# Patient Record
Sex: MALE | Race: WHITE | NOT HISPANIC OR LATINO | Employment: PART TIME | URBAN - METROPOLITAN AREA
[De-identification: names, ages, dates, MRNs, and addresses within clinical notes are randomized per-mention and may not be internally consistent; named-entity substitution may affect disease eponyms.]

---

## 2017-02-08 RX ORDER — PRAVASTATIN SODIUM 40 MG
TABLET ORAL
Qty: 90 TABLET | Refills: 3 | Status: SHIPPED | OUTPATIENT
Start: 2017-02-08 | End: 2017-03-24 | Stop reason: SDUPTHER

## 2017-03-14 RX ORDER — BISOPROLOL FUMARATE 5 MG/1
5 TABLET, FILM COATED ORAL DAILY
Qty: 90 TABLET | Refills: 2 | Status: SHIPPED | OUTPATIENT
Start: 2017-03-14 | End: 2017-08-03 | Stop reason: SDUPTHER

## 2017-03-14 RX ORDER — CLONAZEPAM 1 MG/1
1 TABLET ORAL NIGHTLY PRN
Qty: 30 TABLET | Refills: 2 | OUTPATIENT
Start: 2017-03-14 | End: 2017-04-10

## 2017-03-14 NOTE — TELEPHONE ENCOUNTER
Nelly from [facility] requesting refill for patient Jimmie Salcedo : 1950 for RX Clonazepam 1mg no refills take one tablet by mouth for seizures. Patient was last seen 10/25/2016 and has made a f/u appointment for 2017.     Called in refill for Clonazepam to Patients pharmacy. There were not suppose to be any refills so when I called it in i said 0 refills instead of the 2 refills that are on the order.

## 2017-03-15 ENCOUNTER — OFFICE VISIT (OUTPATIENT)
Dept: INTERNAL MEDICINE | Facility: CLINIC | Age: 67
End: 2017-03-15

## 2017-03-15 VITALS
HEIGHT: 71 IN | WEIGHT: 215 LBS | BODY MASS INDEX: 30.1 KG/M2 | HEART RATE: 64 BPM | SYSTOLIC BLOOD PRESSURE: 140 MMHG | DIASTOLIC BLOOD PRESSURE: 75 MMHG

## 2017-03-15 DIAGNOSIS — F51.04 PSYCHOPHYSIOLOGICAL INSOMNIA: ICD-10-CM

## 2017-03-15 DIAGNOSIS — D69.6 THROMBOCYTOPENIA (HCC): ICD-10-CM

## 2017-03-15 DIAGNOSIS — E80.4 GILBERT'S DISEASE: ICD-10-CM

## 2017-03-15 DIAGNOSIS — M19.90 ARTHRITIS: ICD-10-CM

## 2017-03-15 DIAGNOSIS — I10 ESSENTIAL HYPERTENSION: ICD-10-CM

## 2017-03-15 DIAGNOSIS — G47.33 SEVERE OBSTRUCTIVE SLEEP APNEA: ICD-10-CM

## 2017-03-15 DIAGNOSIS — N52.1 ERECTILE DYSFUNCTION DUE TO DISEASES CLASSIFIED ELSEWHERE: ICD-10-CM

## 2017-03-15 DIAGNOSIS — E80.6 HYPERBILIRUBINEMIA: ICD-10-CM

## 2017-03-15 DIAGNOSIS — I51.89 DIASTOLIC DYSFUNCTION: Primary | ICD-10-CM

## 2017-03-15 DIAGNOSIS — R79.89 ABNORMAL LIVER FUNCTION TEST: ICD-10-CM

## 2017-03-15 DIAGNOSIS — E78.49 OTHER HYPERLIPIDEMIA: ICD-10-CM

## 2017-03-15 DIAGNOSIS — L40.9 PSORIASIS: ICD-10-CM

## 2017-03-15 DIAGNOSIS — K21.9 GASTROESOPHAGEAL REFLUX DISEASE WITHOUT ESOPHAGITIS: ICD-10-CM

## 2017-03-15 DIAGNOSIS — R60.0 BILATERAL EDEMA OF LOWER EXTREMITY: ICD-10-CM

## 2017-03-15 DIAGNOSIS — K27.9 PEPTIC ULCERATION: ICD-10-CM

## 2017-03-15 DIAGNOSIS — R73.09 ABNORMAL GLUCOSE: ICD-10-CM

## 2017-03-15 LAB
ALBUMIN SERPL-MCNC: 4.2 G/DL (ref 3.2–4.8)
ALBUMIN/GLOB SERPL: 1.5 G/DL (ref 1.5–2.5)
ALP SERPL-CCNC: 100 U/L (ref 25–100)
ALT SERPL W P-5'-P-CCNC: 26 U/L (ref 7–40)
ANION GAP SERPL CALCULATED.3IONS-SCNC: 5 MMOL/L (ref 3–11)
ARTICHOKE IGE QN: 68 MG/DL (ref 0–130)
AST SERPL-CCNC: 22 U/L (ref 0–33)
BILIRUB CONJ SERPL-MCNC: 0.4 MG/DL (ref 0–0.2)
BILIRUB SERPL-MCNC: 2.3 MG/DL (ref 0.3–1.2)
BUN BLD-MCNC: 23 MG/DL (ref 9–23)
BUN/CREAT SERPL: 38.3 (ref 7–25)
CALCIUM SPEC-SCNC: 9.5 MG/DL (ref 8.7–10.4)
CHLORIDE SERPL-SCNC: 107 MMOL/L (ref 99–109)
CHOLEST SERPL-MCNC: 163 MG/DL (ref 0–200)
CO2 SERPL-SCNC: 29 MMOL/L (ref 20–31)
CREAT BLD-MCNC: 0.6 MG/DL (ref 0.6–1.3)
DEPRECATED RDW RBC AUTO: 47.7 FL (ref 37–54)
ERYTHROCYTE [DISTWIDTH] IN BLOOD BY AUTOMATED COUNT: 15 % (ref 11.3–14.5)
GFR SERPL CREATININE-BSD FRML MDRD: 136 ML/MIN/1.73
GLOBULIN UR ELPH-MCNC: 2.8 GM/DL
GLUCOSE BLD-MCNC: 117 MG/DL (ref 70–100)
HBA1C MFR BLD: 5.3 % (ref 4.8–5.6)
HCT VFR BLD AUTO: 48.9 % (ref 38.9–50.9)
HDLC SERPL-MCNC: 42 MG/DL (ref 40–60)
HGB BLD-MCNC: 17.5 G/DL (ref 13.1–17.5)
MCH RBC QN AUTO: 31.1 PG (ref 27–31)
MCHC RBC AUTO-ENTMCNC: 35.8 G/DL (ref 32–36)
MCV RBC AUTO: 86.9 FL (ref 80–99)
PLATELET # BLD AUTO: 111 10*3/MM3 (ref 150–450)
PMV BLD AUTO: 9.8 FL (ref 6–12)
POTASSIUM BLD-SCNC: 4 MMOL/L (ref 3.5–5.5)
PROT SERPL-MCNC: 7 G/DL (ref 5.7–8.2)
RBC # BLD AUTO: 5.63 10*6/MM3 (ref 4.2–5.76)
SODIUM BLD-SCNC: 141 MMOL/L (ref 132–146)
TRIGL SERPL-MCNC: 311 MG/DL (ref 0–150)
WBC NRBC COR # BLD: 8.34 10*3/MM3 (ref 3.5–10.8)

## 2017-03-15 PROCEDURE — 80053 COMPREHEN METABOLIC PANEL: CPT | Performed by: INTERNAL MEDICINE

## 2017-03-15 PROCEDURE — 80061 LIPID PANEL: CPT | Performed by: INTERNAL MEDICINE

## 2017-03-15 PROCEDURE — 36415 COLL VENOUS BLD VENIPUNCTURE: CPT | Performed by: INTERNAL MEDICINE

## 2017-03-15 PROCEDURE — 85027 COMPLETE CBC AUTOMATED: CPT | Performed by: INTERNAL MEDICINE

## 2017-03-15 PROCEDURE — 99214 OFFICE O/P EST MOD 30 MIN: CPT | Performed by: INTERNAL MEDICINE

## 2017-03-15 PROCEDURE — 83036 HEMOGLOBIN GLYCOSYLATED A1C: CPT | Performed by: INTERNAL MEDICINE

## 2017-03-15 PROCEDURE — 82248 BILIRUBIN DIRECT: CPT | Performed by: INTERNAL MEDICINE

## 2017-03-15 RX ORDER — SILDENAFIL 100 MG/1
100 TABLET, FILM COATED ORAL DAILY PRN
Qty: 6 TABLET | Refills: 5 | Status: SHIPPED | OUTPATIENT
Start: 2017-03-15

## 2017-03-15 NOTE — PROGRESS NOTES
Patient is a 66 y.o. male who is here for a follow up of chronic conditions.  Chief Complaint   Patient presents with   • Hypertension   • Hyperlipidemia         HPI:  Here for f/u.  No dizziness or lightheadedness.  Occasional R>L edema.  No nausea or emesis.  No dyspepsia.  Rare bruising.  Sleep is fair with the use of BZD.  No sob or cp.      History:    Patient Active Problem List   Diagnosis   • Acid reflux   • Arthritis   • Hypertension   • Hyperlipidemia   • Severe obstructive sleep apnea   • Psoriasis   • Diastolic dysfunction   • Peptic ulceration   • Bilateral edema of lower extremity   • Abnormal liver function test   • Hyperbilirubinemia   • Psychophysiological insomnia   • Gilbert's disease       Past Medical History   Diagnosis Date   • Abnormal liver function test    • Bilateral edema of lower extremity    • Cellulitis of leg, right    • Chalazion    • Kidney infection    • Neck muscle spasm    • Onychomycosis of toenail    • Peptic ulceration    • Renal calculi        Past Surgical History   Procedure Laterality Date   • Knee surgery Right 1989   • Other surgical history       Lithotripsy       Current Outpatient Prescriptions on File Prior to Visit   Medication Sig   • amLODIPine (NORVASC) 5 MG tablet Take 1 tablet by mouth daily.   • ammonium lactate (LAC-HYDRIN) 12 % lotion Apply  topically As Needed for dry skin.   • bisoprolol (ZEBeta) 5 MG tablet Take 1 tablet by mouth Daily.   • calcipotriene (DOVONOX) 0.005 % ointment Apply  topically.   • clobetasol (TEMOVATE) 0.05 % cream    • clonazePAM (KlonoPIN) 1 MG tablet Take 1 tablet by mouth At Night As Needed (insomnia).   • doxazosin (CARDURA) 4 MG tablet Take 1 tablet by mouth Every Night.   • furosemide (LASIX) 40 MG tablet daily.   • Naproxen-Esomeprazole (VIMOVO) 500-20 MG tablet delayed-release Take 1 tablet by mouth 2 (Two) Times a Day As Needed (pain).   • potassium chloride (K-DUR,KLOR-CON) 20 MEQ CR tablet daily.   • pravastatin  (PRAVACHOL) 40 MG tablet TAKE ONE TABLET BY MOUTH AT BEDTIME   • tiZANidine (ZANAFLEX) 4 MG tablet TAKE ONE TABLET BY MOUTH AT BEDTIME   • [DISCONTINUED] omeprazole (PRILOSEC) 20 MG capsule Take 1 capsule by mouth 2 (Two) Times a Day.     No current facility-administered medications on file prior to visit.        Family History   Problem Relation Age of Onset   • Arthritis Other    • Hypertension Other    • Hyperlipidemia Other    • Heart attack Other    • Hypertension Mother    • Heart disease Father    • Hypertension Father        Social History     Social History   • Marital status:      Spouse name: N/A   • Number of children: N/A   • Years of education: N/A     Occupational History   • Not on file.     Social History Main Topics   • Smoking status: Never Smoker   • Smokeless tobacco: Never Used   • Alcohol use Yes   • Drug use: No   • Sexual activity: Not on file     Other Topics Concern   • Not on file     Social History Narrative         ROS:    Review of Systems   Constitutional: Negative for chills, fatigue, fever and unexpected weight change.   HENT: Negative for congestion, ear pain, hearing loss, rhinorrhea, sinus pressure, sore throat and trouble swallowing.    Eyes: Negative for discharge and itching.   Respiratory: Negative for cough, chest tightness and shortness of breath.    Cardiovascular: Positive for leg swelling. Negative for chest pain and palpitations.   Gastrointestinal: Negative for abdominal pain, blood in stool, constipation, diarrhea and vomiting.        2013 colonoscopy normal   Endocrine: Negative for polydipsia and polyuria.   Genitourinary: Negative for difficulty urinating, dysuria, enuresis, frequency, hematuria and urgency.        11/16 psa  ED   Musculoskeletal: Positive for arthralgias and neck pain. Negative for back pain, gait problem and joint swelling.   Skin: Negative for rash and wound.        Dry skin   Allergic/Immunologic: Negative for immunocompromised state.  "  Neurological: Negative for dizziness, syncope, weakness, light-headedness, numbness and headaches.   Hematological: Does not bruise/bleed easily.   Psychiatric/Behavioral: Negative for behavioral problems, dysphoric mood and sleep disturbance. The patient is not nervous/anxious.        Visit Vitals   • /75   • Pulse 64   • Ht 71\" (180.3 cm)   • Wt 215 lb (97.5 kg)   • BMI 29.99 kg/m2       Physical Exam:    Physical Exam   Constitutional: He is oriented to person, place, and time. He appears well-developed and well-nourished.   HENT:   Head: Normocephalic and atraumatic.   Right Ear: External ear normal.   Left Ear: External ear normal.   Mouth/Throat: Oropharynx is clear and moist.   Eyes: Conjunctivae and EOM are normal. Pupils are equal, round, and reactive to light.   Neck: Normal range of motion. Neck supple.   Cardiovascular: Normal rate, regular rhythm, normal heart sounds and intact distal pulses.    Pulmonary/Chest: Effort normal and breath sounds normal.   Abdominal: Soft. Bowel sounds are normal.   Genitourinary: Rectum normal, prostate normal and penis normal. Rectal exam shows guaiac negative stool. No penile tenderness.   Musculoskeletal: Normal range of motion. He exhibits edema (trace R>L).   Neurological: He is alert and oriented to person, place, and time. He has normal reflexes.   Skin: Skin is warm and dry.   Dry skin   Psychiatric: He has a normal mood and affect. His behavior is normal. Judgment and thought content normal.   Vitals reviewed.      Procedure:      Discussion/Summary:  htn-improved with increase of doxazosin to 4 mg  hyperlipidemia-cont pravachol, labs today  BPH-cont cardura  GERD-cont ppi  djd-advised to limit nsaids  neck spasm-zanaflex prn  edema-cont lasix  Hyperbilirubinemia-recheck today incl the direct  Thrombocytopenia-recheck today  ED-viagra rx  ?[place]-will fractionate the bilirubin  high risk meds-labs today      labs noted and dw patient      Current " Outpatient Prescriptions:   •  amLODIPine (NORVASC) 5 MG tablet, Take 1 tablet by mouth daily., Disp: 90 tablet, Rfl: 3  •  ammonium lactate (LAC-HYDRIN) 12 % lotion, Apply  topically As Needed for dry skin., Disp: 567 g, Rfl: 2  •  bisoprolol (ZEBeta) 5 MG tablet, Take 1 tablet by mouth Daily., Disp: 90 tablet, Rfl: 2  •  calcipotriene (DOVONOX) 0.005 % ointment, Apply  topically., Disp: , Rfl:   •  clobetasol (TEMOVATE) 0.05 % cream, , Disp: , Rfl:   •  clonazePAM (KlonoPIN) 1 MG tablet, Take 1 tablet by mouth At Night As Needed (insomnia)., Disp: 30 tablet, Rfl: 2  •  doxazosin (CARDURA) 4 MG tablet, Take 1 tablet by mouth Every Night., Disp: 90 tablet, Rfl: 3  •  esomeprazole (nexIUM) 20 MG capsule, , Disp: , Rfl:   •  furosemide (LASIX) 40 MG tablet, daily., Disp: , Rfl:   •  Naproxen-Esomeprazole (VIMOVO) 500-20 MG tablet delayed-release, Take 1 tablet by mouth 2 (Two) Times a Day As Needed (pain)., Disp: 60 tablet, Rfl: 2  •  potassium chloride (K-DUR,KLOR-CON) 20 MEQ CR tablet, daily., Disp: , Rfl:   •  pravastatin (PRAVACHOL) 40 MG tablet, TAKE ONE TABLET BY MOUTH AT BEDTIME, Disp: 90 tablet, Rfl: 3  •  tiZANidine (ZANAFLEX) 4 MG tablet, TAKE ONE TABLET BY MOUTH AT BEDTIME, Disp: 30 tablet, Rfl: 5  •  sildenafil (VIAGRA) 100 MG tablet, Take 1 tablet by mouth Daily As Needed for erectile dysfunction., Disp: 6 tablet, Rfl: 5        Jimmie was seen today for hypertension and hyperlipidemia.    Diagnoses and all orders for this visit:    Diastolic dysfunction    Other hyperlipidemia  -     Comprehensive Metabolic Panel  -     Lipid Panel    Essential hypertension    Severe obstructive sleep apnea    Gastroesophageal reflux disease without esophagitis    Peptic ulceration    Arthritis    Psoriasis    Abnormal liver function test    Bilateral edema of lower extremity    Hyperbilirubinemia  -     Bilirubin, Direct    Psychophysiological insomnia    Thrombocytopenia  -     CBC (No Diff)    Erectile dysfunction due  to diseases classified elsewhere  -     sildenafil (VIAGRA) 100 MG tablet; Take 1 tablet by mouth Daily As Needed for erectile dysfunction.    Abnormal glucose  -     Hemoglobin A1c    Gilbert's disease

## 2017-03-24 DIAGNOSIS — I10 ESSENTIAL HYPERTENSION: ICD-10-CM

## 2017-03-24 RX ORDER — DOXAZOSIN MESYLATE 4 MG/1
4 TABLET ORAL NIGHTLY
Qty: 90 TABLET | Refills: 3 | Status: SHIPPED | OUTPATIENT
Start: 2017-03-24 | End: 2017-08-03 | Stop reason: SDUPTHER

## 2017-03-24 RX ORDER — PRAVASTATIN SODIUM 40 MG
40 TABLET ORAL DAILY
Qty: 90 TABLET | Refills: 3 | Status: SHIPPED | OUTPATIENT
Start: 2017-03-24 | End: 2017-08-03 | Stop reason: SDUPTHER

## 2017-03-24 RX ORDER — AMLODIPINE BESYLATE 5 MG/1
5 TABLET ORAL DAILY
Qty: 90 TABLET | Refills: 3 | Status: SHIPPED | OUTPATIENT
Start: 2017-03-24 | End: 2017-03-24 | Stop reason: SDUPTHER

## 2017-03-24 RX ORDER — TIZANIDINE 4 MG/1
4 TABLET ORAL DAILY
Qty: 90 TABLET | Refills: 3 | Status: SHIPPED | OUTPATIENT
Start: 2017-03-24 | End: 2017-08-03 | Stop reason: SDUPTHER

## 2017-03-24 RX ORDER — AMLODIPINE BESYLATE 5 MG/1
5 TABLET ORAL DAILY
Qty: 90 TABLET | Refills: 3 | Status: SHIPPED | OUTPATIENT
Start: 2017-03-24 | End: 2017-08-03 | Stop reason: SDUPTHER

## 2017-03-30 ENCOUNTER — TELEPHONE (OUTPATIENT)
Dept: INTERNAL MEDICINE | Facility: CLINIC | Age: 67
End: 2017-03-30

## 2017-03-30 NOTE — TELEPHONE ENCOUNTER
----- Message from Javad Negron MD sent at 3/30/2017  4:35 PM EDT -----  Regarding: RE: MENTAL HEALTH ISSUES  The pulmonary/sleep MD is rxing that  ----- Message -----     From: Jackie Zapata MA     Sent: 3/30/2017   4:22 PM       To: Javad Negron MD  Subject: FW: MENTAL HEALTH ISSUES                         Spoke to wife....... She states that years ago he had a complete mental breakdown and took him like 2 years to get better. She is noticing some of the same symptoms. He is very defensive, short tempered and she is very concerned.  She wants you to know tricia does not know she is calling  ----- Message -----     From: Ghazal Pelletier     Sent: 3/30/2017   2:00 PM       To: Jackie Zapata MA  Subject: MENTAL HEALTH ISSUES                             YURIY PATIENTS WIFE IS VERY CONCERNED  ABOUT MEDICATION CLONIAZEPAM THAT PATIENT IS ON. SHE HAS MENTAL HEALTH CONCERNS AND SHE THINKS ITS BECAUSE OF THE MEDICATION. PATIENT DOES NOT KNOW HIS WIFE IS CALLING. PLEASE CALL [facility] [phone]      Notified her to call sleep doctor

## 2017-03-31 ENCOUNTER — TELEPHONE (OUTPATIENT)
Dept: SLEEP MEDICINE | Facility: HOSPITAL | Age: 67
End: 2017-03-31

## 2017-03-31 NOTE — TELEPHONE ENCOUNTER
Patient wife called and she feels like every since patient has been on clonazepam his behavior has been changing.    - extreme irritability  - manic like: unable to function normally   - jaime  - eyes look grey and lifeless     Feels like he has completely changed since on the medication.    Patient wife wants your medical advice if they should discontinue the medication.     Wife states that he doesn't know that he is calling and that she wants me to call her back with a response.     [phone] Silvana Salcedo

## 2017-04-10 ENCOUNTER — OFFICE VISIT (OUTPATIENT)
Dept: SLEEP MEDICINE | Facility: HOSPITAL | Age: 67
End: 2017-04-10

## 2017-04-10 VITALS
SYSTOLIC BLOOD PRESSURE: 156 MMHG | BODY MASS INDEX: 31.64 KG/M2 | DIASTOLIC BLOOD PRESSURE: 58 MMHG | HEART RATE: 56 BPM | WEIGHT: 226 LBS | HEIGHT: 71 IN | OXYGEN SATURATION: 91 %

## 2017-04-10 DIAGNOSIS — F51.04 PSYCHOPHYSIOLOGICAL INSOMNIA: ICD-10-CM

## 2017-04-10 DIAGNOSIS — G47.33 SEVERE OBSTRUCTIVE SLEEP APNEA: ICD-10-CM

## 2017-04-10 DIAGNOSIS — G47.33 OBSTRUCTIVE SLEEP APNEA, ADULT: Primary | ICD-10-CM

## 2017-04-10 PROCEDURE — 99214 OFFICE O/P EST MOD 30 MIN: CPT | Performed by: INTERNAL MEDICINE

## 2017-04-10 NOTE — PROGRESS NOTES
Subjective:     Chief Complaint:   Chief Complaint   Patient presents with   • Follow-up       HPI:    Jimmie Salcedo is a 66 y.o. male here for follow-up of obstructive sleep apnea and psychophysiological insomnia.      He has been prescribed clonazepam to help with sleep.  He thinks that causes him to be excessively somnolent following day.  His wife has noticed this as well.  He has not used it for the last week.    I ask him what he notices when he does not take the clonazepam and he said he simply wakes up several times per night.  It does not necessarily take him an excessive amount of time to return to sleep.    Since starting PAP sleep problem has: increased  Currently using PAP: yes Hours of usage during the night: 6    Amount of sleep per night : 6 hours  Average length of time it takes to fall asleep : 10 minutes  Number of awakenings per night : 1     He feels fatigue (tiredness, exhaustion, lethargy) in the daytime even when not sleepy? Occasionally   He feels sleepy (or struggles to stay awake) in the daytime? Occasionally     [place] Scale scored as 9/24.    Type of mask: nasal pillow    He (since starting PAP or since last visit) has problems with the following:   Pressure from the mask 0 - No Problem  Skin irritation from the mask 0 - No Problem  Mask coming off face 2 - Mild Problems  Air leaks from the mask 2 - Mild Problems  Dry mouth or throat 8 - Severe Problems  Nasal congestion 2 - Mild Problems    I reviewed his PAP download:  Average pressure: 10  Average AHI:  1  Average minutes in large leak per night: 0      Current medications are:   Current Outpatient Prescriptions:   •  amLODIPine (NORVASC) 5 MG tablet, Take 1 tablet by mouth Daily., Disp: 90 tablet, Rfl: 3  •  ammonium lactate (LAC-HYDRIN) 12 % lotion, Apply  topically As Needed for dry skin., Disp: 567 g, Rfl: 2  •  bisoprolol (ZEBeta) 5 MG tablet, Take 1 tablet by mouth Daily., Disp: 90 tablet, Rfl: 2  •  calcipotriene  (DOVONOX) 0.005 % ointment, Apply  topically., Disp: , Rfl:   •  clobetasol (TEMOVATE) 0.05 % cream, , Disp: , Rfl:   •  doxazosin (CARDURA) 4 MG tablet, Take 1 tablet by mouth Every Night., Disp: 90 tablet, Rfl: 3  •  esomeprazole (nexIUM) 20 MG capsule, , Disp: , Rfl:   •  furosemide (LASIX) 40 MG tablet, daily., Disp: , Rfl:   •  Naproxen-Esomeprazole (VIMOVO) 500-20 MG tablet delayed-release, Take 1 tablet by mouth 2 (Two) Times a Day As Needed (pain)., Disp: 60 tablet, Rfl: 2  •  potassium chloride (K-DUR,KLOR-CON) 20 MEQ CR tablet, daily., Disp: , Rfl:   •  pravastatin (PRAVACHOL) 40 MG tablet, Take 1 tablet by mouth Daily., Disp: 90 tablet, Rfl: 3  •  tiZANidine (ZANAFLEX) 4 MG tablet, Take 1 tablet by mouth Daily., Disp: 90 tablet, Rfl: 3  •  sildenafil (VIAGRA) 100 MG tablet, Take 1 tablet by mouth Daily As Needed for erectile dysfunction., Disp: 6 tablet, Rfl: 5.      The patient's relevant past medical, surgical, family and social history were reviewed and updated in Epic as appropriate.     ROS:    Review of Systems   Constitutional: Positive for fatigue.   HENT: Positive for congestion.    Respiratory: Positive for apnea.    Psychiatric/Behavioral: Positive for sleep disturbance.         Objective:    Physical Exam   Constitutional: He is oriented to person, place, and time. He appears well-developed and well-nourished.   HENT:   Head: Normocephalic and atraumatic.   Mouth/Throat: Oropharynx is clear and moist.   Class II airway   Neck: Neck supple. No thyromegaly present.   Cardiovascular: Normal rate and regular rhythm.  Exam reveals no gallop and no friction rub.    No murmur heard.  Pulmonary/Chest: Effort normal. No respiratory distress. He has no wheezes. He has no rales.   Abdominal: Soft. Bowel sounds are normal.   Musculoskeletal: He exhibits no edema.   Neurological: He is alert and oriented to person, place, and time.   Skin: Skin is warm and dry.   Psychiatric: He has a normal mood and  affect. His behavior is normal.   Vitals reviewed.      Data:    Patient's PAP download was personally reviewed including raw data and results.    Assessment:    Problem List Items Addressed This Visit        Pulmonary Problems    Severe obstructive sleep apnea    Overview     Auto CPAP            Other    Psychophysiological insomnia      Other Visit Diagnoses     Obstructive sleep apnea, adult    -  Primary    Relevant Orders    CPAP Therapy        I would recommend he not take the clonazepam and I would not necessarily replace it with another sleep aid at this point.  I think with concentration on sleep hygiene and, if that is not fully effective, cognitive behavioral therapy, would be preferable in his case.      Plan:   I went over sleep hygiene in detail with him.  I gave him some information on online cognitive behavioral therapy if he wants to go that route.  Otherwise we could refer him locally.  No change in PAP settings.  No change in his mask size or type.  Continued efforts at further weight loss.  I urged 7-8 hours of sleep per night on average.  I renewed supplies for the next year.  Follow-up scheduled.  If problems in the interim he knows how to contact us or his [employer] company.  Discussed the above in detail with the patient and any family present.        Signed by  Segun Mejia MD

## 2017-04-14 ENCOUNTER — TRANSCRIBE ORDERS (OUTPATIENT)
Dept: LAB | Facility: HOSPITAL | Age: 67
End: 2017-04-14

## 2017-04-14 ENCOUNTER — LAB (OUTPATIENT)
Dept: LAB | Facility: HOSPITAL | Age: 67
End: 2017-04-14

## 2017-04-14 DIAGNOSIS — L03.115 CELLULITIS OF RIGHT FOOT: ICD-10-CM

## 2017-04-14 DIAGNOSIS — R50.9 HYPERTHERMIA-INDUCED DEFECT: ICD-10-CM

## 2017-04-14 DIAGNOSIS — D69.6 THROMBOCYTOPENIA, UNSPECIFIED (HCC): Primary | ICD-10-CM

## 2017-04-14 DIAGNOSIS — D69.6 THROMBOCYTOPENIA, UNSPECIFIED (HCC): ICD-10-CM

## 2017-04-14 LAB
ALBUMIN SERPL-MCNC: 3.8 G/DL (ref 3.2–4.8)
ALBUMIN/GLOB SERPL: 1.4 G/DL (ref 1.5–2.5)
ALP SERPL-CCNC: 81 U/L (ref 25–100)
ALT SERPL W P-5'-P-CCNC: 32 U/L (ref 7–40)
ANION GAP SERPL CALCULATED.3IONS-SCNC: 1 MMOL/L (ref 3–11)
AST SERPL-CCNC: 29 U/L (ref 0–33)
BASOPHILS # BLD AUTO: 0.02 10*3/MM3 (ref 0–0.2)
BASOPHILS NFR BLD AUTO: 0.3 % (ref 0–1)
BILIRUB SERPL-MCNC: 3.1 MG/DL (ref 0.3–1.2)
BUN BLD-MCNC: 27 MG/DL (ref 9–23)
BUN/CREAT SERPL: 30 (ref 7–25)
CALCIUM SPEC-SCNC: 9 MG/DL (ref 8.7–10.4)
CHLORIDE SERPL-SCNC: 104 MMOL/L (ref 99–109)
CK SERPL-CCNC: 72 U/L (ref 26–174)
CO2 SERPL-SCNC: 30 MMOL/L (ref 20–31)
CREAT BLD-MCNC: 0.9 MG/DL (ref 0.6–1.3)
CRP SERPL-MCNC: 25.19 MG/DL (ref 0–1)
DEPRECATED RDW RBC AUTO: 50.3 FL (ref 37–54)
EOSINOPHIL # BLD AUTO: 0.04 10*3/MM3 (ref 0.1–0.3)
EOSINOPHIL NFR BLD AUTO: 0.6 % (ref 0–3)
ERYTHROCYTE [DISTWIDTH] IN BLOOD BY AUTOMATED COUNT: 15.5 % (ref 11.3–14.5)
ERYTHROCYTE [SEDIMENTATION RATE] IN BLOOD: 15 MM/HR (ref 0–20)
GFR SERPL CREATININE-BSD FRML MDRD: 85 ML/MIN/1.73
GLOBULIN UR ELPH-MCNC: 2.8 GM/DL
GLUCOSE BLD-MCNC: 118 MG/DL (ref 70–100)
HCT VFR BLD AUTO: 44.6 % (ref 38.9–50.9)
HGB BLD-MCNC: 15.6 G/DL (ref 13.1–17.5)
IMM GRANULOCYTES # BLD: 0.02 10*3/MM3 (ref 0–0.03)
IMM GRANULOCYTES NFR BLD: 0.3 % (ref 0–0.6)
LYMPHOCYTES # BLD AUTO: 0.49 10*3/MM3 (ref 0.6–4.8)
LYMPHOCYTES NFR BLD AUTO: 6.9 % (ref 24–44)
MCH RBC QN AUTO: 31.3 PG (ref 27–31)
MCHC RBC AUTO-ENTMCNC: 35 G/DL (ref 32–36)
MCV RBC AUTO: 89.6 FL (ref 80–99)
MONOCYTES # BLD AUTO: 0.39 10*3/MM3 (ref 0–1)
MONOCYTES NFR BLD AUTO: 5.5 % (ref 0–12)
NEUTROPHILS # BLD AUTO: 6.1 10*3/MM3 (ref 1.5–8.3)
NEUTROPHILS NFR BLD AUTO: 86.4 % (ref 41–71)
PLATELET # BLD AUTO: 80 10*3/MM3 (ref 150–450)
PMV BLD AUTO: 9.7 FL (ref 6–12)
POTASSIUM BLD-SCNC: 3.8 MMOL/L (ref 3.5–5.5)
PROT SERPL-MCNC: 6.6 G/DL (ref 5.7–8.2)
RBC # BLD AUTO: 4.98 10*6/MM3 (ref 4.2–5.76)
SODIUM BLD-SCNC: 135 MMOL/L (ref 132–146)
WBC NRBC COR # BLD: 7.06 10*3/MM3 (ref 3.5–10.8)

## 2017-04-14 PROCEDURE — 36415 COLL VENOUS BLD VENIPUNCTURE: CPT

## 2017-04-14 PROCEDURE — 85652 RBC SED RATE AUTOMATED: CPT

## 2017-04-14 PROCEDURE — 85025 COMPLETE CBC W/AUTO DIFF WBC: CPT

## 2017-04-14 PROCEDURE — 86140 C-REACTIVE PROTEIN: CPT

## 2017-04-14 PROCEDURE — 80053 COMPREHEN METABOLIC PANEL: CPT

## 2017-04-14 PROCEDURE — 82550 ASSAY OF CK (CPK): CPT

## 2017-04-17 ENCOUNTER — TRANSCRIBE ORDERS (OUTPATIENT)
Dept: LAB | Facility: HOSPITAL | Age: 67
End: 2017-04-17

## 2017-04-17 ENCOUNTER — APPOINTMENT (OUTPATIENT)
Dept: LAB | Facility: HOSPITAL | Age: 67
End: 2017-04-17

## 2017-04-17 DIAGNOSIS — R50.9 HYPERTHERMIA-INDUCED DEFECT: ICD-10-CM

## 2017-04-17 DIAGNOSIS — R51.9 FACIAL PAIN: ICD-10-CM

## 2017-04-17 DIAGNOSIS — L03.115 CELLULITIS OF RIGHT FOOT: ICD-10-CM

## 2017-04-17 DIAGNOSIS — D69.6 THROMBOCYTOPENIA, UNSPECIFIED (HCC): Primary | ICD-10-CM

## 2017-04-17 LAB
ALBUMIN SERPL-MCNC: 4.1 G/DL (ref 3.2–4.8)
ALBUMIN/GLOB SERPL: 1.4 G/DL (ref 1.5–2.5)
ALP SERPL-CCNC: 77 U/L (ref 25–100)
ALT SERPL W P-5'-P-CCNC: 34 U/L (ref 7–40)
ANION GAP SERPL CALCULATED.3IONS-SCNC: 6 MMOL/L (ref 3–11)
AST SERPL-CCNC: 32 U/L (ref 0–33)
BASOPHILS # BLD AUTO: 0.04 10*3/MM3 (ref 0–0.2)
BASOPHILS NFR BLD AUTO: 0.5 % (ref 0–1)
BILIRUB SERPL-MCNC: 1.4 MG/DL (ref 0.3–1.2)
BUN BLD-MCNC: 20 MG/DL (ref 9–23)
BUN/CREAT SERPL: 28.6 (ref 7–25)
CALCIUM SPEC-SCNC: 9.6 MG/DL (ref 8.7–10.4)
CHLORIDE SERPL-SCNC: 104 MMOL/L (ref 99–109)
CO2 SERPL-SCNC: 30 MMOL/L (ref 20–31)
CREAT BLD-MCNC: 0.7 MG/DL (ref 0.6–1.3)
CRP SERPL-MCNC: 3.61 MG/DL (ref 0–1)
DEPRECATED RDW RBC AUTO: 50.1 FL (ref 37–54)
EOSINOPHIL # BLD AUTO: 0.23 10*3/MM3 (ref 0.1–0.3)
EOSINOPHIL NFR BLD AUTO: 2.9 % (ref 0–3)
ERYTHROCYTE [DISTWIDTH] IN BLOOD BY AUTOMATED COUNT: 15.1 % (ref 11.3–14.5)
ERYTHROCYTE [SEDIMENTATION RATE] IN BLOOD: 14 MM/HR (ref 0–20)
GFR SERPL CREATININE-BSD FRML MDRD: 114 ML/MIN/1.73
GLOBULIN UR ELPH-MCNC: 2.9 GM/DL
GLUCOSE BLD-MCNC: 130 MG/DL (ref 70–100)
HCT VFR BLD AUTO: 46.4 % (ref 38.9–50.9)
HGB BLD-MCNC: 16 G/DL (ref 13.1–17.5)
IMM GRANULOCYTES # BLD: 0.04 10*3/MM3 (ref 0–0.03)
IMM GRANULOCYTES NFR BLD: 0.5 % (ref 0–0.6)
LYMPHOCYTES # BLD AUTO: 1.39 10*3/MM3 (ref 0.6–4.8)
LYMPHOCYTES NFR BLD AUTO: 17.5 % (ref 24–44)
MCH RBC QN AUTO: 31.2 PG (ref 27–31)
MCHC RBC AUTO-ENTMCNC: 34.5 G/DL (ref 32–36)
MCV RBC AUTO: 90.4 FL (ref 80–99)
MONOCYTES # BLD AUTO: 0.58 10*3/MM3 (ref 0–1)
MONOCYTES NFR BLD AUTO: 7.3 % (ref 0–12)
NEUTROPHILS # BLD AUTO: 5.67 10*3/MM3 (ref 1.5–8.3)
NEUTROPHILS NFR BLD AUTO: 71.3 % (ref 41–71)
PLATELET # BLD AUTO: 114 10*3/MM3 (ref 150–450)
PMV BLD AUTO: 9.9 FL (ref 6–12)
POTASSIUM BLD-SCNC: 4.1 MMOL/L (ref 3.5–5.5)
PROT SERPL-MCNC: 7 G/DL (ref 5.7–8.2)
RBC # BLD AUTO: 5.13 10*6/MM3 (ref 4.2–5.76)
SODIUM BLD-SCNC: 140 MMOL/L (ref 132–146)
WBC NRBC COR # BLD: 7.95 10*3/MM3 (ref 3.5–10.8)

## 2017-04-17 PROCEDURE — 36415 COLL VENOUS BLD VENIPUNCTURE: CPT | Performed by: INTERNAL MEDICINE

## 2017-04-17 PROCEDURE — 80053 COMPREHEN METABOLIC PANEL: CPT | Performed by: INTERNAL MEDICINE

## 2017-04-17 PROCEDURE — 85652 RBC SED RATE AUTOMATED: CPT | Performed by: INTERNAL MEDICINE

## 2017-04-17 PROCEDURE — 85025 COMPLETE CBC W/AUTO DIFF WBC: CPT | Performed by: INTERNAL MEDICINE

## 2017-04-17 PROCEDURE — 86140 C-REACTIVE PROTEIN: CPT | Performed by: INTERNAL MEDICINE

## 2017-04-26 ENCOUNTER — TRANSCRIBE ORDERS (OUTPATIENT)
Dept: LAB | Facility: HOSPITAL | Age: 67
End: 2017-04-26

## 2017-04-26 ENCOUNTER — LAB (OUTPATIENT)
Dept: LAB | Facility: HOSPITAL | Age: 67
End: 2017-04-26

## 2017-04-26 DIAGNOSIS — D69.6 THROMBOCYTOPENIA, UNSPECIFIED (HCC): ICD-10-CM

## 2017-04-26 DIAGNOSIS — H60.501 ACUTE NON-INFECTIVE OTITIS EXTERNA OF RIGHT EAR, UNSPECIFIED TYPE: ICD-10-CM

## 2017-04-26 DIAGNOSIS — R50.9 HYPERTHERMIA-INDUCED DEFECT: ICD-10-CM

## 2017-04-26 DIAGNOSIS — L40.9 PSORIASIS: ICD-10-CM

## 2017-04-26 DIAGNOSIS — L03.115 CELLULITIS OF RIGHT FOOT: ICD-10-CM

## 2017-04-26 DIAGNOSIS — L40.9 PSORIASIS: Primary | ICD-10-CM

## 2017-04-26 LAB
ALBUMIN SERPL-MCNC: 4.2 G/DL (ref 3.2–4.8)
ALBUMIN/GLOB SERPL: 1.4 G/DL (ref 1.5–2.5)
ALP SERPL-CCNC: 85 U/L (ref 25–100)
ALT SERPL W P-5'-P-CCNC: 25 U/L (ref 7–40)
ANION GAP SERPL CALCULATED.3IONS-SCNC: 5 MMOL/L (ref 3–11)
AST SERPL-CCNC: 19 U/L (ref 0–33)
BASOPHILS # BLD AUTO: 0.05 10*3/MM3 (ref 0–0.2)
BASOPHILS NFR BLD AUTO: 0.5 % (ref 0–1)
BILIRUB SERPL-MCNC: 2.4 MG/DL (ref 0.3–1.2)
BUN BLD-MCNC: 23 MG/DL (ref 9–23)
BUN/CREAT SERPL: 32.9 (ref 7–25)
CALCIUM SPEC-SCNC: 9.5 MG/DL (ref 8.7–10.4)
CHLORIDE SERPL-SCNC: 100 MMOL/L (ref 99–109)
CO2 SERPL-SCNC: 32 MMOL/L (ref 20–31)
CREAT BLD-MCNC: 0.7 MG/DL (ref 0.6–1.3)
CRP SERPL-MCNC: 0.21 MG/DL (ref 0–1)
DEPRECATED RDW RBC AUTO: 49.6 FL (ref 37–54)
EOSINOPHIL # BLD AUTO: 0.21 10*3/MM3 (ref 0.1–0.3)
EOSINOPHIL NFR BLD AUTO: 2.2 % (ref 0–3)
ERYTHROCYTE [DISTWIDTH] IN BLOOD BY AUTOMATED COUNT: 15.2 % (ref 11.3–14.5)
ERYTHROCYTE [SEDIMENTATION RATE] IN BLOOD: 1 MM/HR (ref 0–20)
GFR SERPL CREATININE-BSD FRML MDRD: 114 ML/MIN/1.73
GLOBULIN UR ELPH-MCNC: 3 GM/DL
GLUCOSE BLD-MCNC: 101 MG/DL (ref 70–100)
HCT VFR BLD AUTO: 47.3 % (ref 38.9–50.9)
HGB BLD-MCNC: 16.7 G/DL (ref 13.1–17.5)
IMM GRANULOCYTES # BLD: 0.02 10*3/MM3 (ref 0–0.03)
IMM GRANULOCYTES NFR BLD: 0.2 % (ref 0–0.6)
LYMPHOCYTES # BLD AUTO: 1.55 10*3/MM3 (ref 0.6–4.8)
LYMPHOCYTES NFR BLD AUTO: 16.1 % (ref 24–44)
MCH RBC QN AUTO: 31.6 PG (ref 27–31)
MCHC RBC AUTO-ENTMCNC: 35.3 G/DL (ref 32–36)
MCV RBC AUTO: 89.4 FL (ref 80–99)
MONOCYTES # BLD AUTO: 0.64 10*3/MM3 (ref 0–1)
MONOCYTES NFR BLD AUTO: 6.6 % (ref 0–12)
NEUTROPHILS # BLD AUTO: 7.16 10*3/MM3 (ref 1.5–8.3)
NEUTROPHILS NFR BLD AUTO: 74.4 % (ref 41–71)
PLATELET # BLD AUTO: 169 10*3/MM3 (ref 150–450)
PMV BLD AUTO: 9.4 FL (ref 6–12)
POTASSIUM BLD-SCNC: 4.4 MMOL/L (ref 3.5–5.5)
PROT SERPL-MCNC: 7.2 G/DL (ref 5.7–8.2)
RBC # BLD AUTO: 5.29 10*6/MM3 (ref 4.2–5.76)
SODIUM BLD-SCNC: 137 MMOL/L (ref 132–146)
WBC NRBC COR # BLD: 9.63 10*3/MM3 (ref 3.5–10.8)

## 2017-04-26 PROCEDURE — 85652 RBC SED RATE AUTOMATED: CPT | Performed by: INTERNAL MEDICINE

## 2017-04-26 PROCEDURE — 80053 COMPREHEN METABOLIC PANEL: CPT | Performed by: INTERNAL MEDICINE

## 2017-04-26 PROCEDURE — 36415 COLL VENOUS BLD VENIPUNCTURE: CPT | Performed by: INTERNAL MEDICINE

## 2017-04-26 PROCEDURE — 86140 C-REACTIVE PROTEIN: CPT | Performed by: INTERNAL MEDICINE

## 2017-04-26 PROCEDURE — 85025 COMPLETE CBC W/AUTO DIFF WBC: CPT | Performed by: INTERNAL MEDICINE

## 2017-04-28 PROBLEM — D69.6 THROMBOCYTOPENIA: Status: ACTIVE | Noted: 2017-04-28

## 2017-05-01 ENCOUNTER — LAB (OUTPATIENT)
Dept: LAB | Facility: HOSPITAL | Age: 67
End: 2017-05-01

## 2017-05-01 ENCOUNTER — CONSULT (OUTPATIENT)
Dept: ONCOLOGY | Facility: CLINIC | Age: 67
End: 2017-05-01

## 2017-05-01 VITALS
HEART RATE: 53 BPM | BODY MASS INDEX: 32.22 KG/M2 | TEMPERATURE: 97.3 F | DIASTOLIC BLOOD PRESSURE: 74 MMHG | SYSTOLIC BLOOD PRESSURE: 166 MMHG | WEIGHT: 231 LBS | RESPIRATION RATE: 19 BRPM

## 2017-05-01 DIAGNOSIS — D69.6 THROMBOCYTOPENIA (HCC): ICD-10-CM

## 2017-05-01 DIAGNOSIS — E80.4 GILBERT'S DISEASE: ICD-10-CM

## 2017-05-01 DIAGNOSIS — E80.6 HYPERBILIRUBINEMIA: Primary | ICD-10-CM

## 2017-05-01 DIAGNOSIS — E80.6 HYPERBILIRUBINEMIA: ICD-10-CM

## 2017-05-01 LAB
ALBUMIN SERPL-MCNC: 4.2 G/DL (ref 3.2–4.8)
ALBUMIN/GLOB SERPL: 1.4 G/DL (ref 1.5–2.5)
ALP SERPL-CCNC: 89 U/L (ref 25–100)
ALT SERPL W P-5'-P-CCNC: 20 U/L (ref 7–40)
ANION GAP SERPL CALCULATED.3IONS-SCNC: 5 MMOL/L (ref 3–11)
APTT PPP: 31.6 SECONDS (ref 24–31)
AST SERPL-CCNC: 17 U/L (ref 0–33)
BILIRUB CONJ SERPL-MCNC: 0.5 MG/DL (ref 0–0.2)
BILIRUB INDIRECT SERPL-MCNC: 2.1 MG/DL (ref 0.1–1.1)
BILIRUB SERPL-MCNC: 2.6 MG/DL (ref 0.3–1.2)
BILIRUB SERPL-MCNC: 2.6 MG/DL (ref 0.3–1.2)
BUN BLD-MCNC: 18 MG/DL (ref 9–23)
BUN/CREAT SERPL: 30 (ref 7–25)
CALCIUM SPEC-SCNC: 9.8 MG/DL (ref 8.7–10.4)
CHLORIDE SERPL-SCNC: 105 MMOL/L (ref 99–109)
CO2 SERPL-SCNC: 29 MMOL/L (ref 20–31)
CREAT BLD-MCNC: 0.6 MG/DL (ref 0.6–1.3)
D DIMER PPP FEU-MCNC: 0.21 MG/L (FEU) (ref 0–0.5)
DAT POLY-SP REAG RBC QL: NEGATIVE
ERYTHROCYTE [DISTWIDTH] IN BLOOD BY AUTOMATED COUNT: 14.9 % (ref 11.3–14.5)
FIBRINOGEN PPP-MCNC: 194 MG/DL (ref 200–400)
FSP PPP LA-ACNC: NORMAL
GFR SERPL CREATININE-BSD FRML MDRD: 136 ML/MIN/1.73
GLOBULIN UR ELPH-MCNC: 3 GM/DL
GLUCOSE BLD-MCNC: 103 MG/DL (ref 70–100)
HCT VFR BLD AUTO: 47 % (ref 38.9–50.9)
HGB BLD-MCNC: 15.8 G/DL (ref 13.1–17.5)
INR PPP: 1.09
LYMPHOCYTES # BLD AUTO: 1.7 10*3/MM3 (ref 0.6–4.8)
LYMPHOCYTES NFR BLD AUTO: 24.6 % (ref 24–44)
MCH RBC QN AUTO: 30.1 PG (ref 27–31)
MCHC RBC AUTO-ENTMCNC: 33.6 G/DL (ref 32–36)
MCV RBC AUTO: 89.8 FL (ref 80–99)
MONOCYTES # BLD AUTO: 0.3 10*3/MM3 (ref 0–1)
MONOCYTES NFR BLD AUTO: 4.1 % (ref 0–12)
NEUTROPHILS # BLD AUTO: 4.8 10*3/MM3 (ref 1.5–8.3)
NEUTROPHILS NFR BLD AUTO: 71.3 % (ref 41–71)
PLATELET # BLD AUTO: 165 10*3/MM3 (ref 150–450)
PMV BLD AUTO: 7.1 FL (ref 6–12)
POTASSIUM BLD-SCNC: 4 MMOL/L (ref 3.5–5.5)
PROT SERPL-MCNC: 7.2 G/DL (ref 5.7–8.2)
PROTHROMBIN TIME: 11.9 SECONDS (ref 9.6–11.5)
RBC # BLD AUTO: 5.24 10*6/MM3 (ref 4.2–5.76)
RETICS/RBC NFR AUTO: 2.02 % (ref 0.5–1.5)
SODIUM BLD-SCNC: 139 MMOL/L (ref 132–146)
WBC NRBC COR # BLD: 6.8 10*3/MM3 (ref 3.5–10.8)

## 2017-05-01 PROCEDURE — 85730 THROMBOPLASTIN TIME PARTIAL: CPT | Performed by: INTERNAL MEDICINE

## 2017-05-01 PROCEDURE — 85060 BLOOD SMEAR INTERPRETATION: CPT | Performed by: INTERNAL MEDICINE

## 2017-05-01 PROCEDURE — 86038 ANTINUCLEAR ANTIBODIES: CPT | Performed by: INTERNAL MEDICINE

## 2017-05-01 PROCEDURE — 85025 COMPLETE CBC W/AUTO DIFF WBC: CPT

## 2017-05-01 PROCEDURE — 85045 AUTOMATED RETICULOCYTE COUNT: CPT | Performed by: INTERNAL MEDICINE

## 2017-05-01 PROCEDURE — 86431 RHEUMATOID FACTOR QUANT: CPT | Performed by: INTERNAL MEDICINE

## 2017-05-01 PROCEDURE — 82247 BILIRUBIN TOTAL: CPT | Performed by: INTERNAL MEDICINE

## 2017-05-01 PROCEDURE — 85362 FIBRIN DEGRADATION PRODUCTS: CPT | Performed by: INTERNAL MEDICINE

## 2017-05-01 PROCEDURE — 36415 COLL VENOUS BLD VENIPUNCTURE: CPT

## 2017-05-01 PROCEDURE — 85379 FIBRIN DEGRADATION QUANT: CPT | Performed by: INTERNAL MEDICINE

## 2017-05-01 PROCEDURE — 86880 COOMBS TEST DIRECT: CPT | Performed by: INTERNAL MEDICINE

## 2017-05-01 PROCEDURE — 85610 PROTHROMBIN TIME: CPT | Performed by: INTERNAL MEDICINE

## 2017-05-01 PROCEDURE — 80053 COMPREHEN METABOLIC PANEL: CPT | Performed by: INTERNAL MEDICINE

## 2017-05-01 PROCEDURE — 85384 FIBRINOGEN ACTIVITY: CPT | Performed by: INTERNAL MEDICINE

## 2017-05-01 PROCEDURE — 82248 BILIRUBIN DIRECT: CPT | Performed by: INTERNAL MEDICINE

## 2017-05-01 PROCEDURE — 99204 OFFICE O/P NEW MOD 45 MIN: CPT | Performed by: INTERNAL MEDICINE

## 2017-05-01 PROCEDURE — 83010 ASSAY OF HAPTOGLOBIN QUANT: CPT | Performed by: INTERNAL MEDICINE

## 2017-05-02 LAB
ANA SER QL IA: NEGATIVE
CYTOLOGIST CVX/VAG CYTO: NORMAL
HAPTOGLOB SERPL-MCNC: 47 MG/DL (ref 34–200)
PATH INTERP BLD-IMP: NORMAL
RHEUMATOID FACT SERPL-ACNC: NEGATIVE [IU]/ML

## 2017-05-08 ENCOUNTER — OFFICE VISIT (OUTPATIENT)
Dept: ONCOLOGY | Facility: CLINIC | Age: 67
End: 2017-05-08

## 2017-05-08 VITALS
DIASTOLIC BLOOD PRESSURE: 80 MMHG | SYSTOLIC BLOOD PRESSURE: 174 MMHG | HEART RATE: 59 BPM | RESPIRATION RATE: 18 BRPM | HEIGHT: 71 IN | WEIGHT: 231 LBS | BODY MASS INDEX: 32.34 KG/M2 | TEMPERATURE: 97.4 F

## 2017-05-08 DIAGNOSIS — E80.4 GILBERT'S DISEASE: Primary | ICD-10-CM

## 2017-05-08 DIAGNOSIS — E80.6 HYPERBILIRUBINEMIA: ICD-10-CM

## 2017-05-08 PROBLEM — D69.6 THROMBOCYTOPENIA: Status: RESOLVED | Noted: 2017-04-28 | Resolved: 2017-05-08

## 2017-05-08 PROCEDURE — 99213 OFFICE O/P EST LOW 20 MIN: CPT | Performed by: INTERNAL MEDICINE

## 2017-05-30 ENCOUNTER — HOSPITAL ENCOUNTER (OUTPATIENT)
Dept: GENERAL RADIOLOGY | Facility: HOSPITAL | Age: 67
Discharge: HOME | End: 2017-05-30
Attending: INTERNAL MEDICINE | Admitting: INTERNAL MEDICINE

## 2017-05-30 ENCOUNTER — TRANSCRIBE ORDERS (OUTPATIENT)
Dept: LAB | Facility: HOSPITAL | Age: 67
End: 2017-05-30

## 2017-05-30 ENCOUNTER — LAB (OUTPATIENT)
Dept: LAB | Facility: HOSPITAL | Age: 67
End: 2017-05-30

## 2017-05-30 ENCOUNTER — TRANSCRIBE ORDERS (OUTPATIENT)
Dept: GENERAL RADIOLOGY | Facility: HOSPITAL | Age: 67
End: 2017-05-30

## 2017-05-30 DIAGNOSIS — D69.6 THROMBOCYTOPENIA, UNSPECIFIED (HCC): ICD-10-CM

## 2017-05-30 DIAGNOSIS — M25.562 LEFT KNEE PAIN, UNSPECIFIED CHRONICITY: Primary | ICD-10-CM

## 2017-05-30 DIAGNOSIS — L03.115 CELLULITIS OF RIGHT FOOT: ICD-10-CM

## 2017-05-30 DIAGNOSIS — L40.9 PSORIASIS: ICD-10-CM

## 2017-05-30 DIAGNOSIS — R50.9 HYPERTHERMIA-INDUCED DEFECT: ICD-10-CM

## 2017-05-30 DIAGNOSIS — R50.9 HYPERTHERMIA-INDUCED DEFECT: Primary | ICD-10-CM

## 2017-05-30 DIAGNOSIS — M25.562 ACUTE PAIN OF LEFT KNEE: ICD-10-CM

## 2017-05-30 DIAGNOSIS — M25.562 LEFT KNEE PAIN, UNSPECIFIED CHRONICITY: ICD-10-CM

## 2017-05-30 LAB
ALBUMIN SERPL-MCNC: 4.2 G/DL (ref 3.2–4.8)
ALBUMIN/GLOB SERPL: 1.4 G/DL (ref 1.5–2.5)
ALP SERPL-CCNC: 91 U/L (ref 25–100)
ALT SERPL W P-5'-P-CCNC: 24 U/L (ref 7–40)
ANION GAP SERPL CALCULATED.3IONS-SCNC: 8 MMOL/L (ref 3–11)
AST SERPL-CCNC: 22 U/L (ref 0–33)
BASOPHILS # BLD AUTO: 0.04 10*3/MM3 (ref 0–0.2)
BASOPHILS NFR BLD AUTO: 0.6 % (ref 0–1)
BILIRUB SERPL-MCNC: 1.5 MG/DL (ref 0.3–1.2)
BUN BLD-MCNC: 18 MG/DL (ref 9–23)
BUN/CREAT SERPL: 22.5 (ref 7–25)
CALCIUM SPEC-SCNC: 9.7 MG/DL (ref 8.7–10.4)
CHLORIDE SERPL-SCNC: 104 MMOL/L (ref 99–109)
CO2 SERPL-SCNC: 28 MMOL/L (ref 20–31)
CREAT BLD-MCNC: 0.8 MG/DL (ref 0.6–1.3)
CRP SERPL-MCNC: 0.86 MG/DL (ref 0–1)
DEPRECATED RDW RBC AUTO: 46.4 FL (ref 37–54)
EOSINOPHIL # BLD AUTO: 0.28 10*3/MM3 (ref 0.1–0.3)
EOSINOPHIL NFR BLD AUTO: 4 % (ref 0–3)
ERYTHROCYTE [DISTWIDTH] IN BLOOD BY AUTOMATED COUNT: 14.5 % (ref 11.3–14.5)
ERYTHROCYTE [SEDIMENTATION RATE] IN BLOOD: 6 MM/HR (ref 0–20)
GFR SERPL CREATININE-BSD FRML MDRD: 97 ML/MIN/1.73
GLOBULIN UR ELPH-MCNC: 2.9 GM/DL
GLUCOSE BLD-MCNC: 127 MG/DL (ref 70–100)
HCT VFR BLD AUTO: 47 % (ref 38.9–50.9)
HGB BLD-MCNC: 16.4 G/DL (ref 13.1–17.5)
IMM GRANULOCYTES # BLD: 0.02 10*3/MM3 (ref 0–0.03)
IMM GRANULOCYTES NFR BLD: 0.3 % (ref 0–0.6)
LYMPHOCYTES # BLD AUTO: 1.45 10*3/MM3 (ref 0.6–4.8)
LYMPHOCYTES NFR BLD AUTO: 20.8 % (ref 24–44)
MCH RBC QN AUTO: 30.8 PG (ref 27–31)
MCHC RBC AUTO-ENTMCNC: 34.9 G/DL (ref 32–36)
MCV RBC AUTO: 88.2 FL (ref 80–99)
MONOCYTES # BLD AUTO: 0.44 10*3/MM3 (ref 0–1)
MONOCYTES NFR BLD AUTO: 6.3 % (ref 0–12)
NEUTROPHILS # BLD AUTO: 4.74 10*3/MM3 (ref 1.5–8.3)
NEUTROPHILS NFR BLD AUTO: 68 % (ref 41–71)
PLATELET # BLD AUTO: 110 10*3/MM3 (ref 150–450)
PMV BLD AUTO: 10 FL (ref 6–12)
POTASSIUM BLD-SCNC: 4.1 MMOL/L (ref 3.5–5.5)
PROT SERPL-MCNC: 7.1 G/DL (ref 5.7–8.2)
RBC # BLD AUTO: 5.33 10*6/MM3 (ref 4.2–5.76)
SODIUM BLD-SCNC: 140 MMOL/L (ref 132–146)
URATE SERPL-MCNC: 7 MG/DL (ref 3.7–9.2)
WBC NRBC COR # BLD: 6.97 10*3/MM3 (ref 3.5–10.8)

## 2017-05-30 PROCEDURE — 86140 C-REACTIVE PROTEIN: CPT | Performed by: INTERNAL MEDICINE

## 2017-05-30 PROCEDURE — 80053 COMPREHEN METABOLIC PANEL: CPT | Performed by: INTERNAL MEDICINE

## 2017-05-30 PROCEDURE — 85025 COMPLETE CBC W/AUTO DIFF WBC: CPT | Performed by: INTERNAL MEDICINE

## 2017-05-30 PROCEDURE — 36415 COLL VENOUS BLD VENIPUNCTURE: CPT

## 2017-05-30 PROCEDURE — 73560 X-RAY EXAM OF KNEE 1 OR 2: CPT

## 2017-05-30 PROCEDURE — 84550 ASSAY OF BLOOD/URIC ACID: CPT | Performed by: INTERNAL MEDICINE

## 2017-05-30 PROCEDURE — 85652 RBC SED RATE AUTOMATED: CPT | Performed by: INTERNAL MEDICINE

## 2017-08-03 DIAGNOSIS — I10 ESSENTIAL HYPERTENSION: ICD-10-CM

## 2017-08-03 RX ORDER — AMLODIPINE BESYLATE 5 MG/1
5 TABLET ORAL DAILY
Qty: 90 TABLET | Refills: 3 | Status: SHIPPED | OUTPATIENT
Start: 2017-08-03 | End: 2017-08-07 | Stop reason: SDUPTHER

## 2017-08-03 RX ORDER — TIZANIDINE 4 MG/1
4 TABLET ORAL DAILY
Qty: 90 TABLET | Refills: 3 | Status: SHIPPED | OUTPATIENT
Start: 2017-08-03 | End: 2017-08-07 | Stop reason: SDUPTHER

## 2017-08-03 RX ORDER — PRAVASTATIN SODIUM 40 MG
40 TABLET ORAL DAILY
Qty: 90 TABLET | Refills: 3 | Status: SHIPPED | OUTPATIENT
Start: 2017-08-03 | End: 2017-08-07 | Stop reason: SDUPTHER

## 2017-08-03 RX ORDER — DOXAZOSIN MESYLATE 4 MG/1
4 TABLET ORAL NIGHTLY
Qty: 90 TABLET | Refills: 3 | Status: SHIPPED | OUTPATIENT
Start: 2017-08-03 | End: 2017-08-07 | Stop reason: SDUPTHER

## 2017-08-03 RX ORDER — FUROSEMIDE 40 MG/1
40 TABLET ORAL DAILY
Qty: 90 TABLET | Refills: 2 | Status: SHIPPED | OUTPATIENT
Start: 2017-08-03 | End: 2017-08-07 | Stop reason: SDUPTHER

## 2017-08-03 RX ORDER — BISOPROLOL FUMARATE 5 MG/1
5 TABLET, FILM COATED ORAL DAILY
Qty: 90 TABLET | Refills: 2 | Status: SHIPPED | OUTPATIENT
Start: 2017-08-03 | End: 2017-08-07 | Stop reason: SDUPTHER

## 2017-08-07 DIAGNOSIS — I10 ESSENTIAL HYPERTENSION: ICD-10-CM

## 2017-08-07 RX ORDER — PRAVASTATIN SODIUM 40 MG
40 TABLET ORAL DAILY
Qty: 90 TABLET | Refills: 3 | Status: SHIPPED | OUTPATIENT
Start: 2017-08-07 | End: 2017-08-21 | Stop reason: SDUPTHER

## 2017-08-07 RX ORDER — BISOPROLOL FUMARATE 5 MG/1
5 TABLET, FILM COATED ORAL DAILY
Qty: 90 TABLET | Refills: 2 | Status: SHIPPED | OUTPATIENT
Start: 2017-08-07 | End: 2017-08-21 | Stop reason: SDUPTHER

## 2017-08-07 RX ORDER — FUROSEMIDE 40 MG/1
40 TABLET ORAL DAILY
Qty: 90 TABLET | Refills: 2 | Status: SHIPPED | OUTPATIENT
Start: 2017-08-07 | End: 2017-08-21 | Stop reason: SDUPTHER

## 2017-08-07 RX ORDER — TIZANIDINE 4 MG/1
4 TABLET ORAL DAILY
Qty: 90 TABLET | Refills: 3 | Status: SHIPPED | OUTPATIENT
Start: 2017-08-07 | End: 2017-08-21 | Stop reason: SDUPTHER

## 2017-08-07 RX ORDER — DOXAZOSIN MESYLATE 4 MG/1
4 TABLET ORAL NIGHTLY
Qty: 90 TABLET | Refills: 3 | Status: SHIPPED | OUTPATIENT
Start: 2017-08-07 | End: 2017-08-21 | Stop reason: SDUPTHER

## 2017-08-07 RX ORDER — AMLODIPINE BESYLATE 5 MG/1
5 TABLET ORAL DAILY
Qty: 90 TABLET | Refills: 3 | Status: SHIPPED | OUTPATIENT
Start: 2017-08-07 | End: 2017-08-21 | Stop reason: SDUPTHER

## 2017-08-19 ENCOUNTER — HOSPITAL ENCOUNTER (EMERGENCY)
Facility: HOSPITAL | Age: 67
Discharge: HOME | End: 2017-08-20
Attending: EMERGENCY MEDICINE | Admitting: EMERGENCY MEDICINE

## 2017-08-19 DIAGNOSIS — L03.115 CELLULITIS OF RIGHT LOWER EXTREMITY: Primary | ICD-10-CM

## 2017-08-19 PROCEDURE — 99282 EMERGENCY DEPT VISIT SF MDM: CPT

## 2017-08-19 RX ORDER — CEPHALEXIN 500 MG/1
500 CAPSULE ORAL 3 TIMES DAILY
Qty: 21 CAPSULE | Refills: 0 | Status: SHIPPED | OUTPATIENT
Start: 2017-08-19 | End: 2017-08-30

## 2017-08-20 VITALS
OXYGEN SATURATION: 99 % | DIASTOLIC BLOOD PRESSURE: 89 MMHG | HEART RATE: 68 BPM | SYSTOLIC BLOOD PRESSURE: 152 MMHG | TEMPERATURE: 99.3 F | RESPIRATION RATE: 16 BRPM | BODY MASS INDEX: 30.1 KG/M2 | HEIGHT: 71 IN | WEIGHT: 215 LBS

## 2017-08-20 NOTE — DISCHARGE INSTRUCTIONS
Keep elevated clean and dry.  Keep wound covered loosely with dry dressing.  Recheck midweek with your primary care provider.  Return to emergency department if any change or worsening.

## 2017-08-20 NOTE — ED PROVIDER NOTES
Subjective   HPI Comments: Jimmie Salcedo is a 67 y.o.male with a history of several infections in the right lower extremity in the last several months. He presents to the ED today with c/o insect bite. He reports that earlier this evening, he noticed a localized, burning sensation in the posterior lower right leg after working outside. He believes that he may have been bitten by an insect, however, he is unsure of the species. He denies fever, chills, diaphoresis, nausea, or vomiting. Other than some scabbing in the area, there are no other acute complaints at this time.     Patient is a 67 y.o. male presenting with insect bite/sting.   History provided by:  Patient  Insect Bite   Contact animal:  Insect  Location:  Leg  Leg injury location:  R lower leg  Time since incident:  1 day  Pain details:     Quality:  Burning    Severity:  Moderate    Timing:  Constant    Progression:  Unchanged  Incident location:  Outside  Provoked: unprovoked    Notifications:  None  Animal in possession: no    Relieved by:  Nothing  Worsened by:  Nothing  Ineffective treatments:  None tried  Associated symptoms: no fever, no numbness, no rash and no swelling        Review of Systems   Constitutional: Negative for chills, diaphoresis and fever.   Gastrointestinal: Negative for nausea and vomiting.   Skin: Negative for rash.        Insect bite on the posterior right lower leg   Neurological: Negative for numbness.   All other systems reviewed and are negative.      Past Medical History:   Diagnosis Date   • Abnormal liver function test    • Arthritis    • Bilateral edema of lower extremity    • Cellulitis of leg, right    • Chalazion    • Hypertension    • Kidney infection    • Neck muscle spasm    • Onychomycosis of toenail    • Peptic ulceration    • Renal calculi        Allergies   Allergen Reactions   • Penicillins        Past Surgical History:   Procedure Laterality Date   • KNEE SURGERY Right 1989   • OTHER SURGICAL HISTORY       Lithotripsy   • TONSILLECTOMY         Family History   Problem Relation Age of Onset   • Arthritis Other    • Hypertension Other    • Hyperlipidemia Other    • Heart attack Other    • Hypertension Mother    • Heart disease Father    • Hypertension Father        Social History     Social History   • Marital status:      Spouse name: N/A   • Number of children: N/A   • Years of education: N/A     Social History Main Topics   • Smoking status: Never Smoker   • Smokeless tobacco: Never Used   • Alcohol use 2.4 oz/week     4 Cans of beer per week   • Drug use: No   • Sexual activity: Not Asked     Other Topics Concern   • None     Social History Narrative   • None         Objective   Physical Exam   Constitutional: He is oriented to person, place, and time. He appears well-developed and well-nourished. No distress.   HENT:   Head: Normocephalic and atraumatic.   Eyes: Conjunctivae are normal. No scleral icterus.   Neck: Normal range of motion. Neck supple.   Cardiovascular: Normal rate, regular rhythm and normal heart sounds.    No murmur heard.  Pulmonary/Chest: Effort normal. No respiratory distress.   Musculoskeletal: Normal range of motion.   Neurological: He is alert and oriented to person, place, and time.   Skin: Skin is warm and dry.   1 by 2 cm superficial abrasion on the right medial calf. No soft tissue swelling. No erythema. No purulent drainage. Skin is dry.    Psychiatric: He has a normal mood and affect. His behavior is normal.   Nursing note and vitals reviewed.      Procedures         ED Course  ED Course     No results found for this or any previous visit (from the past 24 hour(s)).  Note: In addition to lab results from this visit, the labs listed above may include labs taken at another facility or during a different encounter within the last 24 hours. Please correlate lab times with ED admission and discharge times for further clarification of the services performed during this visit.    No  "orders to display     Vitals:    08/19/17 2138   BP: (!) 186/84   BP Location: Right arm   Patient Position: Sitting   Pulse: 66   Resp: 18   Temp: 99.3 °F (37.4 °C)   TempSrc: Oral   SpO2: 98%   Weight: 215 lb (97.5 kg)   Height: 70.5\" (179.1 cm)     Medications - No data to display  ECG/EMG Results (last 24 hours)     ** No results found for the last 24 hours. **                       [facility]    Final diagnoses:   Cellulitis of right lower extremity       Documentation assistance provided by evan Johnson.  Information recorded by the evan was done at my direction and has been verified and validated by me.     Caitlin Johnson  08/19/17 0573       Israel Felix PA-C  08/19/17 [id number]    "

## 2017-08-21 DIAGNOSIS — I10 ESSENTIAL HYPERTENSION: ICD-10-CM

## 2017-08-21 RX ORDER — PRAVASTATIN SODIUM 40 MG
40 TABLET ORAL DAILY
Qty: 90 TABLET | Refills: 3 | Status: SHIPPED | OUTPATIENT
Start: 2017-08-21 | End: 2018-09-28 | Stop reason: SDUPTHER

## 2017-08-21 RX ORDER — DOXAZOSIN MESYLATE 4 MG/1
4 TABLET ORAL NIGHTLY
Qty: 90 TABLET | Refills: 3 | Status: SHIPPED | OUTPATIENT
Start: 2017-08-21 | End: 2018-08-31 | Stop reason: SDUPTHER

## 2017-08-21 RX ORDER — FUROSEMIDE 40 MG/1
40 TABLET ORAL DAILY
Qty: 90 TABLET | Refills: 2 | Status: SHIPPED | OUTPATIENT
Start: 2017-08-21 | End: 2018-09-12 | Stop reason: SDUPTHER

## 2017-08-21 RX ORDER — TIZANIDINE 4 MG/1
4 TABLET ORAL DAILY
Qty: 90 TABLET | Refills: 3 | Status: SHIPPED | OUTPATIENT
Start: 2017-08-21 | End: 2019-08-08

## 2017-08-21 RX ORDER — AMLODIPINE BESYLATE 5 MG/1
5 TABLET ORAL DAILY
Qty: 90 TABLET | Refills: 3 | Status: SHIPPED | OUTPATIENT
Start: 2017-08-21 | End: 2018-09-18 | Stop reason: SDUPTHER

## 2017-08-21 RX ORDER — BISOPROLOL FUMARATE 5 MG/1
5 TABLET, FILM COATED ORAL DAILY
Qty: 90 TABLET | Refills: 2 | Status: SHIPPED | OUTPATIENT
Start: 2017-08-21 | End: 2018-08-31 | Stop reason: SDUPTHER

## 2017-08-30 ENCOUNTER — OFFICE VISIT (OUTPATIENT)
Dept: INTERNAL MEDICINE | Facility: CLINIC | Age: 67
End: 2017-08-30

## 2017-08-30 VITALS
HEIGHT: 71 IN | SYSTOLIC BLOOD PRESSURE: 140 MMHG | BODY MASS INDEX: 31.36 KG/M2 | DIASTOLIC BLOOD PRESSURE: 64 MMHG | WEIGHT: 224 LBS | HEART RATE: 64 BPM

## 2017-08-30 DIAGNOSIS — I10 ESSENTIAL HYPERTENSION, MALIGNANT: ICD-10-CM

## 2017-08-30 DIAGNOSIS — L40.9 PSORIASIS: ICD-10-CM

## 2017-08-30 DIAGNOSIS — I10 ESSENTIAL HYPERTENSION: ICD-10-CM

## 2017-08-30 DIAGNOSIS — E80.6 HYPERBILIRUBINEMIA: ICD-10-CM

## 2017-08-30 DIAGNOSIS — R60.0 BILATERAL EDEMA OF LOWER EXTREMITY: ICD-10-CM

## 2017-08-30 DIAGNOSIS — R79.89 ABNORMAL LIVER FUNCTION TEST: ICD-10-CM

## 2017-08-30 DIAGNOSIS — K27.9 PEPTIC ULCERATION: ICD-10-CM

## 2017-08-30 DIAGNOSIS — E78.5 HYPERLIPIDEMIA, UNSPECIFIED HYPERLIPIDEMIA TYPE: Primary | ICD-10-CM

## 2017-08-30 DIAGNOSIS — I51.89 DIASTOLIC DYSFUNCTION: ICD-10-CM

## 2017-08-30 DIAGNOSIS — G47.33 SEVERE OBSTRUCTIVE SLEEP APNEA: ICD-10-CM

## 2017-08-30 DIAGNOSIS — M19.90 ARTHRITIS: ICD-10-CM

## 2017-08-30 DIAGNOSIS — E80.4 GILBERT'S DISEASE: ICD-10-CM

## 2017-08-30 DIAGNOSIS — K21.9 GASTROESOPHAGEAL REFLUX DISEASE WITHOUT ESOPHAGITIS: ICD-10-CM

## 2017-08-30 LAB
ALBUMIN SERPL-MCNC: 3.9 G/DL (ref 3.2–4.8)
ALBUMIN/GLOB SERPL: 1.3 G/DL (ref 1.5–2.5)
ALP SERPL-CCNC: 90 U/L (ref 25–100)
ALT SERPL W P-5'-P-CCNC: 23 U/L (ref 7–40)
ANION GAP SERPL CALCULATED.3IONS-SCNC: 7 MMOL/L (ref 3–11)
ARTICHOKE IGE QN: 37 MG/DL (ref 0–130)
AST SERPL-CCNC: 19 U/L (ref 0–33)
BILIRUB SERPL-MCNC: 2.1 MG/DL (ref 0.3–1.2)
BUN BLD-MCNC: 20 MG/DL (ref 9–23)
BUN/CREAT SERPL: 33.3 (ref 7–25)
CALCIUM SPEC-SCNC: 8.8 MG/DL (ref 8.7–10.4)
CHLORIDE SERPL-SCNC: 102 MMOL/L (ref 99–109)
CHOLEST SERPL-MCNC: 177 MG/DL (ref 0–200)
CO2 SERPL-SCNC: 28 MMOL/L (ref 20–31)
CREAT BLD-MCNC: 0.6 MG/DL (ref 0.6–1.3)
DEPRECATED RDW RBC AUTO: 47.2 FL (ref 37–54)
ERYTHROCYTE [DISTWIDTH] IN BLOOD BY AUTOMATED COUNT: 14.5 % (ref 11.3–14.5)
GFR SERPL CREATININE-BSD FRML MDRD: 135 ML/MIN/1.73
GLOBULIN UR ELPH-MCNC: 3 GM/DL
GLUCOSE BLD-MCNC: 102 MG/DL (ref 70–100)
HCT VFR BLD AUTO: 46 % (ref 38.9–50.9)
HDLC SERPL-MCNC: 31 MG/DL (ref 40–60)
HGB BLD-MCNC: 16.3 G/DL (ref 13.1–17.5)
MCH RBC QN AUTO: 31.3 PG (ref 27–31)
MCHC RBC AUTO-ENTMCNC: 35.4 G/DL (ref 32–36)
MCV RBC AUTO: 88.5 FL (ref 80–99)
PLATELET # BLD AUTO: 101 10*3/MM3 (ref 150–450)
PMV BLD AUTO: 10.2 FL (ref 6–12)
POTASSIUM BLD-SCNC: 3.7 MMOL/L (ref 3.5–5.5)
PROT SERPL-MCNC: 6.9 G/DL (ref 5.7–8.2)
RBC # BLD AUTO: 5.2 10*6/MM3 (ref 4.2–5.76)
SODIUM BLD-SCNC: 137 MMOL/L (ref 132–146)
TRIGL SERPL-MCNC: 809 MG/DL (ref 0–150)
WBC NRBC COR # BLD: 6.04 10*3/MM3 (ref 3.5–10.8)

## 2017-08-30 PROCEDURE — 99214 OFFICE O/P EST MOD 30 MIN: CPT | Performed by: INTERNAL MEDICINE

## 2017-08-30 PROCEDURE — 80061 LIPID PANEL: CPT | Performed by: INTERNAL MEDICINE

## 2017-08-30 PROCEDURE — 80053 COMPREHEN METABOLIC PANEL: CPT | Performed by: INTERNAL MEDICINE

## 2017-08-30 PROCEDURE — 85027 COMPLETE CBC AUTOMATED: CPT | Performed by: INTERNAL MEDICINE

## 2017-08-30 NOTE — PROGRESS NOTES
Patient is a 67 y.o. male who is here for a follow up of chronic conditions.  Chief Complaint   Patient presents with   • Hypertension   • Hyperlipidemia         HPI:  Here for f/u.  Had labs done this am.  BP has good.  No dizziness or lightheadedness.  Recently seen by Hem/Onc and ID.  Completing Keflex sec to right leg wound. His knees are bothersome.      History:    Patient Active Problem List   Diagnosis   • Acid reflux   • Arthritis   • Hypertension   • Hyperlipidemia   • Severe obstructive sleep apnea   • Psoriasis   • Diastolic dysfunction   • Peptic ulceration   • Bilateral edema of lower extremity   • Abnormal liver function test   • Hyperbilirubinemia   • Psychophysiological insomnia   • Gilbert's disease       Past Medical History:   Diagnosis Date   • Abnormal liver function test    • Arthritis    • Bilateral edema of lower extremity    • Cellulitis of leg, right    • Chalazion    • Hypertension    • Kidney infection    • Neck muscle spasm    • Onychomycosis of toenail    • Peptic ulceration    • Renal calculi        Past Surgical History:   Procedure Laterality Date   • KNEE SURGERY Right 1989   • OTHER SURGICAL HISTORY      Lithotripsy   • TONSILLECTOMY         Current Outpatient Prescriptions on File Prior to Visit   Medication Sig   • amLODIPine (NORVASC) 5 MG tablet Take 1 tablet by mouth Daily.   • bisoprolol (ZEBeta) 5 MG tablet Take 1 tablet by mouth Daily.   • doxazosin (CARDURA) 4 MG tablet Take 1 tablet by mouth Every Night.   • furosemide (LASIX) 40 MG tablet Take 1 tablet by mouth Daily.   • mupirocin (BACTROBAN) 2 % ointment Apply  topically 3 (Three) Times a Day.   • potassium chloride (K-DUR,KLOR-CON) 20 MEQ CR tablet daily.   • pravastatin (PRAVACHOL) 40 MG tablet Take 1 tablet by mouth Daily.   • sildenafil (VIAGRA) 100 MG tablet Take 1 tablet by mouth Daily As Needed for erectile dysfunction.   • tiZANidine (ZANAFLEX) 4 MG tablet Take 1 tablet by mouth Daily.   • [DISCONTINUED]  clobetasol (TEMOVATE) 0.05 % cream    • [DISCONTINUED] cephalexin (KEFLEX) 500 MG capsule Take 1 capsule by mouth 3 (Three) Times a Day.   • [DISCONTINUED] esomeprazole (nexIUM) 20 MG capsule    • [DISCONTINUED] Naproxen-Esomeprazole (VIMOVO) 500-20 MG tablet delayed-release Take 1 tablet by mouth 2 (Two) Times a Day As Needed (pain). (Patient taking differently: Take 1 tablet by mouth Daily.)     No current facility-administered medications on file prior to visit.        Family History   Problem Relation Age of Onset   • Arthritis Other    • Hypertension Other    • Hyperlipidemia Other    • Heart attack Other    • Hypertension Mother    • Heart disease Father    • Hypertension Father        Social History     Social History   • Marital status:      Spouse name: N/A   • Number of children: N/A   • Years of education: N/A     Occupational History   • Not on file.     Social History Main Topics   • Smoking status: Never Smoker   • Smokeless tobacco: Never Used   • Alcohol use 2.4 oz/week     4 Cans of beer per week   • Drug use: No   • Sexual activity: Not on file     Other Topics Concern   • Not on file     Social History Narrative         ROS:    Review of Systems   Constitutional: Negative for chills, fatigue, fever and unexpected weight change.   HENT: Negative for congestion, ear pain, hearing loss, rhinorrhea, sinus pressure, sore throat and trouble swallowing.    Eyes: Negative for discharge and itching.   Respiratory: Negative for cough, chest tightness and shortness of breath.    Cardiovascular: Positive for leg swelling. Negative for chest pain and palpitations.   Gastrointestinal: Negative for abdominal pain, blood in stool, constipation, diarrhea and vomiting.        2013 colonoscopy normal   Endocrine: Negative for polydipsia and polyuria.   Genitourinary: Negative for difficulty urinating, dysuria, enuresis, frequency, hematuria and urgency.        11/16 psa  ED   Musculoskeletal: Positive for  "arthralgias and neck pain. Negative for back pain, gait problem and joint swelling.   Skin: Negative for rash and wound.        Dry skin   Allergic/Immunologic: Negative for immunocompromised state.   Neurological: Negative for dizziness, syncope, weakness, light-headedness, numbness and headaches.   Hematological: Does not bruise/bleed easily.   Psychiatric/Behavioral: Negative for behavioral problems, dysphoric mood and sleep disturbance. The patient is not nervous/anxious.        /64  Pulse 64  Ht 70.5\" (179.1 cm)  Wt 224 lb (102 kg)  BMI 31.69 kg/m2    Physical Exam:    Physical Exam   Constitutional: He is oriented to person, place, and time. He appears well-developed and well-nourished.   HENT:   Head: Normocephalic and atraumatic.   Right Ear: External ear normal.   Left Ear: External ear normal.   Mouth/Throat: Oropharynx is clear and moist.   Eyes: Conjunctivae and EOM are normal. Pupils are equal, round, and reactive to light.   Neck: Normal range of motion. Neck supple.   Cardiovascular: Normal rate, regular rhythm, normal heart sounds and intact distal pulses.    Pulmonary/Chest: Effort normal and breath sounds normal.   Abdominal: Soft. Bowel sounds are normal.   Musculoskeletal: Normal range of motion. He exhibits edema (trace R>L).   Neurological: He is alert and oriented to person, place, and time. He has normal reflexes.   Skin: Skin is warm and dry.   Dry skin   Psychiatric: He has a normal mood and affect. His behavior is normal. Judgment and thought content normal.   Vitals reviewed.      Procedure:      Discussion/Summary:  htn-improved with increase of doxazosin to 4 mg  hyperlipidemia-cont pravachol, labs today  BPH-cont cardura  GERD-cont ppi  djd-advised to limit nsaids  neck spasm-zanaflex prn  edema-cont lasix  Hyperbilirubinemia-recheck today   Thrombocytopenia-recheck today  ED-viagra rx  ?Ar[facility]ne-Hem noted  high risk meds-labs today      labs noted and dw patient, will add " trilipix      Current Outpatient Prescriptions:   •  amLODIPine (NORVASC) 5 MG tablet, Take 1 tablet by mouth Daily., Disp: 90 tablet, Rfl: 3  •  bisoprolol (ZEBeta) 5 MG tablet, Take 1 tablet by mouth Daily., Disp: 90 tablet, Rfl: 2  •  doxazosin (CARDURA) 4 MG tablet, Take 1 tablet by mouth Every Night., Disp: 90 tablet, Rfl: 3  •  furosemide (LASIX) 40 MG tablet, Take 1 tablet by mouth Daily., Disp: 90 tablet, Rfl: 2  •  mupirocin (BACTROBAN) 2 % ointment, Apply  topically 3 (Three) Times a Day., Disp: 22 g, Rfl: 0  •  potassium chloride (K-DUR,KLOR-CON) 20 MEQ CR tablet, daily., Disp: , Rfl:   •  pravastatin (PRAVACHOL) 40 MG tablet, Take 1 tablet by mouth Daily., Disp: 90 tablet, Rfl: 3  •  sildenafil (VIAGRA) 100 MG tablet, Take 1 tablet by mouth Daily As Needed for erectile dysfunction., Disp: 6 tablet, Rfl: 5  •  tiZANidine (ZANAFLEX) 4 MG tablet, Take 1 tablet by mouth Daily., Disp: 90 tablet, Rfl: 3  •  triamcinolone (KENALOG) 0.1 % ointment, Apply  topically 2 (Two) Times a Day As Needed for Irritation or Rash., Disp: 80 g, Rfl: 5        Jimmie was seen today for hypertension and hyperlipidemia.    Diagnoses and all orders for this visit:    Hyperlipidemia, unspecified hyperlipidemia type  -     Lipid panel  -     Comprehensive Metabolic Panel    Essential hypertension, malignant  -     Comprehensive Metabolic Panel  -     CBC (No Diff)    Diastolic dysfunction    Essential hypertension    Severe obstructive sleep apnea    Gastroesophageal reflux disease without esophagitis    Peptic ulceration    Arthritis    Psoriasis  -     triamcinolone (KENALOG) 0.1 % ointment; Apply  topically 2 (Two) Times a Day As Needed for Irritation or Rash.    Abnormal liver function test    Bilateral edema of lower extremity    Gilbert's disease    Hyperbilirubinemia

## 2017-09-15 ENCOUNTER — OFFICE VISIT (OUTPATIENT)
Dept: ORTHOPEDIC SURGERY | Facility: CLINIC | Age: 67
End: 2017-09-15

## 2017-09-15 VITALS
HEART RATE: 56 BPM | BODY MASS INDEX: 32.58 KG/M2 | DIASTOLIC BLOOD PRESSURE: 72 MMHG | SYSTOLIC BLOOD PRESSURE: 163 MMHG | WEIGHT: 220 LBS | HEIGHT: 69 IN

## 2017-09-15 DIAGNOSIS — M79.645 FINGER PAIN, LEFT: Primary | ICD-10-CM

## 2017-09-15 DIAGNOSIS — S66.822A FLEXOR TENDON LACERATION OF HAND WITH OPEN WOUND, LEFT, INITIAL ENCOUNTER: ICD-10-CM

## 2017-09-15 DIAGNOSIS — S61.402A FLEXOR TENDON LACERATION OF HAND WITH OPEN WOUND, LEFT, INITIAL ENCOUNTER: ICD-10-CM

## 2017-09-15 PROCEDURE — 99204 OFFICE O/P NEW MOD 45 MIN: CPT | Performed by: ORTHOPAEDIC SURGERY

## 2017-09-15 NOTE — PROGRESS NOTES
[facility] [place] [facility] Note    Subjective     Chief Complaint   Patient presents with   • Left Hand - Pain     Left small finger injury 9/8/2017        HPI      Jimmie Salcedo is a 67 y.o. male.  He cut his left small finger about a week ago he is unable to flex the small finger.  He is on Keflex.        Past Medical History:   Diagnosis Date   • Abnormal liver function test    • Arthritis    • Bilateral edema of lower extremity    • Cellulitis of leg, right    • Chalazion    • Hypertension    • Kidney infection    • Neck muscle spasm    • Onychomycosis of toenail    • Peptic ulceration    • Renal calculi       Past Surgical History:   Procedure Laterality Date   • KNEE SURGERY Right 1989   • OTHER SURGICAL HISTORY      Lithotripsy   • TONSILLECTOMY        Family History   Problem Relation Age of Onset   • Arthritis Other    • Hypertension Other    • Hyperlipidemia Other    • Heart attack Other    • Hypertension Mother    • Heart disease Father    • Hypertension Father      Social History     Social History   • Marital status:      Spouse name: N/A   • Number of children: N/A   • Years of education: N/A     Occupational History   • Not on file.     Social History Main Topics   • Smoking status: Never Smoker   • Smokeless tobacco: Never Used   • Alcohol use 2.4 oz/week     4 Cans of beer per week   • Drug use: No   • Sexual activity: Defer     Other Topics Concern   • Not on file     Social History Narrative      Current Outpatient Prescriptions on File Prior to Visit   Medication Sig Dispense Refill   • amLODIPine (NORVASC) 5 MG tablet Take 1 tablet by mouth Daily. 90 tablet 3   • bisoprolol (ZEBeta) 5 MG tablet Take 1 tablet by mouth Daily. 90 tablet 2   • cephalexin (KEFLEX) 500 MG capsule Take 1 capsule by mouth 3 (Three) Times a Day for 7 days. 21 capsule 0   • choline fenofibrate (TRILIPIX) 135 MG capsule Take 1 capsule by mouth Daily. call if muscle aches/nausea/vomiting/darkening of urine  "30 capsule 2   • doxazosin (CARDURA) 4 MG tablet Take 1 tablet by mouth Every Night. 90 tablet 3   • furosemide (LASIX) 40 MG tablet Take 1 tablet by mouth Daily. 90 tablet 2   • mupirocin (BACTROBAN) 2 % ointment Apply  topically 3 (Three) Times a Day. 22 g 0   • potassium chloride (K-DUR,KLOR-CON) 20 MEQ CR tablet daily.     • pravastatin (PRAVACHOL) 40 MG tablet Take 1 tablet by mouth Daily. 90 tablet 3   • sildenafil (VIAGRA) 100 MG tablet Take 1 tablet by mouth Daily As Needed for erectile dysfunction. 6 tablet 5   • tiZANidine (ZANAFLEX) 4 MG tablet Take 1 tablet by mouth Daily. 90 tablet 3   • triamcinolone (KENALOG) 0.1 % ointment Apply  topically 2 (Two) Times a Day As Needed for Irritation or Rash. 80 g 5     No current facility-administered medications on file prior to visit.       Allergies   Allergen Reactions   • Penicillins         The following portions of the patient's history were reviewed and updated as appropriate: allergies, current medications, past family history, past medical history, past social history, past surgical history and problem list.    Review of Systems   Constitutional: Negative.    HENT: Negative.    Eyes: Negative.    Respiratory: Negative.    Cardiovascular: Positive for leg swelling.   Gastrointestinal: Negative.    Endocrine: Negative.    Genitourinary: Negative.    Musculoskeletal: Positive for neck pain and neck stiffness.   Skin: Negative.    Allergic/Immunologic: Negative.    Neurological: Negative.    Hematological: Negative.    Psychiatric/Behavioral: Negative.         Objective      Physical Exam  /72  Pulse 56  Ht 69.49\" (176.5 cm)  Wt 220 lb (99.8 kg)  BMI 32.03 kg/m2    Body mass index is 32.03 kg/(m^2).      GENERAL APPEARANCE: awake, alert & oriented x 3, in no acute distress and well developed, well nourished  PSYCH: normal mood andaffect  LUNGS:  breathing nonlabored, no wheezing  EYES: sclera anicteric, pupils equal  CARDIOVASCULAR: palpable pulses " dorsalis pedis, palpable posterior tibial bilaterally. Capillary refill less than 2 seconds  INTEGUMENTARY: skin intact, no clubbing, cyanosis  NEUROLOGIC:  Normal gait and balance     Ortho Exam  Neurologic   Left Upper Extremity    Left wrist extensors: 5/5    Left wrist flexors: 5/5    Left intrinsics: 5/5      Right Upper Extremity    Right wrist extensors: 5/5    Right wrist flexors: 5/5    Right intrinsics: 5/5    Musculoskeletal   Left Elbow    Forearm supination: AROM - 90 degrees    Forearm pronation: AROM - 90 degrees     Right Elbow    Forearm supination: AROM - 90 degrees    Forearm pronation: AROM - 90 degrees     Left Wrist    Flexion: AROM - 90 degrees    Extension: AROM - 90 degrees     Right Wrist    Flexion: AROM - 90 degrees    Extension: AROM - 90 degrees     Inspection and Palpation   Right Hand:      Tenderness - none    Swelling - none    Crepitus - none    Muscle tone - no atrophy     Left Hand    Tenderness - small finger    Swelling - none    Crepitus - none    Muscle tone - no atrophy     Strength and Tone    Right  strength: good    Left  strength: good     Deformities, Malalignments, Discrepancies    None     Phalanges    Full range of motion of bilateral thumb MCPJs and IPJs.     Full range of motion of bilateral index finger MCPJs, PIPs, and DIPJs    Full range of motion of bilateral middle finder MCPJs, PIPs, and DIPJs    Full range of motion of bilateral ring finger MCPJs, PIPs, and DIPJs    Full passive range of motion of bilateral small finger MCPJs, PIPs, and DIPJs      FDS, FDP, and extensor tendons are intact in bilateral index, middle, ring, but the FDP is torn in the small finger left hand. FPL and extensor tendons are intact in bilateral thumbs.  No palpable triggering.    Imaging/Studies  Imaging Results (last 24 hours)     Procedure Component Value Units Date/Time    XR Finger 2+ View Left [[id number]] Resulted:  09/15/17 1120     Updated:  09/15/17 1120     Narrative:       X-rays 2 views left small finger  Negative   no fracture, no soft tissue swelling, no abnormality, and no comparison   views          Assessment/Plan      Jimmie was seen today for pain.    Diagnoses and all orders for this visit:    Finger pain, left  -     XR Finger 2+ View Left    Flexor tendon laceration of hand with open wound, left, initial encounter        I will refer her to a hand surgeon for treatment of his left small finger FDP tear for surgical treatment.  His right small finger has a FDP intact but it is absent and torn in the left.  He is already a week out from the injury.  He will finish his prescription of antibiotic.    Medical Decision Making  Management Options : over-the-counter medicine and major surgery with risk factors  Data/Risk: radiology tests and independent visualization of imaging, lab tests, or EMG/NCV    Aaron Best MD  09/15/17  11:21 AM

## 2017-09-21 ENCOUNTER — LAB REQUISITION (OUTPATIENT)
Dept: LAB | Facility: HOSPITAL | Age: 67
End: 2017-09-21

## 2017-09-21 DIAGNOSIS — S61.309A OPEN WOUND OF FINGER WITH DAMAGE TO NAIL: ICD-10-CM

## 2017-09-21 DIAGNOSIS — S66.529A LACERATION OF INTRINSIC MUSCLE, FASCIA AND TENDON OF UNSPECIFIED FINGER AT WRIST AND HAND LEVEL, INITIAL ENCOUNTER: ICD-10-CM

## 2017-09-21 LAB — POTASSIUM BLDA-SCNC: 3.89 MMOL/L (ref 3.5–5.3)

## 2017-09-21 PROCEDURE — 84132 ASSAY OF SERUM POTASSIUM: CPT | Performed by: PLASTIC SURGERY

## 2017-10-02 ENCOUNTER — TRANSCRIBE ORDERS (OUTPATIENT)
Dept: PHYSICAL THERAPY | Facility: CLINIC | Age: 67
End: 2017-10-02

## 2017-10-02 ENCOUNTER — OFFICE VISIT (OUTPATIENT)
Dept: PHYSICAL THERAPY | Facility: CLINIC | Age: 67
End: 2017-10-02

## 2017-10-02 DIAGNOSIS — IMO0002 TENDON LACERATION: Primary | ICD-10-CM

## 2017-10-02 DIAGNOSIS — Z47.89 ORTHOPEDIC AFTERCARE: ICD-10-CM

## 2017-10-02 DIAGNOSIS — S64.40XA LACERATION OF DIGITAL NERVE OF FINGER, INITIAL ENCOUNTER: ICD-10-CM

## 2017-10-02 DIAGNOSIS — S66.822A FLEXOR TENDON LACERATION OF LEFT HAND WITH OPEN WOUND, INITIAL ENCOUNTER: Primary | ICD-10-CM

## 2017-10-02 DIAGNOSIS — S61.402A FLEXOR TENDON LACERATION OF LEFT HAND WITH OPEN WOUND, INITIAL ENCOUNTER: Primary | ICD-10-CM

## 2017-10-02 PROCEDURE — L3906 WHO W/O JOINTS CF: HCPCS | Performed by: PHYSICAL THERAPIST

## 2017-10-02 NOTE — PROGRESS NOTES
Jimmie DAVIS Matias 1950   Diagnosis/ Surgery: Left Small finger FDP/FDS laceration, Digital nerve laceration              Date Of Injury: 09/09/2017    Date Of Surgery:09/21/2017    Hand Dominance: Right  History of Present Condition: At home, accidental laceration with utility knife.  Sent to PT for splint fabrication  Medical/Vocational History/ Medications: See EPIC for PMH.  Works part time at [employer]    Pain: Minor discomfort in SF    Edema: moderate small finger  Sensibility: Decreased throughout the small finger   Wound Status:Primary closure clean eschar.  ROM/ Strength: Not assessed due to surgical precautions    Splinting:  · Patient was measure and fit with a custom fabricated forearm based dorsal block splint with wrist flexed 20 degrees, MCP flexed to 65 degrees, IP joint in full extension, with rubber bands attached to LF/RF/SF.  · Patient was instructed in wearing schedule, precautions and care of the splint during this visit.   · Patient was instructed in proper donning/doffing of splint.   · HEP:  PROM of fingers in the splint 10-15 x/2 hours, AROM against rubber band traction 10-20 x/hours.  Assessment:  · Patient was fitted and appropriate splint was fabricated this date.  · Patient reported that splint was comfortable and had no complications with the fit of the splint.  · Patient was instructed and patient verbalized understanding of precautions, wear and care of the splint.   · Patient demonstrated independent donning/doffing of splint during treatment today.  Goals:  · Patient was fitted properly with appropriate splint for diagnosis  · Patient was educated on precautions, wear schedule and care of splint  · Patient demonstrated independence with donning/doffing of the splint.  · Splint was provided to Protect Healing Structures, Restrict Mobility, Improve joint alignment.  Plan:  · No additional treatment is required for this patient at this time. The patient is therefore discharged from  therapy.  · Patient advised to contact therapist with any additional questions or concerns regarding the fit and function of the splint.  · Patient will be seen for splint issues as needed   · Wear Instructions: Off for hygiene       PT SIGNATURE: Aren Randle PT,T   DATE TREATMENT INITIATED: 10/2/2017    Physician Signature____________________________________ Date____________

## 2017-10-05 ENCOUNTER — TREATMENT (OUTPATIENT)
Dept: PHYSICAL THERAPY | Facility: CLINIC | Age: 67
End: 2017-10-05

## 2017-10-05 DIAGNOSIS — Z47.89 ORTHOPEDIC AFTERCARE: Primary | ICD-10-CM

## 2017-10-05 DIAGNOSIS — S61.402A FLEXOR TENDON LACERATION OF LEFT HAND WITH OPEN WOUND, INITIAL ENCOUNTER: ICD-10-CM

## 2017-10-05 DIAGNOSIS — S66.822A FLEXOR TENDON LACERATION OF LEFT HAND WITH OPEN WOUND, INITIAL ENCOUNTER: ICD-10-CM

## 2017-10-05 PROCEDURE — PTNOCHG PR CUSTOM PT NO CHARGE VISIT: Performed by: PHYSICAL THERAPIST

## 2017-10-09 NOTE — PROGRESS NOTES
The pt came in for a brace check and band check before he went out of town for the next 2 weeks.  He was instructed in place and hold 10 seconds 10 reps 4 times a day, and tenodesis glides 10 x 4 sets a day.  Aren Randle, PT, CHT

## 2017-11-08 ENCOUNTER — OFFICE VISIT (OUTPATIENT)
Dept: INTERNAL MEDICINE | Facility: CLINIC | Age: 67
End: 2017-11-08

## 2017-11-08 VITALS
WEIGHT: 223 LBS | DIASTOLIC BLOOD PRESSURE: 74 MMHG | HEART RATE: 64 BPM | HEIGHT: 71 IN | BODY MASS INDEX: 31.22 KG/M2 | SYSTOLIC BLOOD PRESSURE: 130 MMHG

## 2017-11-08 DIAGNOSIS — Z12.5 SCREENING FOR PROSTATE CANCER: ICD-10-CM

## 2017-11-08 DIAGNOSIS — K21.9 GASTROESOPHAGEAL REFLUX DISEASE WITHOUT ESOPHAGITIS: ICD-10-CM

## 2017-11-08 DIAGNOSIS — L40.9 PSORIASIS: ICD-10-CM

## 2017-11-08 DIAGNOSIS — I51.89 DIASTOLIC DYSFUNCTION: Primary | ICD-10-CM

## 2017-11-08 DIAGNOSIS — R79.89 ABNORMAL LIVER FUNCTION TEST: ICD-10-CM

## 2017-11-08 DIAGNOSIS — E80.4 GILBERT'S DISEASE: ICD-10-CM

## 2017-11-08 DIAGNOSIS — G47.33 SEVERE OBSTRUCTIVE SLEEP APNEA: ICD-10-CM

## 2017-11-08 DIAGNOSIS — R60.0 BILATERAL EDEMA OF LOWER EXTREMITY: ICD-10-CM

## 2017-11-08 DIAGNOSIS — E78.49 OTHER HYPERLIPIDEMIA: ICD-10-CM

## 2017-11-08 DIAGNOSIS — M19.90 ARTHRITIS: ICD-10-CM

## 2017-11-08 DIAGNOSIS — I10 ESSENTIAL HYPERTENSION: ICD-10-CM

## 2017-11-08 LAB
ALBUMIN SERPL-MCNC: 4.3 G/DL (ref 3.2–4.8)
ALBUMIN/GLOB SERPL: 1.7 G/DL (ref 1.5–2.5)
ALP SERPL-CCNC: 73 U/L (ref 25–100)
ALT SERPL W P-5'-P-CCNC: 24 U/L (ref 7–40)
ANION GAP SERPL CALCULATED.3IONS-SCNC: 8 MMOL/L (ref 3–11)
ARTICHOKE IGE QN: 88 MG/DL (ref 0–130)
AST SERPL-CCNC: 22 U/L (ref 0–33)
BILIRUB SERPL-MCNC: 1.7 MG/DL (ref 0.3–1.2)
BUN BLD-MCNC: 21 MG/DL (ref 9–23)
BUN/CREAT SERPL: 26.3 (ref 7–25)
CALCIUM SPEC-SCNC: 9.1 MG/DL (ref 8.7–10.4)
CHLORIDE SERPL-SCNC: 104 MMOL/L (ref 99–109)
CHOLEST SERPL-MCNC: 145 MG/DL (ref 0–200)
CK SERPL-CCNC: 95 U/L (ref 26–174)
CO2 SERPL-SCNC: 26 MMOL/L (ref 20–31)
CREAT BLD-MCNC: 0.8 MG/DL (ref 0.6–1.3)
DEPRECATED RDW RBC AUTO: 45.3 FL (ref 37–54)
ERYTHROCYTE [DISTWIDTH] IN BLOOD BY AUTOMATED COUNT: 14.2 % (ref 11.3–14.5)
GFR SERPL CREATININE-BSD FRML MDRD: 97 ML/MIN/1.73
GLOBULIN UR ELPH-MCNC: 2.6 GM/DL
GLUCOSE BLD-MCNC: 94 MG/DL (ref 70–100)
HCT VFR BLD AUTO: 44 % (ref 38.9–50.9)
HDLC SERPL-MCNC: 41 MG/DL (ref 40–60)
HGB BLD-MCNC: 15.4 G/DL (ref 13.1–17.5)
MCH RBC QN AUTO: 30.6 PG (ref 27–31)
MCHC RBC AUTO-ENTMCNC: 35 G/DL (ref 32–36)
MCV RBC AUTO: 87.5 FL (ref 80–99)
PLATELET # BLD AUTO: 113 10*3/MM3 (ref 150–450)
PMV BLD AUTO: 10.2 FL (ref 6–12)
POTASSIUM BLD-SCNC: 3.7 MMOL/L (ref 3.5–5.5)
PROT SERPL-MCNC: 6.9 G/DL (ref 5.7–8.2)
PSA SERPL-MCNC: 2.73 NG/ML (ref 0–4)
RBC # BLD AUTO: 5.03 10*6/MM3 (ref 4.2–5.76)
SODIUM BLD-SCNC: 138 MMOL/L (ref 132–146)
TRIGL SERPL-MCNC: 160 MG/DL (ref 0–150)
WBC NRBC COR # BLD: 6.87 10*3/MM3 (ref 3.5–10.8)

## 2017-11-08 PROCEDURE — 85027 COMPLETE CBC AUTOMATED: CPT | Performed by: INTERNAL MEDICINE

## 2017-11-08 PROCEDURE — 80061 LIPID PANEL: CPT | Performed by: INTERNAL MEDICINE

## 2017-11-08 PROCEDURE — G0103 PSA SCREENING: HCPCS | Performed by: INTERNAL MEDICINE

## 2017-11-08 PROCEDURE — 82550 ASSAY OF CK (CPK): CPT | Performed by: INTERNAL MEDICINE

## 2017-11-08 PROCEDURE — 80053 COMPREHEN METABOLIC PANEL: CPT | Performed by: INTERNAL MEDICINE

## 2017-11-08 PROCEDURE — 99214 OFFICE O/P EST MOD 30 MIN: CPT | Performed by: INTERNAL MEDICINE

## 2017-11-08 NOTE — PROGRESS NOTES
Patient is a 67 y.o. male who is here for a follow up of chronic conditions.  Chief Complaint   Patient presents with   • Hypertension   • Hyperlipidemia         HPI:  Here for f/u.  BP has been good.  Has no issues with the fibrates.  No nausea or emesis or myalgia.  Energy level is good.  No dizziness or lightheadedness.  GERD is controlled.  No longer on vimovo.      History:    Patient Active Problem List   Diagnosis   • Acid reflux   • Arthritis   • Hypertension   • Hyperlipidemia   • Severe obstructive sleep apnea   • Psoriasis   • Diastolic dysfunction   • Peptic ulceration   • Bilateral edema of lower extremity   • Abnormal liver function test   • Hyperbilirubinemia   • Psychophysiological insomnia   • Gilbert's disease       Past Medical History:   Diagnosis Date   • Abnormal liver function test    • Arthritis    • Bilateral edema of lower extremity    • Cellulitis of leg, right    • Chalazion    • Hypertension    • Kidney infection    • Neck muscle spasm    • Onychomycosis of toenail    • Peptic ulceration    • Renal calculi        Past Surgical History:   Procedure Laterality Date   • KNEE SURGERY Right 1989   • OTHER SURGICAL HISTORY      Lithotripsy   • TONSILLECTOMY         Current Outpatient Prescriptions on File Prior to Visit   Medication Sig   • amLODIPine (NORVASC) 5 MG tablet Take 1 tablet by mouth Daily.   • bisoprolol (ZEBeta) 5 MG tablet Take 1 tablet by mouth Daily.   • choline fenofibrate (TRILIPIX) 135 MG capsule Take 1 capsule by mouth Daily. call if muscle aches/nausea/vomiting/darkening of urine   • doxazosin (CARDURA) 4 MG tablet Take 1 tablet by mouth Every Night.   • furosemide (LASIX) 40 MG tablet Take 1 tablet by mouth Daily.   • potassium chloride (K-DUR,KLOR-CON) 20 MEQ CR tablet daily.   • pravastatin (PRAVACHOL) 40 MG tablet Take 1 tablet by mouth Daily.   • sildenafil (VIAGRA) 100 MG tablet Take 1 tablet by mouth Daily As Needed for erectile dysfunction.   • tiZANidine  (ZANAFLEX) 4 MG tablet Take 1 tablet by mouth Daily.   • triamcinolone (KENALOG) 0.1 % ointment Apply  topically 2 (Two) Times a Day As Needed for Irritation or Rash.   • mupirocin (BACTROBAN) 2 % ointment Apply  topically 3 (Three) Times a Day.   • [DISCONTINUED] oxyCODONE-acetaminophen (PERCOCET) 5-325 MG per tablet Take 1-2 tablets by mouth Every 4 (Four) to six (6)Hours As Needed.   • [DISCONTINUED] promethazine (PHENERGAN) 25 MG tablet Take 1 tablet by mouth Every 6 (Six) Hours As Needed.     No current facility-administered medications on file prior to visit.        Family History   Problem Relation Age of Onset   • Arthritis Other    • Hypertension Other    • Hyperlipidemia Other    • Heart attack Other    • Hypertension Mother    • Heart disease Father    • Hypertension Father        Social History     Social History   • Marital status:      Spouse name: N/A   • Number of children: N/A   • Years of education: N/A     Occupational History   • Not on file.     Social History Main Topics   • Smoking status: Never Smoker   • Smokeless tobacco: Never Used   • Alcohol use 2.4 oz/week     4 Cans of beer per week   • Drug use: No   • Sexual activity: Defer     Other Topics Concern   • Not on file     Social History Narrative         ROS:    Review of Systems   Constitutional: Negative for chills, fatigue, fever and unexpected weight change.   HENT: Negative for congestion, ear pain, hearing loss, rhinorrhea, sinus pressure, sore throat and trouble swallowing.    Eyes: Negative for discharge and itching.   Respiratory: Negative for cough, chest tightness and shortness of breath.    Cardiovascular: Positive for leg swelling. Negative for chest pain and palpitations.   Gastrointestinal: Negative for abdominal pain, blood in stool, constipation, diarrhea and vomiting.        2013 colonoscopy normal   Endocrine: Negative for polydipsia and polyuria.   Genitourinary: Negative for difficulty urinating, dysuria,  "enuresis, frequency, hematuria and urgency.        11/16 psa  ED   Musculoskeletal: Positive for arthralgias and neck pain. Negative for back pain, gait problem and joint swelling.   Skin: Negative for rash and wound.        Dry skin   Allergic/Immunologic: Negative for immunocompromised state.   Neurological: Negative for dizziness, syncope, weakness, light-headedness, numbness and headaches.   Hematological: Does not bruise/bleed easily.   Psychiatric/Behavioral: Negative for behavioral problems, dysphoric mood and sleep disturbance. The patient is not nervous/anxious.        /74 (BP Location: Left arm, Patient Position: Sitting)  Pulse 64  Ht 70.5\" (179.1 cm)  Wt 223 lb (101 kg)  BMI 31.54 kg/m2    Physical Exam:    Physical Exam   Constitutional: He is oriented to person, place, and time. He appears well-developed and well-nourished.   HENT:   Head: Normocephalic and atraumatic.   Right Ear: External ear normal.   Left Ear: External ear normal.   Mouth/Throat: Oropharynx is clear and moist.   Eyes: Conjunctivae and EOM are normal. Pupils are equal, round, and reactive to light.   Neck: Normal range of motion. Neck supple.   Cardiovascular: Normal rate, regular rhythm, normal heart sounds and intact distal pulses.    Pulmonary/Chest: Effort normal and breath sounds normal.   Abdominal: Soft. Bowel sounds are normal.   Musculoskeletal: Normal range of motion. He exhibits edema (trace R>L).   Neurological: He is alert and oriented to person, place, and time. He has normal reflexes.   Skin: Skin is warm and dry.   Dry skin   Psychiatric: He has a normal mood and affect. His behavior is normal. Judgment and thought content normal.   Vitals reviewed.      Procedure:      Discussion/Summary:  htn-stable  hyperlipidemia-cont pravachol/fibrate, labs today  BPH-cont cardura  GERD-cont ppi  djd-advised to limit nsaids  neck spasm-zanaflex prn and can attempt to wean off  edema-cont " lasix  Hyperbilirubinemia-recheck today   Thrombocytopenia-recheck today  ED-viagra rx  ?Hoven-Hem noted  high risk meds-labs today      labs noted and dw patient, has already had flu shot         Current Outpatient Prescriptions:   •  amLODIPine (NORVASC) 5 MG tablet, Take 1 tablet by mouth Daily., Disp: 90 tablet, Rfl: 3  •  bisoprolol (ZEBeta) 5 MG tablet, Take 1 tablet by mouth Daily., Disp: 90 tablet, Rfl: 2  •  choline fenofibrate (TRILIPIX) 135 MG capsule, Take 1 capsule by mouth Daily. call if muscle aches/nausea/vomiting/darkening of urine, Disp: 30 capsule, Rfl: 2  •  doxazosin (CARDURA) 4 MG tablet, Take 1 tablet by mouth Every Night., Disp: 90 tablet, Rfl: 3  •  furosemide (LASIX) 40 MG tablet, Take 1 tablet by mouth Daily., Disp: 90 tablet, Rfl: 2  •  potassium chloride (K-DUR,KLOR-CON) 20 MEQ CR tablet, daily., Disp: , Rfl:   •  pravastatin (PRAVACHOL) 40 MG tablet, Take 1 tablet by mouth Daily., Disp: 90 tablet, Rfl: 3  •  sildenafil (VIAGRA) 100 MG tablet, Take 1 tablet by mouth Daily As Needed for erectile dysfunction., Disp: 6 tablet, Rfl: 5  •  tiZANidine (ZANAFLEX) 4 MG tablet, Take 1 tablet by mouth Daily., Disp: 90 tablet, Rfl: 3  •  triamcinolone (KENALOG) 0.1 % ointment, Apply  topically 2 (Two) Times a Day As Needed for Irritation or Rash., Disp: 80 g, Rfl: 5  •  mupirocin (BACTROBAN) 2 % ointment, Apply  topically 3 (Three) Times a Day., Disp: 22 g, Rfl: 0        Jimmie was seen today for hypertension and hyperlipidemia.    Diagnoses and all orders for this visit:    Diastolic dysfunction    Other hyperlipidemia  -     Comprehensive Metabolic Panel  -     Lipid Panel  -     CK    Essential hypertension  -     CBC (No Diff)    Severe obstructive sleep apnea    Gastroesophageal reflux disease without esophagitis    Arthritis    Psoriasis    Abnormal liver function test    Bilateral edema of lower extremity    Gilbert's disease    Screening for prostate cancer  -     PSA Screen

## 2017-11-10 ENCOUNTER — TRANSCRIBE ORDERS (OUTPATIENT)
Dept: PHYSICAL THERAPY | Facility: CLINIC | Age: 67
End: 2017-11-10

## 2017-11-10 DIAGNOSIS — S61.219A FINGER LACERATION INVOLVING TENDON, INITIAL ENCOUNTER: Primary | ICD-10-CM

## 2017-11-10 DIAGNOSIS — Z47.89 ORTHOPEDIC AFTERCARE: ICD-10-CM

## 2017-11-13 ENCOUNTER — TREATMENT (OUTPATIENT)
Dept: PHYSICAL THERAPY | Facility: CLINIC | Age: 67
End: 2017-11-13

## 2017-11-13 DIAGNOSIS — M25.642 STIFFNESS OF JOINT, HAND, LEFT: ICD-10-CM

## 2017-11-13 DIAGNOSIS — S66.822A FLEXOR TENDON LACERATION OF LEFT HAND WITH OPEN WOUND, INITIAL ENCOUNTER: ICD-10-CM

## 2017-11-13 DIAGNOSIS — S61.402A FLEXOR TENDON LACERATION OF LEFT HAND WITH OPEN WOUND, INITIAL ENCOUNTER: ICD-10-CM

## 2017-11-13 DIAGNOSIS — Z47.89 ORTHOPEDIC AFTERCARE: Primary | ICD-10-CM

## 2017-11-13 DIAGNOSIS — S64.40XA LACERATION OF DIGITAL NERVE OF FINGER, INITIAL ENCOUNTER: ICD-10-CM

## 2017-11-13 PROCEDURE — G8985 CARRY GOAL STATUS: HCPCS | Performed by: PHYSICAL THERAPIST

## 2017-11-13 PROCEDURE — 97161 PT EVAL LOW COMPLEX 20 MIN: CPT | Performed by: PHYSICAL THERAPIST

## 2017-11-13 PROCEDURE — 97140 MANUAL THERAPY 1/> REGIONS: CPT | Performed by: PHYSICAL THERAPIST

## 2017-11-13 PROCEDURE — G8984 CARRY CURRENT STATUS: HCPCS | Performed by: PHYSICAL THERAPIST

## 2017-11-13 NOTE — PROGRESS NOTES
Physical Therapy Initial Evaluation and Plan of Care      Patient: Jimmie Salcedo   : 1950  Diagnosis/ICD-10 Code:  Orthopedic aftercare [Z47.89]  Referring practitioner: Janna Gonzales MD  Date of Initial Visit: 2017  Today's Date: 2017  Patient seen for 3 sessions             Subjective Evaluation    History of Present Illness  Date of surgery: 2017  Mechanism of injury: Patient presents to clinic s/p left SF flexor tendon laceration repair. MARYURI accidental laceration with utility knife at home. Received a custom fabricated forearm based dorsal block splint on 10/2/17 from this clinic.  Patient has been out of the splint since Thursday.     He complains of sensitivity to temperature, specifically cold. When his finger is cold it feels dull and achy, no sharp pain. Cannot bend finger completely, and gripping objects is difficult. Pain has made it difficult for him to sleep. Consistent pain throughout the day. Patient has a history of moderate arthritis in his bilateral hand and fingers.    Subjective comment: SF flexor tendon laceration  Patient Occupation: Part time  at [employer] Quality of life: good    Pain  Current pain ratin  At best pain ratin  At worst pain ratin  Location: whole SF, mostly at MCP joint; sometimes pain at RF and MF, but could be associated with arthritis  Quality: dull ache, discomfort and tight  Relieving factors: medications and rest (NSAIDS)  Aggravating factors: lifting (gripping)  Progression: improved    Social Support  Lives with: spouse    Hand dominance: right    Patient Goals  Patient goals for therapy: decreased edema, decreased pain, increased motion and increased strength             Objective       Observations     Left Wrist/Hand   Positive for Jovanna's nodes, deformity and edema.     Right Wrist/Hand   Positive for Jovanna's nodes and deformity.     Palpation     Additional Palpation Details  Scar adhesion  and tautness throughout whole SF. Most taut at and below MCP and at medial PIP joints.     Tenderness     Additional Tenderness Details  Tightness on volar SF following flexor tendon pathway    Neurological Testing   Sensation     Wrist/Hand   Left   Intact: light touch  Diminished: pin prick and sharp/dull discrimination  Hyposensation: light touch  Absent: pin prick     Right   Intact: light touch, pin prick and sharp/dull discrimination    Comments   Left pin prick: absent sensation at bifercation of scar, hyposensation around scar, normal sensation on dorsal SF     Active Range of Motion     Left Digits    Flexion   Little     MCP: 75 degrees    PIP: 32 degrees    DIP: 8 degrees  Extension   Little     MCP: 20 degrees    PIP: 25 degrees    DIP: 8 degrees    Additional Active Range of Motion Details  No AROM of SF DIP flexion, SF PIP extension, or SF DIP extension.     Passive Range of Motion     Left Digits   Flexion   Little     MCP: 85 degrees    PIP: 60 degrees    DIP: 32 degrees  Extension   Little     MCP: 60 degrees    PIP: 25 degrees    DIP: 0 degrees    Additional Passive Range of Motion Details  Hard end feel capsular block of PROM PIP extension.    Strength/Myotome Testing     Left Wrist/Hand      (2nd hand position)     Trial 1: 15 lbs    Thumb Strength  Palmar/Three-Point Pinch     Trial 1: 9 lbs    Right Wrist/Hand      (2nd hand position)     Trial 1: 50 lbs    Thumb Strength   Palmar/Three-Point Pinch     Trial 1: 11 lbs    Additional Strength Details  Normal intrinsic strength finger abduction; 4/5 intrinsic strength finger adduction         Assessment & Plan     Assessment  Impairments: abnormal or restricted ROM, impaired physical strength, lacks appropriate home exercise program and pain with function  Assessment details: Patient presents to the clinic s/p left SF flexor tendon laceration repair on 9/21/17. Patient was in a custom fabricated forearm based dorsal block splint for 6  weeks, and has been released from using it on Thursday. He presents with severe lack of AROM with the PIP and DIP joint flexion and extension greatly limited. PROM PIP extension is absent. The scar incision site spans from the distal palmar arch to above the DIP joint. The scar is taut throughout. He has overall joint and scar stiffness. Because of the limitations in AROM and PROM at the MCP and PIP joints, we hypothesize that flexor tendon is not gliding correctly at the following sites: just proximal to the MCP joint and at the PIP joint.   Prognosis: fair  Prognosis details: Patient has fair prognosis considering his age, arthritis, and scar adhesion sites negatively effecting the flexor tendon.    GOALS:  Goals to be met within 4 weeks:  1.  strength will improve by 20%.  2. SF MCP and PIP flexion AROM will improve by 15 degrees.  3. SF PIP extension PROM will improve by 10 degrees.  4. SF DIP flexion AROM will improve by 10 degrees.  5. Pain will decrease to an average of 2/10 daily.    Goals to be met within 12 weeks:  1.  strength will improve by 50% from the initial evaluation.  2. SF MCP and PIP flexion AROM will improve by 30 degrees.  3. SF PIP extension AROM will improve by 15 degrees.  4. SF DIP AROM will be within normal ranges.  5. Pain will be 0/10 throughout the day.  6. Patient will have normal sensation on volar SF.  Functional Limitations: sleeping, pulling, pushing and uncomfortable because of pain  Plan  Therapy options: will be seen for skilled physical therapy services  Planned modality interventions: thermotherapy (paraffin bath), ultrasound, high voltage pulsed current (pain management) and fluidotherapy  Planned therapy interventions: manual therapy, soft tissue mobilization, strengthening, therapeutic activities, joint mobilization, home exercise program and fine motor coordination training  Frequency: 3x week  Duration in weeks: 12  Treatment plan discussed with:  patient        Manual Therapy:    25     mins  76654;  Therapeutic Exercise:         mins  70081;     Neuromuscular Amy:        mins  84271;    Therapeutic Activity:          mins  64942;     Gait Training:           mins  94760;     Ultrasound:          mins  40077;    Electrical Stimulation:         mins  53598 ( );  Dry Needling          mins self-pay    Timed Treatment:   25   mins   Total Treatment:     25   mins    PT STUDENT SIGNATURE: Maile Tello, PT Student  PT SIGNATURE: Aren Ranlde, PT   DATE TREATMENT INITIATED: 11/13/2017    Initial Certification  Certification Period: 2/11/2018  I certify that the therapy services are furnished while this patient is under my care.  The services outlined above are required by this patient, and will be reviewed every 90 days.     PHYSICIAN: Janna Gonzales MD      DATE:     Please sign and return via fax to  .. Thank you, [facility].

## 2017-11-15 ENCOUNTER — TREATMENT (OUTPATIENT)
Dept: PHYSICAL THERAPY | Facility: CLINIC | Age: 67
End: 2017-11-15

## 2017-11-15 PROCEDURE — 97035 APP MDLTY 1+ULTRASOUND EA 15: CPT | Performed by: PHYSICAL THERAPIST

## 2017-11-15 PROCEDURE — 97140 MANUAL THERAPY 1/> REGIONS: CPT | Performed by: PHYSICAL THERAPIST

## 2017-11-15 PROCEDURE — 97110 THERAPEUTIC EXERCISES: CPT | Performed by: PHYSICAL THERAPIST

## 2017-11-16 NOTE — PROGRESS NOTES
Physical Therapy Daily Progress Note        Patient: Jimmie Salcedo   : 1950  Diagnosis/ICD-10 Code:  No primary diagnosis found.  Referring practitioner: Janna Gonzales MD  Date of Initial Visit: Type: THERAPY  Noted: 10/2/2017  Today's Date: 2017  Patient seen for 4 sessions              Subjective   Jimmie Salcedo reports: He has been performing soft tissue mobilization to his scar 2-3 times daily at home.    Objective   Patient has hypersensitivity at and around scar. Scar tautness still present. AROM has not improved. PIP capsular end feel less stiff than last visit.    See Exercise, Manual, and Modality Logs for complete treatment.    Assessment/Plan  Patient will have a slow recovery progression due to the involvement of the scar tissue. He is progressing as expected.  Progress per Plan of Care       Manual Therapy:    35     mins  71916;  Therapeutic Exercise:    10     mins  04768;     Neuromuscular Amy:        mins  43684;    Therapeutic Activity:          mins  05444;     Gait Training:           mins  14542;     Ultrasound:     13     mins  49678;   Iontophoresis          mins  15800   Electrical Stimulation:         mins  05541 ( );  Dry Needling          mins self-pay  Fluidotherapy          mins 15939    Timed Treatment:   58   mins   Total Treatment:     58   mins    Maile Tello, PT Student  Physical Therapist Student    Aren Randle, PT  Physical Therapist

## 2017-11-17 ENCOUNTER — TREATMENT (OUTPATIENT)
Dept: PHYSICAL THERAPY | Facility: CLINIC | Age: 67
End: 2017-11-17

## 2017-11-17 DIAGNOSIS — S66.822S FLEXOR TENDON LACERATION OF LEFT HAND WITH OPEN WOUND, SEQUELA: ICD-10-CM

## 2017-11-17 DIAGNOSIS — Z47.89 ORTHOPEDIC AFTERCARE: Primary | ICD-10-CM

## 2017-11-17 DIAGNOSIS — S61.402S FLEXOR TENDON LACERATION OF LEFT HAND WITH OPEN WOUND, SEQUELA: ICD-10-CM

## 2017-11-17 DIAGNOSIS — M25.642 STIFFNESS OF JOINT, HAND, LEFT: ICD-10-CM

## 2017-11-17 DIAGNOSIS — S64.40XS LACERATION OF DIGITAL NERVE OF FINGER, SEQUELA: ICD-10-CM

## 2017-11-17 PROCEDURE — 97140 MANUAL THERAPY 1/> REGIONS: CPT | Performed by: PHYSICAL THERAPIST

## 2017-11-17 PROCEDURE — 97022 WHIRLPOOL THERAPY: CPT | Performed by: PHYSICAL THERAPIST

## 2017-11-17 PROCEDURE — 97110 THERAPEUTIC EXERCISES: CPT | Performed by: PHYSICAL THERAPIST

## 2017-11-20 ENCOUNTER — TELEPHONE (OUTPATIENT)
Dept: INTERNAL MEDICINE | Facility: CLINIC | Age: 67
End: 2017-11-20

## 2017-11-20 RX ORDER — AZITHROMYCIN 250 MG/1
TABLET, FILM COATED ORAL
Qty: 6 TABLET | Refills: 0 | Status: SHIPPED | OUTPATIENT
Start: 2017-11-20 | End: 2017-12-11

## 2017-11-20 NOTE — TELEPHONE ENCOUNTER
Patient said he has an upper respiratory infection and was wanting to know if he could be called in something?

## 2017-11-20 NOTE — PROGRESS NOTES
Subjective   Jimmie Salcedo reports: Finger still very stiff and doesn't want to close very well    Objective   OBSERVATION: Significant flexion contracture still present in small finger.  PALPATION: Volar left small finger tender in scar, scar very hypomobile.  See Exercise, Manual, and Modality Logs for complete treatment.       Assessment/Plan  FDP/FDS flexion contracture present.  Appears to be held up in zone II  Progress strengthening /stabilization /functional activity           Manual Therapy:    30     mins  88094;  Therapeutic Exercise:    15     mins  73360;     Neuromuscular Amy:        mins  71653;    Therapeutic Activity:          mins  75805;     Gait Training:           mins  21300;     Ultrasound:          mins  04038;   Iontophoresis          mins  77752   Electrical Stimulation:         mins  09595 ( );  Dry Needling          mins self-pay  Fluidotherapy    15      mins 13880    Timed Treatment:   45   mins   Total Treatment:     60   mins    Aren Randle, PT, CHT  Physical Therapist

## 2017-11-29 ENCOUNTER — TREATMENT (OUTPATIENT)
Dept: PHYSICAL THERAPY | Facility: CLINIC | Age: 67
End: 2017-11-29

## 2017-11-29 DIAGNOSIS — S61.402S FLEXOR TENDON LACERATION OF LEFT HAND WITH OPEN WOUND, SEQUELA: ICD-10-CM

## 2017-11-29 DIAGNOSIS — S66.822S FLEXOR TENDON LACERATION OF LEFT HAND WITH OPEN WOUND, SEQUELA: ICD-10-CM

## 2017-11-29 DIAGNOSIS — S64.40XS LACERATION OF DIGITAL NERVE OF FINGER, SEQUELA: ICD-10-CM

## 2017-11-29 DIAGNOSIS — Z47.89 ORTHOPEDIC AFTERCARE: Primary | ICD-10-CM

## 2017-11-29 DIAGNOSIS — M25.642 STIFFNESS OF JOINT, HAND, LEFT: ICD-10-CM

## 2017-11-29 PROCEDURE — 97022 WHIRLPOOL THERAPY: CPT | Performed by: PHYSICAL THERAPIST

## 2017-11-29 PROCEDURE — 97140 MANUAL THERAPY 1/> REGIONS: CPT | Performed by: PHYSICAL THERAPIST

## 2017-11-29 PROCEDURE — 97035 APP MDLTY 1+ULTRASOUND EA 15: CPT | Performed by: PHYSICAL THERAPIST

## 2017-12-01 ENCOUNTER — TREATMENT (OUTPATIENT)
Dept: PHYSICAL THERAPY | Facility: CLINIC | Age: 67
End: 2017-12-01

## 2017-12-01 DIAGNOSIS — S61.402S FLEXOR TENDON LACERATION OF LEFT HAND WITH OPEN WOUND, SEQUELA: ICD-10-CM

## 2017-12-01 DIAGNOSIS — Z47.89 ORTHOPEDIC AFTERCARE: Primary | ICD-10-CM

## 2017-12-01 DIAGNOSIS — S64.40XS LACERATION OF DIGITAL NERVE OF FINGER, SEQUELA: ICD-10-CM

## 2017-12-01 DIAGNOSIS — M25.642 STIFFNESS OF JOINT, HAND, LEFT: ICD-10-CM

## 2017-12-01 DIAGNOSIS — S66.822S FLEXOR TENDON LACERATION OF LEFT HAND WITH OPEN WOUND, SEQUELA: ICD-10-CM

## 2017-12-01 PROCEDURE — 97022 WHIRLPOOL THERAPY: CPT | Performed by: PHYSICAL THERAPIST

## 2017-12-01 PROCEDURE — 97035 APP MDLTY 1+ULTRASOUND EA 15: CPT | Performed by: PHYSICAL THERAPIST

## 2017-12-01 PROCEDURE — 97110 THERAPEUTIC EXERCISES: CPT | Performed by: PHYSICAL THERAPIST

## 2017-12-01 PROCEDURE — 97140 MANUAL THERAPY 1/> REGIONS: CPT | Performed by: PHYSICAL THERAPIST

## 2017-12-01 NOTE — PROGRESS NOTES
Subjective   Jimmie Salcedo reports: Finger is stiff this morning.    Objective   OBJECTIVE: significant tendon flexor contractor still present  NEUROLOGICAL: moderate hypersensitivity along scar  AROM: approximately 5 degrees PIP flexion, approximately 15 degrees DIP flexion  OTHER: crepitus and popping-like feeling occurring along flexor tendon pathway with resisted finger flexion after scar tissue mobilization.  See Exercise, Manual, and Modality Logs for complete treatment.       Assessment/Plan  Patient still presents with significant flexor tendon contracture. Scar tissue mobilization followed with resisted finger flexion seems to be helping to release contracture. Patient slowly progressing.  Progress per Plan of Care and Progress strengthening /stabilization /functional activity           Manual Therapy:    30     mins  63834;  Therapeutic Exercise:    20     mins  49922;     Neuromuscular Amy:        mins  02052;    Therapeutic Activity:          mins  18859;     Gait Training:           mins  70291;     Ultrasound:     13     mins  09049;   Iontophoresis          mins  46332   Electrical Stimulation:         mins  90645 ( );  Dry Needling          mins self-pay  Fluidotherapy     15     mins 99741    Timed Treatment:  78    mins   Total Treatment:     78   mins    Aren Randle, PT  Physical Therapist

## 2017-12-08 ENCOUNTER — HOSPITAL ENCOUNTER (OUTPATIENT)
Dept: GENERAL RADIOLOGY | Facility: HOSPITAL | Age: 67
Discharge: HOME | End: 2017-12-08
Attending: INTERNAL MEDICINE | Admitting: INTERNAL MEDICINE

## 2017-12-08 DIAGNOSIS — R05.9 COUGH: Primary | ICD-10-CM

## 2017-12-08 PROCEDURE — 71020 HC CHEST PA AND LATERAL: CPT

## 2017-12-11 ENCOUNTER — OFFICE VISIT (OUTPATIENT)
Dept: INTERNAL MEDICINE | Facility: CLINIC | Age: 67
End: 2017-12-11

## 2017-12-11 VITALS
HEART RATE: 64 BPM | HEIGHT: 71 IN | SYSTOLIC BLOOD PRESSURE: 130 MMHG | TEMPERATURE: 98.1 F | DIASTOLIC BLOOD PRESSURE: 62 MMHG

## 2017-12-11 DIAGNOSIS — F51.04 PSYCHOPHYSIOLOGICAL INSOMNIA: ICD-10-CM

## 2017-12-11 DIAGNOSIS — E80.4 GILBERT'S DISEASE: ICD-10-CM

## 2017-12-11 DIAGNOSIS — M19.90 ARTHRITIS: ICD-10-CM

## 2017-12-11 DIAGNOSIS — G47.33 SEVERE OBSTRUCTIVE SLEEP APNEA: ICD-10-CM

## 2017-12-11 DIAGNOSIS — E78.49 OTHER HYPERLIPIDEMIA: Primary | ICD-10-CM

## 2017-12-11 DIAGNOSIS — K21.9 GASTROESOPHAGEAL REFLUX DISEASE WITHOUT ESOPHAGITIS: ICD-10-CM

## 2017-12-11 DIAGNOSIS — I51.89 DIASTOLIC DYSFUNCTION: ICD-10-CM

## 2017-12-11 DIAGNOSIS — R60.0 BILATERAL EDEMA OF LOWER EXTREMITY: ICD-10-CM

## 2017-12-11 DIAGNOSIS — K27.9 PEPTIC ULCERATION: ICD-10-CM

## 2017-12-11 DIAGNOSIS — L40.9 PSORIASIS: ICD-10-CM

## 2017-12-11 DIAGNOSIS — R79.89 ABNORMAL LIVER FUNCTION TEST: ICD-10-CM

## 2017-12-11 DIAGNOSIS — I10 ESSENTIAL HYPERTENSION: ICD-10-CM

## 2017-12-11 DIAGNOSIS — E80.6 HYPERBILIRUBINEMIA: ICD-10-CM

## 2017-12-11 DIAGNOSIS — J18.9 COMMUNITY ACQUIRED PNEUMONIA OF RIGHT LOWER LOBE OF LUNG: ICD-10-CM

## 2017-12-11 PROCEDURE — 99214 OFFICE O/P EST MOD 30 MIN: CPT | Performed by: INTERNAL MEDICINE

## 2017-12-11 RX ORDER — LEVOFLOXACIN 750 MG/1
750 TABLET ORAL DAILY
Qty: 10 TABLET | Refills: 0 | Status: SHIPPED | OUTPATIENT
Start: 2017-12-11 | End: 2018-08-22

## 2017-12-11 NOTE — PROGRESS NOTES
Patient is a 67 y.o. male who is here for productive cough and SOB, patient has had 1 round of antibiotics.  Chief Complaint   Patient presents with   • Cough         HPI:  Patient c/o 2 week hx of cough and congestion.  Was placed on zpack.  Some head congestion.  Has some yellow expectoration.  Some ear pain.  No fever or chills. No nausea or emesis.  Some sore throat.  No pleuritic pain.  Having some wheezing.     History:    Patient Active Problem List   Diagnosis   • Acid reflux   • Arthritis   • Hypertension   • Hyperlipidemia   • Severe obstructive sleep apnea   • Psoriasis   • Diastolic dysfunction   • Peptic ulceration   • Bilateral edema of lower extremity   • Abnormal liver function test   • Hyperbilirubinemia   • Psychophysiological insomnia   • Gilbert's disease   • Community acquired pneumonia of right lower lobe of lung       Past Medical History:   Diagnosis Date   • Abnormal liver function test    • Arthritis    • Bilateral edema of lower extremity    • Cellulitis of leg, right    • Chalazion    • Hypertension    • Kidney infection    • Neck muscle spasm    • Onychomycosis of toenail    • Peptic ulceration    • Renal calculi        Past Surgical History:   Procedure Laterality Date   • KNEE SURGERY Right 1989   • OTHER SURGICAL HISTORY      Lithotripsy   • TONSILLECTOMY         Current Outpatient Prescriptions on File Prior to Visit   Medication Sig   • amLODIPine (NORVASC) 5 MG tablet Take 1 tablet by mouth Daily.   • bisoprolol (ZEBeta) 5 MG tablet Take 1 tablet by mouth Daily.   • choline fenofibrate (TRILIPIX) 135 MG capsule Take 1 capsule by mouth Daily. call if muscle aches/nausea/vomiting/darkening of urine   • doxazosin (CARDURA) 4 MG tablet Take 1 tablet by mouth Every Night.   • furosemide (LASIX) 40 MG tablet Take 1 tablet by mouth Daily.   • mupirocin (BACTROBAN) 2 % ointment Apply  topically 3 (Three) Times a Day.   • potassium chloride (K-DUR,KLOR-CON) 20 MEQ CR tablet daily.   •  pravastatin (PRAVACHOL) 40 MG tablet Take 1 tablet by mouth Daily.   • sildenafil (VIAGRA) 100 MG tablet Take 1 tablet by mouth Daily As Needed for erectile dysfunction.   • tiZANidine (ZANAFLEX) 4 MG tablet Take 1 tablet by mouth Daily.   • triamcinolone (KENALOG) 0.1 % ointment Apply  topically 2 (Two) Times a Day As Needed for Irritation or Rash.   • [DISCONTINUED] azithromycin (ZITHROMAX Z-MARIE) 250 MG tablet Take 2 tablets the first day, then 1 tablet daily for 4 days.     No current facility-administered medications on file prior to visit.        Family History   Problem Relation Age of Onset   • Arthritis Other    • Hypertension Other    • Hyperlipidemia Other    • Heart attack Other    • Hypertension Mother    • Heart disease Father    • Hypertension Father        Social History     Social History   • Marital status:      Spouse name: N/A   • Number of children: N/A   • Years of education: N/A     Occupational History   • Not on file.     Social History Main Topics   • Smoking status: Never Smoker   • Smokeless tobacco: Never Used   • Alcohol use 2.4 oz/week     4 Cans of beer per week   • Drug use: No   • Sexual activity: Defer     Other Topics Concern   • Not on file     Social History Narrative         ROS:    Review of Systems   Constitutional: Negative for chills, fatigue, fever and unexpected weight change.   HENT: Positive for congestion. Negative for ear pain, hearing loss, rhinorrhea, sinus pressure, sore throat and trouble swallowing.    Eyes: Negative for discharge and itching.   Respiratory: Positive for shortness of breath. Negative for cough and chest tightness.    Cardiovascular: Positive for leg swelling. Negative for chest pain and palpitations.   Gastrointestinal: Negative for abdominal pain, blood in stool, constipation, diarrhea and vomiting.        2013 colonoscopy normal   Endocrine: Negative for polydipsia and polyuria.   Genitourinary: Negative for difficulty urinating, dysuria,  "enuresis, frequency, hematuria and urgency.        11/17 psa  ED   Musculoskeletal: Positive for arthralgias and neck pain. Negative for back pain, gait problem and joint swelling.   Skin: Negative for rash and wound.        Dry skin   Allergic/Immunologic: Negative for immunocompromised state.   Neurological: Negative for dizziness, syncope, weakness, light-headedness, numbness and headaches.   Hematological: Does not bruise/bleed easily.   Psychiatric/Behavioral: Negative for behavioral problems, dysphoric mood and sleep disturbance. The patient is not nervous/anxious.        /62  Pulse 64  Temp 98.1 °F (36.7 °C) (Temporal Artery )   Ht 179.1 cm (70.51\")    Physical Exam:    Physical Exam   Constitutional: He is oriented to person, place, and time. He appears well-developed and well-nourished.   HENT:   Head: Normocephalic and atraumatic.   Right Ear: External ear normal.   Left Ear: External ear normal.   Mouth/Throat: Oropharynx is clear and moist.   Eyes: Conjunctivae and EOM are normal. Pupils are equal, round, and reactive to light.   Neck: Normal range of motion. Neck supple.   Cardiovascular: Normal rate, regular rhythm, normal heart sounds and intact distal pulses.    Pulmonary/Chest: Effort normal. He has wheezes. He has rales ( RLL).   Abdominal: Soft. Bowel sounds are normal.   Musculoskeletal: Normal range of motion. He exhibits edema (trace R>L).   Neurological: He is alert and oriented to person, place, and time. He has normal reflexes.   Skin: Skin is warm and dry.   Dry skin   Psychiatric: He has a normal mood and affect. His behavior is normal. Judgment and thought content normal.   Vitals reviewed.      Procedure:      Discussion/Summary:    htn-stable  hyperlipidemia-cont pravachol/fibrate, labs noted  BPH-cont cardura  GERD-cont ppi  djd-advised to limit nsaids  neck spasm-zanaflex prn and can attempt to wean off  edema-cont lasix  Hyperbilirubinemia-recheck " noted  Thrombocytopenia-recheck noted  ED-viagra rx  ?Ceiba-Hem noted  CAP-levaquin and Breo  high risk meds-labs noted      labs noted and dw patient, has already had flu shot    Current Outpatient Prescriptions:   •  amLODIPine (NORVASC) 5 MG tablet, Take 1 tablet by mouth Daily., Disp: 90 tablet, Rfl: 3  •  bisoprolol (ZEBeta) 5 MG tablet, Take 1 tablet by mouth Daily., Disp: 90 tablet, Rfl: 2  •  choline fenofibrate (TRILIPIX) 135 MG capsule, Take 1 capsule by mouth Daily. call if muscle aches/nausea/vomiting/darkening of urine, Disp: 30 capsule, Rfl: 2  •  doxazosin (CARDURA) 4 MG tablet, Take 1 tablet by mouth Every Night., Disp: 90 tablet, Rfl: 3  •  furosemide (LASIX) 40 MG tablet, Take 1 tablet by mouth Daily., Disp: 90 tablet, Rfl: 2  •  mupirocin (BACTROBAN) 2 % ointment, Apply  topically 3 (Three) Times a Day., Disp: 22 g, Rfl: 0  •  potassium chloride (K-DUR,KLOR-CON) 20 MEQ CR tablet, daily., Disp: , Rfl:   •  pravastatin (PRAVACHOL) 40 MG tablet, Take 1 tablet by mouth Daily., Disp: 90 tablet, Rfl: 3  •  sildenafil (VIAGRA) 100 MG tablet, Take 1 tablet by mouth Daily As Needed for erectile dysfunction., Disp: 6 tablet, Rfl: 5  •  tiZANidine (ZANAFLEX) 4 MG tablet, Take 1 tablet by mouth Daily., Disp: 90 tablet, Rfl: 3  •  triamcinolone (KENALOG) 0.1 % ointment, Apply  topically 2 (Two) Times a Day As Needed for Irritation or Rash., Disp: 80 g, Rfl: 5  •  levoFLOXacin (LEVAQUIN) 750 MG tablet, Take 1 tablet by mouth Daily., Disp: 10 tablet, Rfl: 0        Jimmie was seen today for cough.    Diagnoses and all orders for this visit:    Other hyperlipidemia    Essential hypertension    Diastolic dysfunction    Community acquired pneumonia of right lower lobe of lung  -     levoFLOXacin (LEVAQUIN) 750 MG tablet; Take 1 tablet by mouth Daily.    Severe obstructive sleep apnea    Peptic ulceration    Gastroesophageal reflux disease without esophagitis    Psoriasis    Arthritis    Gilbert's  disease    Psychophysiological insomnia    Hyperbilirubinemia    Abnormal liver function test    Bilateral edema of lower extremity

## 2017-12-28 RX ORDER — FENOFIBRATE 145 MG/1
145 TABLET, COATED ORAL DAILY
Qty: 90 TABLET | Refills: 2 | Status: SHIPPED | OUTPATIENT
Start: 2017-12-28 | End: 2018-11-21 | Stop reason: SDUPTHER

## 2018-05-14 ENCOUNTER — APPOINTMENT (OUTPATIENT)
Dept: CT IMAGING | Facility: HOSPITAL | Age: 68
End: 2018-05-14

## 2018-05-14 ENCOUNTER — HOSPITAL ENCOUNTER (EMERGENCY)
Facility: HOSPITAL | Age: 68
Discharge: HOME | End: 2018-05-14
Attending: EMERGENCY MEDICINE | Admitting: EMERGENCY MEDICINE

## 2018-05-14 VITALS
BODY MASS INDEX: 31.5 KG/M2 | SYSTOLIC BLOOD PRESSURE: 142 MMHG | TEMPERATURE: 98.8 F | WEIGHT: 220 LBS | DIASTOLIC BLOOD PRESSURE: 75 MMHG | HEIGHT: 70 IN | RESPIRATION RATE: 16 BRPM | OXYGEN SATURATION: 96 % | HEART RATE: 55 BPM

## 2018-05-14 DIAGNOSIS — T14.8XXA MUSCULOSKELETAL STRAIN: Primary | ICD-10-CM

## 2018-05-14 LAB
BILIRUB UR QL STRIP: NEGATIVE
BUN BLDA-MCNC: 21 MG/DL (ref 8–26)
CA-I BLDA-SCNC: 1.28 MMOL/L (ref 1.2–1.32)
CHLORIDE BLDA-SCNC: 102 MMOL/L (ref 98–109)
CLARITY UR: CLEAR
CO2 BLDA-SCNC: 27 MMOL/L (ref 24–29)
COLOR UR: YELLOW
CREAT BLDA-MCNC: 0.8 MG/DL (ref 0.6–1.3)
GLUCOSE BLDC GLUCOMTR-MCNC: 135 MG/DL (ref 70–130)
GLUCOSE UR STRIP-MCNC: NEGATIVE MG/DL
HCT VFR BLDA CALC: 42 % (ref 38–51)
HGB BLDA-MCNC: 14.3 G/DL (ref 12–17)
HGB UR QL STRIP.AUTO: NEGATIVE
KETONES UR QL STRIP: NEGATIVE
LEUKOCYTE ESTERASE UR QL STRIP.AUTO: NEGATIVE
NITRITE UR QL STRIP: NEGATIVE
PH UR STRIP.AUTO: <=5 [PH] (ref 5–8)
POTASSIUM BLDA-SCNC: 3.5 MMOL/L (ref 3.5–4.9)
PROT UR QL STRIP: NEGATIVE
SODIUM BLDA-SCNC: 141 MMOL/L (ref 138–146)
SP GR UR STRIP: 1.01 (ref 1–1.03)
UROBILINOGEN UR QL STRIP: NORMAL

## 2018-05-14 PROCEDURE — 85014 HEMATOCRIT: CPT

## 2018-05-14 PROCEDURE — 81003 URINALYSIS AUTO W/O SCOPE: CPT | Performed by: EMERGENCY MEDICINE

## 2018-05-14 PROCEDURE — 80047 BASIC METABLC PNL IONIZED CA: CPT

## 2018-05-14 PROCEDURE — 99283 EMERGENCY DEPT VISIT LOW MDM: CPT

## 2018-05-14 PROCEDURE — 74176 CT ABD & PELVIS W/O CONTRAST: CPT

## 2018-05-14 RX ORDER — CYCLOBENZAPRINE HCL 10 MG
10 TABLET ORAL 3 TIMES DAILY
Qty: 12 TABLET | Refills: 0 | Status: SHIPPED | OUTPATIENT
Start: 2018-05-14 | End: 2019-08-08

## 2018-05-14 RX ORDER — HYDROCODONE BITARTRATE AND ACETAMINOPHEN 7.5; 325 MG/1; MG/1
1 TABLET ORAL ONCE
Status: COMPLETED | OUTPATIENT
Start: 2018-05-14 | End: 2018-05-14

## 2018-05-14 RX ORDER — NAPROXEN 500 MG/1
500 TABLET ORAL 2 TIMES DAILY WITH MEALS
Qty: 10 TABLET | Refills: 0 | Status: SHIPPED | OUTPATIENT
Start: 2018-05-14 | End: 2019-01-30

## 2018-05-14 RX ADMIN — HYDROCODONE BITARTRATE AND ACETAMINOPHEN 1 TABLET: 7.5; 325 TABLET ORAL at 15:05

## 2018-05-14 NOTE — ED PROVIDER NOTES
Subjective   Jimmie Salcedo is a 67 y.o.male who presents to the ED with complaints of back pain. He reports developing lower back pain ten days ago while getting out of bed. He believes he may have twisted his back at that time. Since, he has been experiencing constant lower back pain that is most prominent on the left. He has taken Ibuprofen with some relief. He denies any recent strenuous activity or heavy lifting. He also complains of nausea but denies vomiting, dysuria, hematuria, or any other complaints at this time. He has past medical history of kidney stones 30 years ago. He presented to an [facility] prior to arrival and was referred to the ED for further evaluation and potential imaging.         History provided by:  Patient  Back Pain   Location:  Lumbar spine  Radiates to:  Does not radiate  Pain severity:  Moderate  Pain is:  Same all the time  Onset quality:  Sudden  Duration:  10 days  Timing:  Constant  Progression:  Unchanged  Chronicity:  New  Context: twisting (while getting out of bed)    Context: not lifting heavy objects    Relieved by:  Ibuprofen (improved with)  Worsened by:  Nothing  Ineffective treatments:  None tried  Associated symptoms: no dysuria        Review of Systems   Gastrointestinal: Negative for vomiting.   Genitourinary: Negative for dysuria and hematuria.   Musculoskeletal: Positive for back pain.   All other systems reviewed and are negative.      Past Medical History:   Diagnosis Date   • Abnormal liver function test    • Arthritis    • Bilateral edema of lower extremity    • Cellulitis of leg, right    • Chalazion    • Hypertension    • Kidney infection    • Neck muscle spasm    • Onychomycosis of toenail    • Peptic ulceration    • Renal calculi        Allergies   Allergen Reactions   • Penicillins        Past Surgical History:   Procedure Laterality Date   • KNEE SURGERY Right 1989   • OTHER SURGICAL HISTORY      Lithotripsy   • TONSILLECTOMY         Family History   Problem  Relation Age of Onset   • Arthritis Other    • Hypertension Other    • Hyperlipidemia Other    • Heart attack Other    • Hypertension Mother    • Heart disease Father    • Hypertension Father        Social History     Social History   • Marital status:      Social History Main Topics   • Smoking status: Never Smoker   • Smokeless tobacco: Never Used   • Alcohol use 2.4 oz/week     4 Cans of beer per week   • Drug use: No   • Sexual activity: Defer     Other Topics Concern   • Not on file         Objective   Physical Exam   Constitutional: He is oriented to person, place, and time. He appears well-developed and well-nourished. No distress.   HENT:   Head: Normocephalic and atraumatic.   Nose: Nose normal.   Eyes: Conjunctivae are normal. No scleral icterus.   Neck: Normal range of motion. Neck supple.   Cardiovascular: Normal rate, regular rhythm and normal heart sounds.    No murmur heard.  Pulmonary/Chest: Effort normal and breath sounds normal. No respiratory distress.   Abdominal: Soft. Bowel sounds are normal. There is no tenderness (No abdominal tenderness).   Tenderness at the left CVA   Musculoskeletal: Normal range of motion. He exhibits tenderness. He exhibits no edema.   Tenderness to palpation in the area of L2-L4 to the left of the vertebral column.    Neurological: He is alert and oriented to person, place, and time.   Skin: Skin is warm and dry.   Psychiatric: He has a normal mood and affect. His behavior is normal.   Nursing note and vitals reviewed.      Procedures         ED Course  ED Course     Recent Results (from the past 24 hour(s))   Urinalysis With / Culture If Indicated - Urine, Clean Catch    Collection Time: 05/14/18  2:34 PM   Result Value Ref Range    Color, UA Yellow Yellow, Straw    Appearance, UA Clear Clear    pH, UA <=5.0 5.0 - 8.0    Specific Gravity, UA 1.013 1.001 - 1.030    Glucose, UA Negative Negative    Ketones, UA Negative Negative    Bilirubin, UA Negative Negative  "   Blood, UA Negative Negative    Protein, UA Negative Negative    Leuk Esterase, UA Negative Negative    Nitrite, UA Negative Negative    Urobilinogen, UA 0.2 E.U./dL 0.2 - 1.0 E.U./dL   POC CHEM 8    Collection Time: 05/14/18  2:49 PM   Result Value Ref Range    Glucose 135 (H) 70 - 130 mg/dL    BUN, Arterial 21 8 - 26 mg/dL    Creatinine 0.80 0.60 - 1.30 mg/dL    Sodium 141 138 - 146 mmol/L    Potassium 3.5 3.5 - 4.9 mmol/L    Chloride 102 98 - 109 mmol/L    Total CO2 27 24 - 29 mmol/L    Hemoglobin 14.3 12.0 - 17.0 g/dL    Hematocrit 42 38 - 51 %    Ionized Calcium 1.28 1.20 - 1.32 mmol/L     Note: In addition to lab results from this visit, the labs listed above may include labs taken at another facility or during a different encounter within the last 24 hours. Please correlate lab times with ED admission and discharge times for further clarification of the services performed during this visit.    CT Abdomen Pelvis Without Contrast    (Results Pending)     Vitals:    05/14/18 1331   BP: 157/74   Pulse: 52   Resp: 20   Temp: 98.4 °F (36.9 °C)   TempSrc: Oral   SpO2: 96%   Weight: 99.8 kg (220 lb)   Height: 177.8 cm (70\")     Medications   HYDROcodone-acetaminophen (NORCO) 7.5-325 MG per tablet 1 tablet (1 tablet Oral Given 5/14/18 1505)     ECG/EMG Results (last 24 hours)     ** No results found for the last 24 hours. **                        [facility]    Final diagnoses:   Musculoskeletal strain       Documentation assistance provided by evan Jiménez.  Information recorded by the evan was done at my direction and has been verified and validated by me.     Cat Jiménez  05/14/18 1519       Cat Jiménez  05/14/18 1541       ANNA MARIE Hackett  05/14/18 1552    "

## 2018-05-14 NOTE — DISCHARGE INSTRUCTIONS
Immediately return to the ED for any concerns or worsening symptoms.     Please review the medications you are supposed to be taking according to prior physician recommendations. I have not changed your home medications during this visit. If your discharge instructions indicate that I have changed your home medications, this is not the case, and you should disregard. If you have any questions about the medication you should be taking at home, please call your physician.

## 2018-05-15 ENCOUNTER — EPISODE CHANGES (OUTPATIENT)
Dept: CASE MANAGEMENT | Facility: OTHER | Age: 68
End: 2018-05-15

## 2018-05-17 ENCOUNTER — EPISODE CHANGES (OUTPATIENT)
Dept: CASE MANAGEMENT | Facility: OTHER | Age: 68
End: 2018-05-17

## 2018-05-29 ENCOUNTER — EPISODE CHANGES (OUTPATIENT)
Dept: CASE MANAGEMENT | Facility: OTHER | Age: 68
End: 2018-05-29

## 2018-08-01 DIAGNOSIS — F43.23 SITUATIONAL MIXED ANXIETY AND DEPRESSIVE DISORDER: Primary | ICD-10-CM

## 2018-08-22 ENCOUNTER — OFFICE VISIT (OUTPATIENT)
Dept: INTERNAL MEDICINE | Facility: CLINIC | Age: 68
End: 2018-08-22

## 2018-08-22 VITALS
DIASTOLIC BLOOD PRESSURE: 70 MMHG | SYSTOLIC BLOOD PRESSURE: 126 MMHG | BODY MASS INDEX: 33.36 KG/M2 | HEIGHT: 70 IN | WEIGHT: 233 LBS | HEART RATE: 64 BPM

## 2018-08-22 DIAGNOSIS — Z12.11 SCREEN FOR COLON CANCER: ICD-10-CM

## 2018-08-22 DIAGNOSIS — F43.21 SITUATIONAL DEPRESSION: ICD-10-CM

## 2018-08-22 DIAGNOSIS — E80.4 GILBERT'S DISEASE: ICD-10-CM

## 2018-08-22 DIAGNOSIS — E80.6 HYPERBILIRUBINEMIA: ICD-10-CM

## 2018-08-22 DIAGNOSIS — K27.9 PEPTIC ULCERATION: ICD-10-CM

## 2018-08-22 DIAGNOSIS — M19.90 ARTHRITIS: ICD-10-CM

## 2018-08-22 DIAGNOSIS — N52.8 OTHER MALE ERECTILE DYSFUNCTION: ICD-10-CM

## 2018-08-22 DIAGNOSIS — I51.89 DIASTOLIC DYSFUNCTION: Primary | ICD-10-CM

## 2018-08-22 DIAGNOSIS — E78.49 OTHER HYPERLIPIDEMIA: ICD-10-CM

## 2018-08-22 DIAGNOSIS — G47.33 SEVERE OBSTRUCTIVE SLEEP APNEA: ICD-10-CM

## 2018-08-22 DIAGNOSIS — R60.0 BILATERAL EDEMA OF LOWER EXTREMITY: ICD-10-CM

## 2018-08-22 DIAGNOSIS — R79.89 ABNORMAL LIVER FUNCTION TEST: ICD-10-CM

## 2018-08-22 DIAGNOSIS — I10 ESSENTIAL HYPERTENSION: ICD-10-CM

## 2018-08-22 DIAGNOSIS — K21.9 GASTROESOPHAGEAL REFLUX DISEASE WITHOUT ESOPHAGITIS: ICD-10-CM

## 2018-08-22 DIAGNOSIS — F51.04 PSYCHOPHYSIOLOGICAL INSOMNIA: ICD-10-CM

## 2018-08-22 PROBLEM — J18.9 COMMUNITY ACQUIRED PNEUMONIA OF RIGHT LOWER LOBE OF LUNG: Status: RESOLVED | Noted: 2017-12-11 | Resolved: 2018-08-22

## 2018-08-22 LAB
ALBUMIN SERPL-MCNC: 4.28 G/DL (ref 3.2–4.8)
ALBUMIN/GLOB SERPL: 1.6 G/DL (ref 1.5–2.5)
ALP SERPL-CCNC: 58 U/L (ref 25–100)
ALT SERPL W P-5'-P-CCNC: 30 U/L (ref 7–40)
ANION GAP SERPL CALCULATED.3IONS-SCNC: 11 MMOL/L (ref 3–11)
ARTICHOKE IGE QN: 94 MG/DL (ref 0–130)
AST SERPL-CCNC: 24 U/L (ref 0–33)
BILIRUB SERPL-MCNC: 1.2 MG/DL (ref 0.3–1.2)
BUN BLD-MCNC: 18 MG/DL (ref 9–23)
BUN/CREAT SERPL: 22 (ref 7–25)
CALCIUM SPEC-SCNC: 9.2 MG/DL (ref 8.7–10.4)
CHLORIDE SERPL-SCNC: 104 MMOL/L (ref 99–109)
CHOLEST SERPL-MCNC: 156 MG/DL (ref 0–200)
CO2 SERPL-SCNC: 26 MMOL/L (ref 20–31)
CREAT BLD-MCNC: 0.82 MG/DL (ref 0.6–1.3)
DEPRECATED RDW RBC AUTO: 48.4 FL (ref 37–54)
ERYTHROCYTE [DISTWIDTH] IN BLOOD BY AUTOMATED COUNT: 14.7 % (ref 11.3–14.5)
GFR SERPL CREATININE-BSD FRML MDRD: 94 ML/MIN/1.73
GLOBULIN UR ELPH-MCNC: 2.7 GM/DL
GLUCOSE BLD-MCNC: 102 MG/DL (ref 70–100)
HCT VFR BLD AUTO: 48.3 % (ref 38.9–50.9)
HDLC SERPL-MCNC: 41 MG/DL (ref 40–60)
HGB BLD-MCNC: 16.7 G/DL (ref 13.1–17.5)
MCH RBC QN AUTO: 31.2 PG (ref 27–31)
MCHC RBC AUTO-ENTMCNC: 34.6 G/DL (ref 32–36)
MCV RBC AUTO: 90.1 FL (ref 80–99)
PLATELET # BLD AUTO: 109 10*3/MM3 (ref 150–450)
PMV BLD AUTO: 10.4 FL (ref 6–12)
POTASSIUM BLD-SCNC: 3.8 MMOL/L (ref 3.5–5.5)
PROT SERPL-MCNC: 7 G/DL (ref 5.7–8.2)
RBC # BLD AUTO: 5.36 10*6/MM3 (ref 4.2–5.76)
SODIUM BLD-SCNC: 141 MMOL/L (ref 132–146)
TRIGL SERPL-MCNC: 204 MG/DL (ref 0–150)
TSH SERPL DL<=0.05 MIU/L-ACNC: 1.14 MIU/ML (ref 0.35–5.35)
WBC NRBC COR # BLD: 7.78 10*3/MM3 (ref 3.5–10.8)

## 2018-08-22 PROCEDURE — 80061 LIPID PANEL: CPT | Performed by: INTERNAL MEDICINE

## 2018-08-22 PROCEDURE — 99212 OFFICE O/P EST SF 10 MIN: CPT | Performed by: INTERNAL MEDICINE

## 2018-08-22 PROCEDURE — 84443 ASSAY THYROID STIM HORMONE: CPT | Performed by: INTERNAL MEDICINE

## 2018-08-22 PROCEDURE — G0438 PPPS, INITIAL VISIT: HCPCS | Performed by: INTERNAL MEDICINE

## 2018-08-22 PROCEDURE — 80053 COMPREHEN METABOLIC PANEL: CPT | Performed by: INTERNAL MEDICINE

## 2018-08-22 PROCEDURE — 85027 COMPLETE CBC AUTOMATED: CPT | Performed by: INTERNAL MEDICINE

## 2018-08-22 RX ORDER — SILDENAFIL CITRATE 20 MG/1
TABLET ORAL
Qty: 90 TABLET | Refills: 5 | Status: SHIPPED | OUTPATIENT
Start: 2018-08-22 | End: 2020-07-08

## 2018-08-22 RX ORDER — DULOXETIN HYDROCHLORIDE 30 MG/1
30 CAPSULE, DELAYED RELEASE ORAL DAILY
Qty: 30 CAPSULE | Refills: 5 | Status: SHIPPED | OUTPATIENT
Start: 2018-08-22 | End: 2019-02-20 | Stop reason: SDUPTHER

## 2018-08-22 NOTE — PROGRESS NOTES
QUICK REFERENCE INFORMATION:  The ABCs of the Annual Wellness Visit    Initial Medicare Wellness Visit    HEALTH RISK ASSESSMENT    1950    Recent Hospitalizations:  No hospitalization(s) within the last year..        Current Medical Providers:  Patient Care Team:  Javad Negron MD as PCP - General (Internal Medicine)  Javad Negron MD as PCP - Claims Attributed  Sundeep Berger MD as Consulting Physician (Infectious Diseases)  Erin Hobbs, RN as Care Coordinator (Population Health)        Smoking Status:  History   Smoking Status   • Never Smoker   Smokeless Tobacco   • Never Used       Alcohol Consumption:  History   Alcohol Use   • 2.4 oz/week   • 4 Cans of beer per week       Depression Screen:   PHQ-2/PHQ-9 Depression Screening 8/22/2018   Little interest or pleasure in doing things 1   Feeling down, depressed, or hopeless 1   Trouble falling or staying asleep, or sleeping too much 0   Feeling tired or having little energy 1   Poor appetite or overeating 0   Feeling bad about yourself - or that you are a failure or have let yourself or your family down 0   Trouble concentrating on things, such as reading the newspaper or watching television 0   Moving or speaking so slowly that other people could have noticed. Or the opposite - being so fidgety or restless that you have been moving around a lot more than usual 0   Thoughts that you would be better off dead, or of hurting yourself in some way 0   Total Score 3   If you checked off any problems, how difficult have these problems made it for you to do your work, take care of things at home, or get along with other people? Somewhat difficult       Health Habits and Functional and Cognitive Screening:  Functional & Cognitive Status 8/22/2018   Do you have difficulty preparing food and eating? No   Do you have difficulty bathing yourself, getting dressed or grooming yourself? No   Do you have difficulty using the toilet? No   Do you have difficulty  moving around from place to place? No   Do you have trouble with steps or getting out of a bed or a chair? No   In the past year have you fallen or experienced a near fall? Yes   Current Diet Well Balanced Diet   Dental Exam Up to date   Eye Exam Up to date   Exercise (times per week) 2 times per week   Current Exercise Activities Include Walking   Do you need help using the phone?  No   Are you deaf or do you have serious difficulty hearing?  No   Do you need help with transportation? No   Do you need help shopping? No   Do you need help preparing meals?  No   Do you need help with housework?  No   Do you need help with laundry? No   Do you need help taking your medications? No   Do you need help managing money? No   Do you ever drive or ride in a car without wearing a seat belt? No   Have you felt unusual stress, anger or loneliness in the last month? No   Who do you live with? Spouse   If you need help, do you have trouble finding someone available to you? No   Have you been bothered in the last four weeks by sexual problems? No   Do you have difficulty concentrating, remembering or making decisions? No           Does the patient have evidence of cognitive impairment? No    Asiprin use counseling: Does not need ASA (and currently is not on it)      Recent Lab Results:    Visual Acuity:  No exam data present    Age-appropriate Screening Schedule:  Refer to the list below for future screening recommendations based on patient's age, sex and/or medical conditions. Orders for these recommended tests are listed in the plan section. The patient has been provided with a written plan.    Health Maintenance   Topic Date Due   • TDAP/TD VACCINES (1 - Tdap) 08/18/1971   • ZOSTER VACCINE (1 of 2) 08/18/2002   • PNEUMOCOCCAL VACCINES (65+ LOW/MEDIUM RISK) (1 of 2 - PCV13) 08/18/2017   • INFLUENZA VACCINE  08/01/2018   • LIPID PANEL  11/08/2018   • COLONOSCOPY  01/01/2023        Subjective   History of Present Illness    Jimmie  RYAN Salcedo is a 68 y.o. male who presents for an Annual Wellness Visit.    The following portions of the patient's history were reviewed and updated as appropriate: current medications, past family history, past medical history, past social history, past surgical history and problem list.    Outpatient Medications Prior to Visit   Medication Sig Dispense Refill   • amLODIPine (NORVASC) 5 MG tablet Take 1 tablet by mouth Daily. 90 tablet 3   • bisoprolol (ZEBeta) 5 MG tablet Take 1 tablet by mouth Daily. 90 tablet 2   • cyclobenzaprine (FLEXERIL) 10 MG tablet Take 1 tablet by mouth 3 (Three) Times a Day. 12 tablet 0   • doxazosin (CARDURA) 4 MG tablet Take 1 tablet by mouth Every Night. 90 tablet 3   • fenofibrate (TRICOR) 145 MG tablet Take 1 tablet by mouth Daily. 90 tablet 2   • furosemide (LASIX) 40 MG tablet Take 1 tablet by mouth Daily. 90 tablet 2   • mupirocin (BACTROBAN) 2 % ointment Apply  topically 3 (Three) Times a Day. 22 g 0   • naproxen (EC NAPROSYN) 500 MG EC tablet Take 1 tablet by mouth 2 (Two) Times a Day With Meals. 10 tablet 0   • potassium chloride (K-DUR,KLOR-CON) 20 MEQ CR tablet daily.     • pravastatin (PRAVACHOL) 40 MG tablet Take 1 tablet by mouth Daily. 90 tablet 3   • sildenafil (VIAGRA) 100 MG tablet Take 1 tablet by mouth Daily As Needed for erectile dysfunction. 6 tablet 5   • tiZANidine (ZANAFLEX) 4 MG tablet Take 1 tablet by mouth Daily. 90 tablet 3   • triamcinolone (KENALOG) 0.1 % ointment Apply  topically 2 (Two) Times a Day As Needed for Irritation or Rash. 80 g 5   • levoFLOXacin (LEVAQUIN) 750 MG tablet Take 1 tablet by mouth Daily. 10 tablet 0     No facility-administered medications prior to visit.        Patient Active Problem List   Diagnosis   • Acid reflux   • Arthritis   • Hypertension   • Hyperlipidemia   • Severe obstructive sleep apnea   • Psoriasis   • Diastolic dysfunction   • Peptic ulceration   • Bilateral edema of lower extremity   • Abnormal liver function test    • Hyperbilirubinemia   • Psychophysiological insomnia   • Gilbert's disease   • Situational depression       Advance Care Planning:  has an advance directive - a copy has been provided and is in file    Identification of Risk Factors:  Risk factors include: weight , unhealthy diet, cardiovascular risk, inactivity, increased fall risk, chronic pain, depression, financial stress and polypharmacy.    Review of Systems   Constitutional: Negative for chills, fatigue, fever and unexpected weight change.   HENT: Negative for ear pain, hearing loss, rhinorrhea, sinus pressure, sore throat and trouble swallowing.    Eyes: Positive for redness. Negative for discharge and itching.   Respiratory: Positive for shortness of breath. Negative for cough and chest tightness.    Cardiovascular: Positive for leg swelling. Negative for chest pain and palpitations.   Gastrointestinal: Negative for abdominal pain, blood in stool, constipation, diarrhea and vomiting.        2013 colonoscopy normal   Endocrine: Negative for polydipsia and polyuria.   Genitourinary: Negative for difficulty urinating, dysuria, enuresis, frequency, hematuria and urgency.        11/17 psa  ED   Musculoskeletal: Positive for arthralgias and neck pain. Negative for back pain, gait problem and joint swelling.   Skin: Negative for rash and wound.        Dry skin   Allergic/Immunologic: Negative for immunocompromised state.   Neurological: Negative for dizziness, syncope, weakness, light-headedness, numbness and headaches.   Hematological: Does not bruise/bleed easily.   Psychiatric/Behavioral: Negative for behavioral problems, dysphoric mood and sleep disturbance. The patient is not nervous/anxious.        Compared to one year ago, the patient feels his physical health is worse.  Compared to one year ago, the patient feels his mental health is worse.    Objective     Physical Exam   Constitutional: He is oriented to person, place, and time. He appears  "well-developed and well-nourished.   HENT:   Head: Normocephalic and atraumatic.   Right Ear: External ear normal.   Left Ear: External ear normal.   Mouth/Throat: Oropharynx is clear and moist.   Eyes: Pupils are equal, round, and reactive to light. Conjunctivae and EOM are normal.   Right subconjunctival bleed   Neck: Normal range of motion. Neck supple.   Cardiovascular: Normal rate, regular rhythm, normal heart sounds and intact distal pulses.    Pulmonary/Chest: Effort normal and breath sounds normal.   Abdominal: Soft. Bowel sounds are normal.   Musculoskeletal: Normal range of motion. He exhibits edema (trace R>L).   Neurological: He is alert and oriented to person, place, and time. He has normal reflexes.   Skin: Skin is warm and dry.   Dry skin   Psychiatric: He has a normal mood and affect. His behavior is normal. Judgment and thought content normal.   Vitals reviewed.      Vitals:    08/22/18 0944 08/22/18 0959   BP: 130/70 126/70   BP Location: Left arm    Patient Position: Sitting    Pulse: 64    Weight: 106 kg (233 lb)    Height: 177.8 cm (70\")    PainSc: 0-No pain        Patient's Body mass index is 33.43 kg/m². BMI is above normal parameters. Recommendations include: educational material, exercise counseling and nutrition counseling.      Assessment/Plan   Patient Self-Management and Personalized Health Advice  The patient has been provided with information about: diet, exercise, fall prevention and mental health concerns and preventive services including:   · Colorectal cancer screening, colonoscopy referral placed, Exercise counseling provided, Nutrition counseling provided, Prostate cancer screening discussed, Zostavax vaccine (Herpes Zoster).    Visit Diagnoses:    ICD-10-CM ICD-9-CM   1. Diastolic dysfunction I51.9 429.9   2. Other hyperlipidemia E78.4 272.4   3. Essential hypertension I10 401.9   4. Severe obstructive sleep apnea G47.33 327.23   5. Gastroesophageal reflux disease without " esophagitis K21.9 530.81   6. Peptic ulceration K27.9 533.90   7. Arthritis M19.90 716.90   8. Abnormal liver function test R79.89 790.6   9. Bilateral edema of lower extremity R60.0 782.3   10. Gilbert's disease E80.4 277.4   11. Hyperbilirubinemia E80.6 782.4   12. Psychophysiological insomnia F51.04 307.42   13. Situational depression F43.21 309.0   14. Screen for colon cancer Z12.11 V76.51   15. Other male erectile dysfunction N52.8 607.84       Orders Placed This Encounter   Procedures   • Comprehensive Metabolic Panel   • CBC (No Diff)   • Lipid Panel   • TSH   • Ambulatory Referral For Screening Colonoscopy     Referral Priority:   Routine     Referral Type:   Diagnostic Medical     Referral Reason:   Specialty Services Required     Referred to Provider:   Ck Sullivan MD     Number of Visits Requested:   1       Outpatient Encounter Prescriptions as of 8/22/2018   Medication Sig Dispense Refill   • amLODIPine (NORVASC) 5 MG tablet Take 1 tablet by mouth Daily. 90 tablet 3   • bisoprolol (ZEBeta) 5 MG tablet Take 1 tablet by mouth Daily. 90 tablet 2   • cyclobenzaprine (FLEXERIL) 10 MG tablet Take 1 tablet by mouth 3 (Three) Times a Day. 12 tablet 0   • doxazosin (CARDURA) 4 MG tablet Take 1 tablet by mouth Every Night. 90 tablet 3   • fenofibrate (TRICOR) 145 MG tablet Take 1 tablet by mouth Daily. 90 tablet 2   • furosemide (LASIX) 40 MG tablet Take 1 tablet by mouth Daily. 90 tablet 2   • mupirocin (BACTROBAN) 2 % ointment Apply  topically 3 (Three) Times a Day. 22 g 0   • naproxen (EC NAPROSYN) 500 MG EC tablet Take 1 tablet by mouth 2 (Two) Times a Day With Meals. 10 tablet 0   • potassium chloride (K-DUR,KLOR-CON) 20 MEQ CR tablet daily.     • pravastatin (PRAVACHOL) 40 MG tablet Take 1 tablet by mouth Daily. 90 tablet 3   • sildenafil (VIAGRA) 100 MG tablet Take 1 tablet by mouth Daily As Needed for erectile dysfunction. 6 tablet 5   • tiZANidine (ZANAFLEX) 4 MG tablet Take 1 tablet by mouth  Daily. 90 tablet 3   • triamcinolone (KENALOG) 0.1 % ointment Apply  topically 2 (Two) Times a Day As Needed for Irritation or Rash. 80 g 5   • DULoxetine (CYMBALTA) 30 MG capsule Take 1 capsule by mouth Daily. 30 capsule 5   • sildenafil (REVATIO) 20 MG tablet 2-4 tabs as needed 90 tablet 5   • [DISCONTINUED] levoFLOXacin (LEVAQUIN) 750 MG tablet Take 1 tablet by mouth Daily. 10 tablet 0     No facility-administered encounter medications on file as of 8/22/2018.        Reviewed use of high risk medication in the elderly: yes  Reviewed for potential of harmful drug interactions in the elderly: yes    Follow Up:  No Follow-up on file.     An After Visit Summary and PPPS with all of these plans were given to the patient.          htn-stable  hyperlipidemia-cont pravachol/fibrate, labs today  BPH-cont cardura  GERD-cont ppi  djd-advised to limit nsaids  neck spasm-zanaflex prn and can attempt to wean off  edema-cont lasix  Hyperbilirubinemia-recheck   Thrombocytopenia-recheck  ED-viagra rx  ?[facility] [place]-[facility] noted  Situational depression-rx cymbalta and will f/u with Behavioral health  high risk meds-labs today      labs noted and dw patient

## 2018-08-31 DIAGNOSIS — I10 ESSENTIAL HYPERTENSION: ICD-10-CM

## 2018-08-31 RX ORDER — BISOPROLOL FUMARATE 5 MG/1
TABLET, FILM COATED ORAL
Qty: 90 TABLET | Refills: 2 | Status: SHIPPED | OUTPATIENT
Start: 2018-08-31 | End: 2018-10-25

## 2018-08-31 RX ORDER — DOXAZOSIN MESYLATE 4 MG/1
TABLET ORAL
Qty: 90 TABLET | Refills: 3 | Status: SHIPPED | OUTPATIENT
Start: 2018-08-31 | End: 2019-09-28 | Stop reason: SDUPTHER

## 2018-09-12 RX ORDER — FUROSEMIDE 40 MG/1
TABLET ORAL
Qty: 90 TABLET | Refills: 2 | Status: SHIPPED | OUTPATIENT
Start: 2018-09-12 | End: 2019-07-08 | Stop reason: SDUPTHER

## 2018-09-17 DIAGNOSIS — Z12.11 SCREENING FOR COLON CANCER: Primary | ICD-10-CM

## 2018-09-17 RX ORDER — SODIUM, POTASSIUM,MAG SULFATES 17.5-3.13G
1 SOLUTION, RECONSTITUTED, ORAL ORAL TAKE AS DIRECTED
Qty: 2 BOTTLE | Refills: 0 | Status: SHIPPED | OUTPATIENT
Start: 2018-09-17 | End: 2018-10-25

## 2018-09-18 DIAGNOSIS — I10 ESSENTIAL HYPERTENSION: ICD-10-CM

## 2018-09-18 RX ORDER — AMLODIPINE BESYLATE 5 MG/1
TABLET ORAL
Qty: 90 TABLET | Refills: 3 | Status: SHIPPED | OUTPATIENT
Start: 2018-09-18 | End: 2019-10-05 | Stop reason: SDUPTHER

## 2018-09-25 ENCOUNTER — LAB REQUISITION (OUTPATIENT)
Dept: LAB | Facility: HOSPITAL | Age: 68
End: 2018-09-25

## 2018-09-25 ENCOUNTER — OUTSIDE FACILITY SERVICE (OUTPATIENT)
Dept: GASTROENTEROLOGY | Facility: CLINIC | Age: 68
End: 2018-09-25

## 2018-09-25 DIAGNOSIS — K64.8 OTHER HEMORRHOIDS: ICD-10-CM

## 2018-09-25 DIAGNOSIS — D12.3 BENIGN NEOPLASM OF TRANSVERSE COLON: ICD-10-CM

## 2018-09-25 DIAGNOSIS — D12.0 BENIGN NEOPLASM OF CECUM: ICD-10-CM

## 2018-09-25 DIAGNOSIS — Z80.0 FAMILY HISTORY OF MALIGNANT NEOPLASM OF DIGESTIVE ORGAN: ICD-10-CM

## 2018-09-25 DIAGNOSIS — Z86.010 HISTORY OF COLONIC POLYPS: ICD-10-CM

## 2018-09-25 PROCEDURE — 45385 COLONOSCOPY W/LESION REMOVAL: CPT | Performed by: INTERNAL MEDICINE

## 2018-09-25 PROCEDURE — 88305 TISSUE EXAM BY PATHOLOGIST: CPT | Performed by: INTERNAL MEDICINE

## 2018-09-26 PROBLEM — D36.9 TUBULAR ADENOMA: Status: ACTIVE | Noted: 2018-09-26

## 2018-09-26 LAB
CYTO UR: NORMAL
LAB AP CASE REPORT: NORMAL
LAB AP CLINICAL INFORMATION: NORMAL
PATH REPORT.FINAL DX SPEC: NORMAL
PATH REPORT.GROSS SPEC: NORMAL

## 2018-09-28 RX ORDER — PRAVASTATIN SODIUM 40 MG
TABLET ORAL
Qty: 90 TABLET | Refills: 3 | Status: SHIPPED | OUTPATIENT
Start: 2018-09-28 | End: 2019-10-29 | Stop reason: SDUPTHER

## 2018-10-12 ENCOUNTER — EPISODE CHANGES (OUTPATIENT)
Dept: CASE MANAGEMENT | Facility: OTHER | Age: 68
End: 2018-10-12

## 2018-10-25 ENCOUNTER — OFFICE VISIT (OUTPATIENT)
Dept: INTERNAL MEDICINE | Facility: CLINIC | Age: 68
End: 2018-10-25

## 2018-10-25 VITALS
HEIGHT: 70 IN | WEIGHT: 218 LBS | BODY MASS INDEX: 31.21 KG/M2 | SYSTOLIC BLOOD PRESSURE: 172 MMHG | DIASTOLIC BLOOD PRESSURE: 80 MMHG | HEART RATE: 72 BPM

## 2018-10-25 DIAGNOSIS — F43.21 SITUATIONAL DEPRESSION: ICD-10-CM

## 2018-10-25 DIAGNOSIS — G47.33 SEVERE OBSTRUCTIVE SLEEP APNEA: ICD-10-CM

## 2018-10-25 DIAGNOSIS — E80.4 GILBERT'S DISEASE: ICD-10-CM

## 2018-10-25 DIAGNOSIS — K27.9 PEPTIC ULCERATION: ICD-10-CM

## 2018-10-25 DIAGNOSIS — F51.04 PSYCHOPHYSIOLOGICAL INSOMNIA: ICD-10-CM

## 2018-10-25 DIAGNOSIS — R60.0 BILATERAL EDEMA OF LOWER EXTREMITY: ICD-10-CM

## 2018-10-25 DIAGNOSIS — Z23 NEED FOR INFLUENZA VACCINATION: ICD-10-CM

## 2018-10-25 DIAGNOSIS — D36.9 TUBULAR ADENOMA: ICD-10-CM

## 2018-10-25 DIAGNOSIS — I10 ESSENTIAL HYPERTENSION: ICD-10-CM

## 2018-10-25 DIAGNOSIS — E80.6 HYPERBILIRUBINEMIA: ICD-10-CM

## 2018-10-25 DIAGNOSIS — I51.89 DIASTOLIC DYSFUNCTION: Primary | ICD-10-CM

## 2018-10-25 DIAGNOSIS — R79.89 ABNORMAL LIVER FUNCTION TEST: ICD-10-CM

## 2018-10-25 DIAGNOSIS — L40.9 PSORIASIS: ICD-10-CM

## 2018-10-25 DIAGNOSIS — E78.49 OTHER HYPERLIPIDEMIA: ICD-10-CM

## 2018-10-25 DIAGNOSIS — K21.9 GASTROESOPHAGEAL REFLUX DISEASE WITHOUT ESOPHAGITIS: ICD-10-CM

## 2018-10-25 DIAGNOSIS — M19.90 ARTHRITIS: ICD-10-CM

## 2018-10-25 PROCEDURE — 90662 IIV NO PRSV INCREASED AG IM: CPT | Performed by: INTERNAL MEDICINE

## 2018-10-25 PROCEDURE — G0008 ADMIN INFLUENZA VIRUS VAC: HCPCS | Performed by: INTERNAL MEDICINE

## 2018-10-25 PROCEDURE — 99214 OFFICE O/P EST MOD 30 MIN: CPT | Performed by: INTERNAL MEDICINE

## 2018-10-25 RX ORDER — IRBESARTAN 300 MG/1
300 TABLET ORAL NIGHTLY
Refills: 5 | COMMUNITY
Start: 2018-10-22 | End: 2023-03-24

## 2018-10-25 RX ORDER — ARIPIPRAZOLE 10 MG/1
TABLET ORAL
Refills: 2 | COMMUNITY
Start: 2018-10-10 | End: 2019-08-08

## 2018-10-25 NOTE — PROGRESS NOTES
Patient is a 68 y.o. male who is here for a follow up of chronic conditions.  Chief Complaint   Patient presents with   • Hypertension   • Hyperlipidemia         HPI:    Here for f/u.  Feels better on cymbalta.  Still seeing counselor.  Poor sleep.  No dizziness or lightheadedness.  Occasional palpitations.  GERD is good.  Has lost 15 pounds since last seen.  No HA's.      History:    Patient Active Problem List   Diagnosis   • Acid reflux   • Arthritis   • Hypertension   • Hyperlipidemia   • Severe obstructive sleep apnea   • Psoriasis   • Diastolic dysfunction   • Peptic ulceration   • Bilateral edema of lower extremity   • Abnormal liver function test   • Hyperbilirubinemia   • Psychophysiological insomnia   • Gilbert's disease   • Situational depression   • Tubular adenoma       Past Medical History:   Diagnosis Date   • Abnormal liver function test    • Arthritis    • Bilateral edema of lower extremity    • Cellulitis of leg, right    • Chalazion    • Hypertension    • Kidney infection    • Neck muscle spasm    • Onychomycosis of toenail    • Peptic ulceration    • Renal calculi    • Situational depression 8/22/2018       Past Surgical History:   Procedure Laterality Date   • KNEE SURGERY Right 1989   • OTHER SURGICAL HISTORY      Lithotripsy   • TONSILLECTOMY         Current Outpatient Prescriptions on File Prior to Visit   Medication Sig   • amLODIPine (NORVASC) 5 MG tablet TAKE ONE TABLET BY MOUTH EVERY DAY   • cyclobenzaprine (FLEXERIL) 10 MG tablet Take 1 tablet by mouth 3 (Three) Times a Day.   • doxazosin (CARDURA) 4 MG tablet TAKE ONE TABLET BY MOUTH EVERY NIGHT   • DULoxetine (CYMBALTA) 30 MG capsule Take 1 capsule by mouth Daily.   • fenofibrate (TRICOR) 145 MG tablet Take 1 tablet by mouth Daily.   • furosemide (LASIX) 40 MG tablet TAKE ONE TABLET BY MOUTH EVERY DAY   • mupirocin (BACTROBAN) 2 % ointment Apply  topically 3 (Three) Times a Day.   • naproxen (EC NAPROSYN) 500 MG EC tablet Take 1 tablet  by mouth 2 (Two) Times a Day With Meals.   • potassium chloride (K-DUR,KLOR-CON) 20 MEQ CR tablet daily.   • pravastatin (PRAVACHOL) 40 MG tablet TAKE ONE TABLET BY MOUTH EVERY DAY   • sildenafil (REVATIO) 20 MG tablet 2-4 tabs as needed   • sildenafil (VIAGRA) 100 MG tablet Take 1 tablet by mouth Daily As Needed for erectile dysfunction.   • tiZANidine (ZANAFLEX) 4 MG tablet Take 1 tablet by mouth Daily.   • triamcinolone (KENALOG) 0.1 % ointment Apply  topically 2 (Two) Times a Day As Needed for Irritation or Rash.   • [DISCONTINUED] bisoprolol (ZEBeta) 5 MG tablet TAKE ONE TABLET BY MOUTH EVERY DAY   • [DISCONTINUED] sodium-potassium-magnesium sulfates (SUPREP BOWEL PREP KIT) 17.5-3.13-1.6 GM/180ML solution oral solution Take 1 bottle by mouth Take As Directed. Follow instructions that were mailed to your home. If you didn't receive these call [phone].     No current facility-administered medications on file prior to visit.        Family History   Problem Relation Age of Onset   • Arthritis Other    • Hypertension Other    • Hyperlipidemia Other    • Heart attack Other    • Hypertension Mother    • Heart disease Father    • Hypertension Father        Social History     Social History   • Marital status:      Spouse name: N/A   • Number of children: N/A   • Years of education: N/A     Occupational History   • Not on file.     Social History Main Topics   • Smoking status: Never Smoker   • Smokeless tobacco: Never Used   • Alcohol use 2.4 oz/week     4 Cans of beer per week   • Drug use: No   • Sexual activity: Defer     Other Topics Concern   • Not on file     Social History Narrative   • No narrative on file         Review of Systems   Constitutional: Negative for chills, fatigue, fever and unexpected weight change.   HENT: Negative for ear pain, hearing loss, rhinorrhea, sinus pressure, sore throat and trouble swallowing.    Eyes: Negative for discharge and itching.   Respiratory: Negative for  "cough and chest tightness.    Cardiovascular: Positive for leg swelling (on right leg). Negative for chest pain and palpitations.   Gastrointestinal: Negative for abdominal pain, blood in stool, constipation, diarrhea and vomiting.        2013 colonoscopy normal  9/18 colonoscopy with TA times 1, repeat in 9/21   Endocrine: Negative for polydipsia and polyuria.   Genitourinary: Negative for difficulty urinating, dysuria, enuresis, frequency, hematuria and urgency.        11/17 psa  ED   Musculoskeletal: Positive for arthralgias and neck pain. Negative for back pain, gait problem and joint swelling.   Skin: Negative for rash and wound.        Dry skin   Allergic/Immunologic: Negative for immunocompromised state.   Neurological: Negative for dizziness, syncope, weakness, light-headedness, numbness and headaches.   Hematological: Does not bruise/bleed easily.   Psychiatric/Behavioral: Negative for behavioral problems, dysphoric mood and sleep disturbance. The patient is not nervous/anxious.        /80   Pulse 72   Ht 177.8 cm (70\")   Wt 98.9 kg (218 lb)   BMI 31.28 kg/m²       Physical Exam   Constitutional: He is oriented to person, place, and time. He appears well-developed and well-nourished.   HENT:   Head: Normocephalic and atraumatic.   Right Ear: External ear normal.   Left Ear: External ear normal.   Mouth/Throat: Oropharynx is clear and moist.   Eyes: Pupils are equal, round, and reactive to light. Conjunctivae and EOM are normal.   Neck: Normal range of motion. Neck supple.   Cardiovascular: Normal rate, regular rhythm, normal heart sounds and intact distal pulses.    Pulmonary/Chest: Effort normal and breath sounds normal.   Abdominal: Soft. Bowel sounds are normal.   Musculoskeletal: Normal range of motion. He exhibits edema (trace R>L).   Neurological: He is alert and oriented to person, place, and time. He has normal reflexes.   Skin: Skin is warm and dry.   Dry skin   Psychiatric: He has a " normal mood and affect. His behavior is normal. Judgment and thought content normal.   Vitals reviewed.      Procedure:      Discussion/Summary:    htn-has recently been rxd irbesartan  hyperlipidemia-cont pravachol/fibrate, labs on rtc  BPH-cont cardura  GERD-cont ppi  djd-advised to limit nsaids  neck spasm-zanaflex prn and can attempt to wean off  edema-cont lasix  Hyperbilirubinemia-recheck noted  Thrombocytopenia-recheck noted  ED-viagra rx  ?[place]-Hem noted  Situational depression-cont cymbalta and will f/u with Behavioral health  high risk meds-labs noted      labs noted and dw patient    Current Outpatient Prescriptions:   •  amLODIPine (NORVASC) 5 MG tablet, TAKE ONE TABLET BY MOUTH EVERY DAY, Disp: 90 tablet, Rfl: 3  •  cyclobenzaprine (FLEXERIL) 10 MG tablet, Take 1 tablet by mouth 3 (Three) Times a Day., Disp: 12 tablet, Rfl: 0  •  doxazosin (CARDURA) 4 MG tablet, TAKE ONE TABLET BY MOUTH EVERY NIGHT, Disp: 90 tablet, Rfl: 3  •  DULoxetine (CYMBALTA) 30 MG capsule, Take 1 capsule by mouth Daily., Disp: 30 capsule, Rfl: 5  •  fenofibrate (TRICOR) 145 MG tablet, Take 1 tablet by mouth Daily., Disp: 90 tablet, Rfl: 2  •  furosemide (LASIX) 40 MG tablet, TAKE ONE TABLET BY MOUTH EVERY DAY, Disp: 90 tablet, Rfl: 2  •  mupirocin (BACTROBAN) 2 % ointment, Apply  topically 3 (Three) Times a Day., Disp: 22 g, Rfl: 0  •  naproxen (EC NAPROSYN) 500 MG EC tablet, Take 1 tablet by mouth 2 (Two) Times a Day With Meals., Disp: 10 tablet, Rfl: 0  •  potassium chloride (K-DUR,KLOR-CON) 20 MEQ CR tablet, daily., Disp: , Rfl:   •  pravastatin (PRAVACHOL) 40 MG tablet, TAKE ONE TABLET BY MOUTH EVERY DAY, Disp: 90 tablet, Rfl: 3  •  sildenafil (REVATIO) 20 MG tablet, 2-4 tabs as needed, Disp: 90 tablet, Rfl: 5  •  sildenafil (VIAGRA) 100 MG tablet, Take 1 tablet by mouth Daily As Needed for erectile dysfunction., Disp: 6 tablet, Rfl: 5  •  tiZANidine (ZANAFLEX) 4 MG tablet, Take 1 tablet by mouth Daily., Disp: 90 tablet,  Rfl: 3  •  triamcinolone (KENALOG) 0.1 % ointment, Apply  topically 2 (Two) Times a Day As Needed for Irritation or Rash., Disp: 80 g, Rfl: 5  •  ARIPiprazole (ABILIFY) 5 MG tablet, TK 1 T PO HS, Disp: , Rfl: 2  •  irbesartan (AVAPRO) 300 MG tablet, Take 300 mg by mouth Daily., Disp: , Rfl: 5        Jimmie was seen today for hypertension and hyperlipidemia.    Diagnoses and all orders for this visit:    Diastolic dysfunction    Other hyperlipidemia    Essential hypertension    Severe obstructive sleep apnea    Gastroesophageal reflux disease without esophagitis    Peptic ulceration    Arthritis    Psoriasis    Abnormal liver function test    Bilateral edema of lower extremity    Gilbert's disease    Hyperbilirubinemia    Psychophysiological insomnia    Situational depression    Tubular adenoma    Need for influenza vaccination  -     Fluzone High Dose =>67Years

## 2018-10-26 ENCOUNTER — TELEPHONE (OUTPATIENT)
Dept: INTERNAL MEDICINE | Facility: CLINIC | Age: 68
End: 2018-10-26

## 2018-10-26 RX ORDER — CLONIDINE HYDROCHLORIDE 0.1 MG/1
TABLET ORAL
Qty: 30 TABLET | Refills: 1 | Status: SHIPPED | OUTPATIENT
Start: 2018-10-26 | End: 2019-01-30 | Stop reason: SDUPTHER

## 2018-11-05 ENCOUNTER — OFFICE VISIT (OUTPATIENT)
Dept: SLEEP MEDICINE | Facility: HOSPITAL | Age: 68
End: 2018-11-05

## 2018-11-05 VITALS
WEIGHT: 220 LBS | HEART RATE: 67 BPM | SYSTOLIC BLOOD PRESSURE: 158 MMHG | DIASTOLIC BLOOD PRESSURE: 69 MMHG | OXYGEN SATURATION: 98 % | HEIGHT: 70 IN | BODY MASS INDEX: 31.5 KG/M2

## 2018-11-05 DIAGNOSIS — F51.04 PSYCHOPHYSIOLOGICAL INSOMNIA: ICD-10-CM

## 2018-11-05 DIAGNOSIS — G47.33 OSA (OBSTRUCTIVE SLEEP APNEA): Primary | ICD-10-CM

## 2018-11-05 PROCEDURE — 99213 OFFICE O/P EST LOW 20 MIN: CPT | Performed by: NURSE PRACTITIONER

## 2018-11-05 RX ORDER — UREA 200 MG/G
GEL TOPICAL AS NEEDED
COMMUNITY

## 2018-11-05 RX ORDER — CHLORAL HYDRATE 500 MG
CAPSULE ORAL
COMMUNITY
End: 2020-03-11 | Stop reason: SDUPTHER

## 2018-11-05 NOTE — PROGRESS NOTES
Subjective: Follow-up        Chief Complaint:   Chief Complaint   Patient presents with   • Follow-up       HPI:    Jimmie Salcedo is a 68 y.o. male here for follow-up of chad.  Patient was last seen 4/10/17..  He's doing well with his sleep therapy and a sleeping approximately 5 hours nightly.  His [place] is increased at 13/24.  Patient has had some blood pressure medication changes in the past month and he does think this is affecting his sleep.  He is having increased trouble with insomnia.  He will wake up in the middle the night and will not go back to sleep.  Years ago he was on clonazepam and discontinued he has not tried anything prescription   He has tried melatonin in the past and it did seem to be working however he did stop but he does not remember why.        Current medications are:   Current Outpatient Prescriptions:   •  amLODIPine (NORVASC) 5 MG tablet, TAKE ONE TABLET BY MOUTH EVERY DAY, Disp: 90 tablet, Rfl: 3  •  ARIPiprazole (ABILIFY) 5 MG tablet, TK 1 T PO HS, Disp: , Rfl: 2  •  calcium citrate-vitamin d (CITRACAL) 200-250 MG-UNIT tablet tablet, Take  by mouth Daily., Disp: , Rfl:   •  CloNIDine (CATAPRES) 0.1 MG tablet, Tab 1 tab 1 6 hours prn if systolic is over 180, Disp: 30 tablet, Rfl: 1  •  cyclobenzaprine (FLEXERIL) 10 MG tablet, Take 1 tablet by mouth 3 (Three) Times a Day., Disp: 12 tablet, Rfl: 0  •  doxazosin (CARDURA) 4 MG tablet, TAKE ONE TABLET BY MOUTH EVERY NIGHT, Disp: 90 tablet, Rfl: 3  •  DULoxetine (CYMBALTA) 30 MG capsule, Take 1 capsule by mouth Daily., Disp: 30 capsule, Rfl: 5  •  fenofibrate (TRICOR) 145 MG tablet, Take 1 tablet by mouth Daily., Disp: 90 tablet, Rfl: 2  •  furosemide (LASIX) 40 MG tablet, TAKE ONE TABLET BY MOUTH EVERY DAY, Disp: 90 tablet, Rfl: 2  •  irbesartan (AVAPRO) 300 MG tablet, Take 300 mg by mouth Daily., Disp: , Rfl: 5  •  mupirocin (BACTROBAN) 2 % ointment, Apply  topically 3 (Three) Times a Day., Disp: 22 g, Rfl: 0  •  Omega-3 Fatty Acids  (FISH OIL) 1000 MG capsule capsule, Take  by mouth Daily With Breakfast., Disp: , Rfl:   •  Ped Multivitamins-Fl-Iron (MULTIVITAMIN WITH FLUORIDE/IRON) 0.25-10 MG/ML solution solution, Take  by mouth Daily., Disp: , Rfl:   •  potassium chloride (K-DUR,KLOR-CON) 20 MEQ CR tablet, daily., Disp: , Rfl:   •  pravastatin (PRAVACHOL) 40 MG tablet, TAKE ONE TABLET BY MOUTH EVERY DAY, Disp: 90 tablet, Rfl: 3  •  sildenafil (REVATIO) 20 MG tablet, 2-4 tabs as needed, Disp: 90 tablet, Rfl: 5  •  triamcinolone (KENALOG) 0.1 % ointment, Apply  topically 2 (Two) Times a Day As Needed for Irritation or Rash., Disp: 80 g, Rfl: 5  •  urea (CARMOL) 20 % cream, Apply  topically to the appropriate area as directed As Needed for Dry Skin., Disp: , Rfl:   •  naproxen (EC NAPROSYN) 500 MG EC tablet, Take 1 tablet by mouth 2 (Two) Times a Day With Meals., Disp: 10 tablet, Rfl: 0  •  sildenafil (VIAGRA) 100 MG tablet, Take 1 tablet by mouth Daily As Needed for erectile dysfunction., Disp: 6 tablet, Rfl: 5  •  tiZANidine (ZANAFLEX) 4 MG tablet, Take 1 tablet by mouth Daily., Disp: 90 tablet, Rfl: 3.      The patient's relevant past medical, surgical, family and social history were reviewed and updated in Epic as appropriate.       Review of Systems   Eyes: Positive for visual disturbance.   Respiratory: Positive for apnea.    Gastrointestinal:        Reflux   Musculoskeletal: Positive for arthralgias and joint swelling.   Skin: Positive for rash.   Psychiatric/Behavioral: Positive for sleep disturbance.         Objective:    Physical Exam   Constitutional: He is oriented to person, place, and time. He appears well-developed and well-nourished.   HENT:   Head: Normocephalic and atraumatic.   Mouth/Throat: Oropharynx is clear and moist.   Mallampati 2 anatomy   Eyes: Conjunctivae are normal.   Neck: Neck supple. No thyromegaly present.   Cardiovascular: Normal rate and regular rhythm.    Pulmonary/Chest: Effort normal and breath sounds  normal.   Lymphadenopathy:     He has no cervical adenopathy.   Neurological: He is alert and oriented to person, place, and time.   Skin: Skin is warm and dry.   Psychiatric: He has a normal mood and affect. His behavior is normal. Judgment and thought content normal.   Nursing note and vitals reviewed.   download and compliance reviewed with patient.  Usage greater than 4 hours of 90.6.  AHI of 0.9.  90% pressure 11.0 cm H2O.      ASSESSMENT/PLAN    Jimmie was seen today for follow-up.    Diagnoses and all orders for this visit:    THA (obstructive sleep apnea)  -     CPAP Therapy    Psychophysiological insomnia            1. Counseled patient regarding multimodal approach with healthy nutrition, healthy sleep, regular physical activity, social activities, counseling, and medications. Encouraged to practice lateral sleep position. Avoid alcohol and sedatives close to bedtime.  2. I have given patient a handout on the [facility] sleep therapy online program.  I have also suggested melatonin and possibly if this does not work a very small dose of Benadryl.  I have put patient back in 1 year.  However, I have strongly stressed to patient if the insomnia does not get better to please return to clinic sooner as we will try a stronger medication.  Patient verbalizes understanding.    I have reviewed the results of my evaluation and impression and discussed my recommendations in detail with the patient.      Signed by  SOURAV Field    November 5, 2018      CC: Javad Negron MD          No ref. provider found

## 2018-11-05 NOTE — PATIENT INSTRUCTIONS
Insomnia  Insomnia is a sleep disorder that makes it difficult to fall asleep or to stay asleep. Insomnia can cause tiredness (fatigue), low energy, difficulty concentrating, mood swings, and poor performance at work or school.  There are three different ways to classify insomnia:  · Difficulty falling asleep.  · Difficulty staying asleep.  · Waking up too early in the morning.    Any type of insomnia can be long-term (chronic) or short-term (acute). Both are common. Short-term insomnia usually lasts for three months or less. Chronic insomnia occurs at least three times a week for longer than three months.  What are the causes?  Insomnia may be caused by another condition, situation, or substance, such as:  · Anxiety.  · Certain medicines.  · Gastroesophageal reflux disease (GERD) or other gastrointestinal conditions.  · Asthma or other breathing conditions.  · Restless legs syndrome, sleep apnea, or other sleep disorders.  · Chronic pain.  · Menopause. This may include hot flashes.  · Stroke.  · Abuse of alcohol, tobacco, or illegal drugs.  · Depression.  · Caffeine.  · Neurological disorders, such as Alzheimer disease.  · An overactive thyroid (hyperthyroidism).    The cause of insomnia may not be known.  What increases the risk?  Risk factors for insomnia include:  · Gender. Women are more commonly affected than men.  · Age. Insomnia is more common as you get older.  · Stress. This may involve your professional or personal life.  · Income. Insomnia is more common in people with lower income.  · Lack of exercise.  · Irregular work schedule or night shifts.  · Traveling between different time zones.    What are the signs or symptoms?  If you have insomnia, trouble falling asleep or trouble staying asleep is the main symptom. This may lead to other symptoms, such as:  · Feeling fatigued.  · Feeling nervous about going to sleep.  · Not feeling rested in the morning.  · Having trouble concentrating.  · Feeling  irritable, anxious, or depressed.    How is this treated?  Treatment for insomnia depends on the cause. If your insomnia is caused by an underlying condition, treatment will focus on addressing the condition. Treatment may also include:  · Medicines to help you sleep.  · Counseling or therapy.  · Lifestyle adjustments.    Follow these instructions at home:  · Take medicines only as directed by your health care provider.  · Keep regular sleeping and waking hours. Avoid naps.  · Keep a sleep diary to help you and your health care provider figure out what could be causing your insomnia. Include:  ? When you sleep.  ? When you wake up during the night.  ? How well you sleep.  ? How rested you feel the next day.  ? Any side effects of medicines you are taking.  ? What you eat and drink.  · Make your bedroom a comfortable place where it is easy to fall asleep:  ? Put up shades or special blackout curtains to block light from outside.  ? Use a white noise machine to block noise.  ? Keep the temperature cool.  · Exercise regularly as directed by your health care provider. Avoid exercising right before bedtime.  · Use relaxation techniques to manage stress. Ask your health care provider to suggest some techniques that may work well for you. These may include:  ? Breathing exercises.  ? Routines to release muscle tension.  ? Visualizing peaceful scenes.  · Cut back on alcohol, caffeinated beverages, and cigarettes, especially close to bedtime. These can disrupt your sleep.  · Do not overeat or eat spicy foods right before bedtime. This can lead to digestive discomfort that can make it hard for you to sleep.  · Limit screen use before bedtime. This includes:  ? Watching TV.  ? Using your smartphone, tablet, and computer.  · Stick to a routine. This can help you fall asleep faster. Try to do a quiet activity, brush your teeth, and go to bed at the same time each night.  · Get out of bed if you are still awake after 15 minutes  of trying to sleep. Keep the lights down, but try reading or doing a quiet activity. When you feel sleepy, go back to bed.  · Make sure that you drive carefully. Avoid driving if you feel very sleepy.  · Keep all follow-up appointments as directed by your health care provider. This is important.  Contact a health care provider if:  · You are tired throughout the day or have trouble in your daily routine due to sleepiness.  · You continue to have sleep problems or your sleep problems get worse.  Get help right away if:  · You have serious thoughts about hurting yourself or someone else.  This information is not intended to replace advice given to you by your health care provider. Make sure you discuss any questions you have with your health care provider.  Document Released: 12/15/2001 Document Revised: 05/19/2017 Document Reviewed: 09/18/2015  [employer] Interactive Patient Education © 2018 [employer]  Sleep Apnea  Sleep apnea is a condition that affects breathing. People with sleep apnea have moments during sleep when their breathing pauses briefly or gets shallow. Sleep apnea can cause these symptoms:  · Trouble staying asleep.  · Sleepiness or tiredness during the day.  · Irritability.  · Loud snoring.  · Morning headaches.  · Trouble concentrating.  · Forgetting things.  · Less interest in sex.  · Being sleepy for no reason.  · Mood swings.  · Personality changes.  · Depression.  · Waking up a lot during the night to pee (urinate).  · Dry mouth.  · Sore throat.    Follow these instructions at home:  · Make any changes in your routine that your doctor recommends.  · Eat a healthy, well-balanced diet.  · Take over-the-counter and prescription medicines only as told by your doctor.  · Avoid using alcohol, calming medicines (sedatives), and narcotic medicines.  · Take steps to lose weight if you are overweight.  · If you were given a machine (device) to use while you sleep, use it only as told by your doctor.  · Do  not use any tobacco products, such as cigarettes, chewing tobacco, and e-cigarettes. If you need help quitting, ask your doctor.  · Keep all follow-up visits as told by your doctor. This is important.  Contact a doctor if:  · The machine that you were given to use during sleep is uncomfortable or does not seem to be working.  · Your symptoms do not get better.  · Your symptoms get worse.  Get help right away if:  · Your chest hurts.  · You have trouble breathing in enough air (shortness of breath).  · You have an uncomfortable feeling in your back, arms, or stomach.  · You have trouble talking.  · One side of your body feels weak.  · A part of your face is hanging down (drooping).  These symptoms may be an emergency. Do not wait to see if the symptoms will go away. Get medical help right away. Call your local emergency services (911 in the [place] Do not drive yourself to the hospital.  This information is not intended to replace advice given to you by your health care provider. Make sure you discuss any questions you have with your health care provider.  Document Released: 09/26/2009 Document Revised: 08/13/2017 Document Reviewed: 09/26/2016  [employer] Interactive Patient Education © 2018 [employer]

## 2018-11-06 ENCOUNTER — TRANSCRIBE ORDERS (OUTPATIENT)
Dept: LAB | Facility: HOSPITAL | Age: 68
End: 2018-11-06

## 2018-11-06 ENCOUNTER — LAB (OUTPATIENT)
Dept: LAB | Facility: HOSPITAL | Age: 68
End: 2018-11-06

## 2018-11-06 DIAGNOSIS — R50.9 HYPERTHERMIA-INDUCED DEFECT: Primary | ICD-10-CM

## 2018-11-06 DIAGNOSIS — M25.562 ACUTE PAIN OF LEFT KNEE: ICD-10-CM

## 2018-11-06 DIAGNOSIS — R50.9 HYPERTHERMIA-INDUCED DEFECT: ICD-10-CM

## 2018-11-06 LAB
ALBUMIN SERPL-MCNC: 4.31 G/DL (ref 3.2–4.8)
ALBUMIN/GLOB SERPL: 1.9 G/DL (ref 1.5–2.5)
ALP SERPL-CCNC: 68 U/L (ref 25–100)
ALT SERPL W P-5'-P-CCNC: 22 U/L (ref 7–40)
ANION GAP SERPL CALCULATED.3IONS-SCNC: 9 MMOL/L (ref 3–11)
AST SERPL-CCNC: 28 U/L (ref 0–33)
BASOPHILS # BLD AUTO: 0.02 10*3/MM3 (ref 0–0.2)
BASOPHILS NFR BLD AUTO: 0.1 % (ref 0–1)
BILIRUB SERPL-MCNC: 1.7 MG/DL (ref 0.3–1.2)
BUN BLD-MCNC: 21 MG/DL (ref 9–23)
BUN/CREAT SERPL: 20.6 (ref 7–25)
CALCIUM SPEC-SCNC: 9.2 MG/DL (ref 8.7–10.4)
CHLORIDE SERPL-SCNC: 102 MMOL/L (ref 99–109)
CO2 SERPL-SCNC: 27 MMOL/L (ref 20–31)
CREAT BLD-MCNC: 1.02 MG/DL (ref 0.6–1.3)
CRP SERPL-MCNC: 6.94 MG/DL (ref 0–1)
DEPRECATED RDW RBC AUTO: 47.3 FL (ref 37–54)
EOSINOPHIL # BLD AUTO: 0.02 10*3/MM3 (ref 0–0.3)
EOSINOPHIL NFR BLD AUTO: 0.1 % (ref 0–3)
ERYTHROCYTE [DISTWIDTH] IN BLOOD BY AUTOMATED COUNT: 15 % (ref 11.3–14.5)
ERYTHROCYTE [SEDIMENTATION RATE] IN BLOOD: 7 MM/HR (ref 0–20)
GFR SERPL CREATININE-BSD FRML MDRD: 73 ML/MIN/1.73
GLOBULIN UR ELPH-MCNC: 2.3 GM/DL
GLUCOSE BLD-MCNC: 114 MG/DL (ref 70–100)
HCT VFR BLD AUTO: 44.4 % (ref 38.9–50.9)
HGB BLD-MCNC: 15.5 G/DL (ref 13.1–17.5)
IMM GRANULOCYTES # BLD: 0.06 10*3/MM3 (ref 0–0.03)
IMM GRANULOCYTES NFR BLD: 0.3 % (ref 0–0.6)
LYMPHOCYTES # BLD AUTO: 0.49 10*3/MM3 (ref 0.6–4.8)
LYMPHOCYTES NFR BLD AUTO: 2.3 % (ref 24–44)
MCH RBC QN AUTO: 30.6 PG (ref 27–31)
MCHC RBC AUTO-ENTMCNC: 34.9 G/DL (ref 32–36)
MCV RBC AUTO: 87.6 FL (ref 80–99)
MONOCYTES # BLD AUTO: 0.63 10*3/MM3 (ref 0–1)
MONOCYTES NFR BLD AUTO: 3 % (ref 0–12)
NEUTROPHILS # BLD AUTO: 20 10*3/MM3 (ref 1.5–8.3)
NEUTROPHILS NFR BLD AUTO: 94.5 % (ref 41–71)
PLATELET # BLD AUTO: 114 10*3/MM3 (ref 150–450)
PMV BLD AUTO: 9.9 FL (ref 6–12)
POTASSIUM BLD-SCNC: 3.8 MMOL/L (ref 3.5–5.5)
PROT SERPL-MCNC: 6.6 G/DL (ref 5.7–8.2)
RBC # BLD AUTO: 5.07 10*6/MM3 (ref 4.2–5.76)
SODIUM BLD-SCNC: 138 MMOL/L (ref 132–146)
WBC NRBC COR # BLD: 21.16 10*3/MM3 (ref 3.5–10.8)

## 2018-11-06 PROCEDURE — 85025 COMPLETE CBC W/AUTO DIFF WBC: CPT

## 2018-11-06 PROCEDURE — 86140 C-REACTIVE PROTEIN: CPT

## 2018-11-06 PROCEDURE — 80053 COMPREHEN METABOLIC PANEL: CPT

## 2018-11-06 PROCEDURE — 36415 COLL VENOUS BLD VENIPUNCTURE: CPT

## 2018-11-06 PROCEDURE — 85652 RBC SED RATE AUTOMATED: CPT

## 2018-11-08 ENCOUNTER — TRANSCRIBE ORDERS (OUTPATIENT)
Dept: LAB | Facility: HOSPITAL | Age: 68
End: 2018-11-08

## 2018-11-08 ENCOUNTER — LAB (OUTPATIENT)
Dept: LAB | Facility: HOSPITAL | Age: 68
End: 2018-11-08

## 2018-11-08 DIAGNOSIS — R79.82 ELEVATED C-REACTIVE PROTEIN (CRP): ICD-10-CM

## 2018-11-08 DIAGNOSIS — R50.9 HYPERTHERMIA-INDUCED DEFECT: ICD-10-CM

## 2018-11-08 DIAGNOSIS — R53.81 DEBILITY: ICD-10-CM

## 2018-11-08 DIAGNOSIS — R53.81 DEBILITY: Primary | ICD-10-CM

## 2018-11-08 DIAGNOSIS — L03.115 CELLULITIS OF RIGHT FOOT: ICD-10-CM

## 2018-11-08 DIAGNOSIS — R23.4 CHANGES IN SKIN TEXTURE: ICD-10-CM

## 2018-11-08 LAB
ANION GAP SERPL CALCULATED.3IONS-SCNC: 8 MMOL/L (ref 3–11)
BASOPHILS # BLD AUTO: 0.03 10*3/MM3 (ref 0–0.2)
BASOPHILS NFR BLD AUTO: 0.4 % (ref 0–1)
BUN BLD-MCNC: 21 MG/DL (ref 9–23)
BUN/CREAT SERPL: 20.8 (ref 7–25)
CALCIUM SPEC-SCNC: 8.7 MG/DL (ref 8.7–10.4)
CHLORIDE SERPL-SCNC: 102 MMOL/L (ref 99–109)
CO2 SERPL-SCNC: 28 MMOL/L (ref 20–31)
CREAT BLD-MCNC: 1.01 MG/DL (ref 0.6–1.3)
CRP SERPL-MCNC: 6.56 MG/DL (ref 0–1)
DEPRECATED RDW RBC AUTO: 46.7 FL (ref 37–54)
EOSINOPHIL # BLD AUTO: 0.2 10*3/MM3 (ref 0–0.3)
EOSINOPHIL NFR BLD AUTO: 2.6 % (ref 0–3)
ERYTHROCYTE [DISTWIDTH] IN BLOOD BY AUTOMATED COUNT: 14.8 % (ref 11.3–14.5)
GFR SERPL CREATININE-BSD FRML MDRD: 74 ML/MIN/1.73
GLUCOSE BLD-MCNC: 114 MG/DL (ref 70–100)
HCT VFR BLD AUTO: 43.3 % (ref 38.9–50.9)
HGB BLD-MCNC: 15.2 G/DL (ref 13.1–17.5)
IMM GRANULOCYTES # BLD: 0.01 10*3/MM3 (ref 0–0.03)
IMM GRANULOCYTES NFR BLD: 0.1 % (ref 0–0.6)
LYMPHOCYTES # BLD AUTO: 0.95 10*3/MM3 (ref 0.6–4.8)
LYMPHOCYTES NFR BLD AUTO: 12.4 % (ref 24–44)
MCH RBC QN AUTO: 30.3 PG (ref 27–31)
MCHC RBC AUTO-ENTMCNC: 35.1 G/DL (ref 32–36)
MCV RBC AUTO: 86.3 FL (ref 80–99)
MONOCYTES # BLD AUTO: 0.48 10*3/MM3 (ref 0–1)
MONOCYTES NFR BLD AUTO: 6.3 % (ref 0–12)
NEUTROPHILS # BLD AUTO: 6.02 10*3/MM3 (ref 1.5–8.3)
NEUTROPHILS NFR BLD AUTO: 78.3 % (ref 41–71)
PLATELET # BLD AUTO: 137 10*3/MM3 (ref 150–450)
PMV BLD AUTO: 10.1 FL (ref 6–12)
POTASSIUM BLD-SCNC: 4 MMOL/L (ref 3.5–5.5)
RBC # BLD AUTO: 5.02 10*6/MM3 (ref 4.2–5.76)
SODIUM BLD-SCNC: 138 MMOL/L (ref 132–146)
WBC NRBC COR # BLD: 7.68 10*3/MM3 (ref 3.5–10.8)

## 2018-11-08 PROCEDURE — 86140 C-REACTIVE PROTEIN: CPT

## 2018-11-08 PROCEDURE — 85025 COMPLETE CBC W/AUTO DIFF WBC: CPT

## 2018-11-08 PROCEDURE — 36415 COLL VENOUS BLD VENIPUNCTURE: CPT

## 2018-11-08 PROCEDURE — 80048 BASIC METABOLIC PNL TOTAL CA: CPT

## 2018-11-21 RX ORDER — FENOFIBRATE 145 MG/1
TABLET, COATED ORAL
Qty: 90 TABLET | Refills: 2 | Status: SHIPPED | OUTPATIENT
Start: 2018-11-21 | End: 2019-09-06 | Stop reason: SDUPTHER

## 2018-11-29 ENCOUNTER — CLINICAL SUPPORT (OUTPATIENT)
Dept: INTERNAL MEDICINE | Facility: CLINIC | Age: 68
End: 2018-11-29

## 2018-11-29 DIAGNOSIS — Z23 NEED FOR PNEUMOCOCCAL VACCINATION: Primary | ICD-10-CM

## 2018-11-29 PROCEDURE — 90732 PPSV23 VACC 2 YRS+ SUBQ/IM: CPT | Performed by: INTERNAL MEDICINE

## 2018-11-29 PROCEDURE — G0009 ADMIN PNEUMOCOCCAL VACCINE: HCPCS | Performed by: INTERNAL MEDICINE

## 2018-12-06 ENCOUNTER — HOSPITAL ENCOUNTER (EMERGENCY)
Facility: HOSPITAL | Age: 68
Discharge: HOME | End: 2018-12-06
Attending: EMERGENCY MEDICINE | Admitting: EMERGENCY MEDICINE

## 2018-12-06 ENCOUNTER — APPOINTMENT (OUTPATIENT)
Dept: CARDIOLOGY | Facility: HOSPITAL | Age: 68
End: 2018-12-06
Attending: EMERGENCY MEDICINE

## 2018-12-06 ENCOUNTER — APPOINTMENT (OUTPATIENT)
Dept: GENERAL RADIOLOGY | Facility: HOSPITAL | Age: 68
End: 2018-12-06

## 2018-12-06 VITALS
TEMPERATURE: 97.9 F | RESPIRATION RATE: 16 BRPM | HEART RATE: 57 BPM | OXYGEN SATURATION: 95 % | HEIGHT: 71 IN | WEIGHT: 214 LBS | BODY MASS INDEX: 29.96 KG/M2 | DIASTOLIC BLOOD PRESSURE: 49 MMHG | SYSTOLIC BLOOD PRESSURE: 148 MMHG

## 2018-12-06 DIAGNOSIS — M25.462 EFFUSION OF LEFT KNEE: Primary | ICD-10-CM

## 2018-12-06 DIAGNOSIS — M17.12 ARTHRITIS OF LEFT KNEE: ICD-10-CM

## 2018-12-06 DIAGNOSIS — M25.562 ARTHRALGIA OF LEFT KNEE: ICD-10-CM

## 2018-12-06 LAB

## 2018-12-06 PROCEDURE — 99284 EMERGENCY DEPT VISIT MOD MDM: CPT

## 2018-12-06 PROCEDURE — 93970 EXTREMITY STUDY: CPT

## 2018-12-06 PROCEDURE — 73560 X-RAY EXAM OF KNEE 1 OR 2: CPT

## 2018-12-06 PROCEDURE — 93970 EXTREMITY STUDY: CPT | Performed by: INTERNAL MEDICINE

## 2018-12-06 RX ORDER — HYDROMORPHONE HYDROCHLORIDE 1 MG/ML
0.5 INJECTION, SOLUTION INTRAMUSCULAR; INTRAVENOUS; SUBCUTANEOUS ONCE
Status: DISCONTINUED | OUTPATIENT
Start: 2018-12-06 | End: 2018-12-06

## 2018-12-06 RX ORDER — GUAIFENESIN 600 MG/1
600 TABLET, EXTENDED RELEASE ORAL EVERY 12 HOURS SCHEDULED
Status: DISCONTINUED | OUTPATIENT
Start: 2018-12-06 | End: 2018-12-06

## 2018-12-06 RX ORDER — HYDROCODONE BITARTRATE AND ACETAMINOPHEN 5; 325 MG/1; MG/1
1 TABLET ORAL EVERY 6 HOURS PRN
Qty: 7 TABLET | Refills: 0 | Status: SHIPPED | OUTPATIENT
Start: 2018-12-06 | End: 2019-01-30

## 2018-12-06 RX ORDER — HYDROCODONE BITARTRATE AND ACETAMINOPHEN 5; 325 MG/1; MG/1
1 TABLET ORAL ONCE
Status: COMPLETED | OUTPATIENT
Start: 2018-12-06 | End: 2018-12-06

## 2018-12-06 RX ADMIN — HYDROCODONE BITARTRATE AND ACETAMINOPHEN 1 TABLET: 5; 325 TABLET ORAL at 07:16

## 2018-12-06 NOTE — DISCHARGE INSTRUCTIONS
Follow up with Dr. Restrepo in two days. Follow up with your PCP, Dr. Negron, within one week for reevaluation.     Keep leg elevated and use ACE wrap as we discussed.    Immediately return to the ED for worsening or new concerning symptoms including fever or worsening pain.     Please review the medications you are supposed to be taking according to prior physician recommendations. I have not changed your home medications during this visit. If your discharge instructions indicate that I have changed your home medications, this is not the case, and you should disregard. If you have any questions about the medication you should be taking at home, please call your physician.

## 2018-12-06 NOTE — ED PROVIDER NOTES
Subjective   This patient is a very nice 68-year-old gentleman who comes in today with left knee discomfort.  He tells me that over the last couple of days, he has had some swelling in the left knee on the anterior surface.  He has no history of ligamentous or meniscal injury.  He has not fallen.  He denies any fevers, chills, redness, rash, penetrating injury.  He reports mild history of arthritis.  He tells me he has a history of infection in the right lower leg that has been difficult to treat.  No history of PAD or PVD.  No history of diabetes.  He has no pain in his calves bilaterally.  No swelling of the calves or legs.  No shortness of breath, cough, chest pain, hemoptysis or hematemesis.  He comes in today with his wife who confirms the aforementioned story.  In summary, we have a patient with left anterior knee swelling over the last couple of days who comes in for evaluation.  He does not have an orthopedist, but would like to see Dr. Mattson if a referral as deemed necessary, given the fact that his mom sees Dr. Mattson here at [facility].    Past Medical History: Arthroscopic surgery. Arthritis. Cellulitis. HLD. No hx of gout. No diabetes.     Family History: CAD in Father. Parkinson's in father. Colon cancer.         History provided by:  Patient  Lower Extremity Issue   Location:  Knee  Time since incident:  3 days  Injury: no    Knee location:  L knee  Pain details:     Radiates to:  L leg    Severity:  Moderate    Onset quality:  Gradual    Duration:  3 days  Associated symptoms: no fatigue, no fever and no neck pain        Review of Systems   Constitutional: Negative.  Negative for chills, fatigue, fever and unexpected weight change.   HENT: Negative for dental problem, ear pain, hearing loss and sinus pressure.    Eyes: Negative for pain and visual disturbance.   Respiratory: Negative for chest tightness and shortness of breath.    Cardiovascular: Negative for chest  pain, palpitations and leg swelling.   Gastrointestinal: Negative for diarrhea, nausea, rectal pain and vomiting.   Genitourinary: Negative for difficulty urinating, dysuria, frequency, hematuria and urgency.   Musculoskeletal: Positive for arthralgias and joint swelling. Negative for myalgias, neck pain and neck stiffness.   Neurological: Negative for seizures, syncope, speech difficulty, light-headedness and headaches.   Psychiatric/Behavioral: Negative for confusion.   All other systems reviewed and are negative.      Past Medical History:   Diagnosis Date   • Abnormal liver function test    • Arthritis    • Bilateral edema of lower extremity    • Cellulitis of leg, right    • Chalazion    • Hypertension    • Kidney infection    • Neck muscle spasm    • Onychomycosis of toenail    • Peptic ulceration    • Renal calculi    • Situational depression 8/22/2018       Allergies   Allergen Reactions   • Penicillins        Past Surgical History:   Procedure Laterality Date   • KNEE SURGERY Right 1989   • OTHER SURGICAL HISTORY      Lithotripsy   • TONSILLECTOMY         Family History   Problem Relation Age of Onset   • Arthritis Other    • Hypertension Other    • Hyperlipidemia Other    • Heart attack Other    • Hypertension Mother    • Heart disease Father    • Hypertension Father        Social History     Socioeconomic History   • Marital status:      Spouse name: Not on file   • Number of children: Not on file   • Years of education: Not on file   • Highest education level: Not on file   Tobacco Use   • Smoking status: Never Smoker   • Smokeless tobacco: Never Used   Substance and Sexual Activity   • Alcohol use: Yes     Alcohol/week: 2.4 oz     Types: 4 Cans of beer per week   • Drug use: No   • Sexual activity: Defer         Objective   Physical Exam   Constitutional: He is oriented to person, place, and time. He appears well-developed.  Non-toxic appearance. No distress.   HENT:   Head: Normocephalic and  atraumatic.   Right Ear: Tympanic membrane, external ear and ear canal normal.   Left Ear: Tympanic membrane, external ear and ear canal normal.   Nose: Nose normal. No nasal septal hematoma.   Mouth/Throat: Oropharynx is clear and moist. Mucous membranes are not dry. No oral lesions. No trismus in the jaw. No dental abscesses or uvula swelling. No posterior oropharyngeal erythema or tonsillar abscesses. No tonsillar exudate.   Eyes: EOM are normal. Pupils are equal, round, and reactive to light. Right conjunctiva is not injected. Left conjunctiva is not injected.   Neck: Normal range of motion. Neck supple. No JVD present. No tracheal tenderness present. No neck rigidity. Normal range of motion present.   Cardiovascular: Normal rate, regular rhythm and normal heart sounds. Exam reveals no gallop and no friction rub.   Pulmonary/Chest: Effort normal and breath sounds normal. He has no wheezes. He has no rales. He exhibits no tenderness.   Abdominal: Soft. Bowel sounds are normal. He exhibits no distension, no pulsatile midline mass and no mass. There is no tenderness. There is no rigidity, no rebound, no guarding and no tenderness at McBurney's point.   No signs of acute abdomen.  No pain at McBurney's point.  No pulsatile abdominal mass   Musculoskeletal: Normal range of motion. He exhibits no edema, tenderness or deformity.   Moderate joint effusion at the left knee. He has no warmth, redness, crepitus, or bony deformity. He has a good range of motion.    Lymphadenopathy:     He has no cervical adenopathy.   Neurological: He is alert and oriented to person, place, and time. He has normal strength. He displays no tremor. No cranial nerve deficit or sensory deficit. He exhibits normal muscle tone.   5/5 strength bilaterally with flexion and extension of fingers, wrist, elbows, knees and hips as well as plantar and dorsiflexion of the foot.   Skin: Skin is warm and dry. No rash noted. No erythema.   Psychiatric: He  has a normal mood and affect. His speech is normal and behavior is normal. Judgment and thought content normal. He is attentive.   Nursing note and vitals reviewed.      Procedures         ED Course  ED Course as of Dec 06 1621   Thu Dec 06, 2018   0710 I had a good conversation with the patient and his wife.  A left knee x-ray and bilateral lower extremity duplex studies have been ordered.  The patient has a mild to moderate left knee joint effusion.  I do not believe this is a traumatic hemarthrosis as no trauma has been experienced.  There is no warmth or cellulitis and no signs of infection.  The patient has good range of motion of the left knee.  We've talked about ligamentous versus meniscal injury.  I talked about the need for orthopedic follow-up and possible/potential MRI of the left knee.  Patient is agreeable and in agreement.  I'm getting the ultrasound primarily due to the fact that the patient has had bilateral lower external complaints for several years and no history of vascular study according to the patient and wife.  Additionally, the wife is concerned about potential DVT.  Patient and wife are very appreciative for care and without question or complaint.  Ultimate disposition, plan, and diagnosis following results of studies ordered.  [VIKRAM]   0712 /75 at 0712.   [TJ]   0854 Reevaluated the patient at bedside and discussed findings from imaging and updated plan for care.   [TJ]   0902 I reexamined the patient and had a good conversation with him.  The x-ray is negative for any acute abnormality.  Positive arthritis noted.  Bilateral duplex negative for DVT.  Patient is resting comfortable he.  I reexamined him and he feels well.  We talked about the differential diagnosis and medical decision making.  An Ace wrap will be placed on his left knee.  Pressure will include left knee effusion, left knee arthralgia, left knee arthritis.  Patient has been told to come back immediately if he no longer  has good range of motion of the left knee, has fevers, or any increase in pain.  We talked about osteomyelitis, gout, and other orthopedic potential complaints.  Patient is very agreeable to the plan, very appreciative, and will be discharged home accordingly.  Patient will follow-up with Dr. Mattson or partner in the next 2 days.  [VIKRAM]      ED Course User Index  [VIKRAM] Dequan Whitman MD  [TJ] Brandin Bonds     Recent Results (from the past 24 hour(s))   Duplex Venous Lower Extremity - Bilateral    Collection Time: 12/06/18 11:01 AM   Result Value Ref Range    Right Common Femoral Spont Y     Right Common Femoral Phasic Y     Right Common Femoral Augment Y     Right Common Femoral Competent Y     Right Common Femoral Compress C     Right Saphenofemoral Junction Spont Y     Right Saphenofemoral Junction Phasic Y     Right Saphenofemoral Junction Augment Y     Right Saphenofemoral Junction Competent Y     Right Saphenofemoral Junction Compress C     Right Proximal Femoral Compress C     Right Mid Femoral Spont Y     Right Mid Femoral Phasic Y     Right Mid Femoral Augment Y     Right Mid Femoral Competent Y     Right Mid Femoral Compress C     Right Distal Femoral Compress C     Right Popliteal Spont Y     Right Popliteal Phasic Y     Right Popliteal Augment Y     Right Popliteal Competent Y     Right Popliteal Compress C     Right Posterior Tibial Compress C     Right Peroneal Compress C     Right GastronemiusSoleal Compress C     Right Greater Saph AK Compress C     Right Greater Saph BK Compress C     Right Lesser Saph Compress C     Left Common Femoral Spont Y     Left Common Femoral Phasic Y     Left Common Femoral Augment Y     Left Common Femoral Competent Y     Left Common Femoral Compress C     Left Saphenofemoral Junction Spont Y     Left Saphenofemoral Junction Phasic Y     Left Saphenofemoral Junction Augment Y     Left Saphenofemoral Junction Competent Y     Left Saphenofemoral Junction Compress C      Left Proximal Femoral Compress C     Left Mid Femoral Spont Y     Left Mid Femoral Phasic Y     Left Mid Femoral Augment Y     Left Mid Femoral Competent Y     Left Mid Femoral Compress C     Left Distal Femoral Compress C     Left Popliteal Spont Y     Left Popliteal Phasic Y     Left Popliteal Augment Y     Left Popliteal Competent Y     Left Popliteal Compress C     Left Posterior Tibial Compress C     Left Peroneal Compress C     Left GastronemiusSoleal Compress C     Left Greater Saph AK Compress C     Left Greater Saph BK Compress C     Left Lesser Saph Compress C      Note: In addition to lab results from this visit, the labs listed above may include labs taken at another facility or during a different encounter within the last 24 hours. Please correlate lab times with ED admission and discharge times for further clarification of the services performed during this visit.    XR Knee 1 or 2 View Left   Final Result   Advanced osteoarthritis. There are no acute findings.       D:  12/06/2018   E:  12/06/2018       This report was finalized on 12/6/2018 2:07 PM by Dr. Tyler Vadlerrama MD.            Vitals:    12/06/18 0700 12/06/18 0730 12/06/18 0848 12/06/18 0900   BP: 165/86 164/79 140/78 148/49   Pulse: 60 57 61 57   Resp:       Temp:       SpO2: 97% 94% 96% 95%   Weight:       Height:         Medications   HYDROcodone-acetaminophen (NORCO) 5-325 MG per tablet 1 tablet (1 tablet Oral Given 12/6/18 0716)     ECG/EMG Results (last 24 hours)     ** No results found for the last 24 hours. **                        [facility]    Final diagnoses:   Effusion of left knee   Arthralgia of left knee   Arthritis of left knee       Documentation assistance provided by evan Bonds.  Information recorded by the evan was done at my direction and has been verified and validated by me.     Brandin Bonds  12/06/18 0710       Brandin Bonds  12/06/18 0920       Brandin Bonds  12/06/18 1013       Dequan Whitman,  MD  12/06/18 1629

## 2018-12-08 ENCOUNTER — HOSPITAL ENCOUNTER (EMERGENCY)
Facility: HOSPITAL | Age: 68
Discharge: HOME | End: 2018-12-08
Attending: EMERGENCY MEDICINE | Admitting: EMERGENCY MEDICINE

## 2018-12-08 ENCOUNTER — APPOINTMENT (OUTPATIENT)
Dept: CT IMAGING | Facility: HOSPITAL | Age: 68
End: 2018-12-08

## 2018-12-08 VITALS
HEIGHT: 71 IN | BODY MASS INDEX: 29.68 KG/M2 | HEART RATE: 74 BPM | DIASTOLIC BLOOD PRESSURE: 68 MMHG | OXYGEN SATURATION: 96 % | SYSTOLIC BLOOD PRESSURE: 160 MMHG | WEIGHT: 212 LBS | RESPIRATION RATE: 22 BRPM | TEMPERATURE: 98.1 F

## 2018-12-08 DIAGNOSIS — R33.9 URINARY RETENTION: ICD-10-CM

## 2018-12-08 DIAGNOSIS — K59.00 CONSTIPATION, UNSPECIFIED CONSTIPATION TYPE: Primary | ICD-10-CM

## 2018-12-08 LAB
ALBUMIN SERPL-MCNC: 4.41 G/DL (ref 3.2–4.8)
ALBUMIN/GLOB SERPL: 1.6 G/DL (ref 1.5–2.5)
ALP SERPL-CCNC: 62 U/L (ref 25–100)
ALT SERPL W P-5'-P-CCNC: 23 U/L (ref 7–40)
ANION GAP SERPL CALCULATED.3IONS-SCNC: 10 MMOL/L (ref 3–11)
AST SERPL-CCNC: 22 U/L (ref 0–33)
BACTERIA UR QL AUTO: ABNORMAL /HPF
BASOPHILS # BLD AUTO: 0.02 10*3/MM3 (ref 0–0.2)
BASOPHILS NFR BLD AUTO: 0.1 % (ref 0–1)
BILIRUB SERPL-MCNC: 1.7 MG/DL (ref 0.3–1.2)
BILIRUB UR QL STRIP: NEGATIVE
BUN BLD-MCNC: 18 MG/DL (ref 9–23)
BUN/CREAT SERPL: 22.2 (ref 7–25)
CALCIUM SPEC-SCNC: 9.3 MG/DL (ref 8.7–10.4)
CHLORIDE SERPL-SCNC: 100 MMOL/L (ref 99–109)
CLARITY UR: ABNORMAL
CO2 SERPL-SCNC: 24 MMOL/L (ref 20–31)
COLOR UR: YELLOW
CREAT BLD-MCNC: 0.81 MG/DL (ref 0.6–1.3)
D-LACTATE SERPL-SCNC: 1.3 MMOL/L (ref 0.5–2)
DEPRECATED RDW RBC AUTO: 47.2 FL (ref 37–54)
EOSINOPHIL # BLD AUTO: 0.06 10*3/MM3 (ref 0–0.3)
EOSINOPHIL NFR BLD AUTO: 0.3 % (ref 0–3)
ERYTHROCYTE [DISTWIDTH] IN BLOOD BY AUTOMATED COUNT: 14.8 % (ref 11.3–14.5)
GFR SERPL CREATININE-BSD FRML MDRD: 95 ML/MIN/1.73
GLOBULIN UR ELPH-MCNC: 2.7 GM/DL
GLUCOSE BLD-MCNC: 145 MG/DL (ref 70–100)
GLUCOSE UR STRIP-MCNC: NEGATIVE MG/DL
HCT VFR BLD AUTO: 46.5 % (ref 38.9–50.9)
HGB BLD-MCNC: 16.9 G/DL (ref 13.1–17.5)
HGB UR QL STRIP.AUTO: NEGATIVE
HOLD SPECIMEN: NORMAL
HOLD SPECIMEN: NORMAL
HYALINE CASTS UR QL AUTO: ABNORMAL /LPF
IMM GRANULOCYTES # BLD: 0.08 10*3/MM3 (ref 0–0.03)
IMM GRANULOCYTES NFR BLD: 0.4 % (ref 0–0.6)
KETONES UR QL STRIP: NEGATIVE
LEUKOCYTE ESTERASE UR QL STRIP.AUTO: NEGATIVE
LIPASE SERPL-CCNC: 32 U/L (ref 6–51)
LYMPHOCYTES # BLD AUTO: 0.87 10*3/MM3 (ref 0.6–4.8)
LYMPHOCYTES NFR BLD AUTO: 4.5 % (ref 24–44)
MCH RBC QN AUTO: 31.4 PG (ref 27–31)
MCHC RBC AUTO-ENTMCNC: 36.3 G/DL (ref 32–36)
MCV RBC AUTO: 86.4 FL (ref 80–99)
MONOCYTES # BLD AUTO: 0.7 10*3/MM3 (ref 0–1)
MONOCYTES NFR BLD AUTO: 3.6 % (ref 0–12)
NEUTROPHILS # BLD AUTO: 17.62 10*3/MM3 (ref 1.5–8.3)
NEUTROPHILS NFR BLD AUTO: 91.1 % (ref 41–71)
NITRITE UR QL STRIP: NEGATIVE
PH UR STRIP.AUTO: 7 [PH] (ref 5–8)
PLATELET # BLD AUTO: 100 10*3/MM3 (ref 150–450)
PMV BLD AUTO: 9.2 FL (ref 6–12)
POTASSIUM BLD-SCNC: 3.7 MMOL/L (ref 3.5–5.5)
PROT SERPL-MCNC: 7.1 G/DL (ref 5.7–8.2)
PROT UR QL STRIP: NEGATIVE
RBC # BLD AUTO: 5.38 10*6/MM3 (ref 4.2–5.76)
RBC # UR: ABNORMAL /HPF
REF LAB TEST METHOD: ABNORMAL
SODIUM BLD-SCNC: 134 MMOL/L (ref 132–146)
SP GR UR STRIP: 1.01 (ref 1–1.03)
SQUAMOUS #/AREA URNS HPF: ABNORMAL /HPF
UROBILINOGEN UR QL STRIP: ABNORMAL
WBC NRBC COR # BLD: 19.35 10*3/MM3 (ref 3.5–10.8)
WBC UR QL AUTO: ABNORMAL /HPF
WHOLE BLOOD HOLD SPECIMEN: NORMAL
WHOLE BLOOD HOLD SPECIMEN: NORMAL

## 2018-12-08 PROCEDURE — 99284 EMERGENCY DEPT VISIT MOD MDM: CPT

## 2018-12-08 PROCEDURE — 83605 ASSAY OF LACTIC ACID: CPT | Performed by: EMERGENCY MEDICINE

## 2018-12-08 PROCEDURE — 85025 COMPLETE CBC W/AUTO DIFF WBC: CPT | Performed by: EMERGENCY MEDICINE

## 2018-12-08 PROCEDURE — 83690 ASSAY OF LIPASE: CPT | Performed by: EMERGENCY MEDICINE

## 2018-12-08 PROCEDURE — 74176 CT ABD & PELVIS W/O CONTRAST: CPT

## 2018-12-08 PROCEDURE — 80053 COMPREHEN METABOLIC PANEL: CPT | Performed by: EMERGENCY MEDICINE

## 2018-12-08 PROCEDURE — 81001 URINALYSIS AUTO W/SCOPE: CPT | Performed by: EMERGENCY MEDICINE

## 2018-12-08 PROCEDURE — 87040 BLOOD CULTURE FOR BACTERIA: CPT | Performed by: EMERGENCY MEDICINE

## 2018-12-08 RX ORDER — SODIUM CHLORIDE 0.9 % (FLUSH) 0.9 %
10 SYRINGE (ML) INJECTION AS NEEDED
Status: DISCONTINUED | OUTPATIENT
Start: 2018-12-08 | End: 2018-12-09 | Stop reason: HOSPADM

## 2018-12-08 RX ADMIN — Medication 300 ML: at 22:30

## 2018-12-09 NOTE — DISCHARGE INSTRUCTIONS
Keep scheduled appointment with Dr. Lynch this week.     Follow up with your primary care physician at next available. Call to schedule an appointment.     Follow up with Dr. Bhatti, urology. Call the office in 2 days to schedule appointment for santana cath removal.     Immediately return to the emergency department for any new or worsening symptoms.

## 2018-12-09 NOTE — ED PROVIDER NOTES
Subjective   Mr. Jimmie Salcedo is a 68 y.o. male who presents to the ED with c/o constipation onset 2 days ago. Pt reports he was evaluated in this ED 2 days ago for knee pain and upon discharge he was prescribed percocet. Since starting this medication he has gradually developed urinary retention and constipation with accompanied abdominal pain, abdominal distention, and decreased appetite.  He has been attempting to treat the constipation with stool softeners, high fiber diet, and enemas x2, however none have provided relief. Throughout the day today the pain gradually worsened and he also notes a sensation of needing to produce a bowel movement, however he states he is unable to do so even with straining. He denies fever, vomiting and any other acute sx at this time.     Pt also has hx of cellulitis of RLE (treated with Lasix), kidney infection, peptic ulceration, renal calculi, HTN, knee surgery.             History provided by:  Patient  Constipation   Severity:  Moderate  Time since last bowel movement:  2 days  Timing:  Constant  Progression:  Worsening  Chronicity:  New  Context: narcotics    Stool description:  None produced  Relieved by:  Nothing  Worsened by:  Nothing  Ineffective treatments:  Stool softeners, fiber and enemas  Associated symptoms: abdominal pain and urinary retention    Associated symptoms: no fever and no vomiting    Risk factors: change in medication (percocet)        Review of Systems   Constitutional: Positive for appetite change (decreased). Negative for fever.   Gastrointestinal: Positive for abdominal distention, abdominal pain and constipation. Negative for vomiting.   Genitourinary: Positive for difficulty urinating.   All other systems reviewed and are negative.      Past Medical History:   Diagnosis Date   • Abnormal liver function test    • Arthritis    • Bilateral edema of lower extremity    • Cellulitis of leg, right    • Chalazion    • Hypertension    • Kidney infection   "  • Neck muscle spasm    • Onychomycosis of toenail    • Peptic ulceration    • Renal calculi    • Situational depression 8/22/2018       Allergies   Allergen Reactions   • Penicillins        Past Surgical History:   Procedure Laterality Date   • KNEE SURGERY Right 1989   • OTHER SURGICAL HISTORY      Lithotripsy   • TONSILLECTOMY         Family History   Problem Relation Age of Onset   • Arthritis Other    • Hypertension Other    • Hyperlipidemia Other    • Heart attack Other    • Hypertension Mother    • Heart disease Father    • Hypertension Father        Social History     Socioeconomic History   • Marital status:      Spouse name: Not on file   • Number of children: Not on file   • Years of education: Not on file   • Highest education level: Not on file   Tobacco Use   • Smoking status: Never Smoker   • Smokeless tobacco: Never Used   Substance and Sexual Activity   • Alcohol use: Yes     Alcohol/week: 2.4 oz     Types: 4 Cans of beer per week   • Drug use: No   • Sexual activity: Defer         Objective   Physical Exam   Constitutional: He is oriented to person, place, and time. He appears well-developed and well-nourished. No distress.   HENT:   Head: Normocephalic and atraumatic.   Right Ear: External ear normal.   Left Ear: External ear normal.   Nose: Nose normal.   Eyes: Conjunctivae are normal. No scleral icterus.   Neck: Normal range of motion.   Cardiovascular: Normal rate, regular rhythm and normal heart sounds. Exam reveals no gallop and no friction rub.   No murmur heard.  Pulmonary/Chest: Effort normal and breath sounds normal. No respiratory distress. He has no wheezes. He has no rales. He exhibits no tenderness.   Abdominal: Soft. Bowel sounds are normal. There is tenderness (mild (pt reports tenderness as \"a little bit\")) in the right lower quadrant. There is no rebound and no guarding.   Genitourinary:   Genitourinary Comments: Large caliber stool in his rectum   Musculoskeletal: Normal " range of motion.   Neurological: He is alert and oriented to person, place, and time.   Skin: Skin is warm and dry. He is not diaphoretic.   Psychiatric: He has a normal mood and affect. His behavior is normal.   Nursing note and vitals reviewed.      Procedures         ED Course  ED Course as of Dec 08 2254   Sat Dec 08, 2018   2024 WBC: (!) 19.35 [ML]   2024 Appearance, UA: (!) Cloudy [ML]   2024 WBC, UA: (!) 6-12 [ML]      ED Course User Index  [ML] Shabnam Chun, SOURAV      Recent Results (from the past 24 hour(s))   Comprehensive Metabolic Panel    Collection Time: 12/08/18  7:46 PM   Result Value Ref Range    Glucose 145 (H) 70 - 100 mg/dL    BUN 18 9 - 23 mg/dL    Creatinine 0.81 0.60 - 1.30 mg/dL    Sodium 134 132 - 146 mmol/L    Potassium 3.7 3.5 - 5.5 mmol/L    Chloride 100 99 - 109 mmol/L    CO2 24.0 20.0 - 31.0 mmol/L    Calcium 9.3 8.7 - 10.4 mg/dL    Total Protein 7.1 5.7 - 8.2 g/dL    Albumin 4.41 3.20 - 4.80 g/dL    ALT (SGPT) 23 7 - 40 U/L    AST (SGOT) 22 0 - 33 U/L    Alkaline Phosphatase 62 25 - 100 U/L    Total Bilirubin 1.7 (H) 0.3 - 1.2 mg/dL    eGFR Non African Amer 95 >60 mL/min/1.73    Globulin 2.7 gm/dL    A/G Ratio 1.6 1.5 - 2.5 g/dL    BUN/Creatinine Ratio 22.2 7.0 - 25.0    Anion Gap 10.0 3.0 - 11.0 mmol/L   Lipase    Collection Time: 12/08/18  7:46 PM   Result Value Ref Range    Lipase 32 6 - 51 U/L   Light Blue Top    Collection Time: 12/08/18  7:46 PM   Result Value Ref Range    Extra Tube hold for add-on    Green Top (Gel)    Collection Time: 12/08/18  7:46 PM   Result Value Ref Range    Extra Tube Hold for add-ons.    Lavender Top    Collection Time: 12/08/18  7:46 PM   Result Value Ref Range    Extra Tube hold for add-on    Gold Top - SST    Collection Time: 12/08/18  7:46 PM   Result Value Ref Range    Extra Tube Hold for add-ons.    CBC Auto Differential    Collection Time: 12/08/18  7:46 PM   Result Value Ref Range    WBC 19.35 (H) 3.50 - 10.80 10*3/mm3    RBC 5.38 4.20 -  5.76 10*6/mm3    Hemoglobin 16.9 13.1 - 17.5 g/dL    Hematocrit 46.5 38.9 - 50.9 %    MCV 86.4 80.0 - 99.0 fL    MCH 31.4 (H) 27.0 - 31.0 pg    MCHC 36.3 (H) 32.0 - 36.0 g/dL    RDW 14.8 (H) 11.3 - 14.5 %    RDW-SD 47.2 37.0 - 54.0 fl    MPV 9.2 6.0 - 12.0 fL    Platelets 100 (L) 150 - 450 10*3/mm3    Neutrophil % 91.1 (H) 41.0 - 71.0 %    Lymphocyte % 4.5 (L) 24.0 - 44.0 %    Monocyte % 3.6 0.0 - 12.0 %    Eosinophil % 0.3 0.0 - 3.0 %    Basophil % 0.1 0.0 - 1.0 %    Immature Grans % 0.4 0.0 - 0.6 %    Neutrophils, Absolute 17.62 (H) 1.50 - 8.30 10*3/mm3    Lymphocytes, Absolute 0.87 0.60 - 4.80 10*3/mm3    Monocytes, Absolute 0.70 0.00 - 1.00 10*3/mm3    Eosinophils, Absolute 0.06 0.00 - 0.30 10*3/mm3    Basophils, Absolute 0.02 0.00 - 0.20 10*3/mm3    Immature Grans, Absolute 0.08 (H) 0.00 - 0.03 10*3/mm3   Urinalysis With Microscopic If Indicated (No Culture) - Urine, Catheter    Collection Time: 12/08/18  7:53 PM   Result Value Ref Range    Color, UA Yellow Yellow, Straw    Appearance, UA Cloudy (A) Clear    pH, UA 7.0 5.0 - 8.0    Specific Gravity, UA 1.015 1.001 - 1.030    Glucose, UA Negative Negative    Ketones, UA Negative Negative    Bilirubin, UA Negative Negative    Blood, UA Negative Negative    Protein, UA Negative Negative    Leuk Esterase, UA Negative Negative    Nitrite, UA Negative Negative    Urobilinogen, UA 1.0 E.U./dL 0.2 - 1.0 E.U./dL   Urinalysis, Microscopic Only - Urine, Catheter    Collection Time: 12/08/18  7:53 PM   Result Value Ref Range    RBC, UA 3-6 (A) None Seen, 0-2 /HPF    WBC, UA 6-12 (A) None Seen, 0-2 /HPF    Bacteria, UA None Seen None Seen, Trace /HPF    Squamous Epithelial Cells, UA 0-2 None Seen, 0-2 /HPF    Hyaline Casts, UA None Seen 0 - 6 /LPF    Methodology Automated Microscopy    Lactic Acid, Plasma    Collection Time: 12/08/18  8:43 PM   Result Value Ref Range    Lactate 1.3 0.5 - 2.0 mmol/L     Note: In addition to lab results from this visit, the labs listed above  may include labs taken at another facility or during a different encounter within the last 24 hours. Please correlate lab times with ED admission and discharge times for further clarification of the services performed during this visit.    CT Abdomen Pelvis Without Contrast   Final Result   1. Findings are suggestive of rectal fecal impaction and constipation. Mild    soft tissue edema in the retrorectal space may reflect proctitis.    2. Negative for bowel obstruction or appendicitis.    3. Urinary bladder is decompressed with a Paiz catheter. Nonspecific prostate    enlargement.   4. Chronic findings as described.       Total images at time of interpretation: 207.      THIS DOCUMENT HAS BEEN ELECTRONICALLY SIGNED BY TRISTON UMANA MD        Vitals:    12/08/18 2045 12/08/18 2115 12/08/18 2130 12/08/18 2200   BP: 153/76 157/72 148/75 164/88   BP Location:       Patient Position:       Pulse: 67 69 70 78   Resp:       Temp:       TempSrc:       SpO2: 98% 99% 94% 99%   Weight:       Height:         Medications   sodium chloride 0.9 % flush 10 mL (not administered)   0.9% sodium chloride for irrigation-mineral oil-glycerin (SMOG) enema 300 mL (not administered)     ECG/EMG Results (last 24 hours)     ** No results found for the last 24 hours. **                          MD[facility]    Final diagnoses:   Constipation, unspecified constipation type   Urinary retention       Documentation assistance provided by evan Amezquita.  Information recorded by the scribe was done at my direction and has been verified and validated by me.     Kai Amezquita  12/08/18 2009       Kai Amezquita  12/08/18 3134       Shabnam Chun APRN  12/08/18 6490

## 2018-12-10 ENCOUNTER — EPISODE CHANGES (OUTPATIENT)
Dept: CASE MANAGEMENT | Facility: OTHER | Age: 68
End: 2018-12-10

## 2018-12-10 ENCOUNTER — HOSPITAL ENCOUNTER (EMERGENCY)
Facility: HOSPITAL | Age: 68
Discharge: HOME | End: 2018-12-10
Attending: EMERGENCY MEDICINE | Admitting: EMERGENCY MEDICINE

## 2018-12-10 VITALS
BODY MASS INDEX: 30.1 KG/M2 | WEIGHT: 215 LBS | HEIGHT: 71 IN | TEMPERATURE: 98.3 F | OXYGEN SATURATION: 96 % | SYSTOLIC BLOOD PRESSURE: 154 MMHG | RESPIRATION RATE: 18 BRPM | DIASTOLIC BLOOD PRESSURE: 69 MMHG | HEART RATE: 76 BPM

## 2018-12-10 DIAGNOSIS — Z46.6 ENCOUNTER FOR FOLEY CATHETER REMOVAL: Primary | ICD-10-CM

## 2018-12-10 PROCEDURE — 99283 EMERGENCY DEPT VISIT LOW MDM: CPT

## 2018-12-10 NOTE — ED PROVIDER NOTES
Subjective   68-year-old male here for Paiz catheter removal.  Seen here 2 days ago, Paiz catheter placed after urinary retention secondary to constipation.  Patient states symptoms are improving, no hematuria pyuria fevers chills sweats abdominal pain or flank pain.  Called his urologist today to have catheter removed, states is unable to be seen, referred to emergency department.  Denies other symptoms at this time.        Illness   Location:  Per HPI  Quality:  Per HPI  Severity:  Moderate  Onset quality:  Gradual  Duration:  2 days  Timing:  Constant  Progression:  Unchanged  Chronicity:  New  Context:  Per HPI  Relieved by:  Per HPI  Worsened by:  Per HPI  Associated symptoms: no abdominal pain, no fever, no nausea and no vomiting        Review of Systems   Constitutional: Negative for fever.   Gastrointestinal: Negative for abdominal pain, nausea and vomiting.   All other systems reviewed and are negative.      Past Medical History:   Diagnosis Date   • Abnormal liver function test    • Arthritis    • Bilateral edema of lower extremity    • Cellulitis of leg, right    • Chalazion    • Hypertension    • Kidney infection    • Neck muscle spasm    • Onychomycosis of toenail    • Peptic ulceration    • Renal calculi    • Situational depression 8/22/2018       Allergies   Allergen Reactions   • Penicillins        Past Surgical History:   Procedure Laterality Date   • KNEE SURGERY Right 1989   • OTHER SURGICAL HISTORY      Lithotripsy   • TONSILLECTOMY         Family History   Problem Relation Age of Onset   • Arthritis Other    • Hypertension Other    • Hyperlipidemia Other    • Heart attack Other    • Hypertension Mother    • Heart disease Father    • Hypertension Father        Social History     Socioeconomic History   • Marital status:      Spouse name: Not on file   • Number of children: Not on file   • Years of education: Not on file   • Highest education level: Not on file   Tobacco Use   • Smoking  status: Never Smoker   • Smokeless tobacco: Never Used   Substance and Sexual Activity   • Alcohol use: Yes     Alcohol/week: 2.4 oz     Types: 4 Cans of beer per week   • Drug use: No   • Sexual activity: Defer           Objective   Physical Exam   Constitutional: He is oriented to person, place, and time. He appears well-developed and well-nourished. No distress.   HENT:   Head: Normocephalic and atraumatic.   Right Ear: External ear normal.   Left Ear: External ear normal.   Nose: Nose normal.   Mouth/Throat: Oropharynx is clear and moist. No oropharyngeal exudate.   Eyes: Conjunctivae and EOM are normal. Pupils are equal, round, and reactive to light. Right eye exhibits no discharge. Left eye exhibits no discharge. No scleral icterus.   Neck: Normal range of motion. Neck supple. No JVD present. No tracheal deviation present. No thyromegaly present.   Cardiovascular: Normal rate.   Pulmonary/Chest: Effort normal. No stridor. No respiratory distress.   Abdominal: Soft. He exhibits no distension. There is no tenderness. There is no rebound and no guarding.   Musculoskeletal: Normal range of motion. He exhibits no edema, tenderness or deformity.   Neurological: He is alert and oriented to person, place, and time. No cranial nerve deficit. He exhibits normal muscle tone. Coordination normal.   Skin: Skin is warm and dry. No rash noted. He is not diaphoretic. No erythema. No pallor.   Psychiatric: He has a normal mood and affect. His behavior is normal. Judgment and thought content normal.   Nursing note and vitals reviewed.      Procedures           ED Course  ED Course as of Dec 10 1500   Mon Dec 10, 2018   1500 Paiz catheter removed.  Patient is to follow-up with his urologist and primary care as directed.  Return immediately if any change or worsening.  [TG]      ED Course User Index  [TG] Israel Felix PA-C [facility]      Final diagnoses:   Encounter for Paiz catheter removal             Israel Felix PA-C  12/10/18 1500

## 2018-12-11 ENCOUNTER — EPISODE CHANGES (OUTPATIENT)
Dept: CASE MANAGEMENT | Facility: OTHER | Age: 68
End: 2018-12-11

## 2018-12-11 ENCOUNTER — TELEPHONE (OUTPATIENT)
Dept: ORTHOPEDIC SURGERY | Facility: CLINIC | Age: 68
End: 2018-12-11

## 2018-12-11 ENCOUNTER — OFFICE VISIT (OUTPATIENT)
Dept: ORTHOPEDIC SURGERY | Facility: CLINIC | Age: 68
End: 2018-12-11

## 2018-12-11 VITALS — HEIGHT: 69 IN | WEIGHT: 216.05 LBS | HEART RATE: 72 BPM | BODY MASS INDEX: 32 KG/M2 | OXYGEN SATURATION: 99 %

## 2018-12-11 DIAGNOSIS — M17.12 PRIMARY OSTEOARTHRITIS OF LEFT KNEE: Primary | ICD-10-CM

## 2018-12-11 PROCEDURE — 20610 DRAIN/INJ JOINT/BURSA W/O US: CPT | Performed by: ORTHOPAEDIC SURGERY

## 2018-12-11 PROCEDURE — 99214 OFFICE O/P EST MOD 30 MIN: CPT | Performed by: ORTHOPAEDIC SURGERY

## 2018-12-11 RX ORDER — TRIAMCINOLONE ACETONIDE 40 MG/ML
80 INJECTION, SUSPENSION INTRA-ARTICULAR; INTRAMUSCULAR
Status: COMPLETED | OUTPATIENT
Start: 2018-12-11 | End: 2018-12-11

## 2018-12-11 RX ORDER — LIDOCAINE HYDROCHLORIDE 10 MG/ML
3 INJECTION, SOLUTION INFILTRATION; PERINEURAL
Status: COMPLETED | OUTPATIENT
Start: 2018-12-11 | End: 2018-12-11

## 2018-12-11 RX ORDER — MELOXICAM 15 MG/1
TABLET ORAL
Qty: 60 TABLET | Refills: 0 | Status: SHIPPED | OUTPATIENT
Start: 2018-12-11 | End: 2019-02-07 | Stop reason: SDUPTHER

## 2018-12-11 RX ORDER — BUPIVACAINE HYDROCHLORIDE 2.5 MG/ML
3 INJECTION, SOLUTION INFILTRATION; PERINEURAL
Status: COMPLETED | OUTPATIENT
Start: 2018-12-11 | End: 2018-12-11

## 2018-12-11 RX ADMIN — LIDOCAINE HYDROCHLORIDE 3 ML: 10 INJECTION, SOLUTION INFILTRATION; PERINEURAL at 09:35

## 2018-12-11 RX ADMIN — TRIAMCINOLONE ACETONIDE 80 MG: 40 INJECTION, SUSPENSION INTRA-ARTICULAR; INTRAMUSCULAR at 09:35

## 2018-12-11 RX ADMIN — BUPIVACAINE HYDROCHLORIDE 3 ML: 2.5 INJECTION, SOLUTION INFILTRATION; PERINEURAL at 09:35

## 2018-12-11 NOTE — PROGRESS NOTES
[facility] Clinic Note: Knee New Patient    Chief Complaint   Patient presents with   • Left Knee - Pain        HPI    Jimmie Salcedo is a 68 y.o. male who presents with left knee pain for 1 year(s). Onset has been atraumatic and gradual in nature.  His pain is localized globally throughout the knee.  He rates it a 4/10 on the pain scale.  Walking standing and weightbearing activities increase his pain.  He complains primarily of stiffness and swelling of the knee with episodes of giving way.  Sitting and resting improves his pain.  Previous treatments have included occasional anti-inflammatories.  Despite these interventions, his pain is persisting and limiting daily activities.  Past Medical History:   Diagnosis Date   • Abnormal liver function test    • Arthritis    • Bilateral edema of lower extremity    • Cellulitis of leg, right    • Chalazion    • Hypertension    • Kidney infection    • Neck muscle spasm    • Onychomycosis of toenail    • Peptic ulceration    • Renal calculi    • Situational depression 8/22/2018      Past Surgical History:   Procedure Laterality Date   • CYSTOSCOPY W/ LASER LITHOTRIPSY     • FINGER SURGERY      left 5th finger   • KNEE SURGERY Right 1986   • OTHER SURGICAL HISTORY      Lithotripsy   • TONSILLECTOMY        Family History   Problem Relation Age of Onset   • Arthritis Other    • Hypertension Other    • Hyperlipidemia Other    • Heart attack Other    • Hypertension Mother    • Heart disease Father    • Hypertension Father      Social History     Socioeconomic History   • Marital status:      Spouse name: Not on file   • Number of children: Not on file   • Years of education: Not on file   • Highest education level: Not on file   Social Needs   • Financial resource strain: Not on file   • Food insecurity - worry: Not on file   • Food insecurity - inability: Not on file   • Transportation needs - medical: Not on file   • Transportation needs - non-medical: Not on file    Occupational History   • Not on file   Tobacco Use   • Smoking status: Never Smoker   • Smokeless tobacco: Never Used   Substance and Sexual Activity   • Alcohol use: Yes     Alcohol/week: 2.4 oz     Types: 4 Cans of beer per week   • Drug use: No   • Sexual activity: Defer   Other Topics Concern   • Not on file   Social History Narrative   • Not on file      Current Outpatient Medications on File Prior to Visit   Medication Sig Dispense Refill   • amLODIPine (NORVASC) 5 MG tablet TAKE ONE TABLET BY MOUTH EVERY DAY 90 tablet 3   • ARIPiprazole (ABILIFY) 5 MG tablet TK 1 T PO HS  2   • calcium citrate-vitamin d (CITRACAL) 200-250 MG-UNIT tablet tablet Take  by mouth Daily.     • CloNIDine (CATAPRES) 0.1 MG tablet Tab 1 tab 1 6 hours prn if systolic is over 180 30 tablet 1   • cyclobenzaprine (FLEXERIL) 10 MG tablet Take 1 tablet by mouth 3 (Three) Times a Day. 12 tablet 0   • doxazosin (CARDURA) 4 MG tablet TAKE ONE TABLET BY MOUTH EVERY NIGHT 90 tablet 3   • DULoxetine (CYMBALTA) 30 MG capsule Take 1 capsule by mouth Daily. 30 capsule 5   • fenofibrate (TRICOR) 145 MG tablet TAKE ONE TABLET BY MOUTH EVERY DAY 90 tablet 2   • furosemide (LASIX) 40 MG tablet TAKE ONE TABLET BY MOUTH EVERY DAY 90 tablet 2   • HYDROcodone-acetaminophen (NORCO) 5-325 MG per tablet Take 1 tablet by mouth Every 6 (Six) Hours As Needed for Moderate Pain . 7 tablet 0   • irbesartan (AVAPRO) 300 MG tablet Take 300 mg by mouth Daily.  5   • mupirocin (BACTROBAN) 2 % ointment Apply  topically 3 (Three) Times a Day. 22 g 0   • naproxen (EC NAPROSYN) 500 MG EC tablet Take 1 tablet by mouth 2 (Two) Times a Day With Meals. 10 tablet 0   • Omega-3 Fatty Acids (FISH OIL) 1000 MG capsule capsule Take  by mouth Daily With Breakfast.     • Ped Multivitamins-Fl-Iron (MULTIVITAMIN WITH FLUORIDE/IRON) 0.25-10 MG/ML solution solution Take  by mouth Daily.     • potassium chloride (K-DUR,KLOR-CON) 20 MEQ CR tablet daily.     • pravastatin (PRAVACHOL) 40 MG  "tablet TAKE ONE TABLET BY MOUTH EVERY DAY 90 tablet 3   • sildenafil (REVATIO) 20 MG tablet 2-4 tabs as needed 90 tablet 5   • sildenafil (VIAGRA) 100 MG tablet Take 1 tablet by mouth Daily As Needed for erectile dysfunction. 6 tablet 5   • tiZANidine (ZANAFLEX) 4 MG tablet Take 1 tablet by mouth Daily. 90 tablet 3   • triamcinolone (KENALOG) 0.1 % ointment Apply  topically 2 (Two) Times a Day As Needed for Irritation or Rash. 80 g 5   • urea (CARMOL) 20 % cream Apply  topically to the appropriate area as directed As Needed for Dry Skin.       No current facility-administered medications on file prior to visit.       Allergies   Allergen Reactions   • Penicillins         Review of Systems   Constitutional: Negative.    HENT: Positive for sore throat.    Eyes: Positive for redness and itching.   Respiratory: Negative.    Cardiovascular: Positive for leg swelling.   Gastrointestinal: Positive for constipation.   Endocrine: Negative.    Genitourinary: Positive for difficulty urinating.   Musculoskeletal: Positive for arthralgias, back pain, neck pain and neck stiffness.   Skin: Negative.    Allergic/Immunologic: Negative.    Neurological: Positive for numbness (feet).   Hematological: Negative.    Psychiatric/Behavioral: The patient is hyperactive.         The following portions of the patient's history were reviewed and updated as appropriate: allergies, current medications, past family history, past medical history, past social history, past surgical history and problem list.    Physical Exam  Pulse 72, height 175.3 cm (69\"), weight 98 kg (216 lb 0.8 oz), SpO2 99 %.    Body mass index is 31.91 kg/m².    GENERAL APPEARANCE: awake, alert & oriented x 3, in no acute distress and well developed, well nourished  PSYCH: normal affect  LUNGS:  breathing nonlabored  EYES: sclera anicteric  CARDIOVASCULAR: palpable dorsalis pedis, palpable posterior tibial bilaterally. Capillary refill less than 2 seconds  EXTREMITIES: no " clubbing, cyanosis  GAIT:  Antalgic            Right Lower Extremity Exam:   ----------  Hip Exam  ----------  FLEXION CONTRACTURE: None  FLEXION: 110 degrees  INTERNAL ROTATION: 20 degrees at 90 degrees of flexion   EXTERNAL ROTATION: 40 degrees at 90 degrees of flexion    PAIN WITH HIP MOTION: no  ----------  Knee Exam  ----------  ALIGNMENT: neutral, no varus or valgus deformity     RANGE OF MOTION:  Normal (0-120 degrees) with no extensor lag or flexion contracture  LIGAMENTOUS STABILITY:   stable to varus and valgus stress at 0 and 30 degrees without any evidence of laxity     STRENGTH:  5/5 knee flexion, extension. 5/5 ankle dorsiflexion and plantarflexion.     PAIN WITH PALPATION: denies tenderness to palpation about the knee, denies medial or lateral joint line pain  KNEE EFFUSION: no  PAIN WITH KNEE ROM: no  PATELLAR CREPITUS: yes, asymptomatic  SPECIAL EXAM FINDINGS:  Negative patellar compression    REFLEXES:  PATELLAR 2+/4  ACHILLES 2+/4    CLONUS: negative  STRAIGHT LEG TEST:   negative    SENSATION TO LIGHT TOUCH:  DEEP PERONEAL/SUPERFICIAL PERONEAL/SURAL/SAPHENOUS/TIBIAL:   intact    EDEMA:  no  ERYTHEMA:  no  WOUNDS/INCISIONS: none, no overlying skin problems.      Left Lower Extremity Exam:   ----------  Hip Exam  ----------  FLEXION CONTRACTURE: None  FLEXION: 110 degrees  INTERNAL ROTATION: 20 degrees at 90 degrees of flexion   EXTERNAL ROTATION: 40 degrees at 90 degrees of flexion    PAIN WITH HIP MOTION: no  ----------  Knee Exam  ----------  ALIGNMENT: Slight varus, correctable to neutral     RANGE OF MOTION:  Decreased (5 - 110 degrees) with no extensor lag  LIGAMENTOUS STABILITY:   stable to varus and valgus stress at terminal extension and 30 degrees; slight retensioning of the MCL is appreciated with valgus stress at 30 degrees consistent with medial compartment degeneration     STRENGTH:  5/5 knee flexion, extension. 5/5 ankle dorsiflexion and plantarflexion.     PAIN WITH PALPATION:  global  KNEE EFFUSION: yes, moderate effusion  PAIN WITH KNEE ROM: yes, global   PATELLAR CREPITUS: yes, painful and symptomatic  SPECIAL EXAM FINDINGS:  none    REFLEXES:  PATELLAR 2+/4  ACHILLES 2+/4    CLONUS: no  STRAIGHT LEG TEST:   negative    SENSATION TO LIGHT TOUCH:  DEEP PERONEAL/SUPERFICIAL PERONEAL/SURAL/SAPHENOUS/TIBIAL:   intact    EDEMA:  no  ERYTHEMA:  no  WOUNDS/INCISIONS: no      ______________________________________________________________________  ______________________________________________________________________    RADIOGRAPHIC FINDINGS:   Radiographs from 12/6/18 were personally reviewed.  Radiographs demonstrate moderate to severe tricompartmental osteoarthritis with no acute bony injury or fracture.      Assessment/Plan:   Diagnosis Plan   1. Primary osteoarthritis of left knee  Large Joint Arthrocentesis: L knee     Patient's symptoms are related osteoarthritis of the left knee.  I discussed treatment options with him including oral anti-inflammatories, and cortisone injections.  He is agreeable to proceeding with both of these interventions as recommended.  We'll aspirate the knee due to significant effusion today before proceeding with cortisone injection.  He'll follow-up in 3 months.    Procedure Note:  I discussed with the patient the potential benefits of performing a therapeutic aspiration and injection of the left knee as well as potential risks including but not limited to infection, swelling, pain, bleeding, bruising, nerve/vessel damage, skin color changes, transient elevation in blood glucose levels, and fat atrophy. After informed consent and after the area was prepped with alcohol, ethyl chloride was used to numb the skin. Via the superolateral approach, an 18-gauge needle was inserted into the knee joint and 36 cc of clear yellow joint fluid are aspirated from the knee.  Then, 3cc of 1% lidocaine, 3cc of 0.25% marcaine and 2 cc of 40mg/ml of Kenalog were injected into the  left knee. The patient tolerated the procedure well. There were no complications. A sterile dressing was placed over the injection site.      Dennis Lynch MD  12/11/18  9:52 AM

## 2018-12-11 NOTE — PROGRESS NOTES
Procedure   Large Joint Arthrocentesis: L knee  Date/Time: 12/11/2018 9:35 AM  Consent given by: patient  Site marked: site marked  Timeout: Immediately prior to procedure a time out was called to verify the correct patient, procedure, equipment, support staff and site/side marked as required   Supporting Documentation  Indications: pain   Procedure Details  Location: knee - L knee  Preparation: Patient was prepped and draped in the usual sterile fashion  Needle size: 22 G  Approach: anterolateral  Medications administered: 3 mL bupivacaine 0.25 %; 3 mL lidocaine 1 %; 80 mg triamcinolone acetonide 40 MG/ML  Aspirate amount: 36 mL  Aspirate: yellow and clear  Patient tolerance: patient tolerated the procedure well with no immediate complications

## 2018-12-11 NOTE — TELEPHONE ENCOUNTER
MANDIE CALLING FROM PHARMACY TO MAKE SURE THAT INSTRUCTIONS FOR MELOXICAM IS CORRECT. HER NUMBE [phone]

## 2018-12-11 NOTE — TELEPHONE ENCOUNTER
PT CALLING TO SEE IF DR DOUGLAS WILL CALL IN AN ANTI INFLAMMATORY. PLEASE CALL INTO THE [facility] ON [facility]. HIS NUMBER [phone].

## 2018-12-13 LAB
BACTERIA SPEC AEROBE CULT: NORMAL
BACTERIA SPEC AEROBE CULT: NORMAL

## 2019-01-07 ENCOUNTER — PATIENT OUTREACH (OUTPATIENT)
Dept: CASE MANAGEMENT | Facility: OTHER | Age: 69
End: 2019-01-07

## 2019-01-07 ENCOUNTER — TELEPHONE (OUTPATIENT)
Dept: INTERNAL MEDICINE | Facility: CLINIC | Age: 69
End: 2019-01-07

## 2019-01-07 NOTE — OUTREACH NOTE
PCP notified of being unable to reach patient since ER visit, patient has an appointment to see PCP on 1/30/19. Spoke with Lise. Staff will contact [facility] if further needs are found.

## 2019-01-07 NOTE — TELEPHONE ENCOUNTER
HARRY FROM [facility] CALLED AND WANTED TO LET YOU KNOW THAT THE PATIENT WAS SEEN IN THE ER AT [facility] FOR CONSTIPATION AND SHES REACHED OUT TO HIM 4 TIMES TO CHECK ON HIM AND HAVENT GOTTEN A RESPONSE BACK.

## 2019-01-16 ENCOUNTER — EPISODE CHANGES (OUTPATIENT)
Dept: CASE MANAGEMENT | Facility: OTHER | Age: 69
End: 2019-01-16

## 2019-01-22 ENCOUNTER — OFFICE VISIT (OUTPATIENT)
Dept: ORTHOPEDIC SURGERY | Facility: CLINIC | Age: 69
End: 2019-01-22

## 2019-01-22 VITALS — OXYGEN SATURATION: 98 % | HEART RATE: 76 BPM | HEIGHT: 69 IN | WEIGHT: 225 LBS | BODY MASS INDEX: 33.33 KG/M2

## 2019-01-22 DIAGNOSIS — M11.262 CHONDROCALCINOSIS DUE TO DICALCIUM PHOSPHATE CRYSTALS, OF THE KNEE, LEFT: ICD-10-CM

## 2019-01-22 DIAGNOSIS — M17.12 PRIMARY OSTEOARTHRITIS OF LEFT KNEE: Primary | ICD-10-CM

## 2019-01-22 PROCEDURE — 99214 OFFICE O/P EST MOD 30 MIN: CPT | Performed by: ORTHOPAEDIC SURGERY

## 2019-01-22 NOTE — PROGRESS NOTES
[facility] Note    Subjective     Pain of the Left Knee (Started apx 1 year ago- persistent in nature- pain scale 3/10 today- had injection 12/11/2018- still helping some- mod factors- NSAIDs, activity mod)      HPI    Jimmie Salcedo is a 68 y.o. male.  Patient is here today seeing me for the first time for left knee pain.  In December 2018, he saw my partner Dr. Lynch 2 injected his left knee with corticosteroid.  This helped him.  His main complaints are of his left knee giving way.  No history of locking.  This been going on and off for last year.  He is tried anti-inflammatories without a lot of relief.  He rates his symptoms as 3 out of 10 today.  It should be noted that the patient has a history of a chronic skin infection on the right lower extremity that he believes is due to streptococcus.  His most recent infection was a few months ago.  He has a history of peripheral neuropathy not related to diabetes.     Past Medical History:   Diagnosis Date   • Abnormal liver function test    • Arthritis    • Bilateral edema of lower extremity    • Cellulitis of leg, right    • Chalazion    • Hypertension    • Kidney infection    • Neck muscle spasm    • Onychomycosis of toenail    • Peptic ulceration    • Renal calculi    • Situational depression 8/22/2018      Past Surgical History:   Procedure Laterality Date   • CYSTOSCOPY W/ LASER LITHOTRIPSY     • FINGER SURGERY      left 5th finger   • KNEE SURGERY Right 1986   • OTHER SURGICAL HISTORY      Lithotripsy   • TONSILLECTOMY        Family History   Problem Relation Age of Onset   • Arthritis Other    • Hypertension Other    • Hyperlipidemia Other    • Heart attack Other    • Hypertension Mother    • Heart disease Father    • Hypertension Father      Social History     Socioeconomic History   • Marital status:      Spouse name: Not on file   • Number of children: Not on file   • Years of education: Not on file   • Highest education  level: Not on file   Social Needs   • Financial resource strain: Not on file   • Food insecurity - worry: Not on file   • Food insecurity - inability: Not on file   • Transportation needs - medical: Not on file   • Transportation needs - non-medical: Not on file   Occupational History   • Not on file   Tobacco Use   • Smoking status: Never Smoker   • Smokeless tobacco: Never Used   Substance and Sexual Activity   • Alcohol use: Yes     Alcohol/week: 2.4 oz     Types: 4 Cans of beer per week   • Drug use: No   • Sexual activity: Defer   Other Topics Concern   • Not on file   Social History Narrative   • Not on file      Current Outpatient Medications on File Prior to Visit   Medication Sig Dispense Refill   • amLODIPine (NORVASC) 5 MG tablet TAKE ONE TABLET BY MOUTH EVERY DAY 90 tablet 3   • ARIPiprazole (ABILIFY) 5 MG tablet TK 1 T PO HS  2   • calcium citrate-vitamin d (CITRACAL) 200-250 MG-UNIT tablet tablet Take  by mouth Daily.     • CloNIDine (CATAPRES) 0.1 MG tablet Tab 1 tab 1 6 hours prn if systolic is over 180 30 tablet 1   • cyclobenzaprine (FLEXERIL) 10 MG tablet Take 1 tablet by mouth 3 (Three) Times a Day. 12 tablet 0   • doxazosin (CARDURA) 4 MG tablet TAKE ONE TABLET BY MOUTH EVERY NIGHT 90 tablet 3   • DULoxetine (CYMBALTA) 30 MG capsule Take 1 capsule by mouth Daily. 30 capsule 5   • fenofibrate (TRICOR) 145 MG tablet TAKE ONE TABLET BY MOUTH EVERY DAY 90 tablet 2   • furosemide (LASIX) 40 MG tablet TAKE ONE TABLET BY MOUTH EVERY DAY 90 tablet 2   • HYDROcodone-acetaminophen (NORCO) 5-325 MG per tablet Take 1 tablet by mouth Every 6 (Six) Hours As Needed for Moderate Pain . 7 tablet 0   • irbesartan (AVAPRO) 300 MG tablet Take 300 mg by mouth Daily.  5   • meloxicam (MOBIC) 15 MG tablet 1 PO Daily with food. 60 tablet 0   • mupirocin (BACTROBAN) 2 % ointment Apply  topically 3 (Three) Times a Day. 22 g 0   • naproxen (EC NAPROSYN) 500 MG EC tablet Take 1 tablet by mouth 2 (Two) Times a Day With  "Meals. 10 tablet 0   • Omega-3 Fatty Acids (FISH OIL) 1000 MG capsule capsule Take  by mouth Daily With Breakfast.     • Ped Multivitamins-Fl-Iron (MULTIVITAMIN WITH FLUORIDE/IRON) 0.25-10 MG/ML solution solution Take  by mouth Daily.     • potassium chloride (K-DUR,KLOR-CON) 20 MEQ CR tablet daily.     • pravastatin (PRAVACHOL) 40 MG tablet TAKE ONE TABLET BY MOUTH EVERY DAY 90 tablet 3   • sildenafil (REVATIO) 20 MG tablet 2-4 tabs as needed 90 tablet 5   • sildenafil (VIAGRA) 100 MG tablet Take 1 tablet by mouth Daily As Needed for erectile dysfunction. 6 tablet 5   • tiZANidine (ZANAFLEX) 4 MG tablet Take 1 tablet by mouth Daily. 90 tablet 3   • triamcinolone (KENALOG) 0.1 % ointment Apply  topically 2 (Two) Times a Day As Needed for Irritation or Rash. 80 g 5   • urea (CARMOL) 20 % cream Apply  topically to the appropriate area as directed As Needed for Dry Skin.       No current facility-administered medications on file prior to visit.       Allergies   Allergen Reactions   • Penicillins         The following portions of the patient's history were reviewed and updated as appropriate: allergies, current medications, past family history, past medical history, past social history, past surgical history and problem list.    Review of Systems   Constitutional: Negative.    HENT: Negative.    Eyes: Negative.    Respiratory: Negative.    Cardiovascular: Negative.    Gastrointestinal: Negative.    Endocrine: Negative.    Genitourinary: Negative.    Musculoskeletal: Positive for arthralgias.   Skin: Negative.    Allergic/Immunologic: Negative.    Neurological: Negative.    Hematological: Negative.    Psychiatric/Behavioral: Negative.         Objective      Physical Exam  Pulse 76   Ht 175.3 cm (69.02\")   Wt 102 kg (225 lb)   SpO2 98%   BMI 33.21 kg/m²     Body mass index is 33.21 kg/m².    General  Mental Status - alert  General Appearance - cooperative, well groomed, not in acute distress  Orientation - Oriented " X3  Build & Nutrition - well developed and well nourished  Posture - normal posture  Gait - normal gait     Integumentary  Global Assessment  Examination of related systems reveals - no lymphadenopathy  Ears:  No abnormality  Nose:  No mucous drainage  General Characteristics  Overall examination of the patient's skin reveals - no rashes, no evidence of scars, no suspicious lesions and no bruises.  Color - normal coloration of skin.  Vascular: Brisk capillary refill in all extremities    Ortho Exam  Peripheral Vascular:    Upper Extremity:   Inspection:  Left--no cyanotic nail beds Right--no cyanotic nail beds   Bilateral:  Pink nail beds with brisk capillary refill   Palpation:  Bilateral radial pulse normal    Musculoskeletal:  Global Assessment:  Overall assessment of Lower Extremity Muscle Strength and Tone:  Left quadriceps--5/5   Left hamstrings--5/5       Left tibialis anterior--5/5  Left gastroc-soleus--5/5  Left EHL --5/5    Lower Extremity:  Knee/Patella:  No digital clubbing or cyanosis.    Examination of left knee reveals:  Normal deep tendon reflexes, coordination, strength, tone, sensation.  No known fractures or deformities.    Inspection and Palpation:  Left knee:  Tenderness:  Over the medial joint line and moderate severity  Effusion:  1+  Crepitus:  Positive  Pulses:  2+  Ecchymosis:  None  Warmth:  None     ROM:  Right:  Extension: 5    Flexion: 120   Left:  Extension: 5     Flexion:120    Instability:    Left:  Lachman Test:  Negative, Varus stress test negative, Valgus stress test negative    Deformities/Malalignments/Discrepancies:    Left:  Genu Varum   Right:  No deformity    Functional Testing:  Vincent's test:  Negative  Patella grind test:  Positive  Q-angle:  normal    Imaging/Studies  I reviewed x-rays of the patient's left knee from December 2018 through Epic.  He has end-stage patellofemoral arthritis and chondrocalcinosis and moderate medial compartment joint space narrowing and  arthritis    Assessment:  1. Primary osteoarthritis of left knee    2. 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 Continue over-the-counter medication as needed for discomfort  2. Patient's chronic infection on the right leg needs to be kept in mind.  He doesn't have changes on the left leg that are as worrisome  3. Because he has failed corticosteroid injection, I recommended we get a course of viscosupplementation approved and started on the left knee.  4. His patellofemoral arthritis is severe enough that we can call it end-stage and may merit arthroplasty but some aquatic physical therapy and viscosupplementation I believe could delay the surgery for some time if he finds them beneficial      Medical Decision Making  Management Options : over-the-counter medicine and prescription/IM medicine  Data/Risk: independent visualization of imaging, lab tests, or EMG/NCV    Tereso Restrepo MD  01/22/19  10:14 AM

## 2019-01-30 ENCOUNTER — OFFICE VISIT (OUTPATIENT)
Dept: INTERNAL MEDICINE | Facility: CLINIC | Age: 69
End: 2019-01-30

## 2019-01-30 VITALS
SYSTOLIC BLOOD PRESSURE: 160 MMHG | HEART RATE: 68 BPM | BODY MASS INDEX: 33.62 KG/M2 | WEIGHT: 227 LBS | DIASTOLIC BLOOD PRESSURE: 80 MMHG | HEIGHT: 69 IN

## 2019-01-30 DIAGNOSIS — G47.33 SEVERE OBSTRUCTIVE SLEEP APNEA: ICD-10-CM

## 2019-01-30 DIAGNOSIS — K27.9 PEPTIC ULCERATION: ICD-10-CM

## 2019-01-30 DIAGNOSIS — K21.9 GASTROESOPHAGEAL REFLUX DISEASE WITHOUT ESOPHAGITIS: ICD-10-CM

## 2019-01-30 DIAGNOSIS — I51.89 DIASTOLIC DYSFUNCTION: ICD-10-CM

## 2019-01-30 DIAGNOSIS — I10 ESSENTIAL HYPERTENSION: Primary | ICD-10-CM

## 2019-01-30 DIAGNOSIS — D36.9 TUBULAR ADENOMA: ICD-10-CM

## 2019-01-30 DIAGNOSIS — E80.6 HYPERBILIRUBINEMIA: ICD-10-CM

## 2019-01-30 DIAGNOSIS — F43.21 SITUATIONAL DEPRESSION: ICD-10-CM

## 2019-01-30 DIAGNOSIS — E80.4 GILBERT'S DISEASE: ICD-10-CM

## 2019-01-30 DIAGNOSIS — R79.89 ABNORMAL LIVER FUNCTION TEST: ICD-10-CM

## 2019-01-30 DIAGNOSIS — Z12.5 SCREENING FOR PROSTATE CANCER: ICD-10-CM

## 2019-01-30 DIAGNOSIS — L40.9 PSORIASIS: ICD-10-CM

## 2019-01-30 DIAGNOSIS — E78.49 OTHER HYPERLIPIDEMIA: ICD-10-CM

## 2019-01-30 DIAGNOSIS — R60.0 BILATERAL EDEMA OF LOWER EXTREMITY: ICD-10-CM

## 2019-01-30 DIAGNOSIS — M19.90 ARTHRITIS: ICD-10-CM

## 2019-01-30 LAB
ALBUMIN SERPL-MCNC: 4.56 G/DL (ref 3.2–4.8)
ALBUMIN/GLOB SERPL: 2.2 G/DL (ref 1.5–2.5)
ALP SERPL-CCNC: 58 U/L (ref 25–100)
ALT SERPL W P-5'-P-CCNC: 26 U/L (ref 7–40)
ANION GAP SERPL CALCULATED.3IONS-SCNC: 7 MMOL/L (ref 3–11)
ARTICHOKE IGE QN: 115 MG/DL (ref 0–130)
AST SERPL-CCNC: 20 U/L (ref 0–33)
BILIRUB SERPL-MCNC: 1.8 MG/DL (ref 0.3–1.2)
BUN BLD-MCNC: 19 MG/DL (ref 9–23)
BUN/CREAT SERPL: 21.8 (ref 7–25)
CALCIUM SPEC-SCNC: 9.3 MG/DL (ref 8.7–10.4)
CHLORIDE SERPL-SCNC: 106 MMOL/L (ref 99–109)
CHOLEST SERPL-MCNC: 178 MG/DL (ref 0–200)
CO2 SERPL-SCNC: 29 MMOL/L (ref 20–31)
CREAT BLD-MCNC: 0.87 MG/DL (ref 0.6–1.3)
DEPRECATED RDW RBC AUTO: 47.9 FL (ref 37–54)
ERYTHROCYTE [DISTWIDTH] IN BLOOD BY AUTOMATED COUNT: 15 % (ref 11.3–14.5)
GFR SERPL CREATININE-BSD FRML MDRD: 88 ML/MIN/1.73
GLOBULIN UR ELPH-MCNC: 2 GM/DL
GLUCOSE BLD-MCNC: 107 MG/DL (ref 70–100)
HCT VFR BLD AUTO: 47.7 % (ref 38.9–50.9)
HDLC SERPL-MCNC: 47 MG/DL (ref 40–60)
HGB BLD-MCNC: 16.5 G/DL (ref 13.1–17.5)
MCH RBC QN AUTO: 30.8 PG (ref 27–31)
MCHC RBC AUTO-ENTMCNC: 34.6 G/DL (ref 32–36)
MCV RBC AUTO: 89 FL (ref 80–99)
PLATELET # BLD AUTO: 121 10*3/MM3 (ref 150–450)
PMV BLD AUTO: 10 FL (ref 6–12)
POTASSIUM BLD-SCNC: 3.9 MMOL/L (ref 3.5–5.5)
PROT SERPL-MCNC: 6.6 G/DL (ref 5.7–8.2)
PSA SERPL-MCNC: 2.97 NG/ML (ref 0–4)
RBC # BLD AUTO: 5.36 10*6/MM3 (ref 4.2–5.76)
SODIUM BLD-SCNC: 142 MMOL/L (ref 132–146)
TRIGL SERPL-MCNC: 172 MG/DL (ref 0–150)
TSH SERPL DL<=0.05 MIU/L-ACNC: 1.27 MIU/ML (ref 0.35–5.35)
WBC NRBC COR # BLD: 7.44 10*3/MM3 (ref 3.5–10.8)

## 2019-01-30 PROCEDURE — 80053 COMPREHEN METABOLIC PANEL: CPT | Performed by: INTERNAL MEDICINE

## 2019-01-30 PROCEDURE — G0103 PSA SCREENING: HCPCS | Performed by: INTERNAL MEDICINE

## 2019-01-30 PROCEDURE — 84443 ASSAY THYROID STIM HORMONE: CPT | Performed by: INTERNAL MEDICINE

## 2019-01-30 PROCEDURE — 80061 LIPID PANEL: CPT | Performed by: INTERNAL MEDICINE

## 2019-01-30 PROCEDURE — 85027 COMPLETE CBC AUTOMATED: CPT | Performed by: INTERNAL MEDICINE

## 2019-01-30 PROCEDURE — 99214 OFFICE O/P EST MOD 30 MIN: CPT | Performed by: INTERNAL MEDICINE

## 2019-01-30 RX ORDER — GABAPENTIN 300 MG/1
300 CAPSULE ORAL NIGHTLY
COMMUNITY
End: 2019-08-08

## 2019-01-30 RX ORDER — CLONIDINE HYDROCHLORIDE 0.1 MG/1
0.1 TABLET ORAL 2 TIMES DAILY
Qty: 60 TABLET | Refills: 5 | Status: SHIPPED | OUTPATIENT
Start: 2019-01-30 | End: 2019-08-22

## 2019-01-30 RX ORDER — GABAPENTIN 300 MG/1
300 CAPSULE ORAL 3 TIMES DAILY
Status: CANCELLED | OUTPATIENT
Start: 2019-01-30

## 2019-01-30 NOTE — PROGRESS NOTES
Patient is a 68 y.o. male who is here for a follow up of chronic conditions.  Chief Complaint   Patient presents with   • Hyperlipidemia   • Hypertension         HPI:    Here for f/u.  Has a rash on his back.  Worst in the winter.  BP has been up at times.  Problems with left knee and plans for collagen injection.  No dizziness or lightheadedness.  No abdominal pains.  GERD is controlled.      History:    Patient Active Problem List   Diagnosis   • Acid reflux   • Arthritis   • Hypertension   • Hyperlipidemia   • Severe obstructive sleep apnea   • Psoriasis   • Diastolic dysfunction   • Peptic ulceration   • Bilateral edema of lower extremity   • Abnormal liver function test   • Hyperbilirubinemia   • Psychophysiological insomnia   • Gilbert's disease   • Situational depression   • Tubular adenoma       Past Medical History:   Diagnosis Date   • Abnormal liver function test    • Arthritis    • Bilateral edema of lower extremity    • Cellulitis of leg, right    • Chalazion    • Hypertension    • Kidney infection    • Neck muscle spasm    • Onychomycosis of toenail    • Peptic ulceration    • Renal calculi    • Situational depression 8/22/2018       Past Surgical History:   Procedure Laterality Date   • CYSTOSCOPY W/ LASER LITHOTRIPSY     • FINGER SURGERY      left 5th finger   • KNEE SURGERY Right 1986   • OTHER SURGICAL HISTORY      Lithotripsy   • TONSILLECTOMY         Current Outpatient Medications on File Prior to Visit   Medication Sig   • amLODIPine (NORVASC) 5 MG tablet TAKE ONE TABLET BY MOUTH EVERY DAY   • ARIPiprazole (ABILIFY) 5 MG tablet TK 1 T PO HS   • calcium citrate-vitamin d (CITRACAL) 200-250 MG-UNIT tablet tablet Take  by mouth Daily.   • cyclobenzaprine (FLEXERIL) 10 MG tablet Take 1 tablet by mouth 3 (Three) Times a Day.   • doxazosin (CARDURA) 4 MG tablet TAKE ONE TABLET BY MOUTH EVERY NIGHT   • DULoxetine (CYMBALTA) 30 MG capsule Take 1 capsule by mouth Daily.   • fenofibrate (TRICOR) 145 MG  tablet TAKE ONE TABLET BY MOUTH EVERY DAY   • furosemide (LASIX) 40 MG tablet TAKE ONE TABLET BY MOUTH EVERY DAY   • gabapentin (NEURONTIN) 300 MG capsule Take 300 mg by mouth Every Night.   • irbesartan (AVAPRO) 300 MG tablet Take 300 mg by mouth Daily.   • meloxicam (MOBIC) 15 MG tablet 1 PO Daily with food.   • mupirocin (BACTROBAN) 2 % ointment Apply  topically 3 (Three) Times a Day.   • Omega-3 Fatty Acids (FISH OIL) 1000 MG capsule capsule Take  by mouth Daily With Breakfast.   • Ped Multivitamins-Fl-Iron (MULTIVITAMIN WITH FLUORIDE/IRON) 0.25-10 MG/ML solution solution Take  by mouth Daily.   • potassium chloride (K-DUR,KLOR-CON) 20 MEQ CR tablet daily.   • pravastatin (PRAVACHOL) 40 MG tablet TAKE ONE TABLET BY MOUTH EVERY DAY   • sildenafil (REVATIO) 20 MG tablet 2-4 tabs as needed   • sildenafil (VIAGRA) 100 MG tablet Take 1 tablet by mouth Daily As Needed for erectile dysfunction.   • tiZANidine (ZANAFLEX) 4 MG tablet Take 1 tablet by mouth Daily.   • triamcinolone (KENALOG) 0.1 % ointment Apply  topically 2 (Two) Times a Day As Needed for Irritation or Rash.   • urea (CARMOL) 20 % cream Apply  topically to the appropriate area as directed As Needed for Dry Skin.   • [DISCONTINUED] CloNIDine (CATAPRES) 0.1 MG tablet Tab 1 tab 1 6 hours prn if systolic is over 180   • [DISCONTINUED] HYDROcodone-acetaminophen (NORCO) 5-325 MG per tablet Take 1 tablet by mouth Every 6 (Six) Hours As Needed for Moderate Pain .   • [DISCONTINUED] naproxen (EC NAPROSYN) 500 MG EC tablet Take 1 tablet by mouth 2 (Two) Times a Day With Meals.     No current facility-administered medications on file prior to visit.        Family History   Problem Relation Age of Onset   • Arthritis Other    • Hypertension Other    • Hyperlipidemia Other    • Heart attack Other    • Hypertension Mother    • Heart disease Father    • Hypertension Father        Social History     Socioeconomic History   • Marital status:      Spouse name: Not  on file   • Number of children: Not on file   • Years of education: Not on file   • Highest education level: Not on file   Social Needs   • Financial resource strain: Not on file   • Food insecurity - worry: Not on file   • Food insecurity - inability: Not on file   • Transportation needs - medical: Not on file   • Transportation needs - non-medical: Not on file   Occupational History   • Not on file   Tobacco Use   • Smoking status: Never Smoker   • Smokeless tobacco: Never Used   Substance and Sexual Activity   • Alcohol use: Yes     Alcohol/week: 2.4 oz     Types: 4 Cans of beer per week   • Drug use: No   • Sexual activity: Defer   Other Topics Concern   • Not on file   Social History Narrative   • Not on file         Review of Systems   Constitutional: Negative for chills, fatigue, fever and unexpected weight change.   HENT: Negative for ear pain, hearing loss, rhinorrhea, sinus pressure, sore throat and trouble swallowing.    Eyes: Negative for discharge and itching.   Respiratory: Negative for cough and chest tightness.    Cardiovascular: Positive for leg swelling (on right leg). Negative for chest pain and palpitations.   Gastrointestinal: Negative for abdominal pain, blood in stool, constipation, diarrhea and vomiting.        2013 colonoscopy normal  9/18 colonoscopy with TA times 1, repeat in 9/21   Endocrine: Negative for polydipsia and polyuria.   Genitourinary: Negative for difficulty urinating, dysuria, enuresis, frequency, hematuria and urgency.        1/19 psa  ED   Musculoskeletal: Positive for arthralgias and neck pain. Negative for back pain, gait problem and joint swelling.   Skin: Negative for rash and wound.        Dry skin   Allergic/Immunologic: Negative for immunocompromised state.   Neurological: Negative for dizziness, syncope, weakness, light-headedness, numbness and headaches.   Hematological: Does not bruise/bleed easily.   Psychiatric/Behavioral: Negative for behavioral problems,  "dysphoric mood and sleep disturbance. The patient is not nervous/anxious.        /80   Pulse 68   Ht 175.3 cm (69.02\")   Wt 103 kg (227 lb)   BMI 33.51 kg/m²       Physical Exam   Constitutional: He is oriented to person, place, and time. He appears well-developed and well-nourished.   HENT:   Head: Normocephalic and atraumatic.   Right Ear: External ear normal.   Left Ear: External ear normal.   Mouth/Throat: Oropharynx is clear and moist.   Eyes: Conjunctivae and EOM are normal. Pupils are equal, round, and reactive to light.   Neck: Normal range of motion. Neck supple.   Cardiovascular: Normal rate, regular rhythm, normal heart sounds and intact distal pulses.   Pulmonary/Chest: Effort normal and breath sounds normal.   Abdominal: Soft. Bowel sounds are normal.   Musculoskeletal: Normal range of motion. He exhibits edema (trace R>L).   Neurological: He is alert and oriented to person, place, and time. He has normal reflexes.   Skin: Skin is warm and dry.   Dry skin   Psychiatric: He has a normal mood and affect. His behavior is normal. Judgment and thought content normal.   Vitals reviewed.      Procedure:      Discussion/Summary:    htn-add clonidine at 1/2 bid  hyperlipidemia-cont pravachol/fibrate, labs today  BPH-cont cardura  GERD-cont ppi  djd-advised to limit nsaids  neck spasm-zanaflex prn and can attempt to wean off  edema-cont lasix  Hyperbilirubinemia-recheck   Thrombocytopenia-recheck   ED-viagra rx  ?[place]-Hem noted  Situational depression-cont cymbalta and will f/u with Behavioral health  high risk meds-labs today      labs noted and dw patient, counseled on low carb and low chol        Current Outpatient Medications:   •  amLODIPine (NORVASC) 5 MG tablet, TAKE ONE TABLET BY MOUTH EVERY DAY, Disp: 90 tablet, Rfl: 3  •  ARIPiprazole (ABILIFY) 5 MG tablet, TK 1 T PO HS, Disp: , Rfl: 2  •  calcium citrate-vitamin d (CITRACAL) 200-250 MG-UNIT tablet tablet, Take  by mouth Daily., Disp: , " Rfl:   •  CloNIDine (CATAPRES) 0.1 MG tablet, Take 1 tablet by mouth 2 (Two) Times a Day., Disp: 60 tablet, Rfl: 5  •  cyclobenzaprine (FLEXERIL) 10 MG tablet, Take 1 tablet by mouth 3 (Three) Times a Day., Disp: 12 tablet, Rfl: 0  •  doxazosin (CARDURA) 4 MG tablet, TAKE ONE TABLET BY MOUTH EVERY NIGHT, Disp: 90 tablet, Rfl: 3  •  DULoxetine (CYMBALTA) 30 MG capsule, Take 1 capsule by mouth Daily., Disp: 30 capsule, Rfl: 5  •  fenofibrate (TRICOR) 145 MG tablet, TAKE ONE TABLET BY MOUTH EVERY DAY, Disp: 90 tablet, Rfl: 2  •  furosemide (LASIX) 40 MG tablet, TAKE ONE TABLET BY MOUTH EVERY DAY, Disp: 90 tablet, Rfl: 2  •  gabapentin (NEURONTIN) 300 MG capsule, Take 300 mg by mouth Every Night., Disp: , Rfl:   •  irbesartan (AVAPRO) 300 MG tablet, Take 300 mg by mouth Daily., Disp: , Rfl: 5  •  meloxicam (MOBIC) 15 MG tablet, 1 PO Daily with food., Disp: 60 tablet, Rfl: 0  •  mupirocin (BACTROBAN) 2 % ointment, Apply  topically 3 (Three) Times a Day., Disp: 22 g, Rfl: 0  •  Omega-3 Fatty Acids (FISH OIL) 1000 MG capsule capsule, Take  by mouth Daily With Breakfast., Disp: , Rfl:   •  Ped Multivitamins-Fl-Iron (MULTIVITAMIN WITH FLUORIDE/IRON) 0.25-10 MG/ML solution solution, Take  by mouth Daily., Disp: , Rfl:   •  potassium chloride (K-DUR,KLOR-CON) 20 MEQ CR tablet, daily., Disp: , Rfl:   •  pravastatin (PRAVACHOL) 40 MG tablet, TAKE ONE TABLET BY MOUTH EVERY DAY, Disp: 90 tablet, Rfl: 3  •  sildenafil (REVATIO) 20 MG tablet, 2-4 tabs as needed, Disp: 90 tablet, Rfl: 5  •  sildenafil (VIAGRA) 100 MG tablet, Take 1 tablet by mouth Daily As Needed for erectile dysfunction., Disp: 6 tablet, Rfl: 5  •  tiZANidine (ZANAFLEX) 4 MG tablet, Take 1 tablet by mouth Daily., Disp: 90 tablet, Rfl: 3  •  triamcinolone (KENALOG) 0.1 % ointment, Apply  topically 2 (Two) Times a Day As Needed for Irritation or Rash., Disp: 80 g, Rfl: 5  •  urea (CARMOL) 20 % cream, Apply  topically to the appropriate area as directed As Needed for  Dry Skin., Disp: , Rfl:         Jimmie was seen today for hyperlipidemia and hypertension.    Diagnoses and all orders for this visit:    Essential hypertension  -     CloNIDine (CATAPRES) 0.1 MG tablet; Take 1 tablet by mouth 2 (Two) Times a Day.  -     CBC (No Diff)    Other hyperlipidemia  -     Comprehensive Metabolic Panel  -     Lipid Panel  -     TSH    Diastolic dysfunction    Severe obstructive sleep apnea    Peptic ulceration    Gastroesophageal reflux disease without esophagitis    Psoriasis    Arthritis    Tubular adenoma    Situational depression    Hyperbilirubinemia    Gilbert's disease    Bilateral edema of lower extremity    Abnormal liver function test    Screening for prostate cancer  -     PSA Screen    Other orders  -     Cancel: gabapentin (NEURONTIN) 300 MG capsule; Take 1 capsule by mouth 3 (Three) Times a Day.

## 2019-02-07 ENCOUNTER — CLINICAL SUPPORT (OUTPATIENT)
Dept: ORTHOPEDIC SURGERY | Facility: CLINIC | Age: 69
End: 2019-02-07

## 2019-02-07 DIAGNOSIS — M11.262 CHONDROCALCINOSIS DUE TO DICALCIUM PHOSPHATE CRYSTALS, OF THE KNEE, LEFT: ICD-10-CM

## 2019-02-07 DIAGNOSIS — M17.12 PRIMARY OSTEOARTHRITIS OF LEFT KNEE: Primary | ICD-10-CM

## 2019-02-07 PROCEDURE — 20610 DRAIN/INJ JOINT/BURSA W/O US: CPT | Performed by: ORTHOPAEDIC SURGERY

## 2019-02-07 RX ORDER — MELOXICAM 15 MG/1
TABLET ORAL
Qty: 60 TABLET | Refills: 2 | Status: SHIPPED | OUTPATIENT
Start: 2019-02-07 | End: 2019-08-08

## 2019-02-07 RX ORDER — LIDOCAINE HYDROCHLORIDE 10 MG/ML
3 INJECTION, SOLUTION INFILTRATION; PERINEURAL
Status: COMPLETED | OUTPATIENT
Start: 2019-02-07 | End: 2019-02-07

## 2019-02-07 RX ADMIN — LIDOCAINE HYDROCHLORIDE 3 ML: 10 INJECTION, SOLUTION INFILTRATION; PERINEURAL at 11:31

## 2019-02-07 NOTE — PROGRESS NOTES
Patient's here today for Orthovisc No. 1 to the left knee.  This is injected through a superolateral approach and tolerated well.  Patient will be seen in 1 week for Orthovisc No. 2.    He is asked for a refill on his meloxicam.  He is currently having no GI symptoms.  We've asked him to be on the look out for that.    Patient is also asked for a prescription for physical therapy.  He will be sent to the [address] [place] [address] for outpatient PT for the left knee.

## 2019-02-07 NOTE — PROGRESS NOTES
Procedure   Large Joint Arthrocentesis: L knee  Date/Time: 2/7/2019 11:31 AM  Consent given by: patient  Site marked: site marked  Timeout: Immediately prior to procedure a time out was called to verify the correct patient, procedure, equipment, support staff and site/side marked as required   Supporting Documentation  Indications: pain   Procedure Details  Location: knee - L knee  Preparation: Patient was prepped and draped in the usual sterile fashion  Needle size: 22 G  Approach: superior  Medications administered: 3 mL lidocaine 1 %; 30 mg Hyaluronan 30 MG/2ML  Aspirate: clear (to verify intraarticular placement of the needle)  Patient tolerance: patient tolerated the procedure well with no immediate complications

## 2019-02-14 ENCOUNTER — CLINICAL SUPPORT (OUTPATIENT)
Dept: ORTHOPEDIC SURGERY | Facility: CLINIC | Age: 69
End: 2019-02-14

## 2019-02-14 ENCOUNTER — TELEPHONE (OUTPATIENT)
Dept: INTERNAL MEDICINE | Facility: CLINIC | Age: 69
End: 2019-02-14

## 2019-02-14 DIAGNOSIS — R00.1 BRADYCARDIA: Primary | ICD-10-CM

## 2019-02-14 DIAGNOSIS — M17.12 PRIMARY OSTEOARTHRITIS OF LEFT KNEE: Primary | ICD-10-CM

## 2019-02-14 PROCEDURE — 20610 DRAIN/INJ JOINT/BURSA W/O US: CPT | Performed by: ORTHOPAEDIC SURGERY

## 2019-02-14 RX ORDER — LIDOCAINE HYDROCHLORIDE 10 MG/ML
3 INJECTION, SOLUTION INFILTRATION; PERINEURAL
Status: COMPLETED | OUTPATIENT
Start: 2019-02-14 | End: 2019-02-14

## 2019-02-14 RX ADMIN — LIDOCAINE HYDROCHLORIDE 3 ML: 10 INJECTION, SOLUTION INFILTRATION; PERINEURAL at 09:31

## 2019-02-14 NOTE — PROGRESS NOTES
Patient is here for Orthovisc No. 2 to the left knee.  He is getting relief from injection #1.  Follow-up in one week for Orthovisc No. 3.

## 2019-02-14 NOTE — PROGRESS NOTES
Procedure   Large Joint Arthrocentesis: L knee  Date/Time: 2/14/2019 9:31 AM  Consent given by: patient  Site marked: site marked  Timeout: Immediately prior to procedure a time out was called to verify the correct patient, procedure, equipment, support staff and site/side marked as required   Supporting Documentation  Indications: pain   Procedure Details  Location: knee - L knee  Preparation: Patient was prepped and draped in the usual sterile fashion  Needle size: 22 G  Approach: anterolateral  Medications administered: 30 mg Hyaluronan 30 MG/2ML; 3 mL lidocaine 1 %  Patient tolerance: patient tolerated the procedure well with no immediate complications

## 2019-02-14 NOTE — TELEPHONE ENCOUNTER
Pt said that his heart rate is going below 50. Its had been running 46,47 and 48. Please call the pt back

## 2019-02-20 DIAGNOSIS — F43.21 SITUATIONAL DEPRESSION: ICD-10-CM

## 2019-02-21 ENCOUNTER — CLINICAL SUPPORT (OUTPATIENT)
Dept: ORTHOPEDIC SURGERY | Facility: CLINIC | Age: 69
End: 2019-02-21

## 2019-02-21 DIAGNOSIS — M11.262 CHONDROCALCINOSIS DUE TO DICALCIUM PHOSPHATE CRYSTALS, OF THE KNEE, LEFT: ICD-10-CM

## 2019-02-21 DIAGNOSIS — M17.12 PRIMARY OSTEOARTHRITIS OF LEFT KNEE: Primary | ICD-10-CM

## 2019-02-21 PROCEDURE — 20610 DRAIN/INJ JOINT/BURSA W/O US: CPT | Performed by: ORTHOPAEDIC SURGERY

## 2019-02-21 RX ORDER — DULOXETIN HYDROCHLORIDE 30 MG/1
30 CAPSULE, DELAYED RELEASE ORAL DAILY
Qty: 30 CAPSULE | Refills: 5 | Status: SHIPPED | OUTPATIENT
Start: 2019-02-21 | End: 2019-04-24 | Stop reason: SDUPTHER

## 2019-02-21 RX ORDER — LIDOCAINE HYDROCHLORIDE 10 MG/ML
3 INJECTION, SOLUTION INFILTRATION; PERINEURAL
Status: COMPLETED | OUTPATIENT
Start: 2019-02-21 | End: 2019-02-21

## 2019-02-21 RX ADMIN — LIDOCAINE HYDROCHLORIDE 3 ML: 10 INJECTION, SOLUTION INFILTRATION; PERINEURAL at 09:15

## 2019-02-21 NOTE — PROGRESS NOTES
Patient is here for Orthovisc No. 3 to the left knee.  Patient is getting relief from the injections.  This was injected through a superolateral approach and tolerated well.  Patient would like to follow-up as needed.  Cannot have repeat Orthovisc until after August 22, 2019.

## 2019-02-21 NOTE — PROGRESS NOTES
Procedure   Large Joint Arthrocentesis: L knee  Date/Time: 2/21/2019 9:15 AM  Consent given by: patient  Site marked: site marked  Timeout: Immediately prior to procedure a time out was called to verify the correct patient, procedure, equipment, support staff and site/side marked as required   Supporting Documentation  Indications: pain   Procedure Details  Location: knee - L knee  Preparation: Patient was prepped and draped in the usual sterile fashion  Needle size: 22 G  Approach: anterolateral  Medications administered: 30 mg Hyaluronan 30 MG/2ML; 3 mL lidocaine 1 %  Patient tolerance: patient tolerated the procedure well with no immediate complications

## 2019-02-25 ENCOUNTER — HOSPITAL ENCOUNTER (OUTPATIENT)
Dept: PHYSICAL THERAPY | Facility: HOSPITAL | Age: 69
Setting detail: THERAPIES SERIES
Discharge: HOME | End: 2019-02-25

## 2019-02-25 DIAGNOSIS — M25.562 CHRONIC PAIN OF LEFT KNEE: Primary | ICD-10-CM

## 2019-02-25 DIAGNOSIS — G89.29 CHRONIC PAIN OF LEFT KNEE: Primary | ICD-10-CM

## 2019-02-25 PROCEDURE — 97161 PT EVAL LOW COMPLEX 20 MIN: CPT

## 2019-02-25 NOTE — THERAPY TREATMENT NOTE
Outpatient Physical Therapy Ortho Initial Evaluation  [facility]     Patient Name: Jimmie Salcedo  : 1950  MRN: [id number]  Today's Date: 2019      Visit Date: 2019    Patient Active Problem List   Diagnosis   • Acid reflux   • Arthritis   • Hypertension   • Hyperlipidemia   • Severe obstructive sleep apnea   • Psoriasis   • Diastolic dysfunction   • Peptic ulceration   • Bilateral edema of lower extremity   • Abnormal liver function test   • Hyperbilirubinemia   • Psychophysiological insomnia   • Gilbert's disease   • Situational depression   • Tubular adenoma        Past Medical History:   Diagnosis Date   • Abnormal liver function test    • Arthritis    • Bilateral edema of lower extremity    • Cellulitis of leg, right    • Chalazion    • Hypertension    • Kidney infection    • Neck muscle spasm    • Onychomycosis of toenail    • Peptic ulceration    • Renal calculi    • Situational depression 2018        Past Surgical History:   Procedure Laterality Date   • CYSTOSCOPY W/ LASER LITHOTRIPSY     • FINGER SURGERY      left 5th finger   • KNEE SURGERY Right    • OTHER SURGICAL HISTORY      Lithotripsy   • TONSILLECTOMY         Visit Dx:     ICD-10-CM ICD-9-CM   1. Chronic pain of left knee M25.562 719.46    G89.29 338.29       Patient History     Row Name 19 1345 19 1300          History    Chief Complaint  Difficulty with daily activities;Difficulty Walking;Joint stiffness;Muscle tenderness;Muscle weakness;Pain;Joint swelling  -MM  --     Type of Pain  Knee pain  -MM  --     Brief Description of Current Complaint  Client presents with left knee pain that has been present for over a year. He had a cortizone injection about a year ago and experienced relief for about a month. He recently finished the series of 3 synvisc injections for his knee with the last injection being last week.  He reports that his knee feels remarkably better after the injections. Prior to the  injections he was noticing problems with his knee giving way and needing to use a crutch. Now he currently has very little pain. Client also has a comorbidity of problems with his right knee due to battling intermittent infection the past 3 years.   -MM  --     Patient/Caregiver Goals  Relieve pain;Return to prior level of function;Improve mobility;Improve strength  -MM  --  -MM     Occupation/sports/leisure activities  employed part time for [employer] as a   -MM  --     What clinical tests have you had for this problem?  X-ray  -MM  --     Results of Clinical Tests  signs of OA left knee  -MM  --        Pain     Pain Location  Knee  -MM  --  -MM     Pain at Present  0  -MM  --  -MM     Pain at Best  0  -MM  --  -MM     Pain at Worst  4  -MM  --  -MM     Pain Frequency  Intermittent  -MM  --     Pain Description  Aching  -MM  --     Pain Comments  Pain is worse with prolonged weightbearing activity.   -MM  --     Difficulties with ADL's?  Prolonged weightbearing activity. negotiating stairs, squats  -MM  --        Fall Risk Assessment    Any falls in the past year:  No  -MM  --        Daily Activities    Primary Language  English  -MM  --     How does patient learn best?  Listening;Reading;Demonstration  -MM  --     Barriers to learning  None  -MM  --     Pt Participated in POC and Goals  Yes  -MM  --        Safety    Are you being hurt, hit, or frightened by anyone at home or in your life?  Unspecified  -MM  --       User Key  (r) = Recorded By, (t) = Taken By, (c) = Cosigned By    Initials Name Provider Type    MM Dennis Kenny, PT Physical Therapist          PT Ortho     Row Name 02/25/19 6459       Posture/Observations    Posture/Observations Comments  No noticeable swelling. Palpation: mild tenderness medial and lateral left knee joint line.   -MM       General ROM    GENERAL ROM COMMENTS  Crepitus with passive knee flexion and extension  -MM       Left Lower Ext    Lt Knee Extension/Flexion AROM   0-134  -MM       MMT Left Lower Ext    Lt Hip Flexion MMT, Gross Movement  (4+/5) good plus  -MM    Lt Hip Extension MMT, Gross Movement  (5/5) normal  -MM    Lt Hip ABduction MMT, Gross Movement  (4+/5) good plus  -MM    Lt Hip ADduction MMT, Gross Movement  (4+/5) good plus  -MM    Lt Knee Extension MMT, Gross Movement  (4+/5) good plus crepitus noted  -MM    Lt Knee Flexion MMT, Gross Movement  (5/5) normal crepitus  -MM    Lt Ankle Plantarflexion MMT, Gross Movement  (5/5) normal  -MM    Lt Ankle Dorsiflexion MMT, Gross Movement  (5/5) normal  -MM      User Key  (r) = Recorded By, (t) = Taken By, (c) = Cosigned By    Initials Name Provider Type    Dennis Nagy, PT Physical Therapist        Therapy Education  Education Details: Provided and reviewed home program. Exercises included: partial squat with support, SLR flexion/abduction/extension, ball squeezes, heel slides, long arc quads, bridging, and hamstring stretch.     PT OP Goals     Row Name 02/25/19 1345          PT Short Term Goals    STG Date to Achieve  03/18/19  -MM     STG 1  Client will report 50% improvement in knee pain with daily activity.   -MM     STG 1 Progress  New  -MM     STG 2  Client will be independent with home program to minimize pain and improve strength.   -MM     STG 2 Progress  New  -MM     STG 3  Left knee extension strength will improve to 5/5.   -MM     STG 3 Progress  New  -MM        Long Term Goals    LTG Date to Achieve  04/08/19  -MM     LTG 1  LEFS score will improve to 75% ability.   -MM     LTG 1 Progress  New  -MM     LTG 2  Client will improve to negotiating stairs without difficulty.   -MM     LTG 2 Progress  New  -MM        Time Calculation    PT Goal Re-Cert Due Date  05/26/19  -MM       User Key  (r) = Recorded By, (t) = Taken By, (c) = Cosigned By    Initials Name Provider Type    Dennis Nagy, PT Physical Therapist          PT Assessment/Plan     Row Name 02/25/19 1345          PT Assessment     Functional Limitations  Impaired locomotion;Limitation in home management;Limitations in community activities;Performance in leisure activities;Performance in self-care ADL  -MM     Impairments  Pain;Muscle strength  -MM     Assessment Comments  Client presents with evolving symptoms of low complexity. Signs and symptoms are consistent with left Knee pain related to OA. He received 3 rounds of ortho visc injections and has experienced a lot of relief. Further skilled care is required to maximize strength, mobility and minimize pain.   -MM     Please refer to paper survey for additional self-reported information  Yes  -MM     Rehab Potential  Good  -MM     Patient/caregiver participated in establishment of treatment plan and goals  Yes  -MM     Patient would benefit from skilled therapy intervention  Yes  -MM        PT Plan    PT Frequency  1x/week  -MM     Predicted Duration of Therapy Intervention (Therapy Eval)  2-5 visits  -MM     Planned CPT's?  PT EVAL LOW COMPLEXITY: 51091;PT THER PROC EA 15 MIN: 70404;PT MANUAL THERAPY EA 15 MIN: 62139;PT NEUROMUSC RE-EDUCATION EA 15 MIN: 19269;PT THER ACT EA 15 MIN: 80242  -MM     PT Plan Comments  Continue per plan of treatment with exercises to maximize mobility and strength while minimizing stress on the medial joint line.   -MM       User Key  (r) = Recorded By, (t) = Taken By, (c) = Cosigned By    Initials Name Provider Type    MM Dennis Kenny, PT Physical Therapist        Outcome Measure Options: Lower Extremity Functional Scale (LEFS)(57.5%)  Lower Extremity Functional Index  Any of your usual work, housework or school activities: Moderate difficulty  Your usual hobbies, recreational or sporting activities: A little bit of difficulty  Getting into or out of the bath: No difficulty  Walking between rooms: No difficulty  Putting on your shoes or socks: No difficulty  Squatting: Quite a bit of difficulty  Lifting an object, like a bag of groceries from the floor: A  little bit of difficulty  Performing light activities around your home: No difficulty  Performing heavy activities around your home: Moderate difficulty  Getting into or out of a car: A little bit of difficulty  Walking 2 blocks: Moderate difficulty  Walking a mile: Quite a bit of difficulty  Going up or down 10 stairs (about 1 flight of stairs): Moderate difficulty  Standing for 1 hour: Moderate difficulty  Sitting for 1 hour: A little bit of difficulty  Running on even ground: Quite a bit of difficulty  Running on uneven ground: Quite a bit of difficulty  Making sharp turns while running fast: Extreme difficulty or unable to perform activity  Hopping: Quite a bit of difficulty  Rolling over in bed: A little bit of difficulty  Total: 46    Start Time: 1345     Therapy Charges for Today     Code Description Service Date Service Provider Modifiers Qty    75862812867 HC PT EVAL LOW COMPLEXITY 4 2/25/2019 Dennis Kenny, PT GP 1          PT G-Codes  Outcome Measure Options: Lower Extremity Functional Scale (LEFS)(57.5%)  Total: 46         Dennis Kenny, PT  2/25/2019

## 2019-03-07 ENCOUNTER — HOSPITAL ENCOUNTER (OUTPATIENT)
Dept: PHYSICAL THERAPY | Facility: HOSPITAL | Age: 69
Setting detail: THERAPIES SERIES
Discharge: HOME | End: 2019-03-07

## 2019-03-07 DIAGNOSIS — M25.562 CHRONIC PAIN OF LEFT KNEE: Primary | ICD-10-CM

## 2019-03-07 DIAGNOSIS — G89.29 CHRONIC PAIN OF LEFT KNEE: Primary | ICD-10-CM

## 2019-03-07 PROCEDURE — 97110 THERAPEUTIC EXERCISES: CPT

## 2019-03-07 NOTE — THERAPY TREATMENT NOTE
Outpatient Physical Therapy Ortho Treatment Note  [facility]     Patient Name: Jimmie Salcedo  : 1950  MRN: [id number]  Today's Date: 3/7/2019      Visit Date: 2019    Visit Dx:    ICD-10-CM ICD-9-CM   1. Chronic pain of left knee M25.562 719.46    G89.29 338.29       Patient Active Problem List   Diagnosis   • Acid reflux   • Arthritis   • Hypertension   • Hyperlipidemia   • Severe obstructive sleep apnea   • Psoriasis   • Diastolic dysfunction   • Peptic ulceration   • Bilateral edema of lower extremity   • Abnormal liver function test   • Hyperbilirubinemia   • Psychophysiological insomnia   • Gilbert's disease   • Situational depression   • Tubular adenoma        Past Medical History:   Diagnosis Date   • Abnormal liver function test    • Arthritis    • Bilateral edema of lower extremity    • Cellulitis of leg, right    • Chalazion    • Hypertension    • Kidney infection    • Neck muscle spasm    • Onychomycosis of toenail    • Peptic ulceration    • Renal calculi    • Situational depression 2018        Past Surgical History:   Procedure Laterality Date   • CYSTOSCOPY W/ LASER LITHOTRIPSY     • FINGER SURGERY      left 5th finger   • KNEE SURGERY Right    • OTHER SURGICAL HISTORY      Lithotripsy   • TONSILLECTOMY         PT Assessment/Plan     Row Name 19 1115          PT Assessment    Assessment Comments  Client continues with some joint line soreness.  He tolerates exercises in the clinic well.  Limited range is required for squatting activity due to crepitus.  -MM        PT Plan    PT Plan Comments  Continue per plan of treatment with exercises and use modalities as needed.  Consider adding sidestep with band and diagonal step with band.  -MM       User Key  (r) = Recorded By, (t) = Taken By, (c) = Cosigned By    Initials Name Provider Type    Dennis Nagy, PT Physical Therapist              Exercises     Row Name 19 1130 19 1115          Subjective  Comments    Subjective Comments  --  -MM  Client reports some lateral joint line pain today. He has been working on the home program.   -MM        Subjective Pain    Able to rate subjective pain?  --  yes  -MM     Pre-Treatment Pain Level  --  3  -MM     Post-Treatment Pain Level  --  2  -MM        Total Minutes    33280 - PT Therapeutic Exercise Minutes  --  30  -MM        Exercise 1    Exercise Name 1  --  Included exercises in clinical setting per flow sheet.  Updated program to include terminal knee extension with blue band, and three-way leg kicks with red band.  -MM     Cueing 1  --  Verbal  -MM     Time 1  --  30  -MM     Additional Comments  --  ther ex  -MM        Exercise 2    Exercise Name 2  --  --  -MM     Time 2  --  --  -MM       User Key  (r) = Recorded By, (t) = Taken By, (c) = Cosigned By    Initials Name Provider Type    Dennis Nagy, PT Physical Therapist          Time Calculation:   Start Time: 1115  Therapy Suggested Charges     Code   Minutes Charges    99808 (CPT®) Hc Pt Neuromusc Re Education Ea 15 Min      99486 (CPT®) Hc Pt Ther Proc Ea 15 Min 30 2    47217 (CPT®) Hc Gait Training Ea 15 Min      65640 (CPT®) Hc Pt Therapeutic Act Ea 15 Min      89678 (CPT®) Hc Pt Manual Therapy Ea 15 Min      79904 (CPT®) Hc Pt Ther Massage- Per 15 Min      09172 (CPT®) Hc Pt Iontophoresis Ea 15 Min      40312 (CPT®) Hc Pt Elec Stim Ea-Per 15 Min      59066 (CPT®) Hc Pt Ultrasound Ea 15 Min      22381 (CPT®) Hc Pt Self Care/Mgmt/Train Ea 15 Min      49579 (CPT®) Hc Pt Prosthetic (S) Train Initial Encounter, Each 15 Min      22777 (CPT®) Hc Orthotic(S) Mgmt/Train Initial Encounter, Each 15min      72099 (CPT®) Hc Pt Aquatic Therapy Ea 15 Min      28598 (CPT®) Hc Pt Orthotic(S)/Prosthetic(S) Encounter, Each 15 Min       (CPT®) Hc Pt Electrical Stim Unattended      Total  30 2        Therapy Charges for Today     Code Description Service Date Service Provider Modifiers Qty    01386411845 HC PT  THER PROC EA 15 MIN 3/7/2019 Dennis Kenny, PT GP 2                    Dennis Kenny, PT  3/7/2019

## 2019-03-13 ENCOUNTER — OFFICE VISIT (OUTPATIENT)
Dept: INTERNAL MEDICINE | Facility: CLINIC | Age: 69
End: 2019-03-13

## 2019-03-13 VITALS
WEIGHT: 223 LBS | HEIGHT: 69 IN | HEART RATE: 60 BPM | DIASTOLIC BLOOD PRESSURE: 70 MMHG | SYSTOLIC BLOOD PRESSURE: 142 MMHG | BODY MASS INDEX: 33.03 KG/M2

## 2019-03-13 DIAGNOSIS — G47.33 SEVERE OBSTRUCTIVE SLEEP APNEA: ICD-10-CM

## 2019-03-13 DIAGNOSIS — R60.0 BILATERAL EDEMA OF LOWER EXTREMITY: ICD-10-CM

## 2019-03-13 DIAGNOSIS — M19.90 ARTHRITIS: ICD-10-CM

## 2019-03-13 DIAGNOSIS — I10 ESSENTIAL HYPERTENSION: Primary | ICD-10-CM

## 2019-03-13 DIAGNOSIS — L40.9 PSORIASIS: ICD-10-CM

## 2019-03-13 DIAGNOSIS — I51.89 DIASTOLIC DYSFUNCTION: ICD-10-CM

## 2019-03-13 DIAGNOSIS — F51.04 PSYCHOPHYSIOLOGICAL INSOMNIA: ICD-10-CM

## 2019-03-13 DIAGNOSIS — F43.21 SITUATIONAL DEPRESSION: ICD-10-CM

## 2019-03-13 DIAGNOSIS — D36.9 TUBULAR ADENOMA: ICD-10-CM

## 2019-03-13 DIAGNOSIS — K21.9 GASTROESOPHAGEAL REFLUX DISEASE WITHOUT ESOPHAGITIS: ICD-10-CM

## 2019-03-13 DIAGNOSIS — E78.49 OTHER HYPERLIPIDEMIA: ICD-10-CM

## 2019-03-13 DIAGNOSIS — E80.4 GILBERT'S DISEASE: ICD-10-CM

## 2019-03-13 DIAGNOSIS — R79.89 ABNORMAL LIVER FUNCTION TEST: ICD-10-CM

## 2019-03-13 DIAGNOSIS — K27.9 PEPTIC ULCERATION: ICD-10-CM

## 2019-03-13 PROCEDURE — 99214 OFFICE O/P EST MOD 30 MIN: CPT | Performed by: INTERNAL MEDICINE

## 2019-03-13 NOTE — PROGRESS NOTES
Patient is a 68 y.o. male who is here for a follow up of chronic conditions.  Chief Complaint   Patient presents with   • Hyperlipidemia   • Hypertension         HPI:    Here for f/u.  No dizziness or lightheadedness.  Occasional HAs.  Some fatigue.  GERD is controlled.  Getting PT for knee pain.  Sleeping well.  No nocturia.  Using gabapentin qhs which helps sleep.    History:    Patient Active Problem List   Diagnosis   • Acid reflux   • Arthritis   • Hypertension   • Hyperlipidemia   • Severe obstructive sleep apnea   • Psoriasis   • Diastolic dysfunction   • Peptic ulceration   • Bilateral edema of lower extremity   • Abnormal liver function test   • Hyperbilirubinemia   • Psychophysiological insomnia   • Gilbert's disease   • Situational depression   • Tubular adenoma       Past Medical History:   Diagnosis Date   • Abnormal liver function test    • Arthritis    • Bilateral edema of lower extremity    • Cellulitis of leg, right    • Chalazion    • Hypertension    • Kidney infection    • Neck muscle spasm    • Onychomycosis of toenail    • Peptic ulceration    • Renal calculi    • Situational depression 8/22/2018       Past Surgical History:   Procedure Laterality Date   • CYSTOSCOPY W/ LASER LITHOTRIPSY     • FINGER SURGERY      left 5th finger   • KNEE SURGERY Right 1986   • OTHER SURGICAL HISTORY      Lithotripsy   • TONSILLECTOMY         Current Outpatient Medications on File Prior to Visit   Medication Sig   • ARIPiprazole (ABILIFY) 5 MG tablet TK 1 T PO HS   • calcium citrate-vitamin d (CITRACAL) 200-250 MG-UNIT tablet tablet Take  by mouth Daily.   • CloNIDine (CATAPRES) 0.1 MG tablet Take 1 tablet by mouth 2 (Two) Times a Day.   • cyclobenzaprine (FLEXERIL) 10 MG tablet Take 1 tablet by mouth 3 (Three) Times a Day.   • doxazosin (CARDURA) 4 MG tablet TAKE ONE TABLET BY MOUTH EVERY NIGHT   • DULoxetine (CYMBALTA) 30 MG capsule TAKE 1 CAPSULE BY MOUTH DAILY   • fenofibrate (TRICOR) 145 MG tablet TAKE ONE  TABLET BY MOUTH EVERY DAY   • furosemide (LASIX) 40 MG tablet TAKE ONE TABLET BY MOUTH EVERY DAY   • gabapentin (NEURONTIN) 300 MG capsule Take 300 mg by mouth Every Night.   • irbesartan (AVAPRO) 300 MG tablet Take 300 mg by mouth Daily.   • meloxicam (MOBIC) 15 MG tablet 1 PO Daily with food.   • mupirocin (BACTROBAN) 2 % ointment Apply  topically 3 (Three) Times a Day.   • Omega-3 Fatty Acids (FISH OIL) 1000 MG capsule capsule Take  by mouth Daily With Breakfast.   • Ped Multivitamins-Fl-Iron (MULTIVITAMIN WITH FLUORIDE/IRON) 0.25-10 MG/ML solution solution Take  by mouth Daily.   • potassium chloride (K-DUR,KLOR-CON) 20 MEQ CR tablet daily.   • pravastatin (PRAVACHOL) 40 MG tablet TAKE ONE TABLET BY MOUTH EVERY DAY   • sildenafil (REVATIO) 20 MG tablet 2-4 tabs as needed   • sildenafil (VIAGRA) 100 MG tablet Take 1 tablet by mouth Daily As Needed for erectile dysfunction.   • tiZANidine (ZANAFLEX) 4 MG tablet Take 1 tablet by mouth Daily.   • triamcinolone (KENALOG) 0.1 % ointment Apply  topically 2 (Two) Times a Day As Needed for Irritation or Rash.   • urea (CARMOL) 20 % cream Apply  topically to the appropriate area as directed As Needed for Dry Skin.   • amLODIPine (NORVASC) 5 MG tablet TAKE ONE TABLET BY MOUTH EVERY DAY     No current facility-administered medications on file prior to visit.        Family History   Problem Relation Age of Onset   • Arthritis Other    • Hypertension Other    • Hyperlipidemia Other    • Heart attack Other    • Hypertension Mother    • Heart disease Father    • Hypertension Father        Social History     Socioeconomic History   • Marital status:      Spouse name: Not on file   • Number of children: Not on file   • Years of education: Not on file   • Highest education level: Not on file   Social Needs   • Financial resource strain: Not on file   • Food insecurity - worry: Not on file   • Food insecurity - inability: Not on file   • Transportation needs - medical: Not  "on file   • Transportation needs - non-medical: Not on file   Occupational History   • Not on file   Tobacco Use   • Smoking status: Never Smoker   • Smokeless tobacco: Never Used   Substance and Sexual Activity   • Alcohol use: Yes     Alcohol/week: 2.4 oz     Types: 4 Cans of beer per week   • Drug use: No   • Sexual activity: Defer   Other Topics Concern   • Not on file   Social History Narrative   • Not on file         Review of Systems   Constitutional: Negative for chills, fatigue, fever and unexpected weight change.   HENT: Negative for ear pain, hearing loss, rhinorrhea, sinus pressure, sore throat and trouble swallowing.    Eyes: Negative for discharge and itching.   Respiratory: Negative for cough and chest tightness.    Cardiovascular: Positive for leg swelling (on right leg). Negative for chest pain and palpitations.   Gastrointestinal: Negative for abdominal pain, blood in stool, constipation, diarrhea and vomiting.        2013 colonoscopy normal  9/18 colonoscopy with TA times 1, repeat in 9/21   Endocrine: Negative for polydipsia and polyuria.   Genitourinary: Negative for difficulty urinating, dysuria, enuresis, frequency, hematuria and urgency.        1/19 psa  ED   Musculoskeletal: Positive for arthralgias and neck pain. Negative for back pain, gait problem and joint swelling.   Skin: Negative for rash and wound.        Dry skin   Allergic/Immunologic: Negative for immunocompromised state.   Neurological: Negative for dizziness, syncope, weakness, light-headedness, numbness and headaches.   Hematological: Does not bruise/bleed easily.   Psychiatric/Behavioral: Negative for behavioral problems, dysphoric mood and sleep disturbance. The patient is not nervous/anxious.        /70   Pulse 60   Ht 175.3 cm (69.02\")   Wt 101 kg (223 lb)   BMI 32.92 kg/m²       Physical Exam   Constitutional: He is oriented to person, place, and time. He appears well-developed and well-nourished.   HENT:   Head: " Normocephalic and atraumatic.   Right Ear: External ear normal.   Left Ear: External ear normal.   Mouth/Throat: Oropharynx is clear and moist.   Eyes: Conjunctivae and EOM are normal. Pupils are equal, round, and reactive to light.   Neck: Normal range of motion. Neck supple.   Cardiovascular: Normal rate, regular rhythm, normal heart sounds and intact distal pulses.   Pulmonary/Chest: Effort normal and breath sounds normal.   Abdominal: Soft. Bowel sounds are normal.   Musculoskeletal: Normal range of motion. He exhibits edema (trace R>L).   Neurological: He is alert and oriented to person, place, and time. He has normal reflexes.   Skin: Skin is warm and dry.   Dry skin   Psychiatric: He has a normal mood and affect. His behavior is normal. Judgment and thought content normal.   Vitals reviewed.      Procedure:      Discussion/Summary:    htn-increase clonidine to 1 pill bid  hyperlipidemia-cont pravachol/fibrate, labs noted  BPH-cont cardura  GERD-cont ppi  djd-advised to limit nsaids  neck spasm-zanaflex prn and can attempt to wean off  edema-cont lasix  Hyperbilirubinemia-recheck noted  Thrombocytopenia-recheck noted  ED-viagra rx  ?[place]-Hem noted  Situational depression-cont cymbalta and will f/u with Behavioral health  high risk meds-labs today      labs noted and dw patient, counseled on low carb and low chol        Current Outpatient Medications:   •  ARIPiprazole (ABILIFY) 5 MG tablet, TK 1 T PO HS, Disp: , Rfl: 2  •  calcium citrate-vitamin d (CITRACAL) 200-250 MG-UNIT tablet tablet, Take  by mouth Daily., Disp: , Rfl:   •  CloNIDine (CATAPRES) 0.1 MG tablet, Take 1 tablet by mouth 2 (Two) Times a Day., Disp: 60 tablet, Rfl: 5  •  cyclobenzaprine (FLEXERIL) 10 MG tablet, Take 1 tablet by mouth 3 (Three) Times a Day., Disp: 12 tablet, Rfl: 0  •  doxazosin (CARDURA) 4 MG tablet, TAKE ONE TABLET BY MOUTH EVERY NIGHT, Disp: 90 tablet, Rfl: 3  •  DULoxetine (CYMBALTA) 30 MG capsule, TAKE 1 CAPSULE BY  MOUTH DAILY, Disp: 30 capsule, Rfl: 5  •  fenofibrate (TRICOR) 145 MG tablet, TAKE ONE TABLET BY MOUTH EVERY DAY, Disp: 90 tablet, Rfl: 2  •  furosemide (LASIX) 40 MG tablet, TAKE ONE TABLET BY MOUTH EVERY DAY, Disp: 90 tablet, Rfl: 2  •  gabapentin (NEURONTIN) 300 MG capsule, Take 300 mg by mouth Every Night., Disp: , Rfl:   •  irbesartan (AVAPRO) 300 MG tablet, Take 300 mg by mouth Daily., Disp: , Rfl: 5  •  meloxicam (MOBIC) 15 MG tablet, 1 PO Daily with food., Disp: 60 tablet, Rfl: 2  •  mupirocin (BACTROBAN) 2 % ointment, Apply  topically 3 (Three) Times a Day., Disp: 22 g, Rfl: 0  •  Omega-3 Fatty Acids (FISH OIL) 1000 MG capsule capsule, Take  by mouth Daily With Breakfast., Disp: , Rfl:   •  Ped Multivitamins-Fl-Iron (MULTIVITAMIN WITH FLUORIDE/IRON) 0.25-10 MG/ML solution solution, Take  by mouth Daily., Disp: , Rfl:   •  potassium chloride (K-DUR,KLOR-CON) 20 MEQ CR tablet, daily., Disp: , Rfl:   •  pravastatin (PRAVACHOL) 40 MG tablet, TAKE ONE TABLET BY MOUTH EVERY DAY, Disp: 90 tablet, Rfl: 3  •  sildenafil (REVATIO) 20 MG tablet, 2-4 tabs as needed, Disp: 90 tablet, Rfl: 5  •  sildenafil (VIAGRA) 100 MG tablet, Take 1 tablet by mouth Daily As Needed for erectile dysfunction., Disp: 6 tablet, Rfl: 5  •  tiZANidine (ZANAFLEX) 4 MG tablet, Take 1 tablet by mouth Daily., Disp: 90 tablet, Rfl: 3  •  triamcinolone (KENALOG) 0.1 % ointment, Apply  topically 2 (Two) Times a Day As Needed for Irritation or Rash., Disp: 80 g, Rfl: 5  •  urea (CARMOL) 20 % cream, Apply  topically to the appropriate area as directed As Needed for Dry Skin., Disp: , Rfl:   •  amLODIPine (NORVASC) 5 MG tablet, TAKE ONE TABLET BY MOUTH EVERY DAY, Disp: 90 tablet, Rfl: 3        Jimmie was seen today for hyperlipidemia and hypertension.    Diagnoses and all orders for this visit:    Essential hypertension    Other hyperlipidemia    Diastolic dysfunction    Severe obstructive sleep apnea    Peptic ulceration    Gastroesophageal reflux  disease without esophagitis    Psoriasis    Arthritis    Tubular adenoma    Situational depression    Psychophysiological insomnia    Gilbert's disease    Bilateral edema of lower extremity    Abnormal liver function test

## 2019-03-14 ENCOUNTER — HOSPITAL ENCOUNTER (OUTPATIENT)
Dept: PHYSICAL THERAPY | Facility: HOSPITAL | Age: 69
Setting detail: THERAPIES SERIES
Discharge: HOME | End: 2019-03-14

## 2019-03-14 DIAGNOSIS — G89.29 CHRONIC PAIN OF LEFT KNEE: Primary | ICD-10-CM

## 2019-03-14 DIAGNOSIS — M25.562 CHRONIC PAIN OF LEFT KNEE: Primary | ICD-10-CM

## 2019-03-14 PROCEDURE — 97110 THERAPEUTIC EXERCISES: CPT

## 2019-03-14 NOTE — THERAPY TREATMENT NOTE
Outpatient Physical Therapy Ortho Treatment Note  [facility]     Patient Name: Jimmie Salcedo  : 1950  MRN: [id number]  Today's Date: 3/14/2019      Visit Date: 2019    Visit Dx:    ICD-10-CM ICD-9-CM   1. Chronic pain of left knee M25.562 719.46    G89.29 338.29       Patient Active Problem List   Diagnosis   • Acid reflux   • Arthritis   • Hypertension   • Hyperlipidemia   • Severe obstructive sleep apnea   • Psoriasis   • Diastolic dysfunction   • Peptic ulceration   • Bilateral edema of lower extremity   • Abnormal liver function test   • Hyperbilirubinemia   • Psychophysiological insomnia   • Gilbert's disease   • Situational depression   • Tubular adenoma        Past Medical History:   Diagnosis Date   • Abnormal liver function test    • Arthritis    • Bilateral edema of lower extremity    • Cellulitis of leg, right    • Chalazion    • Hypertension    • Kidney infection    • Neck muscle spasm    • Onychomycosis of toenail    • Peptic ulceration    • Renal calculi    • Situational depression 2018        Past Surgical History:   Procedure Laterality Date   • CYSTOSCOPY W/ LASER LITHOTRIPSY     • FINGER SURGERY      left 5th finger   • KNEE SURGERY Right    • OTHER SURGICAL HISTORY      Lithotripsy   • TONSILLECTOMY                         PT Assessment/Plan     Row Name 19 1130          PT Assessment    Assessment Comments  Client is noticed a good improvement in pain after the Orthovisc injections.  He tolerates exercises in the clinic very well.  Knee flexion and weightbearing is limited due to crepitus.  -MM        PT Plan    PT Plan Comments  Client plans to continue exercises on his own and to also look at exercising at a gym.  Client plans to call in 2 weeks to follow-up if needed.  -MM       User Key  (r) = Recorded By, (t) = Taken By, (c) = Cosigned By    Initials Name Provider Type    Dennis Nagy, PT Physical Therapist              Exercises     Row Name  03/14/19 1130             Subjective Comments    Subjective Comments  Client reports his knee has been feeling better.  He reports no pain at the time of treatment today.  He continues to notice crepitus with squatting type activity.  -MM         Subjective Pain    Able to rate subjective pain?  yes  -MM      Pre-Treatment Pain Level  0  -MM      Post-Treatment Pain Level  0  -MM         Total Minutes    18585 - PT Therapeutic Exercise Minutes  25  -MM         Exercise 1    Exercise Name 1  Included exercises in clinical setting per flow sheet.  Updated exercises to include sidestep with band, diagonal step with band.  Also reviewed hamstring and quadricep stretches.  -MM      Cueing 1  Verbal  -MM      Time 1  25  -MM      Additional Comments  Therapeutic exercise  -MM        User Key  (r) = Recorded By, (t) = Taken By, (c) = Cosigned By    Initials Name Provider Type    Dennis Nagy, PT Physical Therapist                                            Time Calculation:   Start Time: 1130  Therapy Suggested Charges     Code   Minutes Charges    32399 (CPT®) Hc Pt Neuromusc Re Education Ea 15 Min      02988 (CPT®) Hc Pt Ther Proc Ea 15 Min 25 2    13141 (CPT®) Hc Gait Training Ea 15 Min      96435 (CPT®) Hc Pt Therapeutic Act Ea 15 Min      43069 (CPT®) Hc Pt Manual Therapy Ea 15 Min      07364 (CPT®) Hc Pt Ther Massage- Per 15 Min      21379 (CPT®) Hc Pt Iontophoresis Ea 15 Min      64400 (CPT®) Hc Pt Elec Stim Ea-Per 15 Min      23504 (CPT®) Hc Pt Ultrasound Ea 15 Min      90637 (CPT®) Hc Pt Self Care/Mgmt/Train Ea 15 Min      38468 (CPT®) Hc Pt Prosthetic (S) Train Initial Encounter, Each 15 Min      62884 (CPT®) Hc Orthotic(S) Mgmt/Train Initial Encounter, Each 15min      88559 (CPT®) Hc Pt Aquatic Therapy Ea 15 Min      43216 (CPT®) Hc Pt Orthotic(S)/Prosthetic(S) Encounter, Each 15 Min       (CPT®) Hc Pt Electrical Stim Unattended      Total  25 2        Therapy Charges for Today     Code Description  Service Date Service Provider Modifiers Qty    [id number]  PT THER PROC EA 15 MIN 3/14/2019 Dennis Kenny, PT GP 2                    Dennis Kenny, PT  3/14/2019

## 2019-04-24 ENCOUNTER — OFFICE VISIT (OUTPATIENT)
Dept: INTERNAL MEDICINE | Facility: CLINIC | Age: 69
End: 2019-04-24

## 2019-04-24 VITALS
DIASTOLIC BLOOD PRESSURE: 80 MMHG | WEIGHT: 204 LBS | HEIGHT: 69 IN | HEART RATE: 64 BPM | BODY MASS INDEX: 30.21 KG/M2 | SYSTOLIC BLOOD PRESSURE: 130 MMHG

## 2019-04-24 DIAGNOSIS — K21.9 GASTROESOPHAGEAL REFLUX DISEASE WITHOUT ESOPHAGITIS: ICD-10-CM

## 2019-04-24 DIAGNOSIS — F43.21 SITUATIONAL DEPRESSION: ICD-10-CM

## 2019-04-24 DIAGNOSIS — R60.0 BILATERAL EDEMA OF LOWER EXTREMITY: ICD-10-CM

## 2019-04-24 DIAGNOSIS — F51.04 PSYCHOPHYSIOLOGICAL INSOMNIA: ICD-10-CM

## 2019-04-24 DIAGNOSIS — K27.9 PEPTIC ULCERATION: ICD-10-CM

## 2019-04-24 DIAGNOSIS — I10 ESSENTIAL HYPERTENSION: Primary | ICD-10-CM

## 2019-04-24 DIAGNOSIS — M19.90 ARTHRITIS: ICD-10-CM

## 2019-04-24 DIAGNOSIS — D36.9 TUBULAR ADENOMA: ICD-10-CM

## 2019-04-24 DIAGNOSIS — I51.89 DIASTOLIC DYSFUNCTION: ICD-10-CM

## 2019-04-24 DIAGNOSIS — E78.49 OTHER HYPERLIPIDEMIA: ICD-10-CM

## 2019-04-24 DIAGNOSIS — E80.4 GILBERT'S DISEASE: ICD-10-CM

## 2019-04-24 DIAGNOSIS — R73.09 ABNORMAL GLUCOSE: ICD-10-CM

## 2019-04-24 LAB
ALBUMIN SERPL-MCNC: 4.5 G/DL (ref 3.5–5.2)
ALBUMIN/GLOB SERPL: 1.6 G/DL
ALP SERPL-CCNC: 57 U/L (ref 39–117)
ALT SERPL W P-5'-P-CCNC: 22 U/L (ref 1–41)
ANION GAP SERPL CALCULATED.3IONS-SCNC: 13.2 MMOL/L
AST SERPL-CCNC: 21 U/L (ref 1–40)
BILIRUB SERPL-MCNC: 1.8 MG/DL (ref 0.2–1.2)
BUN BLD-MCNC: 17 MG/DL (ref 8–23)
BUN/CREAT SERPL: 17.9 (ref 7–25)
CALCIUM SPEC-SCNC: 10.3 MG/DL (ref 8.6–10.5)
CHLORIDE SERPL-SCNC: 103 MMOL/L (ref 98–107)
CO2 SERPL-SCNC: 21.8 MMOL/L (ref 22–29)
CREAT BLD-MCNC: 0.95 MG/DL (ref 0.76–1.27)
DEPRECATED RDW RBC AUTO: 43.4 FL (ref 37–54)
ERYTHROCYTE [DISTWIDTH] IN BLOOD BY AUTOMATED COUNT: 13.6 % (ref 12.3–15.4)
GFR SERPL CREATININE-BSD FRML MDRD: 79 ML/MIN/1.73
GLOBULIN UR ELPH-MCNC: 2.9 GM/DL
GLUCOSE BLD-MCNC: 105 MG/DL (ref 65–99)
HBA1C MFR BLD: 4.88 % (ref 4.8–5.6)
HCT VFR BLD AUTO: 49.1 % (ref 37.5–51)
HGB BLD-MCNC: 16.8 G/DL (ref 13–17.7)
MCH RBC QN AUTO: 30.1 PG (ref 26.6–33)
MCHC RBC AUTO-ENTMCNC: 34.2 G/DL (ref 31.5–35.7)
MCV RBC AUTO: 88 FL (ref 79–97)
PLATELET # BLD AUTO: 131 10*3/MM3 (ref 140–450)
PMV BLD AUTO: 10.7 FL (ref 6–12)
POTASSIUM BLD-SCNC: 3.8 MMOL/L (ref 3.5–5.2)
PROT SERPL-MCNC: 7.4 G/DL (ref 6–8.5)
RBC # BLD AUTO: 5.58 10*6/MM3 (ref 4.14–5.8)
SODIUM BLD-SCNC: 138 MMOL/L (ref 136–145)
TSH SERPL DL<=0.05 MIU/L-ACNC: 0.79 MIU/ML (ref 0.27–4.2)
VIT B12 BLD-MCNC: 793 PG/ML (ref 211–946)
WBC NRBC COR # BLD: 6.66 10*3/MM3 (ref 3.4–10.8)

## 2019-04-24 PROCEDURE — 82607 VITAMIN B-12: CPT | Performed by: INTERNAL MEDICINE

## 2019-04-24 PROCEDURE — 99214 OFFICE O/P EST MOD 30 MIN: CPT | Performed by: INTERNAL MEDICINE

## 2019-04-24 PROCEDURE — 84443 ASSAY THYROID STIM HORMONE: CPT | Performed by: INTERNAL MEDICINE

## 2019-04-24 PROCEDURE — 80053 COMPREHEN METABOLIC PANEL: CPT | Performed by: INTERNAL MEDICINE

## 2019-04-24 PROCEDURE — 83036 HEMOGLOBIN GLYCOSYLATED A1C: CPT | Performed by: INTERNAL MEDICINE

## 2019-04-24 PROCEDURE — 85027 COMPLETE CBC AUTOMATED: CPT | Performed by: INTERNAL MEDICINE

## 2019-04-24 RX ORDER — DULOXETIN HYDROCHLORIDE 30 MG/1
30 CAPSULE, DELAYED RELEASE ORAL DAILY
Qty: 30 CAPSULE | Refills: 5 | Status: SHIPPED | OUTPATIENT
Start: 2019-04-24 | End: 2019-05-08

## 2019-04-24 NOTE — PROGRESS NOTES
Patient is a 68 y.o. male who is here for a follow up of chronic conditions.  Chief Complaint   Patient presents with   • Hyperlipidemia   • Hypertension         HPI:    Here for f/u.  Has not felt well in the past month.  Feels anxious.  Has been on abilify 5 mg and recently increased cymbalta to 60 mg from 30 mg.  Poor concentration.  Not sleeping well.  Has lost 20 pounds since last seen.  Not performing well at work. Motivation is good.      History:    Patient Active Problem List   Diagnosis   • Acid reflux   • Arthritis   • Hypertension   • Hyperlipidemia   • Severe obstructive sleep apnea   • Psoriasis   • Diastolic dysfunction   • Peptic ulceration   • Bilateral edema of lower extremity   • Abnormal liver function test   • Hyperbilirubinemia   • Psychophysiological insomnia   • Gilbert's disease   • Situational depression   • Tubular adenoma       Past Medical History:   Diagnosis Date   • Abnormal liver function test    • Arthritis    • Bilateral edema of lower extremity    • Cellulitis of leg, right    • Chalazion    • Hypertension    • Kidney infection    • Neck muscle spasm    • Onychomycosis of toenail    • Peptic ulceration    • Renal calculi    • Situational depression 8/22/2018       Past Surgical History:   Procedure Laterality Date   • CYSTOSCOPY W/ LASER LITHOTRIPSY     • FINGER SURGERY      left 5th finger   • KNEE SURGERY Right 1986   • OTHER SURGICAL HISTORY      Lithotripsy   • TONSILLECTOMY         Current Outpatient Medications on File Prior to Visit   Medication Sig   • amLODIPine (NORVASC) 5 MG tablet TAKE ONE TABLET BY MOUTH EVERY DAY   • ARIPiprazole (ABILIFY) 5 MG tablet TK 1 T PO HS   • calcium citrate-vitamin d (CITRACAL) 200-250 MG-UNIT tablet tablet Take  by mouth Daily.   • CloNIDine (CATAPRES) 0.1 MG tablet Take 1 tablet by mouth 2 (Two) Times a Day.   • cyclobenzaprine (FLEXERIL) 10 MG tablet Take 1 tablet by mouth 3 (Three) Times a Day.   • doxazosin (CARDURA) 4 MG tablet TAKE  ONE TABLET BY MOUTH EVERY NIGHT   • fenofibrate (TRICOR) 145 MG tablet TAKE ONE TABLET BY MOUTH EVERY DAY   • furosemide (LASIX) 40 MG tablet TAKE ONE TABLET BY MOUTH EVERY DAY   • gabapentin (NEURONTIN) 300 MG capsule Take 300 mg by mouth Every Night.   • irbesartan (AVAPRO) 300 MG tablet Take 300 mg by mouth Daily.   • meloxicam (MOBIC) 15 MG tablet 1 PO Daily with food.   • mupirocin (BACTROBAN) 2 % ointment Apply  topically 3 (Three) Times a Day.   • Omega-3 Fatty Acids (FISH OIL) 1000 MG capsule capsule Take  by mouth Daily With Breakfast.   • Ped Multivitamins-Fl-Iron (MULTIVITAMIN WITH FLUORIDE/IRON) 0.25-10 MG/ML solution solution Take  by mouth Daily.   • potassium chloride (K-DUR,KLOR-CON) 20 MEQ CR tablet daily.   • pravastatin (PRAVACHOL) 40 MG tablet TAKE ONE TABLET BY MOUTH EVERY DAY   • sildenafil (REVATIO) 20 MG tablet 2-4 tabs as needed   • sildenafil (VIAGRA) 100 MG tablet Take 1 tablet by mouth Daily As Needed for erectile dysfunction.   • tiZANidine (ZANAFLEX) 4 MG tablet Take 1 tablet by mouth Daily.   • triamcinolone (KENALOG) 0.1 % ointment Apply  topically 2 (Two) Times a Day As Needed for Irritation or Rash.   • urea (CARMOL) 20 % cream Apply  topically to the appropriate area as directed As Needed for Dry Skin.   • [DISCONTINUED] DULoxetine (CYMBALTA) 30 MG capsule TAKE 1 CAPSULE BY MOUTH DAILY (Patient taking differently: Take 60 mg by mouth Daily.)     No current facility-administered medications on file prior to visit.        Family History   Problem Relation Age of Onset   • Arthritis Other    • Hypertension Other    • Hyperlipidemia Other    • Heart attack Other    • Hypertension Mother    • Heart disease Father    • Hypertension Father        Social History     Socioeconomic History   • Marital status:      Spouse name: Not on file   • Number of children: Not on file   • Years of education: Not on file   • Highest education level: Not on file   Tobacco Use   • Smoking status:  "Never Smoker   • Smokeless tobacco: Never Used   Substance and Sexual Activity   • Alcohol use: Yes     Alcohol/week: 2.4 oz     Types: 4 Cans of beer per week   • Drug use: No   • Sexual activity: Defer         Review of Systems   Constitutional: Negative for chills, fatigue, fever and unexpected weight change.   HENT: Negative for ear pain, hearing loss, rhinorrhea, sinus pressure, sore throat and trouble swallowing.    Eyes: Negative for discharge and itching.   Respiratory: Negative for cough and chest tightness.    Cardiovascular: Positive for leg swelling (on right leg). Negative for chest pain and palpitations.   Gastrointestinal: Negative for abdominal pain, blood in stool, constipation, diarrhea and vomiting.        2013 colonoscopy normal  9/18 colonoscopy with TA times 1, repeat in 9/21   Endocrine: Negative for polydipsia and polyuria.   Genitourinary: Negative for difficulty urinating, dysuria, enuresis, frequency, hematuria and urgency.        1/19 psa  ED   Musculoskeletal: Positive for arthralgias and neck pain. Negative for back pain, gait problem and joint swelling.   Skin: Negative for rash and wound.        Dry skin   Allergic/Immunologic: Negative for immunocompromised state.   Neurological: Negative for dizziness, syncope, weakness, light-headedness, numbness and headaches.   Hematological: Does not bruise/bleed easily.   Psychiatric/Behavioral: Positive for behavioral problems and decreased concentration. Negative for dysphoric mood and sleep disturbance. The patient is nervous/anxious.        /80   Pulse 64   Ht 175.3 cm (69.02\")   Wt 92.5 kg (204 lb)   BMI 30.11 kg/m²       Physical Exam   Constitutional: He is oriented to person, place, and time. He appears well-developed and well-nourished.   HENT:   Head: Normocephalic and atraumatic.   Right Ear: External ear normal.   Left Ear: External ear normal.   Mouth/Throat: Oropharynx is clear and moist.   Eyes: Conjunctivae and EOM are " normal. Pupils are equal, round, and reactive to light.   Neck: Normal range of motion. Neck supple.   Cardiovascular: Normal rate, regular rhythm, normal heart sounds and intact distal pulses.   Pulmonary/Chest: Effort normal and breath sounds normal.   Abdominal: Soft. Bowel sounds are normal.   Musculoskeletal: Normal range of motion. He exhibits edema (trace R>L).   Neurological: He is alert and oriented to person, place, and time. He has normal reflexes.   Skin: Skin is warm and dry.   Dry skin   Psychiatric: He has a normal mood and affect. His behavior is normal. Judgment and thought content normal.   Vitals reviewed.      Procedure:      Discussion/Summary:    htn-change clonidine to 1/2 bid  hyperlipidemia-cont pravachol/fibrate, labs noted  BPH-cont cardura  GERD-cont ppi  djd-advised to limit nsaids  neck spasm-zanaflex prn and can attempt to wean off  edema-cont lasix  Hyperbilirubinemia-recheck  Thrombocytopenia-recheck  ED-viagra rx  ?[place]-Hem noted  Situational depression-reduce cymbalta and will f/u with Behavioral health  high risk meds-labs today      labs noted and dw patient, counseled on low carb and low chol    Off work for 2 weeks        Current Outpatient Medications:   •  amLODIPine (NORVASC) 5 MG tablet, TAKE ONE TABLET BY MOUTH EVERY DAY, Disp: 90 tablet, Rfl: 3  •  ARIPiprazole (ABILIFY) 5 MG tablet, TK 1 T PO HS, Disp: , Rfl: 2  •  calcium citrate-vitamin d (CITRACAL) 200-250 MG-UNIT tablet tablet, Take  by mouth Daily., Disp: , Rfl:   •  CloNIDine (CATAPRES) 0.1 MG tablet, Take 1 tablet by mouth 2 (Two) Times a Day., Disp: 60 tablet, Rfl: 5  •  cyclobenzaprine (FLEXERIL) 10 MG tablet, Take 1 tablet by mouth 3 (Three) Times a Day., Disp: 12 tablet, Rfl: 0  •  doxazosin (CARDURA) 4 MG tablet, TAKE ONE TABLET BY MOUTH EVERY NIGHT, Disp: 90 tablet, Rfl: 3  •  DULoxetine (CYMBALTA) 30 MG capsule, Take 1 capsule by mouth Daily., Disp: 30 capsule, Rfl: 5  •  fenofibrate (TRICOR) 145 MG  tablet, TAKE ONE TABLET BY MOUTH EVERY DAY, Disp: 90 tablet, Rfl: 2  •  furosemide (LASIX) 40 MG tablet, TAKE ONE TABLET BY MOUTH EVERY DAY, Disp: 90 tablet, Rfl: 2  •  gabapentin (NEURONTIN) 300 MG capsule, Take 300 mg by mouth Every Night., Disp: , Rfl:   •  irbesartan (AVAPRO) 300 MG tablet, Take 300 mg by mouth Daily., Disp: , Rfl: 5  •  meloxicam (MOBIC) 15 MG tablet, 1 PO Daily with food., Disp: 60 tablet, Rfl: 2  •  mupirocin (BACTROBAN) 2 % ointment, Apply  topically 3 (Three) Times a Day., Disp: 22 g, Rfl: 0  •  Omega-3 Fatty Acids (FISH OIL) 1000 MG capsule capsule, Take  by mouth Daily With Breakfast., Disp: , Rfl:   •  Ped Multivitamins-Fl-Iron (MULTIVITAMIN WITH FLUORIDE/IRON) 0.25-10 MG/ML solution solution, Take  by mouth Daily., Disp: , Rfl:   •  potassium chloride (K-DUR,KLOR-CON) 20 MEQ CR tablet, daily., Disp: , Rfl:   •  pravastatin (PRAVACHOL) 40 MG tablet, TAKE ONE TABLET BY MOUTH EVERY DAY, Disp: 90 tablet, Rfl: 3  •  sildenafil (REVATIO) 20 MG tablet, 2-4 tabs as needed, Disp: 90 tablet, Rfl: 5  •  sildenafil (VIAGRA) 100 MG tablet, Take 1 tablet by mouth Daily As Needed for erectile dysfunction., Disp: 6 tablet, Rfl: 5  •  tiZANidine (ZANAFLEX) 4 MG tablet, Take 1 tablet by mouth Daily., Disp: 90 tablet, Rfl: 3  •  triamcinolone (KENALOG) 0.1 % ointment, Apply  topically 2 (Two) Times a Day As Needed for Irritation or Rash., Disp: 80 g, Rfl: 5  •  urea (CARMOL) 20 % cream, Apply  topically to the appropriate area as directed As Needed for Dry Skin., Disp: , Rfl:         Jimmie was seen today for hyperlipidemia and hypertension.    Diagnoses and all orders for this visit:    Essential hypertension  -     CBC (No Diff)    Other hyperlipidemia  -     Comprehensive Metabolic Panel  -     TSH    Diastolic dysfunction    Peptic ulceration    Gastroesophageal reflux disease without esophagitis    Arthritis    Tubular adenoma    Situational depression  -     DULoxetine (CYMBALTA) 30 MG capsule; Take  1 capsule by mouth Daily.  -     Vitamin B12    Psychophysiological insomnia    Gilbert's disease    Bilateral edema of lower extremity    Abnormal glucose  -     Hemoglobin A1c

## 2019-05-08 ENCOUNTER — OFFICE VISIT (OUTPATIENT)
Dept: INTERNAL MEDICINE | Facility: CLINIC | Age: 69
End: 2019-05-08

## 2019-05-08 VITALS
DIASTOLIC BLOOD PRESSURE: 80 MMHG | BODY MASS INDEX: 29.03 KG/M2 | HEART RATE: 68 BPM | WEIGHT: 196 LBS | HEIGHT: 69 IN | SYSTOLIC BLOOD PRESSURE: 154 MMHG

## 2019-05-08 DIAGNOSIS — G47.33 SEVERE OBSTRUCTIVE SLEEP APNEA: ICD-10-CM

## 2019-05-08 DIAGNOSIS — I10 ESSENTIAL HYPERTENSION: Primary | ICD-10-CM

## 2019-05-08 DIAGNOSIS — K21.9 GASTROESOPHAGEAL REFLUX DISEASE WITHOUT ESOPHAGITIS: ICD-10-CM

## 2019-05-08 DIAGNOSIS — L40.9 PSORIASIS: ICD-10-CM

## 2019-05-08 DIAGNOSIS — J30.1 SEASONAL ALLERGIC RHINITIS DUE TO POLLEN: ICD-10-CM

## 2019-05-08 DIAGNOSIS — D36.9 TUBULAR ADENOMA: ICD-10-CM

## 2019-05-08 DIAGNOSIS — E78.49 OTHER HYPERLIPIDEMIA: ICD-10-CM

## 2019-05-08 DIAGNOSIS — F51.04 PSYCHOPHYSIOLOGICAL INSOMNIA: ICD-10-CM

## 2019-05-08 DIAGNOSIS — I51.89 DIASTOLIC DYSFUNCTION: ICD-10-CM

## 2019-05-08 DIAGNOSIS — F43.21 SITUATIONAL DEPRESSION: ICD-10-CM

## 2019-05-08 DIAGNOSIS — K27.9 PEPTIC ULCERATION: ICD-10-CM

## 2019-05-08 DIAGNOSIS — M19.90 ARTHRITIS: ICD-10-CM

## 2019-05-08 PROCEDURE — 99214 OFFICE O/P EST MOD 30 MIN: CPT | Performed by: INTERNAL MEDICINE

## 2019-05-08 NOTE — PROGRESS NOTES
Patient is a 68 y.o. male who is here for a follow up of chronic conditions.  Chief Complaint   Patient presents with   • Hypertension         HPI:    Here for f/u.  Patient recently seen in psych jacobson at [facility].  His abilify was increased to 10 mg.  Having problems constipation.  Last BM was Sunday.  No nausea or emesis.  Still very anxious.  Still depressed.  Will be seeing Dr Lezama this Friday.    Has a sore throat.  Some cough with clear phlegm.  No fever or chills.      History:    Patient Active Problem List   Diagnosis   • Acid reflux   • Arthritis   • Hypertension   • Hyperlipidemia   • Severe obstructive sleep apnea   • Psoriasis   • Diastolic dysfunction   • Peptic ulceration   • Bilateral edema of lower extremity   • Abnormal liver function test   • Hyperbilirubinemia   • Psychophysiological insomnia   • Gilbert's disease   • Situational depression   • Tubular adenoma   • Seasonal allergic rhinitis due to pollen       Past Medical History:   Diagnosis Date   • Abnormal liver function test    • Arthritis    • Bilateral edema of lower extremity    • Cellulitis of leg, right    • Chalazion    • Hypertension    • Kidney infection    • Neck muscle spasm    • Onychomycosis of toenail    • Peptic ulceration    • Renal calculi    • Situational depression 8/22/2018       Past Surgical History:   Procedure Laterality Date   • CYSTOSCOPY W/ LASER LITHOTRIPSY     • FINGER SURGERY      left 5th finger   • KNEE SURGERY Right 1986   • OTHER SURGICAL HISTORY      Lithotripsy   • TONSILLECTOMY         Current Outpatient Medications on File Prior to Visit   Medication Sig   • amLODIPine (NORVASC) 5 MG tablet TAKE ONE TABLET BY MOUTH EVERY DAY   • ARIPiprazole (ABILIFY) 10 MG tablet TK 1 T PO HS   • calcium citrate-vitamin d (CITRACAL) 200-250 MG-UNIT tablet tablet Take  by mouth Daily.   • CloNIDine (CATAPRES) 0.1 MG tablet Take 1 tablet by mouth 2 (Two) Times a Day.   • cyclobenzaprine (FLEXERIL) 10 MG tablet Take 1 tablet  by mouth 3 (Three) Times a Day.   • doxazosin (CARDURA) 4 MG tablet TAKE ONE TABLET BY MOUTH EVERY NIGHT   • fenofibrate (TRICOR) 145 MG tablet TAKE ONE TABLET BY MOUTH EVERY DAY   • furosemide (LASIX) 40 MG tablet TAKE ONE TABLET BY MOUTH EVERY DAY   • gabapentin (NEURONTIN) 300 MG capsule Take 300 mg by mouth Every Night.   • irbesartan (AVAPRO) 300 MG tablet Take 300 mg by mouth Daily.   • meloxicam (MOBIC) 15 MG tablet 1 PO Daily with food.   • mupirocin (BACTROBAN) 2 % ointment Apply  topically 3 (Three) Times a Day.   • Omega-3 Fatty Acids (FISH OIL) 1000 MG capsule capsule Take  by mouth Daily With Breakfast.   • Ped Multivitamins-Fl-Iron (MULTIVITAMIN WITH FLUORIDE/IRON) 0.25-10 MG/ML solution solution Take  by mouth Daily.   • potassium chloride (K-DUR,KLOR-CON) 20 MEQ CR tablet daily.   • pravastatin (PRAVACHOL) 40 MG tablet TAKE ONE TABLET BY MOUTH EVERY DAY   • sildenafil (REVATIO) 20 MG tablet 2-4 tabs as needed   • sildenafil (VIAGRA) 100 MG tablet Take 1 tablet by mouth Daily As Needed for erectile dysfunction.   • tiZANidine (ZANAFLEX) 4 MG tablet Take 1 tablet by mouth Daily.   • triamcinolone (KENALOG) 0.1 % ointment Apply  topically 2 (Two) Times a Day As Needed for Irritation or Rash.   • urea (CARMOL) 20 % cream Apply  topically to the appropriate area as directed As Needed for Dry Skin.   • [DISCONTINUED] DULoxetine (CYMBALTA) 30 MG capsule Take 1 capsule by mouth Daily.     No current facility-administered medications on file prior to visit.        Family History   Problem Relation Age of Onset   • Arthritis Other    • Hypertension Other    • Hyperlipidemia Other    • Heart attack Other    • Hypertension Mother    • Heart disease Father    • Hypertension Father        Social History     Socioeconomic History   • Marital status:      Spouse name: Not on file   • Number of children: Not on file   • Years of education: Not on file   • Highest education level: Not on file   Tobacco Use   •  "Smoking status: Never Smoker   • Smokeless tobacco: Never Used   Substance and Sexual Activity   • Alcohol use: Yes     Alcohol/week: 2.4 oz     Types: 4 Cans of beer per week   • Drug use: No   • Sexual activity: Defer         Review of Systems   Constitutional: Negative for chills, fatigue, fever and unexpected weight change.   HENT: Positive for voice change. Negative for ear pain, hearing loss, rhinorrhea, sinus pressure, sore throat and trouble swallowing.    Eyes: Negative for discharge and itching.   Respiratory: Negative for cough and chest tightness.    Cardiovascular: Positive for leg swelling (on right leg). Negative for chest pain and palpitations.   Gastrointestinal: Negative for abdominal pain, blood in stool, constipation, diarrhea and vomiting.        2013 colonoscopy normal  9/18 colonoscopy with TA times 1, repeat in 9/21   Endocrine: Negative for polydipsia and polyuria.   Genitourinary: Negative for difficulty urinating, dysuria, enuresis, frequency, hematuria and urgency.        1/19 psa  ED   Musculoskeletal: Positive for arthralgias and neck pain. Negative for back pain, gait problem and joint swelling.   Skin: Negative for rash and wound.        Dry skin   Allergic/Immunologic: Negative for immunocompromised state.   Neurological: Negative for dizziness, syncope, weakness, light-headedness, numbness and headaches.   Hematological: Does not bruise/bleed easily.   Psychiatric/Behavioral: Positive for behavioral problems and decreased concentration. Negative for dysphoric mood and sleep disturbance. The patient is nervous/anxious.        /80   Pulse 68   Ht 175.3 cm (69.02\")   Wt 88.9 kg (196 lb)   BMI 28.93 kg/m²       Physical Exam   Constitutional: He is oriented to person, place, and time. He appears well-developed and well-nourished.   HENT:   Head: Normocephalic and atraumatic.   Right Ear: External ear normal.   Left Ear: External ear normal.   Mouth/Throat: Oropharynx is clear " and moist.   Eyes: Conjunctivae and EOM are normal. Pupils are equal, round, and reactive to light.   Neck: Normal range of motion. Neck supple.   Cardiovascular: Normal rate, regular rhythm, normal heart sounds and intact distal pulses.   Pulmonary/Chest: Effort normal and breath sounds normal.   Abdominal: Soft. Bowel sounds are normal.   Musculoskeletal: Normal range of motion. He exhibits edema (trace R>L).   Neurological: He is alert and oriented to person, place, and time. He has normal reflexes.   Skin: Skin is warm and dry.   Dry skin   Psychiatric: He has a normal mood and affect. His behavior is normal. Judgment and thought content normal.   Vitals reviewed.      Procedure:      Discussion/Summary:    htn-change clonidine to 1 bid  hyperlipidemia-cont pravachol/fibrate, labs noted  BPH-cont cardura  GERD-cont ppi  djd-advised to limit nsaids  neck spasm-zanaflex prn and can attempt to wean off  edema-cont lasix  Hyperbilirubinemia-recheck noted  Thrombocytopenia-recheck noted  ED-viagra rx  ?[place]-Hem noted  Situational depression-f/u with Behavioral health  Rhinitis-flonase otc  Constipation-samples of linzess  high risk meds-labs noted      labs noted and dw patient, counseled on low carb and low chol             Current Outpatient Medications:   •  amLODIPine (NORVASC) 5 MG tablet, TAKE ONE TABLET BY MOUTH EVERY DAY, Disp: 90 tablet, Rfl: 3  •  ARIPiprazole (ABILIFY) 10 MG tablet, TK 1 T PO HS, Disp: , Rfl: 2  •  calcium citrate-vitamin d (CITRACAL) 200-250 MG-UNIT tablet tablet, Take  by mouth Daily., Disp: , Rfl:   •  CloNIDine (CATAPRES) 0.1 MG tablet, Take 1 tablet by mouth 2 (Two) Times a Day., Disp: 60 tablet, Rfl: 5  •  cyclobenzaprine (FLEXERIL) 10 MG tablet, Take 1 tablet by mouth 3 (Three) Times a Day., Disp: 12 tablet, Rfl: 0  •  doxazosin (CARDURA) 4 MG tablet, TAKE ONE TABLET BY MOUTH EVERY NIGHT, Disp: 90 tablet, Rfl: 3  •  fenofibrate (TRICOR) 145 MG tablet, TAKE ONE TABLET BY MOUTH  EVERY DAY, Disp: 90 tablet, Rfl: 2  •  furosemide (LASIX) 40 MG tablet, TAKE ONE TABLET BY MOUTH EVERY DAY, Disp: 90 tablet, Rfl: 2  •  gabapentin (NEURONTIN) 300 MG capsule, Take 300 mg by mouth Every Night., Disp: , Rfl:   •  irbesartan (AVAPRO) 300 MG tablet, Take 300 mg by mouth Daily., Disp: , Rfl: 5  •  meloxicam (MOBIC) 15 MG tablet, 1 PO Daily with food., Disp: 60 tablet, Rfl: 2  •  mupirocin (BACTROBAN) 2 % ointment, Apply  topically 3 (Three) Times a Day., Disp: 22 g, Rfl: 0  •  Omega-3 Fatty Acids (FISH OIL) 1000 MG capsule capsule, Take  by mouth Daily With Breakfast., Disp: , Rfl:   •  Ped Multivitamins-Fl-Iron (MULTIVITAMIN WITH FLUORIDE/IRON) 0.25-10 MG/ML solution solution, Take  by mouth Daily., Disp: , Rfl:   •  potassium chloride (K-DUR,KLOR-CON) 20 MEQ CR tablet, daily., Disp: , Rfl:   •  pravastatin (PRAVACHOL) 40 MG tablet, TAKE ONE TABLET BY MOUTH EVERY DAY, Disp: 90 tablet, Rfl: 3  •  sildenafil (REVATIO) 20 MG tablet, 2-4 tabs as needed, Disp: 90 tablet, Rfl: 5  •  sildenafil (VIAGRA) 100 MG tablet, Take 1 tablet by mouth Daily As Needed for erectile dysfunction., Disp: 6 tablet, Rfl: 5  •  tiZANidine (ZANAFLEX) 4 MG tablet, Take 1 tablet by mouth Daily., Disp: 90 tablet, Rfl: 3  •  triamcinolone (KENALOG) 0.1 % ointment, Apply  topically 2 (Two) Times a Day As Needed for Irritation or Rash., Disp: 80 g, Rfl: 5  •  urea (CARMOL) 20 % cream, Apply  topically to the appropriate area as directed As Needed for Dry Skin., Disp: , Rfl:   •  sertraline (ZOLOFT) 50 MG tablet, Take 1 tablet by mouth Every Morning., Disp: 30 tablet, Rfl: 2        Jimmie was seen today for hypertension.    Diagnoses and all orders for this visit:    Essential hypertension    Other hyperlipidemia    Diastolic dysfunction    Severe obstructive sleep apnea    Peptic ulceration    Gastroesophageal reflux disease without esophagitis    Psoriasis    Arthritis    Tubular adenoma    Situational depression  -     sertraline  (ZOLOFT) 50 MG tablet; Take 1 tablet by mouth Every Morning.    Psychophysiological insomnia    Seasonal allergic rhinitis due to pollen

## 2019-06-19 ENCOUNTER — TELEPHONE (OUTPATIENT)
Dept: INTERNAL MEDICINE | Facility: CLINIC | Age: 69
End: 2019-06-19

## 2019-06-19 DIAGNOSIS — G24.01 TARDIVE DYSKINESIA: Primary | ICD-10-CM

## 2019-06-19 NOTE — TELEPHONE ENCOUNTER
Pt wife called and left me a VM stating she would like a referral for her  to go to Brittany James MD at . I'm pretty sure she meant Brittany Leos MD - Neurology.    Please advise,    Thank you     I think she is no longer seeing new patients, can you ask what the issue is

## 2019-07-08 RX ORDER — FUROSEMIDE 40 MG/1
TABLET ORAL
Qty: 90 TABLET | Refills: 2 | Status: SHIPPED | OUTPATIENT
Start: 2019-07-08 | End: 2019-11-05

## 2019-08-08 ENCOUNTER — OFFICE VISIT (OUTPATIENT)
Dept: INTERNAL MEDICINE | Facility: CLINIC | Age: 69
End: 2019-08-08

## 2019-08-08 VITALS
SYSTOLIC BLOOD PRESSURE: 120 MMHG | WEIGHT: 177 LBS | HEIGHT: 69 IN | HEART RATE: 64 BPM | BODY MASS INDEX: 26.22 KG/M2 | DIASTOLIC BLOOD PRESSURE: 60 MMHG

## 2019-08-08 DIAGNOSIS — F43.21 SITUATIONAL DEPRESSION: ICD-10-CM

## 2019-08-08 DIAGNOSIS — G47.33 SEVERE OBSTRUCTIVE SLEEP APNEA: ICD-10-CM

## 2019-08-08 DIAGNOSIS — J30.1 SEASONAL ALLERGIC RHINITIS DUE TO POLLEN: ICD-10-CM

## 2019-08-08 DIAGNOSIS — R60.0 BILATERAL EDEMA OF LOWER EXTREMITY: ICD-10-CM

## 2019-08-08 DIAGNOSIS — D36.9 TUBULAR ADENOMA: ICD-10-CM

## 2019-08-08 DIAGNOSIS — I10 ESSENTIAL HYPERTENSION: Primary | ICD-10-CM

## 2019-08-08 DIAGNOSIS — M19.90 ARTHRITIS: ICD-10-CM

## 2019-08-08 DIAGNOSIS — K21.9 GASTROESOPHAGEAL REFLUX DISEASE WITHOUT ESOPHAGITIS: ICD-10-CM

## 2019-08-08 DIAGNOSIS — E78.49 OTHER HYPERLIPIDEMIA: ICD-10-CM

## 2019-08-08 DIAGNOSIS — I51.89 DIASTOLIC DYSFUNCTION: ICD-10-CM

## 2019-08-08 DIAGNOSIS — K27.9 PEPTIC ULCERATION: ICD-10-CM

## 2019-08-08 LAB
ALBUMIN SERPL-MCNC: 4.7 G/DL (ref 3.5–5.2)
ALBUMIN/GLOB SERPL: 1.8 G/DL
ALP SERPL-CCNC: 51 U/L (ref 39–117)
ALT SERPL W P-5'-P-CCNC: 20 U/L (ref 1–41)
ANION GAP SERPL CALCULATED.3IONS-SCNC: 10.5 MMOL/L (ref 5–15)
AST SERPL-CCNC: 22 U/L (ref 1–40)
BILIRUB SERPL-MCNC: 1.3 MG/DL (ref 0.2–1.2)
BUN BLD-MCNC: 22 MG/DL (ref 8–23)
BUN/CREAT SERPL: 22.4 (ref 7–25)
CALCIUM SPEC-SCNC: 10.1 MG/DL (ref 8.6–10.5)
CHLORIDE SERPL-SCNC: 104 MMOL/L (ref 98–107)
CHOLEST SERPL-MCNC: 146 MG/DL (ref 0–200)
CO2 SERPL-SCNC: 25.5 MMOL/L (ref 22–29)
CREAT BLD-MCNC: 0.98 MG/DL (ref 0.76–1.27)
DEPRECATED RDW RBC AUTO: 49.5 FL (ref 37–54)
ERYTHROCYTE [DISTWIDTH] IN BLOOD BY AUTOMATED COUNT: 14.4 % (ref 12.3–15.4)
GFR SERPL CREATININE-BSD FRML MDRD: 77 ML/MIN/1.73
GLOBULIN UR ELPH-MCNC: 2.6 GM/DL
GLUCOSE BLD-MCNC: 104 MG/DL (ref 65–99)
HCT VFR BLD AUTO: 49.4 % (ref 37.5–51)
HDLC SERPL-MCNC: 43 MG/DL (ref 40–60)
HGB BLD-MCNC: 16.3 G/DL (ref 13–17.7)
LDLC SERPL CALC-MCNC: 77 MG/DL (ref 0–100)
LDLC/HDLC SERPL: 1.78 {RATIO}
MCH RBC QN AUTO: 30.7 PG (ref 26.6–33)
MCHC RBC AUTO-ENTMCNC: 33 G/DL (ref 31.5–35.7)
MCV RBC AUTO: 93 FL (ref 79–97)
PLATELET # BLD AUTO: 134 10*3/MM3 (ref 140–450)
PMV BLD AUTO: 10.8 FL (ref 6–12)
POTASSIUM BLD-SCNC: 3.8 MMOL/L (ref 3.5–5.2)
PROT SERPL-MCNC: 7.3 G/DL (ref 6–8.5)
RBC # BLD AUTO: 5.31 10*6/MM3 (ref 4.14–5.8)
SODIUM BLD-SCNC: 140 MMOL/L (ref 136–145)
TRIGL SERPL-MCNC: 132 MG/DL (ref 0–150)
TSH SERPL DL<=0.05 MIU/L-ACNC: 0.9 MIU/ML (ref 0.27–4.2)
VIT B12 BLD-MCNC: 676 PG/ML (ref 211–946)
VLDLC SERPL-MCNC: 26.4 MG/DL (ref 5–40)
WBC NRBC COR # BLD: 5.42 10*3/MM3 (ref 3.4–10.8)

## 2019-08-08 PROCEDURE — 82607 VITAMIN B-12: CPT | Performed by: INTERNAL MEDICINE

## 2019-08-08 PROCEDURE — 84443 ASSAY THYROID STIM HORMONE: CPT | Performed by: INTERNAL MEDICINE

## 2019-08-08 PROCEDURE — 80061 LIPID PANEL: CPT | Performed by: INTERNAL MEDICINE

## 2019-08-08 PROCEDURE — 80053 COMPREHEN METABOLIC PANEL: CPT | Performed by: INTERNAL MEDICINE

## 2019-08-08 PROCEDURE — 85027 COMPLETE CBC AUTOMATED: CPT | Performed by: INTERNAL MEDICINE

## 2019-08-08 PROCEDURE — 99214 OFFICE O/P EST MOD 30 MIN: CPT | Performed by: INTERNAL MEDICINE

## 2019-08-08 RX ORDER — MIRTAZAPINE 15 MG/1
15 TABLET, FILM COATED ORAL NIGHTLY
Qty: 30 TABLET | Refills: 5 | Status: SHIPPED | OUTPATIENT
Start: 2019-08-08 | End: 2020-03-02

## 2019-08-08 RX ORDER — DIAZEPAM 5 MG/1
2.5 TABLET ORAL 2 TIMES DAILY PRN
COMMUNITY
End: 2019-12-13

## 2019-08-08 NOTE — PROGRESS NOTES
Patient is a 68 y.o. male who is here to followup on chronic conditions.  Chief Complaint   Patient presents with   • Hyperlipidemia   • Hypertension         HPI:    Here for f/u.  Has been on a multitude of meds.  Feels depressed and anxious.  Poor appetite.  Feels like his mind racing.  No SI.  Trouble concentrating.  Sleeping ok at night.  Appetite is poor.  Poor motivation.  No dizziness or lightheadedness.      History:     Patient Active Problem List   Diagnosis   • Acid reflux   • Arthritis   • Hypertension   • Hyperlipidemia   • Severe obstructive sleep apnea   • Psoriasis   • Diastolic dysfunction   • Peptic ulceration   • Bilateral edema of lower extremity   • Abnormal liver function test   • Hyperbilirubinemia   • Psychophysiological insomnia   • Gilbert's disease   • Situational depression   • Tubular adenoma   • Seasonal allergic rhinitis due to pollen       Past Medical History:   Diagnosis Date   • Abnormal liver function test    • Arthritis    • Bilateral edema of lower extremity    • Cellulitis of leg, right    • Chalazion    • Hypertension    • Kidney infection    • Neck muscle spasm    • Onychomycosis of toenail    • Peptic ulceration    • Renal calculi    • Situational depression 8/22/2018       Past Surgical History:   Procedure Laterality Date   • CYSTOSCOPY W/ LASER LITHOTRIPSY     • FINGER SURGERY      left 5th finger   • KNEE SURGERY Right 1986   • OTHER SURGICAL HISTORY      Lithotripsy   • TONSILLECTOMY         Current Outpatient Medications on File Prior to Visit   Medication Sig   • amLODIPine (NORVASC) 5 MG tablet TAKE ONE TABLET BY MOUTH EVERY DAY   • calcium citrate-vitamin d (CITRACAL) 200-250 MG-UNIT tablet tablet Take  by mouth Daily.   • diazePAM (VALIUM) 5 MG tablet Take 2.5 mg by mouth 2 (Two) Times a Day As Needed for Anxiety.   • doxazosin (CARDURA) 4 MG tablet TAKE ONE TABLET BY MOUTH EVERY NIGHT   • fenofibrate (TRICOR) 145 MG tablet TAKE ONE TABLET BY MOUTH EVERY DAY   •  furosemide (LASIX) 40 MG tablet TAKE ONE TABLET BY MOUTH EVERY DAY   • irbesartan (AVAPRO) 300 MG tablet Take 300 mg by mouth Daily.   • Omega-3 Fatty Acids (FISH OIL) 1000 MG capsule capsule Take  by mouth Daily With Breakfast.   • Ped Multivitamins-Fl-Iron (MULTIVITAMIN WITH FLUORIDE/IRON) 0.25-10 MG/ML solution solution Take  by mouth Daily.   • potassium chloride (K-DUR,KLOR-CON) 20 MEQ CR tablet daily.   • pravastatin (PRAVACHOL) 40 MG tablet TAKE ONE TABLET BY MOUTH EVERY DAY   • triamcinolone (KENALOG) 0.1 % ointment Apply  topically 2 (Two) Times a Day As Needed for Irritation or Rash.   • urea (CARMOL) 20 % cream Apply  topically to the appropriate area as directed As Needed for Dry Skin.   • CloNIDine (CATAPRES) 0.1 MG tablet Take 1 tablet by mouth 2 (Two) Times a Day.   • sildenafil (REVATIO) 20 MG tablet 2-4 tabs as needed   • sildenafil (VIAGRA) 100 MG tablet Take 1 tablet by mouth Daily As Needed for erectile dysfunction.   • [DISCONTINUED] ARIPiprazole (ABILIFY) 10 MG tablet TK 1 T PO HS   • [DISCONTINUED] cyclobenzaprine (FLEXERIL) 10 MG tablet Take 1 tablet by mouth 3 (Three) Times a Day.   • [DISCONTINUED] gabapentin (NEURONTIN) 300 MG capsule Take 300 mg by mouth Every Night.   • [DISCONTINUED] meloxicam (MOBIC) 15 MG tablet 1 PO Daily with food.   • [DISCONTINUED] mupirocin (BACTROBAN) 2 % ointment Apply  topically 3 (Three) Times a Day.   • [DISCONTINUED] sertraline (ZOLOFT) 50 MG tablet Take 1 tablet by mouth Every Morning.   • [DISCONTINUED] tiZANidine (ZANAFLEX) 4 MG tablet Take 1 tablet by mouth Daily.     No current facility-administered medications on file prior to visit.        Family History   Problem Relation Age of Onset   • Arthritis Other    • Hypertension Other    • Hyperlipidemia Other    • Heart attack Other    • Hypertension Mother    • Heart disease Father    • Hypertension Father        Social History     Socioeconomic History   • Marital status:      Spouse name: Not on  "file   • Number of children: Not on file   • Years of education: Not on file   • Highest education level: Not on file   Tobacco Use   • Smoking status: Never Smoker   • Smokeless tobacco: Never Used   Substance and Sexual Activity   • Alcohol use: Yes     Alcohol/week: 2.4 oz     Types: 4 Cans of beer per week   • Drug use: No   • Sexual activity: Defer         Review of Systems   Constitutional: Positive for appetite change and fatigue. Negative for chills, fever and unexpected weight change.   HENT: Negative for ear pain, hearing loss, rhinorrhea, sinus pressure, sore throat and trouble swallowing.    Eyes: Negative for discharge and itching.   Respiratory: Negative for cough and chest tightness.    Cardiovascular: Negative for chest pain and palpitations.   Gastrointestinal: Positive for constipation. Negative for abdominal pain, blood in stool, diarrhea and vomiting.        2013 colonoscopy normal  9/18 colonoscopy with TA times 1, repeat in 9/21   Endocrine: Negative for polydipsia and polyuria.   Genitourinary: Negative for difficulty urinating, dysuria, enuresis, frequency, hematuria and urgency.        1/19 psa  ED   Musculoskeletal: Positive for arthralgias and neck pain. Negative for back pain, gait problem and joint swelling.   Skin: Negative for rash and wound.        Dry skin   Allergic/Immunologic: Negative for immunocompromised state.   Neurological: Negative for dizziness, syncope, weakness, light-headedness, numbness and headaches.   Hematological: Does not bruise/bleed easily.   Psychiatric/Behavioral: Positive for behavioral problems and decreased concentration. Negative for dysphoric mood and sleep disturbance. The patient is nervous/anxious.        /60   Pulse 64   Ht 175.3 cm (69.02\")   Wt 80.3 kg (177 lb)   BMI 26.13 kg/m²       Physical Exam   Constitutional: He is oriented to person, place, and time. He appears well-developed and well-nourished.   HENT:   Head: Normocephalic and " atraumatic.   Right Ear: External ear normal.   Left Ear: External ear normal.   Mouth/Throat: Oropharynx is clear and moist.   Eyes: Conjunctivae and EOM are normal. Pupils are equal, round, and reactive to light.   Neck: Normal range of motion. Neck supple.   Cardiovascular: Normal rate, regular rhythm, normal heart sounds and intact distal pulses.   Pulmonary/Chest: Effort normal and breath sounds normal.   Abdominal: Soft. Bowel sounds are normal.   Musculoskeletal: Normal range of motion. He exhibits edema (trace R>L).   Neurological: He is alert and oriented to person, place, and time. He has normal reflexes.   Skin: Skin is warm and dry.   Dry skin   Psychiatric: He has a normal mood and affect. His behavior is normal. Judgment and thought content normal.   Vitals reviewed.      Procedure:      Discussion/Summary:    HTN-wean cardura  hyperlipidemia-cont pravachol/fibrate, labs today  BPH-will add flomax if needed  GERD-stable off tx  djd-advised to limit nsaids  edema-change lasix to mwf  Hyperbilirubinemia-recheck today  Thrombocytopenia-recheck today  ED-viagra rx  ?[place]-[facility]em noted  Situational depression-f/u with Behavioral health, will add rexulti starter pack  Constipation-citrucel/miralax combo  high risk meds-labs today      labs noted and dw patient, counseled on low carb and low chol        Current Outpatient Medications:   •  amLODIPine (NORVASC) 5 MG tablet, TAKE ONE TABLET BY MOUTH EVERY DAY, Disp: 90 tablet, Rfl: 3  •  calcium citrate-vitamin d (CITRACAL) 200-250 MG-UNIT tablet tablet, Take  by mouth Daily., Disp: , Rfl:   •  diazePAM (VALIUM) 5 MG tablet, Take 2.5 mg by mouth 2 (Two) Times a Day As Needed for Anxiety., Disp: , Rfl:   •  doxazosin (CARDURA) 4 MG tablet, TAKE ONE TABLET BY MOUTH EVERY NIGHT, Disp: 90 tablet, Rfl: 3  •  fenofibrate (TRICOR) 145 MG tablet, TAKE ONE TABLET BY MOUTH EVERY DAY, Disp: 90 tablet, Rfl: 2  •  furosemide (LASIX) 40 MG tablet, TAKE ONE TABLET BY MOUTH  EVERY DAY, Disp: 90 tablet, Rfl: 2  •  irbesartan (AVAPRO) 300 MG tablet, Take 300 mg by mouth Daily., Disp: , Rfl: 5  •  Omega-3 Fatty Acids (FISH OIL) 1000 MG capsule capsule, Take  by mouth Daily With Breakfast., Disp: , Rfl:   •  Ped Multivitamins-Fl-Iron (MULTIVITAMIN WITH FLUORIDE/IRON) 0.25-10 MG/ML solution solution, Take  by mouth Daily., Disp: , Rfl:   •  potassium chloride (K-DUR,KLOR-CON) 20 MEQ CR tablet, daily., Disp: , Rfl:   •  pravastatin (PRAVACHOL) 40 MG tablet, TAKE ONE TABLET BY MOUTH EVERY DAY, Disp: 90 tablet, Rfl: 3  •  triamcinolone (KENALOG) 0.1 % ointment, Apply  topically 2 (Two) Times a Day As Needed for Irritation or Rash., Disp: 80 g, Rfl: 5  •  urea (CARMOL) 20 % cream, Apply  topically to the appropriate area as directed As Needed for Dry Skin., Disp: , Rfl:   •  Brexpiprazole (REXULTI) 0.5 MG tablet, Take 0.5 mg by mouth Every Morning., Disp: 30 tablet, Rfl: 0  •  CloNIDine (CATAPRES) 0.1 MG tablet, Take 1 tablet by mouth 2 (Two) Times a Day., Disp: 60 tablet, Rfl: 5  •  mirtazapine (REMERON) 15 MG tablet, Take 1 tablet by mouth Every Night., Disp: 30 tablet, Rfl: 5  •  sildenafil (REVATIO) 20 MG tablet, 2-4 tabs as needed, Disp: 90 tablet, Rfl: 5  •  sildenafil (VIAGRA) 100 MG tablet, Take 1 tablet by mouth Daily As Needed for erectile dysfunction., Disp: 6 tablet, Rfl: 5        Jimmie was seen today for hyperlipidemia and hypertension.    Diagnoses and all orders for this visit:    Essential hypertension  -     CBC (No Diff)    Other hyperlipidemia  -     Comprehensive Metabolic Panel  -     Lipid Panel  -     TSH    Diastolic dysfunction    Severe obstructive sleep apnea    Seasonal allergic rhinitis due to pollen    Peptic ulceration    Gastroesophageal reflux disease without esophagitis    Arthritis    Tubular adenoma    Situational depression  -     Brexpiprazole (REXULTI) 0.5 MG tablet; Take 0.5 mg by mouth Every Morning.  -     mirtazapine (REMERON) 15 MG tablet; Take 1  tablet by mouth Every Night.  -     Vitamin B12    Bilateral edema of lower extremity

## 2019-08-12 ENCOUNTER — DOCUMENTATION (OUTPATIENT)
Dept: PHYSICAL THERAPY | Facility: HOSPITAL | Age: 69
End: 2019-08-12

## 2019-08-12 DIAGNOSIS — S64.40XA LACERATION OF DIGITAL NERVE OF FINGER, INITIAL ENCOUNTER: ICD-10-CM

## 2019-08-12 DIAGNOSIS — S66.822S FLEXOR TENDON LACERATION OF LEFT HAND WITH OPEN WOUND, SEQUELA: ICD-10-CM

## 2019-08-12 DIAGNOSIS — M25.562 CHRONIC PAIN OF LEFT KNEE: Primary | ICD-10-CM

## 2019-08-12 DIAGNOSIS — S61.402A FLEXOR TENDON LACERATION OF LEFT HAND WITH OPEN WOUND, INITIAL ENCOUNTER: ICD-10-CM

## 2019-08-12 DIAGNOSIS — S64.40XS LACERATION OF DIGITAL NERVE OF FINGER, SEQUELA: ICD-10-CM

## 2019-08-12 DIAGNOSIS — Z47.89 ORTHOPEDIC AFTERCARE: ICD-10-CM

## 2019-08-12 DIAGNOSIS — G89.29 CHRONIC PAIN OF LEFT KNEE: Primary | ICD-10-CM

## 2019-08-12 DIAGNOSIS — S66.822A FLEXOR TENDON LACERATION OF LEFT HAND WITH OPEN WOUND, INITIAL ENCOUNTER: ICD-10-CM

## 2019-08-12 DIAGNOSIS — M25.642 STIFFNESS OF JOINT, HAND, LEFT: ICD-10-CM

## 2019-08-12 DIAGNOSIS — S61.402S FLEXOR TENDON LACERATION OF LEFT HAND WITH OPEN WOUND, SEQUELA: ICD-10-CM

## 2019-08-12 NOTE — THERAPY DISCHARGE NOTE
Outpatient Physical Therapy Discharge Summary         Patient Name: Jimmie Salcedo  : 1950  MRN: [id number]    Today's Date: 2019    Visit Dx:    ICD-10-CM ICD-9-CM   1. Chronic pain of left knee M25.562 719.46    G89.29 338.29   2. Orthopedic aftercare Z47.89 V54.9   3. Flexor tendon laceration of left hand with open wound, sequela S66.822S 905.8    S61.409S    4. Laceration of digital nerve of finger, sequela S64.40XS 907.4   5. Stiffness of joint, hand, left M25.642 719.54   6. Flexor tendon laceration of left hand with open wound, initial encounter S66.822A 882.2    S61.409A    7. Laceration of digital nerve of finger, initial encounter S64.40XA 955.6       PT OP Goals     Row Name 19 1429          PT Short Term Goals    STG Date to Achieve  19  -AC     STG 1  Client will report 50% improvement in knee pain with daily activity.   -AC     STG 1 Progress  Not Met  -AC     STG 2  Client will be independent with home program to minimize pain and improve strength.   -AC     STG 2 Progress  Met  -AC     STG 3  Left knee extension strength will improve to 5/5.   -AC     STG 3 Progress  Not Met  -AC        Long Term Goals    LTG Date to Achieve  19  -AC     LTG 1  LEFS score will improve to 75% ability.   -AC     LTG 1 Progress  Not Met  -AC     LTG 2  Client will improve to negotiating stairs without difficulty.   -AC     LTG 2 Progress  Not Met  -AC        Time Calculation    PT Goal Re-Cert Due Date  19  -AC       User Key  (r) = Recorded By, (t) = Taken By, (c) = Cosigned By    Initials Name Provider Type    Shwetha Conway, PT Physical Therapist        OP PT Discharge Summary  Date of Discharge: 19  Reason for Discharge: Non-compliant  Outcomes Achieved: Patient able to partially acheive established goals  Discharge Destination: Home with home program  Discharge Instructions/Additional Comments: Discharge due to lapse in treatment.     Time Calculation:    Start Time: 1429    Shwetha Oneil, PT  8/12/2019

## 2019-08-14 ENCOUNTER — OFFICE VISIT (OUTPATIENT)
Dept: NEUROLOGY | Facility: CLINIC | Age: 69
End: 2019-08-14

## 2019-08-14 VITALS
OXYGEN SATURATION: 99 % | BODY MASS INDEX: 26.93 KG/M2 | HEART RATE: 65 BPM | SYSTOLIC BLOOD PRESSURE: 118 MMHG | WEIGHT: 181.8 LBS | DIASTOLIC BLOOD PRESSURE: 70 MMHG | HEIGHT: 69 IN

## 2019-08-14 DIAGNOSIS — G24.01 TARDIVE DYSKINESIA: Primary | ICD-10-CM

## 2019-08-14 PROCEDURE — 99204 OFFICE O/P NEW MOD 45 MIN: CPT | Performed by: PSYCHIATRY & NEUROLOGY

## 2019-08-14 NOTE — PROGRESS NOTES
Subjective:    CC: Jimmie Salcedo is seen today in consultation at the request of Javad Negron MD for Tardive dyskinesia       HPI:  68-year-old male accompanied by his wife with a history of hypertension, hyperlipidemia, anxiety, depression presents with abnormal movements of the face.  As per patient he was diagnosed with anxiety and depression about 2 years ago.  He was started on Abilify in 2018 that he took until May 2019.  He was also tried on other antidepressant such as Zoloft, benztropine and Cymbalta.  Recently he was started on Rexulti ( last week) and Remeron.  A few months ago patient started to have abnormal movements of his face and tongue where he has frequent blinking of the eyes, mouth twitches, tongue movements and a constant urge to move his legs and arms.  These are worse in the morning.  Since he stopped Abilify these movements have improved but not completely resolved.  Patient states he also has vocal tics which are a means for him to relieve his anxiety.  Patient had blood work recently including a TSH and B12 level both of which were normal.    The following portions of the patient's history were reviewed today and updated as of 08/14/2019  : allergies, current medications, past family history, past medical history, past social history, past surgical history and problem list  These document will be scanned to patient's chart.      Current Outpatient Medications:   •  amLODIPine (NORVASC) 5 MG tablet, TAKE ONE TABLET BY MOUTH EVERY DAY, Disp: 90 tablet, Rfl: 3  •  Brexpiprazole (REXULTI) 0.5 MG tablet, Take 0.5 mg by mouth Every Morning., Disp: 30 tablet, Rfl: 0  •  calcium citrate-vitamin d (CITRACAL) 200-250 MG-UNIT tablet tablet, Take  by mouth Daily., Disp: , Rfl:   •  doxazosin (CARDURA) 4 MG tablet, TAKE ONE TABLET BY MOUTH EVERY NIGHT, Disp: 90 tablet, Rfl: 3  •  fenofibrate (TRICOR) 145 MG tablet, TAKE ONE TABLET BY MOUTH EVERY DAY, Disp: 90 tablet, Rfl: 2  •  furosemide  (LASIX) 40 MG tablet, TAKE ONE TABLET BY MOUTH EVERY DAY, Disp: 90 tablet, Rfl: 2  •  irbesartan (AVAPRO) 300 MG tablet, Take 300 mg by mouth Daily., Disp: , Rfl: 5  •  mirtazapine (REMERON) 15 MG tablet, Take 1 tablet by mouth Every Night., Disp: 30 tablet, Rfl: 5  •  Ped Multivitamins-Fl-Iron (MULTIVITAMIN WITH FLUORIDE/IRON) 0.25-10 MG/ML solution solution, Take  by mouth Daily., Disp: , Rfl:   •  potassium chloride (K-DUR,KLOR-CON) 20 MEQ CR tablet, daily., Disp: , Rfl:   •  pravastatin (PRAVACHOL) 40 MG tablet, TAKE ONE TABLET BY MOUTH EVERY DAY, Disp: 90 tablet, Rfl: 3  •  sildenafil (REVATIO) 20 MG tablet, 2-4 tabs as needed, Disp: 90 tablet, Rfl: 5  •  sildenafil (VIAGRA) 100 MG tablet, Take 1 tablet by mouth Daily As Needed for erectile dysfunction., Disp: 6 tablet, Rfl: 5  •  triamcinolone (KENALOG) 0.1 % ointment, Apply  topically 2 (Two) Times a Day As Needed for Irritation or Rash., Disp: 80 g, Rfl: 5  •  urea (CARMOL) 20 % cream, Apply  topically to the appropriate area as directed As Needed for Dry Skin., Disp: , Rfl:   •  CloNIDine (CATAPRES) 0.1 MG tablet, Take 1 tablet by mouth 2 (Two) Times a Day., Disp: 60 tablet, Rfl: 5  •  diazePAM (VALIUM) 5 MG tablet, Take 2.5 mg by mouth 2 (Two) Times a Day As Needed for Anxiety., Disp: , Rfl:   •  Omega-3 Fatty Acids (FISH OIL) 1000 MG capsule capsule, Take  by mouth Daily With Breakfast., Disp: , Rfl:    Past Medical History:   Diagnosis Date   • Abnormal liver function test    • Arthritis    • Bilateral edema of lower extremity    • Cellulitis of leg, right    • Chalazion    • Hypertension    • Kidney infection    • Neck muscle spasm    • Onychomycosis of toenail    • Peptic ulceration    • Renal calculi    • Situational depression 8/22/2018      Past Surgical History:   Procedure Laterality Date   • CYSTOSCOPY W/ LASER LITHOTRIPSY     • FINGER SURGERY      left 5th finger   • KNEE SURGERY Right 1986   • OTHER SURGICAL HISTORY      Lithotripsy   •  "TONSILLECTOMY        Family History   Problem Relation Age of Onset   • Arthritis Other    • Hypertension Other    • Hyperlipidemia Other    • Heart attack Other    • Hypertension Mother    • Heart disease Father    • Hypertension Father       Social History     Socioeconomic History   • Marital status:      Spouse name: Not on file   • Number of children: Not on file   • Years of education: Not on file   • Highest education level: Not on file   Tobacco Use   • Smoking status: Never Smoker   • Smokeless tobacco: Never Used   Substance and Sexual Activity   • Alcohol use: Yes     Alcohol/week: 2.4 oz     Types: 4 Cans of beer per week   • Drug use: No   • Sexual activity: Defer     Review of Systems   Constitutional: Positive for appetite change, chills and fatigue.   HENT: Positive for voice change.    Respiratory: Positive for cough.    Gastrointestinal: Positive for constipation.   Musculoskeletal: Positive for gait problem and joint swelling.   Neurological: Positive for tremors, speech difficulty and numbness.   Psychiatric/Behavioral: Positive for agitation, behavioral problems and decreased concentration. The patient is nervous/anxious.    All other systems reviewed and are negative.      Objective:    /70   Pulse 65   Ht 175.3 cm (69\")   Wt 82.5 kg (181 lb 12.8 oz)   SpO2 99%   BMI 26.85 kg/m²     Neurology Exam:    General apperance: NAD.     Mental status: Alert, awake and oriented to time place and person.    Recent and Remote memory: Intact.    Attention span and Concentration: Normal.     Language and Speech: Intact- No dysarthria.    Fluency, Naming , Repitition and Comprehension:  Intact    Cranial Nerves:   CN II: Visual fields are full. Intact. Fundi - Normal, No papillederma, Pupils - GERARD  CN III, IV and VI: Extraocular movements are intact. Normal saccades.   CN V: Facial sensation is intact.   CN VII: Muscles of facial expression reveal no asymmetry. Intact.   CN VIII: Hearing " is intact. Whispered voice intact.   CN IX and X: Palate elevates symmetrically. Intact  CN XI: Shoulder shrug is intact.   CN XII: Tongue is midline without evidence of atrophy or fasciculation.     Ophthalmoscopic exam of optic disc-normal    Motor:--No darting movements of the tongue or abnormal facial movements noted.  Rare eye blinking seen.  Patient was also constantly moving his legs.  No tremors in his hands    Right UE muscle strength 5/5. Normal tone.     Left UE muscle strength 5/5. Normal tone.      Right LE muscle strength5/5. Normal tone.     Left LE muscle strength 5/5. Normal tone.      Sensory: Normal light touch, vibration and pinprick sensation bilaterally.    DTRs: 2+ bilaterally in upper and lower extremities.    Babinski: Negative bilaterally.    Co-ordination: Normal finger-to-nose, heel to shin B/L.    Rhomberg: Negative.    Gait: Normal.    Cardiovascular: Regular rate and rhythm without murmur, gallop or rub.    Assessment and Plan:  1. Tardive dyskinesia  Based on the description of his symptoms patient could have tardive dyskinesia along with akathisia.  I do not see any features of drug-induced Parkinson's  Since his movements are not significant I will defer starting him on any medications.  However I made the patient and his wife are aware that if these movements do worsen then I will start him on tetrabenazine.       Return in about 3 months (around 11/14/2019).         Luz Zafar MD

## 2019-08-22 ENCOUNTER — OFFICE VISIT (OUTPATIENT)
Dept: INTERNAL MEDICINE | Facility: CLINIC | Age: 69
End: 2019-08-22

## 2019-08-22 VITALS
SYSTOLIC BLOOD PRESSURE: 130 MMHG | HEIGHT: 69 IN | DIASTOLIC BLOOD PRESSURE: 70 MMHG | BODY MASS INDEX: 26.81 KG/M2 | HEART RATE: 64 BPM | WEIGHT: 181 LBS

## 2019-08-22 DIAGNOSIS — R60.0 BILATERAL EDEMA OF LOWER EXTREMITY: ICD-10-CM

## 2019-08-22 DIAGNOSIS — J30.1 SEASONAL ALLERGIC RHINITIS DUE TO POLLEN: ICD-10-CM

## 2019-08-22 DIAGNOSIS — D36.9 TUBULAR ADENOMA: ICD-10-CM

## 2019-08-22 DIAGNOSIS — G47.33 SEVERE OBSTRUCTIVE SLEEP APNEA: ICD-10-CM

## 2019-08-22 DIAGNOSIS — F43.21 SITUATIONAL DEPRESSION: ICD-10-CM

## 2019-08-22 DIAGNOSIS — E78.49 OTHER HYPERLIPIDEMIA: ICD-10-CM

## 2019-08-22 DIAGNOSIS — I51.89 DIASTOLIC DYSFUNCTION: ICD-10-CM

## 2019-08-22 DIAGNOSIS — K27.9 PEPTIC ULCERATION: ICD-10-CM

## 2019-08-22 DIAGNOSIS — L40.9 PSORIASIS: ICD-10-CM

## 2019-08-22 DIAGNOSIS — E80.4 GILBERT'S DISEASE: ICD-10-CM

## 2019-08-22 DIAGNOSIS — M19.90 ARTHRITIS: ICD-10-CM

## 2019-08-22 DIAGNOSIS — E80.6 HYPERBILIRUBINEMIA: ICD-10-CM

## 2019-08-22 DIAGNOSIS — F51.04 PSYCHOPHYSIOLOGICAL INSOMNIA: ICD-10-CM

## 2019-08-22 DIAGNOSIS — R79.89 ABNORMAL LIVER FUNCTION TEST: ICD-10-CM

## 2019-08-22 DIAGNOSIS — K21.9 GASTROESOPHAGEAL REFLUX DISEASE WITHOUT ESOPHAGITIS: ICD-10-CM

## 2019-08-22 DIAGNOSIS — I10 ESSENTIAL HYPERTENSION: Primary | ICD-10-CM

## 2019-08-22 PROCEDURE — 99214 OFFICE O/P EST MOD 30 MIN: CPT | Performed by: INTERNAL MEDICINE

## 2019-08-22 NOTE — PROGRESS NOTES
Patient is a 69 y.o. male who is here for a follow up of anxiety.  Chief Complaint   Patient presents with   • Anxiety     2 week f/u         HPI:    Here for f/u.  Feels some better.  Continues to see a counselor.  Less anxious.  Some OCD.  Sleeping ok.  More motivated.  Up to 2 mg of Rexulti today.  No nausea or emesis.    BP has been good.  Good uop.  No dizziness or lightheadedness.  No HAs.      History:     Patient Active Problem List   Diagnosis   • Acid reflux   • Arthritis   • Hypertension   • Hyperlipidemia   • Severe obstructive sleep apnea   • Psoriasis   • Diastolic dysfunction   • Peptic ulceration   • Bilateral edema of lower extremity   • Abnormal liver function test   • Hyperbilirubinemia   • Psychophysiological insomnia   • Gilbert's disease   • Situational depression   • Tubular adenoma   • Seasonal allergic rhinitis due to pollen   • Tardive dyskinesia       Past Medical History:   Diagnosis Date   • Abnormal liver function test    • Arthritis    • Bilateral edema of lower extremity    • Cellulitis of leg, right    • Chalazion    • Hypertension    • Kidney infection    • Neck muscle spasm    • Onychomycosis of toenail    • Peptic ulceration    • Renal calculi    • Situational depression 8/22/2018       Past Surgical History:   Procedure Laterality Date   • CYSTOSCOPY W/ LASER LITHOTRIPSY     • FINGER SURGERY      left 5th finger   • KNEE SURGERY Right 1986   • OTHER SURGICAL HISTORY      Lithotripsy   • TONSILLECTOMY         Current Outpatient Medications on File Prior to Visit   Medication Sig   • amLODIPine (NORVASC) 5 MG tablet TAKE ONE TABLET BY MOUTH EVERY DAY   • diazePAM (VALIUM) 5 MG tablet Take 2.5 mg by mouth 2 (Two) Times a Day As Needed for Anxiety.   • doxazosin (CARDURA) 4 MG tablet TAKE ONE TABLET BY MOUTH EVERY NIGHT (Patient taking differently: TAKE ONE HALF TABLET BY MOUTH EVERY NIGHT)   • fenofibrate (TRICOR) 145 MG tablet TAKE ONE TABLET BY MOUTH EVERY DAY   • furosemide  (LASIX) 40 MG tablet TAKE ONE TABLET BY MOUTH EVERY DAY (Patient taking differently: TAKE ONE TABLET BY MOUTH EVERY MWF)   • irbesartan (AVAPRO) 300 MG tablet Take 300 mg by mouth Daily.   • mirtazapine (REMERON) 15 MG tablet Take 1 tablet by mouth Every Night.   • Omega-3 Fatty Acids (FISH OIL) 1000 MG capsule capsule Take  by mouth Daily With Breakfast.   • Ped Multivitamins-Fl-Iron (MULTIVITAMIN WITH FLUORIDE/IRON) 0.25-10 MG/ML solution solution Take  by mouth Daily.   • potassium chloride (K-DUR,KLOR-CON) 20 MEQ CR tablet mwf   • pravastatin (PRAVACHOL) 40 MG tablet TAKE ONE TABLET BY MOUTH EVERY DAY   • sildenafil (REVATIO) 20 MG tablet 2-4 tabs as needed   • sildenafil (VIAGRA) 100 MG tablet Take 1 tablet by mouth Daily As Needed for erectile dysfunction.   • triamcinolone (KENALOG) 0.1 % ointment Apply  topically 2 (Two) Times a Day As Needed for Irritation or Rash.   • urea (CARMOL) 20 % cream Apply  topically to the appropriate area as directed As Needed for Dry Skin.   • [DISCONTINUED] Brexpiprazole (REXULTI) 0.5 MG tablet Take 0.5 mg by mouth Every Morning.   • [DISCONTINUED] calcium citrate-vitamin d (CITRACAL) 200-250 MG-UNIT tablet tablet Take  by mouth Daily.   • [DISCONTINUED] CloNIDine (CATAPRES) 0.1 MG tablet Take 1 tablet by mouth 2 (Two) Times a Day.     No current facility-administered medications on file prior to visit.        Family History   Problem Relation Age of Onset   • Arthritis Other    • Hypertension Other    • Hyperlipidemia Other    • Heart attack Other    • Hypertension Mother    • Heart disease Father    • Hypertension Father        Social History     Socioeconomic History   • Marital status:      Spouse name: Not on file   • Number of children: Not on file   • Years of education: Not on file   • Highest education level: Not on file   Tobacco Use   • Smoking status: Never Smoker   • Smokeless tobacco: Never Used   Substance and Sexual Activity   • Alcohol use: Yes      "Alcohol/week: 2.4 oz     Types: 4 Cans of beer per week   • Drug use: No   • Sexual activity: Defer         Review of Systems   Constitutional: Positive for appetite change and fatigue. Negative for chills, fever and unexpected weight change.   HENT: Negative for ear pain, hearing loss, rhinorrhea, sinus pressure, sore throat and trouble swallowing.    Eyes: Negative for discharge and itching.   Respiratory: Negative for cough and chest tightness.    Cardiovascular: Negative for chest pain and palpitations.   Gastrointestinal: Positive for constipation (BETTER). Negative for abdominal pain, blood in stool, diarrhea and vomiting.        2013 colonoscopy normal  9/18 colonoscopy with TA times 1, repeat in 9/21   Endocrine: Negative for polydipsia and polyuria.   Genitourinary: Negative for difficulty urinating, dysuria, enuresis, frequency, hematuria and urgency.        1/19 psa  ED   Musculoskeletal: Positive for arthralgias and neck pain. Negative for back pain, gait problem and joint swelling.   Skin: Negative for rash and wound.        Dry skin   Allergic/Immunologic: Negative for immunocompromised state.   Neurological: Negative for dizziness, syncope, weakness, light-headedness, numbness and headaches.   Hematological: Does not bruise/bleed easily.   Psychiatric/Behavioral: Positive for behavioral problems and decreased concentration. Negative for dysphoric mood and sleep disturbance. The patient is nervous/anxious.        /70   Pulse 64   Ht 175.3 cm (69.02\")   Wt 82.1 kg (181 lb)   BMI 26.72 kg/m²       Physical Exam   Constitutional: He is oriented to person, place, and time. He appears well-developed and well-nourished.   HENT:   Head: Normocephalic and atraumatic.   Right Ear: External ear normal.   Left Ear: External ear normal.   Mouth/Throat: Oropharynx is clear and moist.   Eyes: Conjunctivae and EOM are normal. Pupils are equal, round, and reactive to light.   Neck: Normal range of motion. " Neck supple.   Cardiovascular: Normal rate, regular rhythm, normal heart sounds and intact distal pulses.   Pulmonary/Chest: Effort normal and breath sounds normal.   Abdominal: Soft. Bowel sounds are normal.   Musculoskeletal: Normal range of motion. He exhibits edema (trace R>L).   Neurological: He is alert and oriented to person, place, and time. He has normal reflexes.   Skin: Skin is warm and dry.   Dry skin   Psychiatric: He has a normal mood and affect. His behavior is normal. Judgment and thought content normal.   Vitals reviewed.      Procedure:      Discussion/Summary:    HTN-stable on triple tx  hyperlipidemia-cont pravachol/fibrate, labs noted  BPH-will add flomax if needed  GERD-stable off tx  djd-advised to limit nsaids  edema-change lasix to prn  Hyperbilirubinemia-recheck noted  Thrombocytopenia-recheck noted  ED-viagra rx  ?[place]-Hem noted  Situational depression-f/u with Behavioral health, will cont rexulti at 2 mg  Constipation-citrucel/miralax combo, stable  high risk meds-labs noted      labs noted and dw patient, counseled on low carb and low chol    Current Outpatient Medications:   •  amLODIPine (NORVASC) 5 MG tablet, TAKE ONE TABLET BY MOUTH EVERY DAY, Disp: 90 tablet, Rfl: 3  •  diazePAM (VALIUM) 5 MG tablet, Take 2.5 mg by mouth 2 (Two) Times a Day As Needed for Anxiety., Disp: , Rfl:   •  doxazosin (CARDURA) 4 MG tablet, TAKE ONE TABLET BY MOUTH EVERY NIGHT (Patient taking differently: TAKE ONE HALF TABLET BY MOUTH EVERY NIGHT), Disp: 90 tablet, Rfl: 3  •  fenofibrate (TRICOR) 145 MG tablet, TAKE ONE TABLET BY MOUTH EVERY DAY, Disp: 90 tablet, Rfl: 2  •  furosemide (LASIX) 40 MG tablet, TAKE ONE TABLET BY MOUTH EVERY DAY (Patient taking differently: TAKE ONE TABLET BY MOUTH EVERY MWF), Disp: 90 tablet, Rfl: 2  •  irbesartan (AVAPRO) 300 MG tablet, Take 300 mg by mouth Daily., Disp: , Rfl: 5  •  mirtazapine (REMERON) 15 MG tablet, Take 1 tablet by mouth Every Night., Disp: 30 tablet,  Rfl: 5  •  Omega-3 Fatty Acids (FISH OIL) 1000 MG capsule capsule, Take  by mouth Daily With Breakfast., Disp: , Rfl:   •  Ped Multivitamins-Fl-Iron (MULTIVITAMIN WITH FLUORIDE/IRON) 0.25-10 MG/ML solution solution, Take  by mouth Daily., Disp: , Rfl:   •  potassium chloride (K-DUR,KLOR-CON) 20 MEQ CR tablet, mwf, Disp: , Rfl:   •  pravastatin (PRAVACHOL) 40 MG tablet, TAKE ONE TABLET BY MOUTH EVERY DAY, Disp: 90 tablet, Rfl: 3  •  sildenafil (REVATIO) 20 MG tablet, 2-4 tabs as needed, Disp: 90 tablet, Rfl: 5  •  sildenafil (VIAGRA) 100 MG tablet, Take 1 tablet by mouth Daily As Needed for erectile dysfunction., Disp: 6 tablet, Rfl: 5  •  triamcinolone (KENALOG) 0.1 % ointment, Apply  topically 2 (Two) Times a Day As Needed for Irritation or Rash., Disp: 80 g, Rfl: 5  •  urea (CARMOL) 20 % cream, Apply  topically to the appropriate area as directed As Needed for Dry Skin., Disp: , Rfl:   •  Brexpiprazole (REXULTI) 2 MG tablet, Take 1 tablet by mouth Every Morning., Disp: 30 tablet, Rfl: 5        Jimmie was seen today for anxiety.    Diagnoses and all orders for this visit:    Essential hypertension    Other hyperlipidemia    Diastolic dysfunction    Severe obstructive sleep apnea    Seasonal allergic rhinitis due to pollen    Gastroesophageal reflux disease without esophagitis    Peptic ulceration    Psoriasis    Arthritis    Tubular adenoma    Situational depression  -     Brexpiprazole (REXULTI) 2 MG tablet; Take 1 tablet by mouth Every Morning.    Psychophysiological insomnia    Hyperbilirubinemia    Gilbert's disease    Bilateral edema of lower extremity    Abnormal liver function test

## 2019-08-29 ENCOUNTER — TELEPHONE (OUTPATIENT)
Dept: INTERNAL MEDICINE | Facility: CLINIC | Age: 69
End: 2019-08-29

## 2019-08-29 NOTE — TELEPHONE ENCOUNTER
After insurance pays $800 they still have to pay over $300 a month and they can not afford that. What do you recommend. He still has a few samples of the 2mg

## 2019-08-29 NOTE — TELEPHONE ENCOUNTER
PT WIFE WOULD LIKEF OR YOU TO GIVE HER A CALL, THE PT CAN NOT AFFORD WHAT INSURANCE WILL NOT PAY ON HIS REXULTI.

## 2019-09-09 RX ORDER — FENOFIBRATE 145 MG/1
TABLET, COATED ORAL
Qty: 90 TABLET | Refills: 2 | Status: SHIPPED | OUTPATIENT
Start: 2019-09-09 | End: 2020-06-15

## 2019-09-28 DIAGNOSIS — I10 ESSENTIAL HYPERTENSION: ICD-10-CM

## 2019-09-30 RX ORDER — DOXAZOSIN MESYLATE 4 MG/1
TABLET ORAL
Qty: 90 TABLET | Refills: 3 | Status: SHIPPED | OUTPATIENT
Start: 2019-09-30 | End: 2020-03-11 | Stop reason: SDUPTHER

## 2019-10-01 ENCOUNTER — OFFICE VISIT (OUTPATIENT)
Dept: NEUROLOGY | Facility: CLINIC | Age: 69
End: 2019-10-01

## 2019-10-01 ENCOUNTER — LAB (OUTPATIENT)
Dept: LAB | Facility: HOSPITAL | Age: 69
End: 2019-10-01

## 2019-10-01 VITALS
OXYGEN SATURATION: 98 % | BODY MASS INDEX: 26.33 KG/M2 | SYSTOLIC BLOOD PRESSURE: 126 MMHG | HEIGHT: 69 IN | DIASTOLIC BLOOD PRESSURE: 80 MMHG | WEIGHT: 177.8 LBS | HEART RATE: 59 BPM

## 2019-10-01 DIAGNOSIS — G24.01 TARDIVE DYSKINESIA: ICD-10-CM

## 2019-10-01 DIAGNOSIS — G31.84 MILD COGNITIVE IMPAIRMENT: Primary | ICD-10-CM

## 2019-10-01 DIAGNOSIS — G31.84 MILD COGNITIVE IMPAIRMENT: ICD-10-CM

## 2019-10-01 LAB
FERRITIN SERPL-MCNC: 393 NG/ML (ref 30–400)
T4 FREE SERPL-MCNC: 1.53 NG/DL (ref 0.93–1.7)
TSH SERPL DL<=0.05 MIU/L-ACNC: 1.35 UIU/ML (ref 0.27–4.2)
VIT B12 BLD-MCNC: 889 PG/ML (ref 211–946)

## 2019-10-01 PROCEDURE — 82728 ASSAY OF FERRITIN: CPT

## 2019-10-01 PROCEDURE — 36415 COLL VENOUS BLD VENIPUNCTURE: CPT

## 2019-10-01 PROCEDURE — 84443 ASSAY THYROID STIM HORMONE: CPT

## 2019-10-01 PROCEDURE — 84439 ASSAY OF FREE THYROXINE: CPT

## 2019-10-01 PROCEDURE — 82607 VITAMIN B-12: CPT

## 2019-10-01 PROCEDURE — 99215 OFFICE O/P EST HI 40 MIN: CPT | Performed by: PSYCHIATRY & NEUROLOGY

## 2019-10-01 NOTE — PROGRESS NOTES
Subjective:    CC: Jimmie Salcedo is seen today for TARDIVE DYSKINESIA       HPI:  Current visit- since his last visit patient has stopped taking most of his antidepressant and antipsychotic medications and is now only on Remeron 15 mg at night.  As per the wife patient has been doing very poorly in the past few months as he reports to having poor attention and concentration, loss of interest in activities, problems with his memory, anxiety, apathy as well as severe anxiety.  He also has shaking of his leg periodically but denies having any abnormal movements of the face anymore.    68-year-old male accompanied by his wife with a history of hypertension, hyperlipidemia, anxiety, depression presents with abnormal movements of the face.  As per patient he was diagnosed with anxiety and depression about 2 years ago.  He was started on Abilify in 2018 that he took until May 2019.  He was also tried on other antidepressant such as Zoloft, benztropine and Cymbalta.  Recently he was started on Rexulti ( last week) and Remeron.  A few months ago patient started to have abnormal movements of his face and tongue where he has frequent blinking of the eyes, mouth twitches, tongue movements and a constant urge to move his legs and arms.  These are worse in the morning.  Since he stopped Abilify these movements have improved but not completely resolved.  Patient states he also has vocal tics which are a means for him to relieve his anxiety.  Patient had blood work recently including a TSH and B12 level both of which were normal.    The following portions of the patient's history were reviewed today and updated as of 10/01/2019  : allergies, current medications, past family history, past medical history, past social history, past surgical history and problem list  These document will be scanned to patient's chart.      Current Outpatient Medications:   •  amLODIPine (NORVASC) 5 MG tablet, TAKE ONE TABLET BY MOUTH EVERY DAY, Disp:  90 tablet, Rfl: 3  •  diazePAM (VALIUM) 5 MG tablet, Take 2.5 mg by mouth 2 (Two) Times a Day As Needed for Anxiety., Disp: , Rfl:   •  doxazosin (CARDURA) 4 MG tablet, TAKE ONE TABLET BY MOUTH EVERY NIGHT, Disp: 90 tablet, Rfl: 3  •  fenofibrate (TRICOR) 145 MG tablet, TAKE ONE TABLET BY MOUTH EVERY DAY, Disp: 90 tablet, Rfl: 2  •  furosemide (LASIX) 40 MG tablet, TAKE ONE TABLET BY MOUTH EVERY DAY (Patient taking differently: TAKE ONE TABLET BY MOUTH EVERY MWF), Disp: 90 tablet, Rfl: 2  •  irbesartan (AVAPRO) 300 MG tablet, Take 300 mg by mouth Daily., Disp: , Rfl: 5  •  mirtazapine (REMERON) 15 MG tablet, Take 1 tablet by mouth Every Night., Disp: 30 tablet, Rfl: 5  •  Omega-3 Fatty Acids (FISH OIL) 1000 MG capsule capsule, Take  by mouth Daily With Breakfast., Disp: , Rfl:   •  potassium chloride (K-DUR,KLOR-CON) 20 MEQ CR tablet, mwf, Disp: , Rfl:   •  pravastatin (PRAVACHOL) 40 MG tablet, TAKE ONE TABLET BY MOUTH EVERY DAY, Disp: 90 tablet, Rfl: 3  •  sildenafil (REVATIO) 20 MG tablet, 2-4 tabs as needed, Disp: 90 tablet, Rfl: 5  •  sildenafil (VIAGRA) 100 MG tablet, Take 1 tablet by mouth Daily As Needed for erectile dysfunction., Disp: 6 tablet, Rfl: 5  •  triamcinolone (KENALOG) 0.1 % ointment, Apply  topically 2 (Two) Times a Day As Needed for Irritation or Rash., Disp: 80 g, Rfl: 5  •  urea (CARMOL) 20 % cream, Apply  topically to the appropriate area as directed As Needed for Dry Skin., Disp: , Rfl:   •  Brexpiprazole (REXULTI) 2 MG tablet, Take 1 tablet by mouth Every Morning., Disp: 30 tablet, Rfl: 5  •  Ped Multivitamins-Fl-Iron (MULTIVITAMIN WITH FLUORIDE/IRON) 0.25-10 MG/ML solution solution, Take  by mouth Daily., Disp: , Rfl:    Past Medical History:   Diagnosis Date   • Abnormal liver function test    • Arthritis    • Bilateral edema of lower extremity    • Cellulitis of leg, right    • Chalazion    • Hypertension    • Kidney infection    • Neck muscle spasm    • Onychomycosis of toenail    •  "Peptic ulceration    • Renal calculi    • Situational depression 8/22/2018      Past Surgical History:   Procedure Laterality Date   • CYSTOSCOPY W/ LASER LITHOTRIPSY     • FINGER SURGERY      left 5th finger   • KNEE SURGERY Right 1986   • OTHER SURGICAL HISTORY      Lithotripsy   • TONSILLECTOMY        Family History   Problem Relation Age of Onset   • Arthritis Other    • Hypertension Other    • Hyperlipidemia Other    • Heart attack Other    • Hypertension Mother    • Heart disease Father    • Hypertension Father       Social History     Socioeconomic History   • Marital status:      Spouse name: Not on file   • Number of children: Not on file   • Years of education: Not on file   • Highest education level: Not on file   Tobacco Use   • Smoking status: Never Smoker   • Smokeless tobacco: Never Used   Substance and Sexual Activity   • Alcohol use: Yes     Alcohol/week: 2.4 oz     Types: 4 Cans of beer per week   • Drug use: No   • Sexual activity: Defer     Review of Systems   Constitutional: Positive for appetite change.   Neurological: Positive for tremors.   Psychiatric/Behavioral: Positive for behavioral problems, decreased concentration, sleep disturbance, depressed mood and stress. The patient is nervous/anxious.    All other systems reviewed and are negative.      Objective:    /80   Pulse 59   Ht 175.3 cm (69\")   Wt 80.6 kg (177 lb 12.8 oz)   SpO2 98%   BMI 26.26 kg/m²     Neurology Exam:    General apperance: NAD.     Mental status: Alert, awake and oriented to time place and person.    Recent and Remote memory: Intact.  MMSE score-29/30 with a delayed recall of 2/3    Attention span and Concentration: Normal.     Language and Speech: Intact- No dysarthria.    Fluency, Naming , Repitition and Comprehension:  Intact    Cranial Nerves:   CN II: Visual fields are full. Intact. Fundi - Normal, No papillederma, Pupils - GERARD  CN III, IV and VI: Extraocular movements are intact. Normal " saccades.   CN V: Facial sensation is intact.   CN VII: Muscles of facial expression reveal no asymmetry. Intact.   CN VIII: Hearing is intact. Whispered voice intact.   CN IX and X: Palate elevates symmetrically. Intact  CN XI: Shoulder shrug is intact.   CN XII: Tongue is midline without evidence of atrophy or fasciculation.     Ophthalmoscopic exam of optic disc-normal    Motor:  Right UE muscle strength 5/5. Normal tone.     Left UE muscle strength 5/5. Normal tone.      Right LE muscle strength5/5. Normal tone.     Left LE muscle strength 5/5. Normal tone.      Sensory: Normal light touch, vibration and pinprick sensation bilaterally.    DTRs: 2+ bilaterally in upper and lower extremities.    Babinski: Negative bilaterally.    Co-ordination: Normal finger-to-nose, heel to shin B/L.    Rhomberg: Negative.    Gait: Normal.    Cardiovascular: Regular rate and rhythm without murmur, gallop or rub.    Assessment and Plan:  1. Mild cognitive impairment  In my opinion patient's cognitive problems are due to underlying depression.  I have again asked them to make an appointment with a new psychiatrist (as they do not want to go back to his previous psychiatrist) because I feel he would need to be started on medications for depression.  He is also undergoing therapy currently  -We will get labs and a MRI brain without contrast as patient has had several concussions before    - Vitamin B12; Future  - TSH+Free T4; Future  - MRI Brain Without Contrast; Future    2. Tardive dyskinesia  Again the movements that the patient described previously could be tardive dyskinesia along with akathisia.  I do not see any leg tremors today but that could be anxiety related  - MRI Brain Without Contrast; Future  - Ferritin; Future       Return in about 4 months (around 2/1/2020).     I spent over 40 minutes with the patient face to face out of which over 50% (30 minutes) was spent in management including obtaining a MMSE, instructions  and education regarding his symptoms.     Luz Zafar MD

## 2019-10-02 ENCOUNTER — TELEPHONE (OUTPATIENT)
Dept: NEUROLOGY | Facility: CLINIC | Age: 69
End: 2019-10-02

## 2019-10-02 NOTE — TELEPHONE ENCOUNTER
----- Message from Luz Zafar MD sent at 10/2/2019 10:21 AM EDT -----  Let him know that all his labs were perfect

## 2019-10-03 ENCOUNTER — TELEPHONE (OUTPATIENT)
Dept: NEUROLOGY | Facility: CLINIC | Age: 69
End: 2019-10-03

## 2019-10-03 NOTE — TELEPHONE ENCOUNTER
----- Message from Fanta Brown sent at 10/3/2019  1:55 PM EDT -----  Contact: Miquel Zafar Pt called in regards to MRI. Pt requested that it be w/contrast the order has w/o. Waiting for a corrected allie that was faxed to the office.  Pt is requesting pllease sign and return. Please call and advise.

## 2019-10-04 ENCOUNTER — HOSPITAL ENCOUNTER (OUTPATIENT)
Dept: MRI IMAGING | Facility: HOSPITAL | Age: 69
Discharge: HOME | End: 2019-10-04
Admitting: PSYCHIATRY & NEUROLOGY

## 2019-10-04 DIAGNOSIS — G31.84 MILD COGNITIVE IMPAIRMENT: ICD-10-CM

## 2019-10-04 DIAGNOSIS — G24.01 TARDIVE DYSKINESIA: ICD-10-CM

## 2019-10-04 PROCEDURE — 70551 MRI BRAIN STEM W/O DYE: CPT

## 2019-10-05 DIAGNOSIS — I10 ESSENTIAL HYPERTENSION: ICD-10-CM

## 2019-10-07 ENCOUNTER — TELEPHONE (OUTPATIENT)
Dept: NEUROLOGY | Facility: CLINIC | Age: 69
End: 2019-10-07

## 2019-10-07 RX ORDER — AMLODIPINE BESYLATE 5 MG/1
TABLET ORAL
Qty: 90 TABLET | Refills: 3 | Status: SHIPPED | OUTPATIENT
Start: 2019-10-07 | End: 2020-03-11 | Stop reason: DRUGHIGH

## 2019-10-07 NOTE — TELEPHONE ENCOUNTER
----- Message from Luz Zafar MD sent at 10/7/2019 12:55 PM EDT -----  Can you let him know that he just has mild age-related changes but no other significant problems

## 2019-10-29 RX ORDER — PRAVASTATIN SODIUM 40 MG
TABLET ORAL
Qty: 90 TABLET | Refills: 3 | Status: SHIPPED | OUTPATIENT
Start: 2019-10-29 | End: 2020-10-19

## 2019-11-05 ENCOUNTER — OFFICE VISIT (OUTPATIENT)
Dept: INTERNAL MEDICINE | Facility: CLINIC | Age: 69
End: 2019-11-05

## 2019-11-05 VITALS
BODY MASS INDEX: 26.81 KG/M2 | HEIGHT: 69 IN | WEIGHT: 181 LBS | DIASTOLIC BLOOD PRESSURE: 60 MMHG | SYSTOLIC BLOOD PRESSURE: 126 MMHG | HEART RATE: 64 BPM

## 2019-11-05 DIAGNOSIS — J30.1 SEASONAL ALLERGIC RHINITIS DUE TO POLLEN: ICD-10-CM

## 2019-11-05 DIAGNOSIS — D36.9 TUBULAR ADENOMA: ICD-10-CM

## 2019-11-05 DIAGNOSIS — K27.9 PEPTIC ULCERATION: ICD-10-CM

## 2019-11-05 DIAGNOSIS — G47.33 SEVERE OBSTRUCTIVE SLEEP APNEA: ICD-10-CM

## 2019-11-05 DIAGNOSIS — I10 ESSENTIAL HYPERTENSION: Primary | ICD-10-CM

## 2019-11-05 DIAGNOSIS — F43.21 SITUATIONAL DEPRESSION: ICD-10-CM

## 2019-11-05 DIAGNOSIS — K21.9 GASTROESOPHAGEAL REFLUX DISEASE WITHOUT ESOPHAGITIS: ICD-10-CM

## 2019-11-05 DIAGNOSIS — E78.49 OTHER HYPERLIPIDEMIA: ICD-10-CM

## 2019-11-05 PROCEDURE — 99214 OFFICE O/P EST MOD 30 MIN: CPT | Performed by: INTERNAL MEDICINE

## 2019-11-05 NOTE — PROGRESS NOTES
Patient is a 69 y.o. male who is here for a follow up of hyperlipidemia and hypertension.  Chief Complaint   Patient presents with   • Hyperlipidemia   • Hypertension         HPI:    Here for mgmt of Depression and Anxiety.  Has weaned off his psychiatric meds except for Remeron.  Has a multitude of  symptoms.  Feels depressed.  Getting out of the house rarely.  Still with little appetite.  Sleeping well.  Continues to see a therapist.  Would like to be seen at  [facility].  Feels anxious also.     History:     Patient Active Problem List   Diagnosis   • Acid reflux   • Arthritis   • Hypertension   • Hyperlipidemia   • Severe obstructive sleep apnea   • Psoriasis   • Diastolic dysfunction   • Peptic ulceration   • Bilateral edema of lower extremity   • Abnormal liver function test   • Hyperbilirubinemia   • Psychophysiological insomnia   • Gilbert's disease   • Situational depression   • Tubular adenoma   • Seasonal allergic rhinitis due to pollen   • Tardive dyskinesia   • Mild cognitive impairment       Past Medical History:   Diagnosis Date   • Abnormal liver function test    • Arthritis    • Bilateral edema of lower extremity    • Cellulitis of leg, right    • Chalazion    • Hypertension    • Kidney infection    • Neck muscle spasm    • Onychomycosis of toenail    • Peptic ulceration    • Renal calculi    • Situational depression 8/22/2018       Past Surgical History:   Procedure Laterality Date   • CYSTOSCOPY W/ LASER LITHOTRIPSY     • FINGER SURGERY      left 5th finger   • KNEE SURGERY Right 1986   • OTHER SURGICAL HISTORY      Lithotripsy   • TONSILLECTOMY         Current Outpatient Medications on File Prior to Visit   Medication Sig   • amLODIPine (NORVASC) 5 MG tablet TAKE ONE TABLET BY MOUTH EVERY DAY   • diazePAM (VALIUM) 5 MG tablet Take 2.5 mg by mouth 2 (Two) Times a Day As Needed for Anxiety.   • doxazosin (CARDURA) 4 MG tablet TAKE ONE TABLET BY MOUTH EVERY NIGHT   • fenofibrate (TRICOR)  145 MG tablet TAKE ONE TABLET BY MOUTH EVERY DAY   • irbesartan (AVAPRO) 300 MG tablet Take 300 mg by mouth Daily.   • mirtazapine (REMERON) 15 MG tablet Take 1 tablet by mouth Every Night.   • Ped Multivitamins-Fl-Iron (MULTIVITAMIN WITH FLUORIDE/IRON) 0.25-10 MG/ML solution solution Take  by mouth Daily.   • pravastatin (PRAVACHOL) 40 MG tablet TAKE ONE TABLET BY MOUTH EVERY DAY   • sildenafil (REVATIO) 20 MG tablet 2-4 tabs as needed   • sildenafil (VIAGRA) 100 MG tablet Take 1 tablet by mouth Daily As Needed for erectile dysfunction.   • triamcinolone (KENALOG) 0.1 % ointment Apply  topically 2 (Two) Times a Day As Needed for Irritation or Rash.   • urea (CARMOL) 20 % cream Apply  topically to the appropriate area as directed As Needed for Dry Skin.   • Omega-3 Fatty Acids (FISH OIL) 1000 MG capsule capsule Take  by mouth Daily With Breakfast.   • [DISCONTINUED] Brexpiprazole (REXULTI) 2 MG tablet Take 1 tablet by mouth Every Morning.   • [DISCONTINUED] furosemide (LASIX) 40 MG tablet TAKE ONE TABLET BY MOUTH EVERY DAY (Patient taking differently: TAKE ONE TABLET BY MOUTH EVERY MWF)   • [DISCONTINUED] potassium chloride (K-DUR,KLOR-CON) 20 MEQ CR tablet mwf     No current facility-administered medications on file prior to visit.        Family History   Problem Relation Age of Onset   • Arthritis Other    • Hypertension Other    • Hyperlipidemia Other    • Heart attack Other    • Hypertension Mother    • Heart disease Father    • Hypertension Father        Social History     Socioeconomic History   • Marital status:      Spouse name: Not on file   • Number of children: Not on file   • Years of education: Not on file   • Highest education level: Not on file   Tobacco Use   • Smoking status: Never Smoker   • Smokeless tobacco: Never Used   Substance and Sexual Activity   • Alcohol use: Yes     Alcohol/week: 2.4 oz     Types: 4 Cans of beer per week   • Drug use: No   • Sexual activity: Defer         Review  "of Systems   Constitutional: Positive for appetite change and fatigue. Negative for chills, fever and unexpected weight change.   HENT: Negative for ear pain, hearing loss, rhinorrhea, sinus pressure, sore throat and trouble swallowing.    Eyes: Negative for discharge and itching.   Respiratory: Negative for cough and chest tightness.    Cardiovascular: Negative for chest pain and palpitations.   Gastrointestinal: Positive for constipation (BETTER). Negative for abdominal pain, blood in stool, diarrhea and vomiting.        2013 colonoscopy normal  9/18 colonoscopy with TA times 1, repeat in 9/21   Endocrine: Negative for polydipsia and polyuria.   Genitourinary: Negative for difficulty urinating, dysuria, enuresis, frequency, hematuria and urgency.        1/19 psa  ED   Musculoskeletal: Positive for arthralgias and neck pain. Negative for back pain, gait problem and joint swelling.   Skin: Negative for rash and wound.        Dry skin   Allergic/Immunologic: Negative for immunocompromised state.   Neurological: Positive for tremors. Negative for dizziness, syncope, weakness, light-headedness, numbness and headaches.   Hematological: Does not bruise/bleed easily.   Psychiatric/Behavioral: Positive for behavioral problems and decreased concentration. Negative for dysphoric mood and sleep disturbance. The patient is nervous/anxious.        /60   Pulse 64   Ht 175.3 cm (69.02\")   Wt 82.1 kg (181 lb)   BMI 26.72 kg/m²       Physical Exam   Constitutional: He is oriented to person, place, and time. He appears well-developed and well-nourished.   HENT:   Head: Normocephalic and atraumatic.   Right Ear: External ear normal.   Left Ear: External ear normal.   Mouth/Throat: Oropharynx is clear and moist.   Eyes: Conjunctivae and EOM are normal. Pupils are equal, round, and reactive to light.   Neck: Normal range of motion. Neck supple.   Cardiovascular: Normal rate, regular rhythm, normal heart sounds and intact " distal pulses.   Pulmonary/Chest: Effort normal and breath sounds normal.   Abdominal: Soft. Bowel sounds are normal.   Musculoskeletal: Normal range of motion. He exhibits edema (trace R>L).   Neurological: He is alert and oriented to person, place, and time. He has normal reflexes.   Skin: Skin is warm and dry.   Dry skin   Psychiatric: He has a normal mood and affect. His behavior is normal. Judgment and thought content normal.   Vitals reviewed.      Procedure:      Discussion/Summary:    HTN-stable on triple tx  hyperlipidemia-cont pravachol/fibrate, labs noted  BPH-stable on cardura  GERD-stable off tx  djd-advised to limit nsaids  edema-not an issue  Hyperbilirubinemia-recheck noted  Thrombocytopenia-recheck noted  ED-viagra rx  ?[place]-[facility]em noted  Situational depression-f/u with Behavioral health at   Constipation-citrucel/miralax combo, stable      labs noted and dw patient, counseled on low carb and low chol    Current Outpatient Medications:   •  amLODIPine (NORVASC) 5 MG tablet, TAKE ONE TABLET BY MOUTH EVERY DAY, Disp: 90 tablet, Rfl: 3  •  diazePAM (VALIUM) 5 MG tablet, Take 2.5 mg by mouth 2 (Two) Times a Day As Needed for Anxiety., Disp: , Rfl:   •  doxazosin (CARDURA) 4 MG tablet, TAKE ONE TABLET BY MOUTH EVERY NIGHT, Disp: 90 tablet, Rfl: 3  •  fenofibrate (TRICOR) 145 MG tablet, TAKE ONE TABLET BY MOUTH EVERY DAY, Disp: 90 tablet, Rfl: 2  •  irbesartan (AVAPRO) 300 MG tablet, Take 300 mg by mouth Daily., Disp: , Rfl: 5  •  mirtazapine (REMERON) 15 MG tablet, Take 1 tablet by mouth Every Night., Disp: 30 tablet, Rfl: 5  •  Ped Multivitamins-Fl-Iron (MULTIVITAMIN WITH FLUORIDE/IRON) 0.25-10 MG/ML solution solution, Take  by mouth Daily., Disp: , Rfl:   •  pravastatin (PRAVACHOL) 40 MG tablet, TAKE ONE TABLET BY MOUTH EVERY DAY, Disp: 90 tablet, Rfl: 3  •  sildenafil (REVATIO) 20 MG tablet, 2-4 tabs as needed, Disp: 90 tablet, Rfl: 5  •  sildenafil (VIAGRA) 100 MG tablet, Take 1 tablet by mouth  Daily As Needed for erectile dysfunction., Disp: 6 tablet, Rfl: 5  •  triamcinolone (KENALOG) 0.1 % ointment, Apply  topically 2 (Two) Times a Day As Needed for Irritation or Rash., Disp: 80 g, Rfl: 5  •  urea (CARMOL) 20 % cream, Apply  topically to the appropriate area as directed As Needed for Dry Skin., Disp: , Rfl:   •  Omega-3 Fatty Acids (FISH OIL) 1000 MG capsule capsule, Take  by mouth Daily With Breakfast., Disp: , Rfl:         Jimmie was seen today for hyperlipidemia and hypertension.    Diagnoses and all orders for this visit:    Essential hypertension    Other hyperlipidemia    Severe obstructive sleep apnea    Seasonal allergic rhinitis due to pollen    Peptic ulceration    Gastroesophageal reflux disease without esophagitis    Tubular adenoma    Situational depression  -     Ambulatory Referral to Psychiatry

## 2019-11-06 ENCOUNTER — TELEPHONE (OUTPATIENT)
Dept: INTERNAL MEDICINE | Facility: CLINIC | Age: 69
End: 2019-11-06

## 2019-11-06 NOTE — TELEPHONE ENCOUNTER
CIARRAI - Called and left Pt a VM. Tried to refer him to Dr. Marcial and I'm not for sure if it's just someone they have looked up and wanted to go there or? But he does not see for this Pt's dx. It says it under his specialty but he doesn't do the treatment side of it. Called and left Pt VM explaining this as well, that they just refer Pt's to  outpatient psych center which I told him about this morning.    Thank you.

## 2019-11-06 NOTE — TELEPHONE ENCOUNTER
Jackie, please have Dr. DORMAN To put in a ref. For Jimmie to Dr. Ron [facility] [phone]. he is a neurological psychologist.    Funmi I placed order for behavioral health for him at  yesterday

## 2019-11-07 DIAGNOSIS — R41.3 MEMORY IMPAIRMENT: Primary | ICD-10-CM

## 2019-11-15 ENCOUNTER — TELEPHONE (OUTPATIENT)
Dept: INTERNAL MEDICINE | Facility: CLINIC | Age: 69
End: 2019-11-15

## 2019-11-15 DIAGNOSIS — R26.81 GAIT INSTABILITY: ICD-10-CM

## 2019-11-15 DIAGNOSIS — G24.01 TARDIVE DYSKINESIA: ICD-10-CM

## 2019-11-15 DIAGNOSIS — R41.3 MEMORY IMPAIRMENT: Primary | ICD-10-CM

## 2019-11-27 ENCOUNTER — TELEPHONE (OUTPATIENT)
Dept: NEUROLOGY | Facility: CLINIC | Age: 69
End: 2019-11-27

## 2019-11-27 NOTE — TELEPHONE ENCOUNTER
"Phone call with wife, Silvana, who contacted me to discuss worsening symptoms and any guidance on getting help with diagnosis. 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 The changes in cogntition and functionality impact his involvement in such activities. She said he has \"tried 14 different meds\" and feels he's getting worse cognitively (foggy, trouble with processing conversations, how to use computer, poor reasoning abilities), looks sad and depressed, apathetic, lost 60lbs as he doesn't have an appetite (he will eat if food put in front of him and she sees that he gets healthy/balanced meals). He struggles with constipation. Physically he is dragging feet, unsteady, left foot shakes starting moment he wakes up. Says he is very fatigued, apathetic not wanting to leave the house- \"says no to going or doing anything but will go\". Voice is lower to where she has him repeat most things, raspy and mumbles. They have an appt with [facility] Jan 2020 and [facility] testing April 2020. I suggested I could conatct Dr. Negron to make an appt for PT,SLP (cognitive rehab and speech) and OT. He is out of the office until Monday but message was sent asking if he would consider a referral to outpatient rehab.   "

## 2019-12-03 DIAGNOSIS — R41.89 COGNITIVE IMPAIRMENT: Primary | ICD-10-CM

## 2019-12-03 DIAGNOSIS — R26.81 GAIT INSTABILITY: ICD-10-CM

## 2019-12-03 DIAGNOSIS — R49.8 IMPAIRED VOCAL QUALITY: ICD-10-CM

## 2019-12-11 ENCOUNTER — OFFICE VISIT (OUTPATIENT)
Dept: PHYSICAL THERAPY | Facility: CLINIC | Age: 69
End: 2019-12-11

## 2019-12-11 DIAGNOSIS — R47.1 DYSARTHRIA: ICD-10-CM

## 2019-12-11 DIAGNOSIS — R49.8 IMPAIRED VOCAL QUALITY: Primary | ICD-10-CM

## 2019-12-11 DIAGNOSIS — R41.3 MEMORY LOSS: ICD-10-CM

## 2019-12-11 PROCEDURE — 92523 SPEECH SOUND LANG COMPREHEN: CPT | Performed by: SPEECH-LANGUAGE PATHOLOGIST

## 2019-12-11 PROCEDURE — 92524 BEHAVRAL QUALIT ANALYS VOICE: CPT | Performed by: SPEECH-LANGUAGE PATHOLOGIST

## 2019-12-11 NOTE — PROGRESS NOTES
Outpatient Speech Language Pathology   Adult Speech Language Cognitive Initial Evaluation       Patient Name: Jimmie Salcedo  : 1950  MRN: [id number]  Today's Date: 2019        Visit Date: 2019   Patient Active Problem List   Diagnosis   • Acid reflux   • Arthritis   • Hypertension   • Hyperlipidemia   • Severe obstructive sleep apnea   • Psoriasis   • Diastolic dysfunction   • Peptic ulceration   • Bilateral edema of lower extremity   • Abnormal liver function test   • Hyperbilirubinemia   • Psychophysiological insomnia   • Gilbert's disease   • Situational depression   • Tubular adenoma   • Seasonal allergic rhinitis due to pollen   • Tardive dyskinesia   • Mild cognitive impairment        Past Medical History:   Diagnosis Date   • Abnormal liver function test    • Arthritis    • Bilateral edema of lower extremity    • Cellulitis of leg, right    • Chalazion    • Hypertension    • Kidney infection    • Neck muscle spasm    • Onychomycosis of toenail    • Peptic ulceration    • Renal calculi    • Situational depression 2018        Past Surgical History:   Procedure Laterality Date   • CYSTOSCOPY W/ LASER LITHOTRIPSY     • FINGER SURGERY      left 5th finger   • KNEE SURGERY Right    • OTHER SURGICAL HISTORY      Lithotripsy   • TONSILLECTOMY           Visit Dx:    ICD-10-CM ICD-9-CM   1. Impaired vocal quality R49.8 784.49   2. Memory loss R41.3 780.93   3. Dysarthria R47.1 784.51           SLP [facility] Evaluation - 19 1245        Communication Assessment/Intervention    Document Type  evaluation   -REAGAN    Subjective Information  no complaints   -REAGAN    Patient Observations  alert;cooperative;agree to therapy   -REAGAN    Patient/Family Observations  pt wife present for evaluation    -REAGAN    Patient Effort  good   -REAGAN    Symptoms Noted During/After Treatment  none   -REAGAN       General Information    Patient Profile Reviewed  yes   -REAGAN    Pertinent History Of Current Problem  This 68 yo was  referred by primary care for evaluation of voice and memory. Pt reports decreased vocal quality and some difficulty with memory that is affecting his daily living. He does report depression and says it has increased recently causing him to retire from his job in March of this year. Pt has flat affect and decreased prosody with limited facial expression observed. Wife and pt report a 50 pound weight loss since March of this year due to decreased appetite. Pt denies difficulty swallowing, however when probed, he did recall having an esophageal dilation in 2000 and has difficulty currently wtih swallowing big bites and some solids which he states 'get stuck'. Recommended pt return to GI for follow up as well as ENT for thorough evaluation of vocal cords due to voice changes also. Pt reports that he is currently not taking any medication for his depression or seeing a therapist stating he is 'on a break' until he 'finds out what is wrong with him'.   Pmhx also includes: Reflux (currently unmanaged per patient) HTN, hyperlipidemia, sleep apnea gilberts disease.    -REAGAN    Precautions/Limitations, Vision  WFL with corrective lenses;for purposes of eval   -REAGAN    Precautions/Limitations, Hearing  WFL;for purposes of eval   -REAGAN    Patient Level of Education  2 years college.    -    Prior Level of Function-Communication  WFL   -    Plans/Goals Discussed with  patient;spouse/S.O.;agreed upon   -    Barriers to Rehab  none identified   -    Patient's Goals for Discharge  functional voice;functional cognition   -       Pain Assessment    Additional Documentation  Pain Scale: Numbers Pre/Post-Treatment (Group)   -REAGAN       Pain Scale: Numbers Pre/Post-Treatment    Pain Scale: Numbers, Pretreatment  0/10 - no pain   -REAGAN    Pain Scale: Numbers, Post-Treatment  0/10 - no pain   -       Comprehension Assessment/Intervention    Comprehension Assessment/Intervention  Auditory Comprehension   -       Auditory Comprehension  Assessment/Intervention    Auditory Comprehension (Communication)  [facility]    Able to Identify Objects/Pictures (Communication)  body part;familiar objects;pictures of common objects;[facility]    Answers Questions (Communication)  yes/no;wh questions;personal;simple;concrete;mulit-unit;[facility]   -    Able to Follow Commands (Communication)  2-step;[facility]   -    Narrative Discourse  simple paragraph level;[facility]   -       Expression Assessment/Intervention    Expression Assessment/Intervention  verbal expression   -       Verbal Expression Assessment/Intervention    Verbal Expression  [facility]   -       Oral Motor Structure and Function    Oral Motor Structure and Function  [facility]   -    Dentition Assessment  natural, present and adequate   -    Mucosal Quality  moist, healthy   -       Oral Musculature and Cranial Nerve Assessment    Oral Motor General Assessment  generalized oral motor weakness   -       Motor Speech Assessment/Intervention    Motor Speech Function  mild impairment   -    Characteristics Consistent with Dysarthria  decreased breath support   -       Cursory Voice Assessment/Intervention    Quality and Resonance (Voice)  hoarse;rough;further voice-assessment warranted;breathy   -    Voice, Comment  voice assessment completed. pt with s/z ratio of 2.8 indicative of further testing with ENT to thoroughly assess vocal cord function and structure.    -REAGAN       Cognitive Assessment Intervention- SLP    Cognitive Function (Cognition)  WFL;mild impairment   -REAGAN    Orientation Status (Cognition)  [facility]    Memory (Cognitive)  simple;mod-complex;functional;immediate;short-term;long-term;delayed;mental manipulation;WFL;mild impairment   -    Attention (Cognitive)  sustained;mild impairment;distracting environment   -    Thought Organization (Cognitive)  concrete divergent;concrete convergent;WFL;mild impairment   -    Reasoning (Cognitive)  mental flexibility;logic/creative thinking;mild  impairment   -ERAGAN    Problem Solving (Cognitive)  multifactorial;temporal;WFL;mild impairment   -REAGAN    Executive Function (Cognition)  planning;time management;home management activities;complex organization;mild impairment   -REAGAN       Standardized Tests    Cognitive/Memory Tests  RBANS: Repeatable Battery for the Assessment of Neuropsychological Status   -       RBANS- Repeatable Battery for the Assessment of Neuropsychological Status    Immediate Memory Index Score  97   -REAGAN    Immediate Memory Qualitative Description  average   -REAGAN    Visuospatial Index Score  89   -REAGAN    Visuospatial Qualitative Description  low average   -REAGAN    Language Index Score  96   -REAGAN    Language Qualitative Description  average   -REAGAN    Attention Index Score  97   -REAGAN    Attention Qualitative Description  average   -REAGAN    Delayed Memory Index Score  88   -REAGAN    Delayed Memory Qualitative Description  borderline   -REAGAN    Total Index Score  467   -REAGAN    Total Percentile  25 %   -REAGAN    Total Qualitative Description  low average   -REAGAN    RBANS Comments  pt performed wfl on most subtests, however, observed to lack motivation for achieving his max potential.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 and overall intelligibility for communicating as well as minimal cognitive communication deficits that interfere with functional activities required for running a household and performing work activities    -REAGAN    Rehab Potential/Prognosis  good   -REAGAN    [facility] Criteria for Skilled Therapy Interventions Met  yes   -REAGAN    Functional Impact  functional impact in social situations;restrictions in personal and social life;difficulty completing vocational tasks;difficulty completing home management task   -REAGAN      User Key  (r) = Recorded By, (t) = Taken By, (c) = Cosigned By    Initials Name Provider Type    REAGAN Zamora  MS Marisabel CCC-SLP Speech and Language Pathologist             Voice - 12/11/19 1300        General Voice History    Reflux History  Reflux is reported to be managed   -REAGAN    Alcohol Servings  occasional   -REAGAN    Daily Water Intake  24 ounces   -REAGAN    Daily Caffeine Intake  0   -REAGAN    Tobacco History  quit in 1980 smoked 1/2 pack per day.    -REAGAN    Pulmonary Status  Obstructive sleep apnea   -REAGAN    Environmental Factors  None reported   -REAGAN    Typical Voice Use  Uses voice for ADL's   -REAGAN    Symptoms Improved/Worsened By  better in afternoon.    -REAGAN    Vocal Abuse/Misuse  Coughing;Unmanaged reflux;Other (comment)    phlegm  -REAGAN    Allergies  none that are reported.    -REAGAN       Voice Assessment    S/Z Ratio and Interpretation  2.31   -REAGAN    Maximum Phonation Time and Interpretation  5.3 sec   -REAGAN    Pitch Glide Characteristics  limited range.    -REAGAN    Pitch Range During Speech  monotone flat affect    -REAGAN    Resonance Characteristics  WFL   -REAGAN    Postural Alignment- Shoulders  mild left lower   -REAGAN    Breath Support- During Sustained Phonation  Clavicular   -REAGAN    Breath Support- During Conversation  Clavicular;Mixed;Abdominal   -REAGAN    Other Voice Observations  harsh raspy   -REAGAN      User Key  (r) = Recorded By, (t) = Taken By, (c) = Cosigned By    Initials Name Provider Type    REAGAN Marisabel Zamora, MS CCC-SLP Speech and Language Pathologist                  OP SLP Education     Row Name 12/11/19 1245       Education    Barriers to Learning  No barriers identified  -REAGAN    Education Provided  Described results of evaluation;Patient requires further education on strategies, risks;Family/caregivers require further education on strategies, risks  -REAGAN    Assessed  Learning needs;Learning motivation;Learning preferences;Learning readiness  -REAGAN    Learning Motivation  Moderate  -REAGAN    Learning Method  Explanation;Demonstration;Teach back  -REAGAN    Teaching Response  Verbalized understanding;Reinforcement needed  -REAGAN     Education Comments  initiated during evaluation  -      User Key  (r) = Recorded By, (t) = Taken By, (c) = Cosigned By    Initials Name Effective Dates    Marisabel Marshall, MS CCC-SLP 08/03/18 -           SLP OP Goals     Row Name 12/11/19 1245          Goal Type Needed    Goal Type Needed  Memory;Voice;Other Adult Goals  -        Memory Goals    Memory LTG's  Patient will be able to remember information needed to participate in avocational activities  -     Memory STG's  Patient’s memory skills will be enhanced as reported by patient by using external memory aides  -REAGAN     Patient’s memory skills will be enhanced as reported by patient by using external memory aides  90%:  -     Status: Patient’s memory skills will be enhanced as reported by patient by using external memory aides  New  -        Voice Goals    Voice LTG's  Patient will maintain a program of good vocal hygiene in all settings by developing an awareness of behaviors and lifestyle  -REAGAN     Patient will maintain a program of good vocal hygiene in all settings by developing an awareness of behaviors and lifestyle.  100%:;with intermittent cues  -REAGAN     Status: Patient will maintain a program of good vocal hygiene in all settings by developing an awareness of behaviors and lifestyle.  New  [facility]     Voice STG's  Patient will institute vocal hygiene technique of increasing water intake per patient report;Patient will institute vocal hygiene technique of replacing cough/throat clear with silent cough or hard swallow;Patient will reduce hyperfunctional use of the vocal mechanism via improved use of diaphragmatic breathing;Patient will identify and discriminate vocal abuse behaviors in situations that promote vocal abuse and misuse as well as identify good vocal hygiene practices, alternatives, and situations;Patient will be able to use a balanced vocal resonance;Pt will demonstrate an understanding of the structures and functions of the  phonatory/respiratory tract (respiration, phonation, resonance and articulation);Patient will reduce hyperfunctional use of voice by performing Vocal Function Exercises  -REAGAN     Status: Patient will institute vocal hygiene technique of increasing water intake per patient report  New  -REAGAN     Patient will institute vocal hygiene technique of replacing cough/throat clear with silent cough or hard swallow  with intermittent cues;___ days:  -REAGAN     Status: Patient will institute vocal hygiene technique of replacing cough/throat clear with silent cough or hard swallow  New  -REAGAN     Patient will reduce hyperfunctional use of the vocal mechanism via improved use of diaphragmatic breathing  90%:  -REAGAN     Status: Patient will reduce hyperfunctional use of the vocal mechanism via improved use of diaphragmatic breathing  New  -REAGAN     Patient will identify and discriminate vocal abuse behaviors in situations that promote vocal abuse and misuse as well as identify good vocal hygiene practices, alternatives, and situations  90%:  -REAGAN     Status: Patient will identify and discriminate vocal abuse behaviors in situations that promote vocal abuse and misuse as well as identify good vocal hygiene practices, alternatives, and situations  New  -REAGAN     Status: Patient will be able to use a balanced vocal resonance  New  -REAGAN     Pt will demonstrate an understanding of the structures and functions of the phonatory/respiratory tract (respiration, phonation, resonance and articulation)  90%:  -REAGAN     Status: Pt will demonstrate an understanding of the structures and functions of the phonatory/respiratory tract (respiration, phonation, resonance and articulation)  New  -REAGAN     Patient will reduce hyperfunctional use of voice by performing Vocal Function Exercises  90%:;with intermittent cues  -REAGAN     Status: Patient will reduce hyperfunctional use of voice by performing Vocal Function Exercises  New  -REAGAN        Other Goals    Other Adult  Goal- 1  Pt will improve visual spatial constructional skills for problem solving for activities of daily living 90% with in cues   -REAGAN     Status: Other Adult Goal- 1  New  -REAGAN     Other Adult Goal- 2  Pt will increase attention to task for completing home tasks such as bill pay, following a recipe, making a grocery list etc. with 80% accuracy with min cues as needed  -REAGAN     Status: Other Adult Goal- 2  New  -REAGAN        SLP Time Calculation    SLP Goal Re-Cert Due Date  03/09/20  -REAGAN       User Key  (r) = Recorded By, (t) = Taken By, (c) = Cosigned By    Initials Name Provider Type    Marisabel Marshall, MS CCC-SLP Speech and Language Pathologist          OP SLP Assessment/Plan - 12/11/19 1361        SLP Assessment    Functional Problems  Speech Language- Adult/Cognition;Voice   -REAGAN    Impact on Function: Adult Speech Language/Cognition  Difficulty participating in avocational activities;Unable to complete specified job requirements;Restrictions in personal and social life   -    Clinical Impression: Speech Language-Adult/Congnition  Minimal:;Mild:;Cognitive Communication Impairment   -REAGAN    Impact on Function- Voice  Restrictions in personal and social life;Difficulty participating in avocational activities   -    Clinical Impression- Voice  Mild voice disorder   -REAGAN    Clinical Impression- PVFM  Does not appear to present with [facility]   -    Functional Problems Comment  pt presents with hoarse vocal quality with decreased breath support affecting his ability to effectively comunicate in all settings. he also exhibits mild cognitive commmunication deficit that affects his ability to complete home management and work related tasks efficiently and safely   -    Clinical Impression Comments  mild cognitive communication deficit; mild mod voice disorder.    -REAGAN    Please refer to paper survey for additional self-reported information  Yes   -REAGAN    Please refer to items scanned into chart for additional diagnostic  informaiton and handouts as provided by clinician  Yes   -REAGAN    Prognosis  Good (comment)   -REAGAN    Patient/caregiver participated in establishment of treatment plan and goals  Yes   -REAGAN    Patient would benefit from skilled therapy intervention  Yes   -REAGAN       SLP Plan    Frequency  1-2x weekly   -REAGAN    Duration  8 weeks   -REAGAN    Planned CPT's?  SLP SPEECH & LANGUAGE EVAL: 06378;SLP VOICE & RESONANCE EVAL: 60081;SLP INDIVIDUAL SPEECH THERAPY: 08631   -REAGAN    Expected Duration Therapy Session - minutes  45-60 minutes   -REAGAN    Plan Comments  initiate plan of care; recommend pt see an ENT for thorough evaluation of vocal cords as well as GI consult for r/o stricture.    -REAGAN      User Key  (r) = Recorded By, (t) = Taken By, (c) = Cosigned By    Initials Name Provider Type    Marisabel Marshall, MS CCC-SLP Speech and Language Pathologist                 Time Calculation:                     Marisabel Zamora MS CCC-SLP  2019 and Outpatient Speech Language Pathology   Adult/Peds Voice Initial Evaluation       Patient Name: Jimmie Salcedo  : 1950  MRN: [id number]  Today's Date: 2019         Visit Date: 2019   Patient Active Problem List   Diagnosis   • Acid reflux   • Arthritis   • Hypertension   • Hyperlipidemia   • Severe obstructive sleep apnea   • Psoriasis   • Diastolic dysfunction   • Peptic ulceration   • Bilateral edema of lower extremity   • Abnormal liver function test   • Hyperbilirubinemia   • Psychophysiological insomnia   • Gilbert's disease   • Situational depression   • Tubular adenoma   • Seasonal allergic rhinitis due to pollen   • Tardive dyskinesia   • Mild cognitive impairment        Past Medical History:   Diagnosis Date   • Abnormal liver function test    • Arthritis    • Bilateral edema of lower extremity    • Cellulitis of leg, right    • Chalazion    • Hypertension    • Kidney infection    • Neck muscle spasm    • Onychomycosis of toenail    • Peptic ulceration    • Renal  calculi    • Situational depression 8/22/2018        Past Surgical History:   Procedure Laterality Date   • CYSTOSCOPY W/ LASER LITHOTRIPSY     • FINGER SURGERY      left 5th finger   • KNEE SURGERY Right 1986   • OTHER SURGICAL HISTORY      Lithotripsy   • TONSILLECTOMY           Visit Dx:    ICD-10-CM ICD-9-CM   1. Impaired vocal quality R49.8 784.49   2. Memory loss R41.3 780.93   3. Dysarthria R47.1 784.51           Voice - 12/11/19 1300        General Voice History    Reflux History  Reflux is reported to be managed   -    Alcohol Servings  occasional   -REAGAN    Daily Water Intake  24 ounces   -REAGAN    Daily Caffeine Intake  0   -REAGAN    Tobacco History  quit in 1980 smoked 1/2 pack per day.    -REAGAN    Pulmonary Status  Obstructive sleep apnea   -REAGAN    Environmental Factors  None reported   -REAGAN    Typical Voice Use  Uses voice for ADL's   -REAGAN    Symptoms Improved/Worsened By  better in afternoon.    -REAGAN    Vocal Abuse/Misuse  Coughing;Unmanaged reflux;Other (comment)    phlegm  -REAGAN    Allergies  none that are reported.    -REAGAN       Voice Assessment    S/Z Ratio and Interpretation  2.31   -REAGAN    Maximum Phonation Time and Interpretation  5.3 sec   -REAGAN    Pitch Glide Characteristics  limited range.    -REAGAN    Pitch Range During Speech  monotone flat affect    -REAGAN    Resonance Characteristics  WFL   -REAGAN    Postural Alignment- Shoulders  mild left lower   -REAGAN    Breath Support- During Sustained Phonation  Clavicular   -REAGAN    Breath Support- During Conversation  Clavicular;Mixed;Abdominal   -REAGAN    Other Voice Observations  harsh raspy   -REAGAN      User Key  (r) = Recorded By, (t) = Taken By, (c) = Cosigned By    Initials Name Provider Type    REAGAN Marisabel Zamora, MS CCC-SLP Speech and Language Pathologist          SLP [facility] Evaluation - 12/11/19 1245        Communication Assessment/Intervention    Document Type  evaluation   -REAGAN    Subjective Information  no complaints   -REAGAN    Patient Observations  alert;cooperative;agree to  therapy   -REAGAN    Patient/Family Observations  pt wife present for evaluation    -REAGAN    Patient Effort  good   -REAGAN    Symptoms Noted During/After Treatment  none   -REAGAN       General Information    Patient Profile Reviewed  yes   -REAGAN    Pertinent History Of Current Problem  This 68 yo was referred by primary care for evaluation of voice and memory. Pt reports decreased vocal quality and some difficulty with memory that is affecting his daily living. He does report depression and says it has increased recently causing him to retire from his job in March of this year. Pt has flat affect and decreased prosody with limited facial expression observed. Wife and pt report a 50 pound weight loss since March of this year due to decreased appetite. Pt denies difficulty swallowing, however when probed, he did recall having an esophageal dilation in 2000 and has difficulty currently wtih swallowing big bites and some solids which he states 'get stuck'. Recommended pt return to GI for follow up as well as ENT for thorough evaluation of vocal cords due to voice changes also. Pt reports that he is currently not taking any medication for his depression or seeing a therapist stating he is 'on a break' until he 'finds out what is wrong with him'.   Pmhx also includes: Reflux (currently unmanaged per patient) HTN, hyperlipidemia, sleep apnea gilberts disease.    -REAGAN    Precautions/Limitations, Vision  WFL with corrective lenses;for purposes of eval   -REAGAN    Precautions/Limitations, Hearing  WFL;for purposes of eval   -REAGAN    Patient Level of Education  2 years college.    -REAGAN    Prior Level of Function-Communication  WFL   -REAGAN    Plans/Goals Discussed with  patient;spouse/S.O.;agreed upon   -REAGAN    Barriers to Rehab  none identified   -REAGAN    Patient's Goals for Discharge  functional voice;functional cognition   -REAGAN       Pain Assessment    Additional Documentation  Pain Scale: Numbers Pre/Post-Treatment (Group)   -REAGAN       Pain Scale:  Numbers Pre/Post-Treatment    Pain Scale: Numbers, Pretreatment  0/10 - no pain   -REAGAN    Pain Scale: Numbers, Post-Treatment  0/10 - no pain   -REAGAN       Comprehension Assessment/Intervention    Comprehension Assessment/Intervention  Auditory Comprehension   -REAGAN       Auditory Comprehension Assessment/Intervention    Auditory Comprehension (Communication)  [facility]   -REAGAN    Able to Identify Objects/Pictures (Communication)  body part;familiar objects;pictures of common objects;[facility]   -    Answers Questions (Communication)  yes/no;wh questions;personal;simple;concrete;mulit-unit;[facility]   -REAGAN    Able to Follow Commands (Communication)  2-step;[facility]   -    Narrative Discourse  simple paragraph level;[facility]   -REAGAN       Expression Assessment/Intervention    Expression Assessment/Intervention  verbal expression   -REAGAN       Verbal Expression Assessment/Intervention    Verbal Expression  [facility]   -REAGAN       Oral Motor Structure and Function    Oral Motor Structure and Function  [facility]   -    Dentition Assessment  natural, present and adequate   -REAGAN    Mucosal Quality  moist, healthy   -REAGAN       Oral Musculature and Cranial Nerve Assessment    Oral Motor General Assessment  generalized oral motor weakness   -REAGAN       Motor Speech Assessment/Intervention    Motor Speech Function  mild impairment   -REAGAN    Characteristics Consistent with Dysarthria  decreased breath support   -REAGAN       Cursory Voice Assessment/Intervention    Quality and Resonance (Voice)  hoarse;rough;further voice-assessment warranted;breathy   -REAGAN    Voice, Comment  voice assessment completed. pt with s/z ratio of 2.8 indicative of further testing with ENT to thoroughly assess vocal cord function and structure.    -REAGAN       Cognitive Assessment Intervention- SLP    Cognitive Function (Cognition)  WFL;mild impairment   -REAGAN    Orientation Status (Cognition)  [facility]   -    Memory (Cognitive)  simple;mod-complex;functional;immediate;short-term;long-term;delayed;mental  manipulation;WFL;mild impairment   -REAGAN    Attention (Cognitive)  sustained;mild impairment;distracting environment   -REAGAN    Thought Organization (Cognitive)  concrete divergent;concrete convergent;WFL;mild impairment   -REAGAN    Reasoning (Cognitive)  mental flexibility;logic/creative thinking;mild impairment   -REAGAN    Problem Solving (Cognitive)  multifactorial;temporal;WFL;mild impairment   -REAGAN    Executive Function (Cognition)  planning;time management;home management activities;complex organization;mild impairment   -REAGAN       Standardized Tests    Cognitive/Memory Tests  RBANS: Repeatable Battery for the Assessment of Neuropsychological Status   -       RBANS- Repeatable Battery for the Assessment of Neuropsychological Status    Immediate Memory Index Score  97   -REAGAN    Immediate Memory Qualitative Description  average   -REAGAN    Visuospatial Index Score  89   -REAGAN    Visuospatial Qualitative Description  low average   -REAGAN    Language Index Score  96   -REAGAN    Language Qualitative Description  average   -REAGAN    Attention Index Score  97   -REAGAN    Attention Qualitative Description  average   -REAGAN    Delayed Memory Index Score  88   -REAGAN    Delayed Memory Qualitative Description  borderline   -    Total Index Score  467   -    Total Percentile  25 %   -    Total Qualitative Description  low average   -REAGAN    RBANS Comments  pt performed wfl on most subtests, however, observed to lack motivation for achieving his max potential.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 and overall intelligibility for communicating as well as minimal cognitive communication deficits that interfere with functional activities required for running a household and performing work activities    -    Rehab Potential/Prognosis  good   -[facility] Criteria for Skilled Therapy Interventions Met  yes   -REAGAN     Functional Impact  functional impact in social situations;restrictions in personal and social life;difficulty completing vocational tasks;difficulty completing home management task   -REAGAN      User Key  (r) = Recorded By, (t) = Taken By, (c) = Cosigned By    Initials Name Provider Type    Marisabel Marshall MS CCC-SLP Speech and Language Pathologist                    OP SLP Education     Row Name 12/11/19 1245       Education    Barriers to Learning  No barriers identified  -REAGAN    Education Provided  Described results of evaluation;Patient requires further education on strategies, risks;Family/caregivers require further education on strategies, risks  -REAGAN    Assessed  Learning needs;Learning motivation;Learning preferences;Learning readiness  -REAGAN    Learning Motivation  Moderate  -REAGAN    Learning Method  Explanation;Demonstration;Teach back  -REAGAN    Teaching Response  Verbalized understanding;Reinforcement needed  -REAGAN    Education Comments  initiated during evaluation  -REAGAN      User Key  (r) = Recorded By, (t) = Taken By, (c) = Cosigned By    Initials Name Effective Dates    Marisabel Marshall MS CCC-SLP 08/03/18 -           SLP OP Goals     Row Name 12/11/19 1245          Goal Type Needed    Goal Type Needed  Memory;Voice;Other Adult Goals  -REAGAN        Memory Goals    Memory LTG's  Patient will be able to remember information needed to participate in avocational activities  -REAGAN     Memory STG's  Patient’s memory skills will be enhanced as reported by patient by using external memory aides  -REAGAN     Patient’s memory skills will be enhanced as reported by patient by using external memory aides  90%:  -REAGAN     Status: Patient’s memory skills will be enhanced as reported by patient by using external memory aides  New  -REAGAN        Voice Goals    Voice LTG's  Patient will maintain a program of good vocal hygiene in all settings by developing an awareness of behaviors and lifestyle  -REAGAN     Patient will maintain a program of  good vocal hygiene in all settings by developing an awareness of behaviors and lifestyle.  100%:;with intermittent cues  -REAGAN     Status: Patient will maintain a program of good vocal hygiene in all settings by developing an awareness of behaviors and lifestyle.  New  -     Voice STG's  Patient will institute vocal hygiene technique of increasing water intake per patient report;Patient will institute vocal hygiene technique of replacing cough/throat clear with silent cough or hard swallow;Patient will reduce hyperfunctional use of the vocal mechanism via improved use of diaphragmatic breathing;Patient will identify and discriminate vocal abuse behaviors in situations that promote vocal abuse and misuse as well as identify good vocal hygiene practices, alternatives, and situations;Patient will be able to use a balanced vocal resonance;Pt will demonstrate an understanding of the structures and functions of the phonatory/respiratory tract (respiration, phonation, resonance and articulation);Patient will reduce hyperfunctional use of voice by performing Vocal Function Exercises  -REAGAN     Status: Patient will institute vocal hygiene technique of increasing water intake per patient report  New  -     Patient will institute vocal hygiene technique of replacing cough/throat clear with silent cough or hard swallow  with intermittent cues;___ days:  -REAGAN     Status: Patient will institute vocal hygiene technique of replacing cough/throat clear with silent cough or hard swallow  New  -     Patient will reduce hyperfunctional use of the vocal mechanism via improved use of diaphragmatic breathing  90%:  -REAGAN     Status: Patient will reduce hyperfunctional use of the vocal mechanism via improved use of diaphragmatic breathing  New  -     Patient will identify and discriminate vocal abuse behaviors in situations that promote vocal abuse and misuse as well as identify good vocal hygiene practices, alternatives, and situations   90%:  -REAGAN     Status: Patient will identify and discriminate vocal abuse behaviors in situations that promote vocal abuse and misuse as well as identify good vocal hygiene practices, alternatives, and situations  New  -REAGAN     Status: Patient will be able to use a balanced vocal resonance  New  -REAGAN     Pt will demonstrate an understanding of the structures and functions of the phonatory/respiratory tract (respiration, phonation, resonance and articulation)  90%:  -REAGAN     Status: Pt will demonstrate an understanding of the structures and functions of the phonatory/respiratory tract (respiration, phonation, resonance and articulation)  New  -REAGAN     Patient will reduce hyperfunctional use of voice by performing Vocal Function Exercises  90%:;with intermittent cues  -REAGAN     Status: Patient will reduce hyperfunctional use of voice by performing Vocal Function Exercises  New  -REAGNA        Other Goals    Other Adult Goal- 1  Pt will improve visual spatial constructional skills for problem solving for activities of daily living 90% with in cues   -REAGAN     Status: Other Adult Goal- 1  New  -REAGAN     Other Adult Goal- 2  Pt will increase attention to task for completing home tasks such as bill pay, following a recipe, making a grocery list etc. with 80% accuracy with min cues as needed  -REAGAN     Status: Other Adult Goal- 2  New  -REAGAN        SLP Time Calculation    SLP Goal Re-Cert Due Date  03/09/20  -REAGAN       User Key  (r) = Recorded By, (t) = Taken By, (c) = Cosigned By    Initials Name Provider Type    Marisabel Marshall MS CCC-SLP Speech and Language Pathologist          OP SLP Assessment/Plan - 12/11/19 2403        SLP Assessment    Functional Problems  Speech Language- Adult/Cognition;Voice   -REAGAN    Impact on Function: Adult Speech Language/Cognition  Difficulty participating in avocational activities;Unable to complete specified job requirements;Restrictions in personal and social life   -REAGAN    Clinical Impression: Speech  Language-Adult/Congnition  Minimal:;Mild:;Cognitive Communication Impairment   -REAGAN    Impact on Function- Voice  Restrictions in personal and social life;Difficulty participating in avocational activities   -    Clinical Impression- Voice  Mild voice disorder   -REAGAN    Clinical Impression- PVFM  Does not appear to present with PVFM   -REAGAN    Functional Problems Comment  pt presents with hoarse vocal quality with decreased breath support affecting his ability to effectively comunicate in all settings. he also exhibits mild cognitive commmunication deficit that affects his ability to complete home management and work related tasks efficiently and safely   -REAGAN    Clinical Impression Comments  mild cognitive communication deficit; mild mod voice disorder.    -REAGAN    Please refer to paper survey for additional self-reported information  Yes   -REAGAN    Please refer to items scanned into chart for additional diagnostic informaiton and handouts as provided by clinician  Yes   -REAGAN    Prognosis  Good (comment)   -REAGAN    Patient/caregiver participated in establishment of treatment plan and goals  Yes   -REAGAN    Patient would benefit from skilled therapy intervention  Yes   -REAGAN       SLP Plan    Frequency  1-2x weekly   -REAGAN    Duration  8 weeks   -REAGAN    Planned CPT's?  SLP SPEECH & LANGUAGE EVAL: 45078;SLP VOICE & RESONANCE EVAL: 84974;SLP INDIVIDUAL SPEECH THERAPY: 45471   -REAGAN    Expected Duration Therapy Session - minutes  45-60 minutes   -REAGAN    Plan Comments  initiate plan of care; recommend pt see an ENT for thorough evaluation of vocal cords as well as GI consult for r/o stricture.    -REAGAN      User Key  (r) = Recorded By, (t) = Taken By, (c) = Cosigned By    Initials Name Provider Type    Marisabel Marshall, MS CCC-SLP Speech and Language Pathologist          Marisabel Zamora, MS CCC-SLP  12/11/2019

## 2019-12-13 ENCOUNTER — APPOINTMENT (OUTPATIENT)
Dept: LAB | Facility: HOSPITAL | Age: 69
End: 2019-12-13

## 2019-12-13 ENCOUNTER — OFFICE VISIT (OUTPATIENT)
Dept: NEUROLOGY | Facility: CLINIC | Age: 69
End: 2019-12-13

## 2019-12-13 VITALS — HEART RATE: 75 BPM | HEIGHT: 69 IN | OXYGEN SATURATION: 99 % | WEIGHT: 181 LBS | BODY MASS INDEX: 26.81 KG/M2

## 2019-12-13 DIAGNOSIS — R41.3 MEMORY LOSS: Primary | ICD-10-CM

## 2019-12-13 LAB
AMMONIA BLD-SCNC: 37 UMOL/L (ref 16–60)
HIV1+2 AB SER QL: NORMAL

## 2019-12-13 PROCEDURE — 82175 ASSAY OF ARSENIC: CPT | Performed by: PSYCHIATRY & NEUROLOGY

## 2019-12-13 PROCEDURE — 83825 ASSAY OF MERCURY: CPT | Performed by: PSYCHIATRY & NEUROLOGY

## 2019-12-13 PROCEDURE — 86592 SYPHILIS TEST NON-TREP QUAL: CPT | Performed by: PSYCHIATRY & NEUROLOGY

## 2019-12-13 PROCEDURE — 82140 ASSAY OF AMMONIA: CPT | Performed by: PSYCHIATRY & NEUROLOGY

## 2019-12-13 PROCEDURE — 86618 LYME DISEASE ANTIBODY: CPT | Performed by: PSYCHIATRY & NEUROLOGY

## 2019-12-13 PROCEDURE — 87798 DETECT AGENT NOS DNA AMP: CPT | Performed by: PSYCHIATRY & NEUROLOGY

## 2019-12-13 PROCEDURE — G0432 EIA HIV-1/HIV-2 SCREEN: HCPCS | Performed by: PSYCHIATRY & NEUROLOGY

## 2019-12-13 PROCEDURE — 83655 ASSAY OF LEAD: CPT | Performed by: PSYCHIATRY & NEUROLOGY

## 2019-12-13 PROCEDURE — 36415 COLL VENOUS BLD VENIPUNCTURE: CPT | Performed by: PSYCHIATRY & NEUROLOGY

## 2019-12-13 PROCEDURE — 99214 OFFICE O/P EST MOD 30 MIN: CPT | Performed by: PSYCHIATRY & NEUROLOGY

## 2019-12-13 RX ORDER — NAPROXEN 500 MG/1
TABLET ORAL
COMMUNITY
Start: 2015-11-30 | End: 2020-03-11

## 2019-12-13 RX ORDER — AMLODIPINE BESYLATE 10 MG/1
TABLET ORAL
COMMUNITY
Start: 2015-11-24 | End: 2020-07-08

## 2019-12-13 RX ORDER — FUROSEMIDE 40 MG/1
TABLET ORAL
COMMUNITY
End: 2020-03-11

## 2019-12-13 RX ORDER — FLUOCINONIDE TOPICAL SOLUTION USP, 0.05% 0.5 MG/ML
SOLUTION TOPICAL
COMMUNITY
Start: 2013-11-05 | End: 2020-07-08

## 2019-12-13 RX ORDER — LISINOPRIL AND HYDROCHLOROTHIAZIDE 20; 12.5 MG/1; MG/1
TABLET ORAL
COMMUNITY
Start: 2013-12-02 | End: 2020-03-11

## 2019-12-13 RX ORDER — POTASSIUM CHLORIDE 20 MEQ/1
TABLET, EXTENDED RELEASE ORAL
COMMUNITY
End: 2020-03-11

## 2019-12-13 RX ORDER — DOXAZOSIN 2 MG/1
TABLET ORAL
COMMUNITY
Start: 2015-11-12 | End: 2020-07-08

## 2019-12-13 RX ORDER — LOVASTATIN 20 MG/1
TABLET ORAL
COMMUNITY
Start: 2015-11-30 | End: 2020-03-11

## 2019-12-13 RX ORDER — BISOPROLOL FUMARATE 5 MG/1
TABLET, FILM COATED ORAL
COMMUNITY
End: 2020-07-08

## 2019-12-13 RX ORDER — PYRITHIONE ZINC 0.25 %
SPRAY, NON-AEROSOL (ML) TOPICAL
COMMUNITY
Start: 2015-05-12 | End: 2020-07-08

## 2019-12-13 RX ORDER — DULOXETIN HYDROCHLORIDE 30 MG/1
CAPSULE, DELAYED RELEASE ORAL
COMMUNITY
End: 2019-12-13

## 2019-12-13 RX ORDER — GABAPENTIN 100 MG/1
CAPSULE ORAL
COMMUNITY
End: 2019-12-13

## 2019-12-13 RX ORDER — CALCIPOTRIENE 50 UG/G
OINTMENT TOPICAL AS NEEDED
COMMUNITY
Start: 2015-10-07

## 2019-12-13 RX ORDER — EPINEPHRINE 0.3 MG/.3ML
INJECTION SUBCUTANEOUS
COMMUNITY
Start: 2015-11-12 | End: 2020-07-08

## 2019-12-13 RX ORDER — CHLORAL HYDRATE 500 MG
1000 CAPSULE ORAL DAILY
COMMUNITY

## 2019-12-13 RX ORDER — ARIPIPRAZOLE 5 MG/1
TABLET ORAL
COMMUNITY
End: 2019-12-13

## 2019-12-13 RX ORDER — DOXYCYCLINE 50 MG/1
TABLET ORAL
COMMUNITY
Start: 2015-11-10 | End: 2020-03-11

## 2019-12-13 RX ORDER — CLONIDINE HYDROCHLORIDE 0.1 MG/1
TABLET ORAL
COMMUNITY
End: 2020-03-11

## 2019-12-13 RX ORDER — FENOFIBRATE 160 MG/1
TABLET ORAL
COMMUNITY
Start: 2015-11-12 | End: 2020-03-11 | Stop reason: SDUPTHER

## 2019-12-13 NOTE — PROGRESS NOTES
"Jimmie Salcedo    Subjective     CC: cognitive impairment    History of Present Illness   Jimmie Salcedo returns to clinic today with a history of cognitive and personality changes. His wife noted a decline starting around March, 2019. Initially he seemed less interested in community activities, and he was less organized. The history is difficult, but his wife has noted a marked decline over time with significant apathy, personality change and forgetfulness. He is more stoic, showing no emotion. He is anhedonic. He has difficulty with decisions. He states that he cannot or won't do things. He is more easily irritated. At times he seems to have significant anxiety.    He has been treated for depression with numerous medications, thought without apparent benefit. He has been hospitalized in May, June and July, including at  for depression. Evaluation by  neurology has apparently been unrevealing.    Evaluation has included a normal MRI of the brain and screening labs.    His FH is notable for a father with PD and 2 great-uncles on his paternal side with ALS.    I have reviewed and confirmed the past family, social and medical history as accurate on 12/13/19.     Review of Systems   Constitutional: Negative.    Respiratory: Negative.    Cardiovascular: Negative.    Gastrointestinal: Negative.    Genitourinary: Negative.    Musculoskeletal: Negative.    Psychiatric/Behavioral: Positive for dysphoric mood.   All other systems reviewed and are negative.      Objective     Pulse 75   Ht 175.3 cm (69\")   Wt 82.1 kg (181 lb)   SpO2 99%   BMI 26.73 kg/m²      Neurologic Exam     Mental Status   Oriented to person, place, and time.   Registration: recalls 3 of 3 objects. Recall at 5 minutes: recalls 3 of 3 objects. Follows 3 step commands.   Attention: normal.   Speech: speech is normal   Level of consciousness: alert  Knowledge: good.   Able to name object. Able to read. Able to repeat. Able to write. Normal " comprehension.     Cranial Nerves   Cranial nerves II through XII intact.     Motor Exam   Muscle bulk: normal  Overall muscle tone: normal    Strength   Strength 5/5 throughout.     Sensory Exam   Light touch normal.     Gait, Coordination, and Reflexes     Gait  Gait: normal    Coordination   Finger to nose coordination: normal    Reflexes   Right brachioradialis: 1+  Left brachioradialis: 1+  Right biceps: 1+  Left biceps: 1+  Right triceps: 1+  Left triceps: 1+  Right patellar: 1+  Left patellar: 1+  Right achilles: 1+  Left achilles: 1+No primitive reflexes elicited       MMSE=30      Assessment/Plan   Jimmie was seen today for memory loss and follow-up.    Diagnoses and all orders for this visit:    Memory loss  -     B. Burgdorferi Antibodies, WB Reflex  -     Ammonia  -     RPR  -     HIV-1 / O / 2 Ag / Antibody 4th Generation  -     Heavy Metals, Blood  -     Tropheryma Whipplei PCR  -     EEG Awake or Asleep Routine  -     CT Lumbar Puncture Diagnostic; Future          Jimmie Salcedo returns to clinic today with a history of cognitive impairment and personality changes. While I'm unconvinced I'll find a neurologic etiology, I would like to pursue a fairly aggressive evaluation. For now I'll request additional labs and a CSF examination. If these are normal, however, then I would recommend re-focusing back on depression. However, I also raised the possibility of referral for a higher level opinion at the [facility] as well.    As part of this visit I reviewed prior lab results, reviewed radiology results, reviewed radiology images and obtained additional history from the family which is incorporated in the HPI.    Ck Casiano MD

## 2019-12-15 LAB — RPR SER QL: NORMAL

## 2019-12-16 LAB
B BURGDOR IGG+IGM SER-ACNC: <0.91 ISR (ref 0–0.9)
B BURGDOR IGM SER-ACNC: <0.8 INDEX (ref 0–0.79)

## 2019-12-17 ENCOUNTER — TREATMENT (OUTPATIENT)
Dept: PHYSICAL THERAPY | Facility: CLINIC | Age: 69
End: 2019-12-17

## 2019-12-17 ENCOUNTER — OFFICE VISIT (OUTPATIENT)
Dept: PHYSICAL THERAPY | Facility: CLINIC | Age: 69
End: 2019-12-17

## 2019-12-17 DIAGNOSIS — R26.81 GAIT INSTABILITY: Primary | ICD-10-CM

## 2019-12-17 DIAGNOSIS — R49.8 IMPAIRED VOCAL QUALITY: Primary | ICD-10-CM

## 2019-12-17 DIAGNOSIS — R47.1 DYSARTHRIA: ICD-10-CM

## 2019-12-17 DIAGNOSIS — R41.3 MEMORY LOSS: ICD-10-CM

## 2019-12-17 LAB
ARSENIC BLD-MCNC: 4 UG/L (ref 2–23)
LEAD BLD-MCNC: 2 UG/DL (ref 0–4)
MERCURY BLD-MCNC: NORMAL UG/L (ref 0–14.9)

## 2019-12-17 PROCEDURE — 97162 PT EVAL MOD COMPLEX 30 MIN: CPT | Performed by: PHYSICAL THERAPIST

## 2019-12-17 PROCEDURE — 97110 THERAPEUTIC EXERCISES: CPT | Performed by: PHYSICAL THERAPIST

## 2019-12-17 PROCEDURE — 92507 TX SP LANG VOICE COMM INDIV: CPT | Performed by: SPEECH-LANGUAGE PATHOLOGIST

## 2019-12-17 NOTE — PROGRESS NOTES
Physical Therapy Initial Evaluation and Plan of Care      Patient: Jimmie Salcedo   : 1950  Diagnosis/ICD-10 Code:  Gait instability [R26.81]  Referring practitioner: Javad Negron MD  Date of Initial Visit: 2019  Today's Date: 2019  Patient seen for 1 sessions           Subjective Questionnaire: FGA   TUG 8.34 sec  10 Meter 6.44 sec      Subjective Evaluation    History of Present Illness  Date of onset: 2019  Mechanism of injury: Patient relates that he is seeing Dr. Casiano for depression and neurological issues. Patient relates he walks the dog every day and that is the only physical activity he gets. He is retired from retail. He states he doesn't feel as balanced as he should be and his gait isn't as normal. Patient relates he has also been hospitalized for depression medication stabilization. He is able to manage ADL's independently. Pt lives with wife in a 1 story home with a few steps in the front of the house. He denies issues with these. Denies any falls. He would like to get back to [employer]. He does sit the majority of the day about 1-2 hours at a time.     Quality of life: fair    Pain  Current pain ratin  At best pain ratin  At worst pain ratin  Location: low back   Quality: dull ache    Social Support  Lives in: one-story house    Patient Goals  Patient goals for therapy: increased strength             Objective       Strength/Myotome Testing     Left Shoulder     Planes of Motion   Flexion: 4   Abduction: 4     Right Shoulder     Planes of Motion   Flexion: 4   Abduction: 4     Left Elbow   Flexion: 4  Extension: 4    Right Elbow   Flexion: 4  Extension: 4    Left Hip   Planes of Motion   Flexion: 4  Extension: 4-  Abduction: 4-  Adduction: 3+    Right Hip   Planes of Motion   Flexion: 4  Extension: 4-  Abduction: 4-  Adduction: 4-    Left Knee   Flexion: 4+  Extension: 5    Right Knee   Flexion: 4+  Extension: 5    Left Ankle/Foot   Dorsiflexion:  5    Right Ankle/Foot   Dorsiflexion: 5    Ambulation     Comments   Pt demonstrates a normalized gait pattern.        PT Neuro         Assessment & Plan     Assessment  Impairments: activity intolerance, impaired balance and impaired physical strength  Assessment details: Patient is a 69 YOM that presents with weakness and deconditioning to worsening of depression symptoms. He is able to walk normally and balance within normal limits, however he is globally weak thoughout LE's due to his sedentary nature. Patient will require skilled PT intervention to address deficits in order to improve function.   Prognosis: fair  Functional Limitations: walking and standing  Goals  Plan Goals: STG (4 visits)  1. Patient will report compliance with initial HEP.   2. Patient will demonstrate improved global LE strength by 1/2 muscle grade throughout.     LTG (8 visits)  1. Patient will be I with final HEP.   3. Patient to perform TUG within 7 sec without LOB for improved functional mobility.  4. Patient to ambulate 10 meters without AD within 6 sec without LOB for improved gait radha and functional mobility.  5. Patient to improve FGA score to >/= 30/30 to decrease client's risk of falls.        Plan  Therapy options: will be seen for skilled physical therapy services  Planned modality interventions: TENS  Planned therapy interventions: ADL retraining, balance/weight-bearing training, flexibility, gait training, manual therapy, neuromuscular re-education, motor coordination training, postural training, strengthening, stretching, therapeutic activities, transfer training and home exercise program  Frequency: 1x week  Duration in visits: 8  Treatment plan discussed with: patient and caregiver  Plan details: Patient will be seen 1x/wk x 8wks with treatment to include strengthening, stretching, manual therapy, neuromuscular re-education, balance, gait and endurance training.           Timed:  Manual Therapy:    0     mins   97377;  Therapeutic Exercise:    10     mins  75401;     Neuromuscular Amy:    0    mins  62862;    Therapeutic Activity:     0     mins  53281;     Gait Trainin     mins  57269;     Ultrasound:     0     mins  18163;    Electrical Stimulation:    0     mins  33594 ( );    Untimed:  Electrical Stimulation:    0     mins  02610 ( );  Mechanical Traction:    0     mins  24424;     Timed Treatment:   10   mins   Total Treatment:     45   mins    PT SIGNATURE: Bernie Daniel, PT, DPT, MSCS, CDP  DATE TREATMENT INITIATED: 2019    Initial Certification Certification Period: 3/16/2020  I certify that the therapy services are furnished while this patient is under my care.  The services outlined above are required by this patient, and will be reviewed every 90 days.     PHYSICIAN: Javad Negron MD      DATE:     Please sign and return via fax to [phone]..   Thank you,   [facility].

## 2019-12-18 NOTE — PROGRESS NOTES
Outpatient Speech Language Pathology   Adult Speech Language Cognitive Treatment Note       Patient Name: Jimmie Salcedo  : 1950  MRN: [id number]  Today's Date: 2019         Visit Date: 2019   Patient Active Problem List   Diagnosis   • Acid reflux   • Arthritis   • Hypertension   • Hyperlipidemia   • Severe obstructive sleep apnea   • Psoriasis   • Diastolic dysfunction   • Peptic ulceration   • Bilateral edema of lower extremity   • Abnormal liver function test   • Hyperbilirubinemia   • Psychophysiological insomnia   • Gilbert's disease   • Situational depression   • Tubular adenoma   • Seasonal allergic rhinitis due to pollen   • Tardive dyskinesia   • Mild cognitive impairment   • Memory loss          Visit Dx:    ICD-10-CM ICD-9-CM   1. Impaired vocal quality R49.8 784.49   2. Memory loss R41.3 780.93   3. Dysarthria R47.1 784.51                         SLP OP Goals     Row Name 19 1515          Goal Type Needed    Goal Type Needed  Memory;Dysarthria;Voice;Other Adult Goals  -REAGAN        Subjective Comments    Subjective Comments  pt very cooperative for therapy activities  -REAGAN        Memory Goals    Memory LTG's  Patient will be able to remember information needed to participate in avocational activities  -REAGAN     Status: Patient will be able to remember information needed to participate in avocational activities  Progressing as expected  -REAGAN     Comments: Patient will be able to remember information needed to participate in avocational activities  : initiated.   -REAGAN     Memory STG's  Patient’s memory skills will be enhanced as reported by patient by using external memory aides  -REAGAN     Patient’s memory skills will be enhanced as reported by patient by using external memory aides  90%:  -REAGAN     Status: Patient’s memory skills will be enhanced as reported by patient by using external memory aides  Progressing as expected  -REAGAN     Comments: Patient’s memory skills will be enhanced  as reported by patient by using external memory aides  12/17: reviewed testing results with patient.   -REAGAN        Voice Goals    Patient will maintain a program of good vocal hygiene in all settings by developing an awareness of behaviors and lifestyle.  100%:;with intermittent cues  -REAGAN     Status: Patient will maintain a program of good vocal hygiene in all settings by developing an awareness of behaviors and lifestyle.  Progressing as expected  -REAGAN     Comments: Patient will maintain a program of good vocal hygiene in all settings by developing an awareness of behaviors and lifestyle.  12/17: introduced vocal hygiene strategies and gave handout. Discussed alternative of throat clearing for increasing vocal hygiene.   -REAGAN     Status: Patient will institute vocal hygiene technique of increasing water intake per patient report  Progressing as expected  -REAGAN     Comments: Patient will institute vocal hygiene technique of increasing water intake per patient report  12/17: discussed importance of increasing water intake to 8 glasses per day. pt agrees.   -REAGAN     Patient will institute vocal hygiene technique of replacing cough/throat clear with silent cough or hard swallow  with intermittent cues;___ days:  -REAGAN     Status: Patient will institute vocal hygiene technique of replacing cough/throat clear with silent cough or hard swallow  Progressing as expected  -REAGAN     Comments: Patient will institute vocal hygiene technique of replacing cough/throat clear with silent cough or hard swallow  12/17: introduced today with cues to replace during session.   -REAGAN     Patient will reduce hyperfunctional use of the vocal mechanism via improved use of diaphragmatic breathing  90%:  -REAGAN     Status: Patient will reduce hyperfunctional use of the vocal mechanism via improved use of diaphragmatic breathing  Progressing as expected  -REAGAN     Comments: Patient will reduce hyperfunctional use of the vocal mechanism via improved use of  diaphragmatic breathing  12/17: introduced diaphragmatic breathing technique  -REAGAN     Patient will identify and discriminate vocal abuse behaviors in situations that promote vocal abuse and misuse as well as identify good vocal hygiene practices, alternatives, and situations  90%:  -REAGAN     Status: Patient will identify and discriminate vocal abuse behaviors in situations that promote vocal abuse and misuse as well as identify good vocal hygiene practices, alternatives, and situations  Progressing as expected  -REAGAN     Comments: Patient will identify and discriminate vocal abuse behaviors in situations that promote vocal abuse and misuse as well as identify good vocal hygiene practices, alternatives, and situations  12/17: introduced; discussed reflux precautions.   -REAGAN     Status: Patient will be able to use a balanced vocal resonance  New  -REAGAN     Pt will demonstrate an understanding of the structures and functions of the phonatory/respiratory tract (respiration, phonation, resonance and articulation)  90%:  -REAGAN     Status: Pt will demonstrate an understanding of the structures and functions of the phonatory/respiratory tract (respiration, phonation, resonance and articulation)  New  -REAGAN     Patient will reduce hyperfunctional use of voice by performing Vocal Function Exercises  90%:;with intermittent cues  -REAGAN     Status: Patient will reduce hyperfunctional use of voice by performing Vocal Function Exercises  New  -REAGAN        Other Goals    Other Adult Goal- 1  Pt will improve visual spatial constructional skills for problem solving for activities of daily living 90% with in cues   -REAGAN     Status: Other Adult Goal- 1  New  -REAGAN     Other Adult Goal- 2  Pt will increase attention to task for completing home tasks such as bill pay, following a recipe, making a grocery list etc. with 80% accuracy with min cues as needed  -REAGAN     Status: Other Adult Goal- 2  New  -REAGAN        SLP Time Calculation    SLP Goal Re-Cert Due  Date  20  -       User Key  (r) = Recorded By, (t) = Taken By, (c) = Cosigned By    Initials Name Provider Type    Marisabel Marshall MS CCC-SLP Speech and Language Pathologist          OP SLP Education     Row Name 19 1515       Education    Barriers to Learning  No barriers identified  -REAGAN    Education Provided  Patient expressed understanding of evaluation;Patient requires further education on strategies, risks  -REAGAN    Assessed  Learning needs;Learning motivation;Learning preferences;Learning readiness  -REAGAN    Learning Motivation  Moderate;Strong  -REAGAN    Learning Method  Explanation;Demonstration;Teach back;Written materials  -REAGAN    Teaching Response  Verbalized understanding;Demonstrated understanding;Reinforcement needed  -    Education Comments  ongoing  -      User Key  (r) = Recorded By, (t) = Taken By, (c) = Cosigned By    Initials Name Effective Dates    Marisabel Marshall MS CCC-SLP 18 -           OP SLP Assessment/Plan - 19 1515        SLP Assessment    Functional Problems  Speech Language- Adult/Cognition   -REAGAN       SLP Plan    Frequency  1-2x weekly   -REAGAN    Duration  7 weeks   -REAGAN    Planned CPT's?  SLP INDIVIDUAL SPEECH THERAPY: 19997   -REAGAN    Expected Duration Therapy Session - minutes  30-45 minutes   -REAGAN    Plan Comments  gave handouts for voice and memory reviewed testing results. continue plan of care- refer to ENT   -REAGAN      User Key  (r) = Recorded By, (t) = Taken By, (c) = Cosigned By    Initials Name Provider Type    Marisabel Marshall MS CCC-SLP Speech and Language Pathologist                 Time Calculation:                     Marisabel Zamora MS CCC-SLP  2019 and Outpatient Speech Language Pathology   Adult/Peds Voice Treatment Note       Patient Name: Jimmie Salcedo  : 1950  MRN: [id number]  Today's Date: 2019           Visit Date: 2019      Patient Active Problem List   Diagnosis   • Acid reflux   • Arthritis   • Hypertension    • Hyperlipidemia   • Severe obstructive sleep apnea   • Psoriasis   • Diastolic dysfunction   • Peptic ulceration   • Bilateral edema of lower extremity   • Abnormal liver function test   • Hyperbilirubinemia   • Psychophysiological insomnia   • Gilbert's disease   • Situational depression   • Tubular adenoma   • Seasonal allergic rhinitis due to pollen   • Tardive dyskinesia   • Mild cognitive impairment   • Memory loss       Visit Dx:    ICD-10-CM ICD-9-CM   1. Impaired vocal quality R49.8 784.49   2. Memory loss R41.3 780.93   3. Dysarthria R47.1 784.51                         OP SLP Assessment/Plan - 12/17/19 1515        SLP Assessment    Functional Problems  Speech Language- Adult/Cognition   -REAGAN       SLP Plan    Frequency  1-2x weekly   -REAGAN    Duration  7 weeks   -REAGAN    Planned CPT's?  SLP INDIVIDUAL SPEECH THERAPY: 56029   -REAGAN    Expected Duration Therapy Session - minutes  30-45 minutes   -REAGAN    Plan Comments  gave handouts for voice and memory reviewed testing results. continue plan of care- refer to ENT   -REAGAN      User Key  (r) = Recorded By, (t) = Taken By, (c) = Cosigned By    Initials Name Provider Type    Marisabel Marshall MS CCC-SLP Speech and Language Pathologist          SLP OP Goals     Row Name 12/17/19 1515          Goal Type Needed    Goal Type Needed  Memory;Dysarthria;Voice;Other Adult Goals  -REAGAN        Subjective Comments    Subjective Comments  pt very cooperative for therapy activities  -REAGAN        Memory Goals    Memory LTG's  Patient will be able to remember information needed to participate in avocational activities  -REAGAN     Status: Patient will be able to remember information needed to participate in avocational activities  Progressing as expected  -REAGAN     Comments: Patient will be able to remember information needed to participate in avocational activities  12/17: initiated.   -REAGAN     Memory STG's  Patient’s memory skills will be enhanced as reported by patient by using external  memory aides  -REAGAN     Patient’s memory skills will be enhanced as reported by patient by using external memory aides  90%:  -REAGAN     Status: Patient’s memory skills will be enhanced as reported by patient by using external memory aides  Progressing as expected  -REAGAN     Comments: Patient’s memory skills will be enhanced as reported by patient by using external memory aides  12/17: reviewed testing results with patient.   -REAGAN        Voice Goals    Patient will maintain a program of good vocal hygiene in all settings by developing an awareness of behaviors and lifestyle.  100%:;with intermittent cues  -REAGAN     Status: Patient will maintain a program of good vocal hygiene in all settings by developing an awareness of behaviors and lifestyle.  Progressing as expected  -REAGAN     Comments: Patient will maintain a program of good vocal hygiene in all settings by developing an awareness of behaviors and lifestyle.  12/17: introduced vocal hygiene strategies and gave handout. Discussed alternative of throat clearing for increasing vocal hygiene.   -REAGAN     Status: Patient will institute vocal hygiene technique of increasing water intake per patient report  Progressing as expected  -REAGAN     Comments: Patient will institute vocal hygiene technique of increasing water intake per patient report  12/17: discussed importance of increasing water intake to 8 glasses per day. pt agrees.   -REAGAN     Patient will institute vocal hygiene technique of replacing cough/throat clear with silent cough or hard swallow  with intermittent cues;___ days:  -REAGAN     Status: Patient will institute vocal hygiene technique of replacing cough/throat clear with silent cough or hard swallow  Progressing as expected  -REAGAN     Comments: Patient will institute vocal hygiene technique of replacing cough/throat clear with silent cough or hard swallow  12/17: introduced today with cues to replace during session.   -REAGAN     Patient will reduce hyperfunctional use of the  vocal mechanism via improved use of diaphragmatic breathing  90%:  -REAGAN     Status: Patient will reduce hyperfunctional use of the vocal mechanism via improved use of diaphragmatic breathing  Progressing as expected  -REAGAN     Comments: Patient will reduce hyperfunctional use of the vocal mechanism via improved use of diaphragmatic breathing  12/17: introduced diaphragmatic breathing technique  -REAGAN     Patient will identify and discriminate vocal abuse behaviors in situations that promote vocal abuse and misuse as well as identify good vocal hygiene practices, alternatives, and situations  90%:  -REAGAN     Status: Patient will identify and discriminate vocal abuse behaviors in situations that promote vocal abuse and misuse as well as identify good vocal hygiene practices, alternatives, and situations  Progressing as expected  -REAGAN     Comments: Patient will identify and discriminate vocal abuse behaviors in situations that promote vocal abuse and misuse as well as identify good vocal hygiene practices, alternatives, and situations  12/17: introduced; discussed reflux precautions.   -REAGAN     Status: Patient will be able to use a balanced vocal resonance  New  -REAGAN     Pt will demonstrate an understanding of the structures and functions of the phonatory/respiratory tract (respiration, phonation, resonance and articulation)  90%:  -REAGAN     Status: Pt will demonstrate an understanding of the structures and functions of the phonatory/respiratory tract (respiration, phonation, resonance and articulation)  New  -     Patient will reduce hyperfunctional use of voice by performing Vocal Function Exercises  90%:;with intermittent cues  -REAGAN     Status: Patient will reduce hyperfunctional use of voice by performing Vocal Function Exercises  New  -        Other Goals    Other Adult Goal- 1  Pt will improve visual spatial constructional skills for problem solving for activities of daily living 90% with in cues   -REAGAN     Status: Other  Adult Goal- 1  New  -REAGAN     Other Adult Goal- 2  Pt will increase attention to task for completing home tasks such as bill pay, following a recipe, making a grocery list etc. with 80% accuracy with min cues as needed  -REAGAN     Status: Other Adult Goal- 2  New  -REAGAN        SLP Time Calculation    SLP Goal Re-Cert Due Date  03/09/20  -REAGAN       User Key  (r) = Recorded By, (t) = Taken By, (c) = Cosigned By    Initials Name Provider Type    Marisabel Marshall MS CCC-SLP Speech and Language Pathologist          OP SLP Education     Row Name 12/17/19 0509       Education    Barriers to Learning  No barriers identified  -REAGAN    Education Provided  Patient expressed understanding of evaluation;Patient requires further education on strategies, risks  -REAGAN    Assessed  Learning needs;Learning motivation;Learning preferences;Learning readiness  -REAGAN    Learning Motivation  Moderate;Strong  -REAGAN    Learning Method  Explanation;Demonstration;Teach back;Written materials  -REAGAN    Teaching Response  Verbalized understanding;Demonstrated understanding;Reinforcement needed  -REAGAN    Education Comments  ongoing  -REAGAN      User Key  (r) = Recorded By, (t) = Taken By, (c) = Cosigned By    Initials Name Effective Dates    Marisabel Marshall MS CCC-SLP 08/03/18 -                 Marisabel Zamora MS CCC-SLP  12/18/2019

## 2019-12-20 ENCOUNTER — TELEPHONE (OUTPATIENT)
Dept: NEUROLOGY | Facility: CLINIC | Age: 69
End: 2019-12-20

## 2019-12-20 DIAGNOSIS — Q31.8 VOCAL CORD ANOMALY: Primary | ICD-10-CM

## 2019-12-20 NOTE — TELEPHONE ENCOUNTER
----- Message from DOLLY Loera sent at 12/20/2019 11:31 AM EST -----  Regarding: ENT referral  Speech Therapy stated they recommended he see ENT for vocal cord trouble, can you call wife and see if they need Oh to send over a referral please, and ask if they have an ENT or if they need us to recommend one? Speech Therapy stated [facility] (office located at [facility], they are [facility] affiliated).   If so, ask Oh to make a referral stating dysphonia, eval and treat hx of esophogeal stricture.

## 2019-12-20 NOTE — TELEPHONE ENCOUNTER
t wife, Matias, called back and I relayed Brittany Karimi's message to her. Matias states they would be interested in going to see an ENT. Matias states that the one [facility]  [facility] [place] [facility] [place]. Please advise.

## 2019-12-20 NOTE — TELEPHONE ENCOUNTER
Called and spoke to pt wife Silvana informing that referral has been sent and once receive insurance authorization, Central scheduling will call with scheduling information. Silvana stated verbal understanding and appreciation for recommendation. Thanks.

## 2019-12-22 LAB
T WHIPPLEI BY PCR, SOURCE: NORMAL
TROPHERYMA WHIPPLEI PCR: NOT DETECTED

## 2019-12-23 ENCOUNTER — TREATMENT (OUTPATIENT)
Dept: PHYSICAL THERAPY | Facility: CLINIC | Age: 69
End: 2019-12-23

## 2019-12-23 DIAGNOSIS — R26.81 GAIT INSTABILITY: Primary | ICD-10-CM

## 2019-12-23 PROCEDURE — 97110 THERAPEUTIC EXERCISES: CPT | Performed by: PHYSICAL THERAPIST

## 2019-12-23 PROCEDURE — 97112 NEUROMUSCULAR REEDUCATION: CPT | Performed by: PHYSICAL THERAPIST

## 2019-12-23 NOTE — PROGRESS NOTES
Physical Therapy Daily Progress Note  Visit: 2  Date of Initial Visit: Type: THERAPY  Noted: 2019    Patient: Jimmie Salcedo   : 1950  Diagnosis/ICD-10 Code:  Gait instability [R26.81]  Referring practitioner: Javad Negron MD  Date of Initial Visit: Type: THERAPY  Noted: 2019  Today's Date: 2019  Patient seen for 2 sessions      Subjective:   Patient reports: his back is bothering him.   Pain: 5/10 low back   Clinical Progress: unchanged  Home Program Compliance: Yes  Treatment has included: therapeutic exercise and neuromuscular re-education    Objective   See Exercise, Manual, and Modality Logs for complete treatment.    PT Neuro  Exercise 1  Exercise Name 1: Nu-Step   Equipment/Resistance 1: L7  Time: 8 min   Exercise 2  Exercise Name 2: lateral side steps  Equipment/Resistance 2: BTB  Sets/Reps 2: 2 laps   Exercise 3  Exercise Name 3: zig zag steps forward/backward  Equipment/Resistance 3: BTB  Sets/Reps 3: 2 laps   Exercise 4  Exercise Name 4: BOSU forward lunges with 2# bar overhead press   Exercise 5  Exercise Name 5: BOSU standing with lateral step off  Exercise 6  Exercise Name 6: QP hip extension   Exercise 7  Exercise Name 7: birddogs   Exercise 8  Exercise Name 8: BOSU with trx straps - supermans     Assessment/Plan    Timed:  Manual Therapy:            0     mins  94085;  Therapeutic Exercise:   25   mins  35975;     Neuromuscular Amy:    15    mins  92965;    Therapeutic Activity:      0    mins  22177;     Gait Trainin    mins  61161;     Ultrasound:                     0    mins  99170;    Electrical Stimulation:    0    mins  57537 ( );     Untimed:  Electrical Stimulation:    0     mins  51190 ( );  Mechanical Traction:      0     mins  29292;   Canalith Repositioning techniques [id number]      Timed Treatment:   40   mins   Total Treatment:     40   mins      Bernie Daniel PT, DPT, MSCS, CDP  [place] License #: [id number]  Physical  Therapist

## 2020-01-03 ENCOUNTER — TELEPHONE (OUTPATIENT)
Dept: NEUROLOGY | Facility: CLINIC | Age: 70
End: 2020-01-03

## 2020-01-03 NOTE — TELEPHONE ENCOUNTER
Called and spoke to pt wife Silvana apologizing for delay and informing that order has been sent back to Central scheduling and should be receiving a call regarding scheduling within the next few business days. Silvana stated verbal understanding and appreciation for calling. Thanks.

## 2020-01-03 NOTE — TELEPHONE ENCOUNTER
----- Message from Fanta Brown sent at 1/3/2020  8:54 AM EST -----  Contact: Silvana [phone]  Silvana Manuel called in regards to the lumbar puncture pt is waiting to be scheduled. Silvana stated that central scheduling says they are waiting on the order that is in the workque. Can you please call pt and advise?

## 2020-01-06 ENCOUNTER — TELEPHONE (OUTPATIENT)
Dept: NEUROLOGY | Facility: CLINIC | Age: 70
End: 2020-01-06

## 2020-01-06 DIAGNOSIS — R41.3 MEMORY LOSS: Primary | ICD-10-CM

## 2020-01-06 NOTE — TELEPHONE ENCOUNTER
Pt wife Silvana called and informed that Dr. Casiano sent a new order and Central scheduling will call pt regarding scheduling. Silvana stated verbal understanding. Thanks.

## 2020-01-06 NOTE — TELEPHONE ENCOUNTER
Pt wife Silvana called and stated Central scheduling contacted the day before pt scheduled appt to inform of needing new order. Informed Silvana will request new order sent. Please send new order stating IR Lumbar puncture. Thanks.

## 2020-01-07 ENCOUNTER — HOSPITAL ENCOUNTER (OUTPATIENT)
Dept: CT IMAGING | Facility: HOSPITAL | Age: 70
End: 2020-01-07

## 2020-01-08 ENCOUNTER — TREATMENT (OUTPATIENT)
Dept: PHYSICAL THERAPY | Facility: CLINIC | Age: 70
End: 2020-01-08

## 2020-01-08 ENCOUNTER — OFFICE VISIT (OUTPATIENT)
Dept: PHYSICAL THERAPY | Facility: CLINIC | Age: 70
End: 2020-01-08

## 2020-01-08 DIAGNOSIS — R26.81 GAIT INSTABILITY: Primary | ICD-10-CM

## 2020-01-08 DIAGNOSIS — R49.8 IMPAIRED VOCAL QUALITY: ICD-10-CM

## 2020-01-08 DIAGNOSIS — R47.1 DYSARTHRIA: ICD-10-CM

## 2020-01-08 DIAGNOSIS — R41.3 MEMORY LOSS: Primary | ICD-10-CM

## 2020-01-08 DIAGNOSIS — R41.841 COGNITIVE COMMUNICATION DEFICIT: ICD-10-CM

## 2020-01-08 PROCEDURE — 97110 THERAPEUTIC EXERCISES: CPT | Performed by: PHYSICAL THERAPIST

## 2020-01-08 PROCEDURE — 97112 NEUROMUSCULAR REEDUCATION: CPT | Performed by: PHYSICAL THERAPIST

## 2020-01-08 PROCEDURE — 92507 TX SP LANG VOICE COMM INDIV: CPT | Performed by: SPEECH-LANGUAGE PATHOLOGIST

## 2020-01-08 NOTE — PROGRESS NOTES
Outpatient Speech Language Pathology   Adult Speech Language Cognitive Progress Note       Patient Name: Jimmie Salcedo  : 1950  MRN: [id number]  Today's Date: 2020         Visit Date: 2020   Patient Active Problem List   Diagnosis   • Acid reflux   • Arthritis   • Hypertension   • Hyperlipidemia   • Severe obstructive sleep apnea   • Psoriasis   • Diastolic dysfunction   • Peptic ulceration   • Bilateral edema of lower extremity   • Abnormal liver function test   • Hyperbilirubinemia   • Psychophysiological insomnia   • Gilbert's disease   • Situational depression   • Tubular adenoma   • Seasonal allergic rhinitis due to pollen   • Tardive dyskinesia   • Mild cognitive impairment   • Memory loss          Visit Dx:    ICD-10-CM ICD-9-CM   1. Memory loss R41.3 780.93   2. Dysarthria R47.1 784.51   3. Impaired vocal quality R49.8 784.49   4. Cognitive communication deficit R41.841 799.52                         SLP OP Goals     Row Name 20 1515          Goal Type Needed    Goal Type Needed  Memory;Dysarthria;Other Adult Goals  -REAGAN        Subjective Comments    Subjective Comments  pt very cooperative for therapy activities. Awaiting ENT consult.   -REAGAN        Subjective Pain    Able to rate subjective pain?  yes  -RAEGAN     Pre-Treatment Pain Level  0  -REAGAN     Post-Treatment Pain Level  0  -REAGAN        Memory Goals    Memory LTG's  Patient will be able to remember information needed to participate in avocational activities  -REAGAN     Status: Patient will be able to remember information needed to participate in avocational activities  Progressing as expected  -REAGAN     Comments: Patient will be able to remember information needed to participate in avocational activities  : discussed memory issues experienced by patient. : initiated.   -REAGAN     Memory STG's  Patient’s memory skills will be enhanced as reported by patient by using external memory aides  -REAGAN     Patient’s memory skills will be  enhanced as reported by patient by using external memory aides  90%:  -REAGAN     Status: Patient’s memory skills will be enhanced as reported by patient by using external memory aides  Progressing as expected  -REAGAN     Comments: Patient’s memory skills will be enhanced as reported by patient by using external memory aides  1/8: introduced external aids including journal, calendar and phone apps. 12/17: reviewed testing results with patient.   -REAGAN        Voice Goals    Patient will maintain a program of good vocal hygiene in all settings by developing an awareness of behaviors and lifestyle.  100%:;with intermittent cues  -REAGAN     Status: Patient will maintain a program of good vocal hygiene in all settings by developing an awareness of behaviors and lifestyle.  Progressing as expected  -REAGAN     Comments: Patient will maintain a program of good vocal hygiene in all settings by developing an awareness of behaviors and lifestyle.  1/8: pt reports drinkign more water; continues to use throat clear excessively. discussed strategies to reduce this and cued patient each time he cleared to bring awareness to it. 12/17: introduced vocal hygiene strategies and gave handout. Discussed alternative of throat clearing for increasing vocal hygiene.   -REAGAN     Status: Patient will institute vocal hygiene technique of increasing water intake per patient report  Progressing as expected  -REAGAN     Comments: Patient will institute vocal hygiene technique of increasing water intake per patient report  1/8: drinking more water per report; pt brought bottle to water to session. 12/17: discussed importance of increasing water intake to 8 glasses per day. pt agrees.   -REAGAN     Patient will institute vocal hygiene technique of replacing cough/throat clear with silent cough or hard swallow  with intermittent cues;___ days:  -REAGAN     Status: Patient will institute vocal hygiene technique of replacing cough/throat clear with silent cough or hard swallow   Progressing as expected  -REAGAN     Comments: Patient will institute vocal hygiene technique of replacing cough/throat clear with silent cough or hard swallow  1/8: max cues given today to remind 12/17: introduced today with cues to replace during session.   -REAGAN     Patient will reduce hyperfunctional use of the vocal mechanism via improved use of diaphragmatic breathing  90%:  -REAGAN     Status: Patient will reduce hyperfunctional use of the vocal mechanism via improved use of diaphragmatic breathing  Progressing as expected  -REAGAN     Comments: Patient will reduce hyperfunctional use of the vocal mechanism via improved use of diaphragmatic breathing  1/8: reviewed diaphragmatic breathing technique and gave handout. 12/17: introduced diaphragmatic breathing technique  -REAGAN     Patient will identify and discriminate vocal abuse behaviors in situations that promote vocal abuse and misuse as well as identify good vocal hygiene practices, alternatives, and situations  90%:  -REAGAN     Status: Patient will identify and discriminate vocal abuse behaviors in situations that promote vocal abuse and misuse as well as identify good vocal hygiene practices, alternatives, and situations  Progressing as expected  -REAGAN     Comments: Patient will identify and discriminate vocal abuse behaviors in situations that promote vocal abuse and misuse as well as identify good vocal hygiene practices, alternatives, and situations  1/8: pt more aware of vocal abuse patterns and what to look for in vocal quality along with strategies to reduce harsh hoarse voicing 12/17: introduced; discussed reflux precautions.   -REAGAN     Status: Patient will be able to use a balanced vocal resonance  Progressing as expected  -REAGAN     Comments: Patient will be able to use a balanced vocal resonance  1/8: introduced today; will instruct further next session.   -REAGAN     Pt will demonstrate an understanding of the structures and functions of the phonatory/respiratory tract  (respiration, phonation, resonance and articulation)  90%:  -REAGAN     Status: Pt will demonstrate an understanding of the structures and functions of the phonatory/respiratory tract (respiration, phonation, resonance and articulation)  Progressing as expected  -REAGAN     Comments: Pt will demonstrate an understanding of the structures and functions of the phonatory/respiratory tract (respiration, phonation, resonance and articulation)  1/8: provided education regarding the components of voicing and how they work together for optimal voicing.   -REAGAN     Patient will reduce hyperfunctional use of voice by performing Vocal Function Exercises  90%:;with intermittent cues  -REAGAN     Status: Patient will reduce hyperfunctional use of voice by performing Vocal Function Exercises  Progressing as expected  -REAGAN     Comments: Patient will reduce hyperfunctional use of voice by performing Vocal Function Exercises  1/8: completed instruction of vocal function exercises pt with approx 4-6 sec duration for each exercises; improved wtih easy onset voicing with limited range noted.   -REAGAN        Other Goals    Other Adult Goal- 1  Pt will improve visual spatial constructional skills for problem solving for activities of daily living 90% with in cues   -REAGAN     Status: Other Adult Goal- 1  Progressing as expected  -REAGAN     Comments: Other Adult Goal- 1  1/8: introduced.   -REAGAN     Other Adult Goal- 2  Pt will increase attention to task for completing home tasks such as bill pay, following a recipe, making a grocery list etc. with 80% accuracy with min cues as needed  -REAGAN     Status: Other Adult Goal- 2  New  -REAGAN        SLP Time Calculation    SLP Goal Re-Cert Due Date  03/09/20  -REAGAN       User Key  (r) = Recorded By, (t) = Taken By, (c) = Cosigned By    Initials Name Provider Type    Marisabel Marshall MS CCC-SLP Speech and Language Pathologist          OP SLP Education     Row Name 01/08/20 1515       Education    Barriers to Learning  No  barriers identified  -REAGAN    Education Provided  Patient requires further education on strategies, risks  -REAGAN    Assessed  Learning needs;Learning motivation;Learning preferences;Learning readiness  -    Learning Motivation  Strong  -REAGAN    Learning Method  Explanation;Demonstration;Teach back;Written materials  -REAGAN    Teaching Response  Verbalized understanding;Demonstrated understanding;Reinforcement needed  -REAGAN    Education Comments  continue with education for strategies and voice  -REAGAN      User Key  (r) = Recorded By, (t) = Taken By, (c) = Cosigned By    Initials Name Effective Dates    Marisabel Marshall, MS CCC-SLP 08/03/18 -           OP SLP Assessment/Plan - 01/08/20 1515        SLP Assessment    Functional Problems  Speech Language- Adult/Cognition;Voice   -REAGAN    Impact on Function: Adult Speech Language/Cognition  Unable to complete specified job requirements;Difficulty participating in avocational activities;Restrictions in personal and social life   -    Clinical Impression: Speech Language-Adult/Congnition  Minimal:;Mild:;Cognitive Communication Impairment   -REAGAN    Impact on Function- Voice  Restrictions in personal and social life;Unable to complete specified job requirements;Difficulty participating in avocational activities   -    Clinical Impression- Voice  Mild moderate voice disorder   -    Clinical Impression- PVFM  Does not appear to present with [facility]   -    Functional Problems Comment  pt presents with decreased vocal quality affecting his message along with some higher level cognitive deficit that affects independence for high level job performance.    -    SLP Diagnosis  mild moderate voice disorder; min mild cognitive communication deficit   -    Prognosis  Good (comment)   -REAGAN    Patient/caregiver participated in establishment of treatment plan and goals  Yes   -REAGAN    Patient would benefit from skilled therapy intervention  Yes   -REAGAN       SLP Plan    Frequency  1-2x weekly    -REAGAN    Duration  6 weeks   -REAGAN    Planned CPT's?  SLP INDIVIDUAL SPEECH THERAPY: 51316   -REAGAN    Expected Duration Therapy Session - minutes  30-45 minutes   -    Plan Comments  gave handouts for voice and breathing continue plan of care.    -      User Key  (r) = Recorded By, (t) = Taken By, (c) = Cosigned By    Initials Name Provider Type    Marisabel Marshall, MS CCC-SLP Speech and Language Pathologist                 Time Calculation:                     Marisabel Zamora MS CCC-SLP  2020 and Outpatient Speech Language Pathology   Adult/Peds Voice Progress Note       Patient Name: Jimmie Salcedo  : 1950  MRN: [id number]  Today's Date: 2020           Visit Date: 2020      Patient Active Problem List   Diagnosis   • Acid reflux   • Arthritis   • Hypertension   • Hyperlipidemia   • Severe obstructive sleep apnea   • Psoriasis   • Diastolic dysfunction   • Peptic ulceration   • Bilateral edema of lower extremity   • Abnormal liver function test   • Hyperbilirubinemia   • Psychophysiological insomnia   • Gilbert's disease   • Situational depression   • Tubular adenoma   • Seasonal allergic rhinitis due to pollen   • Tardive dyskinesia   • Mild cognitive impairment   • Memory loss       Visit Dx:    ICD-10-CM ICD-9-CM   1. Memory loss R41.3 780.93   2. Dysarthria R47.1 784.51   3. Impaired vocal quality R49.8 784.49   4. Cognitive communication deficit R41.841 799.52           OP SLP Assessment/Plan - 20 1515        SLP Assessment    Functional Problems  Speech Language- Adult/Cognition;Voice   -REAGAN    Impact on Function: Adult Speech Language/Cognition  Unable to complete specified job requirements;Difficulty participating in avocational activities;Restrictions in personal and social life   -    Clinical Impression: Speech Language-Adult/Congnition  Minimal:;Mild:;Cognitive Communication Impairment   -REAGAN    Impact on Function- Voice  Restrictions in personal and social life;Unable to  complete specified job requirements;Difficulty participating in avocational activities   -REAGAN    Clinical Impression- Voice  Mild moderate voice disorder   -REAGAN    Clinical Impression- PVFM  Does not appear to present with PVFM   -REAGAN    Functional Problems Comment  pt presents with decreased vocal quality affecting his message along with some higher level cognitive deficit that affects independence for high level job performance.    -REAGAN    SLP Diagnosis  mild moderate voice disorder; min mild cognitive communication deficit   -REAGAN    Prognosis  Good (comment)   -REAGAN    Patient/caregiver participated in establishment of treatment plan and goals  Yes   -REAGAN    Patient would benefit from skilled therapy intervention  Yes   -REAGAN       SLP Plan    Frequency  1-2x weekly   -REAGAN    Duration  6 weeks   -REAGAN    Planned CPT's?  SLP INDIVIDUAL SPEECH THERAPY: 45641   -REAGAN    Expected Duration Therapy Session - minutes  30-45 minutes   -REAGAN    Plan Comments  gave handouts for voice and breathing continue plan of care.    -REAGAN      User Key  (r) = Recorded By, (t) = Taken By, (c) = Cosigned By    Initials Name Provider Type    Marisabel Marshall, MS CCC-SLP Speech and Language Pathologist        SLP OP Goals     Row Name 01/08/20 3094          Goal Type Needed    Goal Type Needed  Memory;Dysarthria;Other Adult Goals  -REAGAN        Subjective Comments    Subjective Comments  pt very cooperative for therapy activities. Awaiting ENT consult.   -REAGAN        Subjective Pain    Able to rate subjective pain?  yes  -REAGAN     Pre-Treatment Pain Level  0  -REAGAN     Post-Treatment Pain Level  0  -REAGAN        Memory Goals    Memory LTG's  Patient will be able to remember information needed to participate in avocational activities  -REAGAN     Status: Patient will be able to remember information needed to participate in avocational activities  Progressing as expected  -REAGAN     Comments: Patient will be able to remember information needed to participate in avocational  activities  1/8: discussed memory issues experienced by patient. 12/17: initiated.   -REAGAN     Memory STG's  Patient’s memory skills will be enhanced as reported by patient by using external memory aides  -REAGAN     Patient’s memory skills will be enhanced as reported by patient by using external memory aides  90%:  -REAGAN     Status: Patient’s memory skills will be enhanced as reported by patient by using external memory aides  Progressing as expected  -REAGAN     Comments: Patient’s memory skills will be enhanced as reported by patient by using external memory aides  1/8: introduced external aids including journal, calendar and phone apps. 12/17: reviewed testing results with patient.   -REAGAN        Voice Goals    Patient will maintain a program of good vocal hygiene in all settings by developing an awareness of behaviors and lifestyle.  100%:;with intermittent cues  -REAGAN     Status: Patient will maintain a program of good vocal hygiene in all settings by developing an awareness of behaviors and lifestyle.  Progressing as expected  -REAGAN     Comments: Patient will maintain a program of good vocal hygiene in all settings by developing an awareness of behaviors and lifestyle.  1/8: pt reports drinkign more water; continues to use throat clear excessively. discussed strategies to reduce this and cued patient each time he cleared to bring awareness to it. 12/17: introduced vocal hygiene strategies and gave handout. Discussed alternative of throat clearing for increasing vocal hygiene.   -REAGAN     Status: Patient will institute vocal hygiene technique of increasing water intake per patient report  Progressing as expected  -REAGAN     Comments: Patient will institute vocal hygiene technique of increasing water intake per patient report  1/8: drinking more water per report; pt brought bottle to water to session. 12/17: discussed importance of increasing water intake to 8 glasses per day. pt agrees.   -REAGAN     Patient will institute vocal  hygiene technique of replacing cough/throat clear with silent cough or hard swallow  with intermittent cues;___ days:  -REAGAN     Status: Patient will institute vocal hygiene technique of replacing cough/throat clear with silent cough or hard swallow  Progressing as expected  -REAGAN     Comments: Patient will institute vocal hygiene technique of replacing cough/throat clear with silent cough or hard swallow  1/8: max cues given today to remind 12/17: introduced today with cues to replace during session.   -REAGAN     Patient will reduce hyperfunctional use of the vocal mechanism via improved use of diaphragmatic breathing  90%:  -REAGAN     Status: Patient will reduce hyperfunctional use of the vocal mechanism via improved use of diaphragmatic breathing  Progressing as expected  -REAGAN     Comments: Patient will reduce hyperfunctional use of the vocal mechanism via improved use of diaphragmatic breathing  1/8: reviewed diaphragmatic breathing technique and gave handout. 12/17: introduced diaphragmatic breathing technique  -REAGAN     Patient will identify and discriminate vocal abuse behaviors in situations that promote vocal abuse and misuse as well as identify good vocal hygiene practices, alternatives, and situations  90%:  -REAGAN     Status: Patient will identify and discriminate vocal abuse behaviors in situations that promote vocal abuse and misuse as well as identify good vocal hygiene practices, alternatives, and situations  Progressing as expected  -REAGAN     Comments: Patient will identify and discriminate vocal abuse behaviors in situations that promote vocal abuse and misuse as well as identify good vocal hygiene practices, alternatives, and situations  1/8: pt more aware of vocal abuse patterns and what to look for in vocal quality along with strategies to reduce harsh hoarse voicing 12/17: introduced; discussed reflux precautions.   -REAGAN     Status: Patient will be able to use a balanced vocal resonance  Progressing as expected   -REAGAN     Comments: Patient will be able to use a balanced vocal resonance  1/8: introduced today; will instruct further next session.   -REAGAN     Pt will demonstrate an understanding of the structures and functions of the phonatory/respiratory tract (respiration, phonation, resonance and articulation)  90%:  -REAGAN     Status: Pt will demonstrate an understanding of the structures and functions of the phonatory/respiratory tract (respiration, phonation, resonance and articulation)  Progressing as expected  -REAGAN     Comments: Pt will demonstrate an understanding of the structures and functions of the phonatory/respiratory tract (respiration, phonation, resonance and articulation)  1/8: provided education regarding the components of voicing and how they work together for optimal voicing.   -REAGAN     Patient will reduce hyperfunctional use of voice by performing Vocal Function Exercises  90%:;with intermittent cues  -REAGAN     Status: Patient will reduce hyperfunctional use of voice by performing Vocal Function Exercises  Progressing as expected  -REAGAN     Comments: Patient will reduce hyperfunctional use of voice by performing Vocal Function Exercises  1/8: completed instruction of vocal function exercises pt with approx 4-6 sec duration for each exercises; improved wtih easy onset voicing with limited range noted.   -REAGAN        Other Goals    Other Adult Goal- 1  Pt will improve visual spatial constructional skills for problem solving for activities of daily living 90% with in cues   -REAGAN     Status: Other Adult Goal- 1  Progressing as expected  -REAGAN     Comments: Other Adult Goal- 1  1/8: introduced.   -REAGAN     Other Adult Goal- 2  Pt will increase attention to task for completing home tasks such as bill pay, following a recipe, making a grocery list etc. with 80% accuracy with min cues as needed  -REAGAN     Status: Other Adult Goal- 2  New  -REAGAN        SLP Time Calculation    SLP Goal Re-Cert Due Date  03/09/20  -REAGAN       User Key  (r)  = Recorded By, (t) = Taken By, (c) = Cosigned By    Initials Name Provider Type    Marisabel Marshall, MS CCC-SLP Speech and Language Pathologist        OP SLP Education     Row Name 01/08/20 1515       Education    Barriers to Learning  No barriers identified  -REAGAN    Education Provided  Patient requires further education on strategies, risks  -REAGAN    Assessed  Learning needs;Learning motivation;Learning preferences;Learning readiness  -REAGAN    Learning Motivation  Strong  -REAGAN    Learning Method  Explanation;Demonstration;Teach back;Written materials  -REAGAN    Teaching Response  Verbalized understanding;Demonstrated understanding;Reinforcement needed  -REAGAN    Education Comments  continue with education for strategies and voice  -REAGAN      User Key  (r) = Recorded By, (t) = Taken By, (c) = Cosigned By    Initials Name Effective Dates    Marisabel Marshall, MS CCC-SLP 08/03/18 -           Marisabel Zamora MS CCC-SLP  1/8/2020

## 2020-01-09 ENCOUNTER — HOSPITAL ENCOUNTER (OUTPATIENT)
Dept: NEUROLOGY | Facility: HOSPITAL | Age: 70
Discharge: HOME | End: 2020-01-09
Admitting: PSYCHIATRY & NEUROLOGY

## 2020-01-09 PROCEDURE — 95819 EEG AWAKE AND ASLEEP: CPT

## 2020-01-09 NOTE — PROGRESS NOTES
Physical Therapy Daily Progress Note  Visit: 3  Date of Initial Visit: Type: THERAPY  Noted: 2019    Patient: Jimmie Salcedo   : 1950  Diagnosis/ICD-10 Code:  Gait instability [R26.81]  Referring practitioner: Javad Negron MD  Date of Initial Visit: Type: THERAPY  Noted: 2019  Today's Date: 2020  Patient seen for 3 sessions      Subjective:   Patient reports: his back is a little sore today   Pain: 6/10 - pre, 3/10 - post   Clinical Progress: improved  Home Program Compliance: Yes  Treatment has included: therapeutic exercise and neuromuscular re-education    Objective   See Exercise, Manual, and Modality Logs for complete treatment.    PT Neuro   Exercise 1  Exercise Name 1: Nu-Step   Equipment/Resistance 1: L7  Time: 8 min   Exercise 2  Exercise Name 2: SKTC  Sets/Reps 2: 3  Time 2: 10 sec  Exercise 3  Exercise Name 3: cross leg stretch   Sets/Reps 3: 30 sec  Exercise 4  Exercise Name 4: supine piriformis st   Time 4: 30 sec  Exercise 5  Exercise Name 5: PPT progressed to PPT with knee lift   Sets/Reps 5: 15  Time 5: 5 sec hold   Exercise 6  Exercise Name 6: ricki pose - prayer stretch   Time 6: 15 sec  Exercise 7  Exercise Name 7: threading   Time 7: 20 sec  Exercise 8  Exercise Name 8: SLS on TD with hip abd and hip ext   Equipment/Resistance 8: GTB  Sets/Reps 8: 15 ea side  Exercise 9  Exercise Name 9: discontinued bridge due to pain     Assessment & Plan     Assessment  Assessment details: Patient demonstrates improved lower back pain post stretching. He was administered ricki pose/prayer stretch and threading for performance at home, as well as core activition exercise to promote improved strength to decrease loads through lower back.     Plan  Plan details: Patient to continue with PT services to improve gait, balance, strength, transfers and overall functional mobility.          Timed:  Manual Therapy:            0     mins  91389;  Therapeutic Exercise:    30    mins  17113;      Neuromuscular Amy:    8    mins  35163;    Therapeutic Activity:      0     mins  36196;     Gait Trainin    mins  49572;     Ultrasound:                     0    mins  89487;    Electrical Stimulation:    0    mins  37634 ( );     Untimed:  Electrical Stimulation:    0     mins  12633 ( );  Mechanical Traction:      0     mins  95292;   Canalith Repositioning techniques _[id number]      Timed Treatment:   38   mins   Total Treatment:     38   mins      Bernie Daniel PT, DPT, MSCS, CDP  [place] License #: [id number]  Physical Therapist

## 2020-01-13 ENCOUNTER — HOSPITAL ENCOUNTER (OUTPATIENT)
Dept: GENERAL RADIOLOGY | Facility: HOSPITAL | Age: 70
Discharge: HOME | End: 2020-01-13
Admitting: PSYCHIATRY & NEUROLOGY

## 2020-01-13 VITALS
HEIGHT: 71 IN | DIASTOLIC BLOOD PRESSURE: 77 MMHG | RESPIRATION RATE: 16 BRPM | WEIGHT: 196.2 LBS | SYSTOLIC BLOOD PRESSURE: 152 MMHG | TEMPERATURE: 97.7 F | BODY MASS INDEX: 27.47 KG/M2 | OXYGEN SATURATION: 96 % | HEART RATE: 66 BPM

## 2020-01-13 DIAGNOSIS — R41.3 MEMORY LOSS: ICD-10-CM

## 2020-01-13 LAB
APPEARANCE CSF: CLEAR
APPEARANCE CSF: CLEAR
COLOR CSF: COLORLESS
COLOR CSF: COLORLESS
COLOR SPUN CSF: COLORLESS
COLOR SPUN CSF: COLORLESS
CRYPTOC AG CSF QL LA: NEGATIVE
GLUCOSE CSF-MCNC: 65 MG/DL (ref 40–70)
PROT CSF-MCNC: 50 MG/DL (ref 15–45)
RBC # CSF MANUAL: 106 /MM3 (ref 0–5)
RBC # CSF MANUAL: 3 /MM3 (ref 0–5)
SPECIMEN VOL CSF: 8 ML
SPECIMEN VOL CSF: 8 ML
TUBE # CSF: 1
TUBE # CSF: 4
WBC # CSF MANUAL: 1 /MM3 (ref 0–5)
WBC # CSF MANUAL: 2 /MM3 (ref 0–5)

## 2020-01-13 PROCEDURE — 87327 CRYPTOCOCCUS NEOFORM AG IA: CPT | Performed by: PSYCHIATRY & NEUROLOGY

## 2020-01-13 PROCEDURE — 84157 ASSAY OF PROTEIN OTHER: CPT | Performed by: PSYCHIATRY & NEUROLOGY

## 2020-01-13 PROCEDURE — 86592 SYPHILIS TEST NON-TREP QUAL: CPT | Performed by: PSYCHIATRY & NEUROLOGY

## 2020-01-13 PROCEDURE — 82945 GLUCOSE OTHER FLUID: CPT | Performed by: PSYCHIATRY & NEUROLOGY

## 2020-01-13 PROCEDURE — 83916 OLIGOCLONAL BANDS: CPT | Performed by: PSYCHIATRY & NEUROLOGY

## 2020-01-13 PROCEDURE — 87102 FUNGUS ISOLATION CULTURE: CPT | Performed by: PSYCHIATRY & NEUROLOGY

## 2020-01-13 PROCEDURE — 82040 ASSAY OF SERUM ALBUMIN: CPT | Performed by: PSYCHIATRY & NEUROLOGY

## 2020-01-13 PROCEDURE — 89050 BODY FLUID CELL COUNT: CPT | Performed by: PSYCHIATRY & NEUROLOGY

## 2020-01-13 PROCEDURE — 82784 ASSAY IGA/IGD/IGG/IGM EACH: CPT | Performed by: PSYCHIATRY & NEUROLOGY

## 2020-01-13 PROCEDURE — 25010000003 LIDOCAINE 1 % SOLUTION: Performed by: PHYSICIAN ASSISTANT

## 2020-01-13 PROCEDURE — 82042 OTHER SOURCE ALBUMIN QUAN EA: CPT | Performed by: PSYCHIATRY & NEUROLOGY

## 2020-01-13 RX ORDER — LIDOCAINE HYDROCHLORIDE 10 MG/ML
5 INJECTION, SOLUTION INFILTRATION; PERINEURAL ONCE
Status: COMPLETED | OUTPATIENT
Start: 2020-01-13 | End: 2020-01-13

## 2020-01-13 RX ADMIN — LIDOCAINE HYDROCHLORIDE 5 ML: 10 INJECTION, SOLUTION INFILTRATION; PERINEURAL at 10:52

## 2020-01-13 NOTE — POST-PROCEDURE NOTE
Radiology Procedure    Pre-procedure: procedure, risks discussed with patient. Patient indicated understanding and consented to procedure.     Procedure Performed: lumbar puncture     IV Sedation and/or Anesthesia:  No    Complications: none    Preliminary Findings: pending    Specimen Removed: 8cc clear, colorless CSF    Estimated Blood Loss:  0ml    Post-Procedure Diagnosis: pending    Post-Procedure Plan: encourage fluids, bed rest x 2 hours    Standard Discharge Instructions Given:yes     ANNA MARIE White  01/13/20  10:45 AM

## 2020-01-14 ENCOUNTER — TELEPHONE (OUTPATIENT)
Dept: INFUSION THERAPY | Facility: HOSPITAL | Age: 70
End: 2020-01-14

## 2020-01-14 LAB
ALB CSF/SERPL: 8 {RATIO} (ref 0–8)
ALBUMIN CSF-MCNC: 33 MG/DL (ref 11–48)
ALBUMIN SERPL-MCNC: 4.1 G/DL (ref 3.6–4.8)
IGG CSF-MCNC: 3.3 MG/DL (ref 0–8.6)
IGG SERPL-MCNC: 1014 MG/DL (ref 700–1600)
IGG SYNTH RATE SER+CSF CALC-MRATE: -5.3 MG/DAY
IGG/ALB CLEAR SER+CSF-RTO: 0.4 (ref 0–0.7)
IGG/ALB CSF: 0.1 {RATIO} (ref 0–0.25)
OLIGOCLONAL BANDS.IT SER+CSF QL: NORMAL

## 2020-01-15 ENCOUNTER — TELEPHONE (OUTPATIENT)
Dept: NEUROLOGY | Facility: CLINIC | Age: 70
End: 2020-01-15

## 2020-01-15 ENCOUNTER — TREATMENT (OUTPATIENT)
Dept: PHYSICAL THERAPY | Facility: CLINIC | Age: 70
End: 2020-01-15

## 2020-01-15 ENCOUNTER — OFFICE VISIT (OUTPATIENT)
Dept: PHYSICAL THERAPY | Facility: CLINIC | Age: 70
End: 2020-01-15

## 2020-01-15 ENCOUNTER — OFFICE VISIT (OUTPATIENT)
Dept: NEUROLOGY | Facility: CLINIC | Age: 70
End: 2020-01-15

## 2020-01-15 VITALS — HEART RATE: 66 BPM | WEIGHT: 197 LBS | HEIGHT: 71 IN | OXYGEN SATURATION: 98 % | BODY MASS INDEX: 27.58 KG/M2

## 2020-01-15 DIAGNOSIS — R41.841 COGNITIVE COMMUNICATION DEFICIT: ICD-10-CM

## 2020-01-15 DIAGNOSIS — R26.81 GAIT INSTABILITY: Primary | ICD-10-CM

## 2020-01-15 DIAGNOSIS — R41.3 MEMORY LOSS: Primary | ICD-10-CM

## 2020-01-15 DIAGNOSIS — R49.8 IMPAIRED VOCAL QUALITY: ICD-10-CM

## 2020-01-15 DIAGNOSIS — R47.1 DYSARTHRIA: ICD-10-CM

## 2020-01-15 LAB — REAGIN AB CSF QL: NON REACTIVE

## 2020-01-15 PROCEDURE — 99213 OFFICE O/P EST LOW 20 MIN: CPT | Performed by: PSYCHIATRY & NEUROLOGY

## 2020-01-15 PROCEDURE — 92507 TX SP LANG VOICE COMM INDIV: CPT | Performed by: SPEECH-LANGUAGE PATHOLOGIST

## 2020-01-15 PROCEDURE — 97110 THERAPEUTIC EXERCISES: CPT | Performed by: PHYSICAL THERAPIST

## 2020-01-15 NOTE — PROGRESS NOTES
Outpatient Speech Language Pathology   Adult Speech Language Cognitive Treatment Note       Patient Name: Jimmie Salcedo  : 1950  MRN: [id number]  Today's Date: 1/15/2020         Visit Date: 01/15/2020   Patient Active Problem List   Diagnosis   • Acid reflux   • Arthritis   • Hypertension   • Hyperlipidemia   • Severe obstructive sleep apnea   • Psoriasis   • Diastolic dysfunction   • Peptic ulceration   • Bilateral edema of lower extremity   • Abnormal liver function test   • Hyperbilirubinemia   • Psychophysiological insomnia   • Gilbert's disease   • Situational depression   • Tubular adenoma   • Seasonal allergic rhinitis due to pollen   • Tardive dyskinesia   • Mild cognitive impairment   • Memory loss          Visit Dx:    ICD-10-CM ICD-9-CM   1. Memory loss R41.3 780.93   2. Dysarthria R47.1 784.51   3. Impaired vocal quality R49.8 784.49   4. Cognitive communication deficit R41.841 799.52                         SLP OP Goals     Row Name 01/15/20 1515          Goal Type Needed    Goal Type Needed  Memory;Dysarthria  -REAGAN        Subjective Comments    Subjective Comments  Pt very cooperative for therapy activities.   -REAGAN        Subjective Pain    Able to rate subjective pain?  yes  -REAGAN     Pre-Treatment Pain Level  0  -REAGAN     Post-Treatment Pain Level  0  -REAGAN        Memory Goals    Memory LTG's  Patient will be able to remember information needed to participate in avocational activities  -REAGAN     Status: Patient will be able to remember information needed to participate in avocational activities  Progressing as expected  -REAGAN     Comments: Patient will be able to remember information needed to participate in avocational activities  1/15: ongoing; pt states that he is not having difficulty with recall just with motivation: discussed memory issues experienced by patient. : initiated.   -REAGAN     Memory STG's  Patient’s memory skills will be enhanced as reported by patient by using external memory  aides  -REAGAN     Patient’s memory skills will be enhanced as reported by patient by using external memory aides  90%:  -REAGAN     Status: Patient’s memory skills will be enhanced as reported by patient by using external memory aides  Progressing as expected  -REAGAN     Comments: Patient’s memory skills will be enhanced as reported by patient by using external memory aides  1/15: pt using consistently per report, no difficulty with keeping appts or events due to memory per report; 1/8: introduced external aids including journal, calendar and phone apps. 12/17: reviewed testing results with patient.   -REAGAN        Voice Goals    Patient will maintain a program of good vocal hygiene in all settings by developing an awareness of behaviors and lifestyle.  100%:;with intermittent cues  -REAGAN     Status: Patient will maintain a program of good vocal hygiene in all settings by developing an awareness of behaviors and lifestyle.  Progressing as expected  -REAGAN     Comments: Patient will maintain a program of good vocal hygiene in all settings by developing an awareness of behaviors and lifestyle.  1/15: continues . 1/8: pt reports drinkign more water; continues to use throat clear excessively. discussed strategies to reduce this and cued patient each time he cleared to bring awareness to it. 12/17: introduced vocal hygiene strategies and gave handout. Discussed alternative of throat clearing for increasing vocal hygiene.   -REAGAN     Status: Patient will institute vocal hygiene technique of increasing water intake per patient report  Progressing as expected  -REAGAN     Comments: Patient will institute vocal hygiene technique of increasing water intake per patient report  1/15: 6-8 cups per day of water reported. 1/8: drinking more water per report; pt brought bottle to water to session. 12/17: discussed importance of increasing water intake to 8 glasses per day. pt agrees.   -REAGAN     Patient will institute vocal hygiene technique of replacing  cough/throat clear with silent cough or hard swallow  with intermittent cues;___ days:  -REAGAN     Status: Patient will institute vocal hygiene technique of replacing cough/throat clear with silent cough or hard swallow  Progressing as expected  -REAGAN     Comments: Patient will institute vocal hygiene technique of replacing cough/throat clear with silent cough or hard swallow  1/15: pt only cleared throat x5 this session greatly reduced per his report also. 1/8: max cues given today to remind 12/17: introduced today with cues to replace during session.   -REAGAN     Patient will reduce hyperfunctional use of the vocal mechanism via improved use of diaphragmatic breathing  90%:  -REAGAN     Status: Patient will reduce hyperfunctional use of the vocal mechanism via improved use of diaphragmatic breathing  Achieved  -REAGAN     Comments: Patient will reduce hyperfunctional use of the vocal mechanism via improved use of diaphragmatic breathing  1/15: pt using more breath support during speaking activities; diaphragmatic breathing exercises mastered with patient. 1/8: reviewed diaphragmatic breathing technique and gave handout. 12/17: introduced diaphragmatic breathing technique  -REAGAN     Patient will identify and discriminate vocal abuse behaviors in situations that promote vocal abuse and misuse as well as identify good vocal hygiene practices, alternatives, and situations  90%:  -REAGAN     Status: Patient will identify and discriminate vocal abuse behaviors in situations that promote vocal abuse and misuse as well as identify good vocal hygiene practices, alternatives, and situations  Progressing as expected  -REAGAN     Comments: Patient will identify and discriminate vocal abuse behaviors in situations that promote vocal abuse and misuse as well as identify good vocal hygiene practices, alternatives, and situations  1/15: pt able to identify vocal abuse habits including resonance; throat clearing and reflux causing foods and habits. 1/8: pt  more aware of vocal abuse patterns and what to look for in vocal quality along with strategies to reduce harsh hoarse voicing 12/17: introduced; discussed reflux precautions.   -REAGAN     Status: Patient will be able to use a balanced vocal resonance  Progressing as expected  -REAGAN     Comments: Patient will be able to use a balanced vocal resonance  1/15: pt with resonance drills dificult initially. eventually able to achieve forward focus with mmmm and syllable drills continue at home for forward focus. 1/8: introduced today; will instruct further next session.   -REAGAN     Pt will demonstrate an understanding of the structures and functions of the phonatory/respiratory tract (respiration, phonation, resonance and articulation)  90%:  -REAGAN     Status: Pt will demonstrate an understanding of the structures and functions of the phonatory/respiratory tract (respiration, phonation, resonance and articulation)  Progressing as expected  -REAGAN     Comments: Pt will demonstrate an understanding of the structures and functions of the phonatory/respiratory tract (respiration, phonation, resonance and articulation)  1/15: pt greatly improved with knowledge and understanding of vocal function 1/8: provided education regarding the components of voicing and how they work together for optimal voicing.   -REAGAN     Patient will reduce hyperfunctional use of voice by performing Vocal Function Exercises  90%:;with intermittent cues  -REAGAN     Status: Patient will reduce hyperfunctional use of voice by performing Vocal Function Exercises  Progressing as expected  -REAGAN     Comments: Patient will reduce hyperfunctional use of voice by performing Vocal Function Exercises  1/15: pt continues to practice 7 second duration achieved with each; encouraging 10 seconds. 1/8: completed instruction of vocal function exercises pt with approx 4-6 sec duration for each exercises; improved wtih easy onset voicing with limited range noted.   -REAGAN        Other Goals     Other Adult Goal- 1  Pt will improve visual spatial constructional skills for problem solving for activities of daily living 90% with in cues   -REAGAN     Status: Other Adult Goal- 1  Progressing as expected  -REAGAN     Comments: Other Adult Goal- 1  1/15: ongoing 1/8: introduced.   -REAGAN     Other Adult Goal- 2  Pt will increase attention to task for completing home tasks such as bill pay, following a recipe, making a grocery list etc. with 80% accuracy with min cues as needed  -REAGAN     Status: Other Adult Goal- 2  Progressing as expected  -REAGAN     Comments: Other Adult Goal- 2  1/15: excellent attention to task for learning vocal function exercises and drills today. pt able to rehears newly learned drills and activtiies without cues.   -REAGAN        SLP Time Calculation    SLP Goal Re-Cert Due Date  03/09/20  -REAGAN       User Key  (r) = Recorded By, (t) = Taken By, (c) = Cosigned By    Initials Name Provider Type    Marisabel Marshall MS CCC-SLP Speech and Language Pathologist          OP SLP Education     Row Name 01/15/20 1515       Education    Barriers to Learning  No barriers identified  -REAGAN    Education Provided  Patient requires further education on strategies, risks  -REAGAN    Assessed  Learning needs;Learning motivation;Learning preferences;Learning readiness  -REAGAN    Learning Motivation  Strong  -REAGAN    Learning Method  Explanation;Demonstration;Teach back;Written materials  -REAGAN    Teaching Response  Demonstrated understanding;Verbalized understanding;Reinforcement needed  -REAGAN    Education Comments  continue with voice education  -REAGAN      User Key  (r) = Recorded By, (t) = Taken By, (c) = Cosigned By    Initials Name Effective Dates    Mraisabel Marshall MS CCC-SLP 08/03/18 -           OP SLP Assessment/Plan - 01/15/20 1515        SLP Assessment    Functional Problems  Speech Language- Adult/Cognition   -REAGAN       SLP Plan    Frequency  1-2x weekly   -REAGAN    Duration  5 weeks   -REAGAN    Planned CPT's?  SLP INDIVIDUAL  SPEECH THERAPY: 20956   -    Expected Duration Therapy Session - minutes  45-60 minutes   -REAGAN    Plan Comments  continue plan of care; gave more handouts for oral resonance.    -      User Key  (r) = Recorded By, (t) = Taken By, (c) = Cosigned By    Initials Name Provider Type    Marisabel Marshall MS CCC-SLP Speech and Language Pathologist                 Time Calculation:                     Marisabel Zamora MS CCC-SLP  1/15/2020 and Outpatient Speech Language Pathology   Adult/Peds Voice Treatment Note       Patient Name: Jimmie Salcedo  : 1950  MRN: [id number]  Today's Date: 1/15/2020           Visit Date: 01/15/2020      Patient Active Problem List   Diagnosis   • Acid reflux   • Arthritis   • Hypertension   • Hyperlipidemia   • Severe obstructive sleep apnea   • Psoriasis   • Diastolic dysfunction   • Peptic ulceration   • Bilateral edema of lower extremity   • Abnormal liver function test   • Hyperbilirubinemia   • Psychophysiological insomnia   • Gilbert's disease   • Situational depression   • Tubular adenoma   • Seasonal allergic rhinitis due to pollen   • Tardive dyskinesia   • Mild cognitive impairment   • Memory loss       Visit Dx:    ICD-10-CM ICD-9-CM   1. Memory loss R41.3 780.93   2. Dysarthria R47.1 784.51   3. Impaired vocal quality R49.8 784.49   4. Cognitive communication deficit R41.841 799.52                           OP SLP Assessment/Plan - 01/15/20 1515        SLP Assessment    Functional Problems  Speech Language- Adult/Cognition   -REAGAN       SLP Plan    Frequency  1-2x weekly   -REAGAN    Duration  5 weeks   -REAGAN    Planned CPT's?  SLP INDIVIDUAL SPEECH THERAPY: 84971   -REAGAN    Expected Duration Therapy Session - minutes  45-60 minutes   -REAGAN    Plan Comments  continue plan of care; gave more handouts for oral resonance.    -      User Key  (r) = Recorded By, (t) = Taken By, (c) = Cosigned By    Initials Name Provider Type    Marisabel Marshall, MS CCC-SLP Speech and Language  Pathologist        SLP OP Goals     Row Name 01/15/20 1515          Goal Type Needed    Goal Type Needed  Memory;Dysarthria  -REAGAN        Subjective Comments    Subjective Comments  Pt very cooperative for therapy activities.   -REAGAN        Subjective Pain    Able to rate subjective pain?  yes  -REAGAN     Pre-Treatment Pain Level  0  -REAGAN     Post-Treatment Pain Level  0  -REAGAN        Memory Goals    Memory LTG's  Patient will be able to remember information needed to participate in avocational activities  -REAGAN     Status: Patient will be able to remember information needed to participate in avocational activities  Progressing as expected  -REAGAN     Comments: Patient will be able to remember information needed to participate in avocational activities  1/15: ongoing; pt states that he is not having difficulty with recall just with motivation1/8: discussed memory issues experienced by patient. 12/17: initiated.   -REAGAN     Memory STG's  Patient’s memory skills will be enhanced as reported by patient by using external memory aides  -REAGAN     Patient’s memory skills will be enhanced as reported by patient by using external memory aides  90%:  -REAGAN     Status: Patient’s memory skills will be enhanced as reported by patient by using external memory aides  Progressing as expected  -REAGAN     Comments: Patient’s memory skills will be enhanced as reported by patient by using external memory aides  1/15: pt using consistently per report, no difficulty with keeping appts or events due to memory per report; 1/8: introduced external aids including journal, calendar and phone apps. 12/17: reviewed testing results with patient.   -REAGAN        Voice Goals    Patient will maintain a program of good vocal hygiene in all settings by developing an awareness of behaviors and lifestyle.  100%:;with intermittent cues  -REAGAN     Status: Patient will maintain a program of good vocal hygiene in all settings by developing an awareness of behaviors and lifestyle.   Progressing as expected  -REAGAN     Comments: Patient will maintain a program of good vocal hygiene in all settings by developing an awareness of behaviors and lifestyle.  1/15: continues . 1/8: pt reports drinkign more water; continues to use throat clear excessively. discussed strategies to reduce this and cued patient each time he cleared to bring awareness to it. 12/17: introduced vocal hygiene strategies and gave handout. Discussed alternative of throat clearing for increasing vocal hygiene.   -REAGAN     Status: Patient will institute vocal hygiene technique of increasing water intake per patient report  Progressing as expected  -REAGAN     Comments: Patient will institute vocal hygiene technique of increasing water intake per patient report  1/15: 6-8 cups per day of water reported. 1/8: drinking more water per report; pt brought bottle to water to session. 12/17: discussed importance of increasing water intake to 8 glasses per day. pt agrees.   -REAGAN     Patient will institute vocal hygiene technique of replacing cough/throat clear with silent cough or hard swallow  with intermittent cues;___ days:  -REAGAN     Status: Patient will institute vocal hygiene technique of replacing cough/throat clear with silent cough or hard swallow  Progressing as expected  -REAGAN     Comments: Patient will institute vocal hygiene technique of replacing cough/throat clear with silent cough or hard swallow  1/15: pt only cleared throat x5 this session greatly reduced per his report also. 1/8: max cues given today to remind 12/17: introduced today with cues to replace during session.   -REAGAN     Patient will reduce hyperfunctional use of the vocal mechanism via improved use of diaphragmatic breathing  90%:  -REAGAN     Status: Patient will reduce hyperfunctional use of the vocal mechanism via improved use of diaphragmatic breathing  Achieved  -REAGAN     Comments: Patient will reduce hyperfunctional use of the vocal mechanism via improved use of  diaphragmatic breathing  1/15: pt using more breath support during speaking activities; diaphragmatic breathing exercises mastered with patient. 1/8: reviewed diaphragmatic breathing technique and gave handout. 12/17: introduced diaphragmatic breathing technique  -REAGAN     Patient will identify and discriminate vocal abuse behaviors in situations that promote vocal abuse and misuse as well as identify good vocal hygiene practices, alternatives, and situations  90%:  -REAGAN     Status: Patient will identify and discriminate vocal abuse behaviors in situations that promote vocal abuse and misuse as well as identify good vocal hygiene practices, alternatives, and situations  Progressing as expected  -REAGAN     Comments: Patient will identify and discriminate vocal abuse behaviors in situations that promote vocal abuse and misuse as well as identify good vocal hygiene practices, alternatives, and situations  1/15: pt able to identify vocal abuse habits including resonance; throat clearing and reflux causing foods and habits. 1/8: pt more aware of vocal abuse patterns and what to look for in vocal quality along with strategies to reduce harsh hoarse voicing 12/17: introduced; discussed reflux precautions.   -REAGAN     Status: Patient will be able to use a balanced vocal resonance  Progressing as expected  -REAGAN     Comments: Patient will be able to use a balanced vocal resonance  1/15: pt with resonance drills dificult initially. eventually able to achieve forward focus with mmmm and syllable drills continue at home for forward focus. 1/8: introduced today; will instruct further next session.   -REAGAN     Pt will demonstrate an understanding of the structures and functions of the phonatory/respiratory tract (respiration, phonation, resonance and articulation)  90%:  -REAGAN     Status: Pt will demonstrate an understanding of the structures and functions of the phonatory/respiratory tract (respiration, phonation, resonance and articulation)   Progressing as expected  -REAGAN     Comments: Pt will demonstrate an understanding of the structures and functions of the phonatory/respiratory tract (respiration, phonation, resonance and articulation)  1/15: pt greatly improved with knowledge and understanding of vocal function 1/8: provided education regarding the components of voicing and how they work together for optimal voicing.   -REAGAN     Patient will reduce hyperfunctional use of voice by performing Vocal Function Exercises  90%:;with intermittent cues  -REAGAN     Status: Patient will reduce hyperfunctional use of voice by performing Vocal Function Exercises  Progressing as expected  -REAGAN     Comments: Patient will reduce hyperfunctional use of voice by performing Vocal Function Exercises  1/15: pt continues to practice 7 second duration achieved with each; encouraging 10 seconds. 1/8: completed instruction of vocal function exercises pt with approx 4-6 sec duration for each exercises; improved wtih easy onset voicing with limited range noted.   -REAGAN        Other Goals    Other Adult Goal- 1  Pt will improve visual spatial constructional skills for problem solving for activities of daily living 90% with in cues   -REAGAN     Status: Other Adult Goal- 1  Progressing as expected  -REAGAN     Comments: Other Adult Goal- 1  1/15: ongoing 1/8: introduced.   -REAGAN     Other Adult Goal- 2  Pt will increase attention to task for completing home tasks such as bill pay, following a recipe, making a grocery list etc. with 80% accuracy with min cues as needed  -REAGAN     Status: Other Adult Goal- 2  Progressing as expected  -REAGAN     Comments: Other Adult Goal- 2  1/15: excellent attention to task for learning vocal function exercises and drills today. pt able to rehears newly learned drills and activtiies without cues.   -REAGAN        SLP Time Calculation    SLP Goal Re-Cert Due Date  03/09/20  -REAGAN       User Key  (r) = Recorded By, (t) = Taken By, (c) = Cosigned By    Initials Name Provider  Type    Marisabel Marshall MS CCC-SLP Speech and Language Pathologist        OP SLP Education     Row Name 01/15/20 1515       Education    Barriers to Learning  No barriers identified  -REAGAN    Education Provided  Patient requires further education on strategies, risks  -REAGAN    Assessed  Learning needs;Learning motivation;Learning preferences;Learning readiness  -REAGAN    Learning Motivation  Strong  -REAGAN    Learning Method  Explanation;Demonstration;Teach back;Written materials  -REAGAN    Teaching Response  Demonstrated understanding;Verbalized understanding;Reinforcement needed  -REAGAN    Education Comments  continue with voice education  -REAGAN      User Key  (r) = Recorded By, (t) = Taken By, (c) = Cosigned By    Initials Name Effective Dates    Marisabel Marshall MS CCC-SLP 08/03/18 -               Time Calculation:                       Marisabel Zamora MS CCC-SLP  1/15/2020

## 2020-01-15 NOTE — TELEPHONE ENCOUNTER
----- Message from Ck Casiano MD sent at 1/14/2020  5:15 PM EST -----  Please call the patient/family and let them know that his spinal fluid results are normal so far. Then, if they'd like a referral to [facility] I'm happy to help. Thanks.

## 2020-01-15 NOTE — PROGRESS NOTES
PT Re-Assessment / Re-Certification        Patient: Jimmie Salcedo   : 1950  Diagnosis/ICD-10 Code:  No primary diagnosis found.  Referring practitioner: Javad Negron MD  Date of Initial Visit: No linked episodes  Today's Date: 1/15/2020  Patient seen for Visit count could not be calculated. Make sure you are using a visit which is associated with an episode. sessions      Subjective:   Patient reports: his back is feeling better with the new stretches   Pain: 3/10, 3/10  Subjective Questionnaire: FGA   Clinical Progress: improved  Home Program Compliance: Yes  Treatment has included: therapeutic exercise    Subjective   Objective     PT Neuro  Exercise 1  Exercise Name 1: Nu-Step   Equipment/Resistance 1: L7  Time: 8 min   Exercise 2  Exercise Name 2: HS stretch  Time 2: 30 sec ea  Exercise 3  Exercise Name 3: QP hydrants   Sets/Reps 3: 10 ea   Exercise 4  Exercise Name 4: birddogs   Sets/Reps 4: 10  Exercise 5  Exercise Name 5: PPT progressed to PPT with knee lift   Sets/Reps 5: 15  Time 5: 5 sec hold   Exercise 6  Exercise Name 6: prone donkey kicks   Sets/Reps 6: 10 ea   Exercise 7  Exercise Name 7: Reassessment performed        Assessment & Plan     Assessment  Impairments: activity intolerance, impaired balance and impaired physical strength  Assessment details: Patient is making good strides towards improve back pain and hip strength. He is compliant with HEP and would like to continue to work towards pain free movement. PT will continue to follow for another 4 visits.   Prognosis: fair  Functional Limitations: walking and standing  Goals  Plan Goals: STG (2 visits)  1. Patient will report compliance with initial HEP. MET  2. Patient will demonstrate improved global LE strength by 1/2 muscle grade throughout. ONGOING    LTG (4visits)  1. Patient will be I with final HEP. ONGOING  3. Patient to perform TUG within 7 sec without LOB for improved functional mobility. MET  4. Patient to ambulate  10 meters without AD within 6 sec without LOB for improved gait radha and functional mobility. MET  5. Patient to improve FGA score to >/= 30/30 to decrease client's risk of falls. ONGOING        Plan  Therapy options: will be seen for skilled physical therapy services  Planned modality interventions: TENS  Planned therapy interventions: ADL retraining, balance/weight-bearing training, flexibility, gait training, manual therapy, neuromuscular re-education, motor coordination training, postural training, strengthening, stretching, therapeutic activities, transfer training and home exercise program  Frequency: 1x week  Duration in visits: 4  Treatment plan discussed with: patient and caregiver  Plan details: Patient will continue to be seen 1x/wk x 4wks with treatment to include strengthening, stretching, manual therapy, neuromuscular re-education, balance, gait and endurance training.           Visit Diagnoses:  No diagnosis found.    Progress toward previous goals: Partially Met      Recommendations: Continue as planned  Timeframe: 1 month  Prognosis to achieve goals: fair    PT Signature: Bernie Daniel, PT, DPT, MSCS, CDP  [place] License #: [id number]    Based upon review of the patient's progress and continued therapy plan, it is my medical opinion that Jimmie Salcedo should continue physical therapy treatment at [facility]  [address] [place] [id number] [phone].    Signature: __________________________________  Javad Negron MD    Timed:  Manual Therapy:    0     mins  20018;  Therapeutic Exercise:    38     mins  30056;     Neuromuscular Amy:    0    mins  68699;    Therapeutic Activity:     0     mins  48678;     Gait Trainin     mins  50129;     Ultrasound:     0     mins  94335;    Electrical Stimulation:    0     mins  12387 ( );    Untimed:  Electrical Stimulation:    0     mins  02633 ( );  Mechanical Traction:    0      mins  89328;     Timed Treatment:   38   mins   Total Treatment:     38   mins

## 2020-01-15 NOTE — PROGRESS NOTES
"Jimmie Salcedo    Subjective     CC: cognitive impairment    History of Present Illness   Jimmie Salcedo returns to clinic today with a history of cognitive and personality changes. His wife noted a decline starting around March, 2019. Initially he seemed less interested in community activities, and he was less organized. The history is difficult, but his wife has noted a marked decline over time with significant apathy, personality change and forgetfulness. He is more stoic, showing no emotion. He is anhedonic. He has difficulty with decisions. He states that he cannot or won't do things. He is more easily irritated. At times he seems to have significant anxiety.    He has been treated for depression with numerous medications, though without apparent benefit. He has been hospitalized in May, June and July, including at  for depression. Evaluation by  neurology has apparently been unrevealing.    Evaluation has included a normal MRI of the brain and screening labs. An EEG and CSF examination have also been unrevealing.    His FH is notable for a father with PD and 2 great-uncles on his paternal side with ALS.    Since his last appointment on 12/13/19 he denies any changes in his interval history. His wife notes significant tearfulness.     I have reviewed and confirmed the past family, social and medical history as accurate on 1/15/20.     Review of Systems   Constitutional: Negative.        Objective     Pulse 66   Ht 180.3 cm (71\")   Wt 89.4 kg (197 lb)   SpO2 98%   BMI 27.48 kg/m²      Neurologic Exam     Mental Status   Oriented to person, place, and time.   Registration: recalls 3 of 3 objects. Recall at 5 minutes: recalls 3 of 3 objects. Follows 3 step commands.   Attention: normal.   Speech: speech is normal   Level of consciousness: alert  Knowledge: good.   Able to name object. Able to read. Able to repeat. Able to write. Normal comprehension.     Cranial Nerves   Cranial nerves II through XII " intact.     Motor Exam   Muscle bulk: normal  Overall muscle tone: normal    Strength   Strength 5/5 throughout.     Gait, Coordination, and Reflexes     Reflexes   Right brachioradialis: 2+  Left brachioradialis: 2+  Right biceps: 2+  Left biceps: 2+  Right triceps: 2+  Left triceps: 2+  Right patellar: 2+  Left patellar: 2+  Right achilles: 2+  Left achilles: 2+      MMSE=30      Assessment/Plan   Jimmie was seen today for follow-up and facial droop.    Diagnoses and all orders for this visit:    Memory loss  -     Ambulatory Referral to Neurology        Jimmie Salcedo returns to clinic today with a history of cognitive impairment and personality changes. I remain unable to find clear evidence for a neurological etiology. However, after discussion it was elected to seek a 2nd opinion at the [facility]. If no neurologic etiology is found at that time, then I would reconsider psychiatric re-evaluation.    As part of this visit I reviewed prior lab results and obtained additional history from the family which is incorporated in the HPI.    Ck Casiano MD

## 2020-01-15 NOTE — TELEPHONE ENCOUNTER
Called and spoke to pt wife Silvana informing of normal test results and informed per Dr. Casiano if they would like a referral to [facility] he would be happy to send. Silvana stated that pt has an appt today and would like to discuss further at appt. Confirmed with Silvana, will see pt at appt today. Thanks.

## 2020-01-16 ENCOUNTER — TELEPHONE (OUTPATIENT)
Dept: NEUROLOGY | Facility: CLINIC | Age: 70
End: 2020-01-16

## 2020-01-16 NOTE — TELEPHONE ENCOUNTER
Called and spoke to pt wife Silvana informing of pt scheduled appt with [facility]. Silvana stated verbal understanding. Faxed Order, OV notes, Demographics, and Ambulatory Consent ([phone]). Pt asked to please call ([phone]) if needing to reschedule. Thanks.

## 2020-01-22 ENCOUNTER — TREATMENT (OUTPATIENT)
Dept: PHYSICAL THERAPY | Facility: CLINIC | Age: 70
End: 2020-01-22

## 2020-01-22 ENCOUNTER — OFFICE VISIT (OUTPATIENT)
Dept: PHYSICAL THERAPY | Facility: CLINIC | Age: 70
End: 2020-01-22

## 2020-01-22 DIAGNOSIS — R49.8 IMPAIRED VOCAL QUALITY: ICD-10-CM

## 2020-01-22 DIAGNOSIS — R47.1 DYSARTHRIA: ICD-10-CM

## 2020-01-22 DIAGNOSIS — R41.3 MEMORY LOSS: Primary | ICD-10-CM

## 2020-01-22 DIAGNOSIS — R26.81 GAIT INSTABILITY: Primary | ICD-10-CM

## 2020-01-22 DIAGNOSIS — R41.841 COGNITIVE COMMUNICATION DEFICIT: ICD-10-CM

## 2020-01-22 PROCEDURE — 97110 THERAPEUTIC EXERCISES: CPT | Performed by: PHYSICAL THERAPIST

## 2020-01-22 PROCEDURE — 97112 NEUROMUSCULAR REEDUCATION: CPT | Performed by: PHYSICAL THERAPIST

## 2020-01-22 PROCEDURE — 92507 TX SP LANG VOICE COMM INDIV: CPT | Performed by: SPEECH-LANGUAGE PATHOLOGIST

## 2020-01-22 NOTE — PROGRESS NOTES
Physical Therapy Daily Progress Note  Visit: 5  Date of Initial Visit: Type: THERAPY  Noted: 2019    Patient: Jimmie Salcedo   : 1950  Diagnosis/ICD-10 Code:  Gait instability [R26.81]  Referring practitioner: Javad Negron MD  Date of Initial Visit: Type: THERAPY  Noted: 2019  Today's Date: 2020  Patient seen for 5 sessions      Subjective:   Patient reports: he is having minimal back pain  Pain: 2/10  Clinical Progress: improved  Home Program Compliance: Yes  Treatment has included: therapeutic exercise and neuromuscular re-education    Objective   See Exercise, Manual, and Modality Logs for complete treatment.    PT Neuro     Exercise 1  Exercise Name 1: Nu-Step   Equipment/Resistance 1: L8  Time: 8 min   Exercise 2  Exercise Name 2: BOSU standing ball toss to rebounder  Exercise 3  Exercise Name 3: rebounder jumps - switch kicks and lateral jumps   Exercise 4  Exercise Name 4: seated air disc 2# ball rotations and 3# bar ball toss to each side   Exercise 5  Exercise Name 5: latera, zig zag and posterior walking with RTB  Sets/Reps 5: 4 laps ea   Exercise 6  Exercise Name 6: lateral ball toss to rebounder               Assessment & Plan     Assessment  Assessment details: Patient demonstrates good form with exercises, however with hip fatigue he did begin to report back pain. He was re-instructed in PPT and resetting posture. Continue with strengthening and balance re-ed.     Plan  Plan details: Patient to continue with PT services to improve gait, balance, strength, transfers and overall functional mobility.          Timed:  Manual Therapy:            0     mins  27714;  Therapeutic Exercise:    15    mins  92513;     Neuromuscular Amy:    15    mins  34896;    Therapeutic Activity:      0     mins  85759;     Gait Trainin    mins  36809;     Ultrasound:                     0    mins  72718;    Electrical Stimulation:    0    mins  17506 ( );      Untimed:  Electrical Stimulation:    0     mins  89871 ( );  Canalith Repositioning techniques _[id number]      Timed Treatment:   30   mins   Total Treatment:     30   mins      Bernie Daniel PT, ALEXT, MSCS, CDP  [place] License #: [id number]  Physical Therapist

## 2020-01-22 NOTE — PROGRESS NOTES
Outpatient Speech Language Pathology   Adult Speech Language Cognitive Treatment Note       Patient Name: Jimmie Salcedo  : 1950  MRN: [id number]  Today's Date: 2020         Visit Date: 2020   Patient Active Problem List   Diagnosis   • Acid reflux   • Arthritis   • Hypertension   • Hyperlipidemia   • Severe obstructive sleep apnea   • Psoriasis   • Diastolic dysfunction   • Peptic ulceration   • Bilateral edema of lower extremity   • Abnormal liver function test   • Hyperbilirubinemia   • Psychophysiological insomnia   • Gilbert's disease   • Situational depression   • Tubular adenoma   • Seasonal allergic rhinitis due to pollen   • Tardive dyskinesia   • Mild cognitive impairment   • Memory loss          Visit Dx:    ICD-10-CM ICD-9-CM   1. Memory loss R41.3 780.93   2. Dysarthria R47.1 784.51   3. Impaired vocal quality R49.8 784.49   4. Cognitive communication deficit R41.841 799.52           SLP OP Goals     Row Name 20 1345          Goal Type Needed    Goal Type Needed  Memory;Dysarthria;Other Adult Goals  -REAGAN        Subjective Comments    Subjective Comments  Pt reports no change in his vocal quality that is signficant. However, pt does admit that after performing vocal function exercises his vocal quality improves for a short time.   -REAGAN        Subjective Pain    Able to rate subjective pain?  yes  -REAGAN     Pre-Treatment Pain Level  0  -REAGAN     Post-Treatment Pain Level  0  -REAGAN        Memory Goals    Memory LTG's  Patient will be able to remember information needed to participate in avocational activities  -REAGAN     Status: Patient will be able to remember information needed to participate in avocational activities  Achieved  -REAGAN     Comments: Patient will be able to remember information needed to participate in avocational activities  : pt at prior level for memory reports distractability is more from lack of motivation than attention issues. 1/15: ongoing; pt states that he is not  having difficulty with recall just with motivation1/8: discussed memory issues experienced by patient. 12/17: initiated.   -REAGAN     Memory STG's  Patient’s memory skills will be enhanced as reported by patient by using external memory aides  -REAGAN     Patient’s memory skills will be enhanced as reported by patient by using external memory aides  90%:  -REAGAN     Status: Patient’s memory skills will be enhanced as reported by patient by using external memory aides  Achieved  -REAGAN     Comments: Patient’s memory skills will be enhanced as reported by patient by using external memory aides  1/22: pt uses consistently. 1/15: pt using consistently per report, no difficulty with keeping appts or events due to memory per report; 1/8: introduced external aids including journal, calendar and phone apps. 12/17: reviewed testing results with patient.   -REAGAN        Voice Goals    Patient will maintain a program of good vocal hygiene in all settings by developing an awareness of behaviors and lifestyle.  100%:;with intermittent cues  -REAGAN     Status: Patient will maintain a program of good vocal hygiene in all settings by developing an awareness of behaviors and lifestyle.  Progressing as expected  -REAGAN     Comments: Patient will maintain a program of good vocal hygiene in all settings by developing an awareness of behaviors and lifestyle.  1/22: pt drinking 8-10 glasses of water; very aware of throat clearing and strategy to clear. reflux precautons are limited. 1/15: continues . 1/8: pt reports drinkign more water; continues to use throat clear excessively. discussed strategies to reduce this and cued patient each time he cleared to bring awareness to it. 12/17: introduced vocal hygiene strategies and gave handout. Discussed alternative of throat clearing for increasing vocal hygiene.   -REAGAN     Status: Patient will institute vocal hygiene technique of increasing water intake per patient report  Achieved  -REAGAN     Comments: Patient will  institute vocal hygiene technique of increasing water intake per patient report  1/22: pt consistently drinking 6-8 cups of water per day no caffeine per pt report. 1/15: 6-8 cups per day of water reported. 1/8: drinking more water per report; pt brought bottle to water to session. 12/17: discussed importance of increasing water intake to 8 glasses per day. pt agrees.   -REAGAN     Patient will institute vocal hygiene technique of replacing cough/throat clear with silent cough or hard swallow  with intermittent cues;___ days:  -REAGAN     Status: Patient will institute vocal hygiene technique of replacing cough/throat clear with silent cough or hard swallow  Progressing as expected  -REAGAN     Comments: Patient will institute vocal hygiene technique of replacing cough/throat clear with silent cough or hard swallow  1/22 pt throat cleared x 1 during exercises1/15: pt only cleared throat x5 this session greatly reduced per his report also. 1/8: max cues given today to remind 12/17: introduced today with cues to replace during session.   -REAGAN     Patient will reduce hyperfunctional use of the vocal mechanism via improved use of diaphragmatic breathing  90%:  -REAGAN     Status: Patient will reduce hyperfunctional use of the vocal mechanism via improved use of diaphragmatic breathing  Achieved  -REAGAN     Comments: Patient will reduce hyperfunctional use of the vocal mechanism via improved use of diaphragmatic breathing  1/15: pt using more breath support during speaking activities; diaphragmatic breathing exercises mastered with patient. 1/8: reviewed diaphragmatic breathing technique and gave handout. 12/17: introduced diaphragmatic breathing technique  -REAGAN     Patient will identify and discriminate vocal abuse behaviors in situations that promote vocal abuse and misuse as well as identify good vocal hygiene practices, alternatives, and situations  90%:  -REAGAN     Status: Patient will identify and discriminate vocal abuse behaviors in  situations that promote vocal abuse and misuse as well as identify good vocal hygiene practices, alternatives, and situations  Progressing as expected  -REAGAN     Comments: Patient will identify and discriminate vocal abuse behaviors in situations that promote vocal abuse and misuse as well as identify good vocal hygiene practices, alternatives, and situations  1/22: pt noted when he started to clear throat and immediately corrected. 1/15: pt able to identify vocal abuse habits including resonance; throat clearing and reflux causing foods and habits. 1/8: pt more aware of vocal abuse patterns and what to look for in vocal quality along with strategies to reduce harsh hoarse voicing 12/17: introduced; discussed reflux precautions.   -REAGAN     Status: Patient will be able to use a balanced vocal resonance  Progressing as expected  -REAGAN     Comments: Patient will be able to use a balanced vocal resonance  1/22: oral resonance achieved more frequently today during drills. pt states that he often feels it in his throat instead of his mouth and becomes fatigued vocally during exercises. discussed importance of forward focus and eaay onset during resonance drills. 1/15: pt with resonance drills dificult initially. eventually able to achieve forward focus with mmmm and syllable drills continue at home for forward focus. 1/8: introduced today; will instruct further next session.   -REAGAN     Pt will demonstrate an understanding of the structures and functions of the phonatory/respiratory tract (respiration, phonation, resonance and articulation)  90%:  -REAGAN     Status: Pt will demonstrate an understanding of the structures and functions of the phonatory/respiratory tract (respiration, phonation, resonance and articulation)  Progressing as expected  -REAGAN     Comments: Pt will demonstrate an understanding of the structures and functions of the phonatory/respiratory tract (respiration, phonation, resonance and articulation)  1/22: ongoing  1/15: pt greatly improved with knowledge and understanding of vocal function 1/8: provided education regarding the components of voicing and how they work together for optimal voicing.   -REAGAN     Patient will reduce hyperfunctional use of voice by performing Vocal Function Exercises  90%:;with intermittent cues  -REAGAN     Status: Patient will reduce hyperfunctional use of voice by performing Vocal Function Exercises  Progressing as expected  -REAGAN     Comments: Patient will reduce hyperfunctional use of voice by performing Vocal Function Exercises  1/22: pt vocal quality improves after performing vocal function exercises. pt with reduced pitch breaks noted during exercises. 1/15: pt continues to practice 7 second duration achieved with each; encouraging 10 seconds. 1/8: completed instruction of vocal function exercises pt with approx 4-6 sec duration for each exercises; improved wtih easy onset voicing with limited range noted.   -REAGAN        Other Goals    Other Adult Goal- 1  Pt will improve visual spatial constructional skills for problem solving for activities of daily living 90% with in cues   -REAGAN     Status: Other Adult Goal- 1  Progressing as expected  -REAGAN     Comments: Other Adult Goal- 1  1/22: pt given rush hour game level 3 and 4 solved indpendently. pt admits he just lacks motivation not problem solving skills. 1/15: ongoing 1/8: introduced.   -REAGAN     Other Adult Goal- 2  Pt will increase attention to task for completing home tasks such as bill pay, following a recipe, making a grocery list etc. with 80% accuracy with min cues as needed  -REAGAN     Status: Other Adult Goal- 2  Progressing as expected  -REAGAN     Comments: Other Adult Goal- 2  1/22: 1/15: excellent attention to task for learning vocal function exercises and drills today. pt able to rehears newly learned drills and activtiies without cues.   -REAGAN        SLP Time Calculation    SLP Goal Re-Cert Due Date  03/09/20  -REAGAN       User Key  (r) = Recorded By,  (t) = Taken By, (c) = Cosigned By    Initials Name Provider Type    Marisabel Marshall MS CCC-SLP Speech and Language Pathologist          OP SLP Education     Row Name 01/22/20 1345       Education    Barriers to Learning  No barriers identified  -REAGAN    Education Provided  Patient demonstrated recommended strategies  -REAGAN    Assessed  Learning needs;Learning motivation;Learning preferences;Learning readiness  -REAGAN    Learning Motivation  Strong  -REAGAN    Learning Method  Explanation;Demonstration;Teach back;Written materials  -REAGAN    Teaching Response  Demonstrated understanding;Verbalized understanding  -REAGAN    Education Comments  ongoing  -REAGAN      User Key  (r) = Recorded By, (t) = Taken By, (c) = Cosigned By    Initials Name Effective Dates    Marisabel Marshall MS CCC-SLP 08/03/18 -           OP SLP Assessment/Plan - 01/22/20 1345        SLP Assessment    Functional Problems  Speech Language- Adult/Cognition   -REAGAN       SLP Plan    Frequency  1-2x weekly   -REAGAN    Duration  4 weeks   -REAGAN    Planned CPT's?  SLP INDIVIDUAL SPEECH THERAPY: 35341   -REAGAN    Expected Duration Therapy Session - minutes  45-60 minutes   -REAGAN    Plan Comments  continue plan of care ENT appt next week scheduled.    -REAGAN      User Key  (r) = Recorded By, (t) = Taken By, (c) = Cosigned By    Initials Name Provider Type    Marisabel Marshall, MS CCC-SLP Speech and Language Pathologist              Marisabel Zamora MS CCC-SLP  1/22/2020

## 2020-01-27 ENCOUNTER — APPOINTMENT (OUTPATIENT)
Dept: NEUROLOGY | Facility: HOSPITAL | Age: 70
End: 2020-01-27

## 2020-01-28 ENCOUNTER — OFFICE VISIT (OUTPATIENT)
Dept: SLEEP MEDICINE | Facility: HOSPITAL | Age: 70
End: 2020-01-28

## 2020-01-28 VITALS
DIASTOLIC BLOOD PRESSURE: 70 MMHG | OXYGEN SATURATION: 97 % | HEART RATE: 67 BPM | BODY MASS INDEX: 27.58 KG/M2 | WEIGHT: 197 LBS | HEIGHT: 71 IN | SYSTOLIC BLOOD PRESSURE: 144 MMHG

## 2020-01-28 DIAGNOSIS — G47.33 SEVERE OBSTRUCTIVE SLEEP APNEA: ICD-10-CM

## 2020-01-28 DIAGNOSIS — G47.33 OSA (OBSTRUCTIVE SLEEP APNEA): Primary | ICD-10-CM

## 2020-01-28 DIAGNOSIS — F51.04 PSYCHOPHYSIOLOGICAL INSOMNIA: ICD-10-CM

## 2020-01-28 PROCEDURE — 99214 OFFICE O/P EST MOD 30 MIN: CPT | Performed by: INTERNAL MEDICINE

## 2020-01-28 RX ORDER — LANOLIN ALCOHOL/MO/W.PET/CERES
50 CREAM (GRAM) TOPICAL DAILY
COMMUNITY

## 2020-01-28 NOTE — PROGRESS NOTES
Subjective:     Chief Complaint:   Chief Complaint   Patient presents with   • Follow-up       HPI:    Jimmie Salcedo is a 69 y.o. male here for follow-up of severe obstructive sleep apnea and insomnia.    I last saw him in April 2017.  He has a history of severe obstructive sleep apnea and has historically been treated with auto CPAP at a range of 10-20 cm H2O.  At the time of his visit in April 2017 his AHI was normal and he was compliant with CPAP therapy.    Since that time there is been some changes.  In May of last year he was admitted for an inpatient psychiatric visit due to uncontrolled depression.  He could not use his CPAP therapy while an inpatient and got out of the habit of using it.  Around that time he also lost around 60 pounds.    He has since gained about 15 pounds back.  He no longer wears CPAP therapy.  He does not think he is somnolent during the day.  He averages 8 hours of sleep per night and awakens once so insomnia is no longer an issue for him.  He thinks his mood problems are relatively well controlled.    His wife notes snoring and possibly apneas at night.  It was recommended he come for reevaluation.    Current medications are:   Current Outpatient Medications:   •  amLODIPine (NORVASC) 5 MG tablet, TAKE ONE TABLET BY MOUTH EVERY DAY, Disp: 90 tablet, Rfl: 3  •  calcipotriene (DOVONOX) 0.005 % ointment, calcipotriene 0.005 % topical ointment  Two times a day, Disp: , Rfl:   •  Cholecalciferol (VITAMIN D3) 125 MCG (5000 UT) capsule capsule, Take 5,000 Units by mouth Daily., Disp: , Rfl:   •  Clobetasol Propionate (CLOBETASOL 17 PROPIONATE) 0.5 % powder, clobetasol  Two times a day, Disp: , Rfl:   •  doxazosin (CARDURA) 2 MG tablet, doxazosin 2 mg tablet  Every night at bedtime, Disp: , Rfl:   •  EPINEPHrine (EPIPEN 2-MARIE) 0.3 MG/0.3ML solution auto-injector injection, EpiPen 2-Marie 0.3 mg/0.3 mL injection, auto-injector  Bedtime, Disp: , Rfl:   •  fenofibrate (TRICOR) 145 MG tablet,  TAKE ONE TABLET BY MOUTH EVERY DAY, Disp: 90 tablet, Rfl: 2  •  fluocinonide (LIDEX) 0.05 % external solution, fluocinonide 0.05 % topical solution  Daily, Disp: , Rfl:   •  hepatitis A (HAVRIX) 1440 EL U/ML vaccine, Havrix (PF) 1,440 JERONIMO unit/mL intramuscular syringe, Disp: , Rfl:   •  irbesartan (AVAPRO) 300 MG tablet, Take 300 mg by mouth Daily., Disp: , Rfl: 5  •  mirtazapine (REMERON) 15 MG tablet, Take 1 tablet by mouth Every Night., Disp: 30 tablet, Rfl: 5  •  MULTIPLE VITAMINS PO, Multiple Vitamins, Disp: , Rfl:   •  Neomycin-Polymyxin-Dexameth (MAXITROL) 0.1 % ointment, Maxitrol 3.5 mg/g-10,000 unit/g-0.1 % eye ointment  Every night at bedtime, Disp: , Rfl:   •  Omega-3 Fatty Acids (FISH OIL) 1000 MG capsule capsule, Take  by mouth Daily With Breakfast., Disp: , Rfl:   •  pravastatin (PRAVACHOL) 40 MG tablet, TAKE ONE TABLET BY MOUTH EVERY DAY, Disp: 90 tablet, Rfl: 3  •  sildenafil (REVATIO) 20 MG tablet, 2-4 tabs as needed, Disp: 90 tablet, Rfl: 5  •  triamcinolone (KENALOG) 0.1 % ointment, Apply  topically 2 (Two) Times a Day As Needed for Irritation or Rash., Disp: 80 g, Rfl: 5  •  urea (CARMOL) 20 % cream, Apply  topically to the appropriate area as directed As Needed for Dry Skin., Disp: , Rfl:   •  vitamin B-6 (PYRIDOXINE) 50 MG tablet, Take 50 mg by mouth Daily., Disp: , Rfl:   •  amLODIPine (NORVASC) 10 MG tablet, amlodipine 10 mg tablet  Daily, Disp: , Rfl:   •  bisoprolol (ZEBeta) 5 MG tablet, bisoprolol fumarate 5 mg tablet  take one tablet by mouth daily, Disp: , Rfl:   •  cloNIDine (CATAPRES) 0.1 MG tablet, clonidine HCl 0.1 mg tablet, Disp: , Rfl:   •  doxazosin (CARDURA) 4 MG tablet, TAKE ONE TABLET BY MOUTH EVERY NIGHT, Disp: 90 tablet, Rfl: 3  •  doxycycline (ADOXA) 50 MG tablet, doxycycline monohydrate 50 mg tablet  Daily, Disp: , Rfl:   •  fenofibrate 160 MG tablet, fenofibrate 160 mg tablet  Daily, Disp: , Rfl:   •  furosemide (LASIX) 40 MG tablet, furosemide 40 mg tablet, Disp: ,  Rfl:   •  lisinopril-hydrochlorothiazide (PRINZIDE,ZESTORETIC) 20-12.5 MG per tablet, lisinopril 20 mg-hydrochlorothiazide 12.5 mg tablet  Daily, Disp: , Rfl:   •  lovastatin (MEVACOR) 20 MG tablet, lovastatin 20 mg tablet  Every night at bedtime, Disp: , Rfl:   •  naproxen (NAPROSYN) 500 MG tablet, naproxen 500 mg tablet  Two times a day, Disp: , Rfl:   •  Omega-3 Fatty Acids (FISH OIL) 1000 MG capsule capsule, Fish Oil, Disp: , Rfl:   •  Ped Multivitamins-Fl-Iron (MULTIVITAMIN WITH FLUORIDE/IRON) 0.25-10 MG/ML solution solution, Take  by mouth Daily., Disp: , Rfl:   •  potassium chloride (K-DUR,KLOR-CON) 20 MEQ CR tablet, potassium chloride ER 20 mEq tablet,extended release(part/cryst), Disp: , Rfl:   •  sildenafil (VIAGRA) 100 MG tablet, Take 1 tablet by mouth Daily As Needed for erectile dysfunction., Disp: 6 tablet, Rfl: 5.      The patient's relevant past medical, surgical, family and social history were reviewed and updated in Epic as appropriate.     ROS:    Review of Systems   Constitutional: Positive for unexpected weight change.   Respiratory: Positive for shortness of breath.    Psychiatric/Behavioral: Positive for dysphoric mood. Negative for sleep disturbance.         Objective:    Physical Exam   Constitutional: He is oriented to person, place, and time. He appears well-developed and well-nourished.   HENT:   Head: Normocephalic and atraumatic.   Mouth/Throat: Oropharynx is clear and moist.   Class III airway   Neck: Neck supple. No thyromegaly present.   Cardiovascular: Normal rate and regular rhythm. Exam reveals no gallop and no friction rub.   No murmur heard.  Pulmonary/Chest: Effort normal. No respiratory distress. He has no wheezes. He has no rales.   Musculoskeletal: He exhibits no edema.   Neurological: He is alert and oriented to person, place, and time.   Skin: Skin is warm and dry.   Psychiatric: He has a normal mood and affect. His behavior is normal.   Vitals  reviewed.        Assessment:    Problem List Items Addressed This Visit        Pulmonary Problems    Severe obstructive sleep apnea    Overview     Auto CPAP         Relevant Orders    Home Sleep Study       Other    Psychophysiological insomnia      Other Visit Diagnoses     THA (obstructive sleep apnea)    -  Primary    Relevant Orders    CPAP Therapy        He has a history of severe sleep apnea which is currently untreated.  He has lost significant amounts of weight which would be a significant change in his clinical status so I think he warrants a reevaluation with a home sleep apnea test.  If that is abnormal then we will notify him and he can resume his CPAP therapy.    In regards to his insomnia, that does not seem to be a big problem currently.  He has a history of depression he thinks that is under control currently as well.  He does take Remeron at night.    Plan:     1. Home sleep apnea test  2. I did reorder supplies for his CPAP machine in case he needs to resume that.  I am concerned that he will still need CPAP therapy as his sleep apnea was of a severe nature and his wife still notes issues with his breathing at night.  3. We can reassess his settings once he is back on therapy, if that occurs, and make adjustments as needed.  His current settings are auto at a range of 10-20 cm H2O.  4. Sleep center follow-up in about 4 months    Discussed in detail with the patient.  He will call prior to his follow up visit for any new problems.    Level of Risk Moderate due to: mild exacerbation of one chronic illness    Signed by  Segun Mejia MD

## 2020-02-05 ENCOUNTER — OFFICE VISIT (OUTPATIENT)
Dept: PHYSICAL THERAPY | Facility: CLINIC | Age: 70
End: 2020-02-05

## 2020-02-05 ENCOUNTER — TREATMENT (OUTPATIENT)
Dept: PHYSICAL THERAPY | Facility: CLINIC | Age: 70
End: 2020-02-05

## 2020-02-05 DIAGNOSIS — R49.8 IMPAIRED VOCAL QUALITY: ICD-10-CM

## 2020-02-05 DIAGNOSIS — R41.3 MEMORY LOSS: Primary | ICD-10-CM

## 2020-02-05 DIAGNOSIS — R26.81 GAIT INSTABILITY: Primary | ICD-10-CM

## 2020-02-05 DIAGNOSIS — R41.841 COGNITIVE COMMUNICATION DEFICIT: ICD-10-CM

## 2020-02-05 DIAGNOSIS — R47.1 DYSARTHRIA: ICD-10-CM

## 2020-02-05 PROCEDURE — 92507 TX SP LANG VOICE COMM INDIV: CPT | Performed by: SPEECH-LANGUAGE PATHOLOGIST

## 2020-02-05 PROCEDURE — 97110 THERAPEUTIC EXERCISES: CPT | Performed by: PHYSICAL THERAPIST

## 2020-02-05 NOTE — PROGRESS NOTES
Physical Therapy Daily Progress Note  Visit: 6  Date of Initial Visit: Type: THERAPY  Noted: 2019    Patient: Jimmie Salcedo   : 1950  Diagnosis/ICD-10 Code:  Gait instability [R26.81]  Referring practitioner: Javad Negron MD  Date of Initial Visit: Type: THERAPY  Noted: 2019  Today's Date: 2020  Patient seen for 6 sessions      Subjective:   Patient reports: he is feeling ok.   Pain: 0/10  Clinical Progress: improved  Home Program Compliance: Yes  Treatment has included: therapeutic exercise and neuromuscular re-education    Objective   See Exercise, Manual, and Modality Logs for complete treatment.    PT Neuro  Exercise 1  Exercise Name 1: Nu-Step   Equipment/Resistance 1: L8  Time: 8 min   Exercise 2  Exercise Name 2: birddogs  Sets/Reps 2: 10  Exercise 3  Exercise Name 3: QP firehydrants   Sets/Reps 3: 10  Exercise 4  Exercise Name 4: TK wood chops w/ 2# ball   Sets/Reps 4: 15 ea   Exercise 5  Exercise Name 5: TK to HK   Sets/Reps 5: 5 ea side   Exercise 6  Exercise Name 6: TG squats   Time 6: 1 min   Exercise 7  Exercise Name 7: TG SLS   Sets/Reps 7: 10 ea   Exercise 8  Exercise Name 8: sidelying TG squats   Time 8: 1 min ea   Exercise 9  Exercise Name 9: step ups with hip hike   Sets/Reps 9: 20 ea  Exercise 10  Exercise Name 10: HS stretch, seated piriformis and cross leg stretch   Time 10: 30 sec ea     Assessment & Plan     Assessment  Assessment details: Pt with a slight increase in back pain due to more challenging exercises. He was instructed on hip stretches, as well as hamstring for home performance.     Plan  Plan details: Patient to continue with PT services to improve gait, balance, strength, transfers and overall functional mobility.          Timed:  Manual Therapy:            0     mins  39998;  Therapeutic Exercise:    40    mins  08581;     Neuromuscular Amy:    0    mins  98074;    Therapeutic Activity:      0     mins  39511;     Gait Trainin     mins  58943;     Electrical Stimulation:    0    mins  17678 ( );     Untimed:  Canalith Repositioning techniques _0_ 32057      Timed Treatment:   15   mins (only billing one unit-overlapped with another patient)   Total Treatment:     40   mins      Bernie Daniel PT, DPT, MSCS, CDP  [place] License #: [id number]  Physical Therapist

## 2020-02-05 NOTE — PROGRESS NOTES
Outpatient Speech Language Pathology   Adult Speech Language Cognitive Progress Note       Patient Name: Jimmie Salcedo  : 1950  MRN: [id number]  Today's Date: 2020         Visit Date: 2020   Patient Active Problem List   Diagnosis   • Acid reflux   • Arthritis   • Hypertension   • Hyperlipidemia   • Severe obstructive sleep apnea   • Psoriasis   • Diastolic dysfunction   • Peptic ulceration   • Bilateral edema of lower extremity   • Abnormal liver function test   • Hyperbilirubinemia   • Psychophysiological insomnia   • Gilbert's disease   • Situational depression   • Tubular adenoma   • Seasonal allergic rhinitis due to pollen   • Tardive dyskinesia   • Mild cognitive impairment   • Memory loss          Visit Dx:    ICD-10-CM ICD-9-CM   1. Memory loss R41.3 780.93   2. Dysarthria R47.1 784.51   3. Impaired vocal quality R49.8 784.49   4. Cognitive communication deficit R41.841 799.52         SLP OP Goals     Row Name 20 1300          Goal Type Needed    Goal Type Needed  Dysarthria;Voice  -REAGAN        Subjective Comments    Subjective Comments  pt reports going to [facility] and having neuropsychological testing last week. results pending.   -REAGAN        Subjective Pain    Able to rate subjective pain?  yes  -REAGAN     Pre-Treatment Pain Level  0  -REAGAN     Post-Treatment Pain Level  0  -REAGAN        Memory Goals    Memory LTG's  Patient will be able to remember information needed to participate in avocational activities  -REAGAN     Status: Patient will be able to remember information needed to participate in avocational activities  Achieved  -REAGAN     Comments: Patient will be able to remember information needed to participate in avocational activities  : pt at prior level for memory reports distractability is more from lack of motivation than attention issues. 1/15: ongoing; pt states that he is not having difficulty with recall just with motivation: discussed memory issues experienced by  patient. 12/17: initiated.   -REAGAN     Memory STG's  Patient’s memory skills will be enhanced as reported by patient by using external memory aides  -REAGAN     Patient’s memory skills will be enhanced as reported by patient by using external memory aides  90%:  -REAGAN     Status: Patient’s memory skills will be enhanced as reported by patient by using external memory aides  Achieved  -REAGAN     Comments: Patient’s memory skills will be enhanced as reported by patient by using external memory aides  1/22: pt uses consistently. 1/15: pt using consistently per report, no difficulty with keeping appts or events due to memory per report; 1/8: introduced external aids including journal, calendar and phone apps. 12/17: reviewed testing results with patient.   -REAGAN        Voice Goals    Patient will maintain a program of good vocal hygiene in all settings by developing an awareness of behaviors and lifestyle.  100%:;with intermittent cues  -REAGAN     Status: Patient will maintain a program of good vocal hygiene in all settings by developing an awareness of behaviors and lifestyle.  Progressing as expected  -REAGAN     Comments: Patient will maintain a program of good vocal hygiene in all settings by developing an awareness of behaviors and lifestyle.  2/5: pt becoing much more aware of habits and things that affect voicing. 1/22: pt drinking 8-10 glasses of water; very aware of throat clearing and strategy to clear. reflux precautons are limited. 1/15: continues . 1/8: pt reports drinkign more water; continues to use throat clear excessively. discussed strategies to reduce this and cued patient each time he cleared to bring awareness to it. 12/17: introduced vocal hygiene strategies and gave handout. Discussed alternative of throat clearing for increasing vocal hygiene.   -REAGAN     Status: Patient will institute vocal hygiene technique of increasing water intake per patient report  Achieved  -REAGAN     Comments: Patient will institute vocal  hygiene technique of increasing water intake per patient report  1/22: pt consistently drinking 6-8 cups of water per day no caffeine per pt report. 1/15: 6-8 cups per day of water reported. 1/8: drinking more water per report; pt brought bottle to water to session. 12/17: discussed importance of increasing water intake to 8 glasses per day. pt agrees.   -REAGAN     Patient will institute vocal hygiene technique of replacing cough/throat clear with silent cough or hard swallow  with intermittent cues;___ days:  -REAGAN     Status: Patient will institute vocal hygiene technique of replacing cough/throat clear with silent cough or hard swallow  Progressing as expected  -REAGAN     Comments: Patient will institute vocal hygiene technique of replacing cough/throat clear with silent cough or hard swallow  2/5: pt very aware of throat clears today and minimally noted. 1/22 pt throat cleared x 1 during exercises1/15: pt only cleared throat x5 this session greatly reduced per his report also. 1/8: max cues given today to remind 12/17: introduced today with cues to replace during session.   -REAGAN     Patient will reduce hyperfunctional use of the vocal mechanism via improved use of diaphragmatic breathing  90%:  -REAGAN     Status: Patient will reduce hyperfunctional use of the vocal mechanism via improved use of diaphragmatic breathing  Achieved  -     Comments: Patient will reduce hyperfunctional use of the vocal mechanism via improved use of diaphragmatic breathing  1/15: pt using more breath support during speaking activities; diaphragmatic breathing exercises mastered with patient. 1/8: reviewed diaphragmatic breathing technique and gave handout. 12/17: introduced diaphragmatic breathing technique  -REAGAN     Patient will identify and discriminate vocal abuse behaviors in situations that promote vocal abuse and misuse as well as identify good vocal hygiene practices, alternatives, and situations  90%:  -REAGAN     Status: Patient will  identify and discriminate vocal abuse behaviors in situations that promote vocal abuse and misuse as well as identify good vocal hygiene practices, alternatives, and situations  Progressing as expected  -REAGAN     Comments: Patient will identify and discriminate vocal abuse behaviors in situations that promote vocal abuse and misuse as well as identify good vocal hygiene practices, alternatives, and situations  2/5: pt [place] brings water and uses alternatives to throat clearing. still not addressing reflux 1/22: pt noted when he started to clear throat and immediately corrected. 1/15: pt able to identify vocal abuse habits including resonance; throat clearing and reflux causing foods and habits. 1/8: pt more aware of vocal abuse patterns and what to look for in vocal quality along with strategies to reduce harsh hoarse voicing 12/17: introduced; discussed reflux precautions.   -REAGAN     Status: Patient will be able to use a balanced vocal resonance  Progressing as expected  -REAGAN     Comments: Patient will be able to use a balanced vocal resonance  2/5: oral resonance achieved in drills pt reports practice at home 1/22: oral resonance achieved more frequently today during drills. pt states that he often feels it in his throat instead of his mouth and becomes fatigued vocally during exercises. discussed importance of forward focus and eaay onset during resonance drills. 1/15: pt with resonance drills dificult initially. eventually able to achieve forward focus with mmmm and syllable drills continue at home for forward focus. 1/8: introduced today; will instruct further next session.   -REAGAN     Pt will demonstrate an understanding of the structures and functions of the phonatory/respiratory tract (respiration, phonation, resonance and articulation)  90%:  -REAGAN     Status: Pt will demonstrate an understanding of the structures and functions of the phonatory/respiratory tract (respiration, phonation, resonance and articulation)   Progressing as expected  -REAGAN     Comments: Pt will demonstrate an understanding of the structures and functions of the phonatory/respiratory tract (respiration, phonation, resonance and articulation)  1/22: ongoing 1/15: pt greatly improved with knowledge and understanding of vocal function 1/8: provided education regarding the components of voicing and how they work together for optimal voicing.   -REAGAN     Patient will reduce hyperfunctional use of voice by performing Vocal Function Exercises  90%:;with intermittent cues  -REAGAN     Status: Patient will reduce hyperfunctional use of voice by performing Vocal Function Exercises  Progressing as expected  -REAGAN     Comments: Patient will reduce hyperfunctional use of voice by performing Vocal Function Exercises  1/22: pt vocal quality improves after performing vocal function exercises. pt with reduced pitch breaks noted during exercises. 1/15: pt continues to practice 7 second duration achieved with each; encouraging 10 seconds. 1/8: completed instruction of vocal function exercises pt with approx 4-6 sec duration for each exercises; improved wtih easy onset voicing with limited range noted.   -REAGAN        Other Goals    Other Adult Goal- 1  Pt will improve visual spatial constructional skills for problem solving for activities of daily living 90% with in cues   -REAGAN     Status: Other Adult Goal- 1  Progressing as expected  -REAGAN     Comments: Other Adult Goal- 1  2/5: visual spatial game on computer pt had difficulty with focus and maintaining attention for 70% 1/22: pt given rush hour game level 3 and 4 solved indpendently. pt admits he just lacks motivation not problem solving skills. 1/15: ongoing 1/8: introduced.   -REAGAN     Other Adult Goal- 2  Pt will increase attention to task for completing home tasks such as bill pay, following a recipe, making a grocery list etc. with 80% accuracy with min cues as needed  -REAGAN     Status: Other Adult Goal- 2  Progressing as expected   -REAGAN     Comments: Other Adult Goal- 2  2/5:introduced lumosity and cognifit websites to patient to try at home.grocery store task given to patient functional math worksheet. pt initially reported he did not understand directions but with encouragement, he was able to  1/22: 1/15: excellent attention to task for learning vocal function exercises and drills today. pt able to rehears newly learned drills and activtiies without cues.   -REAGAN        SLP Time Calculation    SLP Goal Re-Cert Due Date  03/09/20  -REAGAN       User Key  (r) = Recorded By, (t) = Taken By, (c) = Cosigned By    Initials Name Provider Type    Marisabel Marshall MS CCC-SLP Speech and Language Pathologist          OP SLP Education     Row Name 02/05/20 1300       Education    Barriers to Learning  No barriers identified  -REAGAN    Education Provided  Patient requires further education on strategies, risks;Patient demonstrated recommended strategies  -REAGAN    Assessed  Learning needs;Learning motivation;Learning preferences;Learning readiness  -REAGAN    Learning Motivation  Strong  -REAGAN    Learning Method  Explanation;Demonstration;Teach back  -REAGAN    Teaching Response  Verbalized understanding;Reinforcement needed;Demonstrated understanding  -REAGAN    Education Comments  continue  -REAGAN      User Key  (r) = Recorded By, (t) = Taken By, (c) = Cosigned By    Initials Name Effective Dates    Marisabel Marshall MS CCC-SLP 08/03/18 -           OP SLP Assessment/Plan - 02/05/20 1300        SLP Assessment    Functional Problems  Speech Language- Adult/Cognition   -REAGAN    Impact on Function: Adult Speech Language/Cognition  Unable to complete specified job requirements;Difficulty participating in avocational activities;Poor attention to task;Restrictions in personal and social life   -REAGAN    Clinical Impression: Speech Language-Adult/Congnition  Minimal:;Cognitive Communication Impairment;Dysarthria- Mixed   -REAGAN    Impact on Function- Voice  Restrictions in personal and  social life;Difficulty participating in avocational activities   -    Clinical Impression- Voice  Mild moderate voice disorder   -REAGAN    Clinical Impression- PVFM  Does not appear to present with PVFM   -REAGAN    Functional Problems Comment  Pt presents with hoarse vocal quality that affects his abilty to maintain voicing for communicating    -REAGAN    SLP Diagnosis  mild moderate    -REAGAN       SLP Plan    Frequency  1-2x weekly   -REAGAN    Duration  3 weeks   -REAGAN    Planned CPT's?  SLP INDIVIDUAL SPEECH THERAPY: 67217   -RAEGAN    Expected Duration Therapy Session - minutes  30-45 minutes   -REAGAN    Plan Comments  continue plan of care ENT appts was sarah to February 16.    -REAGAN      User Key  (r) = Recorded By, (t) = Taken By, (c) = Cosigned By    Initials Name Provider Type    Marisabel Marshall, MS CCC-SLP Speech and Language Pathologist              Marisabel Zamora, MS CCC-SLP  2/5/2020

## 2020-02-12 ENCOUNTER — OFFICE VISIT (OUTPATIENT)
Dept: PHYSICAL THERAPY | Facility: CLINIC | Age: 70
End: 2020-02-12

## 2020-02-12 ENCOUNTER — TREATMENT (OUTPATIENT)
Dept: PHYSICAL THERAPY | Facility: CLINIC | Age: 70
End: 2020-02-12

## 2020-02-12 DIAGNOSIS — R26.81 GAIT INSTABILITY: Primary | ICD-10-CM

## 2020-02-12 DIAGNOSIS — R49.8 IMPAIRED VOCAL QUALITY: ICD-10-CM

## 2020-02-12 DIAGNOSIS — R41.841 COGNITIVE COMMUNICATION DEFICIT: ICD-10-CM

## 2020-02-12 DIAGNOSIS — R41.3 MEMORY LOSS: ICD-10-CM

## 2020-02-12 DIAGNOSIS — R47.1 DYSARTHRIA: Primary | ICD-10-CM

## 2020-02-12 PROCEDURE — 97110 THERAPEUTIC EXERCISES: CPT | Performed by: PHYSICAL THERAPIST

## 2020-02-12 PROCEDURE — 92507 TX SP LANG VOICE COMM INDIV: CPT | Performed by: SPEECH-LANGUAGE PATHOLOGIST

## 2020-02-12 NOTE — PROGRESS NOTES
Physical Therapy Daily Progress Note  Visit: 7  Date of Initial Visit: Type: THERAPY  Noted: 2019    Patient: Jimmie Salcedo   : 1950  Diagnosis/ICD-10 Code:  Gait instability [R26.81]  Referring practitioner: Javad Negron MD  Date of Initial Visit: Type: THERAPY  Noted: 2019  Today's Date: 2020  Patient seen for 7 sessions      Subjective:   Patient reports: he is back is feeling decent today.  Pain: 2-3/10  Clinical Progress: improved  Home Program Compliance: Yes  Treatment has included: therapeutic exercise and neuromuscular re-education    Objective   See Exercise, Manual, and Modality Logs for complete treatment.    PT Neuro   Exercise 1  Exercise Name 1: Nu-Step  Equipment/Resistance 1: L8  Time: 8 min   Exercise 2  Exercise Name 2: SB knee lifts   Sets/Reps 2: 20  Exercise 3  Exercise Name 3: SB hip abd taps   Sets/Reps 3: 20  Exercise 4  Exercise Name 4: SB wood chops  Equipment/Resistance 4: YTB  Sets/Reps 4: 10 ea side   Exercise 5  Exercise Name 5: prone SB traction  Sets/Reps 5: 5  Time 5: 10 sec  Exercise 6  Exercise Name 6: prone SB leg lifts   Sets/Reps 6: 10 ea   Exercise 7  Exercise Name 7: supine SB bridges   Sets/Reps 7: 10  Exercise 8  Exercise Name 8: Supine SB leg lifts   Sets/Reps 8: 10  Exercise 9  Exercise Name 9: supien SB rolls   Sets/Reps 9: 10  Exercise 10  Exercise Name 10: Elliptical   Time 10: 2 min                    Assessment & Plan     Assessment  Assessment details: Patient demonstrates good form and posture with challenging exercises. No pain reported during treatment. Next visit will be discharge. Exercises for HEP will be progressed.     Plan  Plan details: Patient to continue with PT services to improve gait, balance, strength, transfers and overall functional mobility.          Timed:  Manual Therapy:            0     mins  61869;  Therapeutic Exercise:    40    mins  60625;     Neuromuscular Amy:    0    mins  35535;    Therapeutic Activity:       0     mins  57191;     Gait Trainin    mins  00489;     Electrical Stimulation:    0    mins  72431 ( );     Untimed:  Canalith Repositioning techniques _0_ 12062      Timed Treatment:   40   mins   Total Treatment:     40   mins      Bernie Daniel PT, DPT, MSCS, CDP  [place] License #: [id number]  Physical Therapist

## 2020-02-12 NOTE — PROGRESS NOTES
Outpatient Speech Language Pathology   Adult Speech Language Cognitive Treatment Note        Patient Name: Jimmie Salcedo  : 1950  MRN: [id number]  Today's Date: 2020         Visit Date: 2020   Patient Active Problem List   Diagnosis   • Acid reflux   • Arthritis   • Hypertension   • Hyperlipidemia   • Severe obstructive sleep apnea   • Psoriasis   • Diastolic dysfunction   • Peptic ulceration   • Bilateral edema of lower extremity   • Abnormal liver function test   • Hyperbilirubinemia   • Psychophysiological insomnia   • Gilbert's disease   • Situational depression   • Tubular adenoma   • Seasonal allergic rhinitis due to pollen   • Tardive dyskinesia   • Mild cognitive impairment   • Memory loss          Visit Dx:    ICD-10-CM ICD-9-CM   1. Dysarthria R47.1 784.51   2. Memory loss R41.3 780.93   3. Impaired vocal quality R49.8 784.49   4. Cognitive communication deficit R41.841 799.52         SLP OP Goals     Row Name 20 1340          Goal Type Needed    Goal Type Needed  Dysphagia;Voice  -REAGAN        Subjective Comments    Subjective Comments  pt voice is improved.   -REAGAN        Subjective Pain    Able to rate subjective pain?  yes  -REAGAN     Pre-Treatment Pain Level  0  -REAGAN     Post-Treatment Pain Level  0  -REAGAN        Memory Goals    Memory LTG's  Patient will be able to remember information needed to participate in avocational activities  -REAGAN     Status: Patient will be able to remember information needed to participate in avocational activities  Achieved  -REAGAN     Comments: Patient will be able to remember information needed to participate in avocational activities  : pt at prior level for memory reports distractability is more from lack of motivation than attention issues. 1/15: ongoing; pt states that he is not having difficulty with recall just with motivation: discussed memory issues experienced by patient. : initiated.   -REAGAN     Memory STG's  Patient’s memory skills will  be enhanced as reported by patient by using external memory aides  -REAGAN     Patient’s memory skills will be enhanced as reported by patient by using external memory aides  90%:  -REAGAN     Status: Patient’s memory skills will be enhanced as reported by patient by using external memory aides  Achieved  -REAGAN     Comments: Patient’s memory skills will be enhanced as reported by patient by using external memory aides  1/22: pt uses consistently. 1/15: pt using consistently per report, no difficulty with keeping appts or events due to memory per report; 1/8: introduced external aids including journal, calendar and phone apps. 12/17: reviewed testing results with patient.   -REAGAN        Voice Goals    Patient will maintain a program of good vocal hygiene in all settings by developing an awareness of behaviors and lifestyle.  100%:;with intermittent cues  -REAGAN     Status: Patient will maintain a program of good vocal hygiene in all settings by developing an awareness of behaviors and lifestyle.  Achieved  -REAGAN     Comments: Patient will maintain a program of good vocal hygiene in all settings by developing an awareness of behaviors and lifestyle.  2/12: pt recited all vocal hygiene 2/5: pt becoing much more aware of habits and things that affect voicing. 1/22: pt drinking 8-10 glasses of water; very aware of throat clearing and strategy to clear. reflux precautons are limited. 1/15: continues . 1/8: pt reports drinkign more water; continues to use throat clear excessively. discussed strategies to reduce this and cued patient each time he cleared to bring awareness to it. 12/17: introduced vocal hygiene strategies and gave handout. Discussed alternative of throat clearing for increasing vocal hygiene.   -REAGAN     Status: Patient will institute vocal hygiene technique of increasing water intake per patient report  Achieved  -REAGAN     Comments: Patient will institute vocal hygiene technique of increasing water intake per patient report   1/22: pt consistently drinking 6-8 cups of water per day no caffeine per pt report. 1/15: 6-8 cups per day of water reported. 1/8: drinking more water per report; pt brought bottle to water to session. 12/17: discussed importance of increasing water intake to 8 glasses per day. pt agrees.   -REAGAN     Patient will institute vocal hygiene technique of replacing cough/throat clear with silent cough or hard swallow  with intermittent cues;___ days:  -REAGAN     Status: Patient will institute vocal hygiene technique of replacing cough/throat clear with silent cough or hard swallow  Progressing as expected  -REAGAN     Comments: Patient will institute vocal hygiene technique of replacing cough/throat clear with silent cough or hard swallow  2/12: min throat clears today; pt caught himself 2/5: pt very aware of throat clears today and minimally noted. 1/22 pt throat cleared x 1 during exercises1/15: pt only cleared throat x5 this session greatly reduced per his report also. 1/8: max cues given today to remind 12/17: introduced today with cues to replace during session.   -REAGAN     Patient will reduce hyperfunctional use of the vocal mechanism via improved use of diaphragmatic breathing  90%:  -REAGAN     Status: Patient will reduce hyperfunctional use of the vocal mechanism via improved use of diaphragmatic breathing  Achieved  -     Comments: Patient will reduce hyperfunctional use of the vocal mechanism via improved use of diaphragmatic breathing  1/15: pt using more breath support during speaking activities; diaphragmatic breathing exercises mastered with patient. 1/8: reviewed diaphragmatic breathing technique and gave handout. 12/17: introduced diaphragmatic breathing technique  -     Patient will identify and discriminate vocal abuse behaviors in situations that promote vocal abuse and misuse as well as identify good vocal hygiene practices, alternatives, and situations  90%:  -REAGAN     Status: Patient will identify and  discriminate vocal abuse behaviors in situations that promote vocal abuse and misuse as well as identify good vocal hygiene practices, alternatives, and situations  Progressing as expected  -REAGAN     Comments: Patient will identify and discriminate vocal abuse behaviors in situations that promote vocal abuse and misuse as well as identify good vocal hygiene practices, alternatives, and situations  2/12: very aware of good vs bad vocal hygiene habits. 2/5: pt [place] brings water and uses alternatives to throat clearing. still not addressing reflux 1/22: pt noted when he started to clear throat and immediately corrected. 1/15: pt able to identify vocal abuse habits including resonance; throat clearing and reflux causing foods and habits. 1/8: pt more aware of vocal abuse patterns and what to look for in vocal quality along with strategies to reduce harsh hoarse voicing 12/17: introduced; discussed reflux precautions.   -REAGAN     Status: Patient will be able to use a balanced vocal resonance  Progressing as expected  -REAGAN     Comments: Patient will be able to use a balanced vocal resonance  2/12: resonance drills performed with patient reviewed oral resonance commuicator and drills pt with some tension noted today. 2/5: oral resonance achieved in drills pt reports practice at home 1/22: oral resonance achieved more frequently today during drills. pt states that he often feels it in his throat instead of his mouth and becomes fatigued vocally during exercises. discussed importance of forward focus and eaay onset during resonance drills. 1/15: pt with resonance drills dificult initially. eventually able to achieve forward focus with mmmm and syllable drills continue at home for forward focus. 1/8: introduced today; will instruct further next session.   -REAGAN     Pt will demonstrate an understanding of the structures and functions of the phonatory/respiratory tract (respiration, phonation, resonance and articulation)  90%:  -REAGAN      Status: Pt will demonstrate an understanding of the structures and functions of the phonatory/respiratory tract (respiration, phonation, resonance and articulation)  Achieved  -REAGAN     Comments: Pt will demonstrate an understanding of the structures and functions of the phonatory/respiratory tract (respiration, phonation, resonance and articulation)  1/22: ongoing 1/15: pt greatly improved with knowledge and understanding of vocal function 1/8: provided education regarding the components of voicing and how they work together for optimal voicing.   -REAGAN     Patient will reduce hyperfunctional use of voice by performing Vocal Function Exercises  90%:;with intermittent cues  -REAGAN     Status: Patient will reduce hyperfunctional use of voice by performing Vocal Function Exercises  Progressing as expected  -REAGAN     Comments: Patient will reduce hyperfunctional use of voice by performing Vocal Function Exercises  2/12: pt performed vocal function with good effort improved breath support and vocal quality noted. 1/22: pt vocal quality improves after performing vocal function exercises. pt with reduced pitch breaks noted during exercises. 1/15: pt continues to practice 7 second duration achieved with each; encouraging 10 seconds. 1/8: completed instruction of vocal function exercises pt with approx 4-6 sec duration for each exercises; improved wtih easy onset voicing with limited range noted.   -REAGAN        Other Goals    Other Adult Goal- 1  Pt will improve visual spatial constructional skills for problem solving for activities of daily living 90% with in cues   -REAGAN     Status: Other Adult Goal- 1  Progressing as expected  -REAGAN     Comments: Other Adult Goal- 1  2/12:working memory task for attention pt performed 80% accuracy with min cues required.   2/5: visual spatial  game on computer pt had difficulty with focus and maintaining attention for 70% 1/22: pt given rush hour game level 3 and 4 solved indpendently. pt admits he  just lacks motivation not problem solving skills. 1/15: ongoing 1/8: introduced.   -REAGAN     Other Adult Goal- 2  Pt will increase attention to task for completing home tasks such as bill pay, following a recipe, making a grocery list etc. with 80% accuracy with min cues as needed  -REAGAN     Status: Other Adult Goal- 2  Progressing as expected  -REAGAN     Comments: Other Adult Goal- 2  2/12: attention to task improved for sustained attention task 90% accuracy. 2/5:introduced lumosity and cognifit websites to patient to try at home.grocery store task given to patient functional math worksheet. pt initially reported he did not understand directions but with encouragement, he was able to  1/22: 1/15: excellent attention to task for learning vocal function exercises and drills today. pt able to rehears newly learned drills and activtiies without cues.   -REAGAN        SLP Time Calculation    SLP Goal Re-Cert Due Date  03/09/20  -REAGAN       User Key  (r) = Recorded By, (t) = Taken By, (c) = Cosigned By    Initials Name Provider Type    Marisabel Marshall MS CCC-SLP Speech and Language Pathologist          OP SLP Education     Row Name 02/12/20 1340       Education    Barriers to Learning  No barriers identified  -REAGAN    Education Provided  Patient demonstrated recommended strategies  -REAGAN    Assessed  Learning needs;Learning motivation;Learning preferences;Learning readiness  -REAGAN    Learning Motivation  Strong  -REAGAN    Learning Method  Explanation;Demonstration;Teach back;Written materials  -REAGAN    Teaching Response  Verbalized understanding;Demonstrated understanding  -REAGAN    Education Comments  ongoing  -REAGAN      User Key  (r) = Recorded By, (t) = Taken By, (c) = Cosigned By    Initials Name Effective Dates    Marisabel Marshall MS CCC-SLP 02/11/20 -           OP SLP Assessment/Plan - 02/12/20 1340        SLP Assessment    Functional Problems  Speech Language- Adult/Cognition   -REAGAN       SLP Plan    Frequency  1-2x weekly   -REAGAN     Duration  2 weeks    -REAGAN    Planned CPT's?  SLP INDIVIDUAL SPEECH THERAPY: 09962   -REAGAN    Expected Duration Therapy Session - minutes  30-45 minutes   -REAGAN    Plan Comments  continue plan of care pt ahs ENT next week   -REAGAN      User Key  (r) = Recorded By, (t) = Taken By, (c) = Cosigned By    Initials Name Provider Type    Marisabel Marshall, MS CCC-SLP Speech and Language Pathologist              Marisabel Zamora, MS CRYSTAL-SLP  2/12/2020

## 2020-02-13 ENCOUNTER — HOSPITAL ENCOUNTER (OUTPATIENT)
Dept: SLEEP MEDICINE | Facility: HOSPITAL | Age: 70
Discharge: HOME | End: 2020-02-13
Admitting: INTERNAL MEDICINE

## 2020-02-13 VITALS
OXYGEN SATURATION: 98 % | DIASTOLIC BLOOD PRESSURE: 63 MMHG | SYSTOLIC BLOOD PRESSURE: 129 MMHG | HEIGHT: 71 IN | WEIGHT: 196.8 LBS | HEART RATE: 64 BPM | BODY MASS INDEX: 27.55 KG/M2

## 2020-02-13 DIAGNOSIS — G47.33 SEVERE OBSTRUCTIVE SLEEP APNEA: ICD-10-CM

## 2020-02-13 PROCEDURE — 95806 SLEEP STUDY UNATT&RESP EFFT: CPT

## 2020-02-13 PROCEDURE — 95806 SLEEP STUDY UNATT&RESP EFFT: CPT | Performed by: INTERNAL MEDICINE

## 2020-02-17 DIAGNOSIS — G47.33 OSA (OBSTRUCTIVE SLEEP APNEA): Primary | ICD-10-CM

## 2020-02-17 DIAGNOSIS — I10 ESSENTIAL HYPERTENSION: ICD-10-CM

## 2020-02-17 DIAGNOSIS — J30.1 SEASONAL ALLERGIC RHINITIS DUE TO POLLEN: ICD-10-CM

## 2020-02-18 NOTE — PROGRESS NOTES
CALLED PATIENT TO ADVISE OF STUDY RESULTS. PATIENT VERBALIZED UNDERSTANDING AND WAS AGREEABLE TO PAP THERAPY. FAXED ORDER TO [facility] 02/18/20 TRC

## 2020-02-19 ENCOUNTER — TREATMENT (OUTPATIENT)
Dept: PHYSICAL THERAPY | Facility: CLINIC | Age: 70
End: 2020-02-19

## 2020-02-19 DIAGNOSIS — R26.81 GAIT INSTABILITY: Primary | ICD-10-CM

## 2020-02-19 PROCEDURE — 97110 THERAPEUTIC EXERCISES: CPT | Performed by: PHYSICAL THERAPIST

## 2020-02-19 NOTE — PROGRESS NOTES
PT Discharge Summary   Visit: 8  Date of Initial Visit: Type: THERAPY  Noted: 2019    Patient: Jimmie Salcedo   : 1950  Diagnosis/ICD-10 Code:  Gait instability [R26.81]  Referring practitioner: Javad Negron MD  Date of Initial Visit: Type: THERAPY  Noted: 2019  Today's Date: 2020  Patient seen for 8 sessions      Subjective:   Patient reports: he went to the ENT and is now on silent reflux meds   Pain: 2/10 low back   Clinical Progress: improved  Home Program Compliance: Yes  Treatment has included: therapeutic exercise    Objective   See Exercise, Manual, and Modality Logs for complete treatment.    PT Neuro  Exercise 1  Exercise Name 1: Nu-Step  Equipment/Resistance 1: L8  Time: 8 min   Exercise 2  Exercise Name 2: wall slides   Sets/Reps 2: 10  Exercise 3  Exercise Name 3: fire hydrants   Sets/Reps 3: 10  Exercise 4  Exercise Name 4: birddogs  Sets/Reps 4: 10  Exercise 5  Exercise Name 5: thoracic rotation in QP   Sets/Reps 5: 10 ea   Exercise 6  Exercise Name 6: TK overhead lifts   Sets/Reps 6: 10  Exercise 7  Exercise Name 7: half kneeling trunk rotation   Sets/Reps 7: 10 ea way   Exercise 8  Exercise Name 8: SL bridge   Sets/Reps 8: 10 ea   Exercise 9  Exercise Name 9: bridge march   Sets/Reps 9: 10  Exercise 10  Exercise Name 10: standing hip abd   Sets/Reps 10: 10-GTB    Assessment & Plan     Assessment  Assessment details: Patient is making good strides towards improve back pain and hip strength. He is compliant with HEP and would like to continue to work towards pain free movement. PT will continue to follow for another 4 visits.     Goals  Plan Goals: STG (2 visits)  1. Patient will report compliance with initial HEP. MET  2. Patient will demonstrate improved global LE strength by 1/2 muscle grade throughout. MET    LTG (4visits)  1. Patient will be I with final HEP. MET  3. Patient to perform TUG within 7 sec without LOB for improved functional mobility. MET  4. Patient to  ambulate 10 meters without AD within 6 sec without LOB for improved gait radha and functional mobility. MET  5. Patient to improve FGA score to >/= 30/30 to decrease client's risk of falls. MET        Plan  Treatment plan discussed with: patient and caregiver  Plan details: Patient will be discharged to independent management of program.         Visit Diagnoses:    ICD-10-CM ICD-9-CM   1. Gait instability R26.81 781.2       Progress toward previous goals: All Met      Recommendations: Discharge    PT Signature: Bernie Daniel, PT, DPT, MSCS, CDP  [place] License #: [id number]    Based upon review of the patient's progress, it is my medical opinion that Jimmie Salcedo should be discharged from physical therapy treatment at [facility] THERAPY  [address] [place] [id number] [phone].    Signature: __________________________________  Javad Negron MD    Timed:  Manual Therapy:    0     mins  80389;  Therapeutic Exercise:    38     mins  13283;     Neuromuscular Amy:    0    mins  89498;    Therapeutic Activity:     0     mins  96020;     Gait Trainin     mins  47946;     Electrical Stimulation:    0     mins  10607 ( );    Untimed:  Canalith Repositioning   0 mins  27907    Timed Treatment:   38   mins   Total Treatment:     38   mins

## 2020-02-24 LAB — FUNGUS WND CULT: NORMAL

## 2020-02-26 ENCOUNTER — OFFICE VISIT (OUTPATIENT)
Dept: PHYSICAL THERAPY | Facility: CLINIC | Age: 70
End: 2020-02-26

## 2020-02-26 DIAGNOSIS — R41.841 COGNITIVE COMMUNICATION DEFICIT: ICD-10-CM

## 2020-02-26 DIAGNOSIS — R41.3 MEMORY LOSS: ICD-10-CM

## 2020-02-26 DIAGNOSIS — R47.1 DYSARTHRIA: Primary | ICD-10-CM

## 2020-02-26 DIAGNOSIS — R49.8 IMPAIRED VOCAL QUALITY: ICD-10-CM

## 2020-02-26 PROCEDURE — 92507 TX SP LANG VOICE COMM INDIV: CPT | Performed by: SPEECH-LANGUAGE PATHOLOGIST

## 2020-02-26 NOTE — PROGRESS NOTES
Outpatient Speech Language Pathology   Adult Speech Language Cognitive Treatment Note       Patient Name: Jimmie Salcedo  : 1950  MRN: [id number]  Today's Date: 2020         Visit Date: 2020   Patient Active Problem List   Diagnosis   • Acid reflux   • Arthritis   • Hypertension   • Hyperlipidemia   • Severe obstructive sleep apnea   • Psoriasis   • Diastolic dysfunction   • Peptic ulceration   • Bilateral edema of lower extremity   • Abnormal liver function test   • Hyperbilirubinemia   • Psychophysiological insomnia   • Gilbert's disease   • Situational depression   • Tubular adenoma   • Seasonal allergic rhinitis due to pollen   • Tardive dyskinesia   • Mild cognitive impairment   • Memory loss          Visit Dx:    ICD-10-CM ICD-9-CM   1. Dysarthria R47.1 784.51   2. Memory loss R41.3 780.93   3. Impaired vocal quality R49.8 784.49   4. Cognitive communication deficit R41.841 799.52                         SLP OP Goals     Row Name 20 1300          Goal Type Needed    Goal Type Needed  Dysarthria;Voice;Other Adult Goals  -REAGAN        Subjective Comments    Subjective Comments  Pt saw ent 2 weeks ago dx with partially paralyzed   -REAGAN        Subjective Pain    Able to rate subjective pain?  yes  -REAGAN     Pre-Treatment Pain Level  0  -REAGAN     Post-Treatment Pain Level  0  -REAGAN        Memory Goals    Memory LTG's  Patient will be able to remember information needed to participate in avocational activities  -REAGAN     Status: Patient will be able to remember information needed to participate in avocational activities  Achieved  -REAGAN     Comments: Patient will be able to remember information needed to participate in avocational activities  : pt at prior level for memory reports distractability is more from lack of motivation than attention issues. 1/15: ongoing; pt states that he is not having difficulty with recall just with motivation: discussed memory issues experienced by patient. :  initiated.   -REAGAN     Memory STG's  Patient’s memory skills will be enhanced as reported by patient by using external memory aides  -ERAGAN     Patient’s memory skills will be enhanced as reported by patient by using external memory aides  90%:  -REAGAN     Status: Patient’s memory skills will be enhanced as reported by patient by using external memory aides  Achieved  -REAGAN     Comments: Patient’s memory skills will be enhanced as reported by patient by using external memory aides  1/22: pt uses consistently. 1/15: pt using consistently per report, no difficulty with keeping appts or events due to memory per report; 1/8: introduced external aids including journal, calendar and phone apps. 12/17: reviewed testing results with patient.   -REAGAN        Voice Goals    Patient will maintain a program of good vocal hygiene in all settings by developing an awareness of behaviors and lifestyle.  100%:;with intermittent cues  -REAGAN     Status: Patient will maintain a program of good vocal hygiene in all settings by developing an awareness of behaviors and lifestyle.  Achieved  -REAGAN     Comments: Patient will maintain a program of good vocal hygiene in all settings by developing an awareness of behaviors and lifestyle.  2/12: pt recited all vocal hygiene 2/5: pt becoing much more aware of habits and things that affect voicing. 1/22: pt drinking 8-10 glasses of water; very aware of throat clearing and strategy to clear. reflux precautons are limited. 1/15: continues . 1/8: pt reports drinkign more water; continues to use throat clear excessively. discussed strategies to reduce this and cued patient each time he cleared to bring awareness to it. 12/17: introduced vocal hygiene strategies and gave handout. Discussed alternative of throat clearing for increasing vocal hygiene.   -REAGAN     Status: Patient will institute vocal hygiene technique of increasing water intake per patient report  Achieved  -REAGAN     Comments: Patient will institute vocal  hygiene technique of increasing water intake per patient report  1/22: pt consistently drinking 6-8 cups of water per day no caffeine per pt report. 1/15: 6-8 cups per day of water reported. 1/8: drinking more water per report; pt brought bottle to water to session. 12/17: discussed importance of increasing water intake to 8 glasses per day. pt agrees.   -     Patient will institute vocal hygiene technique of replacing cough/throat clear with silent cough or hard swallow  with intermittent cues;___ days:  -REAGAN     Status: Patient will institute vocal hygiene technique of replacing cough/throat clear with silent cough or hard swallow  Progressing as expected  -     Comments: Patient will institute vocal hygiene technique of replacing cough/throat clear with silent cough or hard swallow  2/26: pt started omeprazole and throat clearing has decreased significantly 2/12: min throat clears today; pt caught himself 2/5: pt very aware of throat clears today and minimally noted. 1/22 pt throat cleared x 1 during exercises1/15: pt only cleared throat x5 this session greatly reduced per his report also. 1/8: max cues given today to remind 12/17: introduced today with cues to replace during session.   -     Patient will reduce hyperfunctional use of the vocal mechanism via improved use of diaphragmatic breathing  90%:  -     Status: Patient will reduce hyperfunctional use of the vocal mechanism via improved use of diaphragmatic breathing  Achieved  -     Comments: Patient will reduce hyperfunctional use of the vocal mechanism via improved use of diaphragmatic breathing  1/15: pt using more breath support during speaking activities; diaphragmatic breathing exercises mastered with patient. 1/8: reviewed diaphragmatic breathing technique and gave handout. 12/17: introduced diaphragmatic breathing technique  -     Patient will identify and discriminate vocal abuse behaviors in situations that promote vocal abuse and  misuse as well as identify good vocal hygiene practices, alternatives, and situations  90%:  -REAGAN     Status: Patient will identify and discriminate vocal abuse behaviors in situations that promote vocal abuse and misuse as well as identify good vocal hygiene practices, alternatives, and situations  Progressing as expected  -REAGAN     Comments: Patient will identify and discriminate vocal abuse behaviors in situations that promote vocal abuse and misuse as well as identify good vocal hygiene practices, alternatives, and situations  2/26: discussed resonance issues with patient and ways to decrease resonance falling back. 2/12: very aware of good vs bad vocal hygiene habits. 2/5: pt [place] brings water and uses alternatives to throat clearing. still not addressing reflux 1/22: pt noted when he started to clear throat and immediately corrected. 1/15: pt able to identify vocal abuse habits including resonance; throat clearing and reflux causing foods and habits. 1/8: pt more aware of vocal abuse patterns and what to look for in vocal quality along with strategies to reduce harsh hoarse voicing 12/17: introduced; discussed reflux precautions.   -REAGAN     Status: Patient will be able to use a balanced vocal resonance  Progressing as expected  -REAGAN     Comments: Patient will be able to use a balanced vocal resonance  2/26: resonance drills performed. pt given strategy for compensating left vocal cord weakness; voicing more breathy and vibratory with head turn to the right. pt with improved voicing 2/12: resonance drills performed with patient reviewed oral resonance commuicator and drills pt with some tension noted today. 2/5: oral resonance achieved in drills pt reports practice at home 1/22: oral resonance achieved more frequently today during drills. pt states that he often feels it in his throat instead of his mouth and becomes fatigued vocally during exercises. discussed importance of forward focus and eaay onset during  resonance drills. 1/15: pt with resonance drills dificult initially. eventually able to achieve forward focus with mmmm and syllable drills continue at home for forward focus. 1/8: introduced today; will instruct further next session.   -REAGAN     Pt will demonstrate an understanding of the structures and functions of the phonatory/respiratory tract (respiration, phonation, resonance and articulation)  90%:  -REAGAN     Status: Pt will demonstrate an understanding of the structures and functions of the phonatory/respiratory tract (respiration, phonation, resonance and articulation)  Achieved  -REAGAN     Comments: Pt will demonstrate an understanding of the structures and functions of the phonatory/respiratory tract (respiration, phonation, resonance and articulation)  1/22: ongoing 1/15: pt greatly improved with knowledge and understanding of vocal function 1/8: provided education regarding the components of voicing and how they work together for optimal voicing.   -REAGAN     Patient will reduce hyperfunctional use of voice by performing Vocal Function Exercises  90%:;with intermittent cues  -REAGAN     Status: Patient will reduce hyperfunctional use of voice by performing Vocal Function Exercises  Progressing as expected  -REAGAN     Comments: Patient will reduce hyperfunctional use of voice by performing Vocal Function Exercises  2/26: pt performing with much improved stamina and vocal quality improved. 2/12: pt performed vocal function with good effort improved breath support and vocal quality noted. 1/22: pt vocal quality improves after performing vocal function exercises. pt with reduced pitch breaks noted during exercises. 1/15: pt continues to practice 7 second duration achieved with each; encouraging 10 seconds. 1/8: completed instruction of vocal function exercises pt with approx 4-6 sec duration for each exercises; improved wtih easy onset voicing with limited range noted.   -REAGAN        Other Goals    Other Adult Goal- 1  Pt  will improve visual spatial constructional skills for problem solving for activities of daily living 90% with in cues   -REAGAN     Status: Other Adult Goal- 1  Progressing as expected  -REAGAN     Comments: Other Adult Goal- 1  2/26: visual spatial skills improved 80% 2/12:working memory task for attention pt performed 80% accuracy with min cues required.   2/5: visual spatial  game on computer pt had difficulty with focus and maintaining attention for 70% 1/22: pt given rush hour game level 3 and 4 solved indpendently. pt admits he just lacks motivation not problem solving skills. 1/15: ongoing 1/8: introduced.   -REAGAN     Other Adult Goal- 2  Pt will increase attention to task for completing home tasks such as bill pay, following a recipe, making a grocery list etc. with 80% accuracy with min cues as needed  -REAGAN     Status: Other Adult Goal- 2  Progressing as expected  -REAGAN     Comments: Other Adult Goal- 2  2/26: attention to task improved; pt very focussed for tasks and completionattention is overall improved.  2/12: attention to task improved for sustained attention task 90% accuracy. 2/5:introduced lumosity and cognifit websites to patient to try at home.grocery store task given to patient functional math worksheet. pt initially reported he did not understand directions but with encouragement, he was able to  1/22: 1/15: excellent attention to task for learning vocal function exercises and drills today. pt able to rehears newly learned drills and activtiies without cues.   -REAGAN        SLP Time Calculation    SLP Goal Re-Cert Due Date  03/09/20  -REAGAN       User Key  (r) = Recorded By, (t) = Taken By, (c) = Cosigned By    Initials Name Provider Type    Marisabel Marshall, MS CCC-SLP Speech and Language Pathologist          OP SLP Education     Row Name 02/26/20 1300       Education    Barriers to Learning  No barriers identified  -REAGAN    Education Provided  Patient demonstrated recommended strategies  -REAGAN    Assessed   "Learning needs;Learning motivation;Learning preferences;Learning readiness  -    Learning Motivation  Strong  -    Learning Method  Explanation;Demonstration;Written materials;Teach back  -    Teaching Response  Verbalized understanding;Demonstrated understanding  -    Education Comments  continue gave pt Dr. Ace book title for reading \"the acid watcher diet\".   -      User Key  (r) = Recorded By, (t) = Taken By, (c) = Cosigned By    Initials Name Effective Dates    Marisabel Marshall, MS CCC-SLP 02/11/20 -           OP SLP Assessment/Plan - 02/26/20 1300        SLP Assessment    Functional Problems  Speech Language- Adult/Cognition   -    Clinical Impression Comments  Pt improving with voicing using vocal function exercises and resonance drills. reflux precautions and vocal hygiene   -    Prognosis  Good (comment)   -       SLP Plan    Frequency  1x weekly   -REAGAN    Duration  2 weeks   -    Planned CPT's?  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 pt voicing improved with exercises and throat clearing reduced significantly even moreso sice starting omeprazole as prescribed by ent. continue plan of care per ENT rec to continue with exercises and strategies for imoproving vocal quality.   -      User Key  (r) = Recorded By, (t) = Taken By, (c) = Cosigned By    Initials Name Provider Type    Marisabel Marshall, MS CCC-SLP Speech and Language Pathologist                 Time Calculation:                     Marisabel Zamora MS CCC-SLP  2/26/2020  "

## 2020-02-29 DIAGNOSIS — F43.21 SITUATIONAL DEPRESSION: ICD-10-CM

## 2020-03-02 RX ORDER — MIRTAZAPINE 15 MG/1
TABLET, FILM COATED ORAL
Qty: 30 TABLET | Refills: 5 | Status: SHIPPED | OUTPATIENT
Start: 2020-03-02 | End: 2022-07-28

## 2020-03-04 ENCOUNTER — OFFICE VISIT (OUTPATIENT)
Dept: PHYSICAL THERAPY | Facility: CLINIC | Age: 70
End: 2020-03-04

## 2020-03-04 DIAGNOSIS — R41.841 COGNITIVE COMMUNICATION DEFICIT: ICD-10-CM

## 2020-03-04 DIAGNOSIS — R47.1 DYSARTHRIA: Primary | ICD-10-CM

## 2020-03-04 DIAGNOSIS — R49.8 IMPAIRED VOCAL QUALITY: ICD-10-CM

## 2020-03-04 PROCEDURE — 92507 TX SP LANG VOICE COMM INDIV: CPT | Performed by: SPEECH-LANGUAGE PATHOLOGIST

## 2020-03-04 NOTE — PROGRESS NOTES
Outpatient Speech Language Pathology   Adult Speech Language Cognitive Progress Note       Patient Name: Jimmie Salcedo  : 1950  MRN: [id number]  Today's Date: 3/4/2020         Visit Date: 2020   Patient Active Problem List   Diagnosis   • Acid reflux   • Arthritis   • Hypertension   • Hyperlipidemia   • Severe obstructive sleep apnea   • Psoriasis   • Diastolic dysfunction   • Peptic ulceration   • Bilateral edema of lower extremity   • Abnormal liver function test   • Hyperbilirubinemia   • Psychophysiological insomnia   • Gilbert's disease   • Situational depression   • Tubular adenoma   • Seasonal allergic rhinitis due to pollen   • Tardive dyskinesia   • Mild cognitive impairment   • Memory loss          Visit Dx:    ICD-10-CM ICD-9-CM   1. Dysarthria R47.1 784.51   2. Impaired vocal quality R49.8 784.49   3. Cognitive communication deficit R41.841 799.52                         SLP OP Goals     Row Name 20 1345          Goal Type Needed    Goal Type Needed  Dysarthria;Voice;Other Adult Goals  -REAGAN        Subjective Comments    Subjective Comments  Pt voice improving weekly. Introduced vocal adduction exercises to patient this week.  -REAGAN        Subjective Pain    Able to rate subjective pain?  yes  -REAGAN     Pre-Treatment Pain Level  0  -REAGAN     Post-Treatment Pain Level  0  -REAGAN        Memory Goals    Memory LTG's  Patient will be able to remember information needed to participate in avocational activities  -REAGAN     Status: Patient will be able to remember information needed to participate in avocational activities  Achieved  -REAGAN     Comments: Patient will be able to remember information needed to participate in avocational activities  : pt at prior level for memory reports distractability is more from lack of motivation than attention issues. 1/15: ongoing; pt states that he is not having difficulty with recall just with motivation: discussed memory issues experienced by patient. :  initiated.   -REAGAN     Memory STG's  Patient’s memory skills will be enhanced as reported by patient by using external memory aides  -REAGAN     Patient’s memory skills will be enhanced as reported by patient by using external memory aides  90%:  -REAGAN     Status: Patient’s memory skills will be enhanced as reported by patient by using external memory aides  Achieved  -REAGAN     Comments: Patient’s memory skills will be enhanced as reported by patient by using external memory aides  1/22: pt uses consistently. 1/15: pt using consistently per report, no difficulty with keeping appts or events due to memory per report; 1/8: introduced external aids including journal, calendar and phone apps. 12/17: reviewed testing results with patient.   -REAGAN        Voice Goals    Patient will maintain a program of good vocal hygiene in all settings by developing an awareness of behaviors and lifestyle.  100%:;with intermittent cues  -REAGAN     Status: Patient will maintain a program of good vocal hygiene in all settings by developing an awareness of behaviors and lifestyle.  Achieved  -REAGAN     Comments: Patient will maintain a program of good vocal hygiene in all settings by developing an awareness of behaviors and lifestyle.  2/12: pt recited all vocal hygiene 2/5: pt becoing much more aware of habits and things that affect voicing. 1/22: pt drinking 8-10 glasses of water; very aware of throat clearing and strategy to clear. reflux precautons are limited. 1/15: continues . 1/8: pt reports drinkign more water; continues to use throat clear excessively. discussed strategies to reduce this and cued patient each time he cleared to bring awareness to it. 12/17: introduced vocal hygiene strategies and gave handout. Discussed alternative of throat clearing for increasing vocal hygiene.   -REAGAN     Status: Patient will institute vocal hygiene technique of increasing water intake per patient report  Achieved  -REAGAN     Comments: Patient will institute vocal  hygiene technique of increasing water intake per patient report  1/22: pt consistently drinking 6-8 cups of water per day no caffeine per pt report. 1/15: 6-8 cups per day of water reported. 1/8: drinking more water per report; pt brought bottle to water to session. 12/17: discussed importance of increasing water intake to 8 glasses per day. pt agrees.   -REAGAN     Patient will institute vocal hygiene technique of replacing cough/throat clear with silent cough or hard swallow  with intermittent cues;___ days:  -REAGAN     Status: Patient will institute vocal hygiene technique of replacing cough/throat clear with silent cough or hard swallow  Progressing as expected  -     Comments: Patient will institute vocal hygiene technique of replacing cough/throat clear with silent cough or hard swallow  3/4: pt with significantly less episodes of throat clearing in session noted. 2/26: pt started omeprazole and throat clearing has decreased significantly 2/12: min throat clears today; pt caught himself 2/5: pt very aware of throat clears today and minimally noted. 1/22 pt throat cleared x 1 during exercises1/15: pt only cleared throat x5 this session greatly reduced per his report also. 1/8: max cues given today to remind 12/17: introduced today with cues to replace during session.   -     Patient will reduce hyperfunctional use of the vocal mechanism via improved use of diaphragmatic breathing  90%:  -     Status: Patient will reduce hyperfunctional use of the vocal mechanism via improved use of diaphragmatic breathing  Achieved  -     Comments: Patient will reduce hyperfunctional use of the vocal mechanism via improved use of diaphragmatic breathing  1/15: pt using more breath support during speaking activities; diaphragmatic breathing exercises mastered with patient. 1/8: reviewed diaphragmatic breathing technique and gave handout. 12/17: introduced diaphragmatic breathing technique  -     Patient will identify and  discriminate vocal abuse behaviors in situations that promote vocal abuse and misuse as well as identify good vocal hygiene practices, alternatives, and situations  90%:  -REAGAN     Status: Patient will identify and discriminate vocal abuse behaviors in situations that promote vocal abuse and misuse as well as identify good vocal hygiene practices, alternatives, and situations  Progressing as expected  -REAGAN     Comments: Patient will identify and discriminate vocal abuse behaviors in situations that promote vocal abuse and misuse as well as identify good vocal hygiene practices, alternatives, and situations  3/4: pt using good vocal hygiene habits consistently. 2/26: discussed resonance issues with patient and ways to decrease resonance falling back. 2/12: very aware of good vs bad vocal hygiene habits. 2/5: pt alwasy brings water and uses alternatives to throat clearing. still not addressing reflux 1/22: pt noted when he started to clear throat and immediately corrected. 1/15: pt able to identify vocal abuse habits including resonance; throat clearing and reflux causing foods and habits. 1/8: pt more aware of vocal abuse patterns and what to look for in vocal quality along with strategies to reduce harsh hoarse voicing 12/17: introduced; discussed reflux precautions.   -REAGAN     Status: Patient will be able to use a balanced vocal resonance  Progressing as expected  -REAGAN     Comments: Patient will be able to use a balanced vocal resonance  3/4: resonance becoming more forward consistently 2/26: resonance drills performed. pt given strategy for compensating left vocal cord weakness; voicing more breathy and vibratory with head turn to the right. pt with improved voicing 2/12: resonance drills performed with patient reviewed oral resonance commuicator and drills pt with some tension noted today. 2/5: oral resonance achieved in drills pt reports practice at home 1/22: oral resonance achieved more frequently today during  drills. pt states that he often feels it in his throat instead of his mouth and becomes fatigued vocally during exercises. discussed importance of forward focus and eaay onset during resonance drills. 1/15: pt with resonance drills dificult initially. eventually able to achieve forward focus with mmmm and syllable drills continue at home for forward focus. 1/8: introduced today; will instruct further next session.   -REAGAN     Pt will demonstrate an understanding of the structures and functions of the phonatory/respiratory tract (respiration, phonation, resonance and articulation)  90%:  -REAGAN     Status: Pt will demonstrate an understanding of the structures and functions of the phonatory/respiratory tract (respiration, phonation, resonance and articulation)  Achieved  -REAGAN     Comments: Pt will demonstrate an understanding of the structures and functions of the phonatory/respiratory tract (respiration, phonation, resonance and articulation)  1/22: ongoing 1/15: pt greatly improved with knowledge and understanding of vocal function 1/8: provided education regarding the components of voicing and how they work together for optimal voicing.   -REAGAN     Patient will reduce hyperfunctional use of voice by performing Vocal Function Exercises  90%:;with intermittent cues  -REAGAN     Status: Patient will reduce hyperfunctional use of voice by performing Vocal Function Exercises  Progressing as expected  -REAGAN     Comments: Patient will reduce hyperfunctional use of voice by performing Vocal Function Exercises  3/4: pt perfromed vocal function exercises with cues to slow rate and take more consistent breaths before each. also attempted with head turned to the left for better adduction with improved voicing achieved. 2/26: pt performing with much improved stamina and vocal quality improved. 2/12: pt performed vocal function with good effort improved breath support and vocal quality noted. 1/22: pt vocal quality improves after performing  vocal function exercises. pt with reduced pitch breaks noted during exercises. 1/15: pt continues to practice 7 second duration achieved with each; encouraging 10 seconds. 1/8: completed instruction of vocal function exercises pt with approx 4-6 sec duration for each exercises; improved wtih easy onset voicing with limited range noted.   -REAGAN        Other Goals    Other Adult Goal- 1  Pt will improve visual spatial constructional skills for problem solving for activities of daily living 90% with in cues   -REAGAN     Status: Other Adult Goal- 1  Achieved  -REAGAN     Comments: Other Adult Goal- 1  2/26: visual spatial skills improved 80%:  2/12:working memory task for attention pt performed 80% accuracy with min cues required.   2/5: visual spatial  game on computer pt had difficulty with focus and maintaining attention for 70% 1/22: pt given rush hour game level 3 and 4 solved indpendently. pt admits he just lacks motivation not problem solving skills. 1/15: ongoing 1/8: introduced.   -REAGAN     Other Adult Goal- 2  Pt will increase attention to task for completing home tasks such as bill pay, following a recipe, making a grocery list etc. with 80% accuracy with min cues as needed  -REAGAN     Status: Other Adult Goal- 2  Achieved  -REAGAN     Comments: Other Adult Goal- 2  3/4: pt completing home management tasks per report without difficulty  2/26: attention to task improved; pt very focussed for tasks and completionattention is overall improved.  2/12: attention to task improved for sustained attention task 90% accuracy. 2/5:introduced lumosity and cognifit websites to patient to try at home.grocery store task given to patient functional math worksheet. pt initially reported he did not understand directions but with encouragement, he was able to  1/22: 1/15: excellent attention to task for learning vocal function exercises and drills today. pt able to rehears newly learned drills and activtiies without cues.   -REAGAN        SLP Time  Calculation    SLP Goal Re-Cert Due Date  06/01/20  -REAGAN       User Key  (r) = Recorded By, (t) = Taken By, (c) = Cosigned By    Initials Name Provider Type    REAGAN ZamoraMarisabel MS CCC-SLP Speech and Language Pathologist          OP SLP Education     Row Name 03/04/20 1345       Education    Barriers to Learning  No barriers identified  -REAGAN    Education Provided  Patient requires further education on strategies, risks;Patient demonstrated recommended strategies  -REAGAN    Assessed  Learning needs;Learning motivation;Learning preferences;Learning readiness  -REAGAN    Learning Motivation  Strong  -REAGAN    Learning Method  Explanation;Demonstration;Teach back;Written materials  -REAGAN    Teaching Response  Verbalized understanding;Demonstrated understanding;Reinforcement needed  -REAGAN    Education Comments  ongoing for strategies and exercises for improving vocal quality   -REAGAN      User Key  (r) = Recorded By, (t) = Taken By, (c) = Cosigned By    Initials Name Effective Dates    Marisabel Marshall MS CCC-SLP 02/28/20 -           OP SLP Assessment/Plan - 03/04/20 1345        SLP Assessment    Functional Problems  Speech Language- Adult/Cognition   -REAGAN    Impact on Function- Voice  Restrictions in personal and social life   -REAGAN    Clinical Impression- Voice  Mild voice disorder   -REAGAN    Functional Problems Comment  pt has improved, however continues to exhibit stimulability and improvement with exercises and strategy training for improving vocal cord adduction resulting in improved overall vocal quality   -REAGAN    Clinical Impression Comments  pt very receptive to tx activities and introduced vocal cord adduction exercises to add to vocal function and resonance exercises. pt also improved with reduction of throat clearing replacing with swallow and sip water behaviour.    -REAGAN    Prognosis  Excellent (comment)   -REAGAN    Patient/caregiver participated in establishment of treatment plan and goals  Yes   -REAGAN    Patient would benefit from  skilled therapy intervention  Yes   -REAGAN       SLP Plan    Frequency  1x weekly    -REAGAN    Duration  2-4 weeks   -REAGAN    Planned CPT's?  SLP INDIVIDUAL SPEECH THERAPY: 71879   -REAGAN    Expected Duration Therapy Session - minutes  30-45 minutes   -REAGAN    Plan Comments  continue plan of care; extended x 2-4 weeks to mastery of exercises for carryover. introduced new exercise today   -REAGAN      User Key  (r) = Recorded By, (t) = Taken By, (c) = Cosigned By    Initials Name Provider Type    Marisabel Marshall, MS CCC-SLP Speech and Language Pathologist                 Time Calculation:                     Marisabel Zamora MS CCC-SLP  3/4/2020

## 2020-03-11 ENCOUNTER — OFFICE VISIT (OUTPATIENT)
Dept: INTERNAL MEDICINE | Facility: CLINIC | Age: 70
End: 2020-03-11

## 2020-03-11 ENCOUNTER — OFFICE VISIT (OUTPATIENT)
Dept: PHYSICAL THERAPY | Facility: CLINIC | Age: 70
End: 2020-03-11

## 2020-03-11 VITALS
WEIGHT: 201 LBS | DIASTOLIC BLOOD PRESSURE: 70 MMHG | HEART RATE: 68 BPM | HEIGHT: 71 IN | BODY MASS INDEX: 28.14 KG/M2 | SYSTOLIC BLOOD PRESSURE: 130 MMHG

## 2020-03-11 DIAGNOSIS — D69.6 THROMBOCYTOPENIA, UNSPECIFIED (HCC): ICD-10-CM

## 2020-03-11 DIAGNOSIS — D36.9 TUBULAR ADENOMA: ICD-10-CM

## 2020-03-11 DIAGNOSIS — R73.09 ABNORMAL GLUCOSE: ICD-10-CM

## 2020-03-11 DIAGNOSIS — K21.9 GASTROESOPHAGEAL REFLUX DISEASE WITHOUT ESOPHAGITIS: ICD-10-CM

## 2020-03-11 DIAGNOSIS — I10 ESSENTIAL HYPERTENSION: Primary | ICD-10-CM

## 2020-03-11 DIAGNOSIS — R41.841 COGNITIVE COMMUNICATION DEFICIT: ICD-10-CM

## 2020-03-11 DIAGNOSIS — Z12.5 SCREENING FOR PROSTATE CANCER: ICD-10-CM

## 2020-03-11 DIAGNOSIS — F43.21 SITUATIONAL DEPRESSION: ICD-10-CM

## 2020-03-11 DIAGNOSIS — J30.1 SEASONAL ALLERGIC RHINITIS DUE TO POLLEN: ICD-10-CM

## 2020-03-11 DIAGNOSIS — R47.1 DYSARTHRIA: Primary | ICD-10-CM

## 2020-03-11 DIAGNOSIS — R49.8 IMPAIRED VOCAL QUALITY: ICD-10-CM

## 2020-03-11 DIAGNOSIS — E80.4 GILBERT'S DISEASE: ICD-10-CM

## 2020-03-11 DIAGNOSIS — I51.89 DIASTOLIC DYSFUNCTION: ICD-10-CM

## 2020-03-11 DIAGNOSIS — M19.90 ARTHRITIS: ICD-10-CM

## 2020-03-11 DIAGNOSIS — E78.49 OTHER HYPERLIPIDEMIA: ICD-10-CM

## 2020-03-11 DIAGNOSIS — K27.9 PEPTIC ULCERATION: ICD-10-CM

## 2020-03-11 PROBLEM — G24.01 TARDIVE DYSKINESIA: Status: RESOLVED | Noted: 2019-08-14 | Resolved: 2020-03-11

## 2020-03-11 LAB
ALBUMIN SERPL-MCNC: 4.4 G/DL (ref 3.5–5.2)
ALBUMIN/GLOB SERPL: 1.8 G/DL
ALP SERPL-CCNC: 53 U/L (ref 39–117)
ALT SERPL W P-5'-P-CCNC: 27 U/L (ref 1–41)
ANION GAP SERPL CALCULATED.3IONS-SCNC: 13.9 MMOL/L (ref 5–15)
AST SERPL-CCNC: 24 U/L (ref 1–40)
BILIRUB SERPL-MCNC: 1.1 MG/DL (ref 0.2–1.2)
BUN BLD-MCNC: 20 MG/DL (ref 8–23)
BUN/CREAT SERPL: 21.1 (ref 7–25)
CALCIUM SPEC-SCNC: 9.4 MG/DL (ref 8.6–10.5)
CHLORIDE SERPL-SCNC: 101 MMOL/L (ref 98–107)
CHOLEST SERPL-MCNC: 148 MG/DL (ref 0–200)
CO2 SERPL-SCNC: 24.1 MMOL/L (ref 22–29)
CREAT BLD-MCNC: 0.95 MG/DL (ref 0.76–1.27)
DEPRECATED RDW RBC AUTO: 42.3 FL (ref 37–54)
ERYTHROCYTE [DISTWIDTH] IN BLOOD BY AUTOMATED COUNT: 13.9 % (ref 12.3–15.4)
GFR SERPL CREATININE-BSD FRML MDRD: 79 ML/MIN/1.73
GLOBULIN UR ELPH-MCNC: 2.5 GM/DL
GLUCOSE BLD-MCNC: 109 MG/DL (ref 65–99)
HCT VFR BLD AUTO: 45.9 % (ref 37.5–51)
HDLC SERPL-MCNC: 50 MG/DL (ref 40–60)
HGB BLD-MCNC: 16 G/DL (ref 13–17.7)
LDLC SERPL CALC-MCNC: 81 MG/DL (ref 0–100)
LDLC/HDLC SERPL: 1.62 {RATIO}
MCH RBC QN AUTO: 29.7 PG (ref 26.6–33)
MCHC RBC AUTO-ENTMCNC: 34.9 G/DL (ref 31.5–35.7)
MCV RBC AUTO: 85.2 FL (ref 79–97)
PLATELET # BLD AUTO: 125 10*3/MM3 (ref 140–450)
PMV BLD AUTO: 9.6 FL (ref 6–12)
POTASSIUM BLD-SCNC: 4.1 MMOL/L (ref 3.5–5.2)
PROT SERPL-MCNC: 6.9 G/DL (ref 6–8.5)
PSA SERPL-MCNC: 3.52 NG/ML (ref 0–4)
RBC # BLD AUTO: 5.39 10*6/MM3 (ref 4.14–5.8)
SODIUM BLD-SCNC: 139 MMOL/L (ref 136–145)
TRIGL SERPL-MCNC: 84 MG/DL (ref 0–150)
TSH SERPL DL<=0.05 MIU/L-ACNC: 1.06 UIU/ML (ref 0.27–4.2)
VIT B12 BLD-MCNC: 773 PG/ML (ref 211–946)
VLDLC SERPL-MCNC: 16.8 MG/DL (ref 5–40)
WBC NRBC COR # BLD: 5.97 10*3/MM3 (ref 3.4–10.8)

## 2020-03-11 PROCEDURE — 80061 LIPID PANEL: CPT | Performed by: INTERNAL MEDICINE

## 2020-03-11 PROCEDURE — 84443 ASSAY THYROID STIM HORMONE: CPT | Performed by: INTERNAL MEDICINE

## 2020-03-11 PROCEDURE — 85027 COMPLETE CBC AUTOMATED: CPT | Performed by: INTERNAL MEDICINE

## 2020-03-11 PROCEDURE — 83036 HEMOGLOBIN GLYCOSYLATED A1C: CPT | Performed by: INTERNAL MEDICINE

## 2020-03-11 PROCEDURE — 92507 TX SP LANG VOICE COMM INDIV: CPT | Performed by: SPEECH-LANGUAGE PATHOLOGIST

## 2020-03-11 PROCEDURE — G0103 PSA SCREENING: HCPCS | Performed by: INTERNAL MEDICINE

## 2020-03-11 PROCEDURE — 80053 COMPREHEN METABOLIC PANEL: CPT | Performed by: INTERNAL MEDICINE

## 2020-03-11 PROCEDURE — 99214 OFFICE O/P EST MOD 30 MIN: CPT | Performed by: INTERNAL MEDICINE

## 2020-03-11 PROCEDURE — 82607 VITAMIN B-12: CPT | Performed by: INTERNAL MEDICINE

## 2020-03-11 RX ORDER — OMEPRAZOLE 40 MG/1
40 CAPSULE, DELAYED RELEASE ORAL DAILY
Status: ON HOLD | COMMUNITY
End: 2020-07-23 | Stop reason: SDUPTHER

## 2020-03-11 NOTE — PROGRESS NOTES
Outpatient Speech Language Pathology   Adult Speech Language Cognitive Treatment Note       Patient Name: Jimmie Salcedo  : 1950  MRN: [id number]  Today's Date: 3/11/2020         Visit Date: 2020   Patient Active Problem List   Diagnosis   • Acid reflux   • Arthritis   • Hypertension   • Hyperlipidemia   • Severe obstructive sleep apnea   • Psoriasis   • Diastolic dysfunction   • Peptic ulceration   • Bilateral edema of lower extremity   • Abnormal liver function test   • Hyperbilirubinemia   • Psychophysiological insomnia   • Gilbert's disease   • Thrombocytopenia, unspecified (CMS/HCC)   • Situational depression   • Tubular adenoma   • Seasonal allergic rhinitis due to pollen   • Mild cognitive impairment   • Memory loss          Visit Dx:    ICD-10-CM ICD-9-CM   1. Dysarthria R47.1 784.51   2. Impaired vocal quality R49.8 784.49   3. Cognitive communication deficit R41.841 799.52                         SLP OP Goals     Row Name 20 1300          Goal Type Needed    Goal Type Needed  Dysarthria;Voice;Other Adult Goals  -REAGAN        Subjective Comments    Subjective Comments  Pt very motivated and cooperative for therapy activities for carryover   -REAGAN        Memory Goals    Memory LTG's  Patient will be able to remember information needed to participate in avocational activities  -REAGAN     Status: Patient will be able to remember information needed to participate in avocational activities  Achieved  -     Comments: Patient will be able to remember information needed to participate in avocational activities  : pt at prior level for memory reports distractability is more from lack of motivation than attention issues. 1/15: ongoing; pt states that he is not having difficulty with recall just with motivation: discussed memory issues experienced by patient. : initiated.   -REAGAN     Memory STG's  Patient’s memory skills will be enhanced as reported by patient by using external memory aides   -REAGAN     Patient’s memory skills will be enhanced as reported by patient by using external memory aides  90%:  -REAGAN     Status: Patient’s memory skills will be enhanced as reported by patient by using external memory aides  Achieved  -REAGAN     Comments: Patient’s memory skills will be enhanced as reported by patient by using external memory aides  1/22: pt uses consistently. 1/15: pt using consistently per report, no difficulty with keeping appts or events due to memory per report; 1/8: introduced external aids including journal, calendar and phone apps. 12/17: reviewed testing results with patient.   -REAGAN        Voice Goals    Patient will maintain a program of good vocal hygiene in all settings by developing an awareness of behaviors and lifestyle.  100%:;with intermittent cues  -REAGAN     Status: Patient will maintain a program of good vocal hygiene in all settings by developing an awareness of behaviors and lifestyle.  Achieved  -REAGAN     Comments: Patient will maintain a program of good vocal hygiene in all settings by developing an awareness of behaviors and lifestyle.  2/12: pt recited all vocal hygiene 2/5: pt becoing much more aware of habits and things that affect voicing. 1/22: pt drinking 8-10 glasses of water; very aware of throat clearing and strategy to clear. reflux precautons are limited. 1/15: continues . 1/8: pt reports drinkign more water; continues to use throat clear excessively. discussed strategies to reduce this and cued patient each time he cleared to bring awareness to it. 12/17: introduced vocal hygiene strategies and gave handout. Discussed alternative of throat clearing for increasing vocal hygiene.   -REAGAN     Status: Patient will institute vocal hygiene technique of increasing water intake per patient report  Achieved  -REAGAN     Comments: Patient will institute vocal hygiene technique of increasing water intake per patient report  1/22: pt consistently drinking 6-8 cups of water per day no caffeine  per pt report. 1/15: 6-8 cups per day of water reported. 1/8: drinking more water per report; pt brought bottle to water to session. 12/17: discussed importance of increasing water intake to 8 glasses per day. pt agrees.   -REAGAN     Patient will institute vocal hygiene technique of replacing cough/throat clear with silent cough or hard swallow  with intermittent cues;___ days:  -REAGAN     Status: Patient will institute vocal hygiene technique of replacing cough/throat clear with silent cough or hard swallow  Achieved  -REAGAN     Comments: Patient will institute vocal hygiene technique of replacing cough/throat clear with silent cough or hard swallow  3/11: pt significantly improved at reduction of throat clearing. pt states he is very aware of replacement strategy instead of throat clearing. pt also started omeprazole per  order. 3/4: pt with significantly less episodes of throat clearing in session noted. 2/26: pt started omeprazole and throat clearing has decreased significantly 2/12: min throat clears today; pt caught himself 2/5: pt very aware of throat clears today and minimally noted. 1/22 pt throat cleared x 1 during exercises1/15: pt only cleared throat x5 this session greatly reduced per his report also. 1/8: max cues given today to remind 12/17: introduced today with cues to replace during session.   -REAGAN     Patient will reduce hyperfunctional use of the vocal mechanism via improved use of diaphragmatic breathing  90%:  -REAGAN     Status: Patient will reduce hyperfunctional use of the vocal mechanism via improved use of diaphragmatic breathing  Achieved  -REAGNA     Comments: Patient will reduce hyperfunctional use of the vocal mechanism via improved use of diaphragmatic breathing  1/15: pt using more breath support during speaking activities; diaphragmatic breathing exercises mastered with patient. 1/8: reviewed diaphragmatic breathing technique and gave handout. 12/17: introduced diaphragmatic breathing technique   -REAGAN     Patient will identify and discriminate vocal abuse behaviors in situations that promote vocal abuse and misuse as well as identify good vocal hygiene practices, alternatives, and situations  90%:  -REAGAN     Status: Patient will identify and discriminate vocal abuse behaviors in situations that promote vocal abuse and misuse as well as identify good vocal hygiene practices, alternatives, and situations  Achieved  -REAGAN     Comments: Patient will identify and discriminate vocal abuse behaviors in situations that promote vocal abuse and misuse as well as identify good vocal hygiene practices, alternatives, and situations  3/11: pt is very aware of resonance and good and bad vocal use consistently able to identify these during session. 3/4: pt using good vocal hygiene habits consistently. 2/26: discussed resonance issues with patient and ways to decrease resonance falling back. 2/12: very aware of good vs bad vocal hygiene habits. 2/5: pt alwasy brings water and uses alternatives to throat clearing. still not addressing reflux 1/22: pt noted when he started to clear throat and immediately corrected. 1/15: pt able to identify vocal abuse habits including resonance; throat clearing and reflux causing foods and habits. 1/8: pt more aware of vocal abuse patterns and what to look for in vocal quality along with strategies to reduce harsh hoarse voicing 12/17: introduced; discussed reflux precautions.   -REAGAN     Status: Patient will be able to use a balanced vocal resonance  Progressing as expected  -REAGAN     Comments: Patient will be able to use a balanced vocal resonance  3/11: pt with occasional vibratory feeling in larynx; much improved resonance achieved with exercises; pt has home carryover activity for continued practice.  3/4: resonance becoming more forward consistently 2/26: resonance drills performed. pt given strategy for compensating left vocal cord weakness; voicing more breathy and vibratory with head turn to  the right. pt with improved voicing 2/12: resonance drills performed with patient reviewed oral resonance commuicator and drills pt with some tension noted today. 2/5: oral resonance achieved in drills pt reports practice at home 1/22: oral resonance achieved more frequently today during drills. pt states that he often feels it in his throat instead of his mouth and becomes fatigued vocally during exercises. discussed importance of forward focus and eaay onset during resonance drills. 1/15: pt with resonance drills dificult initially. eventually able to achieve forward focus with mmmm and syllable drills continue at home for forward focus. 1/8: introduced today; will instruct further next session.   -REAGAN     Pt will demonstrate an understanding of the structures and functions of the phonatory/respiratory tract (respiration, phonation, resonance and articulation)  90%:  -REAGAN     Status: Pt will demonstrate an understanding of the structures and functions of the phonatory/respiratory tract (respiration, phonation, resonance and articulation)  Achieved  -REAGAN     Comments: Pt will demonstrate an understanding of the structures and functions of the phonatory/respiratory tract (respiration, phonation, resonance and articulation)  1/22: ongoing 1/15: pt greatly improved with knowledge and understanding of vocal function 1/8: provided education regarding the components of voicing and how they work together for optimal voicing.   -REAGAN     Patient will reduce hyperfunctional use of voice by performing Vocal Function Exercises  90%:;with intermittent cues  -REAGAN     Status: Patient will reduce hyperfunctional use of voice by performing Vocal Function Exercises  Achieved  -REAGAN     Comments: Patient will reduce hyperfunctional use of voice by performing Vocal Function Exercises  3/11: pt has carryover program in place for continueing vocal function exercises. 3/4: pt perfromed vocal function exercises with cues to slow rate and take  more consistent breaths before each. also attempted with head turned to the left for better adduction with improved voicing achieved. 2/26: pt performing with much improved stamina and vocal quality improved. 2/12: pt performed vocal function with good effort improved breath support and vocal quality noted. 1/22: pt vocal quality improves after performing vocal function exercises. pt with reduced pitch breaks noted during exercises. 1/15: pt continues to practice 7 second duration achieved with each; encouraging 10 seconds. 1/8: completed instruction of vocal function exercises pt with approx 4-6 sec duration for each exercises; improved wtih easy onset voicing with limited range noted.   -REAGAN        Other Goals    Other Adult Goal- 1  Pt will improve visual spatial constructional skills for problem solving for activities of daily living 90% with in cues   -REAGAN     Status: Other Adult Goal- 1  Achieved  -REAGAN     Comments: Other Adult Goal- 1  2/26: visual spatial skills improved 80%:  2/12:working memory task for attention pt performed 80% accuracy with min cues required.   2/5: visual spatial  game on computer pt had difficulty with focus and maintaining attention for 70% 1/22: pt given rush hour game level 3 and 4 solved indpendently. pt admits he just lacks motivation not problem solving skills. 1/15: ongoing 1/8: introduced.   -REAGAN     Other Adult Goal- 2  Pt will increase attention to task for completing home tasks such as bill pay, following a recipe, making a grocery list etc. with 80% accuracy with min cues as needed  -REAGAN     Status: Other Adult Goal- 2  Achieved  -REAGAN     Comments: Other Adult Goal- 2  3/4: pt completing home management tasks per report without difficulty  2/26: attention to task improved; pt very focussed for tasks and completionattention is overall improved.  2/12: attention to task improved for sustained attention task 90% accuracy. 2/5:introduced lumosity and cognifit websites to patient to  try at home.grocery store task given to patient functional math worksheet. pt initially reported he did not understand directions but with encouragement, he was able to  1/22: 1/15: excellent attention to task for learning vocal function exercises and drills today. pt able to rehears newly learned drills and activtiies without cues.   -REAGAN       User Key  (r) = Recorded By, (t) = Taken By, (c) = Cosigned By    Initials Name Provider Type    Marisabel Marshall MS CCC-SLP Speech and Language Pathologist          OP SLP Education     Row Name 03/11/20 1400       Education    Barriers to Learning  No barriers identified  -REAGAN    Education Provided  Patient demonstrated recommended strategies  -REAGAN    Assessed  Learning needs;Learning motivation;Learning preferences;Learning readiness  -REAGAN    Learning Motivation  Strong  -REAGAN    Learning Method  Explanation;Demonstration;Teach back;Written materials  -REAGAN    Teaching Response  Verbalized understanding;Demonstrated understanding;Reinforcement needed  -REAGAN    Education Comments  pt has all hand outs for education of voice and exercises.   -REAGAN      User Key  (r) = Recorded By, (t) = Taken By, (c) = Cosigned By    Initials Name Effective Dates    Marisabel Marshall MS CCC-SLP 02/28/20 -           OP SLP Assessment/Plan - 03/11/20 1400        SLP Assessment    Functional Problems  Speech Language- Adult/Cognition   -REAGAN    Clinical Impression Comments  pt has met most treatment objectives; he has been given a home program for initiation; very receptive to exercises and strategies     -REAGAN    Prognosis  Excellent (comment)   -REAGAN    Patient/caregiver participated in establishment of treatment plan and goals  Yes   -REAGAN    Patient would benefit from skilled therapy intervention  Yes   -REAGAN       SLP Plan    Frequency  1x weekly   -REAGAN    Duration  follow up x 1 to determine   -REAGAN    Planned CPT's?  SLP INDIVIDUAL SPEECH THERAPY: 00446   -REAGAN    Expected Duration Therapy Session - minutes   30-45 minutes   -REAGAN    Plan Comments  pt has initiated home program practice. follow up in 2 weeks to determine if further sessions are warranted. excellent progress toward tx goals    -REAGAN      User Key  (r) = Recorded By, (t) = Taken By, (c) = Cosigned By    Initials Name Provider Type    Marisabel Marshall, MS CCC-SLP Speech and Language Pathologist                 Time Calculation:                     Marisabel Zamora MS CCC-SLP  3/11/2020

## 2020-03-11 NOTE — PROGRESS NOTES
Patient is a 69 y.o. male who is here for a follow up of hyperlipidemia and hypertension.  Chief Complaint   Patient presents with   • Hyperlipidemia   • Hypertension         HPI:    Here for mgmt of HTN and hyperlipidemia and BPH.  Onset years.  Feeling better overall.  Now on wellbutrin in addition to Remeron.  No dizziness or lightheadedness.  Balance is good.  No HAs.  Appetite is good.  Sleeping well.  GERD is controlled with PPI.      History:     Patient Active Problem List   Diagnosis   • Acid reflux   • Arthritis   • Hypertension   • Hyperlipidemia   • Severe obstructive sleep apnea   • Psoriasis   • Diastolic dysfunction   • Peptic ulceration   • Bilateral edema of lower extremity   • Abnormal liver function test   • Hyperbilirubinemia   • Psychophysiological insomnia   • Gilbert's disease   • Thrombocytopenia, unspecified (CMS/HCC)   • Situational depression   • Tubular adenoma   • Seasonal allergic rhinitis due to pollen   • Mild cognitive impairment   • Memory loss       Past Medical History:   Diagnosis Date   • Abnormal liver function test    • Arthritis    • Bilateral edema of lower extremity    • Cellulitis of leg, right    • Chalazion    • Hypertension    • Kidney infection    • Neck muscle spasm    • Onychomycosis of toenail    • Peptic ulceration    • Renal calculi    • Situational depression 8/22/2018       Past Surgical History:   Procedure Laterality Date   • CYSTOSCOPY W/ LASER LITHOTRIPSY     • FINGER SURGERY      left 5th finger   • KNEE SURGERY Right 1986   • OTHER SURGICAL HISTORY      Lithotripsy   • TONSILLECTOMY         Current Outpatient Medications on File Prior to Visit   Medication Sig   • amLODIPine (NORVASC) 10 MG tablet amlodipine 10 mg tablet   Daily   • bisoprolol (ZEBeta) 5 MG tablet bisoprolol fumarate 5 mg tablet   take one tablet by mouth daily   • buPROPion HCl (WELLBUTRIN PO) Take 100 mg by mouth 2 (Two) Times a Day. Started bid today 2/13/20   • calcipotriene (DOVONOX)  0.005 % ointment calcipotriene 0.005 % topical ointment   Two times a day   • Cholecalciferol (VITAMIN D3) 125 MCG (5000 UT) capsule capsule Take 5,000 Units by mouth Daily.   • Clobetasol Propionate (CLOBETASOL 17 PROPIONATE) 0.5 % powder clobetasol   Two times a day   • doxazosin (CARDURA) 2 MG tablet doxazosin 2 mg tablet   Every night at bedtime   • EPINEPHrine (EPIPEN 2-MARIE) 0.3 MG/0.3ML solution auto-injector injection EpiPen 2-Marie 0.3 mg/0.3 mL injection, auto-injector   Bedtime   • fenofibrate (TRICOR) 145 MG tablet TAKE ONE TABLET BY MOUTH EVERY DAY   • fluocinonide (LIDEX) 0.05 % external solution fluocinonide 0.05 % topical solution   Daily   • irbesartan (AVAPRO) 300 MG tablet Take 300 mg by mouth Daily.   • mirtazapine (REMERON) 15 MG tablet TAKE ONE TABLET BY MOUTH EVERY NIGHT   • MULTIPLE VITAMINS PO Multiple Vitamins   • Omega-3 Fatty Acids (FISH OIL) 1000 MG capsule capsule Fish Oil   • omeprazole (priLOSEC) 40 MG capsule Take 40 mg by mouth Daily.   • Ped Multivitamins-Fl-Iron (MULTIVITAMIN WITH FLUORIDE/IRON) 0.25-10 MG/ML solution solution Take  by mouth Daily.   • pravastatin (PRAVACHOL) 40 MG tablet TAKE ONE TABLET BY MOUTH EVERY DAY   • sildenafil (REVATIO) 20 MG tablet 2-4 tabs as needed   • sildenafil (VIAGRA) 100 MG tablet Take 1 tablet by mouth Daily As Needed for erectile dysfunction.   • triamcinolone (KENALOG) 0.1 % ointment Apply  topically 2 (Two) Times a Day As Needed for Irritation or Rash.   • urea (CARMOL) 20 % cream Apply  topically to the appropriate area as directed As Needed for Dry Skin.   • vitamin B-6 (PYRIDOXINE) 50 MG tablet Take 50 mg by mouth Daily.     No current facility-administered medications on file prior to visit.        Family History   Problem Relation Age of Onset   • Arthritis Other    • Hypertension Other    • Hyperlipidemia Other    • Heart attack Other    • Hypertension Mother    • Heart disease Father    • Hypertension Father        Social History  "    Socioeconomic History   • Marital status:      Spouse name: Not on file   • Number of children: Not on file   • Years of education: Not on file   • Highest education level: Not on file   Tobacco Use   • Smoking status: Never Smoker   • Smokeless tobacco: Never Used   Substance and Sexual Activity   • Alcohol use: Yes     Alcohol/week: 4.0 standard drinks     Types: 4 Cans of beer per week   • Drug use: No   • Sexual activity: Defer         Review of Systems   Constitutional: Positive for fatigue (better). Negative for chills, fever and unexpected weight change.   HENT: Negative for ear pain, hearing loss, rhinorrhea, sinus pressure, sore throat and trouble swallowing.    Eyes: Negative for discharge and itching.   Respiratory: Negative for cough and chest tightness.    Cardiovascular: Negative for chest pain and palpitations.   Gastrointestinal: Negative for abdominal pain, blood in stool, diarrhea and vomiting.        2013 colonoscopy normal  9/18 colonoscopy with TA times 1, repeat in 9/21   Endocrine: Negative for polydipsia and polyuria.   Genitourinary: Negative for difficulty urinating, dysuria, enuresis, frequency, hematuria and urgency.        1/19 psa  ED   Musculoskeletal: Positive for arthralgias and neck pain. Negative for back pain, gait problem and joint swelling.   Skin: Negative for rash and wound.        Dry skin   Allergic/Immunologic: Negative for immunocompromised state.   Neurological: Positive for tremors. Negative for dizziness, syncope, weakness, light-headedness, numbness and headaches.   Hematological: Does not bruise/bleed easily.   Psychiatric/Behavioral: Positive for behavioral problems (improved) and decreased concentration (improved). Negative for dysphoric mood and sleep disturbance. The patient is nervous/anxious (improved).        /70   Pulse 68   Ht 180.3 cm (70.98\")   Wt 91.2 kg (201 lb)   BMI 28.05 kg/m²       Physical Exam   Constitutional: He is oriented to " person, place, and time. He appears well-developed and well-nourished.   HENT:   Head: Normocephalic and atraumatic.   Right Ear: External ear normal.   Left Ear: External ear normal.   Mouth/Throat: Oropharynx is clear and moist.   Eyes: Pupils are equal, round, and reactive to light. Conjunctivae and EOM are normal.   Neck: Normal range of motion. Neck supple.   Cardiovascular: Normal rate, regular rhythm, normal heart sounds and intact distal pulses.   Pulmonary/Chest: Effort normal and breath sounds normal.   Abdominal: Soft. Bowel sounds are normal.   Musculoskeletal: Normal range of motion. He exhibits edema (trace R>L).   Neurological: He is alert and oriented to person, place, and time. He has normal reflexes.   Skin: Skin is warm and dry.   Dry skin   Psychiatric: He has a normal mood and affect. His behavior is normal. Judgment and thought content normal.   Vitals reviewed.      Procedure:      Discussion/Summary:    HTN-stable on multi drug tx  hyperlipidemia-cont pravachol/fibrate, labs today at goal  BPH-stable on cardura, psa today at goal  GERD-stable on omeprazole  djd-advised to limit nsaids  edema-not an issue today  Hyperbilirubinemia-recheck at goal  Thrombocytopenia-recheck stable  ?[place]-Hem noted, bilirubin level today stable  Situational depression-improved with combo remeron/wellbutrin  Constipation-citrucel/miralax combo, stable      3/11 labs noted and dw patient    Advance Care Planning   ACP discussion was held with the patient during this visit. Patient has an advance directive (not in EMR), copy requested.      Current Outpatient Medications:   •  amLODIPine (NORVASC) 10 MG tablet, amlodipine 10 mg tablet  Daily, Disp: , Rfl:   •  bisoprolol (ZEBeta) 5 MG tablet, bisoprolol fumarate 5 mg tablet  take one tablet by mouth daily, Disp: , Rfl:   •  buPROPion HCl (WELLBUTRIN PO), Take 100 mg by mouth 2 (Two) Times a Day. Started bid today 2/13/20, Disp: , Rfl:   •  calcipotriene  (DOVONOX) 0.005 % ointment, calcipotriene 0.005 % topical ointment  Two times a day, Disp: , Rfl:   •  Cholecalciferol (VITAMIN D3) 125 MCG (5000 UT) capsule capsule, Take 5,000 Units by mouth Daily., Disp: , Rfl:   •  Clobetasol Propionate (CLOBETASOL 17 PROPIONATE) 0.5 % powder, clobetasol  Two times a day, Disp: , Rfl:   •  doxazosin (CARDURA) 2 MG tablet, doxazosin 2 mg tablet  Every night at bedtime, Disp: , Rfl:   •  EPINEPHrine (EPIPEN 2-MARIE) 0.3 MG/0.3ML solution auto-injector injection, EpiPen 2-Marie 0.3 mg/0.3 mL injection, auto-injector  Bedtime, Disp: , Rfl:   •  fenofibrate (TRICOR) 145 MG tablet, TAKE ONE TABLET BY MOUTH EVERY DAY, Disp: 90 tablet, Rfl: 2  •  fluocinonide (LIDEX) 0.05 % external solution, fluocinonide 0.05 % topical solution  Daily, Disp: , Rfl:   •  irbesartan (AVAPRO) 300 MG tablet, Take 300 mg by mouth Daily., Disp: , Rfl: 5  •  mirtazapine (REMERON) 15 MG tablet, TAKE ONE TABLET BY MOUTH EVERY NIGHT, Disp: 30 tablet, Rfl: 5  •  MULTIPLE VITAMINS PO, Multiple Vitamins, Disp: , Rfl:   •  Omega-3 Fatty Acids (FISH OIL) 1000 MG capsule capsule, Fish Oil, Disp: , Rfl:   •  omeprazole (priLOSEC) 40 MG capsule, Take 40 mg by mouth Daily., Disp: , Rfl:   •  Ped Multivitamins-Fl-Iron (MULTIVITAMIN WITH FLUORIDE/IRON) 0.25-10 MG/ML solution solution, Take  by mouth Daily., Disp: , Rfl:   •  pravastatin (PRAVACHOL) 40 MG tablet, TAKE ONE TABLET BY MOUTH EVERY DAY, Disp: 90 tablet, Rfl: 3  •  sildenafil (REVATIO) 20 MG tablet, 2-4 tabs as needed, Disp: 90 tablet, Rfl: 5  •  sildenafil (VIAGRA) 100 MG tablet, Take 1 tablet by mouth Daily As Needed for erectile dysfunction., Disp: 6 tablet, Rfl: 5  •  triamcinolone (KENALOG) 0.1 % ointment, Apply  topically 2 (Two) Times a Day As Needed for Irritation or Rash., Disp: 80 g, Rfl: 5  •  urea (CARMOL) 20 % cream, Apply  topically to the appropriate area as directed As Needed for Dry Skin., Disp: , Rfl:   •  vitamin B-6 (PYRIDOXINE) 50 MG tablet, Take  50 mg by mouth Daily., Disp: , Rfl:         Jimmie was seen today for hyperlipidemia and hypertension.    Diagnoses and all orders for this visit:    Essential hypertension  -     CBC (No Diff)    Other hyperlipidemia  -     Comprehensive Metabolic Panel  -     Lipid Panel  -     TSH    Diastolic dysfunction    Seasonal allergic rhinitis due to pollen    Peptic ulceration    Gastroesophageal reflux disease without esophagitis    Arthritis    Tubular adenoma    Situational depression    Gilbert's disease    Thrombocytopenia, unspecified (CMS/HCC)  -     Vitamin B12    Screening for prostate cancer  -     PSA Screen    Abnormal glucose  -     Hemoglobin A1c

## 2020-03-12 LAB — HBA1C MFR BLD: 4.7 % (ref 4.8–5.6)

## 2020-05-28 ENCOUNTER — TELEMEDICINE (OUTPATIENT)
Dept: SLEEP MEDICINE | Facility: HOSPITAL | Age: 70
End: 2020-05-28

## 2020-05-28 VITALS — HEIGHT: 71 IN | WEIGHT: 205 LBS | BODY MASS INDEX: 28.7 KG/M2

## 2020-05-28 DIAGNOSIS — G47.33 OSA (OBSTRUCTIVE SLEEP APNEA): Primary | ICD-10-CM

## 2020-05-28 PROCEDURE — 99213 OFFICE O/P EST LOW 20 MIN: CPT | Performed by: NURSE PRACTITIONER

## 2020-05-28 NOTE — PROGRESS NOTES
Chief Complaint:   Chief Complaint   Patient presents with   • Follow-up       HPI:    Jimmie Salcedo is a 69 y.o. male here for follow-up of sleep apnea.  Patient was last seen 1/28/2020 he has stopped using CPAP due to a weight loss of 60 pounds.  Patient did not gained about approximately 15 pounds back and was having snoring and witnessed apneas.  Patient did restudy 2/13/2020 did show sleep apnea with an AHI of 20.2 and he did reinitiate CPAP therapy.  Patient is sleeping 6 to 8 hours nightly and will go to sleep within 30 minutes or less.  Patient does not get up in the night to use the restroom.  Patient has an [place] score of 0/24.  Patient is having no difficulty with skin irritation from the mask, air leak, difficulty putting on or off the mask, dry mucosa, or difficulty breathing while wearing the mask.  Patient does wish to continue with CPAP therapy.        Current medications are:   Current Outpatient Medications:   •  amLODIPine (NORVASC) 10 MG tablet, amlodipine 10 mg tablet  Daily, Disp: , Rfl:   •  bisoprolol (ZEBeta) 5 MG tablet, bisoprolol fumarate 5 mg tablet  take one tablet by mouth daily, Disp: , Rfl:   •  buPROPion HCl (WELLBUTRIN PO), Take 100 mg by mouth 2 (Two) Times a Day. Started bid today 2/13/20, Disp: , Rfl:   •  calcipotriene (DOVONOX) 0.005 % ointment, calcipotriene 0.005 % topical ointment  Two times a day, Disp: , Rfl:   •  Cholecalciferol (VITAMIN D3) 125 MCG (5000 UT) capsule capsule, Take 5,000 Units by mouth Daily., Disp: , Rfl:   •  Clobetasol Propionate (CLOBETASOL 17 PROPIONATE) 0.5 % powder, clobetasol  Two times a day, Disp: , Rfl:   •  doxazosin (CARDURA) 2 MG tablet, doxazosin 2 mg tablet  Every night at bedtime, Disp: , Rfl:   •  EPINEPHrine (EPIPEN 2-MARIE) 0.3 MG/0.3ML solution auto-injector injection, EpiPen 2-Marie 0.3 mg/0.3 mL injection, auto-injector  Bedtime, Disp: , Rfl:   •  fenofibrate (TRICOR) 145 MG tablet, TAKE ONE TABLET BY MOUTH EVERY DAY, Disp: 90  tablet, Rfl: 2  •  fluocinonide (LIDEX) 0.05 % external solution, fluocinonide 0.05 % topical solution  Daily, Disp: , Rfl:   •  irbesartan (AVAPRO) 300 MG tablet, Take 300 mg by mouth Daily., Disp: , Rfl: 5  •  mirtazapine (REMERON) 15 MG tablet, TAKE ONE TABLET BY MOUTH EVERY NIGHT, Disp: 30 tablet, Rfl: 5  •  MULTIPLE VITAMINS PO, Multiple Vitamins, Disp: , Rfl:   •  Omega-3 Fatty Acids (FISH OIL) 1000 MG capsule capsule, Fish Oil, Disp: , Rfl:   •  omeprazole (priLOSEC) 40 MG capsule, Take 40 mg by mouth Daily., Disp: , Rfl:   •  Ped Multivitamins-Fl-Iron (MULTIVITAMIN WITH FLUORIDE/IRON) 0.25-10 MG/ML solution solution, Take  by mouth Daily., Disp: , Rfl:   •  pravastatin (PRAVACHOL) 40 MG tablet, TAKE ONE TABLET BY MOUTH EVERY DAY, Disp: 90 tablet, Rfl: 3  •  sildenafil (REVATIO) 20 MG tablet, 2-4 tabs as needed, Disp: 90 tablet, Rfl: 5  •  sildenafil (VIAGRA) 100 MG tablet, Take 1 tablet by mouth Daily As Needed for erectile dysfunction., Disp: 6 tablet, Rfl: 5  •  triamcinolone (KENALOG) 0.1 % ointment, Apply  topically 2 (Two) Times a Day As Needed for Irritation or Rash., Disp: 80 g, Rfl: 5  •  urea (CARMOL) 20 % cream, Apply  topically to the appropriate area as directed As Needed for Dry Skin., Disp: , Rfl:   •  vitamin B-6 (PYRIDOXINE) 50 MG tablet, Take 50 mg by mouth Daily., Disp: , Rfl: .      The patient's relevant past medical, surgical, family and social history were reviewed and updated in Epic as appropriate.       Review of Systems   Constitutional: Positive for unexpected weight change.   Eyes: Positive for visual disturbance.   Respiratory: Positive for apnea and shortness of breath.    Psychiatric/Behavioral: Positive for dysphoric mood and sleep disturbance.   All other systems reviewed and are negative.        Objective:    Physical Exam   Constitutional: He appears well-developed and well-nourished.   HENT:   Head: Normocephalic and atraumatic.   Mallampati 3 anatomy   Neck: No tracheal  deviation present.   Pulmonary/Chest: Effort normal.   Musculoskeletal: Normal range of motion.   Neurological: He is alert.   Skin: No erythema. No pallor.   Psychiatric: His behavior is normal. Judgment and thought content normal.     86/86 days of use.  Greater than 4-hour use 97.7%.  90% pressure 8.9.  AHI 1.2.  Settings remain 8 to 18 cm H2O.    ASSESSMENT/PLAN    Jimmie was seen today for follow-up.    Diagnoses and all orders for this visit:    THA (obstructive sleep apnea)  -     PAP Therapy            1. Counseled patient regarding multimodal approach with healthy nutrition, healthy sleep, regular physical activity, social activities, counseling, and medications. Encouraged to practice lateral sleep position. Avoid alcohol and sedatives close to bedtime.  2. Refill supplies x1 year.  Return to clinic 1 year or sooner symptoms warrant.  3. After verbal consent was given we did move forward with 15-minute video visit.    I have reviewed the results of my evaluation and impression and discussed my recommendations in detail with the patient.      Signed by  Pallavi Prince, SOURAV    May 28, 2020      CC: Javad Negron MD          No ref. provider found

## 2020-06-15 RX ORDER — FENOFIBRATE 145 MG/1
TABLET, COATED ORAL
Qty: 90 TABLET | Refills: 2 | Status: SHIPPED | OUTPATIENT
Start: 2020-06-15 | End: 2021-03-16

## 2020-06-18 ENCOUNTER — OFFICE VISIT (OUTPATIENT)
Dept: ORTHOPEDIC SURGERY | Facility: CLINIC | Age: 70
End: 2020-06-18

## 2020-06-18 VITALS — OXYGEN SATURATION: 99 % | HEART RATE: 66 BPM | BODY MASS INDEX: 29.82 KG/M2 | WEIGHT: 213 LBS | HEIGHT: 71 IN

## 2020-06-18 DIAGNOSIS — M17.12 PRIMARY OSTEOARTHRITIS OF LEFT KNEE: Primary | ICD-10-CM

## 2020-06-18 DIAGNOSIS — M25.561 PAIN IN BOTH KNEES, UNSPECIFIED CHRONICITY: ICD-10-CM

## 2020-06-18 DIAGNOSIS — M17.0 PRIMARY OSTEOARTHRITIS OF BOTH KNEES: ICD-10-CM

## 2020-06-18 DIAGNOSIS — M25.562 PAIN IN BOTH KNEES, UNSPECIFIED CHRONICITY: ICD-10-CM

## 2020-06-18 PROCEDURE — 99214 OFFICE O/P EST MOD 30 MIN: CPT | Performed by: ORTHOPAEDIC SURGERY

## 2020-06-18 NOTE — PROGRESS NOTES
"    [facility] [place] [facility] Note        Subjective     CC: Pain of the Left Knee and Pain of the Right Knee      HPI    Jimmie Salcedo is a 69 y.o. male.  Patient is here for new problem today regarding his right knee.  This is bothered him quite a bit over the last 60 to 90 days he tells me.  No history of trauma.  He has anteromedial pain.  No treatment to date.  He has been taking over-the-counter medications.  The pain is severe and limits him more than the left knee.  He completed a course of Orthovisc on the left knee on 2/21/2019.      ROS:    Constiutional:Pt denies fever, chills, nausea, or vomiting.  MSK:as above        Objective      Past Medical History  Past Medical History:   Diagnosis Date   • Abnormal liver function test    • Arthritis    • Bilateral edema of lower extremity    • Cellulitis of leg, right    • Chalazion    • Hypertension    • Kidney infection    • Neck muscle spasm    • Onychomycosis of toenail    • Peptic ulceration    • Renal calculi    • Situational depression 8/22/2018         Physical Exam  Pulse 66   Ht 180.3 cm (70.98\")   Wt 96.6 kg (213 lb)   SpO2 99%   BMI 29.72 kg/m²     Body mass index is 29.72 kg/m².    Patient is well nourished and well developed.        Ortho Exam  Peripheral Vascular:    Upper Extremity:   Inspection:  Left--no cyanotic nail beds Right--no cyanotic nail beds   Bilateral:  Pink nail beds with brisk capillary refill   Palpation:  Bilateral radial pulse normal    Musculoskeletal:  Global Assessment:  Overall assessment of Lower Extremity Muscle Strength and Tone:  Right quadriceps--5/5   Right hamstrings--5/5       Right tibialis anterior--5/5  Right gastroc-soleus--5/5  Right EHL --5/5    Lower Extremity:  Knee/Patella:  No digital clubbing or cyanosis.    Examination of right knee reveals:  Normal deep tendon reflexes, coordination, strength, tone, sensation.  No known fractures or deformities.    Inspection and Palpation:  Right " knee:  Tenderness:  Over the medial joint line and moderate severity  Effusion:  1+  Crepitus:  Positive  Pulses:  2+  Ecchymosis:  None  Warmth:  None     ROM:  Right:  Extension: 5    Flexion:120    Instability:    Right:  Lachman Test:  Negative, Varus stress test negative, Valgus stress test negative    Deformities/Malalignments/Discrepancies:    Left:  No deformities   Right:  Genu Varum    Functional Testing:  Vincent's test:  Negative  Patella grind test:  Positive  Q-angle:  Normal    Peripheral Vascular:    Upper Extremity:   Inspection:  Left--no cyanotic nail beds Right--no cyanotic nail beds   Bilateral:  Pink nail beds with brisk capillary refill   Palpation:  Bilateral radial pulse normal    Musculoskeletal:  Global Assessment:  Overall assessment of Lower Extremity Muscle Strength and Tone:  Left quadriceps--5/5   Left hamstrings--5/5       Left tibialis anterior--5/5  Left gastroc-soleus--5/5  Left EHL --5/5    Lower Extremity:  Knee/Patella:  No digital clubbing or cyanosis.    Examination of left knee reveals:  Normal deep tendon reflexes, coordination, strength, tone, sensation.  No known fractures or deformities.    Inspection and Palpation:  Left knee:  Tenderness:  Over the medial joint line and moderate severity  Effusion:  1+  Crepitus:  Positive  Pulses:  2+  Ecchymosis:  None  Warmth:  None     ROM:  Left:  Extension: 5     Flexion:120    Instability:    Left:  Lachman Test:  Negative, Varus stress test negative, Valgus stress test negative    Deformities/Malalignments/Discrepancies:    Left:  Genu Varum   Right:  No deformity    Functional Testing:  Vincent's test:  Negative  Patella grind test:  Positive  Q-angle:  normal            Imaging/Labs/EMG Reviewed:  Imaging Results (Last 24 Hours)     Procedure Component Value Units Date/Time    XR Knee 4+ View Bilateral [[id number]] Resulted:  06/18/20 1301     Updated:  06/18/20 1303    Narrative:       Knee X-Ray    Indication:  Pain    Study:  Upright AP, Skiers, Lateral, and Sunrise views of Right knee(s)    Comparison: None    Findings:    Patient appears to have end-stage hypertrophic degenerative changes in the   medial compartment.  There is bone-on-bone articulation.  There are moderate degenerative changes in the lateral compartment with   chondrocalcinosis noted.    There are moderate to early severe changes in the patellofemoral   compartment.    Patient has overall varus alignment.        Impression:   End-stage medial compartment and moderate lateral and moderate to early   severe patellofemoral compartment degnerative changes of the knee           Knee X-Ray    Indication: Pain    Study:  Upright AP, Skiers, Lateral, and Sunrise views of Left knee(s)    Comparison: Left knee 12/6/2018    Findings:    Patient appears to have moderate to severe degenerative changes in the   medial compartment.  There is near bone-on-bone articulation with 1 to 2   mm of joint space remaining only.  There are mild to early moderate degenerative changes in the lateral   compartment.    There are severe changes in the patellofemoral compartment.    Patient has overall varus alignment.        Impression:   Early severe medial compartment and end-stage patellofemoral compartment   degnerative changes of the knee               Assessment    Assessment:  1. Primary osteoarthritis of left knee    2. Pain in both knees, unspecified chronicity    3. Primary osteoarthritis of both knees        Plan:  1. Recommend over the counter anti-inflammatories for pain and/or swelling  2. End-stage right knee arthritis--severe medial compartment and moderate to severe patellofemoral compartment.  This seems to be bothering her more than anything.  We have talked to the patient about treatment options alternatives going down the road.  He is a candidate for arthroplasty.  I suspect that he will do quite well.  He is considering that versus Visco supplementation.  We  will go ahead and order the Visco supplement.  He will call us back if he would like to consider surgery.  The risk, benefits, and potential hazards of knee replacement surgery were discussed with him this afternoon just in case.  3. Left knee arthritis--severe patellofemoral and moderate to early severe medial compartment--Visco supplementation will be ordered.  He has done well with Orthovisc in the past.  See him back for injection #1.      Tereso Restrepo MD  06/18/20  13:13

## 2020-06-23 ENCOUNTER — PREP FOR SURGERY (OUTPATIENT)
Dept: OTHER | Facility: HOSPITAL | Age: 70
End: 2020-06-23

## 2020-06-23 DIAGNOSIS — M17.11 PRIMARY OSTEOARTHRITIS OF RIGHT KNEE: Primary | ICD-10-CM

## 2020-06-23 RX ORDER — ACETAMINOPHEN 500 MG
1000 TABLET ORAL ONCE
Status: CANCELLED | OUTPATIENT
Start: 2020-06-23 | End: 2020-06-23

## 2020-06-23 RX ORDER — OXYCODONE HCL 10 MG/1
10 TABLET, FILM COATED, EXTENDED RELEASE ORAL ONCE
Status: CANCELLED | OUTPATIENT
Start: 2020-06-23 | End: 2020-06-23

## 2020-06-23 RX ORDER — CEFAZOLIN SODIUM 2 G/100ML
2 INJECTION, SOLUTION INTRAVENOUS ONCE
Status: CANCELLED | OUTPATIENT
Start: 2020-06-23 | End: 2020-06-23

## 2020-06-24 ENCOUNTER — DOCUMENTATION (OUTPATIENT)
Dept: PHYSICAL THERAPY | Facility: CLINIC | Age: 70
End: 2020-06-24

## 2020-06-24 NOTE — PROGRESS NOTES
Speech Language Pathology Discharge Summary         Patient Name: Jimmie Salcedo  : 1950  MRN: [id number]    Today's Date: 2020    Discharge due to COVID-19       OP SLP Discharge Summary  Date of Discharge: 20  Reason for Discharge: (COVID-19 )  Progress Toward Achieving Short/long Term Goals: goals partially met within established timelines  Discharge Destination: home  Discharge Instructions: follow up as needed       Time Calculation:                    ROSANNE Hernandez  2020

## 2020-07-02 ENCOUNTER — CLINICAL SUPPORT (OUTPATIENT)
Dept: ORTHOPEDIC SURGERY | Facility: CLINIC | Age: 70
End: 2020-07-02

## 2020-07-02 DIAGNOSIS — M17.12 PRIMARY OSTEOARTHRITIS OF LEFT KNEE: Primary | ICD-10-CM

## 2020-07-02 DIAGNOSIS — M17.0 PRIMARY OSTEOARTHRITIS OF BOTH KNEES: ICD-10-CM

## 2020-07-02 PROCEDURE — 20610 DRAIN/INJ JOINT/BURSA W/O US: CPT | Performed by: ORTHOPAEDIC SURGERY

## 2020-07-02 RX ORDER — LIDOCAINE HYDROCHLORIDE 10 MG/ML
3 INJECTION, SOLUTION EPIDURAL; INFILTRATION; INTRACAUDAL; PERINEURAL
Status: COMPLETED | OUTPATIENT
Start: 2020-07-02 | End: 2020-07-02

## 2020-07-02 RX ADMIN — LIDOCAINE HYDROCHLORIDE 3 ML: 10 INJECTION, SOLUTION EPIDURAL; INFILTRATION; INTRACAUDAL; PERINEURAL at 11:22

## 2020-07-02 NOTE — PROGRESS NOTES
Patient is here for Orthovisc No. 1 to the left knee.  This was injected through a superior lateral approach and tolerated well.  Follow-up in 1 week for Orthovisc No. 2.

## 2020-07-02 NOTE — PROGRESS NOTES
Procedure   Large Joint Arthrocentesis: L knee  Date/Time: 7/2/2020 11:22 AM  Consent given by: patient  Site marked: site marked  Timeout: Immediately prior to procedure a time out was called to verify the correct patient, procedure, equipment, support staff and site/side marked as required   Supporting Documentation  Indications: pain   Procedure Details  Location: knee - L knee  Preparation: Patient was prepped and draped in the usual sterile fashion  Needle size: 22 G  Approach: anterolateral  Medications administered: 30 mg Hyaluronan 30 MG/2ML; 3 mL lidocaine PF 1% 1 %  Patient tolerance: patient tolerated the procedure well with no immediate complications

## 2020-07-08 ENCOUNTER — APPOINTMENT (OUTPATIENT)
Dept: PREADMISSION TESTING | Facility: HOSPITAL | Age: 70
End: 2020-07-08

## 2020-07-08 VITALS — WEIGHT: 218.26 LBS | BODY MASS INDEX: 30.56 KG/M2 | HEIGHT: 71 IN

## 2020-07-08 DIAGNOSIS — M17.11 PRIMARY OSTEOARTHRITIS OF RIGHT KNEE: ICD-10-CM

## 2020-07-08 LAB
ANION GAP SERPL CALCULATED.3IONS-SCNC: 8 MMOL/L (ref 5–15)
BASOPHILS # BLD AUTO: 0.06 10*3/MM3 (ref 0–0.2)
BASOPHILS NFR BLD AUTO: 0.9 % (ref 0–1.5)
BUN SERPL-MCNC: 17 MG/DL (ref 8–23)
BUN/CREAT SERPL: 17.3 (ref 7–25)
CALCIUM SPEC-SCNC: 9.4 MG/DL (ref 8.6–10.5)
CHLORIDE SERPL-SCNC: 105 MMOL/L (ref 98–107)
CO2 SERPL-SCNC: 27 MMOL/L (ref 22–29)
CREAT SERPL-MCNC: 0.98 MG/DL (ref 0.76–1.27)
CRP SERPL-MCNC: 0.16 MG/DL (ref 0–0.5)
DEPRECATED RDW RBC AUTO: 47.6 FL (ref 37–54)
EOSINOPHIL # BLD AUTO: 0.25 10*3/MM3 (ref 0–0.4)
EOSINOPHIL NFR BLD AUTO: 3.8 % (ref 0.3–6.2)
ERYTHROCYTE [DISTWIDTH] IN BLOOD BY AUTOMATED COUNT: 15.1 % (ref 12.3–15.4)
ERYTHROCYTE [SEDIMENTATION RATE] IN BLOOD: 2 MM/HR (ref 0–20)
GFR SERPL CREATININE-BSD FRML MDRD: 76 ML/MIN/1.73
GLUCOSE SERPL-MCNC: 102 MG/DL (ref 65–99)
HBA1C MFR BLD: 5.1 % (ref 4.8–5.6)
HCT VFR BLD AUTO: 49.7 % (ref 37.5–51)
HGB BLD-MCNC: 17.3 G/DL (ref 13–17.7)
IMM GRANULOCYTES # BLD AUTO: 0.03 10*3/MM3 (ref 0–0.05)
IMM GRANULOCYTES NFR BLD AUTO: 0.5 % (ref 0–0.5)
LYMPHOCYTES # BLD AUTO: 1.27 10*3/MM3 (ref 0.7–3.1)
LYMPHOCYTES NFR BLD AUTO: 19.4 % (ref 19.6–45.3)
MCH RBC QN AUTO: 30.3 PG (ref 26.6–33)
MCHC RBC AUTO-ENTMCNC: 34.8 G/DL (ref 31.5–35.7)
MCV RBC AUTO: 87 FL (ref 79–97)
MONOCYTES # BLD AUTO: 0.49 10*3/MM3 (ref 0.1–0.9)
MONOCYTES NFR BLD AUTO: 7.5 % (ref 5–12)
NEUTROPHILS NFR BLD AUTO: 4.45 10*3/MM3 (ref 1.7–7)
NEUTROPHILS NFR BLD AUTO: 67.9 % (ref 42.7–76)
NRBC BLD AUTO-RTO: 0 /100 WBC (ref 0–0.2)
PLATELET # BLD AUTO: 119 10*3/MM3 (ref 140–450)
PMV BLD AUTO: 9.6 FL (ref 6–12)
POTASSIUM SERPL-SCNC: 4.6 MMOL/L (ref 3.5–5.2)
RBC # BLD AUTO: 5.71 10*6/MM3 (ref 4.14–5.8)
SODIUM SERPL-SCNC: 140 MMOL/L (ref 136–145)
WBC # BLD AUTO: 6.55 10*3/MM3 (ref 3.4–10.8)

## 2020-07-08 PROCEDURE — 80048 BASIC METABOLIC PNL TOTAL CA: CPT | Performed by: ORTHOPAEDIC SURGERY

## 2020-07-08 PROCEDURE — 93005 ELECTROCARDIOGRAM TRACING: CPT

## 2020-07-08 PROCEDURE — 86140 C-REACTIVE PROTEIN: CPT | Performed by: ORTHOPAEDIC SURGERY

## 2020-07-08 PROCEDURE — 85025 COMPLETE CBC W/AUTO DIFF WBC: CPT | Performed by: ORTHOPAEDIC SURGERY

## 2020-07-08 PROCEDURE — 85652 RBC SED RATE AUTOMATED: CPT | Performed by: ORTHOPAEDIC SURGERY

## 2020-07-08 PROCEDURE — 83036 HEMOGLOBIN GLYCOSYLATED A1C: CPT | Performed by: ORTHOPAEDIC SURGERY

## 2020-07-08 PROCEDURE — 36415 COLL VENOUS BLD VENIPUNCTURE: CPT

## 2020-07-08 PROCEDURE — 93010 ELECTROCARDIOGRAM REPORT: CPT | Performed by: INTERNAL MEDICINE

## 2020-07-08 RX ORDER — BUPROPION HYDROCHLORIDE 200 MG/1
200 TABLET, EXTENDED RELEASE ORAL DAILY
COMMUNITY
End: 2022-07-28

## 2020-07-08 RX ORDER — DOXAZOSIN MESYLATE 4 MG/1
2 TABLET ORAL NIGHTLY
COMMUNITY
End: 2020-10-19

## 2020-07-08 RX ORDER — AMLODIPINE BESYLATE 5 MG/1
5 TABLET ORAL DAILY
COMMUNITY
End: 2020-09-14

## 2020-07-08 ASSESSMENT — KOOS JR
KOOS JR SCORE: 50.012
KOOS JR SCORE: 15

## 2020-07-08 NOTE — PAT
Patient instructed to drink 20 ounces (or until full) of Gatorade and it needs to be completed 1 hour before given arrival time for procedure (NO RED Gatorade)    Patient verbalized understanding.      Per Anesthesia Request, patient instructed not to take their ACE/ARB medications on the AM of surgery.      Patient did not attend joint class during PAT visit for the following reason: covid but provided with joint book and class education.  Joint replacement guide given to patient during PAT visit if they have not received a copy within the last one-two years.  Encouraged patient/family to read guide thoroughly and to notify PAT staff with any questions or concerns.  Patient/family verbalized understanding.      Verified with patient that they received a prescription for Bactroban and Chlorhexidine shower from the office.  Reinforced with them to fill the prescription if they haven't already.  Verbal and written instructions given regarding proper use of the Bactroban and Chlorhexidine to patient and/or famlily during PAT visit. Patient/family also instructed to complete checklist and return it to Pre-op on the day of surgery.  Patient and/or family verbalized understanding.    Memory screen 5/5.     hoos and koos done in pat.

## 2020-07-09 ENCOUNTER — CLINICAL SUPPORT (OUTPATIENT)
Dept: ORTHOPEDIC SURGERY | Facility: CLINIC | Age: 70
End: 2020-07-09

## 2020-07-09 DIAGNOSIS — M17.12 PRIMARY OSTEOARTHRITIS OF LEFT KNEE: Primary | ICD-10-CM

## 2020-07-09 PROCEDURE — 20610 DRAIN/INJ JOINT/BURSA W/O US: CPT | Performed by: ORTHOPAEDIC SURGERY

## 2020-07-09 RX ORDER — LIDOCAINE HYDROCHLORIDE 10 MG/ML
3 INJECTION, SOLUTION EPIDURAL; INFILTRATION; INTRACAUDAL; PERINEURAL
Status: COMPLETED | OUTPATIENT
Start: 2020-07-09 | End: 2020-07-09

## 2020-07-09 RX ADMIN — LIDOCAINE HYDROCHLORIDE 3 ML: 10 INJECTION, SOLUTION EPIDURAL; INFILTRATION; INTRACAUDAL; PERINEURAL at 11:17

## 2020-07-09 NOTE — PROGRESS NOTES
Procedure   Large Joint Arthrocentesis: L knee  Date/Time: 7/9/2020 11:17 AM  Consent given by: patient  Site marked: site marked  Timeout: Immediately prior to procedure a time out was called to verify the correct patient, procedure, equipment, support staff and site/side marked as required   Supporting Documentation  Indications: pain   Procedure Details  Location: knee - L knee  Preparation: Patient was prepped and draped in the usual sterile fashion  Needle size: 22 G  Approach: anterolateral  Medications administered: 30 mg Hyaluronan 30 MG/2ML; 3 mL lidocaine PF 1% 1 %  Patient tolerance: patient tolerated the procedure well with no immediate complications

## 2020-07-09 NOTE — PROGRESS NOTES
Patient is here for Orthovisc No. 2 to the left knee.  This was injected through a superior lateral approach and tolerated well.  Follow-up in 1 week for Orthovisc No. 3.    We have gotten the patient's preoperative laboratory studies and his platelet count was 119,000.  He says he chronically runs low.  At this level, this should not be prohibitive to elective surgery but it needs to be followed.

## 2020-07-15 ENCOUNTER — OFFICE VISIT (OUTPATIENT)
Dept: INTERNAL MEDICINE | Facility: CLINIC | Age: 70
End: 2020-07-15

## 2020-07-15 VITALS
DIASTOLIC BLOOD PRESSURE: 70 MMHG | HEIGHT: 71 IN | HEART RATE: 64 BPM | SYSTOLIC BLOOD PRESSURE: 126 MMHG | BODY MASS INDEX: 30.52 KG/M2 | TEMPERATURE: 98.2 F | WEIGHT: 218 LBS

## 2020-07-15 DIAGNOSIS — R79.89 ABNORMAL LIVER FUNCTION TEST: ICD-10-CM

## 2020-07-15 DIAGNOSIS — D69.6 THROMBOCYTOPENIA, UNSPECIFIED (HCC): ICD-10-CM

## 2020-07-15 DIAGNOSIS — E78.49 OTHER HYPERLIPIDEMIA: ICD-10-CM

## 2020-07-15 DIAGNOSIS — M19.90 ARTHRITIS: ICD-10-CM

## 2020-07-15 DIAGNOSIS — I51.89 DIASTOLIC DYSFUNCTION: ICD-10-CM

## 2020-07-15 DIAGNOSIS — G47.33 SEVERE OBSTRUCTIVE SLEEP APNEA: ICD-10-CM

## 2020-07-15 DIAGNOSIS — I10 ESSENTIAL HYPERTENSION: Primary | ICD-10-CM

## 2020-07-15 DIAGNOSIS — E80.6 HYPERBILIRUBINEMIA: ICD-10-CM

## 2020-07-15 DIAGNOSIS — F43.21 SITUATIONAL DEPRESSION: ICD-10-CM

## 2020-07-15 PROCEDURE — 99214 OFFICE O/P EST MOD 30 MIN: CPT | Performed by: INTERNAL MEDICINE

## 2020-07-15 NOTE — PROGRESS NOTES
"Patient is a 69 y.o. male who is here for a follow up of hyperlipidemia and hypertension.  Chief Complaint   Patient presents with   • Hyperlipidemia   • Hypertension         HPI:    Here for mgmt of HTN and hyperlipidemia and BPH.  Getting right TKR.  BP has been good. No dizziness or lightheadedness.  No HAs.  No easy bruising.  No nausea or emesis.  Depression and anxiety is controlled.  Sleeping good.  Appetite is good.    History:     Patient Active Problem List   Diagnosis   • Acid reflux   • Arthritis   • Hypertension   • Hyperlipidemia   • Severe obstructive sleep apnea   • Psoriasis   • Diastolic dysfunction   • Peptic ulceration   • Bilateral edema of lower extremity   • Abnormal liver function test   • Hyperbilirubinemia   • Psychophysiological insomnia   • Gilbert's disease   • Thrombocytopenia, unspecified (CMS/HCC)   • Situational depression   • Tubular adenoma   • Seasonal allergic rhinitis due to pollen   • Mild cognitive impairment   • Memory loss   • Primary osteoarthritis of right knee       Past Medical History:   Diagnosis Date   • Abnormal liver function test    • Arthritis    • Bilateral edema of lower extremity    • Cataract     bilat- still present - mild    • Cellulitis of leg, right    • Chalazion    • Cracking skin     bilat feet mostly    • Disease     \"brakes/breaks\" disease as child-  effected blood and urine    • GERD (gastroesophageal reflux disease)    • History of kidney stones     x2 had to have broken up    • Hoarseness     from vocal cord bending- seeing ent - possible surgery soon    • Hypertension    • Kidney infection     h/o    • Neck muscle spasm    • Onychomycosis of toenail    • Peptic ulceration    • Renal calculi    • Situational depression 8/22/2018   • Sleep apnea with use of continuous positive airway pressure (CPAP)     compliant with machine    • 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   • buPROPion SR (WELLBUTRIN SR) 200 MG 12 hr tablet Take 200 mg by mouth Daily.   • calcipotriene (DOVONOX) 0.005 % ointment As Needed.   • Cholecalciferol (VITAMIN D3) 125 MCG (5000 UT) capsule capsule Take 5,000 Units by mouth Daily.   • doxazosin (CARDURA) 4 MG tablet Take 2 mg by mouth Every Night. Just takes 1/2 tablet daily   • fenofibrate (TRICOR) 145 MG tablet TAKE ONE TABLET BY MOUTH EVERY DAY (Patient taking differently: Take 145 mg by mouth Daily.)   • irbesartan (AVAPRO) 300 MG tablet Take 300 mg by mouth Every Night.   • mirtazapine (REMERON) 15 MG tablet TAKE ONE TABLET BY MOUTH EVERY NIGHT (Patient taking differently: Take 15 mg by mouth Every Night.)   • MULTIPLE VITAMINS PO Take 1 capsule by mouth Daily.   • Omega-3 Fatty Acids (FISH OIL) 1000 MG capsule capsule Take 1,000 mg by mouth Daily.   • omeprazole (priLOSEC) 40 MG capsule Take 40 mg by mouth Daily.   • pravastatin (PRAVACHOL) 40 MG tablet TAKE ONE TABLET BY MOUTH EVERY DAY (Patient taking differently: Take 40 mg by mouth Daily.)   • sildenafil (VIAGRA) 100 MG tablet Take 1 tablet by mouth Daily As Needed for erectile dysfunction.   • triamcinolone (KENALOG) 0.1 % ointment Apply  topically 2 (Two) Times a Day As Needed for Irritation or Rash.   • urea (CARMOL) 20 % cream Apply  topically to the appropriate area as directed As Needed for Dry Skin.   • vitamin B-6 (PYRIDOXINE) 50 MG tablet Take 50 mg by mouth Daily.     No current facility-administered medications on file prior to visit.         Family History   Problem Relation Age of Onset   • Arthritis Other    • Hypertension Other    • Hyperlipidemia Other    • Heart attack Other    • Hypertension Mother    • Heart disease Father    • Hypertension Father        Social History     Socioeconomic History   • Marital status:      Spouse name: Not on file   • Number of children: Not on file   • Years of education: Not on file   • Highest education level: Not on file   Tobacco Use   • Smoking status: Never Smoker   • Smokeless tobacco: Never Used   Substance and Sexual Activity   • Alcohol use: Yes     Alcohol/week: 4.0 standard drinks     Types: 4 Cans of beer per week   • Drug use: No   • Sexual activity: Defer         Review of Systems   Constitutional: Positive for fatigue (better). Negative for chills, fever and unexpected weight change.   HENT: Negative for ear pain, hearing loss, rhinorrhea, sinus pressure, sore throat and trouble swallowing.    Eyes: Negative for discharge and itching.   Respiratory: Negative for cough and chest tightness.    Cardiovascular: Negative for chest pain and palpitations.   Gastrointestinal: Negative for abdominal pain, blood in stool, diarrhea and vomiting.        2013 colonoscopy normal  9/18 colonoscopy with TA times 1, repeat in 9/21   Endocrine: Negative for polydipsia and polyuria.   Genitourinary: Negative for difficulty urinating, dysuria, enuresis, frequency, hematuria and urgency.        1/19 psa  ED   Musculoskeletal: Positive for arthralgias and back pain. Negative for gait problem and joint swelling.   Skin: Negative for rash and wound.        Dry skin   Allergic/Immunologic: Negative for immunocompromised state.   Neurological: Negative for dizziness, syncope, weakness, light-headedness, numbness and headaches.   Hematological: Does not bruise/bleed easily.   Psychiatric/Behavioral: Positive for behavioral problems (improved) and decreased concentration (improved). Negative for dysphoric mood and  "sleep disturbance. The patient is nervous/anxious (improved).        /70   Pulse 64   Temp 98.2 °F (36.8 °C) (Infrared)   Ht 180.3 cm (70.98\")   Wt 98.9 kg (218 lb)   BMI 30.42 kg/m²       Physical Exam   Constitutional: He is oriented to person, place, and time. He appears well-developed and well-nourished.   HENT:   Head: Normocephalic and atraumatic.   Right Ear: External ear normal.   Left Ear: External ear normal.   Mouth/Throat: Oropharynx is clear and moist.   Eyes: Pupils are equal, round, and reactive to light. Conjunctivae and EOM are normal.   Neck: Normal range of motion. Neck supple.   Cardiovascular: Normal rate, regular rhythm, normal heart sounds and intact distal pulses.   Pulmonary/Chest: Effort normal and breath sounds normal.   Abdominal: Soft. Bowel sounds are normal.   Musculoskeletal: Normal range of motion. He exhibits edema (trace R>L).   Neurological: He is alert and oriented to person, place, and time. He has normal reflexes.   Skin: Skin is warm and dry.   Dry skin   Psychiatric: He has a normal mood and affect. His behavior is normal. Judgment and thought content normal.   Vitals reviewed.      Procedure:      Discussion/Summary:    HTN-controlled on multi drug tx  hyperlipidemia-cont pravachol/fibrate, labs 3/11 at goal  BPH-stable on cardura, psa 3/11 at goal  GERD-controlled on omeprazole  djd-advised to limit nsaids, for R TKR soon  Hyperbilirubinemia-recheck 3/11 at goal  Thrombocytopenia-recheck stable  ?[place]-Hem noted, bilirubin level 3/11 stable  Situational depression-stable with combo remeron/wellbutrin  Constipation-citrucel/miralax combo, stable      3/11 labs noted and dw patient    Current Outpatient Medications:   •  amLODIPine (NORVASC) 5 MG tablet, Take 5 mg by mouth Daily., Disp: , Rfl:   •  buPROPion SR (WELLBUTRIN SR) 200 MG 12 hr tablet, Take 200 mg by mouth Daily., Disp: , Rfl:   •  calcipotriene (DOVONOX) 0.005 % ointment, As Needed., Disp: , Rfl: "   •  Cholecalciferol (VITAMIN D3) 125 MCG (5000 UT) capsule capsule, Take 5,000 Units by mouth Daily., Disp: , Rfl:   •  doxazosin (CARDURA) 4 MG tablet, Take 2 mg by mouth Every Night. Just takes 1/2 tablet daily, Disp: , Rfl:   •  fenofibrate (TRICOR) 145 MG tablet, TAKE ONE TABLET BY MOUTH EVERY DAY (Patient taking differently: Take 145 mg by mouth Daily.), Disp: 90 tablet, Rfl: 2  •  irbesartan (AVAPRO) 300 MG tablet, Take 300 mg by mouth Every Night., Disp: , Rfl: 5  •  mirtazapine (REMERON) 15 MG tablet, TAKE ONE TABLET BY MOUTH EVERY NIGHT (Patient taking differently: Take 15 mg by mouth Every Night.), Disp: 30 tablet, Rfl: 5  •  MULTIPLE VITAMINS PO, Take 1 capsule by mouth Daily., Disp: , Rfl:   •  Omega-3 Fatty Acids (FISH OIL) 1000 MG capsule capsule, Take 1,000 mg by mouth Daily., Disp: , Rfl:   •  omeprazole (priLOSEC) 40 MG capsule, Take 40 mg by mouth Daily., Disp: , Rfl:   •  pravastatin (PRAVACHOL) 40 MG tablet, TAKE ONE TABLET BY MOUTH EVERY DAY (Patient taking differently: Take 40 mg by mouth Daily.), Disp: 90 tablet, Rfl: 3  •  sildenafil (VIAGRA) 100 MG tablet, Take 1 tablet by mouth Daily As Needed for erectile dysfunction., Disp: 6 tablet, Rfl: 5  •  triamcinolone (KENALOG) 0.1 % ointment, Apply  topically 2 (Two) Times a Day As Needed for Irritation or Rash., Disp: 80 g, Rfl: 5  •  urea (CARMOL) 20 % cream, Apply  topically to the appropriate area as directed As Needed for Dry Skin., Disp: , Rfl:   •  vitamin B-6 (PYRIDOXINE) 50 MG tablet, Take 50 mg by mouth Daily., Disp: , Rfl:         Jimmie was seen today for hyperlipidemia and hypertension.    Diagnoses and all orders for this visit:    Essential hypertension    Other hyperlipidemia    Diastolic dysfunction    Severe obstructive sleep apnea    Arthritis    Thrombocytopenia, unspecified (CMS/HCC)    Situational depression    Hyperbilirubinemia    Abnormal liver function test

## 2020-07-16 ENCOUNTER — CLINICAL SUPPORT (OUTPATIENT)
Dept: ORTHOPEDIC SURGERY | Facility: CLINIC | Age: 70
End: 2020-07-16

## 2020-07-16 DIAGNOSIS — M17.0 PRIMARY OSTEOARTHRITIS OF BOTH KNEES: ICD-10-CM

## 2020-07-16 DIAGNOSIS — M17.12 PRIMARY OSTEOARTHRITIS OF LEFT KNEE: Primary | ICD-10-CM

## 2020-07-16 PROCEDURE — 20610 DRAIN/INJ JOINT/BURSA W/O US: CPT | Performed by: ORTHOPAEDIC SURGERY

## 2020-07-16 NOTE — PROGRESS NOTES
Patient is here for Orthovisc No. 3 to the left knee.  This was injected through a superior lateral approach and tolerated well.  Follow-up as needed for the left knee.  We will see him back 3 weeks after his right total knee.

## 2020-07-16 NOTE — PROGRESS NOTES
Procedure   Large Joint Arthrocentesis: L knee  Date/Time: 7/16/2020 11:42 AM  Consent given by: patient  Site marked: site marked  Timeout: Immediately prior to procedure a time out was called to verify the correct patient, procedure, equipment, support staff and site/side marked as required   Supporting Documentation  Indications: pain   Procedure Details  Location: knee - L knee  Preparation: Patient was prepped and draped in the usual sterile fashion  Needle size: 22 G  Approach: lateral  Medications administered: 30 mg Hyaluronan 30 MG/2ML  Patient tolerance: patient tolerated the procedure well with no immediate complications

## 2020-07-19 ENCOUNTER — APPOINTMENT (OUTPATIENT)
Dept: PREADMISSION TESTING | Facility: HOSPITAL | Age: 70
End: 2020-07-19

## 2020-07-19 PROCEDURE — U0002 COVID-19 LAB TEST NON-CDC: HCPCS

## 2020-07-19 PROCEDURE — U0004 COV-19 TEST NON-CDC HGH THRU: HCPCS

## 2020-07-19 PROCEDURE — C9803 HOPD COVID-19 SPEC COLLECT: HCPCS

## 2020-07-20 LAB
REF LAB TEST METHOD: NORMAL
SARS-COV-2 RNA RESP QL NAA+PROBE: NOT DETECTED

## 2020-07-21 ENCOUNTER — ANESTHESIA EVENT (OUTPATIENT)
Dept: PERIOP | Facility: HOSPITAL | Age: 70
End: 2020-07-21

## 2020-07-21 RX ORDER — FAMOTIDINE 10 MG/ML
20 INJECTION, SOLUTION INTRAVENOUS ONCE
Status: CANCELLED | OUTPATIENT
Start: 2020-07-21 | End: 2020-07-21

## 2020-07-21 RX ORDER — SODIUM CHLORIDE 0.9 % (FLUSH) 0.9 %
10 SYRINGE (ML) INJECTION EVERY 12 HOURS SCHEDULED
Status: CANCELLED | OUTPATIENT
Start: 2020-07-21

## 2020-07-21 RX ORDER — SODIUM CHLORIDE 0.9 % (FLUSH) 0.9 %
10 SYRINGE (ML) INJECTION AS NEEDED
Status: CANCELLED | OUTPATIENT
Start: 2020-07-21

## 2020-07-22 ENCOUNTER — ANESTHESIA (OUTPATIENT)
Dept: PERIOP | Facility: HOSPITAL | Age: 70
End: 2020-07-22

## 2020-07-22 ENCOUNTER — HOSPITAL ENCOUNTER (OUTPATIENT)
Facility: HOSPITAL | Age: 70
Discharge: HOME | End: 2020-07-23
Attending: ORTHOPAEDIC SURGERY | Admitting: ORTHOPAEDIC SURGERY

## 2020-07-22 ENCOUNTER — APPOINTMENT (OUTPATIENT)
Dept: GENERAL RADIOLOGY | Facility: HOSPITAL | Age: 70
End: 2020-07-22

## 2020-07-22 DIAGNOSIS — M17.11 PRIMARY OSTEOARTHRITIS OF RIGHT KNEE: ICD-10-CM

## 2020-07-22 DIAGNOSIS — Z96.651 STATUS POST TOTAL RIGHT KNEE REPLACEMENT: Primary | ICD-10-CM

## 2020-07-22 LAB — GLUCOSE BLDC GLUCOMTR-MCNC: 114 MG/DL (ref 70–130)

## 2020-07-22 PROCEDURE — 25010000002 ROPIVACAINE PER 1 MG: Performed by: ORTHOPAEDIC SURGERY

## 2020-07-22 PROCEDURE — C1776 JOINT DEVICE (IMPLANTABLE): HCPCS | Performed by: ORTHOPAEDIC SURGERY

## 2020-07-22 PROCEDURE — 27447 TOTAL KNEE ARTHROPLASTY: CPT | Performed by: PHYSICIAN ASSISTANT

## 2020-07-22 PROCEDURE — 97116 GAIT TRAINING THERAPY: CPT

## 2020-07-22 PROCEDURE — 94799 UNLISTED PULMONARY SVC/PX: CPT

## 2020-07-22 PROCEDURE — 82962 GLUCOSE BLOOD TEST: CPT

## 2020-07-22 PROCEDURE — 25010000002 DEXAMETHASONE PER 1 MG: Performed by: NURSE ANESTHETIST, CERTIFIED REGISTERED

## 2020-07-22 PROCEDURE — 25010000002 ONDANSETRON PER 1 MG: Performed by: NURSE ANESTHETIST, CERTIFIED REGISTERED

## 2020-07-22 PROCEDURE — 25810000003 SODIUM CHLORIDE 0.9 % WITH KCL 20 MEQ 20-0.9 MEQ/L-% SOLUTION: Performed by: ORTHOPAEDIC SURGERY

## 2020-07-22 PROCEDURE — 25010000002 MORPHINE PER 10 MG: Performed by: ORTHOPAEDIC SURGERY

## 2020-07-22 PROCEDURE — 25010000002 KETOROLAC TROMETHAMINE PER 15 MG: Performed by: ORTHOPAEDIC SURGERY

## 2020-07-22 PROCEDURE — 97161 PT EVAL LOW COMPLEX 20 MIN: CPT

## 2020-07-22 PROCEDURE — 25010000003 CEFAZOLIN IN DEXTROSE 2-4 GM/100ML-% SOLUTION: Performed by: ORTHOPAEDIC SURGERY

## 2020-07-22 PROCEDURE — C1713 ANCHOR/SCREW BN/BN,TIS/BN: HCPCS | Performed by: ORTHOPAEDIC SURGERY

## 2020-07-22 PROCEDURE — 27447 TOTAL KNEE ARTHROPLASTY: CPT | Performed by: ORTHOPAEDIC SURGERY

## 2020-07-22 PROCEDURE — 73560 X-RAY EXAM OF KNEE 1 OR 2: CPT

## 2020-07-22 PROCEDURE — 25010000002 ROPIVACAINE PER 1 MG: Performed by: NURSE ANESTHETIST, CERTIFIED REGISTERED

## 2020-07-22 PROCEDURE — 25010000002 PROPOFOL 10 MG/ML EMULSION: Performed by: NURSE ANESTHETIST, CERTIFIED REGISTERED

## 2020-07-22 PROCEDURE — 94660 CPAP INITIATION&MGMT: CPT

## 2020-07-22 DEVICE — SCRW HEX PERSONA FML 2.5X25MM PK/2: Type: IMPLANTABLE DEVICE | Site: KNEE | Status: FUNCTIONAL

## 2020-07-22 DEVICE — CAP BEAR KN VE UPCHRG: Type: IMPLANTABLE DEVICE | Status: FUNCTIONAL

## 2020-07-22 DEVICE — CAP TOTL KN CMT PREMIUM: Type: IMPLANTABLE DEVICE | Status: FUNCTIONAL

## 2020-07-22 DEVICE — CMT BONE R 1X40: Type: IMPLANTABLE DEVICE | Site: KNEE | Status: FUNCTIONAL

## 2020-07-22 DEVICE — DEV CONTRL TISS STRATAFIX SYMM PDS PLUS VIL CT-1 45CM: Type: IMPLANTABLE DEVICE | Site: KNEE | Status: FUNCTIONAL

## 2020-07-22 DEVICE — ART/SRF KN PERSONA/VE MC GH 8TO11 11MM RT: Type: IMPLANTABLE DEVICE | Site: KNEE | Status: FUNCTIONAL

## 2020-07-22 DEVICE — STEM TIB/KN PERSONA CMT 5D SZG RT: Type: IMPLANTABLE DEVICE | Site: KNEE | Status: FUNCTIONAL

## 2020-07-22 DEVICE — COMP FEM/KN PERSONA CR CMT COCR STD SZ9 RT: Type: IMPLANTABLE DEVICE | Site: KNEE | Status: FUNCTIONAL

## 2020-07-22 DEVICE — IMPLANTABLE DEVICE: Type: IMPLANTABLE DEVICE | Site: KNEE | Status: FUNCTIONAL

## 2020-07-22 RX ORDER — BUPROPION HYDROCHLORIDE 100 MG/1
200 TABLET, EXTENDED RELEASE ORAL DAILY
Status: DISCONTINUED | OUTPATIENT
Start: 2020-07-22 | End: 2020-07-23 | Stop reason: HOSPADM

## 2020-07-22 RX ORDER — PRAVASTATIN SODIUM 40 MG
40 TABLET ORAL DAILY
Status: DISCONTINUED | OUTPATIENT
Start: 2020-07-22 | End: 2020-07-23 | Stop reason: HOSPADM

## 2020-07-22 RX ORDER — FAMOTIDINE 20 MG/1
20 TABLET, FILM COATED ORAL ONCE
Status: COMPLETED | OUTPATIENT
Start: 2020-07-22 | End: 2020-07-22

## 2020-07-22 RX ORDER — LOSARTAN POTASSIUM 50 MG/1
100 TABLET ORAL
Status: DISCONTINUED | OUTPATIENT
Start: 2020-07-22 | End: 2020-07-23 | Stop reason: HOSPADM

## 2020-07-22 RX ORDER — AMLODIPINE BESYLATE 5 MG/1
5 TABLET ORAL DAILY
Status: DISCONTINUED | OUTPATIENT
Start: 2020-07-22 | End: 2020-07-23 | Stop reason: HOSPADM

## 2020-07-22 RX ORDER — LIDOCAINE HYDROCHLORIDE 10 MG/ML
0.5 INJECTION, SOLUTION EPIDURAL; INFILTRATION; INTRACAUDAL; PERINEURAL ONCE AS NEEDED
Status: COMPLETED | OUTPATIENT
Start: 2020-07-22 | End: 2020-07-22

## 2020-07-22 RX ORDER — OXYCODONE HYDROCHLORIDE AND ACETAMINOPHEN 5; 325 MG/1; MG/1
1 TABLET ORAL EVERY 4 HOURS PRN
Status: DISCONTINUED | OUTPATIENT
Start: 2020-07-22 | End: 2020-07-23 | Stop reason: HOSPADM

## 2020-07-22 RX ORDER — ASPIRIN 325 MG
325 TABLET, DELAYED RELEASE (ENTERIC COATED) ORAL DAILY
Status: DISCONTINUED | OUTPATIENT
Start: 2020-07-23 | End: 2020-07-23 | Stop reason: HOSPADM

## 2020-07-22 RX ORDER — PROMETHAZINE HYDROCHLORIDE 25 MG/1
25 SUPPOSITORY RECTAL ONCE AS NEEDED
Status: DISCONTINUED | OUTPATIENT
Start: 2020-07-22 | End: 2020-07-22

## 2020-07-22 RX ORDER — FENTANYL CITRATE 50 UG/ML
50 INJECTION, SOLUTION INTRAMUSCULAR; INTRAVENOUS
Status: DISCONTINUED | OUTPATIENT
Start: 2020-07-22 | End: 2020-07-22

## 2020-07-22 RX ORDER — PANTOPRAZOLE SODIUM 40 MG/1
40 TABLET, DELAYED RELEASE ORAL EVERY MORNING
Status: DISCONTINUED | OUTPATIENT
Start: 2020-07-23 | End: 2020-07-23 | Stop reason: HOSPADM

## 2020-07-22 RX ORDER — SODIUM CHLORIDE 0.9 % (FLUSH) 0.9 %
3 SYRINGE (ML) INJECTION EVERY 12 HOURS SCHEDULED
Status: DISCONTINUED | OUTPATIENT
Start: 2020-07-22 | End: 2020-07-23 | Stop reason: HOSPADM

## 2020-07-22 RX ORDER — SODIUM CHLORIDE AND POTASSIUM CHLORIDE 150; 900 MG/100ML; MG/100ML
50 INJECTION, SOLUTION INTRAVENOUS CONTINUOUS
Status: DISCONTINUED | OUTPATIENT
Start: 2020-07-22 | End: 2020-07-23 | Stop reason: HOSPADM

## 2020-07-22 RX ORDER — MORPHINE SULFATE 4 MG/ML
4 INJECTION, SOLUTION INTRAMUSCULAR; INTRAVENOUS
Status: DISCONTINUED | OUTPATIENT
Start: 2020-07-22 | End: 2020-07-23 | Stop reason: HOSPADM

## 2020-07-22 RX ORDER — DEXAMETHASONE SODIUM PHOSPHATE 4 MG/ML
INJECTION, SOLUTION INTRA-ARTICULAR; INTRALESIONAL; INTRAMUSCULAR; INTRAVENOUS; SOFT TISSUE AS NEEDED
Status: DISCONTINUED | OUTPATIENT
Start: 2020-07-22 | End: 2020-07-22 | Stop reason: SURG

## 2020-07-22 RX ORDER — CEFAZOLIN SODIUM 2 G/100ML
2 INJECTION, SOLUTION INTRAVENOUS EVERY 8 HOURS
Status: COMPLETED | OUTPATIENT
Start: 2020-07-22 | End: 2020-07-22

## 2020-07-22 RX ORDER — ACETAMINOPHEN 500 MG
1000 TABLET ORAL ONCE
Status: COMPLETED | OUTPATIENT
Start: 2020-07-22 | End: 2020-07-22

## 2020-07-22 RX ORDER — EPHEDRINE SULFATE 50 MG/ML
5 INJECTION, SOLUTION INTRAVENOUS ONCE AS NEEDED
Status: DISCONTINUED | OUTPATIENT
Start: 2020-07-22 | End: 2020-07-22

## 2020-07-22 RX ORDER — ONDANSETRON 4 MG/1
4 TABLET, FILM COATED ORAL EVERY 6 HOURS PRN
Status: DISCONTINUED | OUTPATIENT
Start: 2020-07-22 | End: 2020-07-23 | Stop reason: HOSPADM

## 2020-07-22 RX ORDER — SODIUM CHLORIDE 0.9 % (FLUSH) 0.9 %
3-10 SYRINGE (ML) INJECTION AS NEEDED
Status: DISCONTINUED | OUTPATIENT
Start: 2020-07-22 | End: 2020-07-23 | Stop reason: HOSPADM

## 2020-07-22 RX ORDER — BUPIVACAINE HYDROCHLORIDE 2.5 MG/ML
INJECTION, SOLUTION EPIDURAL; INFILTRATION; INTRACAUDAL
Status: DISCONTINUED | OUTPATIENT
Start: 2020-07-22 | End: 2020-07-22 | Stop reason: SURG

## 2020-07-22 RX ORDER — MAGNESIUM HYDROXIDE 1200 MG/15ML
LIQUID ORAL AS NEEDED
Status: DISCONTINUED | OUTPATIENT
Start: 2020-07-22 | End: 2020-07-22 | Stop reason: HOSPADM

## 2020-07-22 RX ORDER — OXYCODONE HCL 10 MG/1
10 TABLET, FILM COATED, EXTENDED RELEASE ORAL ONCE
Status: COMPLETED | OUTPATIENT
Start: 2020-07-22 | End: 2020-07-22

## 2020-07-22 RX ORDER — MIRTAZAPINE 15 MG/1
15 TABLET, FILM COATED ORAL NIGHTLY
Status: DISCONTINUED | OUTPATIENT
Start: 2020-07-22 | End: 2020-07-23 | Stop reason: HOSPADM

## 2020-07-22 RX ORDER — SODIUM CHLORIDE, SODIUM LACTATE, POTASSIUM CHLORIDE, CALCIUM CHLORIDE 600; 310; 30; 20 MG/100ML; MG/100ML; MG/100ML; MG/100ML
9 INJECTION, SOLUTION INTRAVENOUS CONTINUOUS
Status: DISCONTINUED | OUTPATIENT
Start: 2020-07-22 | End: 2020-07-23 | Stop reason: HOSPADM

## 2020-07-22 RX ORDER — NALOXONE HCL 0.4 MG/ML
0.4 VIAL (ML) INJECTION
Status: DISCONTINUED | OUTPATIENT
Start: 2020-07-22 | End: 2020-07-23 | Stop reason: HOSPADM

## 2020-07-22 RX ORDER — PROMETHAZINE HYDROCHLORIDE 25 MG/1
25 TABLET ORAL ONCE AS NEEDED
Status: DISCONTINUED | OUTPATIENT
Start: 2020-07-22 | End: 2020-07-22

## 2020-07-22 RX ORDER — PROMETHAZINE HYDROCHLORIDE 25 MG/ML
6.25 INJECTION, SOLUTION INTRAMUSCULAR; INTRAVENOUS ONCE AS NEEDED
Status: DISCONTINUED | OUTPATIENT
Start: 2020-07-22 | End: 2020-07-22

## 2020-07-22 RX ORDER — ONDANSETRON 2 MG/ML
INJECTION INTRAMUSCULAR; INTRAVENOUS AS NEEDED
Status: DISCONTINUED | OUTPATIENT
Start: 2020-07-22 | End: 2020-07-22 | Stop reason: SURG

## 2020-07-22 RX ORDER — TERAZOSIN 5 MG/1
5 CAPSULE ORAL NIGHTLY
Status: DISCONTINUED | OUTPATIENT
Start: 2020-07-22 | End: 2020-07-23 | Stop reason: HOSPADM

## 2020-07-22 RX ORDER — LABETALOL HYDROCHLORIDE 5 MG/ML
10 INJECTION, SOLUTION INTRAVENOUS EVERY 4 HOURS PRN
Status: DISCONTINUED | OUTPATIENT
Start: 2020-07-22 | End: 2020-07-23 | Stop reason: HOSPADM

## 2020-07-22 RX ORDER — CEFAZOLIN SODIUM 2 G/100ML
2 INJECTION, SOLUTION INTRAVENOUS ONCE
Status: COMPLETED | OUTPATIENT
Start: 2020-07-22 | End: 2020-07-22

## 2020-07-22 RX ORDER — BUPIVACAINE HYDROCHLORIDE 5 MG/ML
INJECTION, SOLUTION PERINEURAL
Status: COMPLETED | OUTPATIENT
Start: 2020-07-22 | End: 2020-07-22

## 2020-07-22 RX ORDER — ONDANSETRON 2 MG/ML
4 INJECTION INTRAMUSCULAR; INTRAVENOUS EVERY 6 HOURS PRN
Status: DISCONTINUED | OUTPATIENT
Start: 2020-07-22 | End: 2020-07-23 | Stop reason: HOSPADM

## 2020-07-22 RX ADMIN — LIDOCAINE HYDROCHLORIDE 2 ML: 10 INJECTION, SOLUTION EPIDURAL; INFILTRATION; INTRACAUDAL; PERINEURAL at 06:31

## 2020-07-22 RX ADMIN — DEXAMETHASONE SODIUM PHOSPHATE 8 MG: 4 INJECTION, SOLUTION INTRAMUSCULAR; INTRAVENOUS at 07:34

## 2020-07-22 RX ADMIN — MUPIROCIN 1 APPLICATION: 20 OINTMENT TOPICAL at 06:31

## 2020-07-22 RX ADMIN — BUPIVACAINE HYDROCHLORIDE 20 ML: 2.5 INJECTION, SOLUTION EPIDURAL; INFILTRATION; INTRACAUDAL; PERINEURAL at 10:21

## 2020-07-22 RX ADMIN — ACETAMINOPHEN 1000 MG: 500 TABLET ORAL at 06:31

## 2020-07-22 RX ADMIN — TRANEXAMIC ACID 1000 MG: 100 INJECTION, SOLUTION INTRAVENOUS at 09:59

## 2020-07-22 RX ADMIN — POTASSIUM CHLORIDE AND SODIUM CHLORIDE 50 ML/HR: 900; 150 INJECTION, SOLUTION INTRAVENOUS at 11:36

## 2020-07-22 RX ADMIN — ROPIVACAINE HYDROCHLORIDE 10 ML/HR: 5 INJECTION, SOLUTION EPIDURAL; INFILTRATION; PERINEURAL at 10:25

## 2020-07-22 RX ADMIN — ONDANSETRON 4 MG: 2 INJECTION INTRAMUSCULAR; INTRAVENOUS at 09:59

## 2020-07-22 RX ADMIN — FAMOTIDINE 20 MG: 20 TABLET, FILM COATED ORAL at 06:31

## 2020-07-22 RX ADMIN — TRANEXAMIC ACID 1000 MG: 100 INJECTION, SOLUTION INTRAVENOUS at 07:47

## 2020-07-22 RX ADMIN — LOSARTAN POTASSIUM 100 MG: 50 TABLET, FILM COATED ORAL at 16:42

## 2020-07-22 RX ADMIN — CEFAZOLIN SODIUM 2 G: 2 INJECTION, SOLUTION INTRAVENOUS at 07:24

## 2020-07-22 RX ADMIN — PROPOFOL 50 MCG/KG/MIN: 10 INJECTION, EMULSION INTRAVENOUS at 07:34

## 2020-07-22 RX ADMIN — BUPIVACAINE HYDROCHLORIDE 2 ML: 5 INJECTION, SOLUTION PERINEURAL at 07:30

## 2020-07-22 RX ADMIN — OXYCODONE HYDROCHLORIDE AND ACETAMINOPHEN 1 TABLET: 5; 325 TABLET ORAL at 14:33

## 2020-07-22 RX ADMIN — CEFAZOLIN SODIUM 2 G: 2 INJECTION, SOLUTION INTRAVENOUS at 23:02

## 2020-07-22 RX ADMIN — OXYCODONE HYDROCHLORIDE 10 MG: 10 TABLET, FILM COATED, EXTENDED RELEASE ORAL at 06:31

## 2020-07-22 RX ADMIN — PRAVASTATIN SODIUM 40 MG: 40 TABLET ORAL at 16:42

## 2020-07-22 RX ADMIN — AMLODIPINE BESYLATE 5 MG: 5 TABLET ORAL at 16:42

## 2020-07-22 RX ADMIN — BUPROPION HYDROCHLORIDE 200 MG: 100 TABLET, EXTENDED RELEASE ORAL at 16:42

## 2020-07-22 RX ADMIN — CEFAZOLIN SODIUM 2 G: 2 INJECTION, SOLUTION INTRAVENOUS at 15:38

## 2020-07-22 RX ADMIN — TERAZOSIN HYDROCHLORIDE 5 MG: 5 CAPSULE ORAL at 19:40

## 2020-07-22 RX ADMIN — MIRTAZAPINE 15 MG: 15 TABLET, FILM COATED ORAL at 19:40

## 2020-07-22 RX ADMIN — SODIUM CHLORIDE, POTASSIUM CHLORIDE, SODIUM LACTATE AND CALCIUM CHLORIDE 9 ML/HR: 600; 310; 30; 20 INJECTION, SOLUTION INTRAVENOUS at 06:31

## 2020-07-22 NOTE — ANESTHESIA POSTPROCEDURE EVALUATION
Patient: Jimmie Salcedo    Procedure Summary     Date:  07/22/20 Room / Location:   [facility]    Anesthesia Start:  0724 Anesthesia Stop:  1023    Procedure:  TOTAL KNEE ARTHROPLASTY RIGHT (Right Knee) Diagnosis:       Primary osteoarthritis of right knee      (Primary osteoarthritis of right knee [M17.11])    Surgeon:  Tereso Restrepo MD Provider:  Leroy Chapa MD    Anesthesia Type:  spinal, regional ASA Status:  2          Anesthesia Type: spinal, regional    Vitals  Vitals Value Taken Time   /77 7/22/2020 10:20 AM   Temp     Pulse 54 7/22/2020 10:22 AM   Resp     SpO2 94 % 7/22/2020 10:22 AM   Vitals shown include unvalidated device data.        Post Anesthesia Care and Evaluation    Patient location during evaluation: PACU  Patient participation: complete - patient participated  Level of consciousness: awake and alert  Pain score: 0  Pain management: adequate  Airway patency: patent  Anesthetic complications: No anesthetic complications  PONV Status: none  Cardiovascular status: hemodynamically stable and acceptable  Respiratory status: nonlabored ventilation, acceptable and nasal cannula  Hydration status: acceptable

## 2020-07-22 NOTE — ANESTHESIA PROCEDURE NOTES
Spinal Block      Patient reassessed immediately prior to procedure    Patient location during procedure: OR  Indication:at surgeon's request  Performed By  CRNA: Robb Landis CRNA  Preanesthetic Checklist  Completed: patient identified, site marked, surgical consent, pre-op evaluation, timeout performed, IV checked, risks and benefits discussed and monitors and equipment checked  Spinal Block Prep:  Patient Position:sitting  Sterile Tech:cap, gloves, sterile barriers and mask  Prep:Chloraprep  Patient Monitoring:blood pressure monitoring, continuous pulse oximetry and EKG  Spinal Block Procedure  Approach:midline  Guidance:landmark technique and palpation technique  Location:L4-L5  Needle Type:Sprotte  Needle Gauge:22 G  Placement of Spinal needle event:cerebrospinal fluid aspirated  Paresthesia: no  Fluid Appearance:clear  Medications: bupivacaine (MARCAINE) 0.5 % injection, 2 mL  Med Administered at 7/22/2020 7:30 AM   Post Assessment  Patient Tolerance:patient tolerated the procedure well with no apparent complications  Complications no  Additional Notes  Procedure:  Pt assisted to sitting position, with legs in position of comfort over side of bed.  Pt. instructed in optimal spine presentation, the spine was prepped/ Draped and the skin at insertion site was anesthetized with 1% Lidocaine 2 ml.  The spinal needle was then advanced until CSF flow was obtained and LA was injected:

## 2020-07-22 NOTE — ANESTHESIA PREPROCEDURE EVALUATION
Anesthesia Evaluation     Patient summary reviewed and Nursing notes reviewed   NPO Solid Status: > 8 hours  NPO Liquid Status: > 4 hours           Airway   Mallampati: II  TM distance: >3 FB  Neck ROM: full  No difficulty expected  Dental      Pulmonary     breath sounds clear to auscultation  (+) sleep apnea on CPAP,   Cardiovascular     ECG reviewed  Rhythm: regular  Rate: normal    (+) hypertension,       Neuro/Psych  GI/Hepatic/Renal/Endo    (+)  GERD,  liver disease,     Musculoskeletal     Abdominal    Substance History      OB/GYN          Other   arthritis,      ROS/Med Hx Other: 2016 cardiac cath negative for significant occlusive    Gilbert's disease                  Anesthesia Plan    ASA 2     spinal and regional       Anesthetic plan, all risks, benefits, and alternatives have been provided, discussed and informed consent has been obtained with: patient.    Plan discussed with CRNA.

## 2020-07-22 NOTE — ANESTHESIA PROCEDURE NOTES
Peripheral Block      Patient reassessed immediately prior to procedure    Patient location during procedure: post-op  Start time: 7/22/2020 10:17 AM  Stop time: 7/22/2020 10:22 AM  Reason for block: at surgeon's request and post-op pain management  Performed by  CRNA: Robb Landis, CRNA  Assisted by: Opal Montoya RN  Preanesthetic Checklist  Completed: patient identified, site marked, surgical consent, pre-op evaluation, timeout performed, IV checked, risks and benefits discussed and monitors and equipment checked  Prep:  Pt Position: supine  Sterile barriers:cap, gloves, mask and sterile barriers  Prep: ChloraPrep  Patient monitoring: blood pressure monitoring, continuous pulse oximetry and EKG  Procedure  Sedation:no  Performed under: spinal  Guidance:ultrasound guided  Images:still images obtained, printed/placed on chart    Laterality:right  Block Type:adductor canal block  Injection Technique:catheter  Needle Type:Tuohy and echogenic  Needle Gauge:18 G  Resistance on Injection: none  Catheter Size:20 G (20g)  Cath Depth at skin: 13 cm    Medications Used: bupivacaine PF (MARCAINE) 0.25 % injection, 20 mL  Med admintered at 7/22/2020 10:21 AM      Medications  Preservative Free Saline:5ml    Post Assessment  Injection Assessment: negative aspiration for heme, incremental injection and no paresthesia on injection  Patient Tolerance:comfortable throughout block  Complications:no  Additional Notes  Procedure:             The pt was placed in the Supine position.  The Insertion site was  prepped and Draped in sterile fashion.  The pt was anesthetized with  IV Sedation( see meds).  Skin and cutaneous tissue was infiltrated and anesthetized with 1% Lidocaine 3 mls via a 25g needle.  A BBraun 4 inch 18g echogenic needle was then  inserted approximately midline, mid-thigh and advanced In-plane with Ultrasound guidance.  Normal Saline PSF was utilized for hydrodissection of tissue.  The Vastus medialis and  Sartorius muscle where visualized and the needle tip was placed in the adductor canal,  lateral to the femoral artery.  LA injection spread was visualized, injection was incremental 1-5ml, injection pressure was normal or little, no intraneural injection, no vascular injection.  LA dose was injected thru the needle(see dose above).  A BBraun 20g wire stylet catheter was placed via the needle with ultrasound visualization and confirmation with NS fluid bolus. The labeled Catheter was then secured to skin at insertion site with skin afix and steristrips to curled catheter and CHG transparent dressing.  Thank you.

## 2020-07-23 VITALS
OXYGEN SATURATION: 96 % | RESPIRATION RATE: 20 BRPM | HEART RATE: 72 BPM | SYSTOLIC BLOOD PRESSURE: 151 MMHG | DIASTOLIC BLOOD PRESSURE: 71 MMHG | TEMPERATURE: 98.3 F

## 2020-07-23 PROBLEM — G89.18 POSTOPERATIVE PAIN: Status: ACTIVE | Noted: 2020-07-23

## 2020-07-23 PROBLEM — D72.829 LEUKOCYTOSIS: Status: ACTIVE | Noted: 2020-07-23

## 2020-07-23 LAB
ANION GAP SERPL CALCULATED.3IONS-SCNC: 12 MMOL/L (ref 5–15)
BASOPHILS # BLD AUTO: 0.02 10*3/MM3 (ref 0–0.2)
BASOPHILS NFR BLD AUTO: 0.1 % (ref 0–1.5)
BUN SERPL-MCNC: 22 MG/DL (ref 8–23)
BUN/CREAT SERPL: 22.4 (ref 7–25)
CALCIUM SPEC-SCNC: 8.6 MG/DL (ref 8.6–10.5)
CHLORIDE SERPL-SCNC: 104 MMOL/L (ref 98–107)
CO2 SERPL-SCNC: 22 MMOL/L (ref 22–29)
CREAT SERPL-MCNC: 0.98 MG/DL (ref 0.76–1.27)
DEPRECATED RDW RBC AUTO: 48.7 FL (ref 37–54)
EOSINOPHIL # BLD AUTO: 0.01 10*3/MM3 (ref 0–0.4)
EOSINOPHIL NFR BLD AUTO: 0.1 % (ref 0.3–6.2)
ERYTHROCYTE [DISTWIDTH] IN BLOOD BY AUTOMATED COUNT: 14.7 % (ref 12.3–15.4)
GFR SERPL CREATININE-BSD FRML MDRD: 76 ML/MIN/1.73
GLUCOSE SERPL-MCNC: 138 MG/DL (ref 65–99)
HCT VFR BLD AUTO: 43.7 % (ref 37.5–51)
HGB BLD-MCNC: 14.7 G/DL (ref 13–17.7)
IMM GRANULOCYTES # BLD AUTO: 0.06 10*3/MM3 (ref 0–0.05)
IMM GRANULOCYTES NFR BLD AUTO: 0.4 % (ref 0–0.5)
LYMPHOCYTES # BLD AUTO: 0.66 10*3/MM3 (ref 0.7–3.1)
LYMPHOCYTES NFR BLD AUTO: 4.1 % (ref 19.6–45.3)
MCH RBC QN AUTO: 30.2 PG (ref 26.6–33)
MCHC RBC AUTO-ENTMCNC: 33.6 G/DL (ref 31.5–35.7)
MCV RBC AUTO: 89.9 FL (ref 79–97)
MONOCYTES # BLD AUTO: 0.88 10*3/MM3 (ref 0.1–0.9)
MONOCYTES NFR BLD AUTO: 5.5 % (ref 5–12)
NEUTROPHILS NFR BLD AUTO: 14.3 10*3/MM3 (ref 1.7–7)
NEUTROPHILS NFR BLD AUTO: 89.8 % (ref 42.7–76)
NRBC BLD AUTO-RTO: 0 /100 WBC (ref 0–0.2)
PLATELET # BLD AUTO: 103 10*3/MM3 (ref 140–450)
PMV BLD AUTO: 10.4 FL (ref 6–12)
POTASSIUM SERPL-SCNC: 4.1 MMOL/L (ref 3.5–5.2)
RBC # BLD AUTO: 4.86 10*6/MM3 (ref 4.14–5.8)
SODIUM SERPL-SCNC: 138 MMOL/L (ref 136–145)
WBC # BLD AUTO: 15.93 10*3/MM3 (ref 3.4–10.8)

## 2020-07-23 PROCEDURE — 97110 THERAPEUTIC EXERCISES: CPT

## 2020-07-23 PROCEDURE — 99024 POSTOP FOLLOW-UP VISIT: CPT | Performed by: ORTHOPAEDIC SURGERY

## 2020-07-23 PROCEDURE — 80048 BASIC METABOLIC PNL TOTAL CA: CPT | Performed by: ORTHOPAEDIC SURGERY

## 2020-07-23 PROCEDURE — 94799 UNLISTED PULMONARY SVC/PX: CPT

## 2020-07-23 PROCEDURE — 97116 GAIT TRAINING THERAPY: CPT

## 2020-07-23 PROCEDURE — 97535 SELF CARE MNGMENT TRAINING: CPT

## 2020-07-23 PROCEDURE — 85025 COMPLETE CBC W/AUTO DIFF WBC: CPT | Performed by: ORTHOPAEDIC SURGERY

## 2020-07-23 PROCEDURE — 97166 OT EVAL MOD COMPLEX 45 MIN: CPT

## 2020-07-23 RX ORDER — DOCUSATE SODIUM 100 MG/1
100 CAPSULE, LIQUID FILLED ORAL 2 TIMES DAILY
Qty: 60 CAPSULE | Refills: 0 | Status: SHIPPED | OUTPATIENT
Start: 2020-07-23

## 2020-07-23 RX ORDER — OMEPRAZOLE 40 MG/1
40 CAPSULE, DELAYED RELEASE ORAL 2 TIMES DAILY
Qty: 84 CAPSULE | Refills: 0 | Status: SHIPPED | OUTPATIENT
Start: 2020-07-23 | End: 2020-09-03

## 2020-07-23 RX ORDER — OXYCODONE HYDROCHLORIDE AND ACETAMINOPHEN 5; 325 MG/1; MG/1
1 TABLET ORAL EVERY 4 HOURS PRN
Qty: 40 TABLET | Refills: 0 | Status: SHIPPED | OUTPATIENT
Start: 2020-07-23 | End: 2020-08-01

## 2020-07-23 RX ADMIN — ASPIRIN 325 MG: 325 TABLET, COATED ORAL at 08:11

## 2020-07-23 RX ADMIN — LOSARTAN POTASSIUM 100 MG: 50 TABLET, FILM COATED ORAL at 08:11

## 2020-07-23 RX ADMIN — BUPROPION HYDROCHLORIDE 200 MG: 100 TABLET, EXTENDED RELEASE ORAL at 08:11

## 2020-07-23 RX ADMIN — OXYCODONE HYDROCHLORIDE AND ACETAMINOPHEN 1 TABLET: 5; 325 TABLET ORAL at 11:29

## 2020-07-23 RX ADMIN — PANTOPRAZOLE SODIUM 40 MG: 40 TABLET, DELAYED RELEASE ORAL at 08:11

## 2020-07-23 RX ADMIN — AMLODIPINE BESYLATE 5 MG: 5 TABLET ORAL at 08:11

## 2020-07-23 RX ADMIN — OXYCODONE HYDROCHLORIDE AND ACETAMINOPHEN 1 TABLET: 5; 325 TABLET ORAL at 08:11

## 2020-07-23 RX ADMIN — PRAVASTATIN SODIUM 40 MG: 40 TABLET ORAL at 08:11

## 2020-07-27 ENCOUNTER — APPOINTMENT (OUTPATIENT)
Dept: CT IMAGING | Facility: HOSPITAL | Age: 70
End: 2020-07-27

## 2020-07-27 ENCOUNTER — TELEPHONE (OUTPATIENT)
Dept: ORTHOPEDIC SURGERY | Facility: CLINIC | Age: 70
End: 2020-07-27

## 2020-07-27 ENCOUNTER — NURSE TRIAGE (OUTPATIENT)
Dept: CALL CENTER | Facility: HOSPITAL | Age: 70
End: 2020-07-27

## 2020-07-27 ENCOUNTER — HOSPITAL ENCOUNTER (EMERGENCY)
Facility: HOSPITAL | Age: 70
Discharge: HOME | End: 2020-07-27
Attending: EMERGENCY MEDICINE | Admitting: EMERGENCY MEDICINE

## 2020-07-27 VITALS
TEMPERATURE: 97.7 F | WEIGHT: 216 LBS | DIASTOLIC BLOOD PRESSURE: 69 MMHG | OXYGEN SATURATION: 96 % | HEART RATE: 69 BPM | BODY MASS INDEX: 30.24 KG/M2 | HEIGHT: 71 IN | RESPIRATION RATE: 16 BRPM | SYSTOLIC BLOOD PRESSURE: 149 MMHG

## 2020-07-27 DIAGNOSIS — R06.6 INTRACTABLE HICCUPS: Primary | ICD-10-CM

## 2020-07-27 LAB — CREAT BLDA-MCNC: 0.9 MG/DL (ref 0.6–1.3)

## 2020-07-27 PROCEDURE — 93005 ELECTROCARDIOGRAM TRACING: CPT

## 2020-07-27 PROCEDURE — 0 IOPAMIDOL PER 1 ML: Performed by: EMERGENCY MEDICINE

## 2020-07-27 PROCEDURE — 82565 ASSAY OF CREATININE: CPT

## 2020-07-27 PROCEDURE — 99283 EMERGENCY DEPT VISIT LOW MDM: CPT

## 2020-07-27 PROCEDURE — 93005 ELECTROCARDIOGRAM TRACING: CPT | Performed by: EMERGENCY MEDICINE

## 2020-07-27 PROCEDURE — 71275 CT ANGIOGRAPHY CHEST: CPT

## 2020-07-27 RX ORDER — BACLOFEN 5 MG/1
TABLET ORAL
Qty: 15 TABLET | Refills: 0 | Status: SHIPPED | OUTPATIENT
Start: 2020-07-27 | End: 2020-07-31

## 2020-07-27 RX ORDER — SODIUM CHLORIDE 0.9 % (FLUSH) 0.9 %
10 SYRINGE (ML) INJECTION AS NEEDED
Status: DISCONTINUED | OUTPATIENT
Start: 2020-07-27 | End: 2020-07-28 | Stop reason: HOSPADM

## 2020-07-27 RX ADMIN — IOPAMIDOL 70 ML: 755 INJECTION, SOLUTION INTRAVENOUS at 22:03

## 2020-07-27 NOTE — TELEPHONE ENCOUNTER
Dr. Restrepo,   See message below, I called patients wife and she stated that the help line told her to take her  to the ER because he is gasping for breath in between his hiccups. Please advise.

## 2020-07-27 NOTE — TELEPHONE ENCOUNTER
PATIENT HAS HICCUPS THAT STARTED ON Wednesday THE EVENING OF HIS KNEECAP SURGERY AND HAVE PROGRESSIVELY GOTTEN WORSE.  THEY CALLED THE HELP LINE AND TRIED EVERYTHING THEY SUGGESTED AND NOTHING IS HELPING. THE  HICCUPS ARE UNBEARABLE IN SEVERITY AND QUANTITY - NOT GIVING THE PATIENT ANY REAL BREAK.   THEY ARE CONSIDERING GOING TO THE EMERGENCY DEPARTMENT BUT WOULD LIKE YOUR ADVICE FIRST.  CALL PATIENTS WIFE AND ADVISE.  YURIY [phone].

## 2020-07-27 NOTE — TELEPHONE ENCOUNTER
I called patients wife back and she is taking him to the ER when PT comes to help her get him to the car. I gave her your message as well, she understood.

## 2020-07-27 NOTE — TELEPHONE ENCOUNTER
"07/23/2020- He has hiccups it has been constant since Thursday  Am when he was discharged from the hospital. They have tried PB, ice, sugar,water, nothing has helped. It is worse at night then during the day. He recently had a right total knee replacement. Advised to go be seen, worried about Sob.  She is worried about getting him down 7 stairs. Pt is coming today, she will speak with PT and call surgeon.     Reason for Disposition  • [1] Hiccups present > 3 hours AND [2] severe AND [3] no improvement using hiccup treatment per protocol    Additional Information  • Negative: Chest pain  • Negative: Difficulty breathing  • Negative: [1] Abdomen pain is main symptom AND [2] male  • Negative: [1] Abdomen pain is main symptom AND [2] adult female  • Negative: Vomiting  • Negative: Patient sounds very sick or weak to the triager  • Negative: [1] Hiccups present > 24 hours AND [2] nearly continuous    Answer Assessment - Initial Assessment Questions  1. ONSET: \"When did the hiccups begin?\"       They started in the hospital on Thursday after surgery.   2. SEVERITY: \"How bad are the hiccups now?\"  (Severe: unable to eat, drink or sleep because of hiccups)      He does cat nap, and has trouble with resting At night it is constant.   3. TREATMENT: \"What have you done so far to treat the hiccups?\"      Ice chips, pb   4. RECURRENT SYMPTOM: \"Have you ever had severe hiccups before?\" If so, ask: \"When was the last time?\" and \"What happened that time?\"       none  5. OTHER SYMPTOMS: \"Do you have any other symptoms? (e.g., chest pain, difficulty breathing,  abdominal pain, vomiting)      sob  6. PREGNANCY: \"Is there any chance you are pregnant?\"     n/a    Protocols used: HICCUPS-ADULT-      "

## 2020-07-28 ENCOUNTER — TELEPHONE (OUTPATIENT)
Dept: ORTHOPEDIC SURGERY | Facility: CLINIC | Age: 70
End: 2020-07-28

## 2020-07-28 ENCOUNTER — OFFICE VISIT (OUTPATIENT)
Dept: ORTHOPEDIC SURGERY | Facility: CLINIC | Age: 70
End: 2020-07-28

## 2020-07-28 DIAGNOSIS — Z96.651 STATUS POST TOTAL RIGHT KNEE REPLACEMENT: Primary | ICD-10-CM

## 2020-07-28 PROCEDURE — 99024 POSTOP FOLLOW-UP VISIT: CPT | Performed by: PHYSICIAN ASSISTANT

## 2020-07-28 RX ORDER — CEPHALEXIN 500 MG/1
500 CAPSULE ORAL 3 TIMES DAILY
Qty: 21 CAPSULE | Refills: 0 | Status: SHIPPED | OUTPATIENT
Start: 2020-07-28 | End: 2020-08-04

## 2020-07-28 NOTE — PROGRESS NOTES
[facility] [place]        Subjective     Post-op (6 days s/p (R) TKA 2020)       HPI    Jimmie Salcedo is a 69 y.o. male.  Patient presents for evaluation of his right knee.  On the above-stated date he had a right TKA performed by Dr. Restrepo.  Current pain scale 8/10.  Associated symptoms swelling.  Symptoms are worse with any walking or standing activities.  He is currently using a walker to assist with ambulation but for his visit today he sitting in a wheelchair.  Currently taking aspirin 325 for DVT prophylaxis.    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    Patient was also seen yesterday in the ED for intractable hiccups.  He was prescribed baclofen.    With regards to his knee--surgery was last Wednesday.  He has had 1 home health visit (last Friday) and unfortunately they were supposed to come on Monday but that was canceled due to his ED visit.  He reports he has been elevating, icing and working on range of motion to the knee but has persistent swelling and redness noted to the knee as well as the anterior tibial border.    No reported fever, chills, night sweats or other constitutional symptoms.  He denies any numbness or tingling into the extremity.        Objective      Physical Exam  There were no vitals taken for this visit.    There is no height or weight on file to calculate BMI.        Ortho Exam  Right knee:  Calf:  Soft with slight discomfort, negative Boogie's  Effusion:  Moderate  Swellin+ throughout lower leg  Pulses:  2+  Warmth:  Appropriate  Incision:  Prineo dressing in place. Incision intact and dry.  Normal post-operative subcutaneous hematoma (typical for TKA).  Positive erythema lower leg along tibial border without induration. No ulcers, lesions.     ROM:  Right:  Extension: 0° (increased pain  posterior knee)        Flexion: 80°     Functional Testing:  Extension mechanism intact with 3+/5 quads.      Imaging Reviewed:  No new imaging today.      Assessment:  1. Status post total right knee replacement        Plan:  1. Status post right knee TKA--due to pretibial erythema and history chronic skin infections to same area, patient was placed on a 7-day course of Keflex.  2. He will continue working with home health focusing on range of motion and swelling control.  Recommend compression socks.  3. Discussed the importance of ice and elevation for inflammation and swelling.  4. We will continue use of walker to assist with ambulation.  5. Follow-up with Leta Cortés PA-C next week.  6. Questions and concerns answered.    Patient was also examined by Dr. Restrepo and he agrees with the above assessment and plan.      Anamika Fernandes PA-C  07/29/20  17:02

## 2020-07-28 NOTE — ED PROVIDER NOTES
"Subjective   Pt presents with hiccups for several days.  He had knee surgery last week and has been hiccupping ever since.  He has tried various \"home remedies\" without improvement.  He called his orthopedist and was told to come to ED for PE evaluation.  He has no chest pain or palpitations.  He has dyspnea only when he hiccups several times in a row.  No fever, cough or abd pain.  History of GERD but well controlled.      History provided by:  Patient      Review of Systems   Constitutional: Negative for fever.   Respiratory: Negative for cough.    Cardiovascular: Negative for chest pain and palpitations.   Gastrointestinal: Negative for abdominal pain and vomiting.   All other systems reviewed and are negative.      Past Medical History:   Diagnosis Date   • Abnormal liver function test    • Arthritis    • Bilateral edema of lower extremity    • Cataract     bilat- still present - mild    • Cellulitis of leg, right     resolved   • Chalazion    • Cracking skin     bilat feet mostly    • Disease     \"brakes/breaks\" disease as child-  effected blood and urine    • GERD (gastroesophageal reflux disease)    • History of kidney stones     x2 had to have broken up    • Hoarseness     from vocal cord bending- seeing ent - possible surgery soon    • Hypertension    • Kidney infection     h/o    • Neck muscle spasm    • Onychomycosis of toenail    • Peptic ulceration    • Renal calculi    • Situational depression 8/22/2018   • Sleep apnea with use of continuous positive airway pressure (CPAP)     compliant with machine    • 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    • Vocal cord strain     vocal cord bent- seeing ent but causes pt to be hoarse    • Wears glasses        Allergies   Allergen Reactions   • Penicillins Rash     Generalized  Pt states rash occurred as a child and has not used since       Past Surgical History:   Procedure Laterality Date   • COLONOSCOPY     • CYSTOSCOPY W/ LASER LITHOTRIPSY      x2   • " ENDOSCOPY      with dilation    • FINGER SURGERY      left 5th finger   • KNEE SURGERY Right 1986    arthroscopy   • OTHER SURGICAL HISTORY      Lithotripsy   • TONSILLECTOMY     • TOTAL KNEE ARTHROPLASTY Right 7/22/2020    Procedure: TOTAL KNEE ARTHROPLASTY RIGHT;  Surgeon: Tereso Restrepo MD;  Location: [facility];  Service: Orthopedics;  Laterality: Right;       Family History   Problem Relation Age of Onset   • Arthritis Other    • Hypertension Other    • Hyperlipidemia Other    • Heart attack Other    • Hypertension Mother    • Heart disease Father    • Hypertension Father        Social History     Socioeconomic History   • Marital status:      Spouse name: Not on file   • Number of children: Not on file   • Years of education: Not on file   • Highest education level: Not on file   Tobacco Use   • Smoking status: Never Smoker   • Smokeless tobacco: Never Used   Substance and Sexual Activity   • Alcohol use: Yes     Alcohol/week: 4.0 standard drinks     Types: 4 Cans of beer per week   • Drug use: No   • Sexual activity: Defer           Objective   Physical Exam   Constitutional: He appears well-developed and well-nourished. He is cooperative.  Non-toxic appearance. No distress.   HENT:   Head: Normocephalic and atraumatic.   Mouth/Throat: Oropharynx is clear and moist and mucous membranes are normal.   Eyes: Conjunctivae and EOM are normal. No scleral icterus.   Neck: Normal range of motion. Neck supple.   Cardiovascular: Normal rate, regular rhythm and normal heart sounds.   No murmur heard.  Pulmonary/Chest: Effort normal and breath sounds normal. No respiratory distress. He has no wheezes. He has no rhonchi. He has no rales.   Abdominal: Soft. Bowel sounds are normal. There is no tenderness. There is no rebound and no guarding.   Musculoskeletal: Normal range of motion.   Neurological: He is alert. He has normal strength.   Skin: Skin is warm and dry. No rash noted.   Psychiatric: He has a  normal mood and affect. His speech is normal and behavior is normal.   Nursing note and vitals reviewed.      Procedures           ED Course         PE study negative as expected.  Will treat with baclofen.  Patient stable on serial rechecks.  Discussed findings, concerns, plan of care, expected course, reasons to return and followup.  Provided the opportunity to ask questions.                                    MDM  Number of Diagnoses or Management Options  Intractable hiccups:      Amount and/or Complexity of Data Reviewed  Clinical lab tests: reviewed and ordered  Tests in the radiology section of CPT®: reviewed and ordered  Independent visualization of images, tracings, or specimens: yes        Final diagnoses:   Intractable hiccups            Miller Acevedo MD  07/27/20 6715

## 2020-07-28 NOTE — TELEPHONE ENCOUNTER
Patients wife sent pictures.  States knee is red and warm.  We bring him in at 1:30 to see Franklin Shah

## 2020-07-28 NOTE — TELEPHONE ENCOUNTER
PATIENT'S WIFE CALLED REGARDING SWELLING AND REDNESS OF PATIENT'S KNEE. SHE HAS SENT PICTURES TO [facility]ONE FOR DOCTOR TO REVIEW. SHE IS REQUESTING A CALL BACK [phone].

## 2020-07-30 DIAGNOSIS — Z96.651 STATUS POST TOTAL RIGHT KNEE REPLACEMENT: Primary | ICD-10-CM

## 2020-07-30 RX ORDER — DOXYCYCLINE 100 MG/1
100 CAPSULE ORAL 2 TIMES DAILY
Qty: 20 CAPSULE | Refills: 1 | Status: SHIPPED | OUTPATIENT
Start: 2020-07-30 | End: 2020-10-30

## 2020-07-31 ENCOUNTER — TELEPHONE (OUTPATIENT)
Dept: ORTHOPEDIC SURGERY | Facility: CLINIC | Age: 70
End: 2020-07-31

## 2020-07-31 DIAGNOSIS — Z96.651 STATUS POST TOTAL RIGHT KNEE REPLACEMENT: Primary | ICD-10-CM

## 2020-07-31 RX ORDER — CHLORPROMAZINE HYDROCHLORIDE 25 MG/1
25 TABLET, FILM COATED ORAL EVERY 8 HOURS PRN
Qty: 31 TABLET | Refills: 1 | Status: SHIPPED | OUTPATIENT
Start: 2020-07-31 | End: 2021-02-19

## 2020-07-31 NOTE — TELEPHONE ENCOUNTER
Wife - [phone]      Keflex and Doxycycline - take together or just the Doxy now?  Take together    Something else for hiccups- baclofen isn't working.  Dr. Restrepo is going to send an Rx of Thorazine to pharmacy.    Take Kefer- he is already taking    Redness is not expanding in leg.  No ringing in ears. No temp.    Told wife everything above and told her to call me if they have any questions.        Alyssa

## 2020-08-03 ENCOUNTER — TELEPHONE (OUTPATIENT)
Dept: ORTHOPEDIC SURGERY | Facility: CLINIC | Age: 70
End: 2020-08-03

## 2020-08-03 NOTE — TELEPHONE ENCOUNTER
Checked on patient today and per his wife:  Hiccups are gone  No Temp  Redness/inflammation same - swelling has improved  Having lateral thigh pain-deep muscle cramp    She states he had a hard fall on Monday.  Continuing PT w/ ice and elevation.  She thinks movement is improved.    Alyssa

## 2020-08-04 ENCOUNTER — OFFICE VISIT (OUTPATIENT)
Dept: ORTHOPEDIC SURGERY | Facility: CLINIC | Age: 70
End: 2020-08-04

## 2020-08-04 DIAGNOSIS — Z96.651 STATUS POST TOTAL RIGHT KNEE REPLACEMENT: Primary | ICD-10-CM

## 2020-08-04 PROCEDURE — 99024 POSTOP FOLLOW-UP VISIT: CPT | Performed by: PHYSICIAN ASSISTANT

## 2020-08-04 NOTE — PROGRESS NOTES
[facility] [place] [facility] Note    Subjective     Patient: Jimmie Salcedo  : 1950    Primary Care Provider: Javad Negron MD    Requesting Provider: As above    Post-op (1 week re-check; 13 days s/p Total Knee Arthroplasty Right 20)      History        History of Present Illness: Patient returns today for follow-up of his swelling and redness of his right knee status post total knee arthroplasty with Dr. Restrepo on 2020.  He also had intractable hiccups which have resolved.  Patient reports that the knee joint pain is improving.  The redness and swelling has improved in the knee and anterior tibia.  He is complaining of a new lateral thigh pain with an area that feels very hard and seems to spasm.  He has no pain past the knee.  He has continued therapy.    Current Outpatient Medications on File Prior to Visit   Medication Sig Dispense Refill   • amLODIPine (NORVASC) 5 MG tablet Take 5 mg by mouth Daily.     • aspirin  MG EC tablet Take 1 tablet by mouth Daily for 42 days. For 6 weeks 42 tablet 0   • buPROPion SR (WELLBUTRIN SR) 200 MG 12 hr tablet Take 200 mg by mouth Daily.     • calcipotriene (DOVONOX) 0.005 % ointment As Needed.     • [] cephalexin (KEFLEX) 500 MG capsule Take 1 capsule by mouth 3 (Three) Times a Day for 7 days. 21 capsule 0   • chlorproMAZINE (THORAZINE) 25 MG tablet Take 1 tablet by mouth Every 8 (Eight) Hours As Needed for Nausea. 31 tablet 1   • Cholecalciferol (VITAMIN D3) 125 MCG (5000 UT) capsule capsule Take 5,000 Units by mouth Daily.     • docusate sodium (Colace) 100 MG capsule Take 1 capsule by mouth 2 (Two) Times a Day. 60 capsule 0   • doxazosin (CARDURA) 4 MG tablet Take 2 mg by mouth Every Night. Just takes 1/2 tablet daily     • doxycycline (MONODOX) 100 MG capsule Take 1 capsule by mouth 2 (Two) Times a Day. 20 capsule 1   • fenofibrate (TRICOR) 145 MG tablet TAKE ONE TABLET BY MOUTH EVERY DAY (Patient taking differently:  "Take 145 mg by mouth Daily.) 90 tablet 2   • irbesartan (AVAPRO) 300 MG tablet Take 300 mg by mouth Every Night.  5   • mirtazapine (REMERON) 15 MG tablet TAKE ONE TABLET BY MOUTH EVERY NIGHT (Patient taking differently: Take 15 mg by mouth Every Night.) 30 tablet 5   • MULTIPLE VITAMINS PO Take 1 capsule by mouth Daily.     • Omega-3 Fatty Acids (FISH OIL) 1000 MG capsule capsule Take 1,000 mg by mouth Daily.     • omeprazole (priLOSEC) 40 MG capsule Take 1 capsule by mouth 2 (two) times a day for 42 days. Twice daily while on aspirin 84 capsule 0   • pravastatin (PRAVACHOL) 40 MG tablet TAKE ONE TABLET BY MOUTH EVERY DAY (Patient taking differently: Take 40 mg by mouth Daily.) 90 tablet 3   • sildenafil (VIAGRA) 100 MG tablet Take 1 tablet by mouth Daily As Needed for erectile dysfunction. 6 tablet 5   • triamcinolone (KENALOG) 0.1 % ointment Apply  topically 2 (Two) Times a Day As Needed for Irritation or Rash. 80 g 5   • urea (CARMOL) 20 % cream Apply  topically to the appropriate area as directed As Needed for Dry Skin.     • vitamin B-6 (PYRIDOXINE) 50 MG tablet Take 50 mg by mouth Daily.       No current facility-administered medications on file prior to visit.       Allergies   Allergen Reactions   • Penicillins Rash     Generalized  Pt states rash occurred as a child and has not used since      Past Medical History:   Diagnosis Date   • Abnormal liver function test    • Arthritis    • Bilateral edema of lower extremity    • Cataract     bilat- still present - mild    • Cellulitis of leg, right     resolved   • Chalazion    • Cracking skin     bilat feet mostly    • Disease     \"brakes/breaks\" disease as child-  effected blood and urine    • GERD (gastroesophageal reflux disease)    • History of kidney stones     x2 had to have broken up    • Hoarseness     from vocal cord bending- seeing ent - possible surgery soon    • Hypertension    • Kidney infection     h/o    • Neck muscle spasm    • Onychomycosis of " toenail    • Peptic ulceration    • Renal calculi    • Situational depression 8/22/2018   • Sleep apnea with use of continuous positive airway pressure (CPAP)     compliant with machine    • 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    • Vocal cord strain     vocal cord bent- seeing ent but causes pt to be hoarse    • Wears glasses      Past Surgical History:   Procedure Laterality Date   • COLONOSCOPY     • CYSTOSCOPY W/ LASER LITHOTRIPSY      x2   • ENDOSCOPY      with dilation    • FINGER SURGERY      left 5th finger   • KNEE SURGERY Right 1986    arthroscopy   • OTHER SURGICAL HISTORY      Lithotripsy   • TONSILLECTOMY     • TOTAL KNEE ARTHROPLASTY Right 7/22/2020    Procedure: TOTAL KNEE ARTHROPLASTY RIGHT;  Surgeon: Tereso Restrepo MD;  Location: [facility];  Service: Orthopedics;  Laterality: Right;     Family History   Problem Relation Age of Onset   • Arthritis Other    • Hypertension Other    • Hyperlipidemia Other    • Heart attack Other    • Hypertension Mother    • Heart disease Father    • Hypertension Father       Social History     Socioeconomic History   • Marital status:      Spouse name: Not on file   • Number of children: Not on file   • Years of education: Not on file   • Highest education level: Not on file   Tobacco Use   • Smoking status: Never Smoker   • Smokeless tobacco: Never Used   Substance and Sexual Activity   • Alcohol use: Yes     Alcohol/week: 4.0 standard drinks     Types: 4 Cans of beer per week   • Drug use: No   • Sexual activity: Defer        Review of Systems   Constitutional: Negative.    HENT: Negative.    Eyes: Negative.    Respiratory: Negative.    Cardiovascular: Negative.    Gastrointestinal: Negative.    Endocrine: Negative.    Genitourinary: Negative.    Musculoskeletal: Positive for arthralgias.   Skin: Negative.    Allergic/Immunologic: Negative.    Neurological: Negative.    Hematological: Negative.    Psychiatric/Behavioral: Negative.        The  following portions of the patient's history were reviewed and updated as appropriate: allergies, current medications, past family history, past medical history, past social history, past surgical history and problem list.      Objective      Physical Exam  There were no vitals taken for this visit.    There is no height or weight on file to calculate BMI.    Patient is well developed, well nourished and in no acute distress.  Alert and oriented x 3.    Ortho Exam  Right knee exam:  Anterior knee incision is healing well.  Patient has ecchymosis on the medial thigh with tenderness, firmness on the lateral thigh where the tourniquet is placed  Post op swelling in the knee as expected  Tibial border with improved erythema, intact skin, no evidence of infection.  Appears to be more ecchymotic now than erythematous.  NVI distally      Medical Decision Making    Data Review:   none    Assessment:  1. Status post total right knee replacement        Plan:  Doing well s/p R TKA with Dr. Restrepo on 7/22/2020.  The erythema on the tibial border has improved.  It now looks more ecchymotic than erythematous.  He has one more day of Keflex left.  He reports the knee joint continues to improve.  His biggest complaint at this time is the lateral thigh pain that radiates distally.  No radiation below the knee.  I explained that it is likely a hematoma from the tourniquet, he has bruising on the medial aspect.  It will slowly resolve over time.  It is nothing worrisome, no concern for DVT.  The swelling is localized to the lateral thigh.  He will continue his PT and return to see Dr. Restrepo as scheduled next week.    History, diagnosis and treatment plan discussed with Dr. Lynch.          Leta Cortés PA-C  08/05/20  11:45

## 2020-08-06 ENCOUNTER — TELEPHONE (OUTPATIENT)
Dept: ORTHOPEDIC SURGERY | Facility: CLINIC | Age: 70
End: 2020-08-06

## 2020-08-06 NOTE — TELEPHONE ENCOUNTER
Patient is doing well.  He is active and swelling has decreased.  The patient has been working hard with PT.  He has not had the hiccups since starting the medication.   I told them to call if they need anything.    Alyssa

## 2020-08-13 ENCOUNTER — APPOINTMENT (OUTPATIENT)
Dept: PHYSICAL THERAPY | Facility: HOSPITAL | Age: 70
End: 2020-08-13

## 2020-08-13 ENCOUNTER — OFFICE VISIT (OUTPATIENT)
Dept: ORTHOPEDIC SURGERY | Facility: CLINIC | Age: 70
End: 2020-08-13

## 2020-08-13 DIAGNOSIS — Z96.651 STATUS POST TOTAL RIGHT KNEE REPLACEMENT: Primary | ICD-10-CM

## 2020-08-13 PROCEDURE — 99024 POSTOP FOLLOW-UP VISIT: CPT | Performed by: ORTHOPAEDIC SURGERY

## 2020-08-13 NOTE — PROGRESS NOTES
[facility] [place] [facility] Note        Subjective     Post-op (9 day follow up; 22 days s/p Total Knee Arthroplasty Right 7/22/20)       HPI    Jimmie Salcedo is a 69 y.o. male.  Patient is now 3 weeks out from right total knee arthroplasty on 7/22/2020.  He is had a rough course initially.  There was some concern of some cellulitis and he was placed on antibiotics.  He has had only home health therapy to this point.  They have released him as of today.  He says overall he thinks things are coming along.          Objective      Physical Exam  There were no vitals taken for this visit.    There is no height or weight on file to calculate BMI.        Ortho Exam  Range of motion 10-80  No induration or warmth to the incision or knee  No evidence of cellulitis    Imaging Reviewed:  Imaging Results (Last 24 Hours)     Procedure Component Value Units Date/Time    XR Knee 3+ View With [place] Right [[id number]] Resulted:  08/13/20 0944     Updated:  08/13/20 0944    Narrative:         Knee X-ray    Indication: status-post TKA    Study:  AP, Lateral, and Sunrise views of Right knee    Comparison: Right knee 7/22/2020    Findings:  No signs of acute fracture is visualized  No signs of loosening are appreciated  Components are well aligned    Impression:  Status post Right total knee arthroplasty. No signs of   loosening or fracture.                Assessment    Assessment:  1. Status post total right knee replacement        Plan:  1. Status post right total knee arthroplasty--patient is now in the devine window to try to restore range of motion.  I have suggested aggressive outpatient physical therapy and we will get him over to Jose at the [address] [place] [address].  We have stressed the importance of the patient of working to get as much motion back as possible to avoid the need for manipulation in the future.  I will see him back in 3 weeks to reassess range of motion.  His pernio will be pulled today and we will augment  with Steri-Strips.  We will have the office make a call to the [address] [place] [address] to make sure his physical therapy referral gets taken care of ASAP.      Tereso Restrepo MD  08/13/20  19:00

## 2020-08-14 ENCOUNTER — HOSPITAL ENCOUNTER (OUTPATIENT)
Dept: PHYSICAL THERAPY | Facility: HOSPITAL | Age: 70
Setting detail: THERAPIES SERIES
Discharge: HOME | End: 2020-08-14

## 2020-08-14 DIAGNOSIS — Z96.651 STATUS POST RIGHT KNEE REPLACEMENT: ICD-10-CM

## 2020-08-14 DIAGNOSIS — M25.561 RIGHT KNEE PAIN, UNSPECIFIED CHRONICITY: Primary | ICD-10-CM

## 2020-08-14 PROCEDURE — 97161 PT EVAL LOW COMPLEX 20 MIN: CPT

## 2020-08-14 NOTE — THERAPY EVALUATION
"    Outpatient Physical Therapy Ortho Initial Evaluation  [facility]     Patient Name: Jimmie Salcedo  : 1950  MRN: [id number]  Today's Date: 2020      Visit Date: 2020    Patient Active Problem List   Diagnosis   • Acid reflux   • Arthritis   • Hypertension   • Hyperlipidemia   • Severe obstructive sleep apnea   • Psoriasis   • Diastolic dysfunction   • Peptic ulceration   • Bilateral edema of lower extremity   • Abnormal liver function test   • Hyperbilirubinemia   • Psychophysiological insomnia   • Gilbert's disease   • Thrombocytopenia, unspecified (CMS/HCC)   • Situational depression   • Tubular adenoma   • Seasonal allergic rhinitis due to pollen   • Mild cognitive impairment   • Memory loss   • Primary osteoarthritis of right knee   • Status post total right knee replacement   • Leukocytosis, likely reactive   • Postoperative pain        Past Medical History:   Diagnosis Date   • Abnormal liver function test    • Arthritis    • Bilateral edema of lower extremity    • Cataract     bilat- still present - mild    • Cellulitis of leg, right     resolved   • Chalazion    • Cracking skin     bilat feet mostly    • Disease     \"brakes/breaks\" disease as child-  effected blood and urine    • GERD (gastroesophageal reflux disease)    • History of kidney stones     x2 had to have broken up    • Hoarseness     from vocal cord bending- seeing ent - possible surgery soon    • Hypertension    • Kidney infection     h/o    • Neck muscle spasm    • Onychomycosis of toenail    • Peptic ulceration    • Renal calculi    • Situational depression 2018   • Sleep apnea with use of continuous positive airway pressure (CPAP)     compliant with machine    • 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    • Vocal cord strain     vocal cord bent- seeing ent but causes pt to be hoarse    • Wears glasses         Past Surgical History:   Procedure Laterality Date   • COLONOSCOPY     • CYSTOSCOPY W/ LASER " LITHOTRIPSY      x2   • ENDOSCOPY      with dilation    • FINGER SURGERY      left 5th finger   • KNEE SURGERY Right 1986    arthroscopy   • OTHER SURGICAL HISTORY      Lithotripsy   • TONSILLECTOMY     • TOTAL KNEE ARTHROPLASTY Right 7/22/2020    Procedure: TOTAL KNEE ARTHROPLASTY RIGHT;  Surgeon: Tereso Restrepo MD;  Location: [facility];  Service: Orthopedics;  Laterality: Right;       Visit Dx:     ICD-10-CM ICD-9-CM   1. Right knee pain, unspecified chronicity M25.561 719.46   2. Status post right knee replacement Z96.651 V43.65         Patient History     Row Name 08/14/20 1124             History    Chief Complaint  Pain  -      Type of Pain  Knee pain  -      Date Current Problem(s) Began  -- 2 years ago  -      Brief Description of Current Complaint  Pt presents with 2-year history of R knee with s/p R TKR three weeks ago. Walked well in the hospital after surgery, has been doing home health until yesterday. Can do most everything except bending knee (82 deg max). Had initial episode of swelling/potential cellulitis requiring dose of antibiotics. Has history of strep infection in [facility], tx with infusions over last 2 years. Pain primarily with flexion along anterolateral knee, with sitting, sleeping. Is walking with straight cane on steps only and upon waking. At baseline does not use AD>  -      Previous treatment for THIS PROBLEM  Medication;Rehabilitation;Injections;Surgery  -      Surgery Date:  07/22/20  -      Patient/Caregiver Goals  Relieve pain;Return to prior level of function;Improve mobility;Improve strength  -      Patient's Rating of General Health  Good  -      Occupation/sports/leisure activities  Retired  for [employer]. Cares for yard, some volunteering. Walking down driveway.  -      Patient seeing anyone else for problem(s)?  Kaye  -      What clinical tests have you had for this problem?  X-ray  -      History of Previous Related Injuries   Arthroscopy of R knee in '80s; plan for L TKR  -AC         Pain     Pain Location  Knee  -AC      Pain at Present  3  -AC      Pain at Best  3  -AC      Pain at Worst  6  -AC      Pain Frequency  Intermittent  -AC      Pain Description  Aching;Dull;Other (Comment) Stiffness  -AC      What Performance Factors Make the Current Problem(s) WORSE?  Bending, rest  -AC      What Performance Factors Make the Current Problem(s) BETTER?  movement  -AC      Is your sleep disturbed?  Yes  -AC      Is medication used to assist with sleep?  Yes  -AC      Total hours of sleep per night  Wake up 3-4x/night  -AC      What position do you sleep in?  Supine Leg on wedge pillow  -AC      Difficulties with ADL's?  Stairs, donning R shoe  -AC         Fall Risk Assessment    Any falls in the past year:  Yes  -AC      Number of falls reported in the last 12 months  2  -AC      Factors that contributed to the fall:  Lost balance Stepping out of walker reaching, in the shower  -AC      Does patient have a fear of falling  No  -AC         Daily Activities    Primary Language  English  -AC      How does patient learn best?  Listening;Reading;Demonstration  -AC      Teaching needs identified  Home Exercise Program  -AC      Patient is concerned about/has problems with  Bed Mobility;Climbing Stairs;Walking;Transfers (getting out of a chair, bed)  -AC      Does patient have problems with the following?  None  -AC      Barriers to learning  None  -AC      Pt Participated in POC and Goals  Yes  -AC         Safety    Are you being neglected by a caregiver  No  -AC        User Key  (r) = Recorded By, (t) = Taken By, (c) = Cosigned By    Initials Name Provider Type    AC Shwetha Oneil, PT Physical Therapist          PT Ortho     Row Name 08/14/20 1200       Posture/Observations    Alignment Options  --  -AC    Genu varus  --  -AC    Observations  --  -AC    Posture/Observations Comments  --  -AC       Quarter Clearing    Quarter Clearing  --   -AC       DTR- Lower Quarter Clearing    Patellar tendon (L2-4)  --  -AC    Achilles tendon (S1-2)  --  -AC       Sensation    Sensation WNL?  --  -AC    Row Name 08/14/20 1124       Posture/Observations    Alignment Options  Genu varus  -AC    Genu varus  Right:;Normal;Left:;Moderate  -AC    Observations  Edema;Ecchymosis/bruising  -AC    Posture/Observations Comments  Mild edema noted surrounding R knee. Mild ecchymosis noted along distal lateral thigh. Incision well-healing with steri-strips present.  -AC       Quarter Clearing    Quarter Clearing  Lower Quarter Clearing  -AC       DTR- Lower Quarter Clearing    Patellar tendon (L2-4)  Left:;2- Normal response  -AC    Achilles tendon (S1-2)  Bilateral:;2- Normal response  -AC       Myotomal Screen- Lower Quarter Clearing    Hip flexion (L2)  Right:;3- (Fair -);Left:;4 (Good)  -AC    Knee extension (L3)  Bilateral:;4+ (Good +)  -AC    Ankle DF (L4)  Bilateral:;4+ (Good +)  -AC    Ankle PF (S1)  Right:;4 (Good);Left:;5 (Normal)  -AC    Knee flexion (S2)  Right:;2+ (Poor +);Left:;5 (Normal)  -AC       Special Tests/Palpation    Special Tests/Palpation  Knee  -AC       Patellar Accessory Motions    Patellar Accessory Motions Tested?  Yes  -AC    Superior glide  Right:;Hypomobile  -AC    Inferior glide  Right:;Hypomobile  -AC       General ROM    RT Lower Ext  Rt Knee Extension/Flexion  -AC    LT Lower Ext  Lt Knee Extension/Flexion  -AC       Right Lower Ext    Rt Knee Extension/Flexion AROM  12 deg in flexion-70  -AC       Left Lower Ext    Lt Knee Extension/Flexion AROM  4 deg in flexion-130  -AC       MMT (Manual Muscle Testing)    Rt Lower Ext  Rt Hip Internal (Medial) Rotation;Rt Hip External (Lateral) Rotation;Rt Hip ABduction;Rt Hip Extension  -AC    Lt Lower Ext  Lt Hip Extension;Lt Hip ABduction;Lt Hip Internal (Medial) Rotation;Lt Hip External (Lateral) Rotation  -AC       MMT Right Lower Ext    Rt Hip Extension MMT, Gross Movement  (3-/5) fair minus  -AC     Rt Hip ABduction MMT, Gross Movement  (4/5) good  -AC    Rt Hip Internal (Medial) Rotation MMT, Gross Movement  (4-/5) good minus  -AC    Rt Hip External (Lateral) Rotation MMT, Gross Movement  (3+/5) fair plus  -AC       MMT Left Lower Ext    Lt Hip Extension MMT, Gross Movement  (3-/5) fair minus  -AC    Lt Hip ABduction MMT, Gross Movement  (5/5) normal  -AC    Lt Hip Internal (Medial) Rotation MMT, Gross Movement  (4/5) good  -AC    Lt Hip External (Lateral) Rotation MMT, Gross Movement  (4/5) good  -AC       Sensation    Sensation WNL?  WNL  -AC    Additional Comments  Mild numbness along R lateral knee from n. block  -AC       Flexibility    Flexibility Tested?  Lower Extremity  -AC       Lower Extremity Flexibility    Hamstrings  Right:;Moderately limited;Left:;Mildly limited  -AC    Quadriceps  Right:;Severely limited;Left:;Moderately limited  -AC       Gait/Stairs Assessment/Training    Comment (Gait/Stairs)  Straight cane in LUE, antalgic gait with wide DORON  -AC      User Key  (r) = Recorded By, (t) = Taken By, (c) = Cosigned By    Initials Name Provider Type    AC Shwetha Oneil, PT Physical Therapist                      Therapy Education  Education Details: Pt advised to continue HHPT exercises and provided additional ones including heel slides w/ strap, prone hangs, and prone hip extension.  Given: HEP  Program: New  How Provided: Verbal, Written  Provided to: Patient  Level of Understanding: Verbalized     PT OP Goals     Row Name 08/14/20 1124          PT Short Term Goals    STG Date to Achieve  09/04/20  -     STG 1  Decrease R knee pain by > or = 50%.  -AC     STG 1 Progress  New  -     STG 2  Pt will be independent with HEP to improve ROM/strength.  -AC     STG 2 Progress  New  -     STG 3  Improve R knee flexion ROM to at least 100 deg.  -     STG 3 Progress  New  -        Long Term Goals    LTG Date to Achieve  09/25/20  -     LTG 1  Decrease R knee pain by > or = 75%.  -AC      LTG 1 Progress  New  -AC     LTG 2  Improve LEFS to > or = 55/80.  -AC     LTG 2 Progress  New  -AC     LTG 3  Enable ability to ambulate stairs and put on R shoe with min-no difficulty or pain.  -AC     LTG 3 Progress  New  -AC     LTG 4  Improve R knee ROM to WNL.  -AC     LTG 4 Progress  New  -AC     LTG 5  Improve RLE strength to grossly 4+/5.  -AC     LTG 5 Progress  New  -AC     LTG 6  Enable normal gait pattern with least restrictive device.  -AC     LTG 6 Progress  New  -AC     LTG 7  Improve 30 Sec Chair to > or = 10 reps.  -AC     LTG 7 Progress  New  -AC        Time Calculation    PT Goal Re-Cert Due Date  11/12/20  -AC       User Key  (r) = Recorded By, (t) = Taken By, (c) = Cosigned By    Initials Name Provider Type    AC Shwetha Oneil, PT Physical Therapist          PT Assessment/Plan     Row Name 08/14/20 1124          PT Assessment    Functional Limitations  Limitations in community activities;Limitation in home management;Performance in self-care ADL;Impaired gait;Performance in leisure activities  -AC     Impairments  Gait;Muscle strength;Sensation;Pain;Poor body mechanics;Range of motion;Joint integrity;Joint mobility;Impaired muscle power;Impaired muscle length;Impaired muscle endurance  -AC     Assessment Comments  Pt presents 3 weeks s/p R TKR after undergoing trial of HHPT. Pt demonstrates slightly increased ROM deficits than to be expected at this point in post-op status. Condition was complicated by suspected onset of cellulitis after surgery. Pt demonstrates limitations in ROM, strength, and impaired gait with mild-moderate pain. PT intervention is warranted to restore ROM/strength and maximize function.   -AC     Please refer to paper survey for additional self-reported information  Yes  -AC     Rehab Potential  Good  -AC     Patient/caregiver participated in establishment of treatment plan and goals  Yes  -AC     Patient would benefit from skilled therapy intervention  Yes  -AC         PT Plan    PT Frequency  2x/week  -AC     Predicted Duration of Therapy Intervention (Therapy Eval)  12 wks  -AC     Planned CPT's?  PT EVAL LOW COMPLEXITY: 17217;PT THER ACT EA 15 MIN: 62726;PT GAIT TRAINING EA 15 MIN: 30127;PT SELF CARE/HOME MGMT/TRAIN EA 15: 01981;PT THER SUPP EA 15 MIN;PT THER MASS EA 15 MIN: 68398;PT RE-EVAL: 36166;PT MANUAL THERAPY EA 15 MIN: 67816;PT HOT OR COLD PACK TREAT MCARE;PT THER PROC EA 15 MIN: 91314;PT NEUROMUSC RE-EDUCATION EA 15 MIN: 82779;PT ELECTRICAL STIM UNATTEND: ;PT THER PROC GROUP: 82696  -AC     PT Plan Comments  Progress ROM/strengthening and flexibility exercises as tolerated. Manual/modalities PRN.  -AC       User Key  (r) = Recorded By, (t) = Taken By, (c) = Cosigned By    Initials Name Provider Type    AC Shwetha Oneil, PT Physical Therapist                              Outcome Measure Options: Lower Extremity Functional Scale (LEFS)  Lower Extremity Functional Index  Any of your usual work, housework or school activities: Moderate difficulty  Your usual hobbies, recreational or sporting activities: Moderate difficulty  Getting into or out of the bath: A little bit of difficulty  Walking between rooms: A little bit of difficulty  Putting on your shoes or socks: Moderate difficulty  Squatting: Quite a bit of difficulty  Lifting an object, like a bag of groceries from the floor: Moderate difficulty  Performing light activities around your home: Moderate difficulty  Performing heavy activities around your home: Quite a bit of difficulty  Getting into or out of a car: Moderate difficulty  Walking 2 blocks: Moderate difficulty  Walking a mile: Quite a bit of difficulty  Going up or down 10 stairs (about 1 flight of stairs): A little bit of difficulty  Standing for 1 hour: Quite a bit of difficulty  Sitting for 1 hour: Moderate difficulty  Running on even ground: Quite a bit of difficulty  Running on uneven ground: Quite a bit of difficulty  Making sharp  turns while running fast: Quite a bit of difficulty  Hopping: Quite a bit of difficulty  Rolling over in bed: Moderate difficulty  Total: 35      Time Calculation:     Start Time: 1124     Therapy Charges for Today     Code Description Service Date Service Provider Modifiers Qty    19458394534  PT EVAL LOW COMPLEXITY 4 8/14/2020 Shwetha Oneil, PT GP 1          PT G-Codes  Outcome Measure Options: Lower Extremity Functional Scale (LEFS)  Total: 35         Shwetha Oneil, PT  8/14/2020

## 2020-08-20 ENCOUNTER — HOSPITAL ENCOUNTER (OUTPATIENT)
Dept: PHYSICAL THERAPY | Facility: HOSPITAL | Age: 70
Setting detail: THERAPIES SERIES
Discharge: HOME | End: 2020-08-20

## 2020-08-20 DIAGNOSIS — Z96.651 STATUS POST RIGHT KNEE REPLACEMENT: Primary | ICD-10-CM

## 2020-08-20 DIAGNOSIS — M25.561 RIGHT KNEE PAIN, UNSPECIFIED CHRONICITY: ICD-10-CM

## 2020-08-20 PROCEDURE — 97110 THERAPEUTIC EXERCISES: CPT | Performed by: GENERAL ACUTE CARE HOSPITAL

## 2020-08-20 NOTE — THERAPY TREATMENT NOTE
"    Outpatient Physical Therapy Ortho Treatment Note  [facility]     Patient Name: Jimmie Salcedo  : 1950  MRN: [id number]  Today's Date: 2020      Visit Date: 2020    Visit Dx:    ICD-10-CM ICD-9-CM   1. Status post right knee replacement Z96.651 V43.65   2. Right knee pain, unspecified chronicity M25.561 719.46       Patient Active Problem List   Diagnosis   • Acid reflux   • Arthritis   • Hypertension   • Hyperlipidemia   • Severe obstructive sleep apnea   • Psoriasis   • Diastolic dysfunction   • Peptic ulceration   • Bilateral edema of lower extremity   • Abnormal liver function test   • Hyperbilirubinemia   • Psychophysiological insomnia   • Gilbert's disease   • Thrombocytopenia, unspecified (CMS/HCC)   • Situational depression   • Tubular adenoma   • Seasonal allergic rhinitis due to pollen   • Mild cognitive impairment   • Memory loss   • Primary osteoarthritis of right knee   • Status post total right knee replacement   • Leukocytosis, likely reactive   • Postoperative pain        Past Medical History:   Diagnosis Date   • Abnormal liver function test    • Arthritis    • Bilateral edema of lower extremity    • Cataract     bilat- still present - mild    • Cellulitis of leg, right     resolved   • Chalazion    • Cracking skin     bilat feet mostly    • Disease     \"brakes/breaks\" disease as child-  effected blood and urine    • GERD (gastroesophageal reflux disease)    • History of kidney stones     x2 had to have broken up    • Hoarseness     from vocal cord bending- seeing ent - possible surgery soon    • Hypertension    • Kidney infection     h/o    • Neck muscle spasm    • Onychomycosis of toenail    • Peptic ulceration    • Renal calculi    • Situational depression 2018   • Sleep apnea with use of continuous positive airway pressure (CPAP)     compliant with machine    • 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    • Vocal cord strain     vocal cord bent- seeing ent but " causes pt to be hoarse    • Wears glasses         Past Surgical History:   Procedure Laterality Date   • COLONOSCOPY     • CYSTOSCOPY W/ LASER LITHOTRIPSY      x2   • ENDOSCOPY      with dilation    • FINGER SURGERY      left 5th finger   • KNEE SURGERY Right 1986    arthroscopy   • OTHER SURGICAL HISTORY      Lithotripsy   • TONSILLECTOMY     • TOTAL KNEE ARTHROPLASTY Right 7/22/2020    Procedure: TOTAL KNEE ARTHROPLASTY RIGHT;  Surgeon: Tereso Restrepo MD;  Location: [facility];  Service: Orthopedics;  Laterality: Right;     PT Assessment/Plan     Row Name 08/20/20 1030          PT Assessment    Assessment Comments  Pt presented with limited ROM of the R knee, specifically with knee flexion. He was able to achieve almost 90 degrees but with pain and tightness along the anterior knee. He is limited in his gait as well as his knee lacks enough flexion in order to avoid hip hike  (Pended)   -        PT Plan    PT Plan Comments  Continue per plan of care and progressing pt to improve strength and ROM of the knee  (Pended)   -       User Key  (r) = Recorded By, (t) = Taken By, (c) = Cosigned By    Initials Name Provider Type     Marcus Kelly, PT Student PT Student            OP Exercises     Row Name 08/20/20 1030             Subjective Comments    Subjective Comments  Pt stated that his knee is just sore and hard to bend  (Pended)   -         Subjective Pain    Able to rate subjective pain?  yes  (Pended)   -      Pre-Treatment Pain Level  0  (Pended)   -HP      Post-Treatment Pain Level  0  (Pended)   -         Total Minutes    12344 - Gait Training Minutes   5  (Pended)   -      38186 - PT Therapeutic Exercise Minutes  40  (Pended)   -HP         Exercise 1    Exercise Name 1  Nustep   (Pended)   -      Time 1  4  (Pended)   -HP         Exercise 2    Exercise Name 2  Supine knee flexion, SAQ, and SLR  (Pended)   -      Reps 2  15 each  (Pended)   -         Exercise 3    Exercise Name 3   "Sit to stands   (Pended)   -      Sets 3  2  (Pended)   -HP      Reps 3  10  (Pended)   -         Exercise 4    Exercise Name 4  Knee flexion holds with added patella mobility   (Pended)   -HP      Reps 4  10  (Pended)   -         Exercise 5    Exercise Name 5  Stepping up, stepping up/down, side stepping up onto 4\" step  (Pended)   -HP      Reps 5  10  (Pended)   -      Additional Comments  Reps on both legs  (Pended)   -HP         Exercise 6    Exercise Name 6  Side stepping in parallel bars  (Pended)   -HP      Reps 6  2 laps  (Pended)   -         Exercise 7    Exercise Name 7  Gait mechanics with focus of push off and leading with heel   (Pended)   -      Time 7  5 min  (Pended)   -         Exercise 8    Exercise Name 8  Standing 3 way hip and knee flexion  (Pended)   -HP      Reps 8  10  (Pended)   -        User Key  (r) = Recorded By, (t) = Taken By, (c) = Cosigned By    Initials Name Provider Type     Marcus Kelly, PT Student PT Student        Time Calculation:   Start Time: (P) 1030  Therapy Charges for Today     Code Description Service Date Service Provider Modifiers Qty    15198683609 HC PT THER PROC EA 15 MIN 8/20/2020 Marcus Kelly, PT Student GP 3        Marcus Kelly, PT Student  8/20/2020     "

## 2020-08-21 ENCOUNTER — TELEPHONE (OUTPATIENT)
Dept: ORTHOPEDIC SURGERY | Facility: CLINIC | Age: 70
End: 2020-08-21

## 2020-08-21 NOTE — TELEPHONE ENCOUNTER
Patient wife called today asking if the patient needs to continue Aspirin and Omeprazole.  Per Dr. Restrepo he needs the aspirin for 6 weeks total and take Omeprazole as long as he is on Aspirin.    I gave the information to his wife.     Alyssa

## 2020-08-25 ENCOUNTER — HOSPITAL ENCOUNTER (OUTPATIENT)
Dept: PHYSICAL THERAPY | Facility: HOSPITAL | Age: 70
Setting detail: THERAPIES SERIES
Discharge: HOME | End: 2020-08-25

## 2020-08-25 DIAGNOSIS — Z96.651 STATUS POST RIGHT KNEE REPLACEMENT: Primary | ICD-10-CM

## 2020-08-25 DIAGNOSIS — M25.561 RIGHT KNEE PAIN, UNSPECIFIED CHRONICITY: ICD-10-CM

## 2020-08-25 PROCEDURE — 97140 MANUAL THERAPY 1/> REGIONS: CPT

## 2020-08-25 PROCEDURE — 97110 THERAPEUTIC EXERCISES: CPT

## 2020-08-25 NOTE — THERAPY TREATMENT NOTE
"    Outpatient Physical Therapy Ortho Treatment Note  [facility]     Patient Name: Jimmie Salcedo  : 1950  MRN: [id number]  Today's Date: 2020      Visit Date: 2020    Visit Dx:    ICD-10-CM ICD-9-CM   1. Status post right knee replacement Z96.651 V43.65   2. Right knee pain, unspecified chronicity M25.561 719.46       Patient Active Problem List   Diagnosis   • Acid reflux   • Arthritis   • Hypertension   • Hyperlipidemia   • Severe obstructive sleep apnea   • Psoriasis   • Diastolic dysfunction   • Peptic ulceration   • Bilateral edema of lower extremity   • Abnormal liver function test   • Hyperbilirubinemia   • Psychophysiological insomnia   • Gilbert's disease   • Thrombocytopenia, unspecified (CMS/HCC)   • Situational depression   • Tubular adenoma   • Seasonal allergic rhinitis due to pollen   • Mild cognitive impairment   • Memory loss   • Primary osteoarthritis of right knee   • Status post total right knee replacement   • Leukocytosis, likely reactive   • Postoperative pain        Past Medical History:   Diagnosis Date   • Abnormal liver function test    • Arthritis    • Bilateral edema of lower extremity    • Cataract     bilat- still present - mild    • Cellulitis of leg, right     resolved   • Chalazion    • Cracking skin     bilat feet mostly    • Disease     \"brakes/breaks\" disease as child-  effected blood and urine    • GERD (gastroesophageal reflux disease)    • History of kidney stones     x2 had to have broken up    • Hoarseness     from vocal cord bending- seeing ent - possible surgery soon    • Hypertension    • Kidney infection     h/o    • Neck muscle spasm    • Onychomycosis of toenail    • Peptic ulceration    • Renal calculi    • Situational depression 2018   • Sleep apnea with use of continuous positive airway pressure (CPAP)     compliant with machine    • 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    • Vocal cord strain     vocal cord bent- seeing ent but " causes pt to be hoarse    • Wears glasses         Past Surgical History:   Procedure Laterality Date   • COLONOSCOPY     • CYSTOSCOPY W/ LASER LITHOTRIPSY      x2   • ENDOSCOPY      with dilation    • FINGER SURGERY      left 5th finger   • KNEE SURGERY Right 1986    arthroscopy   • OTHER SURGICAL HISTORY      Lithotripsy   • TONSILLECTOMY     • TOTAL KNEE ARTHROPLASTY Right 7/22/2020    Procedure: TOTAL KNEE ARTHROPLASTY RIGHT;  Surgeon: Tereso Restrepo MD;  Location: [facility];  Service: Orthopedics;  Laterality: Right;                       PT Assessment/Plan     Row Name 08/25/20 1115          PT Assessment    Assessment Comments  Pt is now walking without a cane for most ambulation except for stairs. No pain reported today with the exception of flexion-focused exercises and MT. Continues to remain limited to ~85-90 deg, but extension is improving. Able to progress exercises in clinic today with good tolerance. Advised pt to initiate compression stockings to assist with reducing edema in RLE.   -AC        PT Plan    PT Plan Comments  Cont per POC.  -AC       User Key  (r) = Recorded By, (t) = Taken By, (c) = Cosigned By    Initials Name Provider Type    Shwetha Conway, PT Physical Therapist          Modalities     Row Name 08/25/20 1115             Ice    Ice Applied  Yes  -AC      Location  R knee  -AC      Rx Minutes  10 mins  -AC      Ice S/P Rx  Yes  -AC        User Key  (r) = Recorded By, (t) = Taken By, (c) = Cosigned By    Initials Name Provider Type    Shwetha Conway, PT Physical Therapist        OP Exercises     Row Name 08/25/20 1115             Subjective Comments    Subjective Comments  Pt states he has been walking without his cane. Pain occurs mostly on inner area of knee. Continues to have stiffness/difficulty with bending knee.  -AC         Subjective Pain    Able to rate subjective pain?  yes  -AC      Pre-Treatment Pain Level  0  -AC         Total Minutes    12112 - PT  "Therapeutic Exercise Minutes  28  -AC      31304 - PT Manual Therapy Minutes  10  -AC         Exercise 1    Exercise Name 1  NuStep  -AC      Time 1  5 min  -AC      Additional Comments  L3  -AC         Exercise 2    Exercise Name 2  Heel slides w/ strap  -AC      Reps 2  15  -AC         Exercise 3    Exercise Name 3  SAQ, SLR  -AC      Reps 3  10/10  -AC         Exercise 4    Exercise Name 4  HS curls w ball  -AC      Reps 4  15  -AC         Exercise 5    Exercise Name 5  STS  -AC      Sets 5  2  -AC      Reps 5  10  -AC      Additional Comments  2nd set w/ red band  -AC         Exercise 6    Exercise Name 6  Step ups  -AC      Sets 6  2  -AC      Reps 6  10  -AC      Additional Comments  4\", 8\" step  -AC         Exercise 7    Exercise Name 7  Side step ups  -AC      Reps 7  10  -AC      Additional Comments  8\" step  -AC         Exercise 8    Exercise Name 8  Sidesteps w/ band  -AC      Reps 8  3 laps up/back  -AC      Additional Comments  Red band  -AC        User Key  (r) = Recorded By, (t) = Taken By, (c) = Cosigned By    Initials Name Provider Type    AC Shwetha Oneil, PT Physical Therapist                      Manual Rx (last 36 hours)      Manual Treatments     Row Name 08/25/20 1115             Total Minutes    68511 - PT Manual Therapy Minutes  10  -AC         Manual Rx 1    Manual Rx 1 Location  R knee  -AC      Manual Rx 1 Type  Tibiofemoral distraction, AP/PA glides, patellar mobs, MWM into flexion w/ oscillations at end range  -AC      Manual Rx 1 Grade  II-III  -AC      Manual Rx 1 Duration  10  -AC        User Key  (r) = Recorded By, (t) = Taken By, (c) = Cosigned By    Initials Name Provider Type    AC Shwetha Oneil, PT Physical Therapist                             Time Calculation:   Start Time: 1115  Therapy Charges for Today     Code Description Service Date Service Provider Modifiers Qty    18860822189  PT THER PROC EA 15 MIN 8/25/2020 Shwetha Oneil, PT GP 2    [id number]  " PT MANUAL THERAPY EA 15 MIN 8/25/2020 Shwetha Oneil, PT GP 1                    Shwetha Oneil, PT  8/25/2020

## 2020-08-27 ENCOUNTER — HOSPITAL ENCOUNTER (OUTPATIENT)
Dept: PHYSICAL THERAPY | Facility: HOSPITAL | Age: 70
Setting detail: THERAPIES SERIES
Discharge: HOME | End: 2020-08-27

## 2020-08-27 DIAGNOSIS — M25.561 RIGHT KNEE PAIN, UNSPECIFIED CHRONICITY: ICD-10-CM

## 2020-08-27 DIAGNOSIS — Z96.651 STATUS POST RIGHT KNEE REPLACEMENT: Primary | ICD-10-CM

## 2020-08-27 PROCEDURE — 97140 MANUAL THERAPY 1/> REGIONS: CPT

## 2020-08-27 PROCEDURE — 97110 THERAPEUTIC EXERCISES: CPT

## 2020-08-27 NOTE — THERAPY TREATMENT NOTE
"    Outpatient Physical Therapy Ortho Treatment Note  [facility]     Patient Name: Jimmie Salcedo  : 1950  MRN: [id number]  Today's Date: 2020      Visit Date: 2020    Visit Dx:    ICD-10-CM ICD-9-CM   1. Status post right knee replacement Z96.651 V43.65   2. Right knee pain, unspecified chronicity M25.561 719.46       Patient Active Problem List   Diagnosis   • Acid reflux   • Arthritis   • Hypertension   • Hyperlipidemia   • Severe obstructive sleep apnea   • Psoriasis   • Diastolic dysfunction   • Peptic ulceration   • Bilateral edema of lower extremity   • Abnormal liver function test   • Hyperbilirubinemia   • Psychophysiological insomnia   • Gilbert's disease   • Thrombocytopenia, unspecified (CMS/HCC)   • Situational depression   • Tubular adenoma   • Seasonal allergic rhinitis due to pollen   • Mild cognitive impairment   • Memory loss   • Primary osteoarthritis of right knee   • Status post total right knee replacement   • Leukocytosis, likely reactive   • Postoperative pain        Past Medical History:   Diagnosis Date   • Abnormal liver function test    • Arthritis    • Bilateral edema of lower extremity    • Cataract     bilat- still present - mild    • Cellulitis of leg, right     resolved   • Chalazion    • Cracking skin     bilat feet mostly    • Disease     \"brakes/breaks\" disease as child-  effected blood and urine    • GERD (gastroesophageal reflux disease)    • History of kidney stones     x2 had to have broken up    • Hoarseness     from vocal cord bending- seeing ent - possible surgery soon    • Hypertension    • Kidney infection     h/o    • Neck muscle spasm    • Onychomycosis of toenail    • Peptic ulceration    • Renal calculi    • Situational depression 2018   • Sleep apnea with use of continuous positive airway pressure (CPAP)     compliant with machine    • 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    • Vocal cord strain     vocal cord bent- seeing ent but " causes pt to be hoarse    • Wears glasses         Past Surgical History:   Procedure Laterality Date   • COLONOSCOPY     • CYSTOSCOPY W/ LASER LITHOTRIPSY      x2   • ENDOSCOPY      with dilation    • FINGER SURGERY      left 5th finger   • KNEE SURGERY Right 1986    arthroscopy   • OTHER SURGICAL HISTORY      Lithotripsy   • TONSILLECTOMY     • TOTAL KNEE ARTHROPLASTY Right 7/22/2020    Procedure: TOTAL KNEE ARTHROPLASTY RIGHT;  Surgeon: Tereso Restrepo MD;  Location: [facility];  Service: Orthopedics;  Laterality: Right;                       PT Assessment/Plan     Row Name 08/27/20 1115          PT Assessment    Assessment Comments  Pt demonstrates improved flexion ROM today but continues to have pain pushing flexion. Improved loading of R knee with standing activities today, and was able to progress standing strengthening with good tolerance.  -AC        PT Plan    PT Plan Comments  Cont per POC progressing ROM/strength as able.  -AC       User Key  (r) = Recorded By, (t) = Taken By, (c) = Cosigned By    Initials Name Provider Type    Shwetha Conway, PT Physical Therapist          Modalities     Row Name 08/27/20 1115             Ice    Ice Applied  Yes  -AC      Location  R knee  -AC      Rx Minutes  10 mins  -AC      Ice S/P Rx  Yes  -AC        User Key  (r) = Recorded By, (t) = Taken By, (c) = Cosigned By    Initials Name Provider Type    Shwetha Conway, PT Physical Therapist        OP Exercises     Row Name 08/27/20 1115             Subjective Comments    Subjective Comments  Pt states he was a little sore after last session, but noticed walking is easier.  -AC         Subjective Pain    Able to rate subjective pain?  yes  -AC      Pre-Treatment Pain Level  3  -AC      Subjective Pain Comment  ROM: 94 deg flexion  -AC         Total Minutes    02022 - PT Therapeutic Exercise Minutes  28  -AC      17740 - PT Manual Therapy Minutes  10  -AC         Exercise 1    Exercise Name 1  Rj   "-AC      Time 1  6 min  -AC      Additional Comments  L4  -AC         Exercise 2    Exercise Name 2  HS curls w/ ball  -AC      Reps 2  20  -AC         Exercise 3    Exercise Name 3  Seated heel slides AROM  -AC      Reps 3  10  -AC      Time 3  5 sec holds  -AC         Exercise 4    Exercise Name 4  STS  -AC      Sets 4  2  -AC      Reps 4  10  -AC      Additional Comments  Red band  -AC         Exercise 5    Exercise Name 5  Standing 3-way kicks  -AC      Reps 5  10 ea way both legs  -AC      Additional Comments  Red band  -AC         Exercise 6    Exercise Name 6  Step ups forward/side  -AC      Reps 6  10/10  -AC      Additional Comments  8\" step  -AC         Exercise 7    Exercise Name 7  Step downs  -AC      Reps 7  15  -AC      Additional Comments  4# step  -AC         Exercise 8    Exercise Name 8  Sidesteps  -AC      Reps 8  3 laps in // bars  -AC      Additional Comments  Red band  -AC         Exercise 9    Exercise Name 9  Forward diagonal/backwards steps  -AC      Reps 9  3 laps up/back  -AC      Additional Comments  Red band  -AC        User Key  (r) = Recorded By, (t) = Taken By, (c) = Cosigned By    Initials Name Provider Type    AC Shwetha Oneil, PT Physical Therapist                      Manual Rx (last 36 hours)      Manual Treatments     Row Name 08/27/20 1115             Total Minutes    79761 - PT Manual Therapy Minutes  10  -AC         Manual Rx 1    Manual Rx 1 Location  R knee  -AC      Manual Rx 1 Type  Tibiofemoral distraction, AP/PA glides, patellar mobs, MWM into flexion w/ oscillations at end range  -AC      Manual Rx 1 Grade  II-III  -AC      Manual Rx 1 Duration  10  -AC        User Key  (r) = Recorded By, (t) = Taken By, (c) = Cosigned By    Initials Name Provider Type    AC Shwetha Oneil, PT Physical Therapist                             Time Calculation:   Start Time: 1115  Therapy Charges for Today     Code Description Service Date Service Provider Modifiers Qty    " [id number]  PT THER PROC EA 15 MIN 8/27/2020 Shwetha Oneil, PT GP 2    [id number]  PT MANUAL THERAPY EA 15 MIN 8/27/2020 Shwetha Oneil, PT GP 1                    Shwetha Oneil, PT  8/27/2020

## 2020-09-01 ENCOUNTER — HOSPITAL ENCOUNTER (OUTPATIENT)
Dept: PHYSICAL THERAPY | Facility: HOSPITAL | Age: 70
Setting detail: THERAPIES SERIES
Discharge: HOME | End: 2020-09-01

## 2020-09-01 DIAGNOSIS — M25.561 RIGHT KNEE PAIN, UNSPECIFIED CHRONICITY: ICD-10-CM

## 2020-09-01 DIAGNOSIS — Z96.651 STATUS POST RIGHT KNEE REPLACEMENT: Primary | ICD-10-CM

## 2020-09-01 PROCEDURE — 97140 MANUAL THERAPY 1/> REGIONS: CPT

## 2020-09-01 PROCEDURE — 97110 THERAPEUTIC EXERCISES: CPT

## 2020-09-01 NOTE — THERAPY TREATMENT NOTE
"    Outpatient Physical Therapy Ortho Treatment Note  [facility]     Patient Name: Jimmie Salcedo  : 1950  MRN: [id number]  Today's Date: 2020      Visit Date: 2020    Visit Dx:    ICD-10-CM ICD-9-CM   1. Status post right knee replacement Z96.651 V43.65   2. Right knee pain, unspecified chronicity M25.561 719.46       Patient Active Problem List   Diagnosis   • Acid reflux   • Arthritis   • Hypertension   • Hyperlipidemia   • Severe obstructive sleep apnea   • Psoriasis   • Diastolic dysfunction   • Peptic ulceration   • Bilateral edema of lower extremity   • Abnormal liver function test   • Hyperbilirubinemia   • Psychophysiological insomnia   • Gilbert's disease   • Thrombocytopenia, unspecified (CMS/HCC)   • Situational depression   • Tubular adenoma   • Seasonal allergic rhinitis due to pollen   • Mild cognitive impairment   • Memory loss   • Primary osteoarthritis of right knee   • Status post total right knee replacement   • Leukocytosis, likely reactive   • Postoperative pain        Past Medical History:   Diagnosis Date   • Abnormal liver function test    • Arthritis    • Bilateral edema of lower extremity    • Cataract     bilat- still present - mild    • Cellulitis of leg, right     resolved   • Chalazion    • Cracking skin     bilat feet mostly    • Disease     \"brakes/breaks\" disease as child-  effected blood and urine    • GERD (gastroesophageal reflux disease)    • History of kidney stones     x2 had to have broken up    • Hoarseness     from vocal cord bending- seeing ent - possible surgery soon    • Hypertension    • Kidney infection     h/o    • Neck muscle spasm    • Onychomycosis of toenail    • Peptic ulceration    • Renal calculi    • Situational depression 2018   • Sleep apnea with use of continuous positive airway pressure (CPAP)     compliant with machine    • 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    • Vocal cord strain     vocal cord bent- seeing ent but " causes pt to be hoarse    • Wears glasses         Past Surgical History:   Procedure Laterality Date   • COLONOSCOPY     • CYSTOSCOPY W/ LASER LITHOTRIPSY      x2   • ENDOSCOPY      with dilation    • FINGER SURGERY      left 5th finger   • KNEE SURGERY Right 1986    arthroscopy   • OTHER SURGICAL HISTORY      Lithotripsy   • TONSILLECTOMY     • TOTAL KNEE ARTHROPLASTY Right 7/22/2020    Procedure: TOTAL KNEE ARTHROPLASTY RIGHT;  Surgeon: Tereso Restrepo MD;  Location: [facility];  Service: Orthopedics;  Laterality: Right;                       PT Assessment/Plan     Row Name 09/01/20 1117          PT Assessment    Assessment Comments  Pt making gradual improvements in R knee ROM, measuring up to 98 degrees in flexion today. Pt continues to demonstrate mild pitting edema in RLE likely limiting mobility gains, however this is improving. Able to progress strengthening exercises in standing today with good tolerance.   -AC        PT Plan    PT Plan Comments  Cont per POC.  -AC       User Key  (r) = Recorded By, (t) = Taken By, (c) = Cosigned By    Initials Name Provider Type    Shwetha Conway, PT Physical Therapist          Modalities     Row Name 09/01/20 1117             Ice    Ice Applied  Yes  -AC      Location  R knee  -AC      Rx Minutes  10 mins  -AC      Ice S/P Rx  Yes  -AC        User Key  (r) = Recorded By, (t) = Taken By, (c) = Cosigned By    Initials Name Provider Type    Shwetha Conway, PT Physical Therapist        OP Exercises     Row Name 09/01/20 1117             Subjective Comments    Subjective Comments  Pt states his knee is starting to feel a little better. Has occasional throbbing when flexing knee.  -AC         Subjective Pain    Able to rate subjective pain?  yes  -AC      Pre-Treatment Pain Level  0  -AC      Subjective Pain Comment  Calor noted surrounding knee upon palpation; ROM: 98 deg flexion  -AC         Total Minutes    92759 - PT Therapeutic Exercise Minutes  30   "-AC      65476 - PT Manual Therapy Minutes  10  -AC         Exercise 1    Exercise Name 1  NuStep  -AC      Time 1  5 min  -AC      Additional Comments  L4  -AC         Exercise 2    Exercise Name 2  HS curls w/ ball  -AC      Reps 2  20  -AC         Exercise 3    Exercise Name 3  Heel slides supine   -AC      Reps 3  10 AROM, 4 AAROM  -AC         Exercise 4    Exercise Name 4  LAQ  -AC      Sets 4  2  -AC      Reps 4  15  -AC      Additional Comments  3#  -AC         Exercise 5    Exercise Name 5  STS  -AC      Sets 5  2  -AC      Reps 5  10  -AC      Additional Comments  Red band  -AC         Exercise 6    Exercise Name 6  Step ups forward/ sideways  -AC      Reps 6  10/10  -AC      Additional Comments  8\" step  -AC         Exercise 7    Exercise Name 7  Step-downs  -AC      Reps 7  10  -AC      Additional Comments  6\" step  -AC         Exercise 8    Exercise Name 8  HRs from 1/2 foam  -AC      Reps 8  10  -AC         Exercise 9    Exercise Name 9  Calf stretch on 1/2 foam  -AC      Time 9  1 min  -AC         Exercise 10    Exercise Name 10  Partial lunge on Bosu  -AC      Reps 10  15  -AC         Exercise 11    Exercise Name 11  Standing 3-way SLR  -AC      Reps 11  10/10/10 ea leg  -AC      Additional Comments  Red band  -AC        User Key  (r) = Recorded By, (t) = Taken By, (c) = Cosigned By    Initials Name Provider Type    AC Shwetha Oneil, PT Physical Therapist                      Manual Rx (last 36 hours)      Manual Treatments     Row Name 09/01/20 1117             Total Minutes    40156 - PT Manual Therapy Minutes  10  -AC         Manual Rx 1    Manual Rx 1 Location  R knee  -AC      Manual Rx 1 Type  Tibiofemoral distraction, AP/PA glides, patellar mobs, MWM into flexion w/ oscillations at end range  -AC      Manual Rx 1 Grade  II-III  -AC      Manual Rx 1 Duration  10  -AC        User Key  (r) = Recorded By, (t) = Taken By, (c) = Cosigned By    Initials Name Provider Type    Shwetha Conway" EMILY, PT Physical Therapist                             Time Calculation:   Start Time: 1117  Therapy Charges for Today     Code Description Service Date Service Provider Modifiers Qty    29844893563 HC PT THER PROC EA 15 MIN 9/1/2020 Shwetha Oneil, PT GP 2    63332091847 HC PT MANUAL THERAPY EA 15 MIN 9/1/2020 Shwetha Oneil, PT GP 1                    Shwetha Oneil, PT  9/1/2020

## 2020-09-03 ENCOUNTER — HOSPITAL ENCOUNTER (OUTPATIENT)
Dept: PHYSICAL THERAPY | Facility: HOSPITAL | Age: 70
Setting detail: THERAPIES SERIES
Discharge: HOME | End: 2020-09-03

## 2020-09-03 ENCOUNTER — OFFICE VISIT (OUTPATIENT)
Dept: ORTHOPEDIC SURGERY | Facility: CLINIC | Age: 70
End: 2020-09-03

## 2020-09-03 DIAGNOSIS — T84.89XD POSTOPERATIVE STIFFNESS OF TOTAL KNEE REPLACEMENT, SUBSEQUENT ENCOUNTER: ICD-10-CM

## 2020-09-03 DIAGNOSIS — Z96.651 STATUS POST RIGHT KNEE REPLACEMENT: Primary | ICD-10-CM

## 2020-09-03 DIAGNOSIS — Z96.651 STATUS POST TOTAL RIGHT KNEE REPLACEMENT: Primary | ICD-10-CM

## 2020-09-03 DIAGNOSIS — M25.669 POSTOPERATIVE STIFFNESS OF TOTAL KNEE REPLACEMENT, SUBSEQUENT ENCOUNTER: ICD-10-CM

## 2020-09-03 DIAGNOSIS — Z96.659 POSTOPERATIVE STIFFNESS OF TOTAL KNEE REPLACEMENT, SUBSEQUENT ENCOUNTER: ICD-10-CM

## 2020-09-03 DIAGNOSIS — M25.561 RIGHT KNEE PAIN, UNSPECIFIED CHRONICITY: ICD-10-CM

## 2020-09-03 PROCEDURE — 97110 THERAPEUTIC EXERCISES: CPT

## 2020-09-03 PROCEDURE — 99024 POSTOP FOLLOW-UP VISIT: CPT | Performed by: ORTHOPAEDIC SURGERY

## 2020-09-03 NOTE — PROGRESS NOTES
[facility] Note        Subjective     Post-op (3 weeks follow up; 6 weeks s/p Total Knee Arthroplasty Right 7/22/20))       ANTELMO Salcedo is a 70 y.o. male.  Patient is here today now 6 weeks out from right total knee arthroplasty on 7/22/2020.  He is seeing the [facility] for physical therapy twice a week.  He says his range of motion has been measured up to about 100 degrees of flexion.  He says things are getting better.  He denies chest pain or shortness of breath.          Objective      Physical Exam  There were no vitals taken for this visit.    There is no height or weight on file to calculate BMI.        Ortho Exam  Patient has a small area on the anterior part of the incision that looks like a Vicryl suture is trying to spit out  Small amount of erythema noted without drainage  Range of motion 5-90  Calf is soft and nontender    Imaging Reviewed:  Imaging Results (Last 24 Hours)     ** No results found for the last 24 hours. **            Assessment    Assessment:  1. Status post total right knee replacement    2. Postoperative stiffness of total knee replacement, subsequent encounter        Plan:  1. Status post right total knee arthroplasty with mild postoperative stiffness--we have talked to the patient and treatment options and alternatives and at this point, I recommended that he be quite aggressive with trying to restore his extension and flexion.  I have sent a message to 1 of his therapists.  Plan will be to have him seen about 3 times a week for the next 2 weeks and I will see him back in the office to reassess motion.  We will also remove his prominent Vicryl suture today.  If he is still quite stiff in a couple weeks, manipulation will be planned.      Tereso Restrepo MD  09/03/20  09:47      Dragon disclaimer:  Much of this encounter note is an electronic transcription/translation of spoken language to printed text. The electronic translation of  spoken language may permit erroneous, or at times, nonsensical words or phrases to be inadvertently transcribed; Although I have reviewed the note for such errors, some may still exist.

## 2020-09-03 NOTE — THERAPY PROGRESS REPORT/RE-CERT
"    Outpatient Physical Therapy Ortho Progress Note  [facility]     Patient Name: Jimmie Salcedo  : 1950  MRN: [id number]  Today's Date: 9/3/2020      Visit Date: 2020    Visit Dx:    ICD-10-CM ICD-9-CM   1. Status post right knee replacement Z96.651 V43.65   2. Right knee pain, unspecified chronicity M25.561 719.46       Patient Active Problem List   Diagnosis   • Acid reflux   • Arthritis   • Hypertension   • Hyperlipidemia   • Severe obstructive sleep apnea   • Psoriasis   • Diastolic dysfunction   • Peptic ulceration   • Bilateral edema of lower extremity   • Abnormal liver function test   • Hyperbilirubinemia   • Psychophysiological insomnia   • Gilbert's disease   • Thrombocytopenia, unspecified (CMS/HCC)   • Situational depression   • Tubular adenoma   • Seasonal allergic rhinitis due to pollen   • Mild cognitive impairment   • Memory loss   • Primary osteoarthritis of right knee   • Status post total right knee replacement   • Leukocytosis, likely reactive   • Postoperative pain        Past Medical History:   Diagnosis Date   • Abnormal liver function test    • Arthritis    • Bilateral edema of lower extremity    • Cataract     bilat- still present - mild    • Cellulitis of leg, right     resolved   • Chalazion    • Cracking skin     bilat feet mostly    • Disease     \"brakes/breaks\" disease as child-  effected blood and urine    • GERD (gastroesophageal reflux disease)    • History of kidney stones     x2 had to have broken up    • Hoarseness     from vocal cord bending- seeing ent - possible surgery soon    • Hypertension    • Kidney infection     h/o    • Neck muscle spasm    • Onychomycosis of toenail    • Peptic ulceration    • Renal calculi    • Situational depression 2018   • Sleep apnea with use of continuous positive airway pressure (CPAP)     compliant with machine    • 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    • Vocal cord strain     vocal cord bent- seeing ent but " causes pt to be hoarse    • Wears glasses         Past Surgical History:   Procedure Laterality Date   • COLONOSCOPY     • CYSTOSCOPY W/ LASER LITHOTRIPSY      x2   • ENDOSCOPY      with dilation    • FINGER SURGERY      left 5th finger   • KNEE SURGERY Right 1986    arthroscopy   • OTHER SURGICAL HISTORY      Lithotripsy   • TONSILLECTOMY     • TOTAL KNEE ARTHROPLASTY Right 7/22/2020    Procedure: TOTAL KNEE ARTHROPLASTY RIGHT;  Surgeon: Tereso Restrepo MD;  Location: [facility];  Service: Orthopedics;  Laterality: Right;       PT Ortho     Row Name 09/03/20 1100       Right Lower Ext    Rt Knee Extension/Flexion PROM  -13 extension/ 102 flex after bike/ 108 flex after pnf with some discomfort  -MM      User Key  (r) = Recorded By, (t) = Taken By, (c) = Cosigned By    Initials Name Provider Type    MM Dennis Kenny, PT Physical Therapist        PT Assessment/Plan     Row Name 09/03/20 1115          PT Assessment    Functional Limitations  Impaired gait;Limitation in home management;Limitations in community activities;Performance in leisure activities;Performance in self-care ADL  -MM     Impairments  Pain;Muscle strength;Range of motion;Gait  -MM     Assessment Comments  Overall client is making progress with knee mobility, but he started with very limited movement and needs to make a lot of progress in the next couple of weeks. Knee flexion has improved and was 102 after the recumbent bike today. Passive knee flexion after PNF stretching was up to 108 degrees, but with some discomfort. Knee extension is also with stiffness at -13 degrees on stretch. Focus of treatment will be maximizing mobility.   -MM     Please refer to paper survey for additional self-reported information  Yes  -MM     Rehab Potential  Good  -MM     Patient/caregiver participated in establishment of treatment plan and goals  Yes  -MM     Patient would benefit from skilled therapy intervention  Yes  -MM        PT Plan    PT Frequency   3x/week  -MM     Predicted Duration of Therapy Intervention (Therapy Eval)  3 weeks and change to 2x/week when ready  -MM     Planned CPT's?  PT THER PROC EA 15 MIN: 77631;PT THER ACT EA 15 MIN: 99033;PT MANUAL THERAPY EA 15 MIN: 85570;PT GAIT TRAINING EA 15 MIN: 83847;PT HOT OR COLD PACK TREAT MCARE  -MM     PT Plan Comments  Per MD request increase frequency to 3x/week with a focus on mobility into flexion and extension.  -MM       User Key  (r) = Recorded By, (t) = Taken By, (c) = Cosigned By    Initials Name Provider Type    MM Dennis Kenny, PT Physical Therapist            OP Exercises     Row Name 09/03/20 1100             Subjective Comments    Subjective Comments  No pain, but client continues to notice quite a bit of stiffness. Dr. Restrepo recommended that we increase to 3x/week to try and prevent the need for a manipulation.  -MM         Subjective Pain    Able to rate subjective pain?  yes  -MM      Pre-Treatment Pain Level  0  -MM      Post-Treatment Pain Level  0  -MM         Total Minutes    53086 - PT Therapeutic Exercise Minutes  42  -MM         Exercise 1    Exercise Name 1  nu-step cross   -MM      Time 1  6 min  -MM      Additional Comments  L5  -MM         Exercise 2    Exercise Name 2  recumbent bike  -MM      Time 2  6 min  -MM      Additional Comments  L1  -MM         Exercise 3    Exercise Name 3  LAQ  -MM      Reps 3  30  -MM         Exercise 4    Exercise Name 4  bounces on SB  -MM      Reps 4  30/30  -MM         Exercise 5    Exercise Name 5  leg curls with ball  -MM      Reps 5  20  -MM         Exercise 6    Exercise Name 6  standing leg curl  -MM      Reps 6  15  -MM         Exercise 7    Exercise Name 7  heel slides  -MM      Reps 7  15  -MM         Exercise 8    Exercise Name 8  PNF contract/relax quads and stretch  -MM      Reps 8  5x  -MM         Exercise 9    Exercise Name 9  heel props with 4# cuff weight above knee  -MM      Time 9  5 min  -MM        User Key  (r) =  Recorded By, (t) = Taken By, (c) = Cosigned By    Initials Name Provider Type    Dennis Nagy, PT Physical Therapist        PT OP Goals     Row Name 09/03/20 1115          PT Short Term Goals    STG Date to Achieve  09/04/20  -MM     STG 1  Decrease R knee pain by > or = 50%.  -MM     STG 1 Progress  Ongoing  -MM     STG 2  Pt will be independent with HEP to improve ROM/strength.  -MM     STG 2 Progress  Ongoing  -MM     STG 3  Improve R knee flexion ROM to at least 100 deg.  -MM     STG 3 Progress  Met  -MM        Long Term Goals    LTG Date to Achieve  09/25/20  -MM     LTG 1  Decrease R knee pain by > or = 75%.  -MM     LTG 1 Progress  Ongoing  -MM     LTG 2  Improve LEFS to > or = 55/80.  -MM     LTG 2 Progress  Ongoing  -MM     LTG 3  Enable ability to ambulate stairs and put on R shoe with min-no difficulty or pain.  -MM     LTG 3 Progress  Ongoing  -MM     LTG 4  Improve R knee ROM to WNL.  -MM     LTG 4 Progress  Ongoing  -MM     LTG 5  Improve RLE strength to grossly 4+/5.  -MM     LTG 5 Progress  Ongoing  -MM     LTG 6  Enable normal gait pattern with least restrictive device.  -MM     LTG 6 Progress  New  -MM     LTG 7  Improve 30 Sec Chair to > or = 10 reps.  -MM     LTG 7 Progress  New  -MM        Time Calculation    PT Goal Re-Cert Due Date  11/12/20  -MM       User Key  (r) = Recorded By, (t) = Taken By, (c) = Cosigned By    Initials Name Provider Type    Dennis Nagy, PT Physical Therapist        Time Calculation:   Start Time: 1115  Therapy Charges for Today     Code Description Service Date Service Provider Modifiers Qty    [id number]  PT THER PROC EA 15 MIN 9/3/2020 Dennis Kenny, PT GP 3        Dennis Kenny, PT  9/3/2020

## 2020-09-04 ENCOUNTER — HOSPITAL ENCOUNTER (OUTPATIENT)
Dept: PHYSICAL THERAPY | Facility: HOSPITAL | Age: 70
Setting detail: THERAPIES SERIES
Discharge: HOME | End: 2020-09-04

## 2020-09-04 DIAGNOSIS — M25.561 RIGHT KNEE PAIN, UNSPECIFIED CHRONICITY: ICD-10-CM

## 2020-09-04 DIAGNOSIS — Z96.651 STATUS POST RIGHT KNEE REPLACEMENT: Primary | ICD-10-CM

## 2020-09-04 PROCEDURE — 97110 THERAPEUTIC EXERCISES: CPT

## 2020-09-04 NOTE — THERAPY TREATMENT NOTE
"    Outpatient Physical Therapy Ortho Treatment Note  [facility]     Patient Name: Jimmie Salcedo  : 1950  MRN: [id number]  Today's Date: 2020      Visit Date: 2020    Visit Dx:    ICD-10-CM ICD-9-CM   1. Status post right knee replacement Z96.651 V43.65   2. Right knee pain, unspecified chronicity M25.561 719.46       Patient Active Problem List   Diagnosis   • Acid reflux   • Arthritis   • Hypertension   • Hyperlipidemia   • Severe obstructive sleep apnea   • Psoriasis   • Diastolic dysfunction   • Peptic ulceration   • Bilateral edema of lower extremity   • Abnormal liver function test   • Hyperbilirubinemia   • Psychophysiological insomnia   • Gilbert's disease   • Thrombocytopenia, unspecified (CMS/HCC)   • Situational depression   • Tubular adenoma   • Seasonal allergic rhinitis due to pollen   • Mild cognitive impairment   • Memory loss   • Primary osteoarthritis of right knee   • Status post total right knee replacement   • Leukocytosis, likely reactive   • Postoperative pain        Past Medical History:   Diagnosis Date   • Abnormal liver function test    • Arthritis    • Bilateral edema of lower extremity    • Cataract     bilat- still present - mild    • Cellulitis of leg, right     resolved   • Chalazion    • Cracking skin     bilat feet mostly    • Disease     \"brakes/breaks\" disease as child-  effected blood and urine    • GERD (gastroesophageal reflux disease)    • History of kidney stones     x2 had to have broken up    • Hoarseness     from vocal cord bending- seeing ent - possible surgery soon    • Hypertension    • Kidney infection     h/o    • Neck muscle spasm    • Onychomycosis of toenail    • Peptic ulceration    • Renal calculi    • Situational depression 2018   • Sleep apnea with use of continuous positive airway pressure (CPAP)     compliant with machine    • 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    • Vocal cord strain     vocal cord bent- seeing ent but " causes pt to be hoarse    • Wears glasses         Past Surgical History:   Procedure Laterality Date   • COLONOSCOPY     • CYSTOSCOPY W/ LASER LITHOTRIPSY      x2   • ENDOSCOPY      with dilation    • FINGER SURGERY      left 5th finger   • KNEE SURGERY Right 1986    arthroscopy   • OTHER SURGICAL HISTORY      Lithotripsy   • TONSILLECTOMY     • TOTAL KNEE ARTHROPLASTY Right 7/22/2020    Procedure: TOTAL KNEE ARTHROPLASTY RIGHT;  Surgeon: Tereso Restrepo MD;  Location: [facility];  Service: Orthopedics;  Laterality: Right;                       PT Assessment/Plan     Row Name 09/04/20 1120          PT Assessment    Assessment Comments  Pt demonstrates improved extension AROM to 6 deg in flexion today. AROM flexion to 100, PROM to 107 deg flexion. Continued MT and flexion-based exercises, and added standing TKEs w/ band to HEP to assist with extension ROM. Minimal pain with the exception of during flexion.  -AC        PT Plan    PT Plan Comments  Cont per POC.  -AC       User Key  (r) = Recorded By, (t) = Taken By, (c) = Cosigned By    Initials Name Provider Type    Shwetha Conway, PT Physical Therapist          Modalities     Row Name 09/04/20 1120             Ice    Ice Applied  Yes  -AC      Location  R knee  -AC      Rx Minutes  10 mins  -AC      Ice S/P Rx  Yes  -AC        User Key  (r) = Recorded By, (t) = Taken By, (c) = Cosigned By    Initials Name Provider Type    Shwetha Conway, PT Physical Therapist        OP Exercises     Row Name 09/04/20 1120             Subjective Comments    Subjective Comments  No pain but had some soreness after yesterday's session.  -AC         Subjective Pain    Able to rate subjective pain?  yes  -AC      Pre-Treatment Pain Level  0  -AC      Post-Treatment Pain Level  0  -AC      Subjective Pain Comment  R knee AROM: 6-100 deg; PROM flexion: 107 deg  -AC         Total Minutes    24139 - PT Therapeutic Exercise Minutes  40  -AC      63386 - PT Manual  Therapy Minutes  3  -AC         Exercise 1    Exercise Name 1  nu-step cross   -AC      Time 1  6 min  -AC      Additional Comments  L5  -AC         Exercise 2    Exercise Name 2  recumbent bike  -AC      Time 2  6 min  -AC      Additional Comments  L1  -AC         Exercise 4    Exercise Name 4  bounces on SB  -AC      Reps 4  30/30  -AC         Exercise 5    Exercise Name 5  leg curls with ball  -AC      Reps 5  20  -AC         Exercise 6    Exercise Name 6  standing leg curl  -AC      Reps 6  15  -AC         Exercise 7    Exercise Name 7  heel slides  -AC      Sets 7  3  -AC      Reps 7  15  -AC      Additional Comments  2 PROM, 1 AROM  -AC         Exercise 9    Exercise Name 9  heel props with 4# cuff weight above knee  -AC      Time 9  5 min  -AC         Exercise 10    Exercise Name 10  TKEs  -AC      Reps 10  30  -AC      Additional Comments  Red band  -AC        User Key  (r) = Recorded By, (t) = Taken By, (c) = Cosigned By    Initials Name Provider Type    AC Shwetha Oneil, PT Physical Therapist                      Manual Rx (last 36 hours)      Manual Treatments     Row Name 09/04/20 1120             Total Minutes    33337 - PT Manual Therapy Minutes  3  -AC         Manual Rx 1    Manual Rx 1 Location  R knee  -AC      Manual Rx 1 Type  STM using strumming along peripatellar region  -AC      Manual Rx 1 Duration  3 mins  -AC        User Key  (r) = Recorded By, (t) = Taken By, (c) = Cosigned By    Initials Name Provider Type    AC Shwetha Oneil, PT Physical Therapist                             Time Calculation:   Start Time: 1120  Therapy Charges for Today     Code Description Service Date Service Provider Modifiers Qty    [id number]  PT THER PROC EA 15 MIN 9/4/2020 Shwetha Oneil, PT GP 3                    Shwetha Oneil PT  9/4/2020

## 2020-09-08 ENCOUNTER — HOSPITAL ENCOUNTER (OUTPATIENT)
Dept: PHYSICAL THERAPY | Facility: HOSPITAL | Age: 70
Setting detail: THERAPIES SERIES
Discharge: HOME | End: 2020-09-08

## 2020-09-08 DIAGNOSIS — M25.561 RIGHT KNEE PAIN, UNSPECIFIED CHRONICITY: ICD-10-CM

## 2020-09-08 DIAGNOSIS — Z96.651 STATUS POST RIGHT KNEE REPLACEMENT: Primary | ICD-10-CM

## 2020-09-08 PROCEDURE — 97110 THERAPEUTIC EXERCISES: CPT

## 2020-09-08 NOTE — THERAPY TREATMENT NOTE
"    Outpatient Physical Therapy Ortho Treatment Note  [facility]     Patient Name: Jimmie Salcedo  : 1950  MRN: [id number]  Today's Date: 2020      Visit Date: 2020    Visit Dx:    ICD-10-CM ICD-9-CM   1. Status post right knee replacement Z96.651 V43.65   2. Right knee pain, unspecified chronicity M25.561 719.46       Patient Active Problem List   Diagnosis   • Acid reflux   • Arthritis   • Hypertension   • Hyperlipidemia   • Severe obstructive sleep apnea   • Psoriasis   • Diastolic dysfunction   • Peptic ulceration   • Bilateral edema of lower extremity   • Abnormal liver function test   • Hyperbilirubinemia   • Psychophysiological insomnia   • Gilbert's disease   • Thrombocytopenia, unspecified (CMS/HCC)   • Situational depression   • Tubular adenoma   • Seasonal allergic rhinitis due to pollen   • Mild cognitive impairment   • Memory loss   • Primary osteoarthritis of right knee   • Status post total right knee replacement   • Leukocytosis, likely reactive   • Postoperative pain        Past Medical History:   Diagnosis Date   • Abnormal liver function test    • Arthritis    • Bilateral edema of lower extremity    • Cataract     bilat- still present - mild    • Cellulitis of leg, right     resolved   • Chalazion    • Cracking skin     bilat feet mostly    • Disease     \"brakes/breaks\" disease as child-  effected blood and urine    • GERD (gastroesophageal reflux disease)    • History of kidney stones     x2 had to have broken up    • Hoarseness     from vocal cord bending- seeing ent - possible surgery soon    • Hypertension    • Kidney infection     h/o    • Neck muscle spasm    • Onychomycosis of toenail    • Peptic ulceration    • Renal calculi    • Situational depression 2018   • Sleep apnea with use of continuous positive airway pressure (CPAP)     compliant with machine    • 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    • Vocal cord strain     vocal cord bent- seeing ent but " causes pt to be hoarse    • Wears glasses         Past Surgical History:   Procedure Laterality Date   • COLONOSCOPY     • CYSTOSCOPY W/ LASER LITHOTRIPSY      x2   • ENDOSCOPY      with dilation    • FINGER SURGERY      left 5th finger   • KNEE SURGERY Right 1986    arthroscopy   • OTHER SURGICAL HISTORY      Lithotripsy   • TONSILLECTOMY     • TOTAL KNEE ARTHROPLASTY Right 7/22/2020    Procedure: TOTAL KNEE ARTHROPLASTY RIGHT;  Surgeon: Tereso Restrepo MD;  Location: [facility];  Service: Orthopedics;  Laterality: Right;       PT Assessment/Plan     Row Name 09/08/20 1125          PT Assessment    Assessment Comments  client is making good progress with ROM. He had 111 degrees flexion today when stretching passively, though it was with some discomfort.   -MM        PT Plan    PT Plan Comments  Continue per plan of treatment with exercises and manual therapy.  -MM       User Key  (r) = Recorded By, (t) = Taken By, (c) = Cosigned By    Initials Name Provider Type    MM Dennis Kenny, PT Physical Therapist            OP Exercises     Row Name 09/08/20 1125             Subjective Comments    Subjective Comments  No new complaints. He had a little swelling over the weekend if he didn't wear compression.  -MM         Subjective Pain    Able to rate subjective pain?  yes  -MM      Pre-Treatment Pain Level  0  -MM      Post-Treatment Pain Level  0  -MM      Subjective Pain Comment  R knee AROM flexion: 107; PROM 111.   -MM         Total Minutes    21821 - PT Therapeutic Exercise Minutes  30  -MM         Exercise 1    Exercise Name 1  recumbent bike  -MM      Time 1  6 min  -MM      Additional Comments  L4; seat at 11  -MM         Exercise 2    Exercise Name 2  sit to stand  -MM      Reps 2  20  -MM         Exercise 3    Exercise Name 3  TKE  -MM      Reps 3  30  -MM      Additional Comments  green  -MM         Exercise 4    Exercise Name 4  bounces w ball  -MM      Reps 4  30  -MM         Exercise 5     Exercise Name 5  leg curls w ball  -MM      Reps 5  30  -MM         Exercise 6    Exercise Name 6  heel slide w pnf assist  -MM      Time 6  10 min  -MM         Exercise 7    Exercise Name 7  heel props  -MM      Time 7  8 min  -MM        User Key  (r) = Recorded By, (t) = Taken By, (c) = Cosigned By    Initials Name Provider Type    Dennis Nagy, PT Physical Therapist      Time Calculation:   Start Time: 1125  Therapy Charges for Today     Code Description Service Date Service Provider Modifiers Qty    [id number]  PT THER PROC EA 15 MIN 9/8/2020 Dennis Kenny, PT GP 2      Dennis Kenny, PT  9/8/2020

## 2020-09-10 ENCOUNTER — HOSPITAL ENCOUNTER (OUTPATIENT)
Dept: PHYSICAL THERAPY | Facility: HOSPITAL | Age: 70
Setting detail: THERAPIES SERIES
Discharge: HOME | End: 2020-09-10

## 2020-09-10 DIAGNOSIS — Z96.651 STATUS POST RIGHT KNEE REPLACEMENT: Primary | ICD-10-CM

## 2020-09-10 DIAGNOSIS — M25.561 RIGHT KNEE PAIN, UNSPECIFIED CHRONICITY: ICD-10-CM

## 2020-09-10 PROCEDURE — 97110 THERAPEUTIC EXERCISES: CPT

## 2020-09-10 NOTE — THERAPY TREATMENT NOTE
"    Outpatient Physical Therapy Ortho Treatment Note  [facility]     Patient Name: Jimmie Salcedo  : 1950  MRN: [id number]  Today's Date: 9/10/2020      Visit Date: 09/10/2020    Visit Dx:    ICD-10-CM ICD-9-CM   1. Status post right knee replacement Z96.651 V43.65   2. Right knee pain, unspecified chronicity M25.561 719.46       Patient Active Problem List   Diagnosis   • Acid reflux   • Arthritis   • Hypertension   • Hyperlipidemia   • Severe obstructive sleep apnea   • Psoriasis   • Diastolic dysfunction   • Peptic ulceration   • Bilateral edema of lower extremity   • Abnormal liver function test   • Hyperbilirubinemia   • Psychophysiological insomnia   • Gilbert's disease   • Thrombocytopenia, unspecified (CMS/HCC)   • Situational depression   • Tubular adenoma   • Seasonal allergic rhinitis due to pollen   • Mild cognitive impairment   • Memory loss   • Primary osteoarthritis of right knee   • Status post total right knee replacement   • Leukocytosis, likely reactive   • Postoperative pain        Past Medical History:   Diagnosis Date   • Abnormal liver function test    • Arthritis    • Bilateral edema of lower extremity    • Cataract     bilat- still present - mild    • Cellulitis of leg, right     resolved   • Chalazion    • Cracking skin     bilat feet mostly    • Disease     \"brakes/breaks\" disease as child-  effected blood and urine    • GERD (gastroesophageal reflux disease)    • History of kidney stones     x2 had to have broken up    • Hoarseness     from vocal cord bending- seeing ent - possible surgery soon    • Hypertension    • Kidney infection     h/o    • Neck muscle spasm    • Onychomycosis of toenail    • Peptic ulceration    • Renal calculi    • Situational depression 2018   • Sleep apnea with use of continuous positive airway pressure (CPAP)     compliant with machine    • 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    • Vocal cord strain     vocal cord bent- seeing ent but " causes pt to be hoarse    • Wears glasses         Past Surgical History:   Procedure Laterality Date   • COLONOSCOPY     • CYSTOSCOPY W/ LASER LITHOTRIPSY      x2   • ENDOSCOPY      with dilation    • FINGER SURGERY      left 5th finger   • KNEE SURGERY Right 1986    arthroscopy   • OTHER SURGICAL HISTORY      Lithotripsy   • TONSILLECTOMY     • TOTAL KNEE ARTHROPLASTY Right 7/22/2020    Procedure: TOTAL KNEE ARTHROPLASTY RIGHT;  Surgeon: Tereso Restrepo MD;  Location: [facility];  Service: Orthopedics;  Laterality: Right;                       PT Assessment/Plan     Row Name 09/10/20 1117          PT Assessment    Assessment Comments  Pt continuing to demonstrate improved ROM with 5-108 AROM, and passive flexion to 110 again during today's session, although with pain at end-range. Implemented some further strengthening to reinforce ROM gains. Primary complaints are stiffness after sleeping or driving >20 mins.  -AC        PT Plan    PT Plan Comments  Cont per POC progressing ROM/strength as tolerated.   -AC       User Key  (r) = Recorded By, (t) = Taken By, (c) = Cosigned By    Initials Name Provider Type    Shwetha Conway, PT Physical Therapist          Modalities     Row Name 09/10/20 1117             Ice    Ice Applied  Yes  -AC      Location  R knee  -AC      Rx Minutes  10 mins  -AC      Ice S/P Rx  Yes  -AC        User Key  (r) = Recorded By, (t) = Taken By, (c) = Cosigned By    Initials Name Provider Type    Shwetha Conway, PT Physical Therapist        OP Exercises     Row Name 09/10/20 1117             Subjective Comments    Subjective Comments  No new complaints.  -AC         Subjective Pain    Able to rate subjective pain?  yes  -AC      Pre-Treatment Pain Level  0  -AC      Subjective Pain Comment  R knee AROM: 5-108, PROM flexion: 110  -AC         Total Minutes    29873 - PT Therapeutic Exercise Minutes  28  -AC         Exercise 1    Exercise Name 1  recumbent bike  -AC      Time  1  6 min  -AC      Additional Comments  L4; seat at 11, then 10  -AC         Exercise 2    Exercise Name 2  Ball bounces  -AC      Reps 2  30  -AC         Exercise 3    Exercise Name 3  Heel slides w/ strap  -AC      Reps 3  20  -AC      Additional Comments  AROM 20 times  -AC         Exercise 4    Exercise Name 4  leg curls w/ ball  -AC      Reps 4  30  -AC      Additional Comments  single leg  -AC         Exercise 5    Exercise Name 5  heel slide w/ PNF  -AC      Sets 5  2  -AC      Reps 5  8  -AC         Exercise 6    Exercise Name 6  heel prop  -AC      Time 6  10 mins  -AC      Additional Comments  4#  -AC         Exercise 7    Exercise Name 7  standing TKE  -AC      Reps 7  20  -AC      Additional Comments  GR  -AC         Exercise 8    Exercise Name 8  Standing HS curl  -AC      Reps 8  20  -AC        User Key  (r) = Recorded By, (t) = Taken By, (c) = Cosigned By    Initials Name Provider Type    AC Shwetha Oneil, PT Physical Therapist                                          Time Calculation:   Start Time: 1117  Therapy Charges for Today     Code Description Service Date Service Provider Modifiers Qty    [id number]  PT THER PROC EA 15 MIN 9/10/2020 Shwetha Oneil, PT GP 2                    Shwetha Oneil, PT  9/10/2020

## 2020-09-11 ENCOUNTER — HOSPITAL ENCOUNTER (OUTPATIENT)
Dept: PHYSICAL THERAPY | Facility: HOSPITAL | Age: 70
Setting detail: THERAPIES SERIES
Discharge: HOME | End: 2020-09-11

## 2020-09-11 DIAGNOSIS — M25.561 RIGHT KNEE PAIN, UNSPECIFIED CHRONICITY: ICD-10-CM

## 2020-09-11 DIAGNOSIS — Z96.651 STATUS POST RIGHT KNEE REPLACEMENT: Primary | ICD-10-CM

## 2020-09-11 PROCEDURE — 97110 THERAPEUTIC EXERCISES: CPT

## 2020-09-11 NOTE — THERAPY TREATMENT NOTE
"    Outpatient Physical Therapy Ortho Treatment Note  [facility]     Patient Name: Jimmie Salcedo  : 1950  MRN: [id number]  Today's Date: 2020      Visit Date: 2020    Visit Dx:    ICD-10-CM ICD-9-CM   1. Status post right knee replacement Z96.651 V43.65   2. Right knee pain, unspecified chronicity M25.561 719.46       Patient Active Problem List   Diagnosis   • Acid reflux   • Arthritis   • Hypertension   • Hyperlipidemia   • Severe obstructive sleep apnea   • Psoriasis   • Diastolic dysfunction   • Peptic ulceration   • Bilateral edema of lower extremity   • Abnormal liver function test   • Hyperbilirubinemia   • Psychophysiological insomnia   • Gilbert's disease   • Thrombocytopenia, unspecified (CMS/HCC)   • Situational depression   • Tubular adenoma   • Seasonal allergic rhinitis due to pollen   • Mild cognitive impairment   • Memory loss   • Primary osteoarthritis of right knee   • Status post total right knee replacement   • Leukocytosis, likely reactive   • Postoperative pain        Past Medical History:   Diagnosis Date   • Abnormal liver function test    • Arthritis    • Bilateral edema of lower extremity    • Cataract     bilat- still present - mild    • Cellulitis of leg, right     resolved   • Chalazion    • Cracking skin     bilat feet mostly    • Disease     \"brakes/breaks\" disease as child-  effected blood and urine    • GERD (gastroesophageal reflux disease)    • History of kidney stones     x2 had to have broken up    • Hoarseness     from vocal cord bending- seeing ent - possible surgery soon    • Hypertension    • Kidney infection     h/o    • Neck muscle spasm    • Onychomycosis of toenail    • Peptic ulceration    • Renal calculi    • Situational depression 2018   • Sleep apnea with use of continuous positive airway pressure (CPAP)     compliant with machine    • 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    • Vocal cord strain     vocal cord bent- seeing ent but " causes pt to be hoarse    • Wears glasses         Past Surgical History:   Procedure Laterality Date   • COLONOSCOPY     • CYSTOSCOPY W/ LASER LITHOTRIPSY      x2   • ENDOSCOPY      with dilation    • FINGER SURGERY      left 5th finger   • KNEE SURGERY Right 1986    arthroscopy   • OTHER SURGICAL HISTORY      Lithotripsy   • TONSILLECTOMY     • TOTAL KNEE ARTHROPLASTY Right 7/22/2020    Procedure: TOTAL KNEE ARTHROPLASTY RIGHT;  Surgeon: Tereso Restrepo MD;  Location: [facility];  Service: Orthopedics;  Laterality: Right;       PT Ortho     Row Name 09/11/20 1015       Subjective Pain    Subjective Pain Comment  R knee AROM: -8 to 110. PROM -7 to 111.   -MM      User Key  (r) = Recorded By, (t) = Taken By, (c) = Cosigned By    Initials Name Provider Type    Dennis Nagy, PT Physical Therapist          PT Assessment/Plan     Row Name 09/11/20 1015          PT Assessment    Assessment Comments  Overall mobility is gradually improving. Tolerance to exercise is also improving.  -MM        PT Plan    PT Plan Comments  Continue per plan of treatment working on mobility and strength.  -MM       User Key  (r) = Recorded By, (t) = Taken By, (c) = Cosigned By    Initials Name Provider Type    Dennis Nagy, PT Physical Therapist        OP Exercises     Row Name 09/11/20 1015             Subjective Comments    Subjective Comments  Client reports some stiffness continues each morning.   -MM         Subjective Pain    Able to rate subjective pain?  yes  -MM      Pre-Treatment Pain Level  0  -MM      Post-Treatment Pain Level  0  -MM      Subjective Pain Comment  R knee AROM: -8 to 110. PROM -7 to 111.   -MM         Total Minutes    06299 - PT Therapeutic Exercise Minutes  45  -MM         Exercise 1    Exercise Name 1  recumbent bike  -MM      Time 1  6 min  -MM      Additional Comments  seat at 11  -MM         Exercise 2    Exercise Name 2  Ball bounces  -MM      Sets 2  2  -MM      Reps 2  30  -MM          Exercise 3    Exercise Name 3  Heel slides w/ strap  -MM      Reps 3  20  -MM         Exercise 4    Exercise Name 4  leg curls w/ ball  -MM      Reps 4  15  -MM         Exercise 5    Exercise Name 5  heel slide w/ PNF  -MM      Sets 5  2  -MM      Reps 5  8  -MM         Exercise 6    Exercise Name 6  heel prop  -MM      Time 6  10 mins  -MM         Exercise 7    Exercise Name 7  standing TKE  -MM      Reps 7  20  -MM      Additional Comments  blue  -MM         Exercise 8    Exercise Name 8  Standing HS curl  -MM      Reps 8  20  -MM         Exercise 9    Exercise Name 9  sit to stand  -MM      Reps 9  20  -MM         Exercise 10    Exercise Name 10  belt stretch for extension  -MM      Time 10  3 min  -MM        User Key  (r) = Recorded By, (t) = Taken By, (c) = Cosigned By    Initials Name Provider Type    Dennis Nagy, PT Physical Therapist        Time Calculation:   Start Time: 1015  Therapy Charges for Today     Code Description Service Date Service Provider Modifiers Qty    48124851684 HC PT THER PROC EA 15 MIN 9/11/2020 Dennis Kenny, PT GP 3        Dennis Kenny, PT  9/11/2020

## 2020-09-14 ENCOUNTER — HOSPITAL ENCOUNTER (OUTPATIENT)
Dept: PHYSICAL THERAPY | Facility: HOSPITAL | Age: 70
Setting detail: THERAPIES SERIES
Discharge: HOME | End: 2020-09-14

## 2020-09-14 DIAGNOSIS — Z96.651 STATUS POST RIGHT KNEE REPLACEMENT: Primary | ICD-10-CM

## 2020-09-14 DIAGNOSIS — M25.561 RIGHT KNEE PAIN, UNSPECIFIED CHRONICITY: ICD-10-CM

## 2020-09-14 PROCEDURE — 97110 THERAPEUTIC EXERCISES: CPT

## 2020-09-14 RX ORDER — AMLODIPINE BESYLATE 5 MG/1
TABLET ORAL
Qty: 90 TABLET | Refills: 3 | Status: SHIPPED | OUTPATIENT
Start: 2020-09-14 | End: 2021-09-13

## 2020-09-14 NOTE — THERAPY TREATMENT NOTE
"    Outpatient Physical Therapy Ortho Treatment Note  [facility]     Patient Name: Jimmie Salcedo  : 1950  MRN: [id number]  Today's Date: 2020      Visit Date: 2020    Visit Dx:    ICD-10-CM ICD-9-CM   1. Status post right knee replacement  Z96.651 V43.65   2. Right knee pain, unspecified chronicity  M25.561 719.46       Patient Active Problem List   Diagnosis   • Acid reflux   • Arthritis   • Hypertension   • Hyperlipidemia   • Severe obstructive sleep apnea   • Psoriasis   • Diastolic dysfunction   • Peptic ulceration   • Bilateral edema of lower extremity   • Abnormal liver function test   • Hyperbilirubinemia   • Psychophysiological insomnia   • Gilbert's disease   • Thrombocytopenia, unspecified (CMS/HCC)   • Situational depression   • Tubular adenoma   • Seasonal allergic rhinitis due to pollen   • Mild cognitive impairment   • Memory loss   • Primary osteoarthritis of right knee   • Status post total right knee replacement   • Leukocytosis, likely reactive   • Postoperative pain        Past Medical History:   Diagnosis Date   • Abnormal liver function test    • Arthritis    • Bilateral edema of lower extremity    • Cataract     bilat- still present - mild    • Cellulitis of leg, right     resolved   • Chalazion    • Cracking skin     bilat feet mostly    • Disease     \"brakes/breaks\" disease as child-  effected blood and urine    • GERD (gastroesophageal reflux disease)    • History of kidney stones     x2 had to have broken up    • Hoarseness     from vocal cord bending- seeing ent - possible surgery soon    • Hypertension    • Kidney infection     h/o    • Neck muscle spasm    • Onychomycosis of toenail    • Peptic ulceration    • Renal calculi    • Situational depression 2018   • Sleep apnea with use of continuous positive airway pressure (CPAP)     compliant with machine    • 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    • Vocal cord strain     vocal cord bent- seeing ent but " causes pt to be hoarse    • Wears glasses         Past Surgical History:   Procedure Laterality Date   • COLONOSCOPY     • CYSTOSCOPY W/ LASER LITHOTRIPSY      x2   • ENDOSCOPY      with dilation    • FINGER SURGERY      left 5th finger   • KNEE SURGERY Right 1986    arthroscopy   • OTHER SURGICAL HISTORY      Lithotripsy   • TONSILLECTOMY     • TOTAL KNEE ARTHROPLASTY Right 7/22/2020    Procedure: TOTAL KNEE ARTHROPLASTY RIGHT;  Surgeon: Tereso Restrepo MD;  Location: [facility];  Service: Orthopedics;  Laterality: Right;                       PT Assessment/Plan     Row Name 09/14/20 1445          PT Assessment    Assessment Comments  Pt demonstrates improved AROM to 3-110 deg today, and passive flexion to 112 with patellar pain at end-range flexion. Had some c/o edema superior to knee and patellar tendon-related pain with exercises today. Incorporated LAQs and encouraged pt to perform these and TKEs at home to assist in improving patellar-related pain.  -AC        PT Plan    PT Plan Comments  Cont per POC.  -AC       User Key  (r) = Recorded By, (t) = Taken By, (c) = Cosigned By    Initials Name Provider Type    Shwetha Conway, PT Physical Therapist          Modalities     Row Name 09/14/20 1445             Ice    Ice Applied  Yes  -AC      Location  R knee  -AC      Rx Minutes  10 mins  -AC      Ice S/P Rx  Yes  -AC        User Key  (r) = Recorded By, (t) = Taken By, (c) = Cosigned By    Initials Name Provider Type    Shwetha Conway, PT Physical Therapist        OP Exercises     Row Name 09/14/20 1445 09/14/20 1430          Subjective Comments    Subjective Comments  Pt notes some swelling above knee today, tried icing it. CC is stiffness.  -AC  --        Subjective Pain    Able to rate subjective pain?  yes  -AC  --     Pre-Treatment Pain Level  0  -AC  --     Post-Treatment Pain Level  0  -AC  --     Subjective Pain Comment  R knee AROM flexion: 110 deg, PROM: 112 deg  -AC  --         Total Minutes    06668 - PT Therapeutic Exercise Minutes  40  -AC  --     30400 - PT Manual Therapy Minutes  5  -AC  --  -AC        Exercise 1    Exercise Name 1  recumbent bike  -AC  --     Time 1  6 min  -AC  --     Additional Comments  Seat at 11  -AC  --        Exercise 2    Exercise Name 2  Ball bounces  -AC  --     Sets 2  2  -AC  --     Reps 2  30  -AC  --        Exercise 3    Exercise Name 3  Heel slides w/ strap  -AC  --     Sets 3  2  -AC  --     Reps 3  10  -AC  --        Exercise 4    Exercise Name 4  AROM heel slides  -AC  --     Sets 4  2  -AC  --     Reps 4  10  -AC  --        Exercise 5    Exercise Name 5  HS curls w/ ball  -AC  --     Reps 5  20  -AC  --     Additional Comments  Bilateral  -AC  --        Exercise 6    Exercise Name 6  LAQ  -AC  --     Sets 6  2  -AC  --     Reps 6  10  -AC  --     Additional Comments  2#  -AC  --        Exercise 7    Exercise Name 7  Standing TKE  -AC  --     Reps 7  20  -AC  --     Additional Comments  Blue  -AC  --        Exercise 8    Exercise Name 8  STS  -AC  --     Sets 8  2  -AC  --     Reps 8  10  -AC  --     Additional Comments  2nd set elevated seat d/t patellar pain  -AC  --        Exercise 9    Exercise Name 9  Heel prop  -AC  --     Time 9  10 min  -AC  --     Additional Comments  4#  -AC  --       User Key  (r) = Recorded By, (t) = Taken By, (c) = Cosigned By    Initials Name Provider Type    Shwetha Conway, PT Physical Therapist                      Manual Rx (last 36 hours)      Manual Treatments     Row Name 09/14/20 1445 09/14/20 1430          Total Minutes    71070 - PT Manual Therapy Minutes  5  -AC  --  -AC        Manual Rx 1    Manual Rx 1 Location  R knee  -AC  --  -AC     Manual Rx 1 Type  MLD to distal quad working proximally; STM using strumming along peripatellar region  -AC  --  -AC     Manual Rx 1 Duration  5 min  -AC  --  -AC       User Key  (r) = Recorded By, (t) = Taken By, (c) = Cosigned By    Initials Name Provider Type     AC Shwetha Oneil, PT Physical Therapist                             Time Calculation:   Start Time: 1445  Therapy Charges for Today     Code Description Service Date Service Provider Modifiers Qty    65706377889 HC PT THER PROC EA 15 MIN 9/14/2020 Shwetha Oneil, PT GP 3                    Shwetha Oneil, PT  9/14/2020

## 2020-09-16 ENCOUNTER — HOSPITAL ENCOUNTER (OUTPATIENT)
Dept: PHYSICAL THERAPY | Facility: HOSPITAL | Age: 70
Setting detail: THERAPIES SERIES
Discharge: HOME | End: 2020-09-16

## 2020-09-16 DIAGNOSIS — Z96.651 STATUS POST RIGHT KNEE REPLACEMENT: Primary | ICD-10-CM

## 2020-09-16 DIAGNOSIS — M25.561 RIGHT KNEE PAIN, UNSPECIFIED CHRONICITY: ICD-10-CM

## 2020-09-16 PROCEDURE — 97110 THERAPEUTIC EXERCISES: CPT

## 2020-09-16 NOTE — THERAPY TREATMENT NOTE
"    Outpatient Physical Therapy Ortho Treatment Note  [facility]     Patient Name: Jimmie Salcedo  : 1950  MRN: [id number]  Today's Date: 2020      Visit Date: 2020    Visit Dx:    ICD-10-CM ICD-9-CM   1. Status post right knee replacement  Z96.651 V43.65   2. Right knee pain, unspecified chronicity  M25.561 719.46       Patient Active Problem List   Diagnosis   • Acid reflux   • Arthritis   • Hypertension   • Hyperlipidemia   • Severe obstructive sleep apnea   • Psoriasis   • Diastolic dysfunction   • Peptic ulceration   • Bilateral edema of lower extremity   • Abnormal liver function test   • Hyperbilirubinemia   • Psychophysiological insomnia   • Gilbert's disease   • Thrombocytopenia, unspecified (CMS/HCC)   • Situational depression   • Tubular adenoma   • Seasonal allergic rhinitis due to pollen   • Mild cognitive impairment   • Memory loss   • Primary osteoarthritis of right knee   • Status post total right knee replacement   • Leukocytosis, likely reactive   • Postoperative pain        Past Medical History:   Diagnosis Date   • Abnormal liver function test    • Arthritis    • Bilateral edema of lower extremity    • Cataract     bilat- still present - mild    • Cellulitis of leg, right     resolved   • Chalazion    • Cracking skin     bilat feet mostly    • Disease     \"brakes/breaks\" disease as child-  effected blood and urine    • GERD (gastroesophageal reflux disease)    • History of kidney stones     x2 had to have broken up    • Hoarseness     from vocal cord bending- seeing ent - possible surgery soon    • Hypertension    • Kidney infection     h/o    • Neck muscle spasm    • Onychomycosis of toenail    • Peptic ulceration    • Renal calculi    • Situational depression 2018   • Sleep apnea with use of continuous positive airway pressure (CPAP)     compliant with machine    • 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    • Vocal cord strain     vocal cord bent- seeing ent but " causes pt to be hoarse    • Wears glasses         Past Surgical History:   Procedure Laterality Date   • COLONOSCOPY     • CYSTOSCOPY W/ LASER LITHOTRIPSY      x2   • ENDOSCOPY      with dilation    • FINGER SURGERY      left 5th finger   • KNEE SURGERY Right 1986    arthroscopy   • OTHER SURGICAL HISTORY      Lithotripsy   • TONSILLECTOMY     • TOTAL KNEE ARTHROPLASTY Right 7/22/2020    Procedure: TOTAL KNEE ARTHROPLASTY RIGHT;  Surgeon: Tereso Restrepo MD;  Location: [facility];  Service: Orthopedics;  Laterality: Right;       PT Assessment/Plan     Row Name 09/16/20 0930          PT Assessment    Assessment Comments  Excellent progress from 2 days ago with knee flexion from 112 to 118 passively. He is lacking some with knee extension which ranges from -3 to -7.   -MM        PT Plan    PT Plan Comments  Continue for 2 more weeks to maximize mobility and continue strengthening.  -MM       User Key  (r) = Recorded By, (t) = Taken By, (c) = Cosigned By    Initials Name Provider Type    MM Dennis Kenny, PT Physical Therapist        OP Exercises     Row Name 09/16/20 0900             Subjective Comments    Subjective Comments  No new complaints. No pain at rest. Moderate pain end range flexion.   -MM         Subjective Pain    Able to rate subjective pain?  yes  -MM      Pre-Treatment Pain Level  0  -MM      Post-Treatment Pain Level  0  -MM      Subjective Pain Comment  R knee PROM flexion: 118; extension: -7.  -MM         Total Minutes    47170 - PT Therapeutic Exercise Minutes  40  -MM         Exercise 1    Exercise Name 1  recumbent bike  -MM      Time 1  6 min  -MM      Additional Comments  seat at 10  -MM         Exercise 2    Exercise Name 2  Ball bounces  -MM      Sets 2  2  -MM      Reps 2  30  -MM         Exercise 3    Exercise Name 3  Heel slides w/ strap  -MM      Sets 3  2  -MM      Reps 3  10  -MM         Exercise 4    Exercise Name 4  heel slides w ball  -MM      Sets 4  --  -MM      Reps 4   20  -MM         Exercise 5    Exercise Name 5  --  -MM      Reps 5  --  -MM         Exercise 6    Exercise Name 6  LAQ  -MM      Sets 6  2  -MM      Reps 6  10  -MM      Additional Comments  5#  -MM         Exercise 7    Exercise Name 7  --  -MM      Reps 7  --  -MM         Exercise 8    Exercise Name 8  STS  -MM      Sets 8  2  -MM      Reps 8  10  -MM         Exercise 9    Exercise Name 9  Heel prop  -MM      Time 9  6 min  -MM      Additional Comments  4#  -MM         Exercise 10    Exercise Name 10  nu-step crosstrainer  -MM      Time 10  5 min  -MM      Additional Comments  L5  -MM        User Key  (r) = Recorded By, (t) = Taken By, (c) = Cosigned By    Initials Name Provider Type    MM Dennis Kenny, PT Physical Therapist      Time Calculation:   Start Time: 0930  Therapy Charges for Today     Code Description Service Date Service Provider Modifiers Qty    40400963359  PT THER PROC EA 15 MIN 9/16/2020 Dennis Kenny, PT GP 3        Dennis Kenny, PT  9/16/2020

## 2020-09-17 ENCOUNTER — OFFICE VISIT (OUTPATIENT)
Dept: ORTHOPEDIC SURGERY | Facility: CLINIC | Age: 70
End: 2020-09-17

## 2020-09-17 ENCOUNTER — TELEPHONE (OUTPATIENT)
Dept: ORTHOPEDIC SURGERY | Facility: CLINIC | Age: 70
End: 2020-09-17

## 2020-09-17 DIAGNOSIS — Z96.651 STATUS POST TOTAL RIGHT KNEE REPLACEMENT: Primary | ICD-10-CM

## 2020-09-17 PROCEDURE — 99024 POSTOP FOLLOW-UP VISIT: CPT | Performed by: ORTHOPAEDIC SURGERY

## 2020-09-17 NOTE — TELEPHONE ENCOUNTER
Called patient back- let him know that per Dr Fried note from today- he needs to continue to go to PT. He understood.     Nicole

## 2020-09-22 ENCOUNTER — TELEPHONE (OUTPATIENT)
Dept: ORTHOPEDIC SURGERY | Facility: CLINIC | Age: 70
End: 2020-09-22

## 2020-09-22 RX ORDER — CEPHALEXIN 500 MG/1
1000 CAPSULE ORAL ONCE AS NEEDED
Qty: 2 CAPSULE | Refills: 2 | Status: SHIPPED | OUTPATIENT
Start: 2020-09-22 | End: 2021-09-16

## 2020-09-22 NOTE — TELEPHONE ENCOUNTER
PATIENT CALLED AND STATED HE NEEDS AN ANTIBIOTIC FOR HIS DENTAL PROCEDURE. HE HAD KNEE SURGERY ON 7/23/20.  PATIENT USES [facility] IN [facility]. PATIENT CAN BE REACHED @ [phone]

## 2020-09-22 NOTE — TELEPHONE ENCOUNTER
Called patient and advised him that if possible, he should hold off on his dental procedure until the 3 month jessica from surgery. He will try to make arrangements for this. I consulted Dr Restrepo concerning the Keflex since patient states that this is the best for him. Dr Restrepo wants to use 1000mg 1 hour prior to procedure. I will send to pharmacy for patient.

## 2020-09-24 ENCOUNTER — HOSPITAL ENCOUNTER (OUTPATIENT)
Dept: PHYSICAL THERAPY | Facility: HOSPITAL | Age: 70
Setting detail: THERAPIES SERIES
Discharge: HOME | End: 2020-09-24

## 2020-09-24 DIAGNOSIS — Z96.651 STATUS POST RIGHT KNEE REPLACEMENT: Primary | ICD-10-CM

## 2020-09-24 DIAGNOSIS — M25.561 RIGHT KNEE PAIN, UNSPECIFIED CHRONICITY: ICD-10-CM

## 2020-09-24 PROCEDURE — 97110 THERAPEUTIC EXERCISES: CPT

## 2020-09-24 NOTE — THERAPY TREATMENT NOTE
"    Outpatient Physical Therapy Ortho Treatment Note  [facility]     Patient Name: Jimmie Salcedo  : 1950  MRN: [id number]  Today's Date: 2020      Visit Date: 2020    Visit Dx:    ICD-10-CM ICD-9-CM   1. Status post right knee replacement  Z96.651 V43.65   2. Right knee pain, unspecified chronicity  M25.561 719.46       Patient Active Problem List   Diagnosis   • Acid reflux   • Arthritis   • Hypertension   • Hyperlipidemia   • Severe obstructive sleep apnea   • Psoriasis   • Diastolic dysfunction   • Peptic ulceration   • Bilateral edema of lower extremity   • Abnormal liver function test   • Hyperbilirubinemia   • Psychophysiological insomnia   • Gilbert's disease   • Thrombocytopenia, unspecified (CMS/HCC)   • Situational depression   • Tubular adenoma   • Seasonal allergic rhinitis due to pollen   • Mild cognitive impairment   • Memory loss   • Primary osteoarthritis of right knee   • Status post total right knee replacement   • Leukocytosis, likely reactive   • Postoperative pain        Past Medical History:   Diagnosis Date   • Abnormal liver function test    • Arthritis    • Bilateral edema of lower extremity    • Cataract     bilat- still present - mild    • Cellulitis of leg, right     resolved   • Chalazion    • Cracking skin     bilat feet mostly    • Disease     \"brakes/breaks\" disease as child-  effected blood and urine    • GERD (gastroesophageal reflux disease)    • History of kidney stones     x2 had to have broken up    • Hoarseness     from vocal cord bending- seeing ent - possible surgery soon    • Hypertension    • Kidney infection     h/o    • Neck muscle spasm    • Onychomycosis of toenail    • Peptic ulceration    • Renal calculi    • Situational depression 2018   • Sleep apnea with use of continuous positive airway pressure (CPAP)     compliant with machine    • 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    • Vocal cord strain     vocal cord bent- seeing ent but " causes pt to be hoarse    • Wears glasses         Past Surgical History:   Procedure Laterality Date   • COLONOSCOPY     • CYSTOSCOPY W/ LASER LITHOTRIPSY      x2   • ENDOSCOPY      with dilation    • FINGER SURGERY      left 5th finger   • KNEE SURGERY Right 1986    arthroscopy   • OTHER SURGICAL HISTORY      Lithotripsy   • TONSILLECTOMY     • TOTAL KNEE ARTHROPLASTY Right 7/22/2020    Procedure: TOTAL KNEE ARTHROPLASTY RIGHT;  Surgeon: Tereso Restrepo MD;  Location: [facility];  Service: Orthopedics;  Laterality: Right;                       PT Assessment/Plan     Row Name 09/24/20 0946          PT Assessment    Assessment Comments  Pt demonstrates improved AROM at -3 to 114 today at conclusion of session, with passive flexion to 116. Demonstrates improved tolerance to AROM but still has mild c/o tightness in anterior knee. Practiced steps today as pt reports this is a primary limitation at this time.  -AC        PT Plan    PT Plan Comments  Cont per POC progressing steps as tolerated. Consider wall squats w/ ball.  -AC       User Key  (r) = Recorded By, (t) = Taken By, (c) = Cosigned By    Initials Name Provider Type    Shwetha Conway, PT Physical Therapist          Modalities     Row Name 09/24/20 0946             Subjective Pain    Post-Treatment Pain Level  0  -AC         Ice    Ice Applied  Yes  -AC      Location  R knee  -AC      Rx Minutes  10 mins  -AC      Ice S/P Rx  Yes  -AC        User Key  (r) = Recorded By, (t) = Taken By, (c) = Cosigned By    Initials Name Provider Type    Shwetha Conway, PT Physical Therapist        OP Exercises     Row Name 09/24/20 0946             Subjective Comments    Subjective Comments  Pt states knee is doing well, no new complaints.  -AC         Subjective Pain    Able to rate subjective pain?  yes  -AC      Pre-Treatment Pain Level  0  -AC      Post-Treatment Pain Level  0  -AC      Subjective Pain Comment  R knee PROM: 116, AAROM: 3-114 (249  "prior to standing exercises)  -AC         Total Minutes    96569 - PT Therapeutic Exercise Minutes  40  -AC         Exercise 1    Exercise Name 1  NuStep  -AC      Time 1  5 min  -AC      Additional Comments  L4  -AC         Exercise 2    Exercise Name 2  Bike  -AC      Time 2  6 min  -AC      Additional Comments  L10 to L9  -AC         Exercise 3    Exercise Name 3  ball bounces  -AC      Sets 3  2  -AC      Reps 3  30  -AC         Exercise 4    Exercise Name 4  heel slides w/ ball  -AC      Reps 4  10  -AC         Exercise 5    Exercise Name 5  Heel slides w/ strap  -AC      Sets 5  2  -AC      Reps 5  10  -AC      Additional Comments  ROM: 112  -AC         Exercise 6    Exercise Name 6  STS  -AC      Sets 6  2  -AC      Reps 6  10  -AC         Exercise 7    Exercise Name 7  LAQ  -AC      Sets 7  2  -AC      Reps 7  10  -AC      Additional Comments  5#  -AC         Exercise 8    Exercise Name 8  Step ups  -AC      Sets 8  2  -AC      Reps 8  10  -AC      Additional Comments  8\" step  -AC         Exercise 9    Exercise Name 9  Step downs  -AC      Sets 9  2  -AC      Reps 9  10  -AC      Additional Comments  6\" step; PT external stabilization  -AC         Exercise 10    Exercise Name 10  Bosu lunges  -AC      Reps 10  10  -AC      Time 10  --  -AC      Additional Comments  --  -AC         Exercise 11    Exercise Name 11  Heel prop  -AC      Time 11  6 min  -AC      Additional Comments  4#  -AC        User Key  (r) = Recorded By, (t) = Taken By, (c) = Cosigned By    Initials Name Provider Type    AC Shwetha Oneil, PT Physical Therapist                                          Time Calculation:   Start Time: 0946  Therapy Charges for Today     Code Description Service Date Service Provider Modifiers Qty    [id number]  PT THER PROC EA 15 MIN 9/24/2020 Shwetha Oneil, PT GP 3                    Shwetha Oneil, PT  9/24/2020     "

## 2020-09-28 ENCOUNTER — HOSPITAL ENCOUNTER (OUTPATIENT)
Dept: PHYSICAL THERAPY | Facility: HOSPITAL | Age: 70
Setting detail: THERAPIES SERIES
Discharge: HOME | End: 2020-09-28

## 2020-09-28 DIAGNOSIS — Z96.651 STATUS POST RIGHT KNEE REPLACEMENT: Primary | ICD-10-CM

## 2020-09-28 DIAGNOSIS — M25.561 RIGHT KNEE PAIN, UNSPECIFIED CHRONICITY: ICD-10-CM

## 2020-09-28 PROCEDURE — 97110 THERAPEUTIC EXERCISES: CPT

## 2020-09-28 NOTE — THERAPY TREATMENT NOTE
"    Outpatient Physical Therapy Ortho Treatment Note  [facility]     Patient Name: Jimmie Salcedo  : 1950  MRN: [id number]  Today's Date: 2020      Visit Date: 2020    Visit Dx:    ICD-10-CM ICD-9-CM   1. Status post right knee replacement  Z96.651 V43.65   2. Right knee pain, unspecified chronicity  M25.561 719.46       Patient Active Problem List   Diagnosis   • Acid reflux   • Arthritis   • Hypertension   • Hyperlipidemia   • Severe obstructive sleep apnea   • Psoriasis   • Diastolic dysfunction   • Peptic ulceration   • Bilateral edema of lower extremity   • Abnormal liver function test   • Hyperbilirubinemia   • Psychophysiological insomnia   • Gilbert's disease   • Thrombocytopenia, unspecified (CMS/HCC)   • Situational depression   • Tubular adenoma   • Seasonal allergic rhinitis due to pollen   • Mild cognitive impairment   • Memory loss   • Primary osteoarthritis of right knee   • Status post total right knee replacement   • Leukocytosis, likely reactive   • Postoperative pain        Past Medical History:   Diagnosis Date   • Abnormal liver function test    • Arthritis    • Bilateral edema of lower extremity    • Cataract     bilat- still present - mild    • Cellulitis of leg, right     resolved   • Chalazion    • Cracking skin     bilat feet mostly    • Disease     \"brakes/breaks\" disease as child-  effected blood and urine    • GERD (gastroesophageal reflux disease)    • History of kidney stones     x2 had to have broken up    • Hoarseness     from vocal cord bending- seeing ent - possible surgery soon    • Hypertension    • Kidney infection     h/o    • Neck muscle spasm    • Onychomycosis of toenail    • Peptic ulceration    • Renal calculi    • Situational depression 2018   • Sleep apnea with use of continuous positive airway pressure (CPAP)     compliant with machine    • 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    • Vocal cord strain     vocal cord bent- seeing ent but " causes pt to be hoarse    • Wears glasses         Past Surgical History:   Procedure Laterality Date   • COLONOSCOPY     • CYSTOSCOPY W/ LASER LITHOTRIPSY      x2   • ENDOSCOPY      with dilation    • FINGER SURGERY      left 5th finger   • KNEE SURGERY Right 1986    arthroscopy   • OTHER SURGICAL HISTORY      Lithotripsy   • TONSILLECTOMY     • TOTAL KNEE ARTHROPLASTY Right 7/22/2020    Procedure: TOTAL KNEE ARTHROPLASTY RIGHT;  Surgeon: Tereso Restrepo MD;  Location: [facility];  Service: Orthopedics;  Laterality: Right;                       PT Assessment/Plan     Row Name 09/28/20 1030          PT Assessment    Assessment Comments  Pt continuing to make improvements in strength and mobility, performing 116 degrees AROM and PROM flexion today. Progressing through exercises well with some c/o pain during step-downs.  -AC        PT Plan    PT Plan Comments  Cont per POC.  -AC       User Key  (r) = Recorded By, (t) = Taken By, (c) = Cosigned By    Initials Name Provider Type    Shwetha Conway, PT Physical Therapist          Modalities     Row Name 09/28/20 1030             Ice    Ice Applied  Yes  -AC      Location  R knee   -AC      Rx Minutes  10 mins  -AC      Ice S/P Rx  Yes  -AC        User Key  (r) = Recorded By, (t) = Taken By, (c) = Cosigned By    Initials Name Provider Type    Shwetha Conway, PT Physical Therapist        OP Exercises     Row Name 09/28/20 1030             Subjective Comments    Subjective Comments  Pt has no new complaints.  -AC         Subjective Pain    Able to rate subjective pain?  yes  -AC      Pre-Treatment Pain Level  0  -AC      Post-Treatment Pain Level  0  -AC      Subjective Pain Comment  R knee A and AAROM knee flexion: 116 deg  -AC         Total Minutes    28092 - PT Therapeutic Exercise Minutes  38  -AC         Exercise 1    Exercise Name 1  NuSTep  -AC      Time 1  5 min  -AC      Additional Comments  L6  -AC         Exercise 2    Exercise Name 2  Bike  " -AC      Time 2  5 min  -AC      Additional Comments  L9  -AC         Exercise 3    Exercise Name 3  Ball bounces  -AC      Sets 3  2  -AC      Reps 3  30  -AC         Exercise 4    Exercise Name 4  Heel slides  -AC      Reps 4  10 w/ and 10 w/o strap  -AC         Exercise 5    Exercise Name 5  LAQ  -AC      Sets 5  2  -AC      Reps 5  10  -AC      Additional Comments  5#  -AC         Exercise 6    Exercise Name 6  STS  -AC      Sets 6  2  -AC      Reps 6  10  -AC      Additional Comments  Slight knee pain  -AC         Exercise 7    Exercise Name 7  Step ups  -AC      Sets 7  2  -AC      Reps 7  10  -AC      Additional Comments  8\" step  -AC         Exercise 8    Exercise Name 8  Step downs  -AC      Reps 8  10  -AC      Additional Comments  4\" step with PT external support  -AC         Exercise 9    Exercise Name 9  Wall squats  -AC      Sets 9  2  -AC      Reps 9  10  -AC      Additional Comments  w/ ball  -AC         Exercise 10    Exercise Name 10  TKEs  -AC      Sets 10  2  -AC      Reps 10  15  -AC      Additional Comments  Blue band  -AC         Exercise 11    Exercise Name 11  Heel prop  -AC      Time 11  6 min  -AC      Additional Comments  4#  -AC        User Key  (r) = Recorded By, (t) = Taken By, (c) = Cosigned By    Initials Name Provider Type    AC Shwetha Oneil, PT Physical Therapist                                          Time Calculation:   Start Time: 1030  Therapy Charges for Today     Code Description Service Date Service Provider Modifiers Qty    [id number]  PT THER PROC EA 15 MIN 9/28/2020 Shwetha Oneil, PT GP 3                    Shwetha Oneil, PT  9/28/2020     "

## 2020-10-01 ENCOUNTER — HOSPITAL ENCOUNTER (OUTPATIENT)
Dept: PHYSICAL THERAPY | Facility: HOSPITAL | Age: 70
Setting detail: THERAPIES SERIES
Discharge: HOME | End: 2020-10-01

## 2020-10-01 DIAGNOSIS — Z96.651 STATUS POST RIGHT KNEE REPLACEMENT: Primary | ICD-10-CM

## 2020-10-01 DIAGNOSIS — M25.561 RIGHT KNEE PAIN, UNSPECIFIED CHRONICITY: ICD-10-CM

## 2020-10-01 PROCEDURE — 97110 THERAPEUTIC EXERCISES: CPT

## 2020-10-01 NOTE — THERAPY PROGRESS REPORT/RE-CERT
"    Outpatient Physical Therapy Ortho Progress Note  [facility]     Patient Name: Jimmie Salcedo  : 1950  MRN: [id number]  Today's Date: 10/1/2020      Visit Date: 10/01/2020    Visit Dx:    ICD-10-CM ICD-9-CM   1. Status post right knee replacement  Z96.651 V43.65   2. Right knee pain, unspecified chronicity  M25.561 719.46     Patient Active Problem List   Diagnosis   • Acid reflux   • Arthritis   • Hypertension   • Hyperlipidemia   • Severe obstructive sleep apnea   • Psoriasis   • Diastolic dysfunction   • Peptic ulceration   • Bilateral edema of lower extremity   • Abnormal liver function test   • Hyperbilirubinemia   • Psychophysiological insomnia   • Gilbert's disease   • Thrombocytopenia, unspecified (CMS/HCC)   • Situational depression   • Tubular adenoma   • Seasonal allergic rhinitis due to pollen   • Mild cognitive impairment   • Memory loss   • Primary osteoarthritis of right knee   • Status post total right knee replacement   • Leukocytosis, likely reactive   • Postoperative pain        Past Medical History:   Diagnosis Date   • Abnormal liver function test    • Arthritis    • Bilateral edema of lower extremity    • Cataract     bilat- still present - mild    • Cellulitis of leg, right     resolved   • Chalazion    • Cracking skin     bilat feet mostly    • Disease     \"brakes/breaks\" disease as child-  effected blood and urine    • GERD (gastroesophageal reflux disease)    • History of kidney stones     x2 had to have broken up    • Hoarseness     from vocal cord bending- seeing ent - possible surgery soon    • Hypertension    • Kidney infection     h/o    • Neck muscle spasm    • Onychomycosis of toenail    • Peptic ulceration    • Renal calculi    • Situational depression 2018   • Sleep apnea with use of continuous positive airway pressure (CPAP)     compliant with machine    • 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    • Vocal cord strain     vocal cord bent- seeing ent but " causes pt to be hoarse    • Wears glasses         Past Surgical History:   Procedure Laterality Date   • COLONOSCOPY     • CYSTOSCOPY W/ LASER LITHOTRIPSY      x2   • ENDOSCOPY      with dilation    • FINGER SURGERY      left 5th finger   • KNEE SURGERY Right 1986    arthroscopy   • OTHER SURGICAL HISTORY      Lithotripsy   • TONSILLECTOMY     • TOTAL KNEE ARTHROPLASTY Right 7/22/2020    Procedure: TOTAL KNEE ARTHROPLASTY RIGHT;  Surgeon: Tereso Restrepo MD;  Location: [facility];  Service: Orthopedics;  Laterality: Right;       PT Ortho     Row Name 10/01/20 1030       Subjective Pain    Subjective Pain Comment  R: PROM flexion 122; extension: -2  -MM       MMT (Manual Muscle Testing)    Rt Lower Ext  Rt Hip Flexion;Rt Hip Extension;Rt Hip ABduction;Rt Hip ADduction;Rt Knee Extension;Rt Knee Flexion  -MM       MMT Right Lower Ext    Rt Hip Flexion MMT, Gross Movement  (5/5) normal  -MM    Rt Hip Extension MMT, Gross Movement  (5/5) normal  -MM    Rt Hip ABduction MMT, Gross Movement  (5/5) normal  -MM    Rt Hip ADduction MMT, Gross Movement  (5/5) normal  -MM    Rt Knee Extension MMT, Gross Movement  (5/5) normal  -MM    Rt Knee Flexion MMT, Gross Movement  (5/5) normal  -MM       Balance Skills Training    SLS  R: 3 sec initial, 7 sec after practice; L: 10 sec  -MM      User Key  (r) = Recorded By, (t) = Taken By, (c) = Cosigned By    Initials Name Provider Type    MM Dennis Kenny, PT Physical Therapist        PT Assessment/Plan     Row Name 10/01/20 1030          PT Assessment    Assessment Comments  Overall client is making very good progress. ROM continues to improve. LEFS score improved from 35/80 to 56/80. He demonstrates good mobility and strength. Further skilled care is required to maximize strength for better ability to negotiate stairs.  -MM     Please refer to paper survey for additional self-reported information  Yes  -MM     Rehab Potential  Good  -MM     Patient/caregiver participated in  establishment of treatment plan and goals  Yes  -MM     Patient would benefit from skilled therapy intervention  Yes  -MM        PT Plan    PT Frequency  2x/week  -MM     Predicted Duration of Therapy Intervention (OT)  2-3 weeks  -MM     Planned CPT's?  PT THER PROC EA 15 MIN: 54982;PT THER ACT EA 15 MIN: 26986;PT MANUAL THERAPY EA 15 MIN: 35496;PT HOT OR COLD PACK TREAT MCARE  -MM     PT Plan Comments  Continue per plan of treatment for a few more visits to maximize strength.   -MM       User Key  (r) = Recorded By, (t) = Taken By, (c) = Cosigned By    Initials Name Provider Type    MM Dennis Kenny, PT Physical Therapist        OP Exercises     Row Name 10/01/20 1030             Subjective Comments    Subjective Comments  Client reports some difficulty going down stairs. He notes that he is doing well now going up stairs.  -MM         Subjective Pain    Able to rate subjective pain?  yes  -MM      Pre-Treatment Pain Level  0  -MM      Post-Treatment Pain Level  0  -MM      Subjective Pain Comment  R: PROM flexion 122; extension: -2  -MM         Total Minutes    09555 - PT Therapeutic Exercise Minutes  44  -MM         Exercise 1    Exercise Name 1  NuSTep  -MM      Time 1  5 min  -MM      Additional Comments  L5  -MM         Exercise 2    Exercise Name 2  Bike  -MM      Time 2  5 min  -MM      Additional Comments  seat 9; resistance 5  -MM         Exercise 3    Exercise Name 3  reverse lunges  -MM      Sets 3  --  -MM      Reps 3  10 ea  -MM      Additional Comments  w support  -MM         Exercise 4    Exercise Name 4  band resisted: leg kicks 3 way, diagonal step, side step  -MM      Reps 4  10 ea/ 10/10  -MM      Additional Comments  red band  -MM         Exercise 5    Exercise Name 5  LAQ  -MM      Sets 5  2  -MM      Reps 5  10  -MM      Additional Comments  5#  -MM         Exercise 6    Exercise Name 6  STS  -MM      Sets 6  2  -MM      Reps 6  10  -MM         Exercise 7    Exercise Name 7  Step ups  -MM       Sets 7  2  -MM      Reps 7  10  -MM         Exercise 8    Exercise Name 8  heel slides  -MM      Reps 8  10  -MM         Exercise 9    Exercise Name 9  HS stretch  -MM      Sets 9  --  -MM      Reps 9  --  -MM      Time 9  2 min  -MM         Exercise 10    Exercise Name 10  quad stretch  -MM      Sets 10  --  -MM      Reps 10  --  -MM      Time 10  2 min  -MM         Exercise 11    Exercise Name 11  --  -MM      Time 11  --  -MM        User Key  (r) = Recorded By, (t) = Taken By, (c) = Cosigned By    Initials Name Provider Type    Dennis Nagy, PT Physical Therapist          PT OP Goals     Row Name 10/01/20 1030          PT Short Term Goals    STG Date to Achieve  09/04/20  -MM     STG 1  Decrease R knee pain by > or = 50%.  -MM     STG 1 Progress  Met  -MM     STG 2  Pt will be independent with HEP to improve ROM/strength.  -MM     STG 2 Progress  Met  -MM     STG 3  Improve R knee flexion ROM to at least 100 deg.  -MM     STG 3 Progress  Met  -MM        Long Term Goals    LTG Date to Achieve  09/25/20  -MM     LTG 1  Decrease R knee pain by > or = 75%.  -MM     LTG 1 Progress  Met  -MM     LTG 2  Improve LEFS to > or = 65/80.  -MM     LTG 2 Progress  Goal Revised  -MM     LTG 2 Progress Comments  making very good progress  -MM     LTG 3  Client will negotiate stairs without difficulty.  -MM     LTG 3 Progress  Goal Revised  -MM     LTG 3 Progress Comments  good progress.   -MM     LTG 4  Improve R knee ROM to WNL.  -MM     LTG 4 Progress  Met  -MM     LTG 4 Progress Comments  good progress  -MM     LTG 5  Improve RLE strength to grossly 4+/5.  -MM     LTG 5 Progress  Met  -MM     LTG 5 Progress Comments  good progress  -MM     LTG 6  Enable normal gait pattern with least restrictive device.  -MM     LTG 6 Progress  Met  -MM     LTG 6 Progress Comments  walking without AD,  normal gait  -MM     LTG 7  Improve 30 Sec Chair to > or = 10 reps.  -MM     LTG 7 Progress  Met  -MM        Time Calculation     PT Goal Re-Cert Due Date  11/12/20  -ZUHAIR       User Key  (r) = Recorded By, (t) = Taken By, (c) = Cosigned By    Initials Name Provider Type    Dennis Nagy, PT Physical Therapist      Outcome Measure Options: Lower Extremity Functional Scale (LEFS), 30 Second Chair Stand Test  30 Second Chair Stand Test  30 Second Chair Stand Test: 11  Lower Extremity Functional Index  Any of your usual work, housework or school activities: Moderate difficulty  Your usual hobbies, recreational or sporting activities: A little bit of difficulty  Getting into or out of the bath: A little bit of difficulty  Walking between rooms: No difficulty  Putting on your shoes or socks: No difficulty  Squatting: Quite a bit of difficulty  Lifting an object, like a bag of groceries from the floor: No difficulty  Performing light activities around your home: No difficulty  Performing heavy activities around your home: A little bit of difficulty  Getting into or out of a car: No difficulty  Walking 2 blocks: No difficulty  Walking a mile: Moderate difficulty  Going up or down 10 stairs (about 1 flight of stairs): Moderate difficulty  Standing for 1 hour: A little bit of difficulty  Sitting for 1 hour: No difficulty  Running on even ground: Quite a bit of difficulty  Running on uneven ground: Quite a bit of difficulty  Making sharp turns while running fast: Quite a bit of difficulty  Hopping: A little bit of difficulty  Rolling over in bed: A little bit of difficulty  Total: 56      Time Calculation:   Start Time: 1030  Therapy Charges for Today     Code Description Service Date Service Provider Modifiers Qty    74091741231 HC PT THER PROC EA 15 MIN 10/1/2020 Dennis Kenny, PT GP 3          PT G-Codes  Outcome Measure Options: Lower Extremity Functional Scale (LEFS), 30 Second Chair Stand Test  Total: 56         Dennis Kenny, PT  10/1/2020

## 2020-10-06 ENCOUNTER — HOSPITAL ENCOUNTER (OUTPATIENT)
Dept: PHYSICAL THERAPY | Facility: HOSPITAL | Age: 70
Setting detail: THERAPIES SERIES
Discharge: HOME | End: 2020-10-06

## 2020-10-06 DIAGNOSIS — M25.561 RIGHT KNEE PAIN, UNSPECIFIED CHRONICITY: ICD-10-CM

## 2020-10-06 DIAGNOSIS — Z96.651 STATUS POST RIGHT KNEE REPLACEMENT: Primary | ICD-10-CM

## 2020-10-06 PROCEDURE — 97110 THERAPEUTIC EXERCISES: CPT

## 2020-10-06 NOTE — THERAPY TREATMENT NOTE
"    Outpatient Physical Therapy Ortho Treatment Note  [facility]     Patient Name: Jimmie Salcedo  : 1950  MRN: [id number]  Today's Date: 10/6/2020      Visit Date: 10/06/2020    Visit Dx:    ICD-10-CM ICD-9-CM   1. Status post right knee replacement  Z96.651 V43.65   2. Right knee pain, unspecified chronicity  M25.561 719.46       Patient Active Problem List   Diagnosis   • Acid reflux   • Arthritis   • Hypertension   • Hyperlipidemia   • Severe obstructive sleep apnea   • Psoriasis   • Diastolic dysfunction   • Peptic ulceration   • Bilateral edema of lower extremity   • Abnormal liver function test   • Hyperbilirubinemia   • Psychophysiological insomnia   • Gilbert's disease   • Thrombocytopenia, unspecified (CMS/HCC)   • Situational depression   • Tubular adenoma   • Seasonal allergic rhinitis due to pollen   • Mild cognitive impairment   • Memory loss   • Primary osteoarthritis of right knee   • Status post total right knee replacement   • Leukocytosis, likely reactive   • Postoperative pain        Past Medical History:   Diagnosis Date   • Abnormal liver function test    • Arthritis    • Bilateral edema of lower extremity    • Cataract     bilat- still present - mild    • Cellulitis of leg, right     resolved   • Chalazion    • Cracking skin     bilat feet mostly    • Disease     \"brakes/breaks\" disease as child-  effected blood and urine    • GERD (gastroesophageal reflux disease)    • History of kidney stones     x2 had to have broken up    • Hoarseness     from vocal cord bending- seeing ent - possible surgery soon    • Hypertension    • Kidney infection     h/o    • Neck muscle spasm    • Onychomycosis of toenail    • Peptic ulceration    • Renal calculi    • Situational depression 2018   • Sleep apnea with use of continuous positive airway pressure (CPAP)     compliant with machine    • 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    • Vocal cord strain     vocal cord bent- seeing ent but " causes pt to be hoarse    • Wears glasses         Past Surgical History:   Procedure Laterality Date   • COLONOSCOPY     • CYSTOSCOPY W/ LASER LITHOTRIPSY      x2   • ENDOSCOPY      with dilation    • FINGER SURGERY      left 5th finger   • KNEE SURGERY Right 1986    arthroscopy   • OTHER SURGICAL HISTORY      Lithotripsy   • TONSILLECTOMY     • TOTAL KNEE ARTHROPLASTY Right 7/22/2020    Procedure: TOTAL KNEE ARTHROPLASTY RIGHT;  Surgeon: Tereso Restrepo MD;  [facility]  Service: Orthopedics;  Laterality: Right;                       PT Assessment/Plan     Row Name 10/06/20 0945          PT Assessment    Assessment Comments  Pt progressing well through transition into strengthening-based exercises with mild c/o low back pain during standing activities today. Performed AROM flexion to 114 deg, but after assisted heel slides performed AAROM to 120 deg flexion.  -AC        PT Plan    PT Plan Comments  Cont per POC.  -AC       User Key  (r) = Recorded By, (t) = Taken By, (c) = Cosigned By    Initials Name Provider Type    Shwetha Conway, PT Physical Therapist          Modalities     Row Name 10/06/20 0945             Ice    Ice Applied  Yes  -AC      Location  R knee  -AC      Rx Minutes  10 mins  -AC      Ice S/P Rx  Yes  -AC        User Key  (r) = Recorded By, (t) = Taken By, (c) = Cosigned By    Initials Name Provider Type    Shwetha Conway, PT Physical Therapist        OP Exercises     Row Name 10/06/20 8181             Subjective Comments    Subjective Comments  Pt states going down stairs is getting easier.  -AC         Subjective Pain    Able to rate subjective pain?  yes  -AC      Pre-Treatment Pain Level  0  -AC      Post-Treatment Pain Level  0  -AC      Subjective Pain Comment  AROM flexion 114, AAROM 120 after assisted heel slides  -AC         Total Minutes    10705 - PT Therapeutic Exercise Minutes  40  -AC         Exercise 1    Exercise Name 1  NuStep  -AC      Time 1  5  "min  -AC      Additional Comments  L5  -AC         Exercise 2    Exercise Name 2  Bike  -AC      Time 2  5 min  -AC      Additional Comments  seat 9; resistance level 5  -AC         Exercise 3    Exercise Name 3  Standing 3-way kicks  -AC      Reps 3  10/10/10  -AC      Additional Comments  Red  -AC         Exercise 4    Exercise Name 4  Side and diagonal steps w/ lunge  -AC      Reps 4  2/3 laps up and back // bars  -AC         Exercise 5    Exercise Name 5  Reverse lunges  -AC      Sets 5  2  -AC      Reps 5  10  -AC         Exercise 6    Exercise Name 6  Step ups  -AC      Sets 6  2  -AC      Reps 6  10  -AC      Additional Comments  8\" step  -AC         Exercise 7    Exercise Name 7  Wall squats  -AC      Reps 7  10  -AC      Additional Comments  W/ ball  -AC         Exercise 8    Exercise Name 8  LAQ  -AC      Sets 8  3  -AC      Reps 8  10  -AC      Additional Comments  5#  -AC         Exercise 9    Exercise Name 9  Heel slides w/ and w/o strap  -AC      Reps 9  10/10  -AC         Exercise 10    Exercise Name 10  Quad/HS stretch  -AC      Time 10  2 min ea  -AC        User Key  (r) = Recorded By, (t) = Taken By, (c) = Cosigned By    Initials Name Provider Type    AC Shwetha Oneil, PT Physical Therapist                                          Time Calculation:      Therapy Charges for Today     Code Description Service Date Service Provider Modifiers Qty    [id number]  PT THER PROC EA 15 MIN 10/6/2020 Shwetha Oneil, PT GP 3                    Shwetha Oneil, PT  10/6/2020     "

## 2020-10-12 DIAGNOSIS — G89.29 CHRONIC BILATERAL LOW BACK PAIN, UNSPECIFIED WHETHER SCIATICA PRESENT: Primary | ICD-10-CM

## 2020-10-12 DIAGNOSIS — M54.50 CHRONIC BILATERAL LOW BACK PAIN, UNSPECIFIED WHETHER SCIATICA PRESENT: Primary | ICD-10-CM

## 2020-10-13 ENCOUNTER — HOSPITAL ENCOUNTER (OUTPATIENT)
Dept: PHYSICAL THERAPY | Facility: HOSPITAL | Age: 70
Setting detail: THERAPIES SERIES
Discharge: HOME | End: 2020-10-13

## 2020-10-13 DIAGNOSIS — M25.561 RIGHT KNEE PAIN, UNSPECIFIED CHRONICITY: ICD-10-CM

## 2020-10-13 DIAGNOSIS — Z96.651 STATUS POST RIGHT KNEE REPLACEMENT: Primary | ICD-10-CM

## 2020-10-13 PROCEDURE — 97110 THERAPEUTIC EXERCISES: CPT

## 2020-10-13 NOTE — THERAPY TREATMENT NOTE
"    Outpatient Physical Therapy Ortho Progress Note  [facility]     Patient Name: Jimmie Salcedo  : 1950  MRN: [id number]  Today's Date: 10/13/2020      Visit Date: 10/13/2020    Visit Dx:    ICD-10-CM ICD-9-CM   1. Status post right knee replacement  Z96.651 V43.65   2. Right knee pain, unspecified chronicity  M25.561 719.46       Patient Active Problem List   Diagnosis   • Acid reflux   • Arthritis   • Hypertension   • Hyperlipidemia   • Severe obstructive sleep apnea   • Psoriasis   • Diastolic dysfunction   • Peptic ulceration   • Bilateral edema of lower extremity   • Abnormal liver function test   • Hyperbilirubinemia   • Psychophysiological insomnia   • Gilbert's disease   • Thrombocytopenia, unspecified (CMS/HCC)   • Situational depression   • Tubular adenoma   • Seasonal allergic rhinitis due to pollen   • Mild cognitive impairment   • Memory loss   • Primary osteoarthritis of right knee   • Status post total right knee replacement   • Leukocytosis, likely reactive   • Postoperative pain        Past Medical History:   Diagnosis Date   • Abnormal liver function test    • Arthritis    • Bilateral edema of lower extremity    • Cataract     bilat- still present - mild    • Cellulitis of leg, right     resolved   • Chalazion    • Cracking skin     bilat feet mostly    • Disease     \"brakes/breaks\" disease as child-  effected blood and urine    • GERD (gastroesophageal reflux disease)    • History of kidney stones     x2 had to have broken up    • Hoarseness     from vocal cord bending- seeing ent - possible surgery soon    • Hypertension    • Kidney infection     h/o    • Neck muscle spasm    • Onychomycosis of toenail    • Peptic ulceration    • Renal calculi    • Situational depression 2018   • Sleep apnea with use of continuous positive airway pressure (CPAP)     compliant with machine    • 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    • Vocal cord strain     vocal cord bent- seeing ent but " causes pt to be hoarse    • Wears glasses         Past Surgical History:   Procedure Laterality Date   • COLONOSCOPY     • CYSTOSCOPY W/ LASER LITHOTRIPSY      x2   • ENDOSCOPY      with dilation    • FINGER SURGERY      left 5th finger   • KNEE SURGERY Right 1986    arthroscopy   • OTHER SURGICAL HISTORY      Lithotripsy   • TONSILLECTOMY     • TOTAL KNEE ARTHROPLASTY Right 7/22/2020    Procedure: TOTAL KNEE ARTHROPLASTY RIGHT;  Surgeon: Tereso Restrepo MD;  Location: [facility];  Service: Orthopedics;  Laterality: Right;       PT Ortho     Row Name 10/13/20 1130       Right Lower Ext    Rt Knee Extension/Flexion AROM  -2-120  -MM       MMT Right Lower Ext    Rt Lower Extremity Comments   Right hip and knee strength are WNL  -MM       Gait/Stairs (Locomotion)    Comment (Gait/Stairs)  walking well without AD. negotiating stairs with hand rail and without difficulty.   -MM    Row Name 10/13/20 1100       Right Lower Ext    Rt Knee Extension/Flexion AROM  --  -MM      User Key  (r) = Recorded By, (t) = Taken By, (c) = Cosigned By    Initials Name Provider Type    Dennis Nagy, PT Physical Therapist                      PT Assessment/Plan     Row Name 10/13/20 1130          PT Assessment    Assessment Comments  client has achieved all goals for knee pain. He is now negotiating stairs and walking without difficulty. Knee mobility and strength are WFL. He is experiencing some back pain and has a new order to evaluate and treat for back pain.  -MM        PT Plan    PT Plan Comments  Discharge treatment for knee pain and next visit re-evaluate for back pain.  -MM       User Key  (r) = Recorded By, (t) = Taken By, (c) = Cosigned By    Initials Name Provider Type    Dennis Nagy, PT Physical Therapist            OP Exercises     Row Name 10/13/20 5087             Subjective Comments    Subjective Comments  Client reports that he is doing well now with stairs and walking.   -MM         Subjective Pain     Able to rate subjective pain?  yes  -MM      Pre-Treatment Pain Level  0  -MM      Post-Treatment Pain Level  0  -MM         Total Minutes    05843 - PT Therapeutic Exercise Minutes  30  -MM         Exercise 1    Exercise Name 1  Nu-step   -MM      Time 1  5 min  -MM      Additional Comments  L5  -MM         Exercise 2    Exercise Name 2  recumbent bike  -MM      Time 2  5 min  -MM      Additional Comments  L4  -MM         Exercise 3    Exercise Name 3  Updated and reviewed Home program: sit to stand, reverse lunge with support, band resisted 3-way leg kicks, LAQ, leg curls, and hamstring stretch.   -MM      Time 3  20 min  -MM        User Key  (r) = Recorded By, (t) = Taken By, (c) = Cosigned By    Initials Name Provider Type    MM Dennis Kenny, PT Physical Therapist                       PT OP Goals     Row Name 10/13/20 1130          PT Short Term Goals    STG Date to Achieve  09/04/20  -MM     STG 1  Decrease R knee pain by > or = 50%.  -MM     STG 1 Progress  Met  -MM     STG 2  Pt will be independent with HEP to improve ROM/strength.  -MM     STG 2 Progress  Met  -MM     STG 3  Improve R knee flexion ROM to at least 100 deg.  -MM     STG 3 Progress  Met  -MM        Long Term Goals    LTG Date to Achieve  09/25/20  -MM     LTG 1  Decrease R knee pain by > or = 75%.  -MM     LTG 1 Progress  Met  -MM     LTG 2  Improve LEFS to > or = 65/80.  -MM     LTG 2 Progress  Goal Revised  -MM     LTG 3  Client will negotiate stairs without difficulty.  -MM     LTG 3 Progress  Met  -MM     LTG 4  Improve R knee ROM to WNL.  -MM     LTG 4 Progress  Met  -MM     LTG 5  Improve RLE strength to grossly 4+/5.  -MM     LTG 5 Progress  Met  -MM     LTG 6  Enable normal gait pattern with least restrictive device.  -MM     LTG 6 Progress  Met  -MM     LTG 7  Improve 30 Sec Chair to > or = 10 reps.  -MM     LTG 7 Progress  Met  -MM       User Key  (r) = Recorded By, (t) = Taken By, (c) = Cosigned By    Initials Name Provider  Type    MM Dennis Kenny, PT Physical Therapist                         Time Calculation:   Start Time: 1130  Therapy Charges for Today     Code Description Service Date Service Provider Modifiers Qty    [id number]  PT THER PROC EA 15 MIN 10/13/2020 Dennis Kenny, PT GP 2                    Dennis Kenny, PT  10/13/2020

## 2020-10-14 NOTE — PROGRESS NOTES
"    [facility] [place] [facility] [place]        Subjective     Follow-up (1 month follow up -  3 months s/p Total Knee Arthroplasty Right 7/22/20)       ANTELMO Salcedo is a 70 y.o. male.  Patient is now 3 months out from right total knee arthroplasty on 7/22/2020.  He is doing very well overall.  He states his low back is really bothering him.  This is bothered him for quite some time.  He denies bowel or bladder disturbance.          Objective      Physical Exam  Pulse 77   Ht 180.3 cm (70.98\")   Wt 102 kg (225 lb)   SpO2 97%   BMI 31.40 kg/m²     Body mass index is 31.4 kg/m².        Ortho Exam  Right knee: Range of motion 5-115  Calf is soft and nontender  Toes are pink and warm    Imaging Reviewed:  Imaging Results (Last 24 Hours)     ** No results found for the last 24 hours. **            Assessment    Assessment:  1. Status post total right knee replacement    2. Postoperative stiffness of total knee replacement, subsequent encounter    3. Chronic low back pain, unspecified back pain laterality, unspecified whether sciatica present        Plan:  1. Status post right total knee arthroplasty--patient's range of motion is come along nicely.  Follow-up in 3 months.  2. Low back pain--MRI be requested.  We will refer him to neurosurgery as well.      Tereso Restrepo MD  10/15/20  10:04 ISAELT      Dragon disclaimer:  Much of this encounter note is an electronic transcription/translation of spoken language to printed text. The electronic translation of spoken language may permit erroneous, or at times, nonsensical words or phrases to be inadvertently transcribed; Although I have reviewed the note for such errors, some may still exist.  "

## 2020-10-15 ENCOUNTER — OFFICE VISIT (OUTPATIENT)
Dept: ORTHOPEDIC SURGERY | Facility: CLINIC | Age: 70
End: 2020-10-15

## 2020-10-15 VITALS — WEIGHT: 225 LBS | HEART RATE: 77 BPM | HEIGHT: 71 IN | OXYGEN SATURATION: 97 % | BODY MASS INDEX: 31.5 KG/M2

## 2020-10-15 DIAGNOSIS — Z96.659 POSTOPERATIVE STIFFNESS OF TOTAL KNEE REPLACEMENT, SUBSEQUENT ENCOUNTER: ICD-10-CM

## 2020-10-15 DIAGNOSIS — T84.89XD POSTOPERATIVE STIFFNESS OF TOTAL KNEE REPLACEMENT, SUBSEQUENT ENCOUNTER: ICD-10-CM

## 2020-10-15 DIAGNOSIS — G89.29 CHRONIC LOW BACK PAIN, UNSPECIFIED BACK PAIN LATERALITY, UNSPECIFIED WHETHER SCIATICA PRESENT: ICD-10-CM

## 2020-10-15 DIAGNOSIS — M25.669 POSTOPERATIVE STIFFNESS OF TOTAL KNEE REPLACEMENT, SUBSEQUENT ENCOUNTER: ICD-10-CM

## 2020-10-15 DIAGNOSIS — Z96.651 STATUS POST TOTAL RIGHT KNEE REPLACEMENT: Primary | ICD-10-CM

## 2020-10-15 DIAGNOSIS — M54.50 CHRONIC LOW BACK PAIN, UNSPECIFIED BACK PAIN LATERALITY, UNSPECIFIED WHETHER SCIATICA PRESENT: ICD-10-CM

## 2020-10-15 PROCEDURE — 99024 POSTOP FOLLOW-UP VISIT: CPT | Performed by: ORTHOPAEDIC SURGERY

## 2020-10-16 ENCOUNTER — HOSPITAL ENCOUNTER (OUTPATIENT)
Dept: PHYSICAL THERAPY | Facility: HOSPITAL | Age: 70
Setting detail: THERAPIES SERIES
Discharge: HOME | End: 2020-10-16

## 2020-10-16 DIAGNOSIS — G89.29 CHRONIC BILATERAL LOW BACK PAIN WITHOUT SCIATICA: Primary | ICD-10-CM

## 2020-10-16 DIAGNOSIS — M54.50 CHRONIC BILATERAL LOW BACK PAIN WITHOUT SCIATICA: Primary | ICD-10-CM

## 2020-10-16 PROCEDURE — 97164 PT RE-EVAL EST PLAN CARE: CPT

## 2020-10-16 PROCEDURE — 97110 THERAPEUTIC EXERCISES: CPT

## 2020-10-16 NOTE — THERAPY RE-EVALUATION
"    Outpatient Physical Therapy Ortho Re-Evaluation   [facility]     Patient Name: Jimmie Salcedo  : 1950  MRN: [id number]  Today's Date: 10/16/2020      Visit Date: 10/16/2020    Patient Active Problem List   Diagnosis   • Acid reflux   • Arthritis   • Hypertension   • Hyperlipidemia   • Severe obstructive sleep apnea   • Psoriasis   • Diastolic dysfunction   • Peptic ulceration   • Bilateral edema of lower extremity   • Abnormal liver function test   • Hyperbilirubinemia   • Psychophysiological insomnia   • Gilbert's disease   • Thrombocytopenia, unspecified (CMS/HCC)   • Situational depression   • Tubular adenoma   • Seasonal allergic rhinitis due to pollen   • Mild cognitive impairment   • Memory loss   • Primary osteoarthritis of right knee   • Status post total right knee replacement   • Leukocytosis, likely reactive   • Postoperative pain        Past Medical History:   Diagnosis Date   • Abnormal liver function test    • Arthritis    • Bilateral edema of lower extremity    • Cataract     bilat- still present - mild    • Cellulitis of leg, right     resolved   • Chalazion    • Cracking skin     bilat feet mostly    • Disease     \"brakes/breaks\" disease as child-  effected blood and urine    • GERD (gastroesophageal reflux disease)    • History of kidney stones     x2 had to have broken up    • Hoarseness     from vocal cord bending- seeing ent - possible surgery soon    • Hypertension    • Kidney infection     h/o    • Neck muscle spasm    • Onychomycosis of toenail    • Peptic ulceration    • Renal calculi    • Situational depression 2018   • Sleep apnea with use of continuous positive airway pressure (CPAP)     compliant with machine    • 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    • Vocal cord strain     vocal cord bent- seeing ent but causes pt to be hoarse    • Wears glasses         Past Surgical History:   Procedure Laterality Date   • COLONOSCOPY     • CYSTOSCOPY W/ LASER LITHOTRIPSY "      x2   • ENDOSCOPY      with dilation    • FINGER SURGERY      left 5th finger   • KNEE SURGERY Right 1986    arthroscopy   • OTHER SURGICAL HISTORY      Lithotripsy   • TONSILLECTOMY     • TOTAL KNEE ARTHROPLASTY Right 7/22/2020    Procedure: TOTAL KNEE ARTHROPLASTY RIGHT;  Surgeon: Tereso Restrepo MD;  Location: [facility];  Service: Orthopedics;  Laterality: Right;       Visit Dx:     ICD-10-CM ICD-9-CM   1. Chronic bilateral low back pain without sciatica  M54.5 724.2    G89.29 338.29         Patient History     Row Name 10/16/20 1100             History    Chief Complaint  Difficulty with daily activities;Muscle tenderness;Muscle weakness;Pain  -MM      Type of Pain  Back pain  -MM      Date Current Problem(s) Began  06/01/20  -MM      Brief Description of Current Complaint  Client presents with low back pain that has been present for several months. He had low back pain prior to knee replacement surgery and symptoms have continued. He notes that his low back gives out a couple times per week and he has pain in the morning and after activity.  -MM      Previous treatment for THIS PROBLEM  Medication  -MM      Patient/Caregiver Goals  Relieve pain;Improve mobility;Improve strength  -MM      Patient's Rating of General Health  Good  -MM         Pain     Pain Location  Back  -MM      Pain at Present  6  -MM      Pain at Best  2  -MM      Pain at Worst  9  -MM      Pain Frequency  Intermittent  -MM      Pain Description  Aching;Discomfort;Sore;Sharp  -MM      Difficulties with ADL's?  prolonged sitting/standing, lifting  -MM         Fall Risk Assessment    Any falls in the past year:  Yes  -MM      Number of falls reported in the last 12 months  2  -MM      Factors that contributed to the fall:  Lost balance  -MM      Does patient have a fear of falling  No  -MM         Daily Activities    Pt Participated in POC and Goals  Yes  -MM        User Key  (r) = Recorded By, (t) = Taken By, (c) = Cosigned By     Initials Name Provider Type    Dennis Nagy, PT Physical Therapist          PT Ortho     Row Name 10/16/20 1100       Subjective Pain    Able to rate subjective pain?  yes  -MM    Pre-Treatment Pain Level  6  -MM    Post-Treatment Pain Level  6  -MM       Posture/Observations    Posture/Observations Comments  tenderness central L4/L5 and L5/S1 and LS region.   -MM       General ROM    Head/Neck/Trunk  Trunk Extension;Trunk Flexion;Trunk Lt Lateral Flexion;Trunk Rt Lateral Flexion;Trunk Lt Rotation;Trunk Rt Rotation  -MM    GENERAL ROM COMMENTS  repeated extension in lying is tolerated well w no increase in pain  -MM       Head/Neck/Trunk    Trunk Extension AROM  19 degrees  -MM    Trunk Flexion AROM  full range w some pain  -MM    Trunk Lt Lateral Flexion AROM  severe loss of motion w pain  -MM    Trunk Rt Lateral Flexion AROM  severe loss of motion w pain  -MM    Trunk Lt Rotation AROM  mod loss of motion  -MM    Trunk Rt Rotation AROM  mod loss of motion  -MM       MMT (Manual Muscle Testing)    General MMT Comments  bilateral LE strength is WNL, some back pain w resisted hip flexion  -MM      User Key  (r) = Recorded By, (t) = Taken By, (c) = Cosigned By    Initials Name Provider Type    Dennis Nagy, PT Physical Therapist      Therapy Education  Education Details: HEP: press ups 4x/day, SKTC, LTR,bridging 1x/day  Given: HEP  Program: New  How Provided: Verbal  Provided to: Patient  Level of Understanding: Teach back education performed     PT OP Goals     Row Name 10/16/20 1100          PT Short Term Goals    STG Date to Achieve  11/06/20  -MM     STG 1  Client will report 90% improvement in back pain with daily activity.  -MM     STG 1 Progress  New  -MM     STG 2  Client will report 90% improvement in instances of his left leg giving way with daily activity.  -MM     STG 2 Progress  New  -MM     STG 3  Client will be independent with home program to minimize pain.  -MM     STG 3 Progress  New   -MM     STG 4  Central lumbar tenderness will resolve.   -MM     STG 4 Progress  New  -MM        Long Term Goals    LTG Date to Achieve  11/27/20  -MM     LTG 1  Modified Oswestry score will improve to 18% or better.   -MM     LTG 1 Progress  New  -MM     LTG 2  Client will improve to lifting light items from the floor without difficulty.  -MM     LTG 2 Progress  New  -MM        Time Calculation    PT Goal Re-Cert Due Date  01/14/21  -MM       User Key  (r) = Recorded By, (t) = Taken By, (c) = Cosigned By    Initials Name Provider Type    Dennis Nagy, PT Physical Therapist        PT Assessment/Plan     Row Name 10/16/20 1100          PT Assessment    Functional Limitations  Limitation in home management;Performance in leisure activities;Performance in self-care ADL  -MM     Impairments  Pain;Range of motion  -MM     Assessment Comments  Client returns today for re-evaluation to include treatment for low back pain. He has signs of mechanical low back pain and pain exacerbated by arthritis. skilled intervention is required to improve mobility and tolerance for daily function.   -MM     Please refer to paper survey for additional self-reported information  Yes  -MM     Rehab Potential  Good  -MM     Patient/caregiver participated in establishment of treatment plan and goals  Yes  -MM     Patient would benefit from skilled therapy intervention  Yes  -MM        PT Plan    PT Frequency  1x/week  -MM     Predicted Duration of Therapy Intervention (PT)  4-8 visits  -MM     Planned CPT's?  PT RE-EVAL: 55673;PT THER PROC EA 15 MIN: 11026;PT MANUAL THERAPY EA 15 MIN: 09745;PT TRACTION LUMBAR: 82511  -MM     PT Plan Comments  continue per plan of treatment with exercises focusing on extension. Include general mobility exercises as well.   -MM       User Key  (r) = Recorded By, (t) = Taken By, (c) = Cosigned By    Initials Name Provider Type    Dennis Nagy, PT Physical Therapist        OP Exercises     Row Name  10/16/20 1100             Subjective Pain    Able to rate subjective pain?  yes  -MM      Pre-Treatment Pain Level  6  -MM      Post-Treatment Pain Level  6  -MM        User Key  (r) = Recorded By, (t) = Taken By, (c) = Cosigned By    Initials Name Provider Type    Dennis Nagy, PT Physical Therapist        Outcome Measure Options: Modifed Owestry  Modified Oswestry  Modified Oswestry Score/Comments: 30%      Time Calculation:     Start Time: 1100     Therapy Charges for Today     Code Description Service Date Service Provider Modifiers Qty    [id number]  PT RE-EVAL ESTABLISHED PLAN 2 10/16/2020 Dennis Kenny, PT GP 1          PT G-Codes  Outcome Measure Options: Modifed Owestry  Modified Oswestry Score/Comments: 30%         Dennis Kenny, PT  10/16/2020

## 2020-10-19 RX ORDER — DOXAZOSIN MESYLATE 4 MG/1
TABLET ORAL
Qty: 90 TABLET | Refills: 3 | Status: SHIPPED | OUTPATIENT
Start: 2020-10-19 | End: 2022-01-21

## 2020-10-19 RX ORDER — PRAVASTATIN SODIUM 40 MG
TABLET ORAL
Qty: 90 TABLET | Refills: 3 | Status: SHIPPED | OUTPATIENT
Start: 2020-10-19 | End: 2021-10-07

## 2020-10-23 ENCOUNTER — HOSPITAL ENCOUNTER (OUTPATIENT)
Dept: PHYSICAL THERAPY | Facility: HOSPITAL | Age: 70
Setting detail: THERAPIES SERIES
Discharge: HOME | End: 2020-10-23

## 2020-10-23 DIAGNOSIS — G89.29 CHRONIC BILATERAL LOW BACK PAIN WITHOUT SCIATICA: Primary | ICD-10-CM

## 2020-10-23 DIAGNOSIS — M54.50 CHRONIC BILATERAL LOW BACK PAIN WITHOUT SCIATICA: Primary | ICD-10-CM

## 2020-10-23 PROCEDURE — 97110 THERAPEUTIC EXERCISES: CPT

## 2020-10-23 NOTE — THERAPY TREATMENT NOTE
"    Outpatient Physical Therapy Ortho Treatment Note  [facility]     Patient Name: Jimmie Salcedo  : 1950  MRN: [id number]  Today's Date: 10/23/2020      Visit Date: 10/23/2020    Visit Dx:    ICD-10-CM ICD-9-CM   1. Chronic bilateral low back pain without sciatica  M54.5 724.2    G89.29 338.29       Patient Active Problem List   Diagnosis   • Acid reflux   • Arthritis   • Hypertension   • Hyperlipidemia   • Severe obstructive sleep apnea   • Psoriasis   • Diastolic dysfunction   • Peptic ulceration   • Bilateral edema of lower extremity   • Abnormal liver function test   • Hyperbilirubinemia   • Psychophysiological insomnia   • Gilbert's disease   • Thrombocytopenia, unspecified (CMS/HCC)   • Situational depression   • Tubular adenoma   • Seasonal allergic rhinitis due to pollen   • Mild cognitive impairment   • Memory loss   • Primary osteoarthritis of right knee   • Status post total right knee replacement   • Leukocytosis, likely reactive   • Postoperative pain        Past Medical History:   Diagnosis Date   • Abnormal liver function test    • Arthritis    • Bilateral edema of lower extremity    • Cataract     bilat- still present - mild    • Cellulitis of leg, right     resolved   • Chalazion    • Cracking skin     bilat feet mostly    • Disease     \"brakes/breaks\" disease as child-  effected blood and urine    • GERD (gastroesophageal reflux disease)    • History of kidney stones     x2 had to have broken up    • Hoarseness     from vocal cord bending- seeing ent - possible surgery soon    • Hypertension    • Kidney infection     h/o    • Neck muscle spasm    • Onychomycosis of toenail    • Peptic ulceration    • Renal calculi    • Situational depression 2018   • Sleep apnea with use of continuous positive airway pressure (CPAP)     compliant with machine    • 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    • Vocal cord strain     vocal cord bent- seeing ent but causes pt to be hoarse    • " Wears glasses         Past Surgical History:   Procedure Laterality Date   • COLONOSCOPY     • CYSTOSCOPY W/ LASER LITHOTRIPSY      x2   • ENDOSCOPY      with dilation    • FINGER SURGERY      left 5th finger   • KNEE SURGERY Right 1986    arthroscopy   • OTHER SURGICAL HISTORY      Lithotripsy   • TONSILLECTOMY     • TOTAL KNEE ARTHROPLASTY Right 7/22/2020    Procedure: TOTAL KNEE ARTHROPLASTY RIGHT;  Surgeon: Tereso Restrepo MD;  Location: [facility];  Service: Orthopedics;  Laterality: Right;       PT Assessment/Plan     Row Name 10/23/20 0879          PT Assessment    Assessment Comments  Client tolerates back exercises well and has noticed some improvement in overall discomfort. He is still limited with flexion activities and is not ready for repeated flexion in standing or sitting yet.  -MM        PT Plan    PT Plan Comments  Continue per plan of treatment with exercises and manual therapy as needed.   -MM       User Key  (r) = Recorded By, (t) = Taken By, (c) = Cosigned By    Initials Name Provider Type    MM Dennis Kenny, PT Physical Therapist        OP Exercises     Row Name 10/23/20 8660             Subjective Comments    Subjective Comments  Client reports some improvement in back pain, but it is still noticeable. Overall knee function improves a little each week.  -MM         Subjective Pain    Able to rate subjective pain?  yes  -MM      Pre-Treatment Pain Level  2  -MM      Post-Treatment Pain Level  2  -MM         Total Minutes    06574 - PT Therapeutic Exercise Minutes  25  -MM         Exercise 1    Exercise Name 1  press ups  -MM      Sets 1  3  -MM      Reps 1  10  -MM         Exercise 2    Exercise Name 2  Bridging w ball  -MM      Reps 2  15  -MM         Exercise 3    Exercise Name 3  LTR w ball  -MM      Reps 3  15  -MM         Exercise 4    Exercise Name 4  knee to chest w ball  -MM      Reps 4  15  -MM         Exercise 5    Exercise Name 5  standing sideglide bilateral  -MM       Reps 5  15  -MM         Exercise 6    Exercise Name 6  sit to stand with weights  -MM      Reps 6  15  -MM         Exercise 7    Exercise Name 7  prone leg raise  -MM      Reps 7  10 ea  -MM         Exercise 8    Exercise Name 8  standing leg kick extension  -MM      Reps 8  15 ea  -MM         Exercise 9    Exercise Name 9  standing extension w towel  -MM      Reps 9  15  -MM        User Key  (r) = Recorded By, (t) = Taken By, (c) = Cosigned By    Initials Name Provider Type    Dennis Nagy, PT Physical Therapist      Time Calculation:   Start Time: 0830  Therapy Charges for Today     Code Description Service Date Service Provider Modifiers Qty    36307944508  PT THER PROC EA 15 MIN 10/23/2020 Dennis Kenny, PT GP 2        Dennis Kenny, PT  10/23/2020

## 2020-10-29 ENCOUNTER — HOSPITAL ENCOUNTER (OUTPATIENT)
Dept: MRI IMAGING | Facility: HOSPITAL | Age: 70
End: 2020-10-29

## 2020-10-30 ENCOUNTER — HOSPITAL ENCOUNTER (OUTPATIENT)
Dept: PHYSICAL THERAPY | Facility: HOSPITAL | Age: 70
Setting detail: THERAPIES SERIES
Discharge: HOME | End: 2020-10-30

## 2020-10-30 ENCOUNTER — OFFICE VISIT (OUTPATIENT)
Dept: INTERNAL MEDICINE | Facility: CLINIC | Age: 70
End: 2020-10-30

## 2020-10-30 ENCOUNTER — LAB (OUTPATIENT)
Dept: LAB | Facility: HOSPITAL | Age: 70
End: 2020-10-30

## 2020-10-30 VITALS
HEART RATE: 72 BPM | TEMPERATURE: 97.3 F | BODY MASS INDEX: 31.92 KG/M2 | WEIGHT: 228 LBS | HEIGHT: 71 IN | DIASTOLIC BLOOD PRESSURE: 64 MMHG | SYSTOLIC BLOOD PRESSURE: 140 MMHG

## 2020-10-30 DIAGNOSIS — R97.20 ABNORMAL PSA: ICD-10-CM

## 2020-10-30 DIAGNOSIS — G89.29 CHRONIC BILATERAL LOW BACK PAIN WITHOUT SCIATICA: Primary | ICD-10-CM

## 2020-10-30 DIAGNOSIS — Z12.5 SCREENING FOR PROSTATE CANCER: ICD-10-CM

## 2020-10-30 DIAGNOSIS — D69.6 THROMBOCYTOPENIA, UNSPECIFIED (HCC): ICD-10-CM

## 2020-10-30 DIAGNOSIS — E80.4 GILBERT'S DISEASE: ICD-10-CM

## 2020-10-30 DIAGNOSIS — F43.21 SITUATIONAL DEPRESSION: ICD-10-CM

## 2020-10-30 DIAGNOSIS — M54.50 CHRONIC BILATERAL LOW BACK PAIN WITHOUT SCIATICA: Primary | ICD-10-CM

## 2020-10-30 DIAGNOSIS — N32.2: ICD-10-CM

## 2020-10-30 DIAGNOSIS — N40.1 BENIGN PROSTATIC HYPERPLASIA WITH URINARY FREQUENCY: ICD-10-CM

## 2020-10-30 DIAGNOSIS — G47.33 SEVERE OBSTRUCTIVE SLEEP APNEA: ICD-10-CM

## 2020-10-30 DIAGNOSIS — R35.0 BENIGN PROSTATIC HYPERPLASIA WITH URINARY FREQUENCY: ICD-10-CM

## 2020-10-30 DIAGNOSIS — E78.49 OTHER HYPERLIPIDEMIA: ICD-10-CM

## 2020-10-30 DIAGNOSIS — R73.09 ABNORMAL GLUCOSE: ICD-10-CM

## 2020-10-30 DIAGNOSIS — K21.9 GASTROESOPHAGEAL REFLUX DISEASE WITHOUT ESOPHAGITIS: ICD-10-CM

## 2020-10-30 DIAGNOSIS — I10 ESSENTIAL HYPERTENSION: Primary | ICD-10-CM

## 2020-10-30 LAB
ALBUMIN SERPL-MCNC: 4.5 G/DL (ref 3.5–5.2)
ALBUMIN/GLOB SERPL: 1.7 G/DL
ALP SERPL-CCNC: 75 U/L (ref 39–117)
ALT SERPL W P-5'-P-CCNC: 26 U/L (ref 1–41)
ANION GAP SERPL CALCULATED.3IONS-SCNC: 11.1 MMOL/L (ref 5–15)
AST SERPL-CCNC: 26 U/L (ref 1–40)
BILIRUB SERPL-MCNC: 1.4 MG/DL (ref 0–1.2)
BUN SERPL-MCNC: 16 MG/DL (ref 8–23)
BUN/CREAT SERPL: 15.7 (ref 7–25)
CALCIUM SPEC-SCNC: 9.6 MG/DL (ref 8.6–10.5)
CHLORIDE SERPL-SCNC: 104 MMOL/L (ref 98–107)
CHOLEST SERPL-MCNC: 148 MG/DL (ref 0–200)
CO2 SERPL-SCNC: 25.9 MMOL/L (ref 22–29)
CREAT SERPL-MCNC: 1.02 MG/DL (ref 0.76–1.27)
DEPRECATED RDW RBC AUTO: 46 FL (ref 37–54)
ERYTHROCYTE [DISTWIDTH] IN BLOOD BY AUTOMATED COUNT: 15.2 % (ref 12.3–15.4)
GFR SERPL CREATININE-BSD FRML MDRD: 73 ML/MIN/1.73
GLOBULIN UR ELPH-MCNC: 2.7 GM/DL
GLUCOSE SERPL-MCNC: 107 MG/DL (ref 65–99)
HCT VFR BLD AUTO: 47.7 % (ref 37.5–51)
HDLC SERPL-MCNC: 45 MG/DL (ref 40–60)
HGB BLD-MCNC: 16.5 G/DL (ref 13–17.7)
LDLC SERPL CALC-MCNC: 81 MG/DL (ref 0–100)
LDLC/HDLC SERPL: 1.75 {RATIO}
MCH RBC QN AUTO: 29 PG (ref 26.6–33)
MCHC RBC AUTO-ENTMCNC: 34.6 G/DL (ref 31.5–35.7)
MCV RBC AUTO: 84 FL (ref 79–97)
PLATELET # BLD AUTO: 148 10*3/MM3 (ref 140–450)
PMV BLD AUTO: 9.7 FL (ref 6–12)
POTASSIUM SERPL-SCNC: 4.5 MMOL/L (ref 3.5–5.2)
PROT SERPL-MCNC: 7.2 G/DL (ref 6–8.5)
PSA SERPL-MCNC: 4.71 NG/ML (ref 0–4)
RBC # BLD AUTO: 5.68 10*6/MM3 (ref 4.14–5.8)
SODIUM SERPL-SCNC: 141 MMOL/L (ref 136–145)
TRIGL SERPL-MCNC: 121 MG/DL (ref 0–150)
TSH SERPL DL<=0.05 MIU/L-ACNC: 1.64 UIU/ML (ref 0.27–4.2)
VLDLC SERPL-MCNC: 22 MG/DL (ref 5–40)
WBC # BLD AUTO: 7.84 10*3/MM3 (ref 3.4–10.8)

## 2020-10-30 PROCEDURE — 84443 ASSAY THYROID STIM HORMONE: CPT | Performed by: INTERNAL MEDICINE

## 2020-10-30 PROCEDURE — 83036 HEMOGLOBIN GLYCOSYLATED A1C: CPT | Performed by: INTERNAL MEDICINE

## 2020-10-30 PROCEDURE — 97110 THERAPEUTIC EXERCISES: CPT

## 2020-10-30 PROCEDURE — 80061 LIPID PANEL: CPT | Performed by: INTERNAL MEDICINE

## 2020-10-30 PROCEDURE — 99214 OFFICE O/P EST MOD 30 MIN: CPT | Performed by: INTERNAL MEDICINE

## 2020-10-30 PROCEDURE — 85027 COMPLETE CBC AUTOMATED: CPT | Performed by: INTERNAL MEDICINE

## 2020-10-30 PROCEDURE — 84153 ASSAY OF PSA TOTAL: CPT | Performed by: INTERNAL MEDICINE

## 2020-10-30 PROCEDURE — 80053 COMPREHEN METABOLIC PANEL: CPT | Performed by: INTERNAL MEDICINE

## 2020-10-30 NOTE — THERAPY TREATMENT NOTE
"    Outpatient Physical Therapy Ortho Treatment Note  [facility]     Patient Name: Jimmie Salcedo  : 1950  MRN: [id number]  Today's Date: 10/30/2020      Visit Date: 10/30/2020    Visit Dx:    ICD-10-CM ICD-9-CM   1. Chronic bilateral low back pain without sciatica  M54.5 724.2    G89.29 338.29       Patient Active Problem List   Diagnosis   • Acid reflux   • Arthritis   • Hypertension   • Hyperlipidemia   • Severe obstructive sleep apnea   • Psoriasis   • Diastolic dysfunction   • Peptic ulceration   • Bilateral edema of lower extremity   • Abnormal liver function test   • Hyperbilirubinemia   • Psychophysiological insomnia   • Gilbert's disease   • Thrombocytopenia, unspecified (CMS/HCC)   • Situational depression   • Tubular adenoma   • Seasonal allergic rhinitis due to pollen   • Mild cognitive impairment   • Memory loss   • Primary osteoarthritis of right knee   • Status post total right knee replacement   • Leukocytosis, likely reactive   • Postoperative pain        Past Medical History:   Diagnosis Date   • Abnormal liver function test    • Arthritis    • Bilateral edema of lower extremity    • Cataract     bilat- still present - mild    • Cellulitis of leg, right     resolved   • Chalazion    • Cracking skin     bilat feet mostly    • Disease     \"brakes/breaks\" disease as child-  effected blood and urine    • GERD (gastroesophageal reflux disease)    • History of kidney stones     x2 had to have broken up    • Hoarseness     from vocal cord bending- seeing ent - possible surgery soon    • Hypertension    • Kidney infection     h/o    • Neck muscle spasm    • Onychomycosis of toenail    • Peptic ulceration    • Renal calculi    • Situational depression 2018   • Sleep apnea with use of continuous positive airway pressure (CPAP)     compliant with machine    • 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    • Vocal cord strain     vocal cord bent- seeing ent but causes pt to be hoarse    • " Wears glasses         Past Surgical History:   Procedure Laterality Date   • COLONOSCOPY     • CYSTOSCOPY W/ LASER LITHOTRIPSY      x2   • ENDOSCOPY      with dilation    • FINGER SURGERY      left 5th finger   • KNEE SURGERY Right 1986    arthroscopy   • OTHER SURGICAL HISTORY      Lithotripsy   • TONSILLECTOMY     • TOTAL KNEE ARTHROPLASTY Right 7/22/2020    Procedure: TOTAL KNEE ARTHROPLASTY RIGHT;  Surgeon: Tereso Restrepo MD;  Location: [facility];  Service: Orthopedics;  Laterality: Right;     OP Exercises     Row Name 10/30/20 1030             Subjective Comments    Subjective Comments  Client reports no pain at rest.  He still has some discomfort especially early in the morning for the first 30 minutes.  He reports that exercises seem to be going well.  He still has some difficulty lifting items from the floor.  -MM         Subjective Pain    Able to rate subjective pain?  yes  -MM      Pre-Treatment Pain Level  0  -MM      Post-Treatment Pain Level  0  -MM         Total Minutes    86069 - PT Therapeutic Exercise Minutes  30  -MM         Exercise 1    Exercise Name 1  Updated and reviewed home program.  Exercises in the clinic included: Press ups with clinician overpressure 10x3, prone alternate arm/leg raise, single-leg bridges, lower trunk rotation, lower trunk rotation with leg across, sit to stand.  Reviewed standing hip extension leg kicks, and extension over a fulcrum.  -MM      Time 1  30 minutes  -MM        User Key  (r) = Recorded By, (t) = Taken By, (c) = Cosigned By    Initials Name Provider Type    Dennis Nagy, PT Physical Therapist        Time Calculation:   Start Time: 1030  Therapy Charges for Today     Code Description Service Date Service Provider Modifiers Qty    07662152713 HC PT THER PROC EA 15 MIN 10/30/2020 Dennis Kenny, PT GP 2          Dennis Kenny, PT  10/30/2020

## 2020-10-30 NOTE — PROGRESS NOTES
"Patient is a 70 y.o. male who is here for a follow up of hyperlipidemia and hypertension.  Chief Complaint   Patient presents with   • Hyperlipidemia   • Hypertension         HPI:    Here for mgmt of HTN and hyperlipidemia and GERD.  Will be going back to work.  Getting PT for his back.  Doing well overall.  Sleeping good.  Appetite is good.  Has gained 12 pounds since last seen.  GERD is controlled.  Good uop.  No fever or chills.     History:     Patient Active Problem List   Diagnosis   • Acid reflux   • Arthritis   • Hypertension   • Hyperlipidemia   • Severe obstructive sleep apnea   • Psoriasis   • Diastolic dysfunction   • Peptic ulceration   • Bilateral edema of lower extremity   • Abnormal liver function test   • Hyperbilirubinemia   • Psychophysiological insomnia   • Gilbert's disease   • Thrombocytopenia, unspecified (CMS/HCC)   • Situational depression   • Tubular adenoma   • Seasonal allergic rhinitis due to pollen   • Mild cognitive impairment   • Memory loss   • Primary osteoarthritis of right knee   • Status post total right knee replacement   • Leukocytosis, likely reactive   • Postoperative pain   • Benign prostatic hyperplasia with urinary frequency       Past Medical History:   Diagnosis Date   • Abnormal liver function test    • Arthritis    • Bilateral edema of lower extremity    • Cataract     bilat- still present - mild    • Cellulitis of leg, right     resolved   • Chalazion    • Cracking skin     bilat feet mostly    • Disease     \"brakes/breaks\" disease as child-  effected blood and urine    • GERD (gastroesophageal reflux disease)    • History of kidney stones     x2 had to have broken up    • Hoarseness     from vocal cord bending- seeing ent - possible surgery soon    • Hypertension    • Kidney infection     h/o    • Neck muscle spasm    • Onychomycosis of toenail    • Peptic ulceration    • Renal calculi    • Situational depression 8/22/2018   • Sleep apnea with use of continuous " positive airway pressure (CPAP)     compliant with machine    • 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    • Vocal cord strain     vocal cord bent- seeing ent but causes pt to be hoarse    • Wears glasses        Past Surgical History:   Procedure Laterality Date   • COLONOSCOPY     • CYSTOSCOPY W/ LASER LITHOTRIPSY      x2   • ENDOSCOPY      with dilation    • FINGER SURGERY      left 5th finger   • KNEE SURGERY Right 1986    arthroscopy   • OTHER SURGICAL HISTORY      Lithotripsy   • TONSILLECTOMY     • TOTAL KNEE ARTHROPLASTY Right 7/22/2020    Procedure: TOTAL KNEE ARTHROPLASTY RIGHT;  Surgeon: Tereso Restrepo MD;  Location: [facility];  Service: Orthopedics;  Laterality: Right;       Current Outpatient Medications on File Prior to Visit   Medication Sig   • amLODIPine (NORVASC) 5 MG tablet TAKE ONE TABLET BY MOUTH EVERY DAY   • buPROPion SR (WELLBUTRIN SR) 200 MG 12 hr tablet Take 200 mg by mouth Daily.   • calcipotriene (DOVONOX) 0.005 % ointment As Needed.   • cephalexin (KEFLEX) 500 MG capsule Take 2 capsules by mouth 1 (One) Time As Needed (for dental cleanings) for up to 1 dose. Take 2 capsules 1 hour prior to dental procedure   • chlorproMAZINE (THORAZINE) 25 MG tablet Take 1 tablet by mouth Every 8 (Eight) Hours As Needed for Nausea.   • Cholecalciferol (VITAMIN D3) 125 MCG (5000 UT) capsule capsule Take 5,000 Units by mouth Daily.   • docusate sodium (Colace) 100 MG capsule Take 1 capsule by mouth 2 (Two) Times a Day.   • doxazosin (CARDURA) 4 MG tablet TAKE ONE TABLET BY MOUTH EVERY NIGHT   • fenofibrate (TRICOR) 145 MG tablet TAKE ONE TABLET BY MOUTH EVERY DAY (Patient taking differently: Take 145 mg by mouth Daily.)   • irbesartan (AVAPRO) 300 MG tablet Take 300 mg by mouth Every Night.   • mirtazapine (REMERON) 15 MG tablet TAKE ONE TABLET BY MOUTH EVERY NIGHT (Patient taking differently: Take 15 mg by mouth Every Night.)   • MULTIPLE VITAMINS PO Take 1 capsule by mouth Daily.   • Omega-3  Fatty Acids (FISH OIL) 1000 MG capsule capsule Take 1,000 mg by mouth Daily.   • pravastatin (PRAVACHOL) 40 MG tablet TAKE ONE TABLET BY MOUTH EVERY DAY   • sildenafil (VIAGRA) 100 MG tablet Take 1 tablet by mouth Daily As Needed for erectile dysfunction.   • triamcinolone (KENALOG) 0.1 % ointment Apply  topically 2 (Two) Times a Day As Needed for Irritation or Rash.   • urea (CARMOL) 20 % cream Apply  topically to the appropriate area as directed As Needed for Dry Skin.   • vitamin B-6 (PYRIDOXINE) 50 MG tablet Take 50 mg by mouth Daily.     No current facility-administered medications on file prior to visit.        Family History   Problem Relation Age of Onset   • Arthritis Other    • Hypertension Other    • Hyperlipidemia Other    • Heart attack Other    • Hypertension Mother    • Heart disease Father    • Hypertension Father        Social History     Socioeconomic History   • Marital status:      Spouse name: Not on file   • Number of children: Not on file   • Years of education: Not on file   • Highest education level: Not on file   Tobacco Use   • Smoking status: Never Smoker   • Smokeless tobacco: Never Used   Substance and Sexual Activity   • Alcohol use: Yes     Alcohol/week: 4.0 standard drinks     Types: 4 Cans of beer per week   • Drug use: No   • Sexual activity: Defer         Review of Systems   Constitutional: Positive for fatigue (better). Negative for chills, fever and unexpected weight change.   HENT: Negative for ear pain, hearing loss, rhinorrhea, sinus pressure, sore throat and trouble swallowing.    Eyes: Negative for discharge and itching.   Respiratory: Negative for cough and chest tightness.    Cardiovascular: Negative for chest pain and palpitations.   Gastrointestinal: Negative for abdominal pain, blood in stool, diarrhea and vomiting.        2013 colonoscopy normal  9/18 colonoscopy with TA times 1, repeat in 9/21   Endocrine: Negative for polydipsia and polyuria.   Genitourinary:  "Negative for difficulty urinating, dysuria, enuresis, frequency, hematuria and urgency.        3/20 psa  ED   Musculoskeletal: Positive for arthralgias and back pain. Negative for gait problem and joint swelling.   Skin: Negative for rash and wound.        Dry skin   Allergic/Immunologic: Negative for immunocompromised state.   Neurological: Negative for dizziness, syncope, weakness, light-headedness, numbness and headaches.   Hematological: Does not bruise/bleed easily.   Psychiatric/Behavioral: Positive for behavioral problems (improved) and decreased concentration (improved). Negative for dysphoric mood and sleep disturbance. The patient is nervous/anxious (improved).        /64   Pulse 72   Temp 97.3 °F (36.3 °C) (Infrared)   Ht 180.3 cm (70.98\")   Wt 103 kg (228 lb)   BMI 31.81 kg/m²       Physical Exam  Constitutional:       Appearance: Normal appearance. He is well-developed.   HENT:      Head: Normocephalic and atraumatic.      Right Ear: External ear normal.      Left Ear: External ear normal.      Nose: Nose normal.      Mouth/Throat:      Mouth: Mucous membranes are moist.      Pharynx: Oropharynx is clear.   Eyes:      Extraocular Movements: Extraocular movements intact.      Conjunctiva/sclera: Conjunctivae normal.      Pupils: Pupils are equal, round, and reactive to light.   Neck:      Musculoskeletal: Normal range of motion and neck supple.   Cardiovascular:      Rate and Rhythm: Normal rate and regular rhythm.      Heart sounds: Normal heart sounds.   Pulmonary:      Effort: Pulmonary effort is normal.      Breath sounds: Normal breath sounds.   Abdominal:      General: Bowel sounds are normal.      Palpations: Abdomen is soft.   Musculoskeletal: Normal range of motion.   Lymphadenopathy:      Cervical: No cervical adenopathy.   Skin:     General: Skin is warm and dry.   Neurological:      General: No focal deficit present.      Mental Status: He is alert and oriented to person, place, " and time.   Psychiatric:         Mood and Affect: Mood normal.         Behavior: Behavior normal.         Thought Content: Thought content normal.         Procedure:      Discussion/Summary:    HTN-controlled on multi drug tx, advised goal of 150/80  hyperlipidemia-cont pravachol/fibrate, labs today at [facility];  BPH-stable on cardura, psa today noted and will refer to Urology  GERD-stableon omeprazole  djd-advised to limit nsaids, stable  Hyperbilirubinemia-recheck today stable  Thrombocytopenia-recheck today noted  ?[place]-Hem noted, bilirubin stable  Situational depression-improved with combo remeron/wellbutrin  Constipation-citrucel/miralax combo, stable      10/30 labs noted and dw patient, has already had the flu shot    Current Outpatient Medications:   •  amLODIPine (NORVASC) 5 MG tablet, TAKE ONE TABLET BY MOUTH EVERY DAY, Disp: 90 tablet, Rfl: 3  •  buPROPion SR (WELLBUTRIN SR) 200 MG 12 hr tablet, Take 200 mg by mouth Daily., Disp: , Rfl:   •  calcipotriene (DOVONOX) 0.005 % ointment, As Needed., Disp: , Rfl:   •  cephalexin (KEFLEX) 500 MG capsule, Take 2 capsules by mouth 1 (One) Time As Needed (for dental cleanings) for up to 1 dose. Take 2 capsules 1 hour prior to dental procedure, Disp: 2 capsule, Rfl: 2  •  chlorproMAZINE (THORAZINE) 25 MG tablet, Take 1 tablet by mouth Every 8 (Eight) Hours As Needed for Nausea., Disp: 31 tablet, Rfl: 1  •  Cholecalciferol (VITAMIN D3) 125 MCG (5000 UT) capsule capsule, Take 5,000 Units by mouth Daily., Disp: , Rfl:   •  docusate sodium (Colace) 100 MG capsule, Take 1 capsule by mouth 2 (Two) Times a Day., Disp: 60 capsule, Rfl: 0  •  doxazosin (CARDURA) 4 MG tablet, TAKE ONE TABLET BY MOUTH EVERY NIGHT, Disp: 90 tablet, Rfl: 3  •  fenofibrate (TRICOR) 145 MG tablet, TAKE ONE TABLET BY MOUTH EVERY DAY (Patient taking differently: Take 145 mg by mouth Daily.), Disp: 90 tablet, Rfl: 2  •  irbesartan (AVAPRO) 300 MG tablet, Take 300 mg by mouth Every Night., Disp: , Rfl:  5  •  mirtazapine (REMERON) 15 MG tablet, TAKE ONE TABLET BY MOUTH EVERY NIGHT (Patient taking differently: Take 15 mg by mouth Every Night.), Disp: 30 tablet, Rfl: 5  •  MULTIPLE VITAMINS PO, Take 1 capsule by mouth Daily., Disp: , Rfl:   •  Omega-3 Fatty Acids (FISH OIL) 1000 MG capsule capsule, Take 1,000 mg by mouth Daily., Disp: , Rfl:   •  pravastatin (PRAVACHOL) 40 MG tablet, TAKE ONE TABLET BY MOUTH EVERY DAY, Disp: 90 tablet, Rfl: 3  •  sildenafil (VIAGRA) 100 MG tablet, Take 1 tablet by mouth Daily As Needed for erectile dysfunction., Disp: 6 tablet, Rfl: 5  •  triamcinolone (KENALOG) 0.1 % ointment, Apply  topically 2 (Two) Times a Day As Needed for Irritation or Rash., Disp: 80 g, Rfl: 5  •  urea (CARMOL) 20 % cream, Apply  topically to the appropriate area as directed As Needed for Dry Skin., Disp: , Rfl:   •  vitamin B-6 (PYRIDOXINE) 50 MG tablet, Take 50 mg by mouth Daily., Disp: , Rfl:         Diagnoses and all orders for this visit:    1. Essential hypertension (Primary)    2. Other hyperlipidemia  -     Lipid Panel  -     Comprehensive Metabolic Panel  -     TSH    3. Severe obstructive sleep apnea    4. Gastroesophageal reflux disease without esophagitis    5. Thrombocytopenia, unspecified (CMS/HCC)  -     CBC (No Diff)    6. Situational depression    7. Gilbert's disease    8. Screening for prostate cancer    9. Benign prostatic hyperplasia with urinary frequency  -     PSA DIAGNOSTIC  -     Ambulatory Referral to Urology    10. Abnormal glucose  -     Hemoglobin A1c    11. Abnormal passage of bladder    12. Abnormal PSA  -     Ambulatory Referral to Urology

## 2020-10-31 LAB — HBA1C MFR BLD: 5.3 % (ref 4.8–5.6)

## 2020-11-10 ENCOUNTER — HOSPITAL ENCOUNTER (OUTPATIENT)
Dept: PHYSICAL THERAPY | Facility: HOSPITAL | Age: 70
Setting detail: THERAPIES SERIES
Discharge: HOME | End: 2020-11-10

## 2020-11-10 DIAGNOSIS — G89.29 CHRONIC BILATERAL LOW BACK PAIN WITHOUT SCIATICA: Primary | ICD-10-CM

## 2020-11-10 DIAGNOSIS — M54.50 CHRONIC BILATERAL LOW BACK PAIN WITHOUT SCIATICA: Primary | ICD-10-CM

## 2020-11-10 PROCEDURE — 97110 THERAPEUTIC EXERCISES: CPT

## 2020-11-10 NOTE — THERAPY TREATMENT NOTE
"    Outpatient Physical Therapy Ortho Treatment Note  Psychiatric     Patient Name: Jimmie Salcedo  : 1950  MRN: [id number]  Today's Date: 11/10/2020      Visit Date: 11/10/2020    Visit Dx:    ICD-10-CM ICD-9-CM   1. Chronic bilateral low back pain without sciatica  M54.5 724.2    G89.29 338.29       Patient Active Problem List   Diagnosis   • Acid reflux   • Arthritis   • Hypertension   • Hyperlipidemia   • Severe obstructive sleep apnea   • Psoriasis   • Diastolic dysfunction   • Peptic ulceration   • Bilateral edema of lower extremity   • Abnormal liver function test   • Hyperbilirubinemia   • Psychophysiological insomnia   • Gilbert's disease   • Thrombocytopenia, unspecified (CMS/HCC)   • Situational depression   • Tubular adenoma   • Seasonal allergic rhinitis due to pollen   • Mild cognitive impairment   • Memory loss   • Primary osteoarthritis of right knee   • Status post total right knee replacement   • Leukocytosis, likely reactive   • Postoperative pain   • Benign prostatic hyperplasia with urinary frequency        Past Medical History:   Diagnosis Date   • Abnormal liver function test    • Arthritis    • Bilateral edema of lower extremity    • Cataract     bilat- still present - mild    • Cellulitis of leg, right     resolved   • Chalazion    • Cracking skin     bilat feet mostly    • Disease     \"brakes/breaks\" disease as child-  effected blood and urine    • GERD (gastroesophageal reflux disease)    • History of kidney stones     x2 had to have broken up    • Hoarseness     from vocal cord bending- seeing ent - possible surgery soon    • Hypertension    • Kidney infection     h/o    • Neck muscle spasm    • Onychomycosis of toenail    • Peptic ulceration    • Renal calculi    • Situational depression 2018   • Sleep apnea with use of continuous positive airway pressure (CPAP)     compliant with machine    • 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    • Vocal cord strain     vocal " cord bent- seeing ent but causes pt to be hoarse    • Wears glasses         Past Surgical History:   Procedure Laterality Date   • COLONOSCOPY     • CYSTOSCOPY W/ LASER LITHOTRIPSY      x2   • ENDOSCOPY      with dilation    • FINGER SURGERY      left 5th finger   • KNEE SURGERY Right 1986    arthroscopy   • OTHER SURGICAL HISTORY      Lithotripsy   • TONSILLECTOMY     • TOTAL KNEE ARTHROPLASTY Right 7/22/2020    Procedure: TOTAL KNEE ARTHROPLASTY RIGHT;  Surgeon: Tereso Restrepo MD;  Location: [facility];  Service: Orthopedics;  Laterality: Right;       PT Assessment/Plan     Row Name 11/10/20 1045          PT Assessment    Assessment Comments  client tolerates exercises well. He still has some pain and difficulty with trunk flexion.   -MM        PT Plan    PT Plan Comments  Progress note next visit and check for MRI results.  -MM       User Key  (r) = Recorded By, (t) = Taken By, (c) = Cosigned By    Initials Name Provider Type    MM Dennis Kenny, PT Physical Therapist        OP Exercises     Row Name 11/10/20 1045             Subjective Comments    Subjective Comments  Client reports no pain at rest, but back pain that is often moderate with bending forward. He notes that he is scheduled for an MRI this week.   -MM         Subjective Pain    Able to rate subjective pain?  yes  -MM      Pre-Treatment Pain Level  0  -MM      Post-Treatment Pain Level  0  -MM      Subjective Pain Comment  up to 5/10 w activity.  -MM         Total Minutes    50725 - PT Therapeutic Exercise Minutes  25  -MM         Exercise 1    Exercise Name 1  Exercises in clinical setting included: standing extension over table, kettle bell lifts small range, golfer lift 4# DB, prone press ups w OP, prone alt arm/leg raise, bridging, SL bridging, Celio curl up, LTR, and SKTC.   -MM      Time 1  25  -MM      Additional Comments  ther ex  -MM        User Key  (r) = Recorded By, (t) = Taken By, (c) = Cosigned By    Initials Name Provider  Type    MM Dennis Kenny, PT Physical Therapist      Time Calculation:   Start Time: 1045  Therapy Charges for Today     Code Description Service Date Service Provider Modifiers Qty    93343084727  PT THER PROC EA 15 MIN 11/10/2020 Dennis Kenny, PT GP 2          Dennis Kenny, PT  11/10/2020

## 2020-11-12 ENCOUNTER — HOSPITAL ENCOUNTER (OUTPATIENT)
Dept: MRI IMAGING | Facility: HOSPITAL | Age: 70
Discharge: HOME | End: 2020-11-12
Admitting: ORTHOPAEDIC SURGERY

## 2020-11-12 DIAGNOSIS — M54.50 CHRONIC LOW BACK PAIN, UNSPECIFIED BACK PAIN LATERALITY, UNSPECIFIED WHETHER SCIATICA PRESENT: ICD-10-CM

## 2020-11-12 DIAGNOSIS — G89.29 CHRONIC LOW BACK PAIN, UNSPECIFIED BACK PAIN LATERALITY, UNSPECIFIED WHETHER SCIATICA PRESENT: ICD-10-CM

## 2020-11-12 PROCEDURE — 72148 MRI LUMBAR SPINE W/O DYE: CPT

## 2020-11-18 ENCOUNTER — OFFICE VISIT (OUTPATIENT)
Dept: NEUROSURGERY | Facility: CLINIC | Age: 70
End: 2020-11-18

## 2020-11-18 DIAGNOSIS — M54.50 CHRONIC BILATERAL LOW BACK PAIN WITHOUT SCIATICA: Primary | ICD-10-CM

## 2020-11-18 DIAGNOSIS — G89.29 CHRONIC BILATERAL LOW BACK PAIN WITHOUT SCIATICA: Primary | ICD-10-CM

## 2020-11-18 PROCEDURE — 99203 OFFICE O/P NEW LOW 30 MIN: CPT | Performed by: NEUROLOGICAL SURGERY

## 2020-11-18 NOTE — PROGRESS NOTES
Subjective     Chief Complaint: Back pain    Patient ID: Jimmie Salcedo is a 70 y.o. male seen for consultation today at the request of  Tereso Restrepo,*    Back Pain  The current episode started more than 1 year ago. The problem occurs constantly. The problem is unchanged. The pain is present in the lumbar spine and sacro-iliac. The quality of the pain is described as aching. The pain does not radiate. The pain is at a severity of 6/10. The pain is moderate. The pain is worse during the day. The symptoms are aggravated by sitting, standing and twisting. Stiffness is present in the morning. Pertinent negatives include no bladder incontinence, bowel incontinence, leg pain or numbness. Risk factors include obesity, sedentary lifestyle and lack of exercise. He has tried home exercises for the symptoms. The treatment provided no relief.       This is a 70-year-old man who presents to my clinic with an approximately 18-month history of low back pain.  He denies antecedent trauma.  He denies radiating pain into his legs or buttocks.  He does have some chronic knee problems and underwent a total knee arthroplasty on the right side earlier this year.  He is undergoing physical therapy for some left knee pain.    His medical comorbidities are significant for lack of exercise, moderate obesity, and hypertension.  He denies alcohol or tobacco abuse.    He does not exercise regularly.  He occasionally works in his yard or goes for walks with his dog.  He has undergone approximately 4-5 sessions of physical therapy for his low back pain without relief.  He cannot take anti-inflammatories.  He is unable to identify exacerbating or alleviating factors.  He does state that his pain in his back is slightly worse after he has been lying down or sitting for long periods of time.    The following portions of the patient's history were reviewed and updated as appropriate: allergies, current medications, past family history,  past medical history, past social history, past surgical history and problem list.    Family history:   Family History   Problem Relation Age of Onset   • Arthritis Other    • Hypertension Other    • Hyperlipidemia Other    • Heart attack Other    • Hypertension Mother    • Heart disease Father    • Hypertension Father        Social history:   Social History     Socioeconomic History   • Marital status:      Spouse name: Not on file   • Number of children: Not on file   • Years of education: Not on file   • Highest education level: Not on file   Tobacco Use   • Smoking status: Never Smoker   • Smokeless tobacco: Never Used   Substance and Sexual Activity   • Alcohol use: Yes     Alcohol/week: 4.0 standard drinks     Types: 4 Cans of beer per week   • Drug use: No   • Sexual activity: Defer       Review of Systems   HENT: Positive for trouble swallowing.    Gastrointestinal: Negative for bowel incontinence.   Genitourinary: Positive for difficulty urinating and urgency. Negative for bladder incontinence.   Musculoskeletal: Positive for back pain and neck pain.   Neurological: Negative for numbness.   Psychiatric/Behavioral: Positive for agitation and behavioral problems.   All other systems reviewed and are negative.      Objective   There were no vitals taken for this visit.  There is no height or weight on file to calculate BMI.    Physical Exam  Vitals signs and nursing note reviewed.   Constitutional:       Appearance: He is well-developed. He is not toxic-appearing.   HENT:      Head: Normocephalic and atraumatic.      Right Ear: Hearing normal.      Left Ear: Hearing normal.      Nose: Nose normal.   Eyes:      General: Lids are normal.      Conjunctiva/sclera: Conjunctivae normal.      Pupils: Pupils are equal, round, and reactive to light.   Neck:      Musculoskeletal: Normal range of motion.      Vascular: No JVD.   Cardiovascular:      Rate and Rhythm: Normal rate and regular rhythm.      Pulses:            Radial pulses are 2+ on the right side and 2+ on the left side.   Pulmonary:      Effort: Pulmonary effort is normal. No respiratory distress.      Breath sounds: No stridor. No wheezing.   Musculoskeletal:      Thoracic back: He exhibits no tenderness, no swelling, no pain and no spasm.      Lumbar back: He exhibits no tenderness, no bony tenderness, no swelling, no pain and no spasm.      Comments: Muscle Group    L          R  Hip Flexor          5          5  Knee Extensor  5          5  ADF                   5          5  APF                   5          5  EHL                   5          5   Skin:     General: Skin is warm and dry.      Findings: No erythema or rash.   Neurological:      Mental Status: He is alert and oriented to person, place, and time.      GCS: GCS eye subscore is 4. GCS verbal subscore is 5. GCS motor subscore is 6.      Cranial Nerves: No cranial nerve deficit.      Sensory: No sensory deficit.      Motor: No abnormal muscle tone.      Deep Tendon Reflexes: Reflexes are normal and symmetric. Reflexes normal.      Reflex Scores:       Tricep reflexes are 2+ on the right side and 2+ on the left side.       Bicep reflexes are 2+ on the right side and 2+ on the left side.       Brachioradialis reflexes are 2+ on the right side and 2+ on the left side.       Patellar reflexes are 2+ on the right side and 2+ on the left side.       Achilles reflexes are 2+ on the right side and 2+ on the left side.  Psychiatric:         Behavior: Behavior normal.         Thought Content: Thought content normal.         Judgment: Judgment normal.         Assessment/Plan     Independent Review of Radiographic Studies:      Available for my review is a MRI of the lumbar spine which was performed on November 12, 2020.  This demonstrates severe degenerative disc disease at L4-5 and L5-S1.  There is moderate degenerative disc disease at L1-2, L2-3, and L3-4.  The central canal is capacious.  I do not  appreciate any spondylolisthesis or significant foci of lateral recess or neuroforaminal stenosis.  He does have some early Modic endplate changes at L4-5.    Medical Decision Making:      This is a 70-year-old man with musculoskeletal/axial low back pain and some sacroiliac pain.  I think he is an excellent candidate for pain management and physical therapy.  I have made the appropriate referrals.  I reviewed the signs and symptoms of lumbosacral radiculopathy and cauda equina with him.  I directed him to contact my office with new or worsening symptoms.  I do not foresee a role for surgical intervention in the management of his pain unless his symptoms change dramatically.  I would be happy to follow-up with him on an as-needed basis moving forward.    Diagnoses and all orders for this visit:    1. Chronic bilateral low back pain without sciatica (Primary)  -     Ambulatory Referral to Pain Management  -     Ambulatory Referral to Physical Therapy        Return if symptoms worsen or fail to improve.           This document signed by DANDY Henderson MD November 18, 2020 10:57 EST

## 2020-11-20 PROBLEM — M47.816 SPONDYLOSIS OF LUMBAR REGION WITHOUT MYELOPATHY OR RADICULOPATHY: Status: ACTIVE | Noted: 2020-11-20

## 2020-11-20 PROBLEM — M51.379 DEGENERATION OF LUMBAR OR LUMBOSACRAL INTERVERTEBRAL DISC: Status: ACTIVE | Noted: 2020-11-20

## 2020-11-20 PROBLEM — M51.37 DEGENERATION OF LUMBAR OR LUMBOSACRAL INTERVERTEBRAL DISC: Status: ACTIVE | Noted: 2020-11-20

## 2020-11-20 NOTE — PROGRESS NOTES
"Chief Complaint: \"Pain in my lower back\"      History of Present Illness:   Patient: Mr. Jimmie Salcedo, 70 y.o. male   Referring Physician: Dr. Neel Henderson   Reason for Referral: Consultation for chronic intractable lower back pain.   Pain History: Patient reports a 18-month history of progressive lower back pain, which began without incident. Patient complains of mostly axial mechanical lower back pain. Patient has a sedentary behavior and does not exercise. Occasionally, he walks his dog or does some yard work. He started a PT program at [facility] 3 weeks ago. He has a history of chronic knee problems and underwent right total knee arthroplasty with Dr. Kalen Mattson on July 22, 2020. Patient has failed to obtain pain relief with conservative measures including oral analgesics and physical therapy.  MRI of the lumbar spine on November 12, 2020 revealed diffuse degenerative changes of the thoracolumbar spine involving degenerative disc disease, lumbar facet hypertrophy and ligamentum flavum hypertrophy.  No significant canal stenosis.  There is mild neuroforaminal stenosis at L3-L4 and L4-L5. Patient underwent neurosurgical consultation with Dr. Henderson on 11/18/2020, and was found not to be a surgical candidate at this time. Pain has progressed in intensity over the past months.   Pain Description: Constant pain with intermittent exacerbation, described as sharp, aching, and throbbing sensation.   Radiation of Pain: The pain does not radiate.  Pain intensity today: 7/10  Average pain intensity last week: 6/10  Pain intensity ranges from: 5/10 to 7/10  Aggravating factors: Pain increases with twisting, bending, standing, ambulating   Alleviating factors: Pain decreases with sitting down, lying down, heat, analgesics  Associated Symptoms:   Patient denies pain, numbness or weakness in the lower extremities.   Patient denies any new bladder or bowel problems.   Patient denies difficulties with his balance " or recent falls    Review of previous therapies and additional medical records:  Jimmie Salcedo has already failed the following measures, including:   Conservative Measures: Oral analgesics, topical analgesics and physical therapy   Interventional Measures: None  Surgical Measures: No history of previous lumbar spine or hip surgery   Right total knee replacement by Dr. Kalen Mattson; July 22, 2020  Jimmie Salcedo underwent neurosurgical consultation with Dr. Henderson on 11/18/2020, and was found not to be a surgical candidate.  Jimmie Salcedo presents with significant comorbidities including GERD, hypertension, hyperlipidemia, severe obstructive sleep apnea, psoriasis, Gilbert's syndrome, Hx thrombocytopenia, depression, mild cognitive impairment, s/p right total knee replacement, BPH, insomnia  In terms of current analgesics, Jimmie Salcedo takes: Tylenol. Patient also takes Wellbutrin, chlorpromazine, mirtazapine   I have reviewed [facility] Report #[id number] (NO CS) consistent with medication reconciliation.  SOAPP: Low Risk    Global Pain Scale 11-24  2020          Pain 17          Feelings 5          Clinical outcomes 4          Activities 4          GPS Total: 30            Review of Diagnostic Studies:    MRI of the lumbar spine without contrast November 2020.  I have reviewed the images.  Multilevel degenerative changes throughout the thoracolumbar spine. Extensive DDD and facet OA with fluid interposed. Baastrup's. Vertebral body height and alignment are preserved.  The conus ends at the L1 and has a normal signal intensity.  Axial imaging:  L1-L2: Broad-based disc bulge in combination with facet hypertrophy.  No significant canal or foraminal stenosis  L2-L3, L3-L4, L4-L5, L5-S1: Broad-based disc bulge, facet hypertrophy, ligamentum flavum hypertrophy.  No significant canal stenosis.  Mild neuroforaminal stenosis    Review of Systems   HENT: Positive for trouble swallowing.    Respiratory:  "Positive for apnea.    Cardiovascular: Positive for leg swelling.   Endocrine: Positive for polyuria.   Genitourinary: Positive for frequency and urgency.   Musculoskeletal: Positive for back pain and neck pain.   Psychiatric/Behavioral: The patient is nervous/anxious (depression).    All other systems reviewed and are negative.        Patient Active Problem List   Diagnosis   • Acid reflux   • Arthritis   • Hypertension   • Hyperlipidemia   • Severe obstructive sleep apnea   • Psoriasis   • Diastolic dysfunction   • Peptic ulceration   • Bilateral edema of lower extremity   • Abnormal liver function test   • Hyperbilirubinemia   • Psychophysiological insomnia   • Gilbert's disease   • Thrombocytopenia, unspecified (CMS/HCC)   • Situational depression   • Tubular adenoma   • Seasonal allergic rhinitis due to pollen   • Mild cognitive impairment   • Memory loss   • Primary osteoarthritis of right knee   • Status post total right knee replacement   • Leukocytosis, likely reactive   • Postoperative pain   • Benign prostatic hyperplasia with urinary frequency   • Spondylosis of lumbar region without myelopathy or radiculopathy   • Degeneration of lumbar or lumbosacral intervertebral disc   • Lumbar interspinous bursitis   • Myofascial pain   • Osteoarthritis of multiple joints   • Baastrup's syndrome       Past Medical History:   Diagnosis Date   • Abnormal liver function test    • Arthritis    • Bilateral edema of lower extremity    • Cataract     bilat- still present - mild    • Cellulitis of leg, right     resolved   • Chalazion    • Cracking skin     bilat feet mostly    • Disease     \"brakes/breaks\" disease as child-  effected blood and urine    • GERD (gastroesophageal reflux disease)    • History of kidney stones     x2 had to have broken up    • Hoarseness     from vocal cord bending- seeing ent - possible surgery soon    • Hypertension    • Kidney infection     h/o    • Neck muscle spasm    • Onychomycosis of " toenail    • Peptic ulceration    • Renal calculi    • Situational depression 8/22/2018   • Sleep apnea with use of continuous positive airway pressure (CPAP)     compliant with machine    • 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    • Vocal cord strain     vocal cord bent- seeing ent but causes pt to be hoarse    • Wears glasses          Past Surgical History:   Procedure Laterality Date   • COLONOSCOPY     • CYSTOSCOPY W/ LASER LITHOTRIPSY      x2   • ENDOSCOPY      with dilation    • FINGER SURGERY      left 5th finger   • KNEE SURGERY Right 1986    arthroscopy   • OTHER SURGICAL HISTORY      Lithotripsy   • TONSILLECTOMY     • TOTAL KNEE ARTHROPLASTY Right 7/22/2020    Procedure: TOTAL KNEE ARTHROPLASTY RIGHT;  Surgeon: Tereso Restrepo MD;  [facility];  Service: Orthopedics;  Laterality: Right;         Family History   Problem Relation Age of Onset   • Arthritis Other    • Hypertension Other    • Hyperlipidemia Other    • Heart attack Other    • Hypertension Mother    • Heart disease Father    • Hypertension Father          Social History     Socioeconomic History   • Marital status:      Spouse name: Not on file   • Number of children: Not on file   • Years of education: Not on file   • Highest education level: Not on file   Tobacco Use   • Smoking status: Never Smoker   • Smokeless tobacco: Never Used   Substance and Sexual Activity   • Alcohol use: Yes     Alcohol/week: 4.0 standard drinks     Types: 4 Cans of beer per week   • Drug use: No   • Sexual activity: Defer           Current Outpatient Medications:   •  amLODIPine (NORVASC) 5 MG tablet, TAKE ONE TABLET BY MOUTH EVERY DAY, Disp: 90 tablet, Rfl: 3  •  buPROPion SR (WELLBUTRIN SR) 200 MG 12 hr tablet, Take 200 mg by mouth Daily., Disp: , Rfl:   •  calcipotriene (DOVONOX) 0.005 % ointment, As Needed., Disp: , Rfl:   •  cephalexin (KEFLEX) 500 MG capsule, Take 2 capsules by mouth 1 (One) Time As Needed (for dental cleanings)  for up to 1 dose. Take 2 capsules 1 hour prior to dental procedure, Disp: 2 capsule, Rfl: 2  •  chlorproMAZINE (THORAZINE) 25 MG tablet, Take 1 tablet by mouth Every 8 (Eight) Hours As Needed for Nausea., Disp: 31 tablet, Rfl: 1  •  Cholecalciferol (VITAMIN D3) 125 MCG (5000 UT) capsule capsule, Take 5,000 Units by mouth Daily., Disp: , Rfl:   •  docusate sodium (Colace) 100 MG capsule, Take 1 capsule by mouth 2 (Two) Times a Day., Disp: 60 capsule, Rfl: 0  •  doxazosin (CARDURA) 4 MG tablet, TAKE ONE TABLET BY MOUTH EVERY NIGHT, Disp: 90 tablet, Rfl: 3  •  fenofibrate (TRICOR) 145 MG tablet, TAKE ONE TABLET BY MOUTH EVERY DAY (Patient taking differently: Take 145 mg by mouth Daily.), Disp: 90 tablet, Rfl: 2  •  irbesartan (AVAPRO) 300 MG tablet, Take 300 mg by mouth Every Night., Disp: , Rfl: 5  •  mirtazapine (REMERON) 15 MG tablet, TAKE ONE TABLET BY MOUTH EVERY NIGHT (Patient taking differently: Take 15 mg by mouth Every Night.), Disp: 30 tablet, Rfl: 5  •  MULTIPLE VITAMINS PO, Take 1 capsule by mouth Daily., Disp: , Rfl:   •  Omega-3 Fatty Acids (FISH OIL) 1000 MG capsule capsule, Take 1,000 mg by mouth Daily., Disp: , Rfl:   •  pravastatin (PRAVACHOL) 40 MG tablet, TAKE ONE TABLET BY MOUTH EVERY DAY, Disp: 90 tablet, Rfl: 3  •  sildenafil (VIAGRA) 100 MG tablet, Take 1 tablet by mouth Daily As Needed for erectile dysfunction., Disp: 6 tablet, Rfl: 5  •  triamcinolone (KENALOG) 0.1 % ointment, Apply  topically 2 (Two) Times a Day As Needed for Irritation or Rash., Disp: 80 g, Rfl: 5  •  urea (CARMOL) 20 % cream, Apply  topically to the appropriate area as directed As Needed for Dry Skin., Disp: , Rfl:   •  vitamin B-6 (PYRIDOXINE) 50 MG tablet, Take 50 mg by mouth Daily., Disp: , Rfl:   •  Dietary Management Product (Rheumate) capsule, Take 1 capsule by mouth Daily., Disp: 90 capsule, Rfl: 1  •  tiZANidine (ZANAFLEX) 2 MG tablet, 1/2 to 1 tablet three times per day as needed for muscle spasms, Disp: 60 tablet,  "Rfl: 0      Allergies   Allergen Reactions   • Penicillins Rash     Generalized  Pt states rash occurred as a child and has not used since         /78   Pulse 72   Temp 96.9 °F (36.1 °C)   Ht 180.3 cm (70.98\")   Wt 105 kg (231 lb 3.2 oz)   SpO2 99%   BMI 32.26 kg/m²       Physical Exam:  Constitutional: Patient appears well-developed and well-nourished.   HEENT: Head: Normocephalic and atraumatic.   Eyes: Conjunctivae and lids are normal.   Pupils: Equal, round, reactive to light.   Neck: Trachea normal. Neck supple. No JVD present.   Pulmonary Respiratory effort: No increased work of breathing or signs of respiratory distress. Auscultation of lungs: Clear to auscultation.   Cardiovascular Auscultation of heart: Normal rate and rhythm, normal S1 and S2, no murmurs.   Peripheral vascular exam: Femoral: right 2+, left 2+. Posterior tibialis: right 2+ and left 2+. Dorsalis pedis: right 2+ and left 2+. No edema.   Musculoskeletal   Gait and station: Gait evaluation demonstrated a normal gait.  He was able to walk on heels and toes.  Lumbar spine: Passive and active range of motion are limited secondary to pain. Extension, flexion, lateral flexion, rotation of the lumbar spine increased and reproduced pain. Lumbar facet joint loading maneuvers are positive.  Palpation of the interspinous space particularly at the L2-L3 and L3-4 reproduces pain.  Tommy test and Gaenslen's test are negative   Piriformis maneuvers are negative   Palpation of the bilateral ischial tuberosities, unrevealing   Palpation of the bilateral greater trochanter, unrevealing   Examination of the Iliotibial band: unrevealing   Hip joints: The range of motion of the hip joints is almost full, symmetrical, and without pain   Right knee: 0-100. Tenderness found on the lateral compartment.   Neurological:   Patient is alert and oriented to person, place, and time.   Speech: speech is normal.   Cortical function: Normal mental status. "   Cranial nerves: Cranial nerves 2-12 intact.   Reflex Scores:  Right patellar: 1+  Left patellar: 1+  Right Achilles: 1+  Left Achilles: 1+  Motor strength: 5/5  Motor Tone: normal tone.   Involuntary movements: none.   Superficial/Primitive Reflexes: primitive reflexes were absent.   Right Abraham: absent  Left Abraham: absent  Right ankle clonus: absent  Left ankle clonus: absent   Babinsky: absent  Negative long tract signs. Straight leg raising test is negative. Femoral stretch sign is negative.   Sensory exam: intact to light touch, intact pain and temperature sensation, intact vibration sensation and normal proprioception.   Coordination: Normal finger to nose and heel to shin. Normal balance and negative Romberg's sign   Skin and subcutaneous tissue: Skin is warm and intact. No rash noted. No cyanosis.   Psychiatric: Judgment and insight: Normal. Orientation to person, place and time: Normal. Recent and remote memory: Intact. Mood and affect: Normal.     ASSESSMENT:   1. Spondylosis of lumbar region without myelopathy or radiculopathy    2. Lumbar interspinous bursitis    3. Baastrup's syndrome    4. Degeneration of lumbar or lumbosacral intervertebral disc    5. Myofascial pain    6. Status post total right knee replacement    7. Osteoarthritis of multiple joints, unspecified osteoarthritis type    8. Psoriasis    9. Severe obstructive sleep apnea    10. Situational depression    11. Psychophysiological insomnia        PLAN/MEDICAL DECISION MAKING: Patient reports a 18-month history of progressive lower back pain. Patient complains of mostly axial mechanical lower back pain. He also has a history of chronic knee problems for which he underwent right total knee arthroplasty with Dr. Kalen Mattson on July 22, 2020.  He is still dealing with some residual right knee pain for which he is undergoing physical therapy at the present time.  Patient has failed to obtain pain relief with conservative measures  including oral analgesics and physical therapy.  MRI of the lumbar spine on November 12, 2020 revealed diffuse degenerative changes of the thoracolumbar spine involving extensive degenerative disc disease, lumbar facet hypertrophy with fluid signal interposed in some of the joints, ligamentum flavum hypertrophy, and spinous processes with the appearance of Baastrup's disease.  There is no significant canal or foraminal stenosis except for mild mild neuroforaminal stenosis at L3-L4 and L4-L5. Patient underwent neurosurgical consultation with Dr. Henderson on 11/18/2020, and was found not to be a surgical candidate at this time. Pain has progressed in intensity over the past months.  Patient has failed to obtain pain relief with conservative measures , as referenced above. I have reviewed all available patient's medical records as well as previous therapies as referenced above. I had a lengthy conversation with . Jimmie Salcedo regarding his chronic pain condition and potential therapeutic options including risks, benefits, alternative therapies, to name a few. Therefore, I have proposed the following plan:  1. Diagnostic studies:   A. Flexion and extension x-rays of the lumbar spine to assess stability.  There is increased fluid signal interposed in the lumbar facet joints which may suggest mobility  B.  Patient denies previous work-up for rheumatoid disorders.  Will obtain CBC with differential, ESR, CRP, Cyclic anticitrulinated peptide, LISA panel, Rheumatoid factor, Uric acid  2. Pharmacological measures: Reviewed. Discussed.   A. Patient takes Tylenol. Patient also takes Wellbutrin, chlorpromazine, mirtazapine   B. Trial with tizanidine 2 mg 1/2 to 1 tablet 2 times a day as needed muscle spasm, #60, 1 refill  C. Trial with Rheumate one tablet twice daily  3. Interventional pain management measures: Patient will be scheduled for therapeutic lumbar facet joint injections bilateral L3-L4, bilateral L4-L5 combined  with interspinous bursa injection at L3-L4 and L4-5 to maximize his rehabilitation. We may repeat procedure depending on patient's outcome. If patient fails to obtain sustained pain relief, then, we will proceed with diagnostic bilateral lumbar medial branch blocks at L2, L3, L4; for bilateral lumbar facet joints at L3-L4, L4-L5, to clarify the origin of chronic refractory mechanical lower back pain. If patient experiences more than 80% relief along with significant improvement the range of motion of the lumbar spine, then, patient will be scheduled for bilateral lumbar medial branch rhizotomies  4. Long-term rehabilitation efforts:  A. The patient does not have a history of falls. I did complete a risk assessment for falls  B. Patient will start a comprehensive physical therapy program at [facility] with Dr. Lenin Saba for core strengthening, gait and balance training, ASTYM, myofascial release, cupping, dry needling and Alter-G once pain is under control  C. Start an exercise program such as yoga, Pilates, Cruz Chi, water therapy and swimming  D. Contrast therapy: Apply ice-packs for 15-20 minutes, followed by heating pads for 15-20 minutes to affected area   E. Referral to [facility]. Patient counseled on the importance of weight loss to help with overall health and pain control. Patient instructed to attempt weight loss.    5. The patient has been instructed to contact my office with any questions or difficulties. The patient understands the plan and agrees to proceed accordingly.        Patient Care Team:  Javad Negron MD as PCP - General (Internal Medicine)  Sundeep Berger MD as Consulting Physician (Infectious Diseases)     No orders of the defined types were placed in this encounter.        Future Appointments   Date Time Provider Department Center   11/30/2020 11:15 AM Dennis Kenny, PT [facility]   12/21/2020 12:30 PM Weston Huerta MD [facility]    1/21/2021 10:00 AM Tereso Restrepo MD [facility]   3/11/2021  9:45 AM Javad Negron MD [facility]   5/28/2021 11:00 AM Pallavi Prince APRN [facility] None         Weston Huerta MD     EMR Dragon/Transcription disclaimer:  Much of this encounter note is an electronic transcription of spoken language to printed text. Electronic transcription of spoken language may permit erroneous, or at times, nonsensical words or phrases to be inadvertently transcribed. Although I have reviewed the note for such errors, some may still exist.

## 2020-11-24 ENCOUNTER — OFFICE VISIT (OUTPATIENT)
Dept: PAIN MEDICINE | Facility: CLINIC | Age: 70
End: 2020-11-24

## 2020-11-24 VITALS
HEIGHT: 71 IN | BODY MASS INDEX: 32.37 KG/M2 | OXYGEN SATURATION: 99 % | TEMPERATURE: 96.9 F | HEART RATE: 72 BPM | WEIGHT: 231.2 LBS | SYSTOLIC BLOOD PRESSURE: 142 MMHG | DIASTOLIC BLOOD PRESSURE: 78 MMHG

## 2020-11-24 DIAGNOSIS — M47.816 SPONDYLOSIS OF LUMBAR REGION WITHOUT MYELOPATHY OR RADICULOPATHY: ICD-10-CM

## 2020-11-24 DIAGNOSIS — L40.9 PSORIASIS: ICD-10-CM

## 2020-11-24 DIAGNOSIS — Z96.651 STATUS POST TOTAL RIGHT KNEE REPLACEMENT: ICD-10-CM

## 2020-11-24 DIAGNOSIS — M48.20 BAASTRUP'S SYNDROME: ICD-10-CM

## 2020-11-24 DIAGNOSIS — G47.33 SEVERE OBSTRUCTIVE SLEEP APNEA: ICD-10-CM

## 2020-11-24 DIAGNOSIS — F51.04 PSYCHOPHYSIOLOGICAL INSOMNIA: ICD-10-CM

## 2020-11-24 DIAGNOSIS — F43.21 SITUATIONAL DEPRESSION: ICD-10-CM

## 2020-11-24 DIAGNOSIS — M79.18 MYOFASCIAL PAIN: ICD-10-CM

## 2020-11-24 DIAGNOSIS — M51.37 DEGENERATION OF LUMBAR OR LUMBOSACRAL INTERVERTEBRAL DISC: ICD-10-CM

## 2020-11-24 DIAGNOSIS — M15.9 OSTEOARTHRITIS OF MULTIPLE JOINTS, UNSPECIFIED OSTEOARTHRITIS TYPE: ICD-10-CM

## 2020-11-24 DIAGNOSIS — M48.26 LUMBAR INTERSPINOUS BURSITIS: ICD-10-CM

## 2020-11-24 DIAGNOSIS — M15.9 OSTEOARTHRITIS OF MULTIPLE JOINTS, UNSPECIFIED OSTEOARTHRITIS TYPE: Primary | ICD-10-CM

## 2020-11-24 PROCEDURE — 99204 OFFICE O/P NEW MOD 45 MIN: CPT | Performed by: ANESTHESIOLOGY

## 2020-11-24 RX ORDER — TIZANIDINE 2 MG/1
TABLET ORAL
Qty: 60 TABLET | Refills: 0 | Status: SHIPPED | OUTPATIENT
Start: 2020-11-24 | End: 2020-12-28

## 2020-11-24 RX ORDER — ME-TETRAHYDROFOLATE/B12/HRB236 1-1-500 MG
1 CAPSULE ORAL DAILY
Qty: 90 CAPSULE | Refills: 1 | Status: SHIPPED | OUTPATIENT
Start: 2020-11-24 | End: 2020-12-01 | Stop reason: SDUPTHER

## 2020-11-30 ENCOUNTER — HOSPITAL ENCOUNTER (OUTPATIENT)
Dept: PHYSICAL THERAPY | Facility: HOSPITAL | Age: 70
Setting detail: THERAPIES SERIES
Discharge: HOME | End: 2020-11-30

## 2020-11-30 DIAGNOSIS — G89.29 CHRONIC BILATERAL LOW BACK PAIN WITHOUT SCIATICA: Primary | ICD-10-CM

## 2020-11-30 DIAGNOSIS — M54.50 CHRONIC BILATERAL LOW BACK PAIN WITHOUT SCIATICA: Primary | ICD-10-CM

## 2020-11-30 DIAGNOSIS — Z96.651 STATUS POST RIGHT KNEE REPLACEMENT: ICD-10-CM

## 2020-11-30 PROCEDURE — 97110 THERAPEUTIC EXERCISES: CPT

## 2020-12-01 DIAGNOSIS — M47.816 SPONDYLOSIS OF LUMBAR REGION WITHOUT MYELOPATHY OR RADICULOPATHY: ICD-10-CM

## 2020-12-01 RX ORDER — ME-TETRAHYDROFOLATE/B12/HRB236 1-1-500 MG
1 CAPSULE ORAL DAILY
Qty: 90 CAPSULE | Refills: 1 | Status: SHIPPED | OUTPATIENT
Start: 2020-12-01

## 2020-12-08 DIAGNOSIS — K21.9 GASTROESOPHAGEAL REFLUX DISEASE WITHOUT ESOPHAGITIS: Primary | ICD-10-CM

## 2020-12-21 ENCOUNTER — OUTSIDE FACILITY SERVICE (OUTPATIENT)
Dept: PAIN MEDICINE | Facility: CLINIC | Age: 70
End: 2020-12-21

## 2020-12-21 PROCEDURE — 99152 MOD SED SAME PHYS/QHP 5/>YRS: CPT | Performed by: ANESTHESIOLOGY

## 2020-12-21 PROCEDURE — 64493 INJ PARAVERT F JNT L/S 1 LEV: CPT | Performed by: ANESTHESIOLOGY

## 2020-12-21 PROCEDURE — 64494 INJ PARAVERT F JNT L/S 2 LEV: CPT | Performed by: ANESTHESIOLOGY

## 2020-12-22 ENCOUNTER — TELEPHONE (OUTPATIENT)
Dept: PAIN MEDICINE | Facility: CLINIC | Age: 70
End: 2020-12-22

## 2020-12-22 NOTE — TELEPHONE ENCOUNTER
therapeutic lumbar facet joint injections bilateral L3-L4, bilateral L4-L5 combined with interspinous bursa injection at L3-L4 and L4-5 12/21/2020.    Spoke with patient, he reports he is doing well. No questions/concerns.   Appointment card placed in outgoing mail.

## 2020-12-28 DIAGNOSIS — M47.816 SPONDYLOSIS OF LUMBAR REGION WITHOUT MYELOPATHY OR RADICULOPATHY: Primary | ICD-10-CM

## 2020-12-28 RX ORDER — TIZANIDINE 2 MG/1
TABLET ORAL
Qty: 60 TABLET | Refills: 0 | Status: SHIPPED | OUTPATIENT
Start: 2020-12-28 | End: 2021-02-03

## 2021-01-14 ENCOUNTER — OUTSIDE FACILITY SERVICE (OUTPATIENT)
Dept: GASTROENTEROLOGY | Facility: CLINIC | Age: 71
End: 2021-01-14

## 2021-01-14 DIAGNOSIS — K22.10 ULCERATIVE ESOPHAGITIS: Primary | ICD-10-CM

## 2021-01-14 DIAGNOSIS — K21.00 GASTROESOPHAGEAL REFLUX DISEASE WITH ESOPHAGITIS WITHOUT HEMORRHAGE: ICD-10-CM

## 2021-01-14 PROCEDURE — 43239 EGD BIOPSY SINGLE/MULTIPLE: CPT | Performed by: INTERNAL MEDICINE

## 2021-01-14 PROCEDURE — 88305 TISSUE EXAM BY PATHOLOGIST: CPT | Performed by: INTERNAL MEDICINE

## 2021-01-14 PROCEDURE — 43249 ESOPH EGD DILATION <30 MM: CPT | Performed by: INTERNAL MEDICINE

## 2021-01-14 RX ORDER — ESOMEPRAZOLE MAGNESIUM 40 MG/1
40 CAPSULE, DELAYED RELEASE ORAL 2 TIMES DAILY
Qty: 60 CAPSULE | Refills: 5 | Status: SHIPPED | OUTPATIENT
Start: 2021-01-14

## 2021-01-15 ENCOUNTER — LAB REQUISITION (OUTPATIENT)
Dept: LAB | Facility: HOSPITAL | Age: 71
End: 2021-01-15

## 2021-01-15 DIAGNOSIS — R13.14 DYSPHAGIA, PHARYNGOESOPHAGEAL PHASE: ICD-10-CM

## 2021-01-15 DIAGNOSIS — K21.9 GASTRO-ESOPHAGEAL REFLUX DISEASE WITHOUT ESOPHAGITIS: ICD-10-CM

## 2021-01-18 PROBLEM — K21.00 GASTROESOPHAGEAL REFLUX DISEASE WITH ESOPHAGITIS WITHOUT HEMORRHAGE: Status: ACTIVE | Noted: 2021-01-18

## 2021-01-21 ENCOUNTER — OFFICE VISIT (OUTPATIENT)
Dept: ORTHOPEDIC SURGERY | Facility: CLINIC | Age: 71
End: 2021-01-21

## 2021-01-21 ENCOUNTER — TELEPHONE (OUTPATIENT)
Dept: GASTROENTEROLOGY | Facility: CLINIC | Age: 71
End: 2021-01-21

## 2021-01-21 VITALS — HEIGHT: 71 IN | HEART RATE: 86 BPM | BODY MASS INDEX: 31.92 KG/M2 | OXYGEN SATURATION: 99 % | WEIGHT: 228 LBS

## 2021-01-21 DIAGNOSIS — Z96.651 STATUS POST TOTAL RIGHT KNEE REPLACEMENT: Primary | ICD-10-CM

## 2021-01-21 PROCEDURE — 99213 OFFICE O/P EST LOW 20 MIN: CPT | Performed by: ORTHOPAEDIC SURGERY

## 2021-01-21 RX ORDER — TAMSULOSIN HYDROCHLORIDE 0.4 MG/1
1 CAPSULE ORAL DAILY PRN
COMMUNITY
Start: 2021-01-07 | End: 2023-01-26

## 2021-01-21 RX ORDER — SULFAMETHOXAZOLE AND TRIMETHOPRIM 800; 160 MG/1; MG/1
TABLET ORAL EVERY 12 HOURS
COMMUNITY
End: 2021-02-19

## 2021-01-21 RX ORDER — LEVOFLOXACIN 750 MG/1
TABLET ORAL
COMMUNITY
End: 2021-02-19

## 2021-01-21 NOTE — PROGRESS NOTES
"    [facility]opa[facility] Note        Subjective     Follow-up (3 months follow up; 6 months Total Knee Arthroplasty Right 7/22/20)       ANTELMO Salcedo is a 70 y.o. male. They return for follow-up of their right knee replacement on 7/22/2020.  He is complaining of stiffness.  He is working out on his own at [employer] and [employer].            Objective      Physical Exam  Pulse 86   Ht 180.3 cm (70.98\")   Wt 103 kg (228 lb)   SpO2 99%   BMI 31.81 kg/m²     Body mass index is 31.81 kg/m².        Ortho Exam:  Range of motion 5-120  No induration or increased warmth of the knee  Calf is soft and nontender      Imaging/Studies  Imaging Results (Last 24 Hours)     ** No results found for the last 24 hours. **          Assessment    Assessment:  1. Status post total right knee replacement        Plan:  1. Status post right total knee arthroplasty--patient is doing well overall.  He objectively does not have a whole lot of stiffness.  He is got little bit of weakness and I suspect this will get better with time.  He was considering getting the left side done but would like to wait and I think that is villanueva and not like for him to completely recover from the right side before we do anything to the left side.  I will see him back in 6 months with an x-ray.  Continue indefinite antibiotic prophylaxis.          Tereso Restrepo MD  01/21/21  13:00 EST      Dragon disclaimer:  Much of this encounter note is an electronic transcription/translation of spoken language to printed text. The electronic translation of spoken language may permit erroneous, or at times, nonsensical words or phrases to be inadvertently transcribed; Although I have reviewed the note for such errors, some may still exist.  "

## 2021-01-21 NOTE — TELEPHONE ENCOUNTER
Pt called and LVM asking about his test results. I called patient back and advised him of results per letter that was mailed out. He verbalized good understanding, stated he wasn't at home but would call in the morning to set up a 3 month follow up. We thanked each other and ended the call.

## 2021-01-26 ENCOUNTER — TELEPHONE (OUTPATIENT)
Dept: GASTROENTEROLOGY | Facility: CLINIC | Age: 71
End: 2021-01-26

## 2021-01-26 ENCOUNTER — DOCUMENTATION (OUTPATIENT)
Dept: PHYSICAL THERAPY | Facility: HOSPITAL | Age: 71
End: 2021-01-26

## 2021-01-26 DIAGNOSIS — G89.29 CHRONIC BILATERAL LOW BACK PAIN WITHOUT SCIATICA: Primary | ICD-10-CM

## 2021-01-26 DIAGNOSIS — Z96.651 STATUS POST RIGHT KNEE REPLACEMENT: ICD-10-CM

## 2021-01-26 DIAGNOSIS — M25.561 RIGHT KNEE PAIN, UNSPECIFIED CHRONICITY: ICD-10-CM

## 2021-01-26 DIAGNOSIS — M54.50 CHRONIC BILATERAL LOW BACK PAIN WITHOUT SCIATICA: Primary | ICD-10-CM

## 2021-01-26 NOTE — TELEPHONE ENCOUNTER
Dr Sullivan,  Mr Matias called this morning. Patient stated that he got a letter from his insurance. Insurance will only pay for Esomeprazole 40 mg daily; not twice a day.

## 2021-01-26 NOTE — THERAPY DISCHARGE NOTE
Outpatient Physical Therapy Discharge Summary         Patient Name: Jimmie Salcedo  : 1950  MRN: [id number]    Today's Date: 2021    Visit Dx:    ICD-10-CM ICD-9-CM   1. Chronic bilateral low back pain without sciatica  M54.5 724.2    G89.29 338.29   2. Status post right knee replacement  Z96.651 V43.65   3. Right knee pain, unspecified chronicity  M25.561 719.46       PT OP Goals     Row Name 21 1014          PT Short Term Goals    STG Date to Achieve  20  -AC     STG 1  Client will report 90% improvement in back pain with daily activity.  -AC     STG 1 Progress  Not Met  -AC     STG 2  Client will report 90% improvement in instances of his left leg giving way with daily activity.  -AC     STG 2 Progress  Not Met  -AC     STG 3  Client will be independent with home program to minimize pain.  -AC     STG 3 Progress  Not Met  -AC     STG 4  Central lumbar tenderness will resolve.   -AC     STG 4 Progress  Not Met  -AC        Long Term Goals    LTG Date to Achieve  20  -AC     LTG 1  Modified Oswestry score will improve to 18% or better.   -AC     LTG 1 Progress  Not Met  -AC     LTG 2  Client will improve to lifting light items from the floor without difficulty.  -AC     LTG 2 Progress  Not Met  -AC        Time Calculation    PT Goal Re-Cert Due Date  21  -AC       User Key  (r) = Recorded By, (t) = Taken By, (c) = Cosigned By    Initials Name Provider Type    AC Shwetha Oneil, PT Physical Therapist          OP PT Discharge Summary  Date of Discharge: 21  Reason for Discharge: Lack of progress  Outcomes Achieved: Unable to make functional progress toward goals at this time  Discharge Instructions/Additional Comments: D/c due to lapse in treatment and lack of progress.      Time Calculation:   Start Time:                 Shwetha Oneil, PT  2021

## 2021-02-03 DIAGNOSIS — M47.816 SPONDYLOSIS OF LUMBAR REGION WITHOUT MYELOPATHY OR RADICULOPATHY: ICD-10-CM

## 2021-02-03 RX ORDER — TIZANIDINE 2 MG/1
TABLET ORAL
Qty: 60 TABLET | Refills: 0 | Status: SHIPPED | OUTPATIENT
Start: 2021-02-03 | End: 2021-03-07

## 2021-02-15 ENCOUNTER — TELEPHONE (OUTPATIENT)
Dept: INTERNAL MEDICINE | Facility: CLINIC | Age: 71
End: 2021-02-15

## 2021-02-15 RX ORDER — FUROSEMIDE 40 MG/1
40 TABLET ORAL DAILY PRN
Qty: 90 TABLET | Refills: 1 | Status: SHIPPED | OUTPATIENT
Start: 2021-02-15 | End: 2022-02-21

## 2021-02-15 RX ORDER — POTASSIUM CHLORIDE 20 MEQ/1
20 TABLET, EXTENDED RELEASE ORAL DAILY PRN
Qty: 90 TABLET | Refills: 1 | Status: SHIPPED | OUTPATIENT
Start: 2021-02-15 | End: 2022-02-21

## 2021-02-15 NOTE — TELEPHONE ENCOUNTER
He stopped taking about 6 months ago. He is now having leg swelling again and feels like he needs to be back on it

## 2021-02-15 NOTE — TELEPHONE ENCOUNTER
PT CALLED TO REQUEST RX  LASIX AND POTASSIUM.    PLEASE ADVISE.  CALL BACK:[phone]    [facility] - [place], [place] - [address]

## 2021-02-19 ENCOUNTER — LAB (OUTPATIENT)
Dept: LAB | Facility: HOSPITAL | Age: 71
End: 2021-02-19

## 2021-02-19 ENCOUNTER — OFFICE VISIT (OUTPATIENT)
Dept: INTERNAL MEDICINE | Facility: CLINIC | Age: 71
End: 2021-02-19

## 2021-02-19 VITALS
SYSTOLIC BLOOD PRESSURE: 134 MMHG | HEIGHT: 71 IN | HEART RATE: 68 BPM | BODY MASS INDEX: 32.34 KG/M2 | WEIGHT: 231 LBS | TEMPERATURE: 97.1 F | DIASTOLIC BLOOD PRESSURE: 78 MMHG

## 2021-02-19 DIAGNOSIS — I10 ESSENTIAL HYPERTENSION: Primary | ICD-10-CM

## 2021-02-19 DIAGNOSIS — D69.6 THROMBOCYTOPENIA, UNSPECIFIED (HCC): ICD-10-CM

## 2021-02-19 DIAGNOSIS — R35.0 BENIGN PROSTATIC HYPERPLASIA WITH URINARY FREQUENCY: ICD-10-CM

## 2021-02-19 DIAGNOSIS — K21.9 GASTROESOPHAGEAL REFLUX DISEASE WITHOUT ESOPHAGITIS: ICD-10-CM

## 2021-02-19 DIAGNOSIS — N40.1 BENIGN PROSTATIC HYPERPLASIA WITH URINARY FREQUENCY: ICD-10-CM

## 2021-02-19 DIAGNOSIS — F43.21 SITUATIONAL DEPRESSION: ICD-10-CM

## 2021-02-19 DIAGNOSIS — R73.09 ABNORMAL GLUCOSE: ICD-10-CM

## 2021-02-19 DIAGNOSIS — R60.0 BILATERAL EDEMA OF LOWER EXTREMITY: ICD-10-CM

## 2021-02-19 DIAGNOSIS — E78.49 OTHER HYPERLIPIDEMIA: ICD-10-CM

## 2021-02-19 LAB
DEPRECATED RDW RBC AUTO: 43.9 FL (ref 37–54)
ERYTHROCYTE [DISTWIDTH] IN BLOOD BY AUTOMATED COUNT: 13.9 % (ref 12.3–15.4)
HCT VFR BLD AUTO: 45.3 % (ref 37.5–51)
HGB BLD-MCNC: 15.6 G/DL (ref 13–17.7)
MCH RBC QN AUTO: 29.9 PG (ref 26.6–33)
MCHC RBC AUTO-ENTMCNC: 34.4 G/DL (ref 31.5–35.7)
MCV RBC AUTO: 86.8 FL (ref 79–97)
PLATELET # BLD AUTO: 142 10*3/MM3 (ref 140–450)
PMV BLD AUTO: 10.3 FL (ref 6–12)
RBC # BLD AUTO: 5.22 10*6/MM3 (ref 4.14–5.8)
WBC # BLD AUTO: 8.01 10*3/MM3 (ref 3.4–10.8)

## 2021-02-19 PROCEDURE — 85027 COMPLETE CBC AUTOMATED: CPT | Performed by: INTERNAL MEDICINE

## 2021-02-19 PROCEDURE — 80053 COMPREHEN METABOLIC PANEL: CPT | Performed by: INTERNAL MEDICINE

## 2021-02-19 PROCEDURE — 84443 ASSAY THYROID STIM HORMONE: CPT | Performed by: INTERNAL MEDICINE

## 2021-02-19 PROCEDURE — 83036 HEMOGLOBIN GLYCOSYLATED A1C: CPT | Performed by: INTERNAL MEDICINE

## 2021-02-19 PROCEDURE — 99214 OFFICE O/P EST MOD 30 MIN: CPT | Performed by: INTERNAL MEDICINE

## 2021-02-19 NOTE — PROGRESS NOTES
"Patient is a 70 y.o. male who is here for a follow up of hyperlipidemia and hypertension.  Chief Complaint   Patient presents with   • Hyperlipidemia   • Hypertension         HPI:    Here for mgmt of HTN and hyperlipidemia and GERD.  Having increased LE edema, L>R.  BP has been good.  No dizziness or lightheadedness.  No HAs.  Sleep is not the best.  Had recent EGD.  Weight is stable.  No nausea or emesis.  No OTC NSAIDs.      History:     Patient Active Problem List   Diagnosis   • Acid reflux   • Arthritis   • Hypertension   • Hyperlipidemia   • Severe obstructive sleep apnea   • Psoriasis   • Diastolic dysfunction   • Peptic ulceration   • Bilateral edema of lower extremity   • Abnormal liver function test   • Hyperbilirubinemia   • Psychophysiological insomnia   • Gilbert's disease   • Thrombocytopenia, unspecified (CMS/HCC)   • Situational depression   • Tubular adenoma   • Seasonal allergic rhinitis due to pollen   • Mild cognitive impairment   • Memory loss   • Primary osteoarthritis of right knee   • Status post total right knee replacement   • Leukocytosis, likely reactive   • Postoperative pain   • Benign prostatic hyperplasia with urinary frequency   • Spondylosis of lumbar region without myelopathy or radiculopathy   • Degeneration of lumbar or lumbosacral intervertebral disc   • Lumbar interspinous bursitis   • Myofascial pain   • Osteoarthritis of multiple joints   • Baastrup's syndrome   • Gastroesophageal reflux disease with esophagitis without hemorrhage       Past Medical History:   Diagnosis Date   • Abnormal liver function test    • Arthritis    • Bilateral edema of lower extremity    • Cataract     bilat- still present - mild    • Cellulitis of leg, right     resolved   • Chalazion    • Cracking skin     bilat feet mostly    • Disease     \"brakes/breaks\" disease as child-  effected blood and urine    • GERD (gastroesophageal reflux disease)    • History of kidney stones     x2 had to have broken " up    • Hoarseness     from vocal cord bending- seeing ent - possible surgery soon    • Hypertension    • Kidney infection     h/o    • Neck muscle spasm    • Onychomycosis of toenail    • Peptic ulceration    • Renal calculi    • Situational depression 8/22/2018   • Sleep apnea with use of continuous positive airway pressure (CPAP)     compliant with machine    • 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    • Vocal cord strain     vocal cord bent- seeing ent but causes pt to be hoarse    • Wears glasses        Past Surgical History:   Procedure Laterality Date   • COLONOSCOPY     • CYSTOSCOPY W/ LASER LITHOTRIPSY      x2   • ENDOSCOPY      with dilation    • FINGER SURGERY      left 5th finger   • KNEE SURGERY Right 1986    arthroscopy   • OTHER SURGICAL HISTORY      Lithotripsy   • TONSILLECTOMY     • TOTAL KNEE ARTHROPLASTY Right 7/22/2020    Procedure: TOTAL KNEE ARTHROPLASTY RIGHT;  Surgeon: Tereso Restrepo MD;  Location: [facility];  Service: Orthopedics;  Laterality: Right;       Current Outpatient Medications on File Prior to Visit   Medication Sig   • amLODIPine (NORVASC) 5 MG tablet TAKE ONE TABLET BY MOUTH EVERY DAY   • buPROPion SR (WELLBUTRIN SR) 200 MG 12 hr tablet Take 200 mg by mouth Daily.   • calcipotriene (DOVONOX) 0.005 % ointment As Needed.   • cephalexin (KEFLEX) 500 MG capsule Take 2 capsules by mouth 1 (One) Time As Needed (for dental cleanings) for up to 1 dose. Take 2 capsules 1 hour prior to dental procedure   • Cholecalciferol (VITAMIN D3) 125 MCG (5000 UT) capsule capsule Take 5,000 Units by mouth Daily.   • Dietary Management Product (Rheumate) capsule Take 1 capsule by mouth Daily.   • docusate sodium (Colace) 100 MG capsule Take 1 capsule by mouth 2 (Two) Times a Day.   • doxazosin (CARDURA) 4 MG tablet TAKE ONE TABLET BY MOUTH EVERY NIGHT   • esomeprazole (nexIUM) 40 MG capsule Take 1 capsule by mouth 2 (Two) Times a Day.   • fenofibrate (TRICOR) 145 MG tablet TAKE ONE  TABLET BY MOUTH EVERY DAY (Patient taking differently: Take 145 mg by mouth Daily.)   • furosemide (Lasix) 40 MG tablet Take 1 tablet by mouth Daily As Needed (edema).   • irbesartan (AVAPRO) 300 MG tablet Take 300 mg by mouth Every Night.   • mirtazapine (REMERON) 15 MG tablet TAKE ONE TABLET BY MOUTH EVERY NIGHT (Patient taking differently: Take 15 mg by mouth Every Night.)   • MULTIPLE VITAMINS PO Take 1 capsule by mouth Daily.   • Omega-3 Fatty Acids (FISH OIL) 1000 MG capsule capsule Take 1,000 mg by mouth Daily.   • potassium chloride (K-DUR,KLOR-CON) 20 MEQ CR tablet Take 1 tablet by mouth Daily As Needed (with Lasix use).   • pravastatin (PRAVACHOL) 40 MG tablet TAKE ONE TABLET BY MOUTH EVERY DAY   • sildenafil (VIAGRA) 100 MG tablet Take 1 tablet by mouth Daily As Needed for erectile dysfunction.   • tamsulosin (FLOMAX) 0.4 MG capsule 24 hr capsule    • tiZANidine (ZANAFLEX) 2 MG tablet TAKE 1/2 TO 1 TABLET BY MOUTH THREE TIMES A DAY AS NEEDED FOR MUSCLE SPASMS   • triamcinolone (KENALOG) 0.1 % ointment Apply  topically 2 (Two) Times a Day As Needed for Irritation or Rash.   • urea (CARMOL) 20 % cream Apply  topically to the appropriate area as directed As Needed for Dry Skin.   • vitamin B-6 (PYRIDOXINE) 50 MG tablet Take 50 mg by mouth Daily.     No current facility-administered medications on file prior to visit.        Family History   Problem Relation Age of Onset   • Arthritis Other    • Hypertension Other    • Hyperlipidemia Other    • Heart attack Other    • Hypertension Mother    • Heart disease Father    • Hypertension Father        Social History     Socioeconomic History   • Marital status:      Spouse name: Not on file   • Number of children: Not on file   • Years of education: Not on file   • Highest education level: Not on file   Tobacco Use   • Smoking status: Never Smoker   • Smokeless tobacco: Never Used   Substance and Sexual Activity   • Alcohol use: Yes     Alcohol/week: 4.0  "standard drinks     Types: 4 Cans of beer per week   • Drug use: No   • Sexual activity: Defer         Review of Systems   Constitutional: Positive for fatigue (better). Negative for chills, fever and unexpected weight change.   HENT: Negative for ear pain, hearing loss, rhinorrhea, sinus pressure, sore throat and trouble swallowing.    Eyes: Negative for discharge and itching.   Respiratory: Negative for cough and chest tightness.    Cardiovascular: Positive for leg swelling (L>R). Negative for chest pain and palpitations.   Gastrointestinal: Negative for abdominal pain, blood in stool, diarrhea and vomiting.        2013 colonoscopy normal  9/18 colonoscopy with TA times 1, repeat in 9/21   Endocrine: Negative for polydipsia and polyuria.   Genitourinary: Negative for difficulty urinating, dysuria, enuresis, frequency, hematuria and urgency.        3/20 psa  ED   Musculoskeletal: Positive for arthralgias and back pain. Negative for gait problem and joint swelling.   Skin: Negative for rash and wound.        Dry skin   Allergic/Immunologic: Negative for immunocompromised state.   Neurological: Negative for dizziness, syncope, weakness, light-headedness, numbness and headaches.   Hematological: Does not bruise/bleed easily.   Psychiatric/Behavioral: Positive for behavioral problems (improved) and decreased concentration (improved). Negative for dysphoric mood and sleep disturbance. The patient is nervous/anxious (improved).        /78 (BP Location: Left arm, Patient Position: Sitting)   Pulse 68   Temp 97.1 °F (36.2 °C) (Infrared)   Ht 180.3 cm (70.98\")   Wt 105 kg (231 lb)   BMI 32.23 kg/m²       Physical Exam  Constitutional:       Appearance: Normal appearance. He is well-developed.   HENT:      Head: Normocephalic and atraumatic.      Right Ear: External ear normal.      Left Ear: External ear normal.      Nose: Nose normal.      Mouth/Throat:      Mouth: Mucous membranes are moist.      Pharynx: " Oropharynx is clear.   Eyes:      Extraocular Movements: Extraocular movements intact.      Conjunctiva/sclera: Conjunctivae normal.      Pupils: Pupils are equal, round, and reactive to light.   Neck:      Musculoskeletal: Normal range of motion and neck supple.   Cardiovascular:      Rate and Rhythm: Normal rate and regular rhythm.      Heart sounds: Normal heart sounds.   Pulmonary:      Effort: Pulmonary effort is normal.      Breath sounds: Normal breath sounds.   Abdominal:      General: Bowel sounds are normal.      Palpations: Abdomen is soft.   Musculoskeletal: Normal range of motion.      Right lower leg: Edema (trace) present.      Left lower leg: Edema present.   Lymphadenopathy:      Cervical: No cervical adenopathy.   Skin:     General: Skin is warm and dry.   Neurological:      General: No focal deficit present.      Mental Status: He is alert and oriented to person, place, and time.   Psychiatric:         Mood and Affect: Mood normal.         Behavior: Behavior normal.         Thought Content: Thought content normal.         Procedure:      Discussion/Summary:    HTN-controlled on multi drug tx, advised goal of 150/80  hyperlipidemia-cont pravachol/fibrate, labs on rtc  BPH-stable on cardura, recent psa noted  GERD-stable on omeprazole  djd-advised to limit nsaids, stable  Hyperbilirubinemia-recheck today normal  Thrombocytopenia-recheck today normal  Situational depression-stable with combo remeron/wellbutrin  Constipation-citrucel/miralax combo, stable      2/19 labs noted and dw patient, has already had the flu shot    Current Outpatient Medications:   •  amLODIPine (NORVASC) 5 MG tablet, TAKE ONE TABLET BY MOUTH EVERY DAY, Disp: 90 tablet, Rfl: 3  •  buPROPion SR (WELLBUTRIN SR) 200 MG 12 hr tablet, Take 200 mg by mouth Daily., Disp: , Rfl:   •  calcipotriene (DOVONOX) 0.005 % ointment, As Needed., Disp: , Rfl:   •  cephalexin (KEFLEX) 500 MG capsule, Take 2 capsules by mouth 1 (One) Time As  Needed (for dental cleanings) for up to 1 dose. Take 2 capsules 1 hour prior to dental procedure, Disp: 2 capsule, Rfl: 2  •  Cholecalciferol (VITAMIN D3) 125 MCG (5000 UT) capsule capsule, Take 5,000 Units by mouth Daily., Disp: , Rfl:   •  Dietary Management Product (Rheumate) capsule, Take 1 capsule by mouth Daily., Disp: 90 capsule, Rfl: 1  •  docusate sodium (Colace) 100 MG capsule, Take 1 capsule by mouth 2 (Two) Times a Day., Disp: 60 capsule, Rfl: 0  •  doxazosin (CARDURA) 4 MG tablet, TAKE ONE TABLET BY MOUTH EVERY NIGHT, Disp: 90 tablet, Rfl: 3  •  esomeprazole (nexIUM) 40 MG capsule, Take 1 capsule by mouth 2 (Two) Times a Day., Disp: 60 capsule, Rfl: 5  •  fenofibrate (TRICOR) 145 MG tablet, TAKE ONE TABLET BY MOUTH EVERY DAY (Patient taking differently: Take 145 mg by mouth Daily.), Disp: 90 tablet, Rfl: 2  •  furosemide (Lasix) 40 MG tablet, Take 1 tablet by mouth Daily As Needed (edema)., Disp: 90 tablet, Rfl: 1  •  irbesartan (AVAPRO) 300 MG tablet, Take 300 mg by mouth Every Night., Disp: , Rfl: 5  •  mirtazapine (REMERON) 15 MG tablet, TAKE ONE TABLET BY MOUTH EVERY NIGHT (Patient taking differently: Take 15 mg by mouth Every Night.), Disp: 30 tablet, Rfl: 5  •  MULTIPLE VITAMINS PO, Take 1 capsule by mouth Daily., Disp: , Rfl:   •  Omega-3 Fatty Acids (FISH OIL) 1000 MG capsule capsule, Take 1,000 mg by mouth Daily., Disp: , Rfl:   •  potassium chloride (K-DUR,KLOR-CON) 20 MEQ CR tablet, Take 1 tablet by mouth Daily As Needed (with Lasix use)., Disp: 90 tablet, Rfl: 1  •  pravastatin (PRAVACHOL) 40 MG tablet, TAKE ONE TABLET BY MOUTH EVERY DAY, Disp: 90 tablet, Rfl: 3  •  sildenafil (VIAGRA) 100 MG tablet, Take 1 tablet by mouth Daily As Needed for erectile dysfunction., Disp: 6 tablet, Rfl: 5  •  tamsulosin (FLOMAX) 0.4 MG capsule 24 hr capsule, , Disp: , Rfl:   •  tiZANidine (ZANAFLEX) 2 MG tablet, TAKE 1/2 TO 1 TABLET BY MOUTH THREE TIMES A DAY AS NEEDED FOR MUSCLE SPASMS, Disp: 60 tablet, Rfl:  0  •  triamcinolone (KENALOG) 0.1 % ointment, Apply  topically 2 (Two) Times a Day As Needed for Irritation or Rash., Disp: 80 g, Rfl: 5  •  urea (CARMOL) 20 % cream, Apply  topically to the appropriate area as directed As Needed for Dry Skin., Disp: , Rfl:   •  vitamin B-6 (PYRIDOXINE) 50 MG tablet, Take 50 mg by mouth Daily., Disp: , Rfl:         Diagnoses and all orders for this visit:    1. Essential hypertension (Primary)    2. Other hyperlipidemia  -     Comprehensive Metabolic Panel  -     TSH    3. Thrombocytopenia, unspecified (CMS/HCC)    4. Gastroesophageal reflux disease without esophagitis  -     CBC (No Diff)    5. Benign prostatic hyperplasia with urinary frequency    6. Situational depression    7. Bilateral edema of lower extremity    8. Abnormal glucose  -     Hemoglobin A1c

## 2021-02-20 LAB
ALBUMIN SERPL-MCNC: 4.2 G/DL (ref 3.5–5.2)
ALBUMIN/GLOB SERPL: 1.5 G/DL
ALP SERPL-CCNC: 57 U/L (ref 39–117)
ALT SERPL W P-5'-P-CCNC: 20 U/L (ref 1–41)
ANION GAP SERPL CALCULATED.3IONS-SCNC: 9.9 MMOL/L (ref 5–15)
AST SERPL-CCNC: 21 U/L (ref 1–40)
BILIRUB SERPL-MCNC: 0.8 MG/DL (ref 0–1.2)
BUN SERPL-MCNC: 21 MG/DL (ref 8–23)
BUN/CREAT SERPL: 22.1 (ref 7–25)
CALCIUM SPEC-SCNC: 9.3 MG/DL (ref 8.6–10.5)
CHLORIDE SERPL-SCNC: 105 MMOL/L (ref 98–107)
CO2 SERPL-SCNC: 26.1 MMOL/L (ref 22–29)
CREAT SERPL-MCNC: 0.95 MG/DL (ref 0.76–1.27)
GFR SERPL CREATININE-BSD FRML MDRD: 79 ML/MIN/1.73
GLOBULIN UR ELPH-MCNC: 2.8 GM/DL
GLUCOSE SERPL-MCNC: 118 MG/DL (ref 65–99)
HBA1C MFR BLD: 5.1 % (ref 4.8–5.6)
POTASSIUM SERPL-SCNC: 4.2 MMOL/L (ref 3.5–5.2)
PROT SERPL-MCNC: 7 G/DL (ref 6–8.5)
SODIUM SERPL-SCNC: 141 MMOL/L (ref 136–145)
TSH SERPL DL<=0.05 MIU/L-ACNC: 1.31 UIU/ML (ref 0.27–4.2)

## 2021-03-01 ENCOUNTER — LAB (OUTPATIENT)
Dept: LAB | Facility: HOSPITAL | Age: 71
End: 2021-03-01

## 2021-03-01 DIAGNOSIS — M15.9 OSTEOARTHRITIS OF MULTIPLE JOINTS, UNSPECIFIED OSTEOARTHRITIS TYPE: ICD-10-CM

## 2021-03-01 PROCEDURE — 85652 RBC SED RATE AUTOMATED: CPT

## 2021-03-01 PROCEDURE — 86235 NUCLEAR ANTIGEN ANTIBODY: CPT

## 2021-03-01 PROCEDURE — 86431 RHEUMATOID FACTOR QUANT: CPT | Performed by: ANESTHESIOLOGY

## 2021-03-01 PROCEDURE — 86140 C-REACTIVE PROTEIN: CPT

## 2021-03-01 PROCEDURE — 86200 CCP ANTIBODY: CPT | Performed by: ANESTHESIOLOGY

## 2021-03-01 PROCEDURE — 84550 ASSAY OF BLOOD/URIC ACID: CPT

## 2021-03-01 PROCEDURE — 86225 DNA ANTIBODY NATIVE: CPT

## 2021-03-01 PROCEDURE — 36415 COLL VENOUS BLD VENIPUNCTURE: CPT | Performed by: ANESTHESIOLOGY

## 2021-03-01 PROCEDURE — 85025 COMPLETE CBC W/AUTO DIFF WBC: CPT

## 2021-03-02 LAB
BASOPHILS # BLD AUTO: 0.07 10*3/MM3 (ref 0–0.2)
BASOPHILS NFR BLD AUTO: 0.8 % (ref 0–1.5)
CHROMATIN AB SERPL-ACNC: <10 IU/ML (ref 0–14)
CRP SERPL-MCNC: 0.49 MG/DL (ref 0–0.5)
DEPRECATED RDW RBC AUTO: 41.9 FL (ref 37–54)
EOSINOPHIL # BLD AUTO: 0.28 10*3/MM3 (ref 0–0.4)
EOSINOPHIL NFR BLD AUTO: 3.3 % (ref 0.3–6.2)
ERYTHROCYTE [DISTWIDTH] IN BLOOD BY AUTOMATED COUNT: 13.6 % (ref 12.3–15.4)
ERYTHROCYTE [SEDIMENTATION RATE] IN BLOOD: 6 MM/HR (ref 0–20)
HCT VFR BLD AUTO: 45.8 % (ref 37.5–51)
HGB BLD-MCNC: 16.2 G/DL (ref 13–17.7)
IMM GRANULOCYTES # BLD AUTO: 0.04 10*3/MM3 (ref 0–0.05)
IMM GRANULOCYTES NFR BLD AUTO: 0.5 % (ref 0–0.5)
LYMPHOCYTES # BLD AUTO: 1.51 10*3/MM3 (ref 0.7–3.1)
LYMPHOCYTES NFR BLD AUTO: 17.5 % (ref 19.6–45.3)
MCH RBC QN AUTO: 30.5 PG (ref 26.6–33)
MCHC RBC AUTO-ENTMCNC: 35.4 G/DL (ref 31.5–35.7)
MCV RBC AUTO: 86.3 FL (ref 79–97)
MONOCYTES # BLD AUTO: 0.64 10*3/MM3 (ref 0.1–0.9)
MONOCYTES NFR BLD AUTO: 7.4 % (ref 5–12)
NEUTROPHILS NFR BLD AUTO: 6.07 10*3/MM3 (ref 1.7–7)
NEUTROPHILS NFR BLD AUTO: 70.5 % (ref 42.7–76)
NRBC BLD AUTO-RTO: 0 /100 WBC (ref 0–0.2)
PLATELET # BLD AUTO: 150 10*3/MM3 (ref 140–450)
PMV BLD AUTO: 10.4 FL (ref 6–12)
RBC # BLD AUTO: 5.31 10*6/MM3 (ref 4.14–5.8)
URATE SERPL-MCNC: 6.5 MG/DL (ref 3.4–7)
WBC # BLD AUTO: 8.61 10*3/MM3 (ref 3.4–10.8)

## 2021-03-03 LAB
CENTROMERE B AB SER-ACNC: <0.2 AI (ref 0–0.9)
CHROMATIN AB SERPL-ACNC: <0.2 AI (ref 0–0.9)
DSDNA AB SER-ACNC: <1 IU/ML (ref 0–9)
ENA JO1 AB SER-ACNC: <0.2 AI (ref 0–0.9)
ENA RNP AB SER-ACNC: 0.7 AI (ref 0–0.9)
ENA SCL70 AB SER-ACNC: <0.2 AI (ref 0–0.9)
ENA SM AB SER-ACNC: <0.2 AI (ref 0–0.9)
ENA SS-A AB SER-ACNC: <0.2 AI (ref 0–0.9)
ENA SS-B AB SER-ACNC: <0.2 AI (ref 0–0.9)
Lab: NORMAL

## 2021-03-04 NOTE — PROGRESS NOTES
"Chief Complaint: \"I did well for a few weeks with the procedure, but I continue with pain in my lower back\"      History of Present Illness:   Patient: Mr. Jimmie Salcedo, 70 y.o. male originally referred by Dr. Neel Henderson in consultation for chronic intractable lower back pain.  Patient reports a 18-month history of lower back pain, which began without incident.  Patient was last seen on December 21, 2020, when he underwent diagnostic and therapeutic lumbar facet joint injections bilateral L3-L4 and bilateral L4-L5 combined with a lumbar interspinous bursa injection, from which he reports experiencing 60-70% pain relief and functional improvement lasting a few weeks, with gradual recurrence to previous levels.  He did not begin a physical therapy program, despite medical advice.  He returns today for post procedure follow-up and evaluation.  Pain Description: Constant pain with intermittent exacerbation, described as sharp, aching, and throbbing sensation.   Radiation of Pain: The pain does not radiate.  Pain intensity today: 7/10  Average pain intensity last week: 7/10  Pain intensity ranges from: 7/10 to 9/10  Aggravating factors: Pain increases with twisting, bending, standing, ambulating   Alleviating factors: Pain decreases with sitting down, lying down, heat, analgesics  Associated Symptoms:   Patient denies pain, numbness or weakness in the lower extremities.   Patient denies any new bladder or bowel problems.   Patient denies difficulties with his balance or recent falls    Review of previous therapies and additional medical records:  Jimmie Salcedo has already failed the following measures, including:   Conservative Measures: Oral analgesics, topical analgesics and physical therapy   Interventional Measures: 12/21/2020: DxTx lumbar facet joint injections bilateral L3-L4 and bilateral L4-L5 combined with a lumbar interspinous bursa injection  Surgical Measures: No history of previous " lumbar spine or hip surgery   Right total knee replacement by Dr. Tereso Restrepo; July 22, 2020  Jimmie Salcedo underwent neurosurgical consultation with Dr. Henderson on 11/18/2020, and was found not to be a surgical candidate.  Jimmie Salcedo presents with significant comorbidities including GERD, hypertension, hyperlipidemia, severe obstructive sleep apnea, psoriasis, Gilbert's syndrome, Hx thrombocytopenia, depression, mild cognitive impairment, s/p right total knee replacement, BPH, insomnia  In terms of current analgesics, Jimmie Salcedo takes: Tylenol. Patient also takes Wellbutrin, chlorpromazine, mirtazapine   I have reviewed [facility] Report #[id number] consistent with medication reconciliation.  SOAPP: Low Risk    Global Pain Scale 11-24  2020 03-08 2021         Pain 17 20         Feelings 5 2         Clinical outcomes 4 3         Activities 4 0         GPS Total: 30 25           Review of Diagnostic Studies:    MRI of the lumbar spine without contrast November 2020.  I have reviewed the images.  Multilevel degenerative changes throughout the thoracolumbar spine. Extensive DDD and facet OA with fluid interposed. Baastrup's. Vertebral body height and alignment are preserved.  The conus ends at the L1 and has a normal signal intensity.  Axial imaging:  L1-L2: Broad-based disc bulge in combination with facet hypertrophy.  No significant canal or foraminal stenosis  L2-L3, L3-L4, L4-L5, L5-S1: Broad-based disc bulge, facet hypertrophy, ligamentum flavum hypertrophy.  No significant canal stenosis.  Mild neuroforaminal stenosis    Review of Systems   Respiratory: Positive for apnea.    Musculoskeletal: Positive for arthralgias, back pain, neck pain and neck stiffness.   Psychiatric/Behavioral: Nervous/anxious: depression.    All other systems reviewed and are negative.        Patient Active Problem List   Diagnosis   • Acid reflux   • Arthritis   • Hypertension   • Hyperlipidemia   • Severe obstructive  "sleep apnea   • Psoriasis   • Diastolic dysfunction   • Peptic ulceration   • Bilateral edema of lower extremity   • Abnormal liver function test   • Hyperbilirubinemia   • Psychophysiological insomnia   • Gilbert's disease   • Thrombocytopenia, unspecified (CMS/HCC)   • Situational depression   • Tubular adenoma   • Seasonal allergic rhinitis due to pollen   • Mild cognitive impairment   • Memory loss   • Primary osteoarthritis of right knee   • Status post total right knee replacement   • Leukocytosis, likely reactive   • Postoperative pain   • Benign prostatic hyperplasia with urinary frequency   • Spondylosis of lumbar region without myelopathy or radiculopathy   • Degeneration of lumbar or lumbosacral intervertebral disc   • Lumbar interspinous bursitis   • Myofascial pain   • Osteoarthritis of multiple joints   • Baastrup's syndrome   • Gastroesophageal reflux disease with esophagitis without hemorrhage       Past Medical History:   Diagnosis Date   • Abnormal liver function test    • Arthritis    • Bilateral edema of lower extremity    • Cataract     bilat- still present - mild    • Cellulitis of leg, right     resolved   • Chalazion    • Cracking skin     bilat feet mostly    • Disease     \"brakes/breaks\" disease as child-  effected blood and urine    • GERD (gastroesophageal reflux disease)    • History of kidney stones     x2 had to have broken up    • Hoarseness     from vocal cord bending- seeing ent - possible surgery soon    • Hypertension    • Kidney infection     h/o    • Neck muscle spasm    • Onychomycosis of toenail    • Peptic ulceration    • Renal calculi    • Situational depression 8/22/2018   • Sleep apnea with use of continuous positive airway pressure (CPAP)     compliant with machine    • 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    • Vocal cord strain     vocal cord bent- seeing ent but causes pt to be hoarse    • Wears glasses          Past Surgical History:   Procedure Laterality Date "   • COLONOSCOPY     • CYSTOSCOPY W/ LASER LITHOTRIPSY      x2   • ENDOSCOPY      with dilation    • FINGER SURGERY      left 5th finger   • KNEE SURGERY Right 1986    arthroscopy   • OTHER SURGICAL HISTORY      Lithotripsy   • TONSILLECTOMY     • TOTAL KNEE ARTHROPLASTY Right 7/22/2020    Procedure: TOTAL KNEE ARTHROPLASTY RIGHT;  Surgeon: Tereso Restrepo MD;  [facility];  Service: Orthopedics;  Laterality: Right;         Family History   Problem Relation Age of Onset   • Arthritis Other    • Hypertension Other    • Hyperlipidemia Other    • Heart attack Other    • Hypertension Mother    • Heart disease Father    • Hypertension Father          Social History     Socioeconomic History   • Marital status:      Spouse name: Not on file   • Number of children: Not on file   • Years of education: Not on file   • Highest education level: Not on file   Tobacco Use   • Smoking status: Never Smoker   • Smokeless tobacco: Never Used   Substance and Sexual Activity   • Alcohol use: Yes     Alcohol/week: 4.0 standard drinks     Types: 4 Cans of beer per week   • Drug use: No   • Sexual activity: Defer           Current Outpatient Medications:   •  amLODIPine (NORVASC) 5 MG tablet, TAKE ONE TABLET BY MOUTH EVERY DAY, Disp: 90 tablet, Rfl: 3  •  buPROPion SR (WELLBUTRIN SR) 200 MG 12 hr tablet, Take 200 mg by mouth Daily., Disp: , Rfl:   •  calcipotriene (DOVONOX) 0.005 % ointment, As Needed., Disp: , Rfl:   •  cephalexin (KEFLEX) 500 MG capsule, Take 2 capsules by mouth 1 (One) Time As Needed (for dental cleanings) for up to 1 dose. Take 2 capsules 1 hour prior to dental procedure, Disp: 2 capsule, Rfl: 2  •  Cholecalciferol (VITAMIN D3) 125 MCG (5000 UT) capsule capsule, Take 5,000 Units by mouth Daily., Disp: , Rfl:   •  Dietary Management Product (Rheumate) capsule, Take 1 capsule by mouth Daily., Disp: 90 capsule, Rfl: 1  •  docusate sodium (Colace) 100 MG capsule, Take 1 capsule by mouth 2 (Two)  Times a Day., Disp: 60 capsule, Rfl: 0  •  doxazosin (CARDURA) 4 MG tablet, TAKE ONE TABLET BY MOUTH EVERY NIGHT, Disp: 90 tablet, Rfl: 3  •  esomeprazole (nexIUM) 40 MG capsule, Take 1 capsule by mouth 2 (Two) Times a Day., Disp: 60 capsule, Rfl: 5  •  fenofibrate (TRICOR) 145 MG tablet, TAKE ONE TABLET BY MOUTH EVERY DAY (Patient taking differently: Take 145 mg by mouth Daily.), Disp: 90 tablet, Rfl: 2  •  furosemide (Lasix) 40 MG tablet, Take 1 tablet by mouth Daily As Needed (edema)., Disp: 90 tablet, Rfl: 1  •  irbesartan (AVAPRO) 300 MG tablet, Take 300 mg by mouth Every Night., Disp: , Rfl: 5  •  mirtazapine (REMERON) 15 MG tablet, TAKE ONE TABLET BY MOUTH EVERY NIGHT (Patient taking differently: Take 15 mg by mouth Every Night.), Disp: 30 tablet, Rfl: 5  •  MULTIPLE VITAMINS PO, Take 1 capsule by mouth Daily., Disp: , Rfl:   •  Omega-3 Fatty Acids (FISH OIL) 1000 MG capsule capsule, Take 1,000 mg by mouth Daily., Disp: , Rfl:   •  potassium chloride (K-DUR,KLOR-CON) 20 MEQ CR tablet, Take 1 tablet by mouth Daily As Needed (with Lasix use)., Disp: 90 tablet, Rfl: 1  •  pravastatin (PRAVACHOL) 40 MG tablet, TAKE ONE TABLET BY MOUTH EVERY DAY, Disp: 90 tablet, Rfl: 3  •  sildenafil (VIAGRA) 100 MG tablet, Take 1 tablet by mouth Daily As Needed for erectile dysfunction., Disp: 6 tablet, Rfl: 5  •  tamsulosin (FLOMAX) 0.4 MG capsule 24 hr capsule, , Disp: , Rfl:   •  tiZANidine (ZANAFLEX) 2 MG tablet, TAKE 1/2 TO 1 TABLET BY MOUTH THREE TIMES A DAY AS NEEDED FOR MUSCLE SPASMS, Disp: 60 tablet, Rfl: 0  •  triamcinolone (KENALOG) 0.1 % ointment, Apply  topically 2 (Two) Times a Day As Needed for Irritation or Rash., Disp: 80 g, Rfl: 5  •  urea (CARMOL) 20 % cream, Apply  topically to the appropriate area as directed As Needed for Dry Skin., Disp: , Rfl:   •  vitamin B-6 (PYRIDOXINE) 50 MG tablet, Take 50 mg by mouth Daily., Disp: , Rfl:       Allergies   Allergen Reactions   • Penicillins Rash     Generalized  Pt  "states rash occurred as a child and has not used since         /72 (BP Location: Left arm, Patient Position: Sitting, Cuff Size: Large Adult)   Pulse 64   Ht 177.8 cm (70\")   Wt 108 kg (237 lb 6.4 oz)   SpO2 97%   BMI 34.06 kg/m²       Physical Exam:  Constitutional: Patient appears well-developed and well-nourished.   HEENT: Head: Normocephalic and atraumatic.   Eyes: Conjunctivae and lids are normal.   Pupils: Equal, round, reactive to light.   Neck: Trachea normal. Neck supple. No JVD present.   Pulmonary Respiratory effort: No increased work of breathing or signs of respiratory distress. Auscultation of lungs: Clear to auscultation.   Cardiovascular Auscultation of heart: Normal rate and rhythm, normal S1 and S2, no murmurs.   Peripheral vascular exam: Femoral: right 2+, left 2+. Posterior tibialis: right 2+ and left 2+. Dorsalis pedis: right 2+ and left 2+. No edema.   Musculoskeletal   Gait and station: Gait evaluation demonstrated a normal gait.  He was able to walk on heels and toes.  Lumbar spine: Passive and active range of motion are limited secondary to pain. Extension, flexion, lateral flexion, rotation of the lumbar spine increased and reproduced pain. Lumbar facet joint loading maneuvers are positive.  Palpation of the interspinous space at the L2-L3 and L3-4 reproduces pain.  Tommy test and Gaenslen's test are negative   Piriformis maneuvers are negative   Palpation of the bilateral ischial tuberosities, unrevealing   Palpation of the bilateral greater trochanter, unrevealing   Examination of the Iliotibial band: unrevealing   Hip joints: The range of motion of the hip joints is almost full, symmetrical, and without pain   Right knee: 0-100. Tenderness found on the lateral compartment.   Neurological:   Patient is alert and oriented to person, place, and time.   Speech: speech is normal.   Cortical function: Normal mental status.   Cranial nerves: Cranial nerves 2-12 intact.   Reflex " Scores:  Right patellar: 1+  Left patellar: 1+  Right Achilles: 1+  Left Achilles: 1+  Motor strength: 5/5  Motor Tone: normal tone.   Involuntary movements: none.   Superficial/Primitive Reflexes: primitive reflexes were absent.   Right Abraham: absent  Left Abraham: absent  Right ankle clonus: absent  Left ankle clonus: absent   Babinsky: absent  Negative long tract signs. Straight leg raising test is negative. Femoral stretch sign is negative.   Sensory exam: intact to light touch, intact pain and temperature sensation, intact vibration sensation and normal proprioception.   Coordination: Normal finger to nose and heel to shin. Normal balance and negative Romberg's sign   Skin and subcutaneous tissue: Skin is warm and intact. No rash noted. No cyanosis.   Psychiatric: Judgment and insight: Normal. Orientation to person, place and time: Normal. Recent and remote memory: Intact. Mood and affect: Normal.     I have reviewed the physical exam dated 11/24/2020. Upon examination of the patient today, there are no changes noted.      ASSESSMENT:   1. Spondylosis of lumbar region without myelopathy or radiculopathy    2. Degeneration of lumbar or lumbosacral intervertebral disc    3. Lumbar interspinous bursitis    4. Myofascial pain    5. Status post total right knee replacement    6. Osteoarthritis of multiple joints, unspecified osteoarthritis type    7. Baastrup's syndrome        PLAN/MEDICAL DECISION MAKING: Patient reports a 18-month history of lower back pain. He also has a history of chronic knee problems for which he underwent right total knee arthroplasty with Dr. Tereso Restrepo on July 22, 2020.  He is still dealing with some residual right knee pain for which he completed physical therapy.  Patient has failed to obtain pain relief with conservative measures including oral analgesics and physical therapy.  MRI of the lumbar spine on November 12, 2020 revealed diffuse degenerative changes of the thoracolumbar  spine involving extensive degenerative disc disease, lumbar facet hypertrophy with fluid signal interposed in some of the joints, ligamentum flavum hypertrophy, and spinous processes with the appearance of Baastrup's disease.  There is no significant canal or foraminal stenosis except for mild mild neuroforaminal stenosis at L3-L4 and L4-L5. Patient underwent neurosurgical consultation with Dr. Henderson on 11/18/2020, and was found not to be a surgical candidate at this time.  Due to appearance of increased fluid signal in his MRI, flexion-extension x-rays of the lumbar spine were ordered in addition work-up for rheumatoid disorder, unfortunately the patient did not undergo recommended diagnostics.  He did undergo rheumatological laboratory panels, which were normal.  He does continue to exhibit signs of increased joint pain in multiple areas, and he would like to be referred to a rheumatologist for consultation.  I will be happy to facilitate this for him.  Patient has failed to obtain pain relief with conservative measures , as referenced above. I have reviewed all available patient's medical records as well as previous therapies as referenced above. Jimmie Salcedo reports a pain score of 8.  Given his pain assessment as noted, treatment options were discussed and the following options were decided upon as a follow-up plan to address the patient's pain: continuation of current treatment plan for pain as outlined below.  I had a lengthy conversation with Mr. Jimmie Salcedo regarding his chronic pain condition and potential therapeutic options including risks, benefits, alternative therapies, to name a few. Therefore, I have proposed the following plan:  1. Diagnostic studies:   A. Flexion and extension x-rays of the lumbar spine to assess stability.  There is increased fluid signal interposed in the lumbar facet joints which may suggest mobility  2. Pharmacological measures: Reviewed. Discussed.   A. Patient takes  Tylenol. Patient also takes Wellbutrin, chlorpromazine, mirtazapine   B. Continue tizanidine 2 mg 1/2 to 1 tablet 2 times a day as needed muscle spasm, #60, 1 refill  C. Continue Rheumate one tablet twice daily  3. Referral to Rheumatology Dr. Marisabel Martinez  4. Interventional pain management measures: Patient will be scheduled for diagnostic bilateral lumbar medial branch blocks at L2, L3, L4; for bilateral lumbar facet joints at L3-L4, L4-L5, to clarify the origin of chronic refractory mechanical lower back pain. If patient experiences more than 80% relief along with significant improvement the range of motion of the lumbar spine, then, patient will be scheduled for bilateral lumbar medial branch rhizotomies.  4. Long-term rehabilitation efforts:  A. The patient does not have a history of falls. I did complete a risk assessment for falls  B. Patient will start a comprehensive physical therapy program at [facility] with Dr. Lenin Saba for core strengthening, gait and balance training, ASTYM, myofascial release, cupping, dry needling and Alter-G once pain is under control  C. Start an exercise program such as yoga, Pilates, Cruz Chi, water therapy and swimming  D. Contrast therapy: Apply ice-packs for 15-20 minutes, followed by heating pads for 15-20 minutes to affected area   E. Patient counseled on the importance of weight loss to help with overall health and pain control. Patient instructed to attempt weight loss.    5. The patient has been instructed to contact my office with any questions or difficulties. The patient understands the plan and agrees to proceed accordingly.        Patient Care Team:  Javad Negron MD as PCP - General (Internal Medicine)  Sundeep Berger MD as Consulting Physician (Infectious Diseases)     No orders of the defined types were placed in this encounter.        Future Appointments   Date Time Provider Department Center   3/11/2021  9:45 AM Javad Negron MD [facility]    5/28/2021 11:00 AM Pallavi Prince APRN [facility] None   7/22/2021 10:00 AM Tereso Restrepo MD [facility]         SOURAV Goel/Transcription disclaimer:  Much of this encounter note is an electronic transcription of spoken language to printed text. Electronic transcription of spoken language may permit erroneous, or at times, nonsensical words or phrases to be inadvertently transcribed. Although I have reviewed the note for such errors, some may still exist.

## 2021-03-05 DIAGNOSIS — M47.816 SPONDYLOSIS OF LUMBAR REGION WITHOUT MYELOPATHY OR RADICULOPATHY: ICD-10-CM

## 2021-03-05 LAB — CCP IGA+IGG SERPL IA-ACNC: 5 UNITS (ref 0–19)

## 2021-03-07 RX ORDER — TIZANIDINE 2 MG/1
TABLET ORAL
Qty: 60 TABLET | Refills: 0 | Status: SHIPPED | OUTPATIENT
Start: 2021-03-07 | End: 2021-04-07

## 2021-03-08 ENCOUNTER — HOSPITAL ENCOUNTER (OUTPATIENT)
Dept: GENERAL RADIOLOGY | Facility: HOSPITAL | Age: 71
Discharge: HOME | End: 2021-03-08
Admitting: ANESTHESIOLOGY

## 2021-03-08 ENCOUNTER — OFFICE VISIT (OUTPATIENT)
Dept: PAIN MEDICINE | Facility: CLINIC | Age: 71
End: 2021-03-08

## 2021-03-08 VITALS
OXYGEN SATURATION: 97 % | HEART RATE: 64 BPM | WEIGHT: 237.4 LBS | BODY MASS INDEX: 33.99 KG/M2 | DIASTOLIC BLOOD PRESSURE: 72 MMHG | SYSTOLIC BLOOD PRESSURE: 149 MMHG | HEIGHT: 70 IN

## 2021-03-08 DIAGNOSIS — M48.26 LUMBAR INTERSPINOUS BURSITIS: ICD-10-CM

## 2021-03-08 DIAGNOSIS — M47.816 SPONDYLOSIS OF LUMBAR REGION WITHOUT MYELOPATHY OR RADICULOPATHY: ICD-10-CM

## 2021-03-08 DIAGNOSIS — Z96.651 STATUS POST TOTAL RIGHT KNEE REPLACEMENT: ICD-10-CM

## 2021-03-08 DIAGNOSIS — M51.37 DEGENERATION OF LUMBAR OR LUMBOSACRAL INTERVERTEBRAL DISC: ICD-10-CM

## 2021-03-08 DIAGNOSIS — M48.20 BAASTRUP'S SYNDROME: ICD-10-CM

## 2021-03-08 DIAGNOSIS — M15.9 OSTEOARTHRITIS OF MULTIPLE JOINTS, UNSPECIFIED OSTEOARTHRITIS TYPE: ICD-10-CM

## 2021-03-08 DIAGNOSIS — M15.9 OSTEOARTHRITIS OF MULTIPLE JOINTS, UNSPECIFIED OSTEOARTHRITIS TYPE: Primary | ICD-10-CM

## 2021-03-08 DIAGNOSIS — M79.18 MYOFASCIAL PAIN: ICD-10-CM

## 2021-03-08 DIAGNOSIS — M47.816 SPONDYLOSIS OF LUMBAR REGION WITHOUT MYELOPATHY OR RADICULOPATHY: Primary | ICD-10-CM

## 2021-03-08 PROCEDURE — 99213 OFFICE O/P EST LOW 20 MIN: CPT | Performed by: NURSE PRACTITIONER

## 2021-03-08 PROCEDURE — 72120 X-RAY BEND ONLY L-S SPINE: CPT

## 2021-03-08 RX ORDER — ACETAMINOPHEN 500 MG
500 TABLET ORAL AS NEEDED
COMMUNITY
End: 2023-01-26

## 2021-03-11 ENCOUNTER — OFFICE VISIT (OUTPATIENT)
Dept: INTERNAL MEDICINE | Facility: CLINIC | Age: 71
End: 2021-03-11

## 2021-03-11 ENCOUNTER — LAB (OUTPATIENT)
Dept: LAB | Facility: HOSPITAL | Age: 71
End: 2021-03-11

## 2021-03-11 VITALS
HEART RATE: 72 BPM | WEIGHT: 234 LBS | SYSTOLIC BLOOD PRESSURE: 128 MMHG | BODY MASS INDEX: 32.76 KG/M2 | DIASTOLIC BLOOD PRESSURE: 70 MMHG | HEIGHT: 71 IN | TEMPERATURE: 96.8 F

## 2021-03-11 DIAGNOSIS — E78.49 OTHER HYPERLIPIDEMIA: ICD-10-CM

## 2021-03-11 DIAGNOSIS — M48.20 BAASTRUP'S SYNDROME: ICD-10-CM

## 2021-03-11 DIAGNOSIS — E80.4 GILBERT'S DISEASE: ICD-10-CM

## 2021-03-11 DIAGNOSIS — K21.9 GASTROESOPHAGEAL REFLUX DISEASE WITHOUT ESOPHAGITIS: ICD-10-CM

## 2021-03-11 DIAGNOSIS — M51.37 DEGENERATION OF LUMBAR OR LUMBOSACRAL INTERVERTEBRAL DISC: ICD-10-CM

## 2021-03-11 DIAGNOSIS — G47.33 SEVERE OBSTRUCTIVE SLEEP APNEA: ICD-10-CM

## 2021-03-11 DIAGNOSIS — R35.0 BENIGN PROSTATIC HYPERPLASIA WITH URINARY FREQUENCY: ICD-10-CM

## 2021-03-11 DIAGNOSIS — M48.26 LUMBAR INTERSPINOUS BURSITIS: ICD-10-CM

## 2021-03-11 DIAGNOSIS — F43.21 SITUATIONAL DEPRESSION: ICD-10-CM

## 2021-03-11 DIAGNOSIS — D36.9 TUBULAR ADENOMA: ICD-10-CM

## 2021-03-11 DIAGNOSIS — Z96.651 STATUS POST TOTAL RIGHT KNEE REPLACEMENT: ICD-10-CM

## 2021-03-11 DIAGNOSIS — M79.18 MYOFASCIAL PAIN: ICD-10-CM

## 2021-03-11 DIAGNOSIS — I10 ESSENTIAL HYPERTENSION: Primary | ICD-10-CM

## 2021-03-11 DIAGNOSIS — Z00.00 ENCOUNTER FOR MEDICARE ANNUAL WELLNESS EXAM: ICD-10-CM

## 2021-03-11 DIAGNOSIS — M15.9 OSTEOARTHRITIS OF MULTIPLE JOINTS, UNSPECIFIED OSTEOARTHRITIS TYPE: ICD-10-CM

## 2021-03-11 DIAGNOSIS — M47.816 SPONDYLOSIS OF LUMBAR REGION WITHOUT MYELOPATHY OR RADICULOPATHY: Primary | ICD-10-CM

## 2021-03-11 DIAGNOSIS — N40.1 BENIGN PROSTATIC HYPERPLASIA WITH URINARY FREQUENCY: ICD-10-CM

## 2021-03-11 PROBLEM — G89.18 POSTOPERATIVE PAIN: Status: RESOLVED | Noted: 2020-07-23 | Resolved: 2021-03-11

## 2021-03-11 LAB
ALBUMIN SERPL-MCNC: 3.9 G/DL (ref 3.5–5.2)
ALBUMIN/GLOB SERPL: 1.5 G/DL
ALP SERPL-CCNC: 57 U/L (ref 39–117)
ALT SERPL W P-5'-P-CCNC: 25 U/L (ref 1–41)
ANION GAP SERPL CALCULATED.3IONS-SCNC: 9.5 MMOL/L (ref 5–15)
AST SERPL-CCNC: 25 U/L (ref 1–40)
BILIRUB SERPL-MCNC: 0.7 MG/DL (ref 0–1.2)
BUN SERPL-MCNC: 20 MG/DL (ref 8–23)
BUN/CREAT SERPL: 21.7 (ref 7–25)
CALCIUM SPEC-SCNC: 9 MG/DL (ref 8.6–10.5)
CHLORIDE SERPL-SCNC: 106 MMOL/L (ref 98–107)
CHOLEST SERPL-MCNC: 146 MG/DL (ref 0–200)
CO2 SERPL-SCNC: 26.5 MMOL/L (ref 22–29)
CREAT SERPL-MCNC: 0.92 MG/DL (ref 0.76–1.27)
GFR SERPL CREATININE-BSD FRML MDRD: 82 ML/MIN/1.73
GLOBULIN UR ELPH-MCNC: 2.6 GM/DL
GLUCOSE SERPL-MCNC: 113 MG/DL (ref 65–99)
HDLC SERPL-MCNC: 40 MG/DL (ref 40–60)
LDLC SERPL CALC-MCNC: 78 MG/DL (ref 0–100)
LDLC/HDLC SERPL: 1.85 {RATIO}
POTASSIUM SERPL-SCNC: 4.2 MMOL/L (ref 3.5–5.2)
PROT SERPL-MCNC: 6.5 G/DL (ref 6–8.5)
SODIUM SERPL-SCNC: 142 MMOL/L (ref 136–145)
TRIGL SERPL-MCNC: 160 MG/DL (ref 0–150)
VLDLC SERPL-MCNC: 28 MG/DL (ref 5–40)

## 2021-03-11 PROCEDURE — G0439 PPPS, SUBSEQ VISIT: HCPCS | Performed by: INTERNAL MEDICINE

## 2021-03-11 PROCEDURE — 80053 COMPREHEN METABOLIC PANEL: CPT | Performed by: INTERNAL MEDICINE

## 2021-03-11 PROCEDURE — 82607 VITAMIN B-12: CPT | Performed by: INTERNAL MEDICINE

## 2021-03-11 PROCEDURE — 80061 LIPID PANEL: CPT | Performed by: INTERNAL MEDICINE

## 2021-03-11 NOTE — PROGRESS NOTES
The ABCs of the Annual Wellness Visit  Subsequent Medicare Wellness Visit    Chief Complaint   Patient presents with   • Annual Exam       Subjective   History of Present Illness:  Jimmie Salcedo is a 70 y.o. male who presents for a Subsequent Medicare Wellness Visit.    HEALTH RISK ASSESSMENT    Recent Hospitalizations:  No hospitalization(s) within the last year.    Current Medical Providers:  Patient Care Team:  Javad Negron MD as PCP - General (Internal Medicine)  Sundeep Berger MD as Consulting Physician (Infectious Diseases)    Smoking Status:  Social History     Tobacco Use   Smoking Status Never Smoker   Smokeless Tobacco Never Used       Alcohol Consumption:  Social History     Substance and Sexual Activity   Alcohol Use Yes   • Alcohol/week: 4.0 standard drinks   • Types: 4 Cans of beer per week       Depression Screen:   PHQ-2/PHQ-9 Depression Screening 3/11/2021   Little interest or pleasure in doing things 0   Feeling down, depressed, or hopeless 0   Trouble falling or staying asleep, or sleeping too much 1   Feeling tired or having little energy 1   Poor appetite or overeating 0   Feeling bad about yourself - or that you are a failure or have let yourself or your family down 0   Trouble concentrating on things, such as reading the newspaper or watching television 0   Moving or speaking so slowly that other people could have noticed. Or the opposite - being so fidgety or restless that you have been moving around a lot more than usual 0   Thoughts that you would be better off dead, or of hurting yourself in some way 0   Total Score 2   If you checked off any problems, how difficult have these problems made it for you to do your work, take care of things at home, or get along with other people? Not difficult at all       Fall Risk Screen:  STEADI Fall Risk Assessment was completed, and patient is at MODERATE risk for falls. Assessment completed on:3/11/2021    Health Habits and Functional and  Cognitive Screening:  Functional & Cognitive Status 3/11/2021   Do you have difficulty preparing food and eating? No   Do you have difficulty bathing yourself, getting dressed or grooming yourself? No   Do you have difficulty using the toilet? No   Do you have difficulty moving around from place to place? No   Do you have trouble with steps or getting out of a bed or a chair? No   Current Diet Well Balanced Diet   Dental Exam Up to date   Eye Exam Up to date   Exercise (times per week) 2 times per week   Current Exercises Include Walking   Current Exercise Activities Include -   Do you need help using the phone?  No   Are you deaf or do you have serious difficulty hearing?  No   Do you need help with transportation? No   Do you need help shopping? No   Do you need help preparing meals?  No   Do you need help with housework?  No   Do you need help with laundry? No   Do you need help taking your medications? No   Do you need help managing money? No   Do you ever drive or ride in a car without wearing a seat belt? No   Have you felt unusual stress, anger or loneliness in the last month? No   Who do you live with? Spouse   If you need help, do you have trouble finding someone available to you? No   Have you been bothered in the last four weeks by sexual problems? No   Do you have difficulty concentrating, remembering or making decisions? No         Does the patient have evidence of cognitive impairment? No    Asprin use counseling:Taking ASA appropriately as indicated    Age-appropriate Screening Schedule:  Refer to the list below for future screening recommendations based on patient's age, sex and/or medical conditions. Orders for these recommended tests are listed in the plan section. The patient has been provided with a written plan.    Health Maintenance   Topic Date Due   • ZOSTER VACCINE (2 of 3) 12/17/2014   • LIPID PANEL  10/30/2021   • COLONOSCOPY  09/25/2028   • TDAP/TD VACCINES (2 - Td) 09/15/2030   •  INFLUENZA VACCINE  Completed            Fall Risk Assessment was completed, and patient is at low risk for falls.      The following portions of the patient's history were reviewed and updated as appropriate: current medications, past family history, past medical history, past social history, past surgical history and problem list.    Outpatient Medications Prior to Visit   Medication Sig Dispense Refill   • acetaminophen (TYLENOL) 500 MG tablet Take 500 mg by mouth As Needed.     • amLODIPine (NORVASC) 5 MG tablet TAKE ONE TABLET BY MOUTH EVERY DAY 90 tablet 3   • buPROPion SR (WELLBUTRIN SR) 200 MG 12 hr tablet Take 200 mg by mouth Daily.     • calcipotriene (DOVONOX) 0.005 % ointment As Needed.     • cephalexin (KEFLEX) 500 MG capsule Take 2 capsules by mouth 1 (One) Time As Needed (for dental cleanings) for up to 1 dose. Take 2 capsules 1 hour prior to dental procedure 2 capsule 2   • Cholecalciferol (VITAMIN D3) 125 MCG (5000 UT) capsule capsule Take 5,000 Units by mouth Daily.     • Dietary Management Product (Rheumate) capsule Take 1 capsule by mouth Daily. 90 capsule 1   • docusate sodium (Colace) 100 MG capsule Take 1 capsule by mouth 2 (Two) Times a Day. 60 capsule 0   • doxazosin (CARDURA) 4 MG tablet TAKE ONE TABLET BY MOUTH EVERY NIGHT (Patient taking differently: Take 2 mg by mouth Every Night.) 90 tablet 3   • esomeprazole (nexIUM) 40 MG capsule Take 1 capsule by mouth 2 (Two) Times a Day. 60 capsule 5   • fenofibrate (TRICOR) 145 MG tablet TAKE ONE TABLET BY MOUTH EVERY DAY (Patient taking differently: Take 145 mg by mouth Daily.) 90 tablet 2   • furosemide (Lasix) 40 MG tablet Take 1 tablet by mouth Daily As Needed (edema). 90 tablet 1   • irbesartan (AVAPRO) 300 MG tablet Take 300 mg by mouth Every Night.  5   • mirtazapine (REMERON) 15 MG tablet TAKE ONE TABLET BY MOUTH EVERY NIGHT (Patient taking differently: Take 15 mg by mouth Every Night.) 30 tablet 5   • MULTIPLE VITAMINS PO Take 1 capsule by  mouth Daily.     • Omega-3 Fatty Acids (FISH OIL) 1000 MG capsule capsule Take 1,000 mg by mouth Daily.     • potassium chloride (K-DUR,KLOR-CON) 20 MEQ CR tablet Take 1 tablet by mouth Daily As Needed (with Lasix use). 90 tablet 1   • pravastatin (PRAVACHOL) 40 MG tablet TAKE ONE TABLET BY MOUTH EVERY DAY 90 tablet 3   • sildenafil (VIAGRA) 100 MG tablet Take 1 tablet by mouth Daily As Needed for erectile dysfunction. 6 tablet 5   • tamsulosin (FLOMAX) 0.4 MG capsule 24 hr capsule      • tiZANidine (ZANAFLEX) 2 MG tablet TAKE 1/2 TO 1 TABLET BY MOUTH THREE TIMES A DAY AS NEEDED FOR MUSCLE SPASMS 60 tablet 0   • triamcinolone (KENALOG) 0.1 % ointment Apply  topically 2 (Two) Times a Day As Needed for Irritation or Rash. 80 g 5   • urea (CARMOL) 20 % cream Apply  topically to the appropriate area as directed As Needed for Dry Skin.     • vitamin B-6 (PYRIDOXINE) 50 MG tablet Take 50 mg by mouth Daily.       No facility-administered medications prior to visit.       Patient Active Problem List   Diagnosis   • Acid reflux   • Arthritis   • Hypertension   • Hyperlipidemia   • Severe obstructive sleep apnea   • Psoriasis   • Diastolic dysfunction   • Peptic ulceration   • Bilateral edema of lower extremity   • Abnormal liver function test   • Hyperbilirubinemia   • Psychophysiological insomnia   • Gilbert's disease   • Thrombocytopenia, unspecified (CMS/HCC)   • Situational depression   • Tubular adenoma   • Seasonal allergic rhinitis due to pollen   • Mild cognitive impairment   • Memory loss   • Primary osteoarthritis of right knee   • Status post total right knee replacement   • Leukocytosis, likely reactive   • Benign prostatic hyperplasia with urinary frequency   • Spondylosis of lumbar region without myelopathy or radiculopathy   • Degeneration of lumbar or lumbosacral intervertebral disc   • Lumbar interspinous bursitis   • Myofascial pain   • Osteoarthritis of multiple joints   • Baastrup's syndrome   •  Gastroesophageal reflux disease with esophagitis without hemorrhage       Advanced Care Planning:  ACP discussion was held with the patient during this visit. Patient has an advance directive in EMR which is still valid.     Review of Systems   Constitutional: Positive for fatigue (better). Negative for chills, fever and unexpected weight change.   HENT: Negative for ear pain, hearing loss, rhinorrhea, sinus pressure, sore throat and trouble swallowing.    Eyes: Negative for discharge and itching.   Respiratory: Negative for cough and chest tightness.    Cardiovascular: Positive for leg swelling (L>R). Negative for chest pain and palpitations.   Gastrointestinal: Negative for abdominal pain, blood in stool, diarrhea and vomiting.        2013 colonoscopy normal  9/18 colonoscopy with TA times 1, repeat in 9/21   Endocrine: Negative for polydipsia and polyuria.   Genitourinary: Negative for difficulty urinating, dysuria, enuresis, frequency, hematuria and urgency.        10/21 psa  ED  Followed by Dr Fu   Musculoskeletal: Positive for arthralgias and back pain. Negative for gait problem and joint swelling.   Skin: Negative for rash and wound.        Dry skin   Allergic/Immunologic: Negative for immunocompromised state.   Neurological: Negative for dizziness, syncope, weakness, light-headedness, numbness and headaches.   Hematological: Does not bruise/bleed easily.   Psychiatric/Behavioral: Positive for behavioral problems (improved) and decreased concentration (improved). Negative for dysphoric mood and sleep disturbance. The patient is nervous/anxious (improved).        Compared to one year ago, the patient feels his physical health is better.  Compared to one year ago, the patient feels his mental health is better.    Reviewed chart for potential of high risk medication in the elderly: yes  Reviewed chart for potential of harmful drug interactions in the elderly:yes    Objective         Vitals:    03/11/21 0950  "03/11/21 1019   BP: 124/70 128/70   BP Location: Left arm    Patient Position: Sitting    Pulse: 72    Temp: 96.8 °F (36 °C)    TempSrc: Infrared    Weight: 106 kg (234 lb)    Height: 180.3 cm (70.98\")    PainSc:   7        Body mass index is 32.65 kg/m².  Discussed the patient's BMI with him. The BMI is above average; BMI management plan is completed.    Physical Exam  Constitutional:       Appearance: Normal appearance. He is well-developed.   HENT:      Head: Normocephalic and atraumatic.      Right Ear: External ear normal.      Left Ear: External ear normal.      Nose: Nose normal.      Mouth/Throat:      Mouth: Mucous membranes are moist.      Pharynx: Oropharynx is clear.   Eyes:      Extraocular Movements: Extraocular movements intact.      Conjunctiva/sclera: Conjunctivae normal.      Pupils: Pupils are equal, round, and reactive to light.   Cardiovascular:      Rate and Rhythm: Normal rate and regular rhythm.      Heart sounds: Normal heart sounds.   Pulmonary:      Effort: Pulmonary effort is normal.      Breath sounds: Normal breath sounds.   Abdominal:      General: Bowel sounds are normal.      Palpations: Abdomen is soft.   Musculoskeletal:         General: Normal range of motion.      Cervical back: Normal range of motion and neck supple.      Right lower leg: Edema (trace) present.      Left lower leg: Edema present.   Lymphadenopathy:      Cervical: No cervical adenopathy.   Skin:     General: Skin is warm and dry.   Neurological:      General: No focal deficit present.      Mental Status: He is alert and oriented to person, place, and time.   Psychiatric:         Mood and Affect: Mood normal.         Behavior: Behavior normal.         Thought Content: Thought content normal.         Lab Results   Component Value Date    HGBA1C 5.10 02/19/2021        Assessment/Plan   Medicare Risks and Personalized Health Plan  CMS Preventative Services Quick Reference  Cardiovascular risk  Colon Cancer " Screening  Depression/Dysphoria  Diabetic Lab Screening   Immunizations Discussed/Encouraged (specific immunizations; Hepatitis A Vaccine/Series and Shingrix )  Inactivity/Sedentary  Obesity/Overweight   Prostate Cancer Screening     The above risks/problems have been discussed with the patient.  Pertinent information has been shared with the patient in the After Visit Summary.  Follow up plans and orders are seen below in the Assessment/Plan Section.    Diagnoses and all orders for this visit:    1. Essential hypertension (Primary)    2. Other hyperlipidemia  -     Comprehensive Metabolic Panel  -     Lipid Panel    3. Gilbert's disease    4. Gastroesophageal reflux disease without esophagitis  -     Vitamin B12    5. Benign prostatic hyperplasia with urinary frequency    6. Tubular adenoma    7. Situational depression    8. Severe obstructive sleep apnea    9. Encounter for Medicare annual wellness exam  -     Comprehensive Metabolic Panel  -     Lipid Panel  -     Vitamin B12      Follow Up:  Return in about 4 months (around 7/11/2021) for Recheck, with fasting labs.     An After Visit Summary and PPPS were given to the patient.       HTN-controlled on multi drug tx, advised goal of 150/80  hyperlipidemia-cont pravachol/fibrate, labs today at goal  BPH-stable on cardura, recent psa noted  GERD-stable on omeprazole  djd-advised to limit nsaids, stable  Hyperbilirubinemia-recheck today stable  Thrombocytopenia-recheck at goal  Situational depression-stable with combo remeron/wellbutrin  Constipation-citrucel/miralax combo, stable      3/11 labs noted and dw patient, has already had the flu shot

## 2021-03-12 LAB — VIT B12 BLD-MCNC: 967 PG/ML (ref 211–946)

## 2021-03-16 RX ORDER — FENOFIBRATE 145 MG/1
TABLET, COATED ORAL
Qty: 90 TABLET | Refills: 3 | Status: SHIPPED | OUTPATIENT
Start: 2021-03-16 | End: 2022-03-28

## 2021-03-21 ENCOUNTER — APPOINTMENT (OUTPATIENT)
Dept: PREADMISSION TESTING | Facility: HOSPITAL | Age: 71
End: 2021-03-21

## 2021-03-23 DIAGNOSIS — M47.816 SPONDYLOSIS OF LUMBAR REGION WITHOUT MYELOPATHY OR RADICULOPATHY: Primary | ICD-10-CM

## 2021-03-24 ENCOUNTER — OUTSIDE FACILITY SERVICE (OUTPATIENT)
Dept: PAIN MEDICINE | Facility: CLINIC | Age: 71
End: 2021-03-24

## 2021-03-24 PROCEDURE — 64493 INJ PARAVERT F JNT L/S 1 LEV: CPT | Performed by: ANESTHESIOLOGY

## 2021-03-24 PROCEDURE — 99152 MOD SED SAME PHYS/QHP 5/>YRS: CPT | Performed by: ANESTHESIOLOGY

## 2021-03-24 PROCEDURE — 64494 INJ PARAVERT F JNT L/S 2 LEV: CPT | Performed by: ANESTHESIOLOGY

## 2021-03-25 ENCOUNTER — TELEPHONE (OUTPATIENT)
Dept: PAIN MEDICINE | Facility: CLINIC | Age: 71
End: 2021-03-25

## 2021-03-25 NOTE — TELEPHONE ENCOUNTER
Patient says he is well and will be speaking with me tomorrow regarding his rhizotomy to be scheduled.

## 2021-03-26 DIAGNOSIS — M47.816 SPONDYLOSIS OF LUMBAR REGION WITHOUT MYELOPATHY OR RADICULOPATHY: Primary | ICD-10-CM

## 2021-04-06 DIAGNOSIS — M47.816 SPONDYLOSIS OF LUMBAR REGION WITHOUT MYELOPATHY OR RADICULOPATHY: ICD-10-CM

## 2021-04-07 RX ORDER — TIZANIDINE 2 MG/1
TABLET ORAL
Qty: 60 TABLET | Refills: 0 | Status: SHIPPED | OUTPATIENT
Start: 2021-04-07 | End: 2021-05-17

## 2021-04-16 ENCOUNTER — APPOINTMENT (OUTPATIENT)
Dept: PREADMISSION TESTING | Facility: HOSPITAL | Age: 71
End: 2021-04-16

## 2021-04-19 ENCOUNTER — OUTSIDE FACILITY SERVICE (OUTPATIENT)
Dept: PAIN MEDICINE | Facility: CLINIC | Age: 71
End: 2021-04-19

## 2021-04-19 PROCEDURE — 64636 DESTROY L/S FACET JNT ADDL: CPT | Performed by: ANESTHESIOLOGY

## 2021-04-19 PROCEDURE — 64635 DESTROY LUMB/SAC FACET JNT: CPT | Performed by: ANESTHESIOLOGY

## 2021-04-19 PROCEDURE — 99152 MOD SED SAME PHYS/QHP 5/>YRS: CPT | Performed by: ANESTHESIOLOGY

## 2021-04-20 ENCOUNTER — TELEPHONE (OUTPATIENT)
Dept: PAIN MEDICINE | Facility: CLINIC | Age: 71
End: 2021-04-20

## 2021-04-20 NOTE — TELEPHONE ENCOUNTER
Called patient and got voice mail. I left a message stating if patient has any questions or concerns to please call the office. Also I gave his appointment info with Mary on 9/7 at 1030am

## 2021-05-17 DIAGNOSIS — M47.816 SPONDYLOSIS OF LUMBAR REGION WITHOUT MYELOPATHY OR RADICULOPATHY: ICD-10-CM

## 2021-05-17 RX ORDER — TIZANIDINE 2 MG/1
TABLET ORAL
Qty: 60 TABLET | Refills: 0 | Status: SHIPPED | OUTPATIENT
Start: 2021-05-17 | End: 2022-07-28

## 2021-05-28 ENCOUNTER — TELEMEDICINE (OUTPATIENT)
Dept: SLEEP MEDICINE | Facility: HOSPITAL | Age: 71
End: 2021-05-28

## 2021-05-28 VITALS — WEIGHT: 225 LBS | HEIGHT: 70 IN | BODY MASS INDEX: 32.21 KG/M2

## 2021-05-28 DIAGNOSIS — G47.33 OSA (OBSTRUCTIVE SLEEP APNEA): Primary | ICD-10-CM

## 2021-05-28 PROCEDURE — 99213 OFFICE O/P EST LOW 20 MIN: CPT | Performed by: NURSE PRACTITIONER

## 2021-05-28 NOTE — PROGRESS NOTES
Chief Complaint:   Chief Complaint   Patient presents with   • Follow-up       HPI:    Jimmie Salcedo is a 70 y.o. male here for follow-up of sleep apnea.  Patient was last seen 5/28/2020.  Patient states he continues to do well with CPAP therapy.  Patient is sleeping 5 to 7 hours nightly and does feel rested upon awakening.  Patient will take up to 30 minutes to go to sleep.  He has now been diagnosed with BPH and is on Lasix and is getting up x2 in the night to use the restroom.  Patient does not have difficulty going back to sleep.  Patient has an Livingston score of 0/24.  Patient has no concerns or complaints regarding CPAP today and wishes to continue.        Current medications are:   Current Outpatient Medications:   •  acetaminophen (TYLENOL) 500 MG tablet, Take 500 mg by mouth As Needed., Disp: , Rfl:   •  amLODIPine (NORVASC) 5 MG tablet, TAKE ONE TABLET BY MOUTH EVERY DAY, Disp: 90 tablet, Rfl: 3  •  buPROPion SR (WELLBUTRIN SR) 200 MG 12 hr tablet, Take 200 mg by mouth Daily., Disp: , Rfl:   •  calcipotriene (DOVONOX) 0.005 % ointment, As Needed., Disp: , Rfl:   •  cephalexin (KEFLEX) 500 MG capsule, Take 2 capsules by mouth 1 (One) Time As Needed (for dental cleanings) for up to 1 dose. Take 2 capsules 1 hour prior to dental procedure, Disp: 2 capsule, Rfl: 2  •  Cholecalciferol (VITAMIN D3) 125 MCG (5000 UT) capsule capsule, Take 5,000 Units by mouth Daily., Disp: , Rfl:   •  Dietary Management Product (Rheumate) capsule, Take 1 capsule by mouth Daily., Disp: 90 capsule, Rfl: 1  •  docusate sodium (Colace) 100 MG capsule, Take 1 capsule by mouth 2 (Two) Times a Day., Disp: 60 capsule, Rfl: 0  •  doxazosin (CARDURA) 4 MG tablet, TAKE ONE TABLET BY MOUTH EVERY NIGHT (Patient taking differently: Take 2 mg by mouth Every Night.), Disp: 90 tablet, Rfl: 3  •  esomeprazole (nexIUM) 40 MG capsule, Take 1 capsule by mouth 2 (Two) Times a Day., Disp: 60 capsule, Rfl: 5  •  fenofibrate (TRICOR) 145 MG tablet,  TAKE ONE TABLET BY MOUTH EVERY DAY, Disp: 90 tablet, Rfl: 3  •  furosemide (Lasix) 40 MG tablet, Take 1 tablet by mouth Daily As Needed (edema)., Disp: 90 tablet, Rfl: 1  •  irbesartan (AVAPRO) 300 MG tablet, Take 300 mg by mouth Every Night., Disp: , Rfl: 5  •  mirtazapine (REMERON) 15 MG tablet, TAKE ONE TABLET BY MOUTH EVERY NIGHT (Patient taking differently: Take 15 mg by mouth Every Night.), Disp: 30 tablet, Rfl: 5  •  MULTIPLE VITAMINS PO, Take 1 capsule by mouth Daily., Disp: , Rfl:   •  Omega-3 Fatty Acids (FISH OIL) 1000 MG capsule capsule, Take 1,000 mg by mouth Daily., Disp: , Rfl:   •  potassium chloride (K-DUR,KLOR-CON) 20 MEQ CR tablet, Take 1 tablet by mouth Daily As Needed (with Lasix use)., Disp: 90 tablet, Rfl: 1  •  pravastatin (PRAVACHOL) 40 MG tablet, TAKE ONE TABLET BY MOUTH EVERY DAY, Disp: 90 tablet, Rfl: 3  •  sildenafil (VIAGRA) 100 MG tablet, Take 1 tablet by mouth Daily As Needed for erectile dysfunction., Disp: 6 tablet, Rfl: 5  •  tamsulosin (FLOMAX) 0.4 MG capsule 24 hr capsule, , Disp: , Rfl:   •  tiZANidine (ZANAFLEX) 2 MG tablet, TAKE 1/2 TO 1 TABLET BY MOUTH THREE TIMES A DAY AS NEEDED FOR MUSCLE SPASMS, Disp: 60 tablet, Rfl: 0  •  triamcinolone (KENALOG) 0.1 % ointment, Apply  topically 2 (Two) Times a Day As Needed for Irritation or Rash., Disp: 80 g, Rfl: 5  •  urea (CARMOL) 20 % cream, Apply  topically to the appropriate area as directed As Needed for Dry Skin., Disp: , Rfl:   •  vitamin B-6 (PYRIDOXINE) 50 MG tablet, Take 50 mg by mouth Daily., Disp: , Rfl: .      The patient's relevant past medical, surgical, family and social history were reviewed and updated in Epic as appropriate.       Review of Systems   Eyes: Positive for visual disturbance.   Respiratory: Positive for apnea and shortness of breath.    Cardiovascular: Positive for leg swelling.   Gastrointestinal:        Heartburn   Genitourinary: Positive for frequency.   Psychiatric/Behavioral: Positive for dysphoric  mood and sleep disturbance.   All other systems reviewed and are negative.        Objective:    Physical Exam  Constitutional:       Appearance: Normal appearance.   HENT:      Head: Normocephalic and atraumatic.      Mouth/Throat:      Comments: Mallampati 3 anatomy  Pulmonary:      Effort: Pulmonary effort is normal. No respiratory distress.   Neurological:      Mental Status: He is alert and oriented to person, place, and time.   Psychiatric:         Mood and Affect: Mood normal.         Behavior: Behavior normal.         Thought Content: Thought content normal.         Judgment: Judgment normal.       90/90 days of use  >4 hour use 80%  90% pressure 10.0  ahi 1.4  Settings 8-18    ASSESSMENT/PLAN    Diagnoses and all orders for this visit:    1. THA (obstructive sleep apnea) (Primary)  -     CPAP Therapy            1. Counseled patient regarding multimodal approach with healthy nutrition, healthy sleep, regular physical activity, social activities, counseling, and medications. Encouraged to practice lateral  sleep position. Avoid alcohol and sedatives close to bedtime.  2.   Refill supplies x1 year.  Return to clinic 1 year or sooner symptoms warrant.  Patient gave verbal consent today for video visit.  I have reviewed the results of my evaluation and impression and discussed my recommendations in detail with the patient.      Signed by  Pallavi Prince, APRN    May 28, 2021      CC: Javad Negron MD          No ref. provider found

## 2021-08-31 ENCOUNTER — PRIOR AUTHORIZATION (OUTPATIENT)
Dept: INTERNAL MEDICINE | Facility: CLINIC | Age: 71
End: 2021-08-31

## 2021-09-02 NOTE — TELEPHONE ENCOUNTER
Received a Prior Auth for Rexulti 2 mg tablets however noted that med was d/c'd and not on pt's current med list. Please advise, Thanks

## 2021-09-13 RX ORDER — AMLODIPINE BESYLATE 5 MG/1
TABLET ORAL
Qty: 30 TABLET | Refills: 3 | Status: SHIPPED | OUTPATIENT
Start: 2021-09-13 | End: 2022-01-03

## 2021-09-16 ENCOUNTER — OFFICE VISIT (OUTPATIENT)
Dept: INTERNAL MEDICINE | Facility: CLINIC | Age: 71
End: 2021-09-16

## 2021-09-16 ENCOUNTER — LAB (OUTPATIENT)
Dept: LAB | Facility: HOSPITAL | Age: 71
End: 2021-09-16

## 2021-09-16 VITALS
SYSTOLIC BLOOD PRESSURE: 130 MMHG | TEMPERATURE: 96.9 F | DIASTOLIC BLOOD PRESSURE: 60 MMHG | BODY MASS INDEX: 32.78 KG/M2 | HEART RATE: 59 BPM | HEIGHT: 70 IN | OXYGEN SATURATION: 98 % | WEIGHT: 229 LBS

## 2021-09-16 DIAGNOSIS — F43.21 SITUATIONAL DEPRESSION: ICD-10-CM

## 2021-09-16 DIAGNOSIS — R35.0 BENIGN PROSTATIC HYPERPLASIA WITH URINARY FREQUENCY: ICD-10-CM

## 2021-09-16 DIAGNOSIS — D69.6 THROMBOCYTOPENIA, UNSPECIFIED (HCC): ICD-10-CM

## 2021-09-16 DIAGNOSIS — E78.49 OTHER HYPERLIPIDEMIA: ICD-10-CM

## 2021-09-16 DIAGNOSIS — K21.00 GASTROESOPHAGEAL REFLUX DISEASE WITH ESOPHAGITIS WITHOUT HEMORRHAGE: ICD-10-CM

## 2021-09-16 DIAGNOSIS — I10 ESSENTIAL HYPERTENSION: Primary | ICD-10-CM

## 2021-09-16 DIAGNOSIS — N40.1 BENIGN PROSTATIC HYPERPLASIA WITH URINARY FREQUENCY: ICD-10-CM

## 2021-09-16 LAB
DEPRECATED RDW RBC AUTO: 44.5 FL (ref 37–54)
ERYTHROCYTE [DISTWIDTH] IN BLOOD BY AUTOMATED COUNT: 14.1 % (ref 12.3–15.4)
HCT VFR BLD AUTO: 44.6 % (ref 37.5–51)
HGB BLD-MCNC: 15.8 G/DL (ref 13–17.7)
MCH RBC QN AUTO: 30.7 PG (ref 26.6–33)
MCHC RBC AUTO-ENTMCNC: 35.4 G/DL (ref 31.5–35.7)
MCV RBC AUTO: 86.6 FL (ref 79–97)
PLATELET # BLD AUTO: 138 10*3/MM3 (ref 140–450)
PMV BLD AUTO: 10 FL (ref 6–12)
RBC # BLD AUTO: 5.15 10*6/MM3 (ref 4.14–5.8)
WBC # BLD AUTO: 7.2 10*3/MM3 (ref 3.4–10.8)

## 2021-09-16 PROCEDURE — 80061 LIPID PANEL: CPT | Performed by: INTERNAL MEDICINE

## 2021-09-16 PROCEDURE — 84443 ASSAY THYROID STIM HORMONE: CPT | Performed by: INTERNAL MEDICINE

## 2021-09-16 PROCEDURE — 80053 COMPREHEN METABOLIC PANEL: CPT | Performed by: INTERNAL MEDICINE

## 2021-09-16 PROCEDURE — 85027 COMPLETE CBC AUTOMATED: CPT | Performed by: INTERNAL MEDICINE

## 2021-09-16 PROCEDURE — 99214 OFFICE O/P EST MOD 30 MIN: CPT | Performed by: INTERNAL MEDICINE

## 2021-09-16 RX ORDER — CLONIDINE HYDROCHLORIDE 0.1 MG/1
0.1 TABLET ORAL 2 TIMES DAILY PRN
COMMUNITY
End: 2023-02-15 | Stop reason: SDUPTHER

## 2021-09-16 NOTE — PROGRESS NOTES
"Patient is a 71 y.o. male who is here for a follow up of hyperlipidemia and hypertension.  Chief Complaint   Patient presents with   • Hyperlipidemia   • Hypertension         HPI:    Here for mgmt of HTN and hyperlipidemia and GERD.  Recently got his Booster.  No dizziness or lightheadedness.  A/D is better.  Sleeping better.  No abdominal pains.  GERD is controlled.  Constipation is controlled.  No falls.      History:     Patient Active Problem List   Diagnosis   • Acid reflux   • Arthritis   • Hypertension   • Hyperlipidemia   • Severe obstructive sleep apnea   • Psoriasis   • Diastolic dysfunction   • Peptic ulceration   • Bilateral edema of lower extremity   • Abnormal liver function test   • Hyperbilirubinemia   • Psychophysiological insomnia   • Gilbert's disease   • Thrombocytopenia, unspecified (CMS/HCC)   • Situational depression   • Tubular adenoma   • Seasonal allergic rhinitis due to pollen   • Mild cognitive impairment   • Memory loss   • Primary osteoarthritis of right knee   • Status post total right knee replacement   • Leukocytosis, likely reactive   • Benign prostatic hyperplasia with urinary frequency   • Spondylosis of lumbar region without myelopathy or radiculopathy   • Degeneration of lumbar or lumbosacral intervertebral disc   • Lumbar interspinous bursitis   • Myofascial pain   • Osteoarthritis of multiple joints   • Baastrup's syndrome   • Gastroesophageal reflux disease with esophagitis without hemorrhage       Past Medical History:   Diagnosis Date   • Abnormal liver function test    • Arthritis    • Bilateral edema of lower extremity    • Cataract     bilat- still present - mild    • Cellulitis of leg, right     resolved   • Chalazion    • Cracking skin     bilat feet mostly    • Disease     \"brakes/breaks\" disease as child-  effected blood and urine    • GERD (gastroesophageal reflux disease)    • History of kidney stones     x2 had to have broken up    • Hoarseness     from vocal cord " bending- seeing ent - possible surgery soon    • Hypertension    • Kidney infection     h/o    • Neck muscle spasm    • Onychomycosis of toenail    • Peptic ulceration    • Renal calculi    • Situational depression 8/22/2018   • Sleep apnea with use of continuous positive airway pressure (CPAP)     compliant with machine    • 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    • Vocal cord strain     vocal cord bent- seeing ent but causes pt to be hoarse    • Wears glasses        Past Surgical History:   Procedure Laterality Date   • COLONOSCOPY     • CYSTOSCOPY W/ LASER LITHOTRIPSY      x2   • ENDOSCOPY      with dilation    • FINGER SURGERY      left 5th finger   • KNEE SURGERY Right 1986    arthroscopy   • OTHER SURGICAL HISTORY      Lithotripsy   • TONSILLECTOMY     • TOTAL KNEE ARTHROPLASTY Right 7/22/2020    Procedure: TOTAL KNEE ARTHROPLASTY RIGHT;  Surgeon: Tereso Restrepo MD;  Location: [facility];  Service: Orthopedics;  Laterality: Right;       Current Outpatient Medications on File Prior to Visit   Medication Sig   • acetaminophen (TYLENOL) 500 MG tablet Take 500 mg by mouth As Needed.   • amLODIPine (NORVASC) 5 MG tablet TAKE ONE TABLET BY MOUTH EVERY DAY   • buPROPion SR (WELLBUTRIN SR) 200 MG 12 hr tablet Take 200 mg by mouth Daily.   • calcipotriene (DOVONOX) 0.005 % ointment As Needed.   • Cholecalciferol (VITAMIN D3) 125 MCG (5000 UT) capsule capsule Take 5,000 Units by mouth Daily.   • cloNIDine (CATAPRES) 0.1 MG tablet Take 0.1 mg by mouth 2 (Two) Times a Day As Needed for High Blood Pressure.   • Dietary Management Product (Rheumate) capsule Take 1 capsule by mouth Daily.   • docusate sodium (Colace) 100 MG capsule Take 1 capsule by mouth 2 (Two) Times a Day.   • doxazosin (CARDURA) 4 MG tablet TAKE ONE TABLET BY MOUTH EVERY NIGHT (Patient taking differently: Take 2 mg by mouth Every Night.)   • esomeprazole (nexIUM) 40 MG capsule Take 1 capsule by mouth 2 (Two) Times a Day.   • fenofibrate  (TRICOR) 145 MG tablet TAKE ONE TABLET BY MOUTH EVERY DAY   • furosemide (Lasix) 40 MG tablet Take 1 tablet by mouth Daily As Needed (edema).   • irbesartan (AVAPRO) 300 MG tablet Take 300 mg by mouth Every Night.   • mirtazapine (REMERON) 15 MG tablet TAKE ONE TABLET BY MOUTH EVERY NIGHT (Patient taking differently: Take 15 mg by mouth Every Night.)   • MULTIPLE VITAMINS PO Take 1 capsule by mouth Daily.   • Omega-3 Fatty Acids (FISH OIL) 1000 MG capsule capsule Take 1,000 mg by mouth Daily.   • potassium chloride (K-DUR,KLOR-CON) 20 MEQ CR tablet Take 1 tablet by mouth Daily As Needed (with Lasix use).   • pravastatin (PRAVACHOL) 40 MG tablet TAKE ONE TABLET BY MOUTH EVERY DAY   • sildenafil (VIAGRA) 100 MG tablet Take 1 tablet by mouth Daily As Needed for erectile dysfunction.   • tamsulosin (FLOMAX) 0.4 MG capsule 24 hr capsule    • tiZANidine (ZANAFLEX) 2 MG tablet TAKE 1/2 TO 1 TABLET BY MOUTH THREE TIMES A DAY AS NEEDED FOR MUSCLE SPASMS   • triamcinolone (KENALOG) 0.1 % ointment Apply  topically 2 (Two) Times a Day As Needed for Irritation or Rash.   • urea (CARMOL) 20 % cream Apply  topically to the appropriate area as directed As Needed for Dry Skin.   • vitamin B-6 (PYRIDOXINE) 50 MG tablet Take 50 mg by mouth Daily.   • [DISCONTINUED] cephalexin (KEFLEX) 500 MG capsule Take 2 capsules by mouth 1 (One) Time As Needed (for dental cleanings) for up to 1 dose. Take 2 capsules 1 hour prior to dental procedure     No current facility-administered medications on file prior to visit.       Family History   Problem Relation Age of Onset   • Arthritis Other    • Hypertension Other    • Hyperlipidemia Other    • Heart attack Other    • Hypertension Mother    • Heart disease Father    • Hypertension Father        Social History     Socioeconomic History   • Marital status:      Spouse name: Not on file   • Number of children: Not on file   • Years of education: Not on file   • Highest education level: Not on  "file   Tobacco Use   • Smoking status: Never Smoker   • Smokeless tobacco: Never Used   Substance and Sexual Activity   • Alcohol use: Yes     Alcohol/week: 4.0 standard drinks     Types: 4 Cans of beer per week   • Drug use: No   • Sexual activity: Defer         Review of Systems   Constitutional: Positive for fatigue (better). Negative for chills, fever and unexpected weight change.   HENT: Negative for ear pain, hearing loss, rhinorrhea, sinus pressure, sore throat and trouble swallowing.    Eyes: Negative for discharge and itching.   Respiratory: Negative for cough and chest tightness.    Cardiovascular: Positive for leg swelling (L>R). Negative for chest pain and palpitations.   Gastrointestinal: Negative for abdominal pain, blood in stool, diarrhea and vomiting.        2013 colonoscopy normal  9/18 colonoscopy with TA times 1, repeat in 9/21   Endocrine: Negative for polydipsia and polyuria.   Genitourinary: Negative for difficulty urinating, dysuria, enuresis, frequency, hematuria and urgency.        10/21 psa  ED  Followed by Dr Fu   Musculoskeletal: Positive for arthralgias and back pain. Negative for gait problem and joint swelling.   Skin: Negative for rash and wound.        Dry skin   Allergic/Immunologic: Negative for immunocompromised state.   Neurological: Negative for dizziness, syncope, weakness, light-headedness, numbness and headaches.   Hematological: Does not bruise/bleed easily.   Psychiatric/Behavioral: Positive for behavioral problems (improved) and decreased concentration (improved). Negative for dysphoric mood and sleep disturbance. The patient is nervous/anxious (improved).        /60   Pulse 59   Temp 96.9 °F (36.1 °C) (Infrared)   Ht 177.8 cm (70\")   Wt 104 kg (229 lb)   SpO2 98%   BMI 32.86 kg/m²       Physical Exam  Constitutional:       Appearance: Normal appearance. He is well-developed.   HENT:      Head: Normocephalic and atraumatic.      Right Ear: External ear " normal.      Left Ear: External ear normal.      Nose: Nose normal.      Mouth/Throat:      Mouth: Mucous membranes are moist.      Pharynx: Oropharynx is clear.   Eyes:      Extraocular Movements: Extraocular movements intact.      Conjunctiva/sclera: Conjunctivae normal.      Pupils: Pupils are equal, round, and reactive to light.   Cardiovascular:      Rate and Rhythm: Normal rate and regular rhythm.      Heart sounds: Normal heart sounds.   Pulmonary:      Effort: Pulmonary effort is normal.      Breath sounds: Normal breath sounds.   Abdominal:      General: Bowel sounds are normal.      Palpations: Abdomen is soft.   Musculoskeletal:         General: Normal range of motion.      Cervical back: Normal range of motion and neck supple.      Right lower leg: Edema (trace) present.      Left lower leg: Edema present.   Lymphadenopathy:      Cervical: No cervical adenopathy.   Skin:     General: Skin is warm and dry.   Neurological:      General: No focal deficit present.      Mental Status: He is alert and oriented to person, place, and time.   Psychiatric:         Mood and Affect: Mood normal.         Behavior: Behavior normal.         Thought Content: Thought content normal.         Procedure:      Discussion/Summary:    HTN-controlled on multi drug tx, advised goal of 150/80  hyperlipidemia-cont pravachol/fibrate, labs today improved , advised exercise and wt loss to decrease TG and increase HDL  BPH-stable on cardura, recent psa noted  GERD-stable on omeprazole  djd-advised to limit nsaids, stable  Hyperbilirubinemia-recheck stable  Thrombocytopenia-recheck stable  Situational depression-stable with combo remeron/wellbutrin  Constipation-citrucel/miralax combo, stable      9/16 labs noted and dw patient    Current Outpatient Medications:   •  acetaminophen (TYLENOL) 500 MG tablet, Take 500 mg by mouth As Needed., Disp: , Rfl:   •  amLODIPine (NORVASC) 5 MG tablet, TAKE ONE TABLET BY MOUTH EVERY DAY, Disp: 30  tablet, Rfl: 3  •  buPROPion SR (WELLBUTRIN SR) 200 MG 12 hr tablet, Take 200 mg by mouth Daily., Disp: , Rfl:   •  calcipotriene (DOVONOX) 0.005 % ointment, As Needed., Disp: , Rfl:   •  Cholecalciferol (VITAMIN D3) 125 MCG (5000 UT) capsule capsule, Take 5,000 Units by mouth Daily., Disp: , Rfl:   •  cloNIDine (CATAPRES) 0.1 MG tablet, Take 0.1 mg by mouth 2 (Two) Times a Day As Needed for High Blood Pressure., Disp: , Rfl:   •  Dietary Management Product (Rheumate) capsule, Take 1 capsule by mouth Daily., Disp: 90 capsule, Rfl: 1  •  docusate sodium (Colace) 100 MG capsule, Take 1 capsule by mouth 2 (Two) Times a Day., Disp: 60 capsule, Rfl: 0  •  doxazosin (CARDURA) 4 MG tablet, TAKE ONE TABLET BY MOUTH EVERY NIGHT (Patient taking differently: Take 2 mg by mouth Every Night.), Disp: 90 tablet, Rfl: 3  •  esomeprazole (nexIUM) 40 MG capsule, Take 1 capsule by mouth 2 (Two) Times a Day., Disp: 60 capsule, Rfl: 5  •  fenofibrate (TRICOR) 145 MG tablet, TAKE ONE TABLET BY MOUTH EVERY DAY, Disp: 90 tablet, Rfl: 3  •  furosemide (Lasix) 40 MG tablet, Take 1 tablet by mouth Daily As Needed (edema)., Disp: 90 tablet, Rfl: 1  •  irbesartan (AVAPRO) 300 MG tablet, Take 300 mg by mouth Every Night., Disp: , Rfl: 5  •  mirtazapine (REMERON) 15 MG tablet, TAKE ONE TABLET BY MOUTH EVERY NIGHT (Patient taking differently: Take 15 mg by mouth Every Night.), Disp: 30 tablet, Rfl: 5  •  MULTIPLE VITAMINS PO, Take 1 capsule by mouth Daily., Disp: , Rfl:   •  Omega-3 Fatty Acids (FISH OIL) 1000 MG capsule capsule, Take 1,000 mg by mouth Daily., Disp: , Rfl:   •  potassium chloride (K-DUR,KLOR-CON) 20 MEQ CR tablet, Take 1 tablet by mouth Daily As Needed (with Lasix use)., Disp: 90 tablet, Rfl: 1  •  pravastatin (PRAVACHOL) 40 MG tablet, TAKE ONE TABLET BY MOUTH EVERY DAY, Disp: 90 tablet, Rfl: 3  •  sildenafil (VIAGRA) 100 MG tablet, Take 1 tablet by mouth Daily As Needed for erectile dysfunction., Disp: 6 tablet, Rfl: 5  •   tamsulosin (FLOMAX) 0.4 MG capsule 24 hr capsule, , Disp: , Rfl:   •  tiZANidine (ZANAFLEX) 2 MG tablet, TAKE 1/2 TO 1 TABLET BY MOUTH THREE TIMES A DAY AS NEEDED FOR MUSCLE SPASMS, Disp: 60 tablet, Rfl: 0  •  triamcinolone (KENALOG) 0.1 % ointment, Apply  topically 2 (Two) Times a Day As Needed for Irritation or Rash., Disp: 80 g, Rfl: 5  •  urea (CARMOL) 20 % cream, Apply  topically to the appropriate area as directed As Needed for Dry Skin., Disp: , Rfl:   •  vitamin B-6 (PYRIDOXINE) 50 MG tablet, Take 50 mg by mouth Daily., Disp: , Rfl:         Diagnoses and all orders for this visit:    1. Essential hypertension (Primary)    2. Other hyperlipidemia  -     Comprehensive Metabolic Panel  -     Lipid Panel  -     TSH    3. Thrombocytopenia, unspecified (CMS/HCC)  -     CBC (No Diff)    4. Benign prostatic hyperplasia with urinary frequency    5. Situational depression    6. Gastroesophageal reflux disease with esophagitis without hemorrhage

## 2021-09-17 LAB
ALBUMIN SERPL-MCNC: 4.3 G/DL (ref 3.5–5.2)
ALBUMIN/GLOB SERPL: 1.5 G/DL
ALP SERPL-CCNC: 58 U/L (ref 39–117)
ALT SERPL W P-5'-P-CCNC: 23 U/L (ref 1–41)
ANION GAP SERPL CALCULATED.3IONS-SCNC: 12.7 MMOL/L (ref 5–15)
AST SERPL-CCNC: 26 U/L (ref 1–40)
BILIRUB SERPL-MCNC: 1.1 MG/DL (ref 0–1.2)
BUN SERPL-MCNC: 23 MG/DL (ref 8–23)
BUN/CREAT SERPL: 19.8 (ref 7–25)
CALCIUM SPEC-SCNC: 9.3 MG/DL (ref 8.6–10.5)
CHLORIDE SERPL-SCNC: 104 MMOL/L (ref 98–107)
CHOLEST SERPL-MCNC: 147 MG/DL (ref 0–200)
CO2 SERPL-SCNC: 23.3 MMOL/L (ref 22–29)
CREAT SERPL-MCNC: 1.16 MG/DL (ref 0.76–1.27)
GFR SERPL CREATININE-BSD FRML MDRD: 62 ML/MIN/1.73
GLOBULIN UR ELPH-MCNC: 2.9 GM/DL
GLUCOSE SERPL-MCNC: 118 MG/DL (ref 65–99)
HDLC SERPL-MCNC: 36 MG/DL (ref 40–60)
LDLC SERPL CALC-MCNC: 84 MG/DL (ref 0–100)
LDLC/HDLC SERPL: 2.21 {RATIO}
POTASSIUM SERPL-SCNC: 4.2 MMOL/L (ref 3.5–5.2)
PROT SERPL-MCNC: 7.2 G/DL (ref 6–8.5)
SODIUM SERPL-SCNC: 140 MMOL/L (ref 136–145)
TRIGL SERPL-MCNC: 157 MG/DL (ref 0–150)
TSH SERPL DL<=0.05 MIU/L-ACNC: 1.02 UIU/ML (ref 0.27–4.2)
VLDLC SERPL-MCNC: 27 MG/DL (ref 5–40)

## 2021-10-07 RX ORDER — PRAVASTATIN SODIUM 40 MG
TABLET ORAL
Qty: 30 TABLET | Refills: 3 | Status: SHIPPED | OUTPATIENT
Start: 2021-10-07 | End: 2022-01-31

## 2021-10-12 ENCOUNTER — OFFICE VISIT (OUTPATIENT)
Dept: PAIN MEDICINE | Facility: CLINIC | Age: 71
End: 2021-10-12

## 2021-10-12 VITALS
OXYGEN SATURATION: 98 % | WEIGHT: 235 LBS | HEART RATE: 60 BPM | HEIGHT: 70 IN | DIASTOLIC BLOOD PRESSURE: 64 MMHG | BODY MASS INDEX: 33.64 KG/M2 | SYSTOLIC BLOOD PRESSURE: 138 MMHG

## 2021-10-12 DIAGNOSIS — M79.18 MYOFASCIAL PAIN: ICD-10-CM

## 2021-10-12 DIAGNOSIS — M48.26 LUMBAR INTERSPINOUS BURSITIS: ICD-10-CM

## 2021-10-12 DIAGNOSIS — M51.37 DEGENERATION OF LUMBAR OR LUMBOSACRAL INTERVERTEBRAL DISC: ICD-10-CM

## 2021-10-12 DIAGNOSIS — M47.816 SPONDYLOSIS OF LUMBAR REGION WITHOUT MYELOPATHY OR RADICULOPATHY: ICD-10-CM

## 2021-10-12 DIAGNOSIS — Z96.651 STATUS POST TOTAL RIGHT KNEE REPLACEMENT: ICD-10-CM

## 2021-10-12 DIAGNOSIS — M48.20 BAASTRUP'S SYNDROME: ICD-10-CM

## 2021-10-12 PROCEDURE — 99213 OFFICE O/P EST LOW 20 MIN: CPT | Performed by: NURSE PRACTITIONER

## 2021-11-12 ENCOUNTER — FLU SHOT (OUTPATIENT)
Dept: INTERNAL MEDICINE | Facility: CLINIC | Age: 71
End: 2021-11-12

## 2021-11-12 DIAGNOSIS — Z23 FLU VACCINE NEED: Primary | ICD-10-CM

## 2021-11-12 PROCEDURE — 90662 IIV NO PRSV INCREASED AG IM: CPT | Performed by: INTERNAL MEDICINE

## 2021-11-12 PROCEDURE — G0008 ADMIN INFLUENZA VIRUS VAC: HCPCS | Performed by: INTERNAL MEDICINE

## 2022-01-03 RX ORDER — AMLODIPINE BESYLATE 5 MG/1
TABLET ORAL
Qty: 30 TABLET | Refills: 3 | Status: SHIPPED | OUTPATIENT
Start: 2022-01-03 | End: 2022-07-28

## 2022-01-17 ENCOUNTER — TELEPHONE (OUTPATIENT)
Dept: INTERNAL MEDICINE | Facility: CLINIC | Age: 72
End: 2022-01-17

## 2022-01-17 NOTE — TELEPHONE ENCOUNTER
Caller: Jimmie Salcedo    Relationship: Self    Best call back number: [phone]    What is the best time to reach you: ANY TIME    Who are you requesting to speak with (clinical staff, provider,  specific staff member): NURSE    Do you know the name of the person who called: SELF    What was the call regarding: PATIENT TESTED POSITIVE ON 01/09 AND HE TOOK A TEST ON 01/16 AND IT STILL CAME BACK POSITIVE. PER CDC GUIDELINES HE WAS TOLD THAT HE HAS NO SYMPTOMS HE CAN LEAVE THE HOUSE AFTER 5 DAYS, IS THIS CORRECT? PLEASE ADVISE  Do you require a callback: YES

## 2022-01-21 RX ORDER — DOXAZOSIN MESYLATE 4 MG/1
TABLET ORAL
Qty: 30 TABLET | Refills: 3 | Status: SHIPPED | OUTPATIENT
Start: 2022-01-21 | End: 2023-02-13

## 2022-01-31 RX ORDER — PRAVASTATIN SODIUM 40 MG
TABLET ORAL
Qty: 30 TABLET | Refills: 3 | Status: SHIPPED | OUTPATIENT
Start: 2022-01-31 | End: 2022-08-02

## 2022-02-21 RX ORDER — POTASSIUM CHLORIDE 20 MEQ/1
TABLET, EXTENDED RELEASE ORAL
Qty: 30 TABLET | Refills: 1 | Status: SHIPPED | OUTPATIENT
Start: 2022-02-21

## 2022-02-21 RX ORDER — FUROSEMIDE 40 MG/1
TABLET ORAL
Qty: 30 TABLET | Refills: 1 | Status: SHIPPED | OUTPATIENT
Start: 2022-02-21

## 2022-03-14 ENCOUNTER — OFFICE VISIT (OUTPATIENT)
Dept: INTERNAL MEDICINE | Facility: CLINIC | Age: 72
End: 2022-03-14

## 2022-03-14 ENCOUNTER — LAB (OUTPATIENT)
Dept: LAB | Facility: HOSPITAL | Age: 72
End: 2022-03-14

## 2022-03-14 VITALS
DIASTOLIC BLOOD PRESSURE: 70 MMHG | HEIGHT: 70 IN | OXYGEN SATURATION: 100 % | BODY MASS INDEX: 32.64 KG/M2 | SYSTOLIC BLOOD PRESSURE: 144 MMHG | HEART RATE: 59 BPM | TEMPERATURE: 96.8 F | WEIGHT: 228 LBS

## 2022-03-14 DIAGNOSIS — F43.21 SITUATIONAL DEPRESSION: ICD-10-CM

## 2022-03-14 DIAGNOSIS — D69.6 THROMBOCYTOPENIA, UNSPECIFIED: ICD-10-CM

## 2022-03-14 DIAGNOSIS — N40.1 BENIGN PROSTATIC HYPERPLASIA WITH URINARY FREQUENCY: ICD-10-CM

## 2022-03-14 DIAGNOSIS — D36.9 TUBULAR ADENOMA: ICD-10-CM

## 2022-03-14 DIAGNOSIS — R73.09 ABNORMAL GLUCOSE: ICD-10-CM

## 2022-03-14 DIAGNOSIS — R35.0 BENIGN PROSTATIC HYPERPLASIA WITH URINARY FREQUENCY: ICD-10-CM

## 2022-03-14 DIAGNOSIS — Z00.00 ENCOUNTER FOR MEDICARE ANNUAL WELLNESS EXAM: ICD-10-CM

## 2022-03-14 DIAGNOSIS — E80.4 GILBERT'S DISEASE: ICD-10-CM

## 2022-03-14 DIAGNOSIS — I10 PRIMARY HYPERTENSION: Primary | ICD-10-CM

## 2022-03-14 DIAGNOSIS — E78.49 OTHER HYPERLIPIDEMIA: ICD-10-CM

## 2022-03-14 PROBLEM — Z86.16 HISTORY OF COVID-19: Status: ACTIVE | Noted: 2022-03-14

## 2022-03-14 LAB
ALBUMIN SERPL-MCNC: 4.4 G/DL (ref 3.5–5.2)
ALBUMIN/GLOB SERPL: 1.7 G/DL
ALP SERPL-CCNC: 68 U/L (ref 39–117)
ALT SERPL W P-5'-P-CCNC: 19 U/L (ref 1–41)
ANION GAP SERPL CALCULATED.3IONS-SCNC: 10.4 MMOL/L (ref 5–15)
AST SERPL-CCNC: 20 U/L (ref 1–40)
BILIRUB SERPL-MCNC: 0.8 MG/DL (ref 0–1.2)
BUN SERPL-MCNC: 18 MG/DL (ref 8–23)
BUN/CREAT SERPL: 17 (ref 7–25)
CALCIUM SPEC-SCNC: 9.3 MG/DL (ref 8.6–10.5)
CHLORIDE SERPL-SCNC: 104 MMOL/L (ref 98–107)
CHOLEST SERPL-MCNC: 154 MG/DL (ref 0–200)
CO2 SERPL-SCNC: 24.6 MMOL/L (ref 22–29)
CREAT SERPL-MCNC: 1.06 MG/DL (ref 0.76–1.27)
DEPRECATED RDW RBC AUTO: 42.9 FL (ref 37–54)
EGFRCR SERPLBLD CKD-EPI 2021: 76 ML/MIN/1.73
ERYTHROCYTE [DISTWIDTH] IN BLOOD BY AUTOMATED COUNT: 13.5 % (ref 12.3–15.4)
GLOBULIN UR ELPH-MCNC: 2.6 GM/DL
GLUCOSE SERPL-MCNC: 122 MG/DL (ref 65–99)
HBA1C MFR BLD: 5.4 % (ref 4.8–5.6)
HCT VFR BLD AUTO: 46.4 % (ref 37.5–51)
HDLC SERPL-MCNC: 41 MG/DL (ref 40–60)
HGB BLD-MCNC: 15.9 G/DL (ref 13–17.7)
LDLC SERPL CALC-MCNC: 88 MG/DL (ref 0–100)
LDLC/HDLC SERPL: 2.08 {RATIO}
MCH RBC QN AUTO: 30 PG (ref 26.6–33)
MCHC RBC AUTO-ENTMCNC: 34.3 G/DL (ref 31.5–35.7)
MCV RBC AUTO: 87.5 FL (ref 79–97)
PLATELET # BLD AUTO: 135 10*3/MM3 (ref 140–450)
PMV BLD AUTO: 10 FL (ref 6–12)
POTASSIUM SERPL-SCNC: 4.4 MMOL/L (ref 3.5–5.2)
PROT SERPL-MCNC: 7 G/DL (ref 6–8.5)
RBC # BLD AUTO: 5.3 10*6/MM3 (ref 4.14–5.8)
SODIUM SERPL-SCNC: 139 MMOL/L (ref 136–145)
TRIGL SERPL-MCNC: 139 MG/DL (ref 0–150)
TSH SERPL DL<=0.05 MIU/L-ACNC: 1.61 UIU/ML (ref 0.27–4.2)
VLDLC SERPL-MCNC: 25 MG/DL (ref 5–40)
WBC NRBC COR # BLD: 7.24 10*3/MM3 (ref 3.4–10.8)

## 2022-03-14 PROCEDURE — 80061 LIPID PANEL: CPT | Performed by: INTERNAL MEDICINE

## 2022-03-14 PROCEDURE — 1125F AMNT PAIN NOTED PAIN PRSNT: CPT | Performed by: INTERNAL MEDICINE

## 2022-03-14 PROCEDURE — 80053 COMPREHEN METABOLIC PANEL: CPT | Performed by: INTERNAL MEDICINE

## 2022-03-14 PROCEDURE — 83036 HEMOGLOBIN GLYCOSYLATED A1C: CPT | Performed by: INTERNAL MEDICINE

## 2022-03-14 PROCEDURE — G0439 PPPS, SUBSEQ VISIT: HCPCS | Performed by: INTERNAL MEDICINE

## 2022-03-14 PROCEDURE — 84443 ASSAY THYROID STIM HORMONE: CPT | Performed by: INTERNAL MEDICINE

## 2022-03-14 PROCEDURE — 1170F FXNL STATUS ASSESSED: CPT | Performed by: INTERNAL MEDICINE

## 2022-03-14 PROCEDURE — 85027 COMPLETE CBC AUTOMATED: CPT | Performed by: INTERNAL MEDICINE

## 2022-03-14 PROCEDURE — 1160F RVW MEDS BY RX/DR IN RCRD: CPT | Performed by: INTERNAL MEDICINE

## 2022-03-14 NOTE — PROGRESS NOTES
The ABCs of the Annual Wellness Visit  Subsequent Medicare Wellness Visit    Chief Complaint   Patient presents with   • Annual Exam      Subjective       History of Present Illness:  Jimmie Salcedo is a 71 y.o. male who presents for a Subsequent Medicare Wellness Visit.    The following portions of the patient's history were reviewed and   updated as appropriate: current medications, past family history, past medical history, past social history, past surgical history and problem list.       Compared to one year ago, the patient feels his physical   health is better.    Compared to one year ago, the patient feels his mental   health is better.    Recent Hospitalizations:  He was not admitted to the hospital during the last year.       Current Medical Providers:  Patient Care Team:  Jvaad Negron MD as PCP - General (Internal Medicine)  Sundeep Berger MD as Consulting Physician (Infectious Diseases)    Outpatient Medications Prior to Visit   Medication Sig Dispense Refill   • acetaminophen (TYLENOL) 500 MG tablet Take 500 mg by mouth As Needed.     • amLODIPine (NORVASC) 5 MG tablet TAKE ONE TABLET BY MOUTH EVERY DAY 30 tablet 3   • buPROPion SR (WELLBUTRIN SR) 200 MG 12 hr tablet Take 200 mg by mouth Daily.     • calcipotriene (DOVONOX) 0.005 % ointment As Needed.     • Cholecalciferol (VITAMIN D3) 125 MCG (5000 UT) capsule capsule Take 5,000 Units by mouth Daily.     • cloNIDine (CATAPRES) 0.1 MG tablet Take 0.1 mg by mouth 2 (Two) Times a Day As Needed for High Blood Pressure.     • Dietary Management Product (Rheumate) capsule Take 1 capsule by mouth Daily. 90 capsule 1   • docusate sodium (Colace) 100 MG capsule Take 1 capsule by mouth 2 (Two) Times a Day. 60 capsule 0   • doxazosin (CARDURA) 4 MG tablet TAKE ONE TABLET BY MOUTH EVERY NIGHT 30 tablet 3   • esomeprazole (nexIUM) 40 MG capsule Take 1 capsule by mouth 2 (Two) Times a Day. 60 capsule 5   • fenofibrate (TRICOR) 145 MG tablet TAKE ONE TABLET BY  MOUTH EVERY DAY 90 tablet 3   • furosemide (LASIX) 40 MG tablet TAKE ONE TABLET BY MOUTH EVERY DAY AS NEEDED FOR EDEMA 30 tablet 1   • irbesartan (AVAPRO) 300 MG tablet Take 300 mg by mouth Every Night.  5   • mirtazapine (REMERON) 15 MG tablet TAKE ONE TABLET BY MOUTH EVERY NIGHT (Patient taking differently: Take 15 mg by mouth Every Night.) 30 tablet 5   • MULTIPLE VITAMINS PO Take 1 capsule by mouth Daily.     • Omega-3 Fatty Acids (FISH OIL) 1000 MG capsule capsule Take 1,000 mg by mouth Daily.     • potassium chloride (K-DUR,KLOR-CON) 20 MEQ CR tablet TAKE ONE TABLET BY MOUTH EVERY DAY AS NEEDED (USE WITH FUROSEMIDE) 30 tablet 1   • pravastatin (PRAVACHOL) 40 MG tablet TAKE ONE TABLET BY MOUTH EVERY DAY 30 tablet 3   • sildenafil (VIAGRA) 100 MG tablet Take 1 tablet by mouth Daily As Needed for erectile dysfunction. 6 tablet 5   • tamsulosin (FLOMAX) 0.4 MG capsule 24 hr capsule      • tiZANidine (ZANAFLEX) 2 MG tablet TAKE 1/2 TO 1 TABLET BY MOUTH THREE TIMES A DAY AS NEEDED FOR MUSCLE SPASMS 60 tablet 0   • triamcinolone (KENALOG) 0.1 % ointment Apply  topically 2 (Two) Times a Day As Needed for Irritation or Rash. 80 g 5   • urea (CARMOL) 20 % cream Apply  topically to the appropriate area as directed As Needed for Dry Skin.     • vitamin B-6 (PYRIDOXINE) 50 MG tablet Take 50 mg by mouth Daily.       No facility-administered medications prior to visit.       No opioid medication identified on active medication list. I have reviewed chart for other potential  high risk medication/s and harmful drug interactions in the elderly.          Aspirin is not on active medication list.  Aspirin use is not indicated based on review of current medical condition/s. Risk of harm outweighs potential benefits.  .      Fall Risk Assessment was completed, and patient is at low risk for falls.      Patient Active Problem List   Diagnosis   • Acid reflux   • Arthritis   • Hypertension   • Hyperlipidemia   • Severe obstructive  sleep apnea   • Psoriasis   • Diastolic dysfunction   • Peptic ulceration   • Bilateral edema of lower extremity   • Abnormal liver function test   • Hyperbilirubinemia   • Psychophysiological insomnia   • Gilbert's disease   • Thrombocytopenia, unspecified (CMS/HCC)   • Situational depression   • Tubular adenoma   • Seasonal allergic rhinitis due to pollen   • Mild cognitive impairment   • Memory loss   • Primary osteoarthritis of right knee   • Status post total right knee replacement   • Leukocytosis, likely reactive   • Benign prostatic hyperplasia with urinary frequency   • Spondylosis of lumbar region without myelopathy or radiculopathy   • Degeneration of lumbar or lumbosacral intervertebral disc   • Lumbar interspinous bursitis   • Myofascial pain   • Osteoarthritis of multiple joints   • Baastrup's syndrome   • Gastroesophageal reflux disease with esophagitis without hemorrhage   • History of COVID-19     Advance Care Planning   Advance Directive is on file.  ACP discussion was held with the patient during this visit. Patient has an advance directive in EMR which is still valid.     Review of Systems   Constitutional: Positive for fatigue (better). Negative for chills, fever and unexpected weight change.   HENT: Negative for ear pain, hearing loss, rhinorrhea, sinus pressure, sore throat and trouble swallowing.    Eyes: Negative for discharge and itching.   Respiratory: Negative for cough and chest tightness.    Cardiovascular: Positive for leg swelling (L>R). Negative for chest pain and palpitations.   Gastrointestinal: Negative for abdominal pain, blood in stool, diarrhea and vomiting.        2013 colonoscopy normal  9/18 colonoscopy with TA times 1, repeat in 9/23   Endocrine: Negative for polydipsia and polyuria.   Genitourinary: Negative for difficulty urinating, dysuria, enuresis, frequency, hematuria and urgency.        10/21 psa  ED  Followed by Dr Fu   Musculoskeletal: Positive for arthralgias  "and back pain. Negative for gait problem and joint swelling.   Skin: Negative for rash and wound.        Dry skin   Allergic/Immunologic: Negative for immunocompromised state.   Neurological: Negative for dizziness, syncope, weakness, light-headedness, numbness and headaches.   Hematological: Does not bruise/bleed easily.   Psychiatric/Behavioral: Positive for behavioral problems (improved) and decreased concentration (improved). Negative for dysphoric mood and sleep disturbance. The patient is nervous/anxious (improved).          Objective       Vitals:    03/14/22 0926 03/14/22 0946   BP: 170/82 144/70   BP Location: Left arm    Patient Position: Sitting    Pulse: 59    Temp: 96.8 °F (36 °C)    TempSrc: Infrared    SpO2: 100%    Weight: 103 kg (228 lb)    Height: 177.8 cm (70\")    PainSc:   3      BMI Readings from Last 1 Encounters:   03/14/22 32.71 kg/m²   BMI is above normal parameters. Recommendations include: exercise counseling and nutrition counseling    Does the patient have evidence of cognitive impairment? No    Physical Exam  Lab Results   Component Value Date    TRIG 139 03/14/2022    HDL 41 03/14/2022    LDL 88 03/14/2022    VLDL 25 03/14/2022    HGBA1C 5.40 03/14/2022            HEALTH RISK ASSESSMENT    Smoking Status:  Social History     Tobacco Use   Smoking Status Never Smoker   Smokeless Tobacco Never Used     Alcohol Consumption:  Social History     Substance and Sexual Activity   Alcohol Use Yes   • Alcohol/week: 4.0 standard drinks   • Types: 4 Cans of beer per week     Fall Risk Screen:    JASMINADI Fall Risk Assessment was completed, and patient is at MODERATE risk for falls. Assessment completed on:3/14/2022    Depression Screening:  PHQ-2/PHQ-9 Depression Screening 3/14/2022   Retired Total Score -   Little Interest or Pleasure in Doing Things 0-->not at all   Feeling Down, Depressed or Hopeless 0-->not at all   PHQ-9: Brief Depression Severity Measure Score 0       Health Habits and " Functional and Cognitive Screening:  Functional & Cognitive Status 3/14/2022   Do you have difficulty preparing food and eating? No   Do you have difficulty bathing yourself, getting dressed or grooming yourself? No   Do you have difficulty using the toilet? No   Do you have difficulty moving around from place to place? No   Do you have trouble with steps or getting out of a bed or a chair? Yes   Current Diet Well Balanced Diet   Dental Exam Up to date   Eye Exam Up to date   Exercise (times per week) 2 times per week   Current Exercises Include Walking   Current Exercise Activities Include -   Do you need help using the phone?  No   Are you deaf or do you have serious difficulty hearing?  No   Do you need help with transportation? No   Do you need help shopping? No   Do you need help preparing meals?  No   Do you need help with housework?  No   Do you need help with laundry? No   Do you need help taking your medications? No   Do you need help managing money? No   Do you ever drive or ride in a car without wearing a seat belt? No   Have you felt unusual stress, anger or loneliness in the last month? No   Who do you live with? Spouse   If you need help, do you have trouble finding someone available to you? Yes   Have you been bothered in the last four weeks by sexual problems? No   Do you have difficulty concentrating, remembering or making decisions? No       Age-appropriate Screening Schedule:  Refer to the list below for future screening recommendations based on patient's age, sex and/or medical conditions. Orders for these recommended tests are listed in the plan section. The patient has been provided with a written plan.    Health Maintenance   Topic Date Due   • ZOSTER VACCINE (2 of 3) 12/17/2014   • LIPID PANEL  03/14/2023   • TDAP/TD VACCINES (2 - Td or Tdap) 09/15/2030   • INFLUENZA VACCINE  Completed              Assessment/Plan     CMS Preventative Services Quick Reference  Risk Factors Identified During  Encounter  Depression/Dysphoria  Immunizations Discussed/Encouraged (specific Immunizations; Td and Shingrix  Inactivity/Sedentary  Obesity/Overweight   Polypharmacy  The above risks/problems have been discussed with the patient.  Follow up actions/plans if indicated are seen below in the Assessment/Plan Section.  Pertinent information has been shared with the patient in the After Visit Summary.    Diagnoses and all orders for this visit:    1. Primary hypertension (Primary)  -     CBC (No Diff)    2. Other hyperlipidemia  -     Comprehensive Metabolic Panel  -     Lipid Panel  -     TSH    3. Thrombocytopenia, unspecified (CMS/HCC)    4. Gilbert's disease    5. Benign prostatic hyperplasia with urinary frequency    6. Tubular adenoma    7. Situational depression    8. Encounter for Medicare annual wellness exam  -     CBC (No Diff)  -     Comprehensive Metabolic Panel  -     Lipid Panel  -     Hemoglobin A1c  -     TSH    9. Abnormal glucose  -     Hemoglobin A1c        Follow Up:   Return in about 6 months (around 9/14/2022) for Recheck, with fasting labs.     An After Visit Summary and PPPS were given to the patient.             HTN-controlled on multi drug tx, advised goal of 150/80  hyperlipidemia-cont pravachol/fibrate, labs today dw patient , advised exercise and wt loss to decrease TG and increase HDL  BPH-stable on cardura, recent psa noted, s/p Green Light  GERD-stable on omeprazole  djd-advised to limit nsaids, stable  Hyperbilirubinemia-recheck normal  Thrombocytopenia-recheck stable  Situational depression-stable with combo remeron/wellbutrin  Constipation-citrucel/miralax combo, stable      3/14 labs noted and dw patient

## 2022-03-15 ENCOUNTER — OFFICE VISIT (OUTPATIENT)
Dept: ORTHOPEDIC SURGERY | Facility: CLINIC | Age: 72
End: 2022-03-15

## 2022-03-15 VITALS
HEIGHT: 70 IN | WEIGHT: 228 LBS | SYSTOLIC BLOOD PRESSURE: 136 MMHG | BODY MASS INDEX: 32.64 KG/M2 | DIASTOLIC BLOOD PRESSURE: 78 MMHG

## 2022-03-15 DIAGNOSIS — M17.12 PRIMARY OSTEOARTHRITIS OF LEFT KNEE: Primary | ICD-10-CM

## 2022-03-15 PROCEDURE — 99214 OFFICE O/P EST MOD 30 MIN: CPT | Performed by: ORTHOPAEDIC SURGERY

## 2022-03-15 PROCEDURE — 20610 DRAIN/INJ JOINT/BURSA W/O US: CPT | Performed by: ORTHOPAEDIC SURGERY

## 2022-03-15 RX ORDER — AMLODIPINE BESYLATE 5 MG/1
1 TABLET ORAL DAILY
COMMUNITY
End: 2022-07-28

## 2022-03-15 RX ORDER — FENOFIBRATE 145 MG/1
1 TABLET, COATED ORAL DAILY
COMMUNITY
End: 2022-07-28

## 2022-03-15 RX ORDER — SILDENAFIL 100 MG/1
1 TABLET, FILM COATED ORAL
COMMUNITY
End: 2022-11-29

## 2022-03-15 RX ORDER — MIRTAZAPINE 15 MG/1
1 TABLET, FILM COATED ORAL
COMMUNITY
Start: 2021-11-23 | End: 2022-07-28

## 2022-03-15 RX ORDER — SULFAMETHOXAZOLE AND TRIMETHOPRIM 800; 160 MG/1; MG/1
0.5 TABLET ORAL 2 TIMES DAILY
COMMUNITY
Start: 2022-02-24 | End: 2022-11-29

## 2022-03-15 RX ORDER — DOXAZOSIN MESYLATE 4 MG/1
1 TABLET ORAL DAILY
COMMUNITY
End: 2022-07-28

## 2022-03-15 RX ORDER — MULTIPLE VITAMINS W/ MINERALS TAB 9MG-400MCG
TAB ORAL
COMMUNITY
End: 2023-01-26

## 2022-03-15 RX ORDER — DOXYCYCLINE 100 MG/1
100 CAPSULE ORAL 2 TIMES DAILY
COMMUNITY
Start: 2022-01-31 | End: 2022-11-28

## 2022-03-15 RX ORDER — TRIAMCINOLONE ACETONIDE 40 MG/ML
40 INJECTION, SUSPENSION INTRA-ARTICULAR; INTRAMUSCULAR
Status: COMPLETED | OUTPATIENT
Start: 2022-03-15 | End: 2022-03-15

## 2022-03-15 RX ORDER — BUPROPION HYDROCHLORIDE 75 MG/1
1 TABLET ORAL EVERY 12 HOURS
COMMUNITY
End: 2022-11-28

## 2022-03-15 RX ORDER — LIDOCAINE HYDROCHLORIDE 10 MG/ML
3 INJECTION, SOLUTION EPIDURAL; INFILTRATION; INTRACAUDAL; PERINEURAL
Status: COMPLETED | OUTPATIENT
Start: 2022-03-15 | End: 2022-03-15

## 2022-03-15 RX ORDER — MULTIPLE VITAMINS W/ MINERALS TAB 9MG-400MCG
TAB ORAL
COMMUNITY

## 2022-03-15 RX ADMIN — TRIAMCINOLONE ACETONIDE 40 MG: 40 INJECTION, SUSPENSION INTRA-ARTICULAR; INTRAMUSCULAR at 15:34

## 2022-03-15 RX ADMIN — LIDOCAINE HYDROCHLORIDE 3 ML: 10 INJECTION, SOLUTION EPIDURAL; INFILTRATION; INTRACAUDAL; PERINEURAL at 15:34

## 2022-03-15 NOTE — PROGRESS NOTES
"    [facility] [place] [facility] Note        Subjective     CC: Follow-up (20 month f/u Primary osteoarthritis of left knee )      HPI    Jimmie Salcedo is a 71 y.o. male.  Patient is here today for follow-up of his left knee arthritis.  Is been quite sometime since we have seen him for the left knee.  He tells me that the left knee has gotten very painful especially with walking or kneeling or squatting.  He has superior lateral knee pain primarily.  Has some episodes where the left knee acts like it wants to give way.    Overall, patient's symptoms are as above.  Should be noted that the patient underwent right total knee arthroplasty in July 2020 and had quite a hard time getting his range of motion but patient tells me that it finally broke free and his knee is doing well.    ROS:    Constiutional:Pt denies fever, chills, nausea, or vomiting.  MSK:as above        Objective      Past Medical History  Past Medical History:   Diagnosis Date   • Abnormal liver function test    • Arthritis    • Bilateral edema of lower extremity    • Cataract     bilat- still present - mild    • Cellulitis of leg, right     resolved   • Chalazion    • Cracking skin     bilat feet mostly    • Disease     \"brakes/breaks\" disease as child-  effected blood and urine    • GERD (gastroesophageal reflux disease)    • History of kidney stones     x2 had to have broken up    • Hoarseness     from vocal cord bending- seeing ent - possible surgery soon    • Hypertension    • Kidney infection     h/o    • Neck muscle spasm    • Onychomycosis of toenail    • Peptic ulceration    • Renal calculi    • Situational depression 8/22/2018   • Sleep apnea with use of continuous positive airway pressure (CPAP)     compliant with machine    • 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    Patient is well nourished and well developed.        Ortho Exam      Musculoskeletal:  Global Assessment:  Overall assessment of Lower Extremity Muscle Strength and Tone:  Left quadriceps--5/5   Left hamstrings--5/5       Left tibialis anterior--5/5  Left gastroc-soleus--5/5  Left EHL --5/5    Lower Extremity:    Inspection and Palpation:  Left knee:  Tenderness:  Over the medial joint line and moderate severity  Effusion:  1+  Crepitus:  Positive  Pulses:  2+  Ecchymosis:  None  Warmth:  None     ROM:  Left:  Extension: 5     Flexion:120    Instability:    Left:    Lachman Test:  Negative   Varus stress test negative  Valgus stress test negative    Deformities/Malalignments/Discrepancies:    Left:  Genu Varum     Functional Testing:  Vincent's test:  Negative  Patella grind test:  Positive  Q-angle:  normal      Imaging/Labs/EMG Reviewed:  Imaging Results (Last 24 Hours)     Procedure Component Value Units Date/Time    XR Knee 4+ View Left [[id number]] Resulted: 03/15/22 1533     Updated: 03/15/22 1535    Narrative:      Knee X-Ray    Indication: Pain    Study:  Upright AP, Skiers, Lateral, and Sunrise views of Left knee(s)    Comparison: None    Findings:    Patient appears to have severe degenerative changes in the medial   compartment.    There are moderate degenerative changes in the lateral compartment.    There are severe changes in the patellofemoral compartment.    Patient has overall varus alignment.    Kellgren-Garth rdGrdrrdarddrderd:rd rd3rd Impression:   Severe medial compartment and patellofemoral compartment degnerative   changes of the knee               Assessment    Assessment:  1. Primary osteoarthritis of left knee        Plan:  1. Recommend over the counter anti-inflammatories for pain and/or swelling  2. Severe left knee arthritis--end-stage medial and patellofemoral.  We had a lengthy discussion with patient regarding treatment options  and alternatives.  He is thinking about doing something towards January 2023 but is not sure.  For now, steroid injection will be given I will see him back in 3 months.  He does not recall Visco supplementation being all that helpful for him in the past with the contralateral knee.    Procedure Note:    I discussed with the patient the potential benefits of performing a therapeutic injections as well as potential risks including but not limited to infection, swelling, pain, bleeding, bruising, nerve/vessel damage, skin color changes, transient elevation in blood glucose levels, and fat atrophy. After informed consent and after the areas were prepped with chlorhexadine soap, ethyl chloride was used to numb the skin. Via the anterolateral approach, 3mL of 1% lidocaine followed by 40mg of Kenalog were each injected into the left knee joint. The patient tolerated the procedure well. There were no complications. A sterile dressing was placed over the injection sites.        Tereso Restrepo MD  03/15/22  17:00 EDT      Dictated Utilizing Dragon Dictation.

## 2022-03-15 NOTE — PROGRESS NOTES
Procedure   Large Joint Arthrocentesis: L knee  Date/Time: 3/15/2022 3:34 PM  Consent given by: patient  Site marked: site marked  Timeout: Immediately prior to procedure a time out was called to verify the correct patient, procedure, equipment, support staff and site/side marked as required   Supporting Documentation  Indications: pain   Procedure Details  Location: knee - L knee  Preparation: Patient was prepped and draped in the usual sterile fashion  Needle size: 22 G  Approach: anterolateral  Medications administered: 40 mg triamcinolone acetonide 40 MG/ML; 3 mL lidocaine PF 1% 1 %  Patient tolerance: patient tolerated the procedure well with no immediate complications

## 2022-03-28 RX ORDER — FENOFIBRATE 145 MG/1
TABLET, COATED ORAL
Qty: 30 TABLET | Refills: 3 | Status: SHIPPED | OUTPATIENT
Start: 2022-03-28 | End: 2022-09-29

## 2022-06-01 RX ORDER — DOXAZOSIN MESYLATE 4 MG/1
TABLET ORAL
Qty: 30 TABLET | Refills: 3 | Status: SHIPPED | OUTPATIENT
Start: 2022-06-01 | End: 2022-10-12

## 2022-06-17 ENCOUNTER — TELEPHONE (OUTPATIENT)
Dept: ORTHOPEDIC SURGERY | Facility: CLINIC | Age: 72
End: 2022-06-17

## 2022-06-17 NOTE — TELEPHONE ENCOUNTER
Called patient about missed appointment 6/16, LVM and reminded patient about our 24 hour cancellation notice

## 2022-06-24 ENCOUNTER — TELEMEDICINE (OUTPATIENT)
Dept: SLEEP MEDICINE | Facility: HOSPITAL | Age: 72
End: 2022-06-24

## 2022-06-24 VITALS — HEIGHT: 71 IN | BODY MASS INDEX: 30.94 KG/M2 | WEIGHT: 221 LBS

## 2022-06-24 DIAGNOSIS — G47.33 OSA (OBSTRUCTIVE SLEEP APNEA): Primary | ICD-10-CM

## 2022-06-24 DIAGNOSIS — G47.33 OBSTRUCTIVE SLEEP APNEA, ADULT: Primary | ICD-10-CM

## 2022-06-24 PROCEDURE — 99213 OFFICE O/P EST LOW 20 MIN: CPT | Performed by: NURSE PRACTITIONER

## 2022-06-24 NOTE — PROGRESS NOTES
Chief Complaint:   Chief Complaint   Patient presents with   • Follow-up       HPI:    Jimmie Salcedo is a 71 y.o. male here for follow-up of sleep apnea.  Patient was last seen 5/28/2021.  Patient continues to do well with CPAP therapy.  He is sleeping 5 to 7 hours nightly and does feel rested upon awakening.  He will go to sleep within 30 minutes and does get up approximately 2 times due to prostate issues.  Patient currently has an [place] score of 0/24.  Patient is interested in having an inspire discussion with ENT physician.  Going through criteria it does appear patient meets these.  We will get set up with Dr. Marino in [facility] for consult.    Patient download appears he has not used since 2/8/2022 patient states this is not correct as he does use this nightly.  Patient also states he is using a loaner machine from [facility] and the chip has not been moved from the old machine.  Patient does understand this must be taken to [facility] for download.        Current medications are:   Current Outpatient Medications:   •  acetaminophen (TYLENOL) 500 MG tablet, Take 500 mg by mouth As Needed., Disp: , Rfl:   •  Acetaminophen 500 MG capsule, Take 2 capsules by mouth Every 6 (Six) Hours., Disp: , Rfl:   •  amLODIPine (NORVASC) 5 MG tablet, TAKE ONE TABLET BY MOUTH EVERY DAY, Disp: 30 tablet, Rfl: 3  •  amLODIPine (NORVASC) 5 MG tablet, Take 1 tablet by mouth Daily., Disp: , Rfl:   •  buPROPion (WELLBUTRIN) 75 MG tablet, Take 1 tablet by mouth Every 12 (Twelve) Hours., Disp: , Rfl:   •  buPROPion SR (WELLBUTRIN SR) 200 MG 12 hr tablet, Take 200 mg by mouth Daily., Disp: , Rfl:   •  calcipotriene (DOVONOX) 0.005 % ointment, As Needed., Disp: , Rfl:   •  Cholecalciferol (VITAMIN D3) 125 MCG (5000 UT) capsule capsule, Take 5,000 Units by mouth Daily., Disp: , Rfl:   •  cloNIDine (CATAPRES) 0.1 MG tablet, Take 0.1 mg by mouth 2 (Two) Times a Day As Needed for High Blood Pressure., Disp: , Rfl:   •  Dietary Management  Product (Rheumate) capsule, Take 1 capsule by mouth Daily., Disp: 90 capsule, Rfl: 1  •  docusate sodium (Colace) 100 MG capsule, Take 1 capsule by mouth 2 (Two) Times a Day., Disp: 60 capsule, Rfl: 0  •  doxazosin (CARDURA) 4 MG tablet, TAKE ONE TABLET BY MOUTH EVERY NIGHT, Disp: 30 tablet, Rfl: 3  •  doxazosin (CARDURA) 4 MG tablet, Take 1 tablet by mouth Daily., Disp: , Rfl:   •  doxazosin (CARDURA) 4 MG tablet, TAKE ONE TABLET BY MOUTH EVERY NIGHT, Disp: 30 tablet, Rfl: 3  •  doxycycline (MONODOX) 100 MG capsule, Take 100 mg by mouth 2 (Two) Times a Day., Disp: , Rfl:   •  esomeprazole (nexIUM) 40 MG capsule, Take 1 capsule by mouth 2 (Two) Times a Day., Disp: 60 capsule, Rfl: 5  •  fenofibrate (TRICOR) 145 MG tablet, Take 1 tablet by mouth Daily., Disp: , Rfl:   •  fenofibrate (TRICOR) 145 MG tablet, TAKE ONE TABLET BY MOUTH EVERY DAY, Disp: 30 tablet, Rfl: 3  •  furosemide (LASIX) 40 MG tablet, TAKE ONE TABLET BY MOUTH EVERY DAY AS NEEDED FOR EDEMA, Disp: 30 tablet, Rfl: 1  •  irbesartan (AVAPRO) 300 MG tablet, Take 300 mg by mouth Every Night., Disp: , Rfl: 5  •  mirtazapine (REMERON) 15 MG tablet, TAKE ONE TABLET BY MOUTH EVERY NIGHT (Patient taking differently: Take 15 mg by mouth Every Night.), Disp: 30 tablet, Rfl: 5  •  mirtazapine (REMERON) 15 MG tablet, Take 1 tablet by mouth., Disp: , Rfl:   •  MULTIPLE VITAMINS PO, Take 1 capsule by mouth Daily., Disp: , Rfl:   •  multivitamin with minerals (CENTRUM SILVER 50+MEN PO), tk 1 po qd, Disp: , Rfl:   •  multivitamin with minerals (Centrum Silver 50+Men) tablet tablet, tk 1 po qd, Disp: , Rfl:   •  mupirocin (BACTROBAN) 2 % ointment, Apply  topically to the appropriate area as directed Every 12 (Twelve) Hours., Disp: , Rfl:   •  Omega-3 Fatty Acids (FISH OIL) 1000 MG capsule capsule, Take 1,000 mg by mouth Daily., Disp: , Rfl:   •  potassium chloride (K-DUR,KLOR-CON) 20 MEQ CR tablet, TAKE ONE TABLET BY MOUTH EVERY DAY AS NEEDED (USE WITH FUROSEMIDE), Disp:  30 tablet, Rfl: 1  •  pravastatin (PRAVACHOL) 40 MG tablet, TAKE ONE TABLET BY MOUTH EVERY DAY, Disp: 30 tablet, Rfl: 3  •  sildenafil (VIAGRA) 100 MG tablet, Take 1 tablet by mouth Daily As Needed for erectile dysfunction., Disp: 6 tablet, Rfl: 5  •  sildenafil (VIAGRA) 100 MG tablet, Take 1 tablet by mouth., Disp: , Rfl:   •  sulfamethoxazole-trimethoprim (BACTRIM DS,SEPTRA DS) 800-160 MG per tablet, Take 0.5 tablets by mouth 2 (Two) Times a Day., Disp: , Rfl:   •  tamsulosin (FLOMAX) 0.4 MG capsule 24 hr capsule, , Disp: , Rfl:   •  tiZANidine (ZANAFLEX) 2 MG tablet, TAKE 1/2 TO 1 TABLET BY MOUTH THREE TIMES A DAY AS NEEDED FOR MUSCLE SPASMS, Disp: 60 tablet, Rfl: 0  •  triamcinolone (KENALOG) 0.1 % ointment, Apply  topically 2 (Two) Times a Day As Needed for Irritation or Rash., Disp: 80 g, Rfl: 5  •  urea (CARMOL) 20 % cream, Apply  topically to the appropriate area as directed As Needed for Dry Skin., Disp: , Rfl:   •  vitamin B-6 (PYRIDOXINE) 50 MG tablet, Take 50 mg by mouth Daily., Disp: , Rfl: .      The patient's relevant past medical, surgical, family and social history were reviewed and updated in Epic as appropriate.       Review of Systems   Eyes: Positive for visual disturbance.   Respiratory: Positive for apnea and shortness of breath.    Cardiovascular: Positive for leg swelling.   Gastrointestinal:        Heartburn   Genitourinary: Positive for frequency.   Musculoskeletal: Positive for arthralgias.   Allergic/Immunologic: Positive for environmental allergies.   Psychiatric/Behavioral: Positive for dysphoric mood and sleep disturbance.   All other systems reviewed and are negative.        Objective:    Physical Exam  Constitutional:       Appearance: Normal appearance.   HENT:      Head: Normocephalic and atraumatic.      Mouth/Throat:      Comments: Class 3 airway  Pulmonary:      Effort: Pulmonary effort is normal. No respiratory distress.   Neurological:      Mental Status: He is alert and  "oriented to person, place, and time.   Psychiatric:         Mood and Affect: Mood normal.         Behavior: Behavior normal.         Thought Content: Thought content normal.         Judgment: Judgment normal.     Ht 179.1 cm (70.5\")   Wt 100 kg (221 lb)   BMI 31.26 kg/m²     ASSESSMENT/PLAN    Diagnoses and all orders for this visit:    1. Obstructive sleep apnea, adult (Primary)  -     Ambulatory Referral to ENT (Otolaryngology)            1. Counseled patient regarding multimodal approach with healthy nutrition, healthy sleep, regular physical activity, social activities, counseling, and medications. Encouraged to practice lateral sleep position. Avoid alcohol and sedatives close to bedtime.  2.   ENT referral Dr. Marino in [place] for possible inspire I will see patient back in 1 year or sooner should he need me for inspire.  He gave consent for video visit.  I have reviewed the results of my evaluation and impression and discussed my recommendations in detail with the patient.      Signed by  Pallavi Prince, SOURAV    June 24, 2022      CC: Javad Negron MD          No ref. provider found      "

## 2022-07-15 RX ORDER — AMLODIPINE BESYLATE 5 MG/1
TABLET ORAL
Qty: 30 TABLET | Refills: 3 | Status: SHIPPED | OUTPATIENT
Start: 2022-07-15 | End: 2022-11-04

## 2022-07-28 ENCOUNTER — OFFICE VISIT (OUTPATIENT)
Dept: ORTHOPEDIC SURGERY | Facility: CLINIC | Age: 72
End: 2022-07-28

## 2022-07-28 ENCOUNTER — PREP FOR SURGERY (OUTPATIENT)
Dept: OTHER | Facility: HOSPITAL | Age: 72
End: 2022-07-28

## 2022-07-28 VITALS
BODY MASS INDEX: 31.22 KG/M2 | HEIGHT: 71 IN | DIASTOLIC BLOOD PRESSURE: 64 MMHG | WEIGHT: 223 LBS | SYSTOLIC BLOOD PRESSURE: 140 MMHG

## 2022-07-28 DIAGNOSIS — Z96.651 STATUS POST TOTAL RIGHT KNEE REPLACEMENT: ICD-10-CM

## 2022-07-28 DIAGNOSIS — M17.12 PRIMARY OSTEOARTHRITIS OF LEFT KNEE: Primary | ICD-10-CM

## 2022-07-28 PROCEDURE — 20610 DRAIN/INJ JOINT/BURSA W/O US: CPT | Performed by: ORTHOPAEDIC SURGERY

## 2022-07-28 PROCEDURE — 99214 OFFICE O/P EST MOD 30 MIN: CPT | Performed by: ORTHOPAEDIC SURGERY

## 2022-07-28 RX ORDER — TRANEXAMIC ACID 10 MG/ML
1000 INJECTION, SOLUTION INTRAVENOUS ONCE
Status: CANCELLED | OUTPATIENT
Start: 2022-07-28 | End: 2022-07-28

## 2022-07-28 RX ORDER — OXYCODONE HCL 10 MG/1
10 TABLET, FILM COATED, EXTENDED RELEASE ORAL ONCE
Status: CANCELLED | OUTPATIENT
Start: 2022-07-28 | End: 2022-07-28

## 2022-07-28 RX ORDER — OXYBUTYNIN CHLORIDE 10 MG/1
TABLET, EXTENDED RELEASE ORAL
COMMUNITY
Start: 2022-07-15 | End: 2022-11-29

## 2022-07-28 RX ORDER — CHLORHEXIDINE GLUCONATE 4 G/100ML
SOLUTION TOPICAL DAILY
Qty: 236 ML | Refills: 0 | Status: SHIPPED | OUTPATIENT
Start: 2022-07-28 | End: 2023-01-26

## 2022-07-28 RX ORDER — ACETAMINOPHEN 500 MG
1000 TABLET ORAL ONCE
Status: CANCELLED | OUTPATIENT
Start: 2022-07-28 | End: 2022-07-28

## 2022-07-28 RX ORDER — CEFAZOLIN SODIUM 2 G/100ML
2 INJECTION, SOLUTION INTRAVENOUS ONCE
Status: CANCELLED | OUTPATIENT
Start: 2022-07-28 | End: 2022-07-28

## 2022-07-28 RX ORDER — LIDOCAINE HYDROCHLORIDE 10 MG/ML
3 INJECTION, SOLUTION EPIDURAL; INFILTRATION; INTRACAUDAL; PERINEURAL
Status: COMPLETED | OUTPATIENT
Start: 2022-07-28 | End: 2022-07-28

## 2022-07-28 RX ORDER — MIRTAZAPINE 15 MG/1
7.5 TABLET, FILM COATED ORAL
COMMUNITY
Start: 2022-07-14 | End: 2022-07-28

## 2022-07-28 RX ORDER — TRIAMCINOLONE ACETONIDE 40 MG/ML
40 INJECTION, SUSPENSION INTRA-ARTICULAR; INTRAMUSCULAR
Status: COMPLETED | OUTPATIENT
Start: 2022-07-28 | End: 2022-07-28

## 2022-07-28 RX ORDER — TRIAMCINOLONE ACETONIDE 1 MG/G
CREAM TOPICAL EVERY 12 HOURS
COMMUNITY

## 2022-07-28 RX ADMIN — TRIAMCINOLONE ACETONIDE 40 MG: 40 INJECTION, SUSPENSION INTRA-ARTICULAR; INTRAMUSCULAR at 09:32

## 2022-07-28 RX ADMIN — LIDOCAINE HYDROCHLORIDE 3 ML: 10 INJECTION, SOLUTION EPIDURAL; INFILTRATION; INTRACAUDAL; PERINEURAL at 09:32
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
## 2022-08-02 RX ORDER — PRAVASTATIN SODIUM 40 MG
TABLET ORAL
Qty: 30 TABLET | Refills: 3 | Status: SHIPPED | OUTPATIENT
Start: 2022-08-02 | End: 2022-12-12

## 2022-08-05 ENCOUNTER — TELEPHONE (OUTPATIENT)
Dept: SLEEP MEDICINE | Facility: HOSPITAL | Age: 72
End: 2022-08-05

## 2022-08-05 NOTE — TELEPHONE ENCOUNTER
PEMA LEFT MSG WITH KUMAR THAT [facility] (DR DAILEY) IS REQUESTING ANOTHER SLEEP STUDY TO BE DONE.    PER KUMAR, PLEASE HAVE PROVIDER PUT NEW ORDER IN Epic AND SHE WILL USE PREV NOTES TO GET AUTHORIZATION.

## 2022-08-08 ENCOUNTER — TELEPHONE (OUTPATIENT)
Dept: INTERNAL MEDICINE | Facility: CLINIC | Age: 72
End: 2022-08-08

## 2022-08-08 NOTE — TELEPHONE ENCOUNTER
Pt would like to know if we have the pfizer booster in office please call pt and let him know if this is available

## 2022-08-10 DIAGNOSIS — G47.33 OSA (OBSTRUCTIVE SLEEP APNEA): Primary | ICD-10-CM

## 2022-08-15 ENCOUNTER — TELEPHONE (OUTPATIENT)
Dept: SLEEP MEDICINE | Facility: HOSPITAL | Age: 72
End: 2022-08-15

## 2022-08-15 NOTE — TELEPHONE ENCOUNTER
----- Message from Jimmie Salcedo sent at 8/15/2022  9:21 AM EDT -----  Regarding: Home Sleep Test  Ok, Thank you Mona.  Will they contact me or will I need to contact them after you let me know the order has been transferred?   Ajay

## 2022-09-14 ENCOUNTER — TELEPHONE (OUTPATIENT)
Dept: INTERNAL MEDICINE | Facility: CLINIC | Age: 72
End: 2022-09-14
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
## 2022-09-19 ENCOUNTER — APPOINTMENT (OUTPATIENT)
Dept: SLEEP MEDICINE | Facility: HOSPITAL | Age: 72
End: 2022-09-19

## 2022-09-29 ENCOUNTER — TELEPHONE (OUTPATIENT)
Dept: INTERNAL MEDICINE | Facility: CLINIC | Age: 72
End: 2022-09-29

## 2022-09-29 RX ORDER — FENOFIBRATE 145 MG/1
TABLET, COATED ORAL
Qty: 30 TABLET | Refills: 3 | Status: SHIPPED | OUTPATIENT
Start: 2022-09-29 | End: 2023-02-13
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
## 2022-09-30 NOTE — TELEPHONE ENCOUNTER
Spoke to pt and explained to him that dr last is out of office next week.  Pt feels for the most part everythiing is better but will let us know if that changes

## 2022-10-12 RX ORDER — DOXAZOSIN MESYLATE 4 MG/1
TABLET ORAL
Qty: 30 TABLET | Refills: 3 | Status: SHIPPED | OUTPATIENT
Start: 2022-10-12 | End: 2022-11-28

## 2022-11-04 RX ORDER — AMLODIPINE BESYLATE 5 MG/1
TABLET ORAL
Qty: 30 TABLET | Refills: 3 | Status: SHIPPED | OUTPATIENT
Start: 2022-11-04 | End: 2023-03-20

## 2022-11-10 DIAGNOSIS — G47.33 OSA (OBSTRUCTIVE SLEEP APNEA): Primary | ICD-10-CM

## 2022-11-28 ENCOUNTER — OFFICE VISIT (OUTPATIENT)
Dept: INTERNAL MEDICINE | Facility: CLINIC | Age: 72
End: 2022-11-28

## 2022-11-28 VITALS
HEIGHT: 71 IN | WEIGHT: 228 LBS | SYSTOLIC BLOOD PRESSURE: 150 MMHG | OXYGEN SATURATION: 98 % | BODY MASS INDEX: 31.92 KG/M2 | HEART RATE: 77 BPM | TEMPERATURE: 96.9 F | DIASTOLIC BLOOD PRESSURE: 80 MMHG

## 2022-11-28 DIAGNOSIS — E78.49 OTHER HYPERLIPIDEMIA: ICD-10-CM

## 2022-11-28 DIAGNOSIS — N40.1 BENIGN PROSTATIC HYPERPLASIA WITH URINARY FREQUENCY: ICD-10-CM

## 2022-11-28 DIAGNOSIS — I10 PRIMARY HYPERTENSION: Primary | ICD-10-CM

## 2022-11-28 DIAGNOSIS — Z23 NEED FOR INFLUENZA VACCINATION: ICD-10-CM

## 2022-11-28 DIAGNOSIS — Z12.5 SCREENING FOR PROSTATE CANCER: ICD-10-CM

## 2022-11-28 DIAGNOSIS — F43.21 SITUATIONAL DEPRESSION: ICD-10-CM

## 2022-11-28 DIAGNOSIS — D69.6 THROMBOCYTOPENIA, UNSPECIFIED: ICD-10-CM

## 2022-11-28 DIAGNOSIS — R35.0 BENIGN PROSTATIC HYPERPLASIA WITH URINARY FREQUENCY: ICD-10-CM

## 2022-11-28 DIAGNOSIS — K21.9 GASTROESOPHAGEAL REFLUX DISEASE WITHOUT ESOPHAGITIS: ICD-10-CM

## 2022-11-28 PROCEDURE — G0008 ADMIN INFLUENZA VIRUS VAC: HCPCS | Performed by: INTERNAL MEDICINE

## 2022-11-28 PROCEDURE — 99214 OFFICE O/P EST MOD 30 MIN: CPT | Performed by: INTERNAL MEDICINE

## 2022-11-28 PROCEDURE — 90662 IIV NO PRSV INCREASED AG IM: CPT | Performed by: INTERNAL MEDICINE

## 2022-11-28 RX ORDER — BUPROPION HYDROCHLORIDE 150 MG/1
150 TABLET ORAL EVERY MORNING
COMMUNITY
End: 2023-01-26
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
## 2022-12-08 ENCOUNTER — LAB (OUTPATIENT)
Dept: LAB | Facility: HOSPITAL | Age: 72
End: 2022-12-08

## 2022-12-08 DIAGNOSIS — D69.6 THROMBOCYTOPENIA, UNSPECIFIED: ICD-10-CM

## 2022-12-08 DIAGNOSIS — E78.49 OTHER HYPERLIPIDEMIA: ICD-10-CM

## 2022-12-08 DIAGNOSIS — Z12.5 SCREENING FOR PROSTATE CANCER: ICD-10-CM

## 2022-12-08 LAB
ALBUMIN SERPL-MCNC: 4.4 G/DL (ref 3.5–5.2)
ALBUMIN/GLOB SERPL: 1.9 G/DL
ALP SERPL-CCNC: 89 U/L (ref 39–117)
ALT SERPL W P-5'-P-CCNC: 18 U/L (ref 1–41)
ANION GAP SERPL CALCULATED.3IONS-SCNC: 11.7 MMOL/L (ref 5–15)
AST SERPL-CCNC: 20 U/L (ref 1–40)
BILIRUB SERPL-MCNC: 0.7 MG/DL (ref 0–1.2)
BUN SERPL-MCNC: 15 MG/DL (ref 8–23)
BUN/CREAT SERPL: 16.7 (ref 7–25)
CALCIUM SPEC-SCNC: 9.8 MG/DL (ref 8.6–10.5)
CHLORIDE SERPL-SCNC: 105 MMOL/L (ref 98–107)
CHOLEST SERPL-MCNC: 174 MG/DL (ref 0–200)
CO2 SERPL-SCNC: 25.3 MMOL/L (ref 22–29)
CREAT SERPL-MCNC: 0.9 MG/DL (ref 0.76–1.27)
DEPRECATED RDW RBC AUTO: 45.1 FL (ref 37–54)
EGFRCR SERPLBLD CKD-EPI 2021: 91.9 ML/MIN/1.73
ERYTHROCYTE [DISTWIDTH] IN BLOOD BY AUTOMATED COUNT: 14.3 % (ref 12.3–15.4)
GLOBULIN UR ELPH-MCNC: 2.3 GM/DL
GLUCOSE SERPL-MCNC: 119 MG/DL (ref 65–99)
HCT VFR BLD AUTO: 45.7 % (ref 37.5–51)
HDLC SERPL-MCNC: 44 MG/DL (ref 40–60)
HGB BLD-MCNC: 16 G/DL (ref 13–17.7)
LDLC SERPL CALC-MCNC: 103 MG/DL (ref 0–100)
LDLC/HDLC SERPL: 2.25 {RATIO}
MCH RBC QN AUTO: 30.5 PG (ref 26.6–33)
MCHC RBC AUTO-ENTMCNC: 35 G/DL (ref 31.5–35.7)
MCV RBC AUTO: 87 FL (ref 79–97)
PLATELET # BLD AUTO: 112 10*3/MM3 (ref 140–450)
PMV BLD AUTO: 9.8 FL (ref 6–12)
POTASSIUM SERPL-SCNC: 4.2 MMOL/L (ref 3.5–5.2)
PROT SERPL-MCNC: 6.7 G/DL (ref 6–8.5)
PSA SERPL-MCNC: 5.8 NG/ML (ref 0–4)
RBC # BLD AUTO: 5.25 10*6/MM3 (ref 4.14–5.8)
SODIUM SERPL-SCNC: 142 MMOL/L (ref 136–145)
TRIGL SERPL-MCNC: 154 MG/DL (ref 0–150)
TSH SERPL DL<=0.05 MIU/L-ACNC: 1.55 UIU/ML (ref 0.27–4.2)
VLDLC SERPL-MCNC: 27 MG/DL (ref 5–40)
WBC NRBC COR # BLD: 6.63 10*3/MM3 (ref 3.4–10.8)

## 2022-12-08 PROCEDURE — 84443 ASSAY THYROID STIM HORMONE: CPT

## 2022-12-08 PROCEDURE — 80061 LIPID PANEL: CPT

## 2022-12-08 PROCEDURE — G0103 PSA SCREENING: HCPCS

## 2022-12-08 PROCEDURE — 85027 COMPLETE CBC AUTOMATED: CPT

## 2022-12-08 PROCEDURE — 80053 COMPREHEN METABOLIC PANEL: CPT

## 2022-12-08 PROCEDURE — 36415 COLL VENOUS BLD VENIPUNCTURE: CPT

## 2022-12-12 RX ORDER — PRAVASTATIN SODIUM 40 MG
TABLET ORAL
Qty: 30 TABLET | Refills: 3 | Status: SHIPPED | OUTPATIENT
Start: 2022-12-12

## 2022-12-23 ENCOUNTER — TELEPHONE (OUTPATIENT)
Dept: SLEEP MEDICINE | Facility: HOSPITAL | Age: 72
End: 2022-12-23

## 2022-12-23 NOTE — TELEPHONE ENCOUNTER
Hub staff attempted to follow warm transfer process and was unsuccessful     Caller: Jimmie Salcedo A    Relationship to patient: Self    Best call back number: [phone]    Patient is needing:  HE HAS BEEN TRYING TO ATTEND HIS MY CHART VIDEO VISIT AND THE LINK IS NOT WORKING. PLEASE CALL HIM BACK ASAP TO HELP HIM GET IT WORKING. HE HAD A 10:15 WITH ANABELLE KNAPP. HE WAS ON TIME AND READY TO GO.

## 2023-01-16 ENCOUNTER — HOSPITAL ENCOUNTER (OUTPATIENT)
Dept: SLEEP MEDICINE | Facility: HOSPITAL | Age: 73
Discharge: HOME | End: 2023-01-16
Admitting: NURSE PRACTITIONER
Payer: MEDICARE

## 2023-01-16 VITALS — BODY MASS INDEX: 30.86 KG/M2 | HEIGHT: 71 IN | WEIGHT: 220.46 LBS

## 2023-01-16 DIAGNOSIS — G47.33 OSA (OBSTRUCTIVE SLEEP APNEA): ICD-10-CM

## 2023-01-16 PROCEDURE — 95806 SLEEP STUDY UNATT&RESP EFFT: CPT

## 2023-01-16 PROCEDURE — 95806 SLEEP STUDY UNATT&RESP EFFT: CPT | Performed by: INTERNAL MEDICINE

## 2023-01-19 DIAGNOSIS — G47.33 SEVERE OBSTRUCTIVE SLEEP APNEA: Primary | ICD-10-CM

## 2023-01-26 ENCOUNTER — OFFICE VISIT (OUTPATIENT)
Dept: ORTHOPEDIC SURGERY | Facility: CLINIC | Age: 73
End: 2023-01-26
Payer: MEDICARE

## 2023-01-26 VITALS
WEIGHT: 220 LBS | HEIGHT: 71 IN | SYSTOLIC BLOOD PRESSURE: 154 MMHG | BODY MASS INDEX: 30.8 KG/M2 | DIASTOLIC BLOOD PRESSURE: 80 MMHG

## 2023-01-26 DIAGNOSIS — Z96.651 STATUS POST TOTAL RIGHT KNEE REPLACEMENT: ICD-10-CM

## 2023-01-26 DIAGNOSIS — M70.62 TROCHANTERIC BURSITIS OF LEFT HIP: Primary | ICD-10-CM

## 2023-01-26 DIAGNOSIS — M25.552 LEFT HIP PAIN: ICD-10-CM

## 2023-01-26 DIAGNOSIS — M17.12 PRIMARY OSTEOARTHRITIS OF LEFT KNEE: ICD-10-CM

## 2023-01-26 PROCEDURE — 20610 DRAIN/INJ JOINT/BURSA W/O US: CPT | Performed by: ORTHOPAEDIC SURGERY

## 2023-01-26 PROCEDURE — 99214 OFFICE O/P EST MOD 30 MIN: CPT | Performed by: ORTHOPAEDIC SURGERY

## 2023-01-26 RX ORDER — TRIAMCINOLONE ACETONIDE 40 MG/ML
40 INJECTION, SUSPENSION INTRA-ARTICULAR; INTRAMUSCULAR
Status: COMPLETED | OUTPATIENT
Start: 2023-01-26 | End: 2023-01-26

## 2023-01-26 RX ORDER — PHENOL 1.4 %
10 AEROSOL, SPRAY (ML) MUCOUS MEMBRANE
COMMUNITY
Start: 2022-10-14

## 2023-01-26 RX ORDER — MIRTAZAPINE 15 MG/1
TABLET, FILM COATED ORAL
COMMUNITY
Start: 2022-12-28

## 2023-01-26 RX ORDER — BUPROPION HYDROCHLORIDE 200 MG/1
200 TABLET, EXTENDED RELEASE ORAL 2 TIMES DAILY
COMMUNITY
End: 2023-03-30

## 2023-01-26 RX ORDER — LIDOCAINE HYDROCHLORIDE 10 MG/ML
3 INJECTION, SOLUTION EPIDURAL; INFILTRATION; INTRACAUDAL; PERINEURAL
Status: COMPLETED | OUTPATIENT
Start: 2023-01-26 | End: 2023-01-26

## 2023-01-26 RX ADMIN — LIDOCAINE HYDROCHLORIDE 3 ML: 10 INJECTION, SOLUTION EPIDURAL; INFILTRATION; INTRACAUDAL; PERINEURAL at 10:29

## 2023-01-26 RX ADMIN — TRIAMCINOLONE ACETONIDE 40 MG: 40 INJECTION, SUSPENSION INTRA-ARTICULAR; INTRAMUSCULAR at 10:29

## 2023-01-26 NOTE — PROGRESS NOTES
Procedure   - Large Joint Arthrocentesis: L greater trochanteric bursa on 1/26/2023 10:29 AM  Indications: pain  Details: (23g) needle, lateral approach  Medications: 3 mL lidocaine PF 1% 1 %; 40 mg triamcinolone acetonide 40 MG/ML  Outcome: tolerated well, no immediate complications  Procedure, treatment alternatives, risks and benefits explained, specific risks discussed. Consent was given by the patient. Immediately prior to procedure a time out was called to verify the correct patient, procedure, equipment, support staff and site/side marked as required. Patient was prepped and draped in the usual sterile fashion.
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
## 2023-02-13 RX ORDER — DOXAZOSIN MESYLATE 4 MG/1
TABLET ORAL
Qty: 30 TABLET | Refills: 3 | Status: SHIPPED | OUTPATIENT
Start: 2023-02-13

## 2023-02-13 RX ORDER — FENOFIBRATE 145 MG/1
TABLET, COATED ORAL
Qty: 30 TABLET | Refills: 3 | Status: SHIPPED | OUTPATIENT
Start: 2023-02-13

## 2023-02-15 RX ORDER — CLONIDINE HYDROCHLORIDE 0.1 MG/1
0.1 TABLET ORAL 2 TIMES DAILY PRN
Qty: 60 TABLET | Refills: 2 | Status: SHIPPED | OUTPATIENT
Start: 2023-02-15

## 2023-02-15 NOTE — TELEPHONE ENCOUNTER
Caller: Jimmie Salcedo    Relationship: Self    Best call back number: [phone]    Requested Prescriptions:   Requested Prescriptions     Pending Prescriptions Disp Refills   • cloNIDine (CATAPRES) 0.1 MG tablet       Sig: Take 1 tablet by mouth 2 (Two) Times a Day As Needed for High Blood Pressure.        Pharmacy where request should be sent: [facility] - [place], [place] - [address] - [phone]  - [phone] FX     Additional details provided by patient: PATIENT IS OUT AND PHARMACY NEEDS A NEW PRESCRIPTION. PATIENT STATED HIS BLOOD PRESSURE HAS SPIKED YESTERDAY AND TODAY, AND HE IS EXPERIENCING SHORTNESS OF BREATH. PLEASE ADVISE     Does the patient have less than a 3 day supply:  [x] Yes  [] No    Would you like a call back once the refill request has been completed: [x] Yes [] No    If the office needs to give you a call back, can they leave a voicemail: [x] Yes [] No    Nimisha Carpenter Rep   02/15/23 11:50 EST

## 2023-02-15 NOTE — TELEPHONE ENCOUNTER
Rx Refill Note  Requested Prescriptions     Pending Prescriptions Disp Refills   • cloNIDine (CATAPRES) 0.1 MG tablet       Sig: Take 1 tablet by mouth 2 (Two) Times a Day As Needed for High Blood Pressure.      Last office visit with prescribing clinician: 11/28/2022   Last telemedicine visit with prescribing clinician: 3/22/2023   Next office visit with prescribing clinician: 3/22/2023                         Austin Streeter MA  02/15/23, 11:58 EST

## 2023-03-20 RX ORDER — AMLODIPINE BESYLATE 5 MG/1
TABLET ORAL
Qty: 30 TABLET | Refills: 3 | Status: SHIPPED | OUTPATIENT
Start: 2023-03-20

## 2023-03-24 ENCOUNTER — OFFICE VISIT (OUTPATIENT)
Dept: INTERNAL MEDICINE | Facility: CLINIC | Age: 73
End: 2023-03-24
Payer: MEDICARE

## 2023-03-24 VITALS
WEIGHT: 232 LBS | DIASTOLIC BLOOD PRESSURE: 70 MMHG | HEART RATE: 74 BPM | OXYGEN SATURATION: 97 % | HEIGHT: 71 IN | BODY MASS INDEX: 32.48 KG/M2 | SYSTOLIC BLOOD PRESSURE: 130 MMHG

## 2023-03-24 DIAGNOSIS — D69.6 THROMBOCYTOPENIA, UNSPECIFIED: ICD-10-CM

## 2023-03-24 DIAGNOSIS — E78.49 OTHER HYPERLIPIDEMIA: ICD-10-CM

## 2023-03-24 DIAGNOSIS — R35.0 BENIGN PROSTATIC HYPERPLASIA WITH URINARY FREQUENCY: ICD-10-CM

## 2023-03-24 DIAGNOSIS — I10 PRIMARY HYPERTENSION: Primary | ICD-10-CM

## 2023-03-24 DIAGNOSIS — F43.21 SITUATIONAL DEPRESSION: ICD-10-CM

## 2023-03-24 DIAGNOSIS — N40.1 BENIGN PROSTATIC HYPERPLASIA WITH URINARY FREQUENCY: ICD-10-CM

## 2023-03-24 DIAGNOSIS — K21.9 GASTROESOPHAGEAL REFLUX DISEASE WITHOUT ESOPHAGITIS: ICD-10-CM

## 2023-03-24 DIAGNOSIS — I51.89 DIASTOLIC DYSFUNCTION: ICD-10-CM

## 2023-03-24 DIAGNOSIS — R73.09 ABNORMAL GLUCOSE: ICD-10-CM

## 2023-03-24 LAB
EXPIRATION DATE: NORMAL
HBA1C MFR BLD: 5.1 %
Lab: NORMAL

## 2023-03-24 NOTE — PROGRESS NOTES
"Patient is a 72 y.o. male who is here for a follow up of hyperlipidemia and hypertension.  Chief Complaint   Patient presents with   • Hyperlipidemia   • Hypertension         HPI:    Here for mgmt of HTN and hyperlipidemia.  Recently changed to Entresto.  No dizziness or lightheadedness.  No abdominal pains.  Depression is under control.  Slowly gaining weight.  Sleeping well.      History:     Patient Active Problem List   Diagnosis   • Acid reflux   • Arthritis   • Hypertension   • Hyperlipidemia   • Severe obstructive sleep apnea   • Psoriasis   • Diastolic dysfunction   • Peptic ulceration   • Bilateral edema of lower extremity   • Abnormal liver function test   • Hyperbilirubinemia   • Psychophysiological insomnia   • Gilbert's disease   • Thrombocytopenia, unspecified (CMS/HCC)   • Situational depression   • Tubular adenoma   • Seasonal allergic rhinitis due to pollen   • Mild cognitive impairment   • Memory loss   • Primary osteoarthritis of right knee   • Status post total right knee replacement   • Leukocytosis, likely reactive   • Benign prostatic hyperplasia with urinary frequency   • Spondylosis of lumbar region without myelopathy or radiculopathy   • Degeneration of lumbar or lumbosacral intervertebral disc   • Lumbar interspinous bursitis   • Myofascial pain   • Osteoarthritis of multiple joints   • Baastrup's syndrome   • Gastroesophageal reflux disease with esophagitis without hemorrhage   • History of COVID-19   • Primary osteoarthritis of left knee       Past Medical History:   Diagnosis Date   • Abnormal liver function test    • Arthritis    • Bilateral edema of lower extremity    • Cataract     bilat- still present - mild    • Cellulitis of leg, right     resolved   • Chalazion    • Cracking skin     bilat feet mostly    • Disease     \"brakes/breaks\" disease as child-  effected blood and urine    • Fracture, finger    • GERD (gastroesophageal reflux disease)    • Hip arthrosis 2022    Pain " walking   • History of kidney stones     x2 had to have broken up    • Hoarseness     from vocal cord bending- seeing ent - possible surgery soon    • Hypertension    • Kidney infection     h/o    • Knee swelling 2015    Ongoing   • Neck muscle spasm    • Onychomycosis of toenail    • Peptic ulceration    • Renal calculi    • Situational depression 08/22/2018   • Sleep apnea with use of continuous positive airway pressure (CPAP)     compliant with machine    • 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    • Vocal cord strain     vocal cord bent- seeing ent but causes pt to be hoarse    • Wears glasses        Past Surgical History:   Procedure Laterality Date   • COLONOSCOPY     • CYSTOSCOPY W/ LASER LITHOTRIPSY      x2   • ENDOSCOPY      with dilation    • FINGER SURGERY      left 5th finger   • HAND SURGERY  2018    Little Finger - Left Hand   • KNEE SURGERY Right 1986    arthroscopy   • LASER OF PROSTATE W/ GREEN LIGHT PVP  02/2022    Dr Fu   • OTHER SURGICAL HISTORY      Lithotripsy   • TONSILLECTOMY     • TOTAL KNEE ARTHROPLASTY Right 07/22/2020    Procedure: TOTAL KNEE ARTHROPLASTY RIGHT;  Surgeon: Tereso Restrepo MD;  [facility];  Service: Orthopedics;  Laterality: Right;       Current Outpatient Medications on File Prior to Visit   Medication Sig   • Acetaminophen 500 MG capsule Take 2 capsules by mouth Every 6 (Six) Hours.   • amLODIPine (NORVASC) 5 MG tablet TAKE ONE TABLET BY MOUTH EVERY DAY   • buPROPion SR (WELLBUTRIN SR) 200 MG 12 hr tablet Take 1 tablet by mouth 2 (Two) Times a Day.   • calcipotriene (DOVONOX) 0.005 % ointment As Needed.   • Cholecalciferol (VITAMIN D3) 125 MCG (5000 UT) capsule capsule Take 1 capsule by mouth Daily.   • cloNIDine (CATAPRES) 0.1 MG tablet Take 1 tablet by mouth 2 (Two) Times a Day As Needed for High Blood Pressure.   • Dietary Management Product (Rheumate) capsule Take 1 capsule by mouth Daily.   • docusate sodium (Colace) 100 MG capsule Take 1  capsule by mouth 2 (Two) Times a Day.   • doxazosin (CARDURA) 4 MG tablet TAKE 1 TABLET BY MOUTH EVERY EVENING   • esomeprazole (nexIUM) 40 MG capsule Take 1 capsule by mouth 2 (Two) Times a Day.   • fenofibrate (TRICOR) 145 MG tablet TAKE ONE TABLET BY MOUTH EVERY DAY   • furosemide (LASIX) 40 MG tablet TAKE ONE TABLET BY MOUTH EVERY DAY AS NEEDED FOR EDEMA   • Melatonin 10 MG tablet Take 1 tablet by mouth.   • mirtazapine (REMERON) 15 MG tablet    • MULTIPLE VITAMINS PO Take 1 tablet by mouth Daily.   • multivitamin with minerals tablet tablet tk 1 po qd   • mupirocin (BACTROBAN) 2 % ointment Apply  topically to the appropriate area as directed Every 12 (Twelve) Hours.   • Omega-3 Fatty Acids (FISH OIL) 1000 MG capsule capsule Take 1 capsule by mouth Daily.   • potassium chloride (K-DUR,KLOR-CON) 20 MEQ CR tablet TAKE ONE TABLET BY MOUTH EVERY DAY AS NEEDED (USE WITH FUROSEMIDE)   • pravastatin (PRAVACHOL) 40 MG tablet TAKE ONE TABLET BY MOUTH EVERY DAY   • Sacubitril-Valsartan (ENTRESTO PO) Take  by mouth.   • sildenafil (VIAGRA) 100 MG tablet Take 1 tablet by mouth Daily As Needed for erectile dysfunction.   • triamcinolone (KENALOG) 0.1 % cream Apply  topically to the appropriate area as directed Every 12 (Twelve) Hours.   • urea (CARMOL) 20 % cream Apply  topically to the appropriate area as directed As Needed for Dry Skin.   • vitamin B-6 (PYRIDOXINE) 50 MG tablet Take 1 tablet by mouth Daily.   • [DISCONTINUED] irbesartan (AVAPRO) 300 MG tablet Take 300 mg by mouth Every Night.     No current facility-administered medications on file prior to visit.       Family History   Problem Relation Age of Onset   • Arthritis Other    • Hypertension Other    • Hyperlipidemia Other    • Heart attack Other    • Hypertension Mother    • Cancer Mother         Colon Cancer   • Heart disease Father    • Hypertension Father        Social History     Socioeconomic History   • Marital status:    Tobacco Use   • Smoking  "status: Never   • Smokeless tobacco: Never   Substance and Sexual Activity   • Alcohol use: Yes     Alcohol/week: 4.0 standard drinks     Types: 4 Cans of beer per week   • Drug use: No   • Sexual activity: Not Currently     Partners: Female     Birth control/protection: None         Review of Systems   Constitutional: Positive for fatigue (better). Negative for chills, fever and unexpected weight change.   HENT: Negative for ear pain, hearing loss, rhinorrhea, sinus pressure, sore throat and trouble swallowing.    Eyes: Negative for discharge and itching.   Respiratory: Positive for shortness of breath. Negative for cough and chest tightness.    Cardiovascular: Positive for leg swelling (L>R, better with compression hose). Negative for chest pain.   Gastrointestinal: Negative for abdominal pain, blood in stool, diarrhea and vomiting.        2013 colonoscopy normal  9/18 colonoscopy with TA times 1, repeat in 9/23   Endocrine: Negative for polydipsia and polyuria.   Genitourinary: Negative for difficulty urinating, dysuria, enuresis, frequency, hematuria and urgency.        10/21 psa  ED  Followed by Dr Fu   Musculoskeletal: Positive for arthralgias and back pain. Negative for gait problem, joint swelling and neck pain.   Skin: Negative for rash and wound.        Dry skin   Allergic/Immunologic: Negative for immunocompromised state.   Neurological: Negative for dizziness, syncope, weakness, light-headedness, numbness and headaches.   Hematological: Does not bruise/bleed easily.   Psychiatric/Behavioral: Positive for behavioral problems (improved) and decreased concentration (improved). Negative for dysphoric mood and sleep disturbance. The patient is nervous/anxious (improved).        /70   Pulse 74   Ht 179.1 cm (70.51\")   Wt 105 kg (232 lb)   SpO2 97%   BMI 32.81 kg/m²       Physical Exam  Constitutional:       Appearance: Normal appearance. He is well-developed. He is obese.   HENT:      Head: " Normocephalic and atraumatic.      Right Ear: External ear normal.      Left Ear: External ear normal.      Nose: Nose normal.      Mouth/Throat:      Mouth: Mucous membranes are moist.      Pharynx: Oropharynx is clear.   Eyes:      Extraocular Movements: Extraocular movements intact.      Conjunctiva/sclera: Conjunctivae normal.      Pupils: Pupils are equal, round, and reactive to light.   Cardiovascular:      Rate and Rhythm: Normal rate and regular rhythm.      Heart sounds: Normal heart sounds.   Pulmonary:      Effort: Pulmonary effort is normal.      Breath sounds: Normal breath sounds.   Abdominal:      General: Bowel sounds are normal.      Palpations: Abdomen is soft.   Musculoskeletal:         General: Normal range of motion.      Cervical back: Normal range of motion and neck supple.      Right lower leg: Edema (trace) present.      Left lower leg: Edema present.   Lymphadenopathy:      Cervical: No cervical adenopathy.   Skin:     General: Skin is warm and dry.   Neurological:      General: No focal deficit present.      Mental Status: He is alert and oriented to person, place, and time.   Psychiatric:         Mood and Affect: Mood normal.         Behavior: Behavior normal.         Thought Content: Thought content normal.         Procedure:      Discussion/Summary:    HTN-controlled on multi drug tx, advised goal of 150/80, recently changed to Entresto  hyperlipidemia-cont pravachol/fibrate, labs on rtc, advised exercise and wt loss to decrease TG and increase HDL  BPH-stable on cardura, recent psa noted, s/p Green Light  GERD-stable on omeprazole  djd-advised to limit nsaids, stable  Hyperbilirubinemia-recheck soon  Thrombocytopenia-recheck soon  Situational depression-stable with combo remeron/wellbutrin  Constipation-citrucel/miralax combo, stable  Abnormal glucose-labs today      prior labs noted and dw patient       Current Outpatient Medications:   •  Acetaminophen 500 MG capsule, Take 2 capsules  by mouth Every 6 (Six) Hours., Disp: , Rfl:   •  amLODIPine (NORVASC) 5 MG tablet, TAKE ONE TABLET BY MOUTH EVERY DAY, Disp: 30 tablet, Rfl: 3  •  buPROPion SR (WELLBUTRIN SR) 200 MG 12 hr tablet, Take 1 tablet by mouth 2 (Two) Times a Day., Disp: , Rfl:   •  calcipotriene (DOVONOX) 0.005 % ointment, As Needed., Disp: , Rfl:   •  Cholecalciferol (VITAMIN D3) 125 MCG (5000 UT) capsule capsule, Take 1 capsule by mouth Daily., Disp: , Rfl:   •  cloNIDine (CATAPRES) 0.1 MG tablet, Take 1 tablet by mouth 2 (Two) Times a Day As Needed for High Blood Pressure., Disp: 60 tablet, Rfl: 2  •  Dietary Management Product (Rheumate) capsule, Take 1 capsule by mouth Daily., Disp: 90 capsule, Rfl: 1  •  docusate sodium (Colace) 100 MG capsule, Take 1 capsule by mouth 2 (Two) Times a Day., Disp: 60 capsule, Rfl: 0  •  doxazosin (CARDURA) 4 MG tablet, TAKE 1 TABLET BY MOUTH EVERY EVENING, Disp: 30 tablet, Rfl: 3  •  esomeprazole (nexIUM) 40 MG capsule, Take 1 capsule by mouth 2 (Two) Times a Day., Disp: 60 capsule, Rfl: 5  •  fenofibrate (TRICOR) 145 MG tablet, TAKE ONE TABLET BY MOUTH EVERY DAY, Disp: 30 tablet, Rfl: 3  •  furosemide (LASIX) 40 MG tablet, TAKE ONE TABLET BY MOUTH EVERY DAY AS NEEDED FOR EDEMA, Disp: 30 tablet, Rfl: 1  •  Melatonin 10 MG tablet, Take 1 tablet by mouth., Disp: , Rfl:   •  mirtazapine (REMERON) 15 MG tablet, , Disp: , Rfl:   •  MULTIPLE VITAMINS PO, Take 1 tablet by mouth Daily., Disp: , Rfl:   •  multivitamin with minerals tablet tablet, tk 1 po qd, Disp: , Rfl:   •  mupirocin (BACTROBAN) 2 % ointment, Apply  topically to the appropriate area as directed Every 12 (Twelve) Hours., Disp: , Rfl:   •  Omega-3 Fatty Acids (FISH OIL) 1000 MG capsule capsule, Take 1 capsule by mouth Daily., Disp: , Rfl:   •  potassium chloride (K-DUR,KLOR-CON) 20 MEQ CR tablet, TAKE ONE TABLET BY MOUTH EVERY DAY AS NEEDED (USE WITH FUROSEMIDE), Disp: 30 tablet, Rfl: 1  •  pravastatin (PRAVACHOL) 40 MG tablet, TAKE ONE TABLET  BY MOUTH EVERY DAY, Disp: 30 tablet, Rfl: 3  •  Sacubitril-Valsartan (ENTRESTO PO), Take  by mouth., Disp: , Rfl:   •  sildenafil (VIAGRA) 100 MG tablet, Take 1 tablet by mouth Daily As Needed for erectile dysfunction., Disp: 6 tablet, Rfl: 5  •  triamcinolone (KENALOG) 0.1 % cream, Apply  topically to the appropriate area as directed Every 12 (Twelve) Hours., Disp: , Rfl:   •  urea (CARMOL) 20 % cream, Apply  topically to the appropriate area as directed As Needed for Dry Skin., Disp: , Rfl:   •  vitamin B-6 (PYRIDOXINE) 50 MG tablet, Take 1 tablet by mouth Daily., Disp: , Rfl:         Diagnoses and all orders for this visit:    1. Primary hypertension (Primary)    2. Other hyperlipidemia    3. Diastolic dysfunction    4. Thrombocytopenia, unspecified (CMS/HCC)    5. Gastroesophageal reflux disease without esophagitis    6. Benign prostatic hyperplasia with urinary frequency    7. Situational depression    8. Abnormal glucose  -     POC Glycosylated Hemoglobin (Hb A1C)        Answers for HPI/ROS submitted by the patient on 3/20/2023  What is the primary reason for your visit?: High Blood Pressure  Chronicity: recurrent  Onset: more than 1 year ago  Progression since onset: gradually worsening  Condition status: resistant  anxiety: Yes  blurred vision: No  malaise/fatigue: Yes  orthopnea: Yes  peripheral edema: Yes  PND: No  sweats: No  Agents associated with hypertension: NSAIDs  CAD risks: dyslipidemia, family history, obesity, sedentary lifestyle, stress  Compliance problems: diet, exercise

## 2023-03-30 ENCOUNTER — OFFICE VISIT (OUTPATIENT)
Dept: ORTHOPEDIC SURGERY | Facility: CLINIC | Age: 73
End: 2023-03-30
Payer: MEDICARE

## 2023-03-30 VITALS
BODY MASS INDEX: 32.48 KG/M2 | SYSTOLIC BLOOD PRESSURE: 144 MMHG | DIASTOLIC BLOOD PRESSURE: 80 MMHG | HEIGHT: 71 IN | WEIGHT: 232 LBS

## 2023-03-30 DIAGNOSIS — M17.12 PRIMARY OSTEOARTHRITIS OF LEFT KNEE: Primary | ICD-10-CM

## 2023-03-30 DIAGNOSIS — M70.62 TROCHANTERIC BURSITIS OF LEFT HIP: ICD-10-CM

## 2023-03-30 DIAGNOSIS — M25.552 LEFT HIP PAIN: ICD-10-CM

## 2023-03-30 DIAGNOSIS — Z96.651 STATUS POST TOTAL RIGHT KNEE REPLACEMENT: ICD-10-CM

## 2023-03-30 PROCEDURE — 99213 OFFICE O/P EST LOW 20 MIN: CPT | Performed by: ORTHOPAEDIC SURGERY

## 2023-03-30 RX ORDER — BUPROPION HYDROCHLORIDE 150 MG/1
TABLET ORAL
COMMUNITY
Start: 2023-03-16

## 2023-03-30 NOTE — PROGRESS NOTES
"    [facility] Note        Subjective     CC: Follow-up (2 month follow up -- Trochanteric bursitis of left hip)      HPI    Jimmie Salcedo is a 72 y.o. male.  Patient returns the office today for follow-up of his left hip pain.  He tells me that the injection we gave him did not help him very much.  He continues to struggle with lateral sided hip pain.  No groin pain.  Radiates down the side of his leg.    Overall, patient's symptoms are as above.    ROS:    Constiutional:Pt denies fever, chills, nausea, or vomiting.  MSK:as above        Objective      Past Medical History  Past Medical History:   Diagnosis Date   • Abnormal liver function test    • Arthritis    • Arthritis of back    • Arthritis of neck    • Bilateral edema of lower extremity    • Bursitis of hip    • Cataract     bilat- still present - mild    • Cellulitis of leg, right     resolved   • Chalazion    • Cracking skin     bilat feet mostly    • Disease     \"brakes/breaks\" disease as child-  effected blood and urine    • Fracture, finger    • Fracture, foot    • GERD (gastroesophageal reflux disease)    • Hip arthrosis 2022    Pain walking   • History of kidney stones     x2 had to have broken up    • Hoarseness     from vocal cord bending- seeing ent - possible surgery soon    • Hypertension    • Kidney infection     h/o    • Knee swelling 2015    Ongoing   • Neck muscle spasm    • Onychomycosis of toenail    • Peptic ulceration    • Renal calculi    • Situational depression 08/22/2018   • Sleep apnea with use of continuous positive airway pressure (CPAP)     compliant with machine    • 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    Patient is well nourished and well developed.        Ortho Exam  Peripheral Vascular  Lower Extremity   Edema - None bilaterally    Musculoskeletal  Lower Extremity   Left Hip    Normal range of motion    No crepitus, instability, subluxation or laxity    No known fractures or dislocations   Right Hip    Normal range of motion    No crepitus, instability, subluxation or laxity    No known fractures or dislocations     Inspection and palpation    Tenderness -      Left - over greater trochanter     Swelling - none bilaterally    Tissue tension/texture is pliable and soft bilaterally     Normal warmth bilaterally   Strength and Tone    Left hip flexors - 5/5     Right hip flexors - 5/5   Deformities/Postures/Misalignments/Discrepancies    No leg length discrepancy   Functional Testing    Stinchfield test positive bilaterally    90-90 straight leg raise negative bilaterally    Saurabh's test:  positive            Imaging/Labs/EMG Reviewed:  Imaging Results (Last 24 Hours)     ** No results found for the last 24 hours. **            Assessment    Assessment:  1. Primary osteoarthritis of left knee    2. Status post total right knee replacement    3. Left hip pain    4. Trochanteric bursitis of left hip        Plan:  1. Recommend over the counter anti-inflammatories for pain and/or swelling  2. Trochanter bursitis left hip--at this point, patient has failed injection.  We offered physical therapy but I think both of us would like to know if there is anything more serious going on in the left hip area.  An MRI will be requested.  I will see him back to review the study.      Tereso Restrepo MD  03/30/23  16:20 " EDT      Dictated Utilizing Dragon Dictation.

## 2023-03-31 ENCOUNTER — TELEPHONE (OUTPATIENT)
Dept: ORTHOPEDIC SURGERY | Facility: CLINIC | Age: 73
End: 2023-03-31
Payer: MEDICARE

## 2023-03-31 NOTE — TELEPHONE ENCOUNTER
CALLED PATIENT AND LEFT A MESSAGE TO INFORM ABOUT MRI SCHEDULED FOR 4/11/2023 AT 04:45PM. AD[address]ESS: [id number]. ALSO, PATIENT WILL NEED MRI FOLLOW UP WITH DR. VALDEZ.

## 2023-04-06 ENCOUNTER — TELEPHONE (OUTPATIENT)
Dept: ORTHOPEDIC SURGERY | Facility: CLINIC | Age: 73
End: 2023-04-06
Payer: MEDICARE

## 2023-04-06 NOTE — TELEPHONE ENCOUNTER
CALLED PATIENT TO CONFIRM MRI APPT AND TO SCHEDULE FU APPT WITH DR. VALDEZ. LEFT MESSAGE TO CALL THE OFFICE BACK

## 2023-04-11 ENCOUNTER — HOSPITAL ENCOUNTER (OUTPATIENT)
Dept: MRI IMAGING | Facility: HOSPITAL | Age: 73
Discharge: HOME | End: 2023-04-11
Admitting: ORTHOPAEDIC SURGERY
Payer: MEDICARE

## 2023-04-11 DIAGNOSIS — M70.62 TROCHANTERIC BURSITIS OF LEFT HIP: ICD-10-CM

## 2023-04-11 PROCEDURE — 73721 MRI JNT OF LWR EXTRE W/O DYE: CPT

## 2023-04-13 ENCOUNTER — OFFICE VISIT (OUTPATIENT)
Dept: ORTHOPEDIC SURGERY | Facility: CLINIC | Age: 73
End: 2023-04-13
Payer: MEDICARE

## 2023-04-13 VITALS
BODY MASS INDEX: 32.21 KG/M2 | SYSTOLIC BLOOD PRESSURE: 134 MMHG | HEIGHT: 70 IN | WEIGHT: 225 LBS | DIASTOLIC BLOOD PRESSURE: 80 MMHG

## 2023-04-13 DIAGNOSIS — M25.552 LEFT HIP PAIN: Primary | ICD-10-CM

## 2023-04-13 DIAGNOSIS — M70.62 TROCHANTERIC BURSITIS OF LEFT HIP: ICD-10-CM

## 2023-04-13 NOTE — PROGRESS NOTES
"    [facility] [place] [facility] [place]        Subjective     CC: Follow-up (2 week follow up --- Trochanter bursitis left hip)      HPI    Jimmie Salcedo is a 72 y.o. male.  Patient returns after the MRI of his left hip.  Corticosteroid injection helped him only briefly.  Continues to complain of lateral sided hip pain.    Overall, patient's symptoms are as above.    ROS:    Constiutional:Pt denies fever, chills, nausea, or vomiting.  MSK:as above        Objective      Past Medical History  Past Medical History:   Diagnosis Date   • Abnormal liver function test    • Arthritis    • Arthritis of back    • Arthritis of neck    • Bilateral edema of lower extremity    • Bursitis of hip    • Cataract     bilat- still present - mild    • Cellulitis of leg, right     resolved   • Chalazion    • Cracking skin     bilat feet mostly    • Disease     \"brakes/breaks\" disease as child-  effected blood and urine    • Fracture, finger    • Fracture, foot    • GERD (gastroesophageal reflux disease)    • Hip arthrosis 2022    Pain walking   • History of kidney stones     x2 had to have broken up    • Hoarseness     from vocal cord bending- seeing ent - possible surgery soon    • Hypertension    • Kidney infection     h/o    • Knee swelling 2015    Ongoing   • Neck muscle spasm    • Onychomycosis of toenail    • Peptic ulceration    • Renal calculi    • Situational depression 08/22/2018   • Sleep apnea with use of continuous positive airway pressure (CPAP)     compliant with machine    • 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    Patient is well nourished and well developed.        Ortho Exam  Peripheral Vascular  Lower Extremity   Edema - None bilaterally    Musculoskeletal  Lower Extremity   Left Hip    Normal range of motion    No crepitus, instability, subluxation or laxity    No known fractures or dislocations   Right Hip    Normal range of motion    No crepitus, instability, subluxation or laxity    No known fractures or dislocations     Inspection and palpation    Tenderness -      Left - over greater trochanter     Swelling - none bilaterally    Tissue tension/texture is pliable and soft bilaterally     Normal warmth bilaterally   Strength and Tone    Left hip flexors - 5/5     Right hip flexors - 5/5   Deformities/Postures/Misalignments/Discrepancies    No leg length discrepancy   Functional Testing    Stinchfield test positive bilaterally    90-90 straight leg raise negative bilaterally    Saurabh's test:  positive            Imaging/Labs/EMG Reviewed:  Imaging Results (Last 24 Hours)     ** No results found for the last 24 hours. **        MRI Hip Left Without Contrast  Narrative: MRI HIP LEFT WO CONTRAST    Date of Exam: 4/11/2023 5:09 PM EDT    Indication: Hip pain, xray neg / oa, referred pain suspected.     Comparison: Left hip radiographs 1/26/2023    Technique:  Routine multiplanar/multisequence sequence images of the left hip were obtained without contrast administration.      Findings:   The exam is somewhat limited owing to motion degradation and wraparound artifact of a few sequences.    Mildly heterogeneous marrow signal intensity without focal bony lesion. There is mild marrow edema about the inferior right SI joint (series 9 image 28). No stress or traumatic fracture. No evidence of femoral head avascular necrosis. The hip joints " are   congruent with trace bilateral hip joint effusions, grossly symmetric.    Small field of view images of the left hip demonstrates normal hip joint alignment without high-grade articular cartilage loss, subchondral cystic change, or subchondral marrow edema. There is some degenerative tearing of the superior and anterosuperior   acetabular labrum. The ligamentum teres is likely intact. Transverse acetabular ligament is normal. There is trace thin fluid in the left greater trochanteric bursa (series 9 image 27).    The anterior flexor tendons are normal. No iliopsoas bursitis. There is likely some tendinosis of the left gluteus minimus and medius tendons, with questionable partial thickness tear of the gluteus minimus tendon at the trochanteric insertion (series 9   image 24). There is likely some right-sided gluteal tendinosis, without discrete tear. There is mild edema of the right greater trochanter bursa without discrete fluid. There is tendinosis and low-grade partial tearing of the right hamstring tendon   origin (series 10 image 49). The external rotator tendons are normal. No definite muscle strain.    Mild degenerative change of the pubic symphysis. There is moderate to severe degenerative disc disease in the partially imaged lower lumbar spine. Mildly enlarged prostate gland is noted. There are small fat-containing bilateral inguinal hernias.   Visualized static nerves are unremarkable.  Impression: Impression:   Trace bilateral hip joint effusions.    Tearing of the superior and anterosuperior left acetabular labrum.    Trace left greater trochanteric bursitis. There is bilateral gluteal tendinosis with questionable low-grade partial tear of the left gluteus minimus tendon at the trochanteric insertion.    Tendinosis and low-grade partial tear of the right hamstring tendon origin.    Mild marrow edema about the inferior right SI joint, favor degenerative.    Moderate to severe degenerative disc  disease in the partially imaged lower lumbar spine.    Electronically Signed: Carlin Fountain    4/12/2023 5:56 PM EDT    Workstation ID: [id number]      We reviewed images and report of the MRI above.  Patient has significant amount of fluid lateral to the greater trochanter with some interstitial tearing of the gluteus medius insertion.    Assessment    Assessment:  1. Left hip pain    2. Trochanteric bursitis of left hip        Plan:  1. Recommend over the counter anti-inflammatories for pain and/or swelling  2. Trochanteric bursitis left hip secondary to injury to the gluteus medius tendon insertion--we briefly discussed possible surgical interventions that could be done but I think the patient needs to try course of therapy to hopefully avoid surgery.  He will go to outpatient physical therapy at [facility] at his request for hip strengthening and IT band stretching.  Follow-up in a couple months to see how he is doing overall.      Tereso Restrepo MD  04/13/23  16:10 EDT      Dictated Utilizing Dragon Dictation.

## 2023-04-18 ENCOUNTER — TELEPHONE (OUTPATIENT)
Dept: ORTHOPEDIC SURGERY | Facility: CLINIC | Age: 73
End: 2023-04-18
Payer: MEDICARE

## 2023-04-18 NOTE — TELEPHONE ENCOUNTER
Caller: PATIENT     Relationship: SELF     Best call back number:[phone]    What orders are you requesting (i.e. lab or imaging): PHYSICAL THERAPY FOR LEFT HIP       Where will you receive your lab/imaging services:  OUT PATIENT PHYSICAL THERAPY AT [facility]- FAX#- [phone]    Additional notes: PT. NEEDS PHYSICAL THERAPY ORDER FAXED.

## 2023-04-19 RX ORDER — PRAVASTATIN SODIUM 40 MG
TABLET ORAL
Qty: 30 TABLET | Refills: 3 | Status: SHIPPED | OUTPATIENT
Start: 2023-04-19

## 2023-04-21 DIAGNOSIS — G89.29 CHRONIC RIGHT SHOULDER PAIN: ICD-10-CM

## 2023-04-21 DIAGNOSIS — M25.552 LEFT HIP PAIN: Primary | ICD-10-CM

## 2023-04-21 DIAGNOSIS — M25.511 CHRONIC RIGHT SHOULDER PAIN: ICD-10-CM

## 2023-04-21 DIAGNOSIS — M70.62 TROCHANTERIC BURSITIS OF LEFT HIP: ICD-10-CM

## 2023-04-21 RX ORDER — DEXAMETHASONE SODIUM PHOSPHATE 4 MG/ML
4 INJECTION, SOLUTION INTRA-ARTICULAR; INTRALESIONAL; INTRAMUSCULAR; INTRAVENOUS; SOFT TISSUE AS NEEDED
Qty: 30 ML | Refills: 0 | Status: CANCELLED | OUTPATIENT
Start: 2023-04-21

## 2023-04-21 NOTE — TELEPHONE ENCOUNTER
PATIENT CALLING BACK ABOUT THE PT ORDER THAT WAS SENT STATES THAT IT WAS SUPPOSED TO CONTAIN LEFT HIP RIGHT SHOULDER ASKING FOR AN UPDATE

## 2023-04-21 NOTE — TELEPHONE ENCOUNTER
I spoke with Lucía at  she stated she just needs the medication ordered and faxed to them. Can we place the order and I will fax it to her at [phone] attn:Lucía.     Opal Myers

## 2023-04-21 NOTE — TELEPHONE ENCOUNTER
Lucía was not able to speak they left her a message and she will call us back she can ask to speak with Opal when she calls back.     Opal Myers

## 2023-04-21 NOTE — TELEPHONE ENCOUNTER
Lucía Chapa from [facility] called and wanted to leave a message for Dr. Restrepo: she's requesting an order for iontophoresis. You can call her back at [phone]

## 2023-06-13 ENCOUNTER — OFFICE VISIT (OUTPATIENT)
Dept: ORTHOPEDIC SURGERY | Facility: CLINIC | Age: 73
End: 2023-06-13
Payer: MEDICARE

## 2023-06-13 VITALS
BODY MASS INDEX: 31.92 KG/M2 | HEIGHT: 70 IN | WEIGHT: 223 LBS | DIASTOLIC BLOOD PRESSURE: 70 MMHG | SYSTOLIC BLOOD PRESSURE: 142 MMHG

## 2023-06-13 DIAGNOSIS — M25.552 LEFT HIP PAIN: ICD-10-CM

## 2023-06-13 DIAGNOSIS — M70.62 TROCHANTERIC BURSITIS OF LEFT HIP: Primary | ICD-10-CM

## 2023-06-13 RX ORDER — TRIAMCINOLONE ACETONIDE 40 MG/ML
40 INJECTION, SUSPENSION INTRA-ARTICULAR; INTRAMUSCULAR
Status: COMPLETED | OUTPATIENT
Start: 2023-06-13 | End: 2023-06-13

## 2023-06-13 RX ORDER — LIDOCAINE HYDROCHLORIDE 10 MG/ML
3 INJECTION, SOLUTION EPIDURAL; INFILTRATION; INTRACAUDAL; PERINEURAL
Status: COMPLETED | OUTPATIENT
Start: 2023-06-13 | End: 2023-06-13

## 2023-06-13 RX ADMIN — TRIAMCINOLONE ACETONIDE 40 MG: 40 INJECTION, SUSPENSION INTRA-ARTICULAR; INTRAMUSCULAR at 09:25

## 2023-06-13 RX ADMIN — LIDOCAINE HYDROCHLORIDE 3 ML: 10 INJECTION, SOLUTION EPIDURAL; INFILTRATION; INTRACAUDAL; PERINEURAL at 09:25

## 2023-06-13 NOTE — PROGRESS NOTES
Procedure   - Large Joint Arthrocentesis: L greater trochanteric bursa on 6/13/2023 9:25 AM  Indications: pain  Details: 22 G needle, lateral approach  Medications: 40 mg triamcinolone acetonide 40 MG/ML; 3 mL lidocaine PF 1% 1 %  Outcome: tolerated well, no immediate complications  Procedure, treatment alternatives, risks and benefits explained, specific risks discussed. Consent was given by the patient. Immediately prior to procedure a time out was called to verify the correct patient, procedure, equipment, support staff and site/side marked as required. Patient was prepped and draped in the usual sterile fashion.

## 2023-06-13 NOTE — PROGRESS NOTES
"    [facility] [place] [facility] [place]        Subjective     CC: Follow-up (2 month follow up -- Trochanteric bursitis of left hip)      ANTELMO Salcedo is a 72 y.o. male.  Patient is here today for follow-up of his left trochanteric bursitis.  Has had a couple of falls.  Continues to struggle with lateral sided hip pain.  Denies groin pain.  Is doing physical therapy at [facility].  He states has not helping all that much but certainly not worsening.    Overall, patient's symptoms are as above.    ROS:    Constiutional:Pt denies fever, chills, nausea, or vomiting.  MSK:as above        Objective      Past Medical History  Past Medical History:   Diagnosis Date    Abnormal liver function test     Arthritis     Arthritis of back     Arthritis of neck     Bilateral edema of lower extremity     Bursitis of hip     Cataract     bilat- still present - mild     Cellulitis of leg, right     resolved    Chalazion     Cracking skin     bilat feet mostly     Disease     \"brakes/breaks\" disease as child-  effected blood and urine     Fracture, finger     Fracture, foot     GERD (gastroesophageal reflux disease)     Hip arthrosis 2022    Pain walking    History of kidney stones     x2 had to have broken up     Hoarseness     from vocal cord bending- seeing ent - possible surgery soon     Hypertension     Kidney infection     h/o     Knee swelling 2015    Ongoing    Neck muscle spasm     Onychomycosis of toenail     Peptic ulceration     Renal calculi     Situational depression 08/22/2018    Sleep apnea with use of continuous positive airway pressure (CPAP)     compliant with machine     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    Patient is well nourished and well developed.        Ortho Exam  Peripheral Vascular  Lower Extremity   Edema - None bilaterally    Musculoskeletal  Lower Extremity   Left Hip    Normal range of motion    No crepitus, instability, subluxation or laxity    No known fractures or dislocations   Right Hip    Normal range of motion    No crepitus, instability, subluxation or laxity    No known fractures or dislocations     Inspection and palpation    Tenderness -      Left - over greater trochanter     Swelling - none bilaterally    Tissue tension/texture is pliable and soft bilaterally     Normal warmth bilaterally   Strength and Tone    Left hip flexors - 5/5     Right hip flexors - 5/5   Deformities/Postures/Misalignments/Discrepancies    No leg length discrepancy   Functional Testing    Stinchfield test positive bilaterally    90-90 straight leg raise negative bilaterally    Saurabh's test:  positive          Imaging/Labs/EMG Reviewed:  Imaging Results (Last 24 Hours)       ** No results found for the last 24 hours. **              Assessment    Assessment:  1. Trochanteric bursitis of left hip    2. Left hip pain        Plan:  Recommend over the counter anti-inflammatories for pain and/or swelling  Troch bursitis left hip--we talked again about treatment options alternatives.  Patient has a bad low back and that needs to be kept in mind.  Plan will be for another injection into the left trochanteric bursa today and I will see him back in 2 to 3 months to assess efficacy.  If he is not a lot better, would like to look into his low back further.        Procedure Note:    I discussed with the patient the potential benefits " of performing a therapeutic injections as well as potential risks including but not limited to infection, swelling, pain, bleeding, bruising, nerve/vessel damage, skin color changes, transient elevation in blood glucose levels, and fat atrophy. After informed consent and after the areas were prepped with chlorhexadine soap, ethyl chloride was used to numb the skin. Via the lateral approach over the area of maximal tenderness, 5mL of 1% lidocaine followed by 40mg of Kenalog were each injected into the trochanteric bursa of the left hip. The patient tolerated the procedure well. There were no complications. A sterile dressing was placed over the injection sites.        Tereso Restrepo MD  06/13/23  09:37 EDT      Dictated Utilizing Dragon Dictation.

## 2023-07-25 RX ORDER — AMLODIPINE BESYLATE 5 MG/1
TABLET ORAL
Qty: 30 TABLET | Refills: 3 | Status: SHIPPED | OUTPATIENT
Start: 2023-07-25

## 2023-08-28 RX ORDER — PRAVASTATIN SODIUM 40 MG
TABLET ORAL
Qty: 30 TABLET | Refills: 3 | Status: SHIPPED | OUTPATIENT
Start: 2023-08-28

## 2023-09-14 ENCOUNTER — OFFICE VISIT (OUTPATIENT)
Dept: ORTHOPEDIC SURGERY | Facility: CLINIC | Age: 73
End: 2023-09-14
Payer: MEDICARE

## 2023-09-14 VITALS
HEIGHT: 70 IN | BODY MASS INDEX: 32.44 KG/M2 | DIASTOLIC BLOOD PRESSURE: 82 MMHG | SYSTOLIC BLOOD PRESSURE: 140 MMHG | WEIGHT: 226.6 LBS

## 2023-09-14 DIAGNOSIS — M25.552 LEFT HIP PAIN: ICD-10-CM

## 2023-09-14 DIAGNOSIS — M70.62 TROCHANTERIC BURSITIS OF LEFT HIP: Primary | ICD-10-CM

## 2023-09-14 NOTE — PROGRESS NOTES
"    [facility] [place] [facility] [place]        Subjective     CC: Follow-up (3 month follow up --Trochanteric bursitis of left hip)      ANTELMO Salcedo is a 73 y.o. male.  Patient is here today for follow-up of his left trochanteric bursitis.  Was injected with steroid and sent for physical therapy which is helping him.  The steroid has started to wear off and he is requesting a repeat injection today.    Overall, patient's symptoms are as above.  He denies groin pain.    ROS:    Constiutional:Pt denies fever, chills, nausea, or vomiting.  MSK:as above        Objective      Past Medical History  Past Medical History:   Diagnosis Date    Abnormal liver function test     Arthritis     Arthritis of back     Arthritis of neck     Bilateral edema of lower extremity     Bursitis of hip     Cataract     bilat- still present - mild     Cellulitis of leg, right     resolved    Chalazion     Cracking skin     bilat feet mostly     Disease     \"brakes/breaks\" disease as child-  effected blood and urine     Fracture, finger     Fracture, foot     GERD (gastroesophageal reflux disease)     Hip arthrosis 2022    Pain walking    History of kidney stones     x2 had to have broken up     Hoarseness     from vocal cord bending- seeing ent - possible surgery soon     Hypertension     Kidney infection     h/o     Knee swelling 2015    Ongoing    Neck muscle spasm     Onychomycosis of toenail     Peptic ulceration     Renal calculi     Situational depression 08/22/2018    Sleep apnea with use of continuous positive airway pressure (CPAP)     compliant with machine     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    Patient is well nourished and well developed.        Ortho Exam  Peripheral Vascular  Lower Extremity   Edema - None bilaterally    Musculoskeletal  Lower Extremity   Left Hip    Normal range of motion    No crepitus, instability, subluxation or laxity    No known fractures or dislocations   Right Hip    Normal range of motion    No crepitus, instability, subluxation or laxity    No known fractures or dislocations     Inspection and palpation    Tenderness -      Left - over greater trochanter     Swelling - none bilaterally    Tissue tension/texture is pliable and soft bilaterally     Normal warmth bilaterally   Strength and Tone    Left hip flexors - 5/5     Right hip flexors - 5/5   Deformities/Postures/Misalignments/Discrepancies    No leg length discrepancy   Functional Testing    Stinchfield test positive bilaterally    90-90 straight leg raise negative bilaterally    Saurabh's test:  positive          Imaging/Labs/EMG Reviewed:  Imaging Results (Last 24 Hours)       ** No results found for the last 24 hours. **              Assessment    Assessment:  1. Trochanteric bursitis of left hip    2. Left hip pain        Plan:  Recommend over the counter anti-inflammatories for pain and/or swelling  Trochanteric bursitis left hip--plan will be for repeat injection today.  We have told him that we are reluctant to give too many injections in this location.  He should follow-up as needed for the left hip and we will see him in July 2024 for follow-up of his right TKA.    Procedure Note:    I discussed with the patient the potential benefits of performing a therapeutic injections as well as potential risks including but not limited to infection, " swelling, pain, bleeding, bruising, nerve/vessel damage, skin color changes, transient elevation in blood glucose levels, and fat atrophy. After informed consent and after the areas were prepped with chlorhexadine soap, ethyl chloride was used to numb the skin. Via the lateral approach over the area of maximal tenderness, 5mL of 1% lidocaine followed by 40mg of Kenalog were each injected into the trochanteric bursa of the left hip. The patient tolerated the procedure well. There were no complications. A sterile dressing was placed over the injection sites.        Tereso Restrepo MD  09/14/23  13:10 EDT      Dictated Utilizing Dragon Dictation.

## 2023-09-29 ENCOUNTER — TELEMEDICINE (OUTPATIENT)
Dept: SLEEP MEDICINE | Facility: CLINIC | Age: 73
End: 2023-09-29
Payer: MEDICARE

## 2023-09-29 VITALS — BODY MASS INDEX: 31.5 KG/M2 | HEIGHT: 70 IN | WEIGHT: 220 LBS

## 2023-09-29 DIAGNOSIS — G47.33 OSA (OBSTRUCTIVE SLEEP APNEA): Primary | ICD-10-CM

## 2023-09-29 PROCEDURE — 1160F RVW MEDS BY RX/DR IN RCRD: CPT | Performed by: NURSE PRACTITIONER

## 2023-09-29 PROCEDURE — 1159F MED LIST DOCD IN RCRD: CPT | Performed by: NURSE PRACTITIONER

## 2023-09-29 PROCEDURE — 99213 OFFICE O/P EST LOW 20 MIN: CPT | Performed by: NURSE PRACTITIONER

## 2023-09-29 NOTE — PROGRESS NOTES
Chief Complaint:   Chief Complaint   Patient presents with    Follow-up       HPI:    Jimmie Salcedo is a 73 y.o. male here for follow-up of sleep apnea.  Patient was last seen 6/21/2022.  At that time patient was awaiting a consult for alexa.  Patient states he did keep that appointment was told he was not a candidate due to the structure of his throat.  Patient states he is doing well with his CPAP.  He does feel his machine is 5+ years old and would like an order for it to be chewed.  We will get that sent to his DME today.  He does get 5 to 7 hours nightly and will go to sleep within 30 minutes.  Patient states he does get up 2-3 times in the night due to prostate issues.  Patient has an Ashton score of 0/24.  He has no other concerns or complaints.        Current medications are:   Current Outpatient Medications:     Acetaminophen 500 MG capsule, Take 2 capsules by mouth Every 6 (Six) Hours., Disp: , Rfl:     amLODIPine (NORVASC) 5 MG tablet, TAKE ONE TABLET BY MOUTH EVERY DAY, Disp: 30 tablet, Rfl: 3    buPROPion XL (WELLBUTRIN XL) 150 MG 24 hr tablet, , Disp: , Rfl:     calcipotriene (DOVONOX) 0.005 % ointment, As Needed., Disp: , Rfl:     Cholecalciferol (VITAMIN D3) 125 MCG (5000 UT) capsule capsule, Take 1 capsule by mouth Daily., Disp: , Rfl:     Dietary Management Product (Rheumate) capsule, Take 1 capsule by mouth Daily., Disp: 90 capsule, Rfl: 1    docusate sodium (Colace) 100 MG capsule, Take 1 capsule by mouth 2 (Two) Times a Day., Disp: 60 capsule, Rfl: 0    doxazosin (CARDURA) 4 MG tablet, TAKE 1 TABLET BY MOUTH EVERY EVENING, Disp: 90 tablet, Rfl: 3    esomeprazole (nexIUM) 40 MG capsule, Take 1 capsule by mouth 2 (Two) Times a Day., Disp: 60 capsule, Rfl: 5    fenofibrate (TRICOR) 145 MG tablet, TAKE ONE TABLET BY MOUTH EVERY DAY, Disp: 90 tablet, Rfl: 3    furosemide (LASIX) 40 MG tablet, TAKE ONE TABLET BY MOUTH EVERY DAY AS NEEDED FOR EDEMA, Disp: 30 tablet, Rfl: 1    MULTIPLE VITAMINS PO,  Take 1 tablet by mouth Daily., Disp: , Rfl:     multivitamin with minerals tablet tablet, tk 1 po qd, Disp: , Rfl:     mupirocin (BACTROBAN) 2 % ointment, Apply  topically to the appropriate area as directed Every 12 (Twelve) Hours., Disp: , Rfl:     Omega-3 Fatty Acids (FISH OIL) 1000 MG capsule capsule, Take 1 capsule by mouth Daily., Disp: , Rfl:     pravastatin (PRAVACHOL) 40 MG tablet, TAKE ONE TABLET BY MOUTH EVERY DAY, Disp: 30 tablet, Rfl: 3    Sacubitril-Valsartan (ENTRESTO PO), Take  by mouth., Disp: , Rfl:     sildenafil (VIAGRA) 100 MG tablet, Take 1 tablet by mouth Daily As Needed for erectile dysfunction., Disp: 6 tablet, Rfl: 5    triamcinolone (KENALOG) 0.1 % cream, Apply  topically to the appropriate area as directed Every 12 (Twelve) Hours., Disp: , Rfl:     urea (CARMOL) 20 % cream, Apply  topically to the appropriate area as directed As Needed for Dry Skin., Disp: , Rfl:     vitamin B-6 (PYRIDOXINE) 50 MG tablet, Take 1 tablet by mouth Daily., Disp: , Rfl: .      The patient's relevant past medical, surgical, family and social history were reviewed and updated in Epic as appropriate.       Review of Systems   Eyes:  Positive for visual disturbance.   Respiratory:  Positive for apnea and shortness of breath.    Cardiovascular:  Positive for leg swelling.   Gastrointestinal:         Heartburn   Genitourinary:  Positive for frequency.   Musculoskeletal:  Positive for arthralgias.   Allergic/Immunologic: Positive for environmental allergies.   Psychiatric/Behavioral:  Positive for dysphoric mood and sleep disturbance.    All other systems reviewed and are negative.      Objective:    Physical Exam  Constitutional:       Appearance: Normal appearance.   HENT:      Head: Normocephalic and atraumatic.      Mouth/Throat:      Comments: Class 3 airway  Pulmonary:      Effort: Pulmonary effort is normal. No respiratory distress.   Neurological:      Mental Status: He is alert and oriented to person, place,  and time.   Psychiatric:         Mood and Affect: Mood normal.         Behavior: Behavior normal.         Thought Content: Thought content normal.         Judgment: Judgment normal.       CPAP Report    54/60 days of use  Greater than 4-hour use 73%  Settings 8-18  AHI of 2.4  95th percentile pressure 10.5  The patient continues to use and benefit from CPAP therapy.    ASSESSMENT/PLAN    Diagnoses and all orders for this visit:    1. THA (obstructive sleep apnea) (Primary)  -     PAP Therapy        Counseled patient regarding multimodal approach with healthy nutrition, healthy sleep, regular physical activity, social activities, counseling, and medications. Encouraged to practice lateral sleep position. Avoid alcohol and sedatives close to bedtime.    Refill supplies x1 year order for new machine I will see him back in 31 to 90 days.  Patient gave consent for video visit.    I have reviewed the results of my evaluation and impression and discussed my recommendations in detail with the patient.      Signed by  Pallavi Prince, SOURAV    September 29, 2023      CC: Javad Negron MD         No ref. provider found

## 2023-11-22 ENCOUNTER — TELEPHONE (OUTPATIENT)
Dept: INTERNAL MEDICINE | Facility: CLINIC | Age: 73
End: 2023-11-22
Payer: MEDICARE

## 2023-11-22 DIAGNOSIS — U07.1 COVID-19 VIRUS INFECTION: Primary | ICD-10-CM

## 2023-11-22 NOTE — TELEPHONE ENCOUNTER
Caller: Jimmie Salcedo    Relationship to patient: Self    Best call back number: [phone]    Date of exposure: LAST WEEK    Date of positive COVID19 test: 308113    Date if possible COVID19 exposure: LAST WEEK    COVID19 symptoms: CHEST CONGESTION, SORE THROAT, SHORTNESS OF BREATH, COUGHING    Date of initial quarantine: [id number]    Additional information or concerns: REQUESTING PAXLOVID    What is the patients preferred pharmacy[facility] [id number] [facility] [address] [phone] [facility] [phone] [facility]

## 2023-12-05 RX ORDER — AMLODIPINE BESYLATE 5 MG/1
TABLET ORAL
Qty: 30 TABLET | Refills: 3 | Status: SHIPPED | OUTPATIENT
Start: 2023-12-05

## 2023-12-08 ENCOUNTER — OFFICE VISIT (OUTPATIENT)
Dept: INTERNAL MEDICINE | Facility: CLINIC | Age: 73
End: 2023-12-08
Payer: MEDICARE

## 2023-12-08 VITALS
HEART RATE: 60 BPM | TEMPERATURE: 96.8 F | SYSTOLIC BLOOD PRESSURE: 130 MMHG | RESPIRATION RATE: 20 BRPM | BODY MASS INDEX: 32.93 KG/M2 | DIASTOLIC BLOOD PRESSURE: 82 MMHG | HEIGHT: 70 IN | WEIGHT: 230 LBS | OXYGEN SATURATION: 99 %

## 2023-12-08 DIAGNOSIS — J06.9 ACUTE URI: Primary | ICD-10-CM

## 2023-12-08 RX ORDER — GUAIFENESIN AND DEXTROMETHORPHAN HYDROBROMIDE 600; 30 MG/1; MG/1
2 TABLET, EXTENDED RELEASE ORAL EVERY 12 HOURS SCHEDULED
Qty: 20 TABLET | Refills: 0 | Status: SHIPPED | OUTPATIENT
Start: 2023-12-08

## 2023-12-08 RX ORDER — AZITHROMYCIN 500 MG/1
500 TABLET, FILM COATED ORAL DAILY
Qty: 5 TABLET | Refills: 0 | Status: CANCELLED | OUTPATIENT
Start: 2023-12-08

## 2023-12-08 RX ORDER — BENZONATATE 100 MG/1
100 CAPSULE ORAL 3 TIMES DAILY PRN
Qty: 30 CAPSULE | Refills: 1 | Status: SHIPPED | OUTPATIENT
Start: 2023-12-08

## 2023-12-08 RX ORDER — METHYLPREDNISOLONE 4 MG/1
TABLET ORAL
Qty: 21 TABLET | Refills: 0 | Status: SHIPPED | OUTPATIENT
Start: 2023-12-08

## 2023-12-08 RX ORDER — GUAIFENESIN 600 MG/1
1200 TABLET, EXTENDED RELEASE ORAL 2 TIMES DAILY
Qty: 30 TABLET | Refills: 0 | Status: CANCELLED | OUTPATIENT
Start: 2023-12-08

## 2023-12-08 RX ORDER — PSEUDOEPHEDRINE HCL 120 MG/1
120 TABLET, FILM COATED, EXTENDED RELEASE ORAL EVERY 12 HOURS
Qty: 20 TABLET | Refills: 0 | Status: CANCELLED | OUTPATIENT
Start: 2023-12-08

## 2023-12-08 NOTE — PROGRESS NOTES
"     Follow Up Office Visit      Date: 2023   Patient Name: Jimmie Salcedo  : 1950   MRN: [id number]     Chief Complaint:    Chief Complaint   Patient presents with    Cough     Just had positive covid 2023    Sore Throat    Generalized Body Aches     Answers submitted by the patient for this visit:  Primary Reason for Visit (Submitted on 2023)  What is the primary reason for your visit?: Cough  Cough Questionnaire (Submitted on 2023)  Chief Complaint: Cough  Chronicity: new  Onset: in the past 7 days  Progression since onset: rapidly worsening  Frequency: every few minutes  Cough characteristics: non-productive  chest pain: No  chills: No  ear congestion: No  ear pain: Yes  fever: No  headaches: Yes  heartburn: No  hemoptysis: No  myalgias: Yes  nasal congestion: No  postnasal drip: No  rash: No  rhinorrhea: No  shortness of breath: Yes  sore throat: Yes  sweats: No  weight loss: No  wheezing: No  Aggravated by: nothing      History of Present Illness: Jimmie Salcedo is a 73 y.o. male who is here today to follow up with sore throat that started 5 days ago. No fever, + chills.  No diarrhea.  No nausea or vomitihg.       Subjective      Past Medical History:   Diagnosis Date    Abnormal liver function test     Anxiety     Arthritis     Arthritis of back     Arthritis of neck     Benign prostatic hyperplasia     Bilateral edema of lower extremity     Bursitis of hip     Cataract     bilat- still present - mild     Cellulitis of leg, right     resolved    Chalazion     Cracking skin     bilat feet mostly     Degeneration of lumbar or lumbosacral intervertebral disc 2020    Disease     \"brakes/breaks\" disease as child-  effected blood and urine     Fracture, finger     Fracture, foot     GERD (gastroesophageal reflux disease)     Hip arthrosis     Pain walking    History of kidney stones     x2 had to have broken up     Hoarseness     from vocal cord bending- seeing ent - " possible surgery soon     Hyperlipidemia     Hypertension     Kidney infection     h/o     Knee swelling 2015    Ongoing    Neck muscle spasm     Onychomycosis of toenail     Peptic ulceration     Pulmonary embolism     Renal calculi     Situational depression 08/22/2018    Sleep apnea with use of continuous positive airway pressure (CPAP)     compliant with machine     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     Vocal cord strain     vocal cord bent- seeing ent but causes pt to be hoarse     Wears glasses        Past Surgical History:   Procedure Laterality Date    BACK SURGERY      CARDIAC CATHETERIZATION      COLONOSCOPY      CYSTOSCOPY W/ LASER LITHOTRIPSY      x2    ENDOSCOPY      with dilation     FINGER SURGERY      left 5th finger    HAND SURGERY  2018    Little Finger - Left Hand    JOINT REPLACEMENT      KNEE SURGERY Right 1986    arthroscopy    LASER OF PROSTATE W/ GREEN LIGHT PVP  02/2022    Dr Fu    OTHER SURGICAL HISTORY      Lithotripsy    PROSTATE SURGERY      TONSILLECTOMY      TOTAL KNEE ARTHROPLASTY Right 07/22/2020    Procedure: TOTAL KNEE ARTHROPLASTY RIGHT;  Surgeon: Tereso Restrepo MD;  Location: [facility];  Service: Orthopedics;  Laterality: Right;    TRIGGER POINT INJECTION         Family History   Problem Relation Age of Onset    Arthritis Other     Hypertension Other     Hyperlipidemia Other     Heart attack Other     Hypertension Mother     Cancer Mother         Colon Cancer    Heart disease Father     Hypertension Father     Alcohol abuse Brother         Social History     Socioeconomic History    Marital status:    Tobacco Use    Smoking status: Never    Smokeless tobacco: Never   Substance and Sexual Activity    Alcohol use: Yes     Alcohol/week: 4.0 standard drinks of alcohol     Types: 4 Cans of beer per week    Drug use: No    Sexual activity: Yes     Partners: Female     Birth control/protection: None         I have reviewed the patients family history,  social history, past medical history, past surgical history and have updated it as appropriate.     Medications:     Current Outpatient Medications:     Acetaminophen 500 MG capsule, Take 2 capsules by mouth Every 6 (Six) Hours., Disp: , Rfl:     amLODIPine (NORVASC) 5 MG tablet, TAKE ONE TABLET BY MOUTH EVERY DAY, Disp: 30 tablet, Rfl: 3    buPROPion XL (WELLBUTRIN XL) 150 MG 24 hr tablet, , Disp: , Rfl:     calcipotriene (DOVONOX) 0.005 % ointment, As Needed., Disp: , Rfl:     Cholecalciferol (VITAMIN D3) 125 MCG (5000 UT) capsule capsule, Take 1 capsule by mouth Daily., Disp: , Rfl:     docusate sodium (Colace) 100 MG capsule, Take 1 capsule by mouth 2 (Two) Times a Day., Disp: 60 capsule, Rfl: 0    doxazosin (CARDURA) 4 MG tablet, TAKE 1 TABLET BY MOUTH EVERY EVENING, Disp: 90 tablet, Rfl: 3    fenofibrate (TRICOR) 145 MG tablet, TAKE ONE TABLET BY MOUTH EVERY DAY, Disp: 90 tablet, Rfl: 3    MULTIPLE VITAMINS PO, Take 1 tablet by mouth Daily., Disp: , Rfl:     mupirocin (BACTROBAN) 2 % ointment, Apply  topically to the appropriate area as directed Every 12 (Twelve) Hours., Disp: , Rfl:     Omega-3 Fatty Acids (FISH OIL) 1000 MG capsule capsule, Take 1 capsule by mouth Daily., Disp: , Rfl:     pravastatin (PRAVACHOL) 40 MG tablet, TAKE ONE TABLET BY MOUTH EVERY DAY, Disp: 30 tablet, Rfl: 3    Sacubitril-Valsartan (ENTRESTO PO), Take  by mouth., Disp: , Rfl:     sildenafil (VIAGRA) 100 MG tablet, Take 1 tablet by mouth Daily As Needed for erectile dysfunction., Disp: 6 tablet, Rfl: 5    triamcinolone (KENALOG) 0.1 % cream, Apply  topically to the appropriate area as directed Every 12 (Twelve) Hours., Disp: , Rfl:     urea (CARMOL) 20 % cream, Apply  topically to the appropriate area as directed As Needed for Dry Skin., Disp: , Rfl:     vitamin B-6 (PYRIDOXINE) 50 MG tablet, Take 1 tablet by mouth Daily., Disp: , Rfl:     benzonatate (Tessalon Perles) 100 MG capsule, Take 1 capsule by mouth 3 (Three) Times a Day As  "Needed for Cough., Disp: 30 capsule, Rfl: 1    esomeprazole (nexIUM) 40 MG capsule, Take 1 capsule by mouth 2 (Two) Times a Day. (Patient not taking: Reported on 12/8/2023), Disp: 60 capsule, Rfl: 5    furosemide (LASIX) 40 MG tablet, TAKE ONE TABLET BY MOUTH EVERY DAY AS NEEDED FOR EDEMA (Patient not taking: Reported on 12/8/2023), Disp: 30 tablet, Rfl: 1    guaifenesin-dextromethorphan (MUCINEX DM)  MG tablet sustained-release 12 hour tablet, Take 2 tablets by mouth Every 12 (Twelve) Hours., Disp: 20 tablet, Rfl: 0    methylPREDNISolone (MEDROL) 4 MG dose pack, Take as directed on package instructions - 6 day taper., Disp: 21 tablet, Rfl: 0    multivitamin with minerals tablet tablet, tk 1 po qd (Patient not taking: Reported on 12/8/2023), Disp: , Rfl:     phenol (CHLORASEPTIC) 1.4 % liquid liquid, Apply 1 spray to the mouth or throat Every 2 (Two) Hours As Needed (pain)., Disp: 118 mL, Rfl: 0    Allergies:   Allergies   Allergen Reactions    Penicillins Rash and Unknown - High Severity     Generalized  Pt states rash occurred as a child and has not used since  Generalized  Pt states rash occurred as a child and has not used since         Objective       Vital Signs:   Vitals:    12/08/23 1306   BP: 130/82   BP Location: Left arm   Patient Position: Sitting   Cuff Size: Adult   Pulse: 60   Resp: 20   Temp: 96.8 °F (36 °C)   TempSrc: Tympanic   SpO2: 99%   Weight: 104 kg (230 lb)   Height: 177.8 cm (70\")     Body mass index is 33 kg/m².    Physical Exam:  Physical Exam  Constitutional:       Appearance: Normal appearance. He is ill-appearing.   HENT:      Head: Normocephalic.      Mouth/Throat:      Pharynx: Posterior oropharyngeal erythema present.   Pulmonary:      Effort: Pulmonary effort is normal.   Skin:     Coloration: Skin is not jaundiced or pale.   Neurological:      Mental Status: He is alert and oriented to person, place, and time. Mental status is at baseline.   Psychiatric:         Mood and " Affect: Mood normal.         Behavior: Behavior normal.         Thought Content: Thought content normal.         Procedures    Results:       Assessment / Plan      Assessment/Plan:   Diagnoses and all orders for this visit:    1. Acute URI (Primary)  -     benzonatate (Tessalon Perles) 100 MG capsule; Take 1 capsule by mouth 3 (Three) Times a Day As Needed for Cough.  Dispense: 30 capsule; Refill: 1  -     methylPREDNISolone (MEDROL) 4 MG dose pack; Take as directed on package instructions - 6 day taper.  Dispense: 21 tablet; Refill: 0  -     guaifenesin-dextromethorphan (MUCINEX DM)  MG tablet sustained-release 12 hour tablet; Take 2 tablets by mouth Every 12 (Twelve) Hours.  Dispense: 20 tablet; Refill: 0  -     phenol (CHLORASEPTIC) 1.4 % liquid liquid; Apply 1 spray to the mouth or throat Every 2 (Two) Hours As Needed (pain).  Dispense: 118 mL; Refill: 0                 Follow Up:   No follow-ups on file.    DO JASBIR Alves

## 2023-12-29 ENCOUNTER — TELEMEDICINE (OUTPATIENT)
Dept: SLEEP MEDICINE | Facility: CLINIC | Age: 73
End: 2023-12-29
Payer: MEDICARE

## 2023-12-29 VITALS — BODY MASS INDEX: 31.36 KG/M2 | WEIGHT: 224 LBS | HEIGHT: 71 IN

## 2023-12-29 DIAGNOSIS — G47.33 OSA (OBSTRUCTIVE SLEEP APNEA): Primary | ICD-10-CM

## 2023-12-29 PROCEDURE — 99213 OFFICE O/P EST LOW 20 MIN: CPT | Performed by: NURSE PRACTITIONER

## 2023-12-29 PROCEDURE — 1159F MED LIST DOCD IN RCRD: CPT | Performed by: NURSE PRACTITIONER

## 2023-12-29 PROCEDURE — 1160F RVW MEDS BY RX/DR IN RCRD: CPT | Performed by: NURSE PRACTITIONER

## 2023-12-29 NOTE — PROGRESS NOTES
Chief Complaint:   Chief Complaint   Patient presents with    Follow-up       HPI:    Jimmie Salcedo is a 73 y.o. male here for follow-up of sleep apnea.  Patient was last seen 9/29/2023.  Patient is sleeping 5 to 7 hours nightly and feels rested upon awakening.  Patient normally goes to sleep within 30 minutes and will get up 2-3 times in the night.  Patient has an [place] score of 0/24.  Patient is going to call his DME today to see if he qualifies for a new machine.  If his is 5+ years older we will get an order submitted.        Current medications are:   Current Outpatient Medications:     Acetaminophen 500 MG capsule, Take 2 capsules by mouth Every 6 (Six) Hours., Disp: , Rfl:     amLODIPine (NORVASC) 5 MG tablet, TAKE ONE TABLET BY MOUTH EVERY DAY, Disp: 30 tablet, Rfl: 3    benzonatate (Tessalon Perles) 100 MG capsule, Take 1 capsule by mouth 3 (Three) Times a Day As Needed for Cough., Disp: 30 capsule, Rfl: 1    buPROPion XL (WELLBUTRIN XL) 150 MG 24 hr tablet, , Disp: , Rfl:     calcipotriene (DOVONOX) 0.005 % ointment, As Needed., Disp: , Rfl:     Cholecalciferol (VITAMIN D3) 125 MCG (5000 UT) capsule capsule, Take 1 capsule by mouth Daily., Disp: , Rfl:     docusate sodium (Colace) 100 MG capsule, Take 1 capsule by mouth 2 (Two) Times a Day., Disp: 60 capsule, Rfl: 0    doxazosin (CARDURA) 4 MG tablet, TAKE 1 TABLET BY MOUTH EVERY EVENING, Disp: 90 tablet, Rfl: 3    fenofibrate (TRICOR) 145 MG tablet, TAKE ONE TABLET BY MOUTH EVERY DAY, Disp: 90 tablet, Rfl: 3    guaifenesin-dextromethorphan (MUCINEX DM)  MG tablet sustained-release 12 hour tablet, Take 2 tablets by mouth Every 12 (Twelve) Hours., Disp: 20 tablet, Rfl: 0    methylPREDNISolone (MEDROL) 4 MG dose pack, Take as directed on package instructions - 6 day taper., Disp: 21 tablet, Rfl: 0    MULTIPLE VITAMINS PO, Take 1 tablet by mouth Daily., Disp: , Rfl:     mupirocin (BACTROBAN) 2 % ointment, Apply  topically to the appropriate area as  directed Every 12 (Twelve) Hours., Disp: , Rfl:     Omega-3 Fatty Acids (FISH OIL) 1000 MG capsule capsule, Take 1 capsule by mouth Daily., Disp: , Rfl:     phenol (CHLORASEPTIC) 1.4 % liquid liquid, Apply 1 spray to the mouth or throat Every 2 (Two) Hours As Needed (pain)., Disp: 118 mL, Rfl: 0    pravastatin (PRAVACHOL) 40 MG tablet, TAKE ONE TABLET BY MOUTH EVERY DAY, Disp: 30 tablet, Rfl: 3    Sacubitril-Valsartan (ENTRESTO PO), Take  by mouth., Disp: , Rfl:     sildenafil (VIAGRA) 100 MG tablet, Take 1 tablet by mouth Daily As Needed for erectile dysfunction., Disp: 6 tablet, Rfl: 5    triamcinolone (KENALOG) 0.1 % cream, Apply  topically to the appropriate area as directed Every 12 (Twelve) Hours., Disp: , Rfl:     urea (CARMOL) 20 % cream, Apply  topically to the appropriate area as directed As Needed for Dry Skin., Disp: , Rfl:     vitamin B-6 (PYRIDOXINE) 50 MG tablet, Take 1 tablet by mouth Daily., Disp: , Rfl: .      The patient's relevant past medical, surgical, family and social history were reviewed and updated in Epic as appropriate.       Review of Systems   Eyes:  Positive for visual disturbance.   Respiratory:  Positive for apnea and shortness of breath.    Gastrointestinal:         Heartburn   Genitourinary:  Positive for frequency.   Musculoskeletal:  Positive for arthralgias.   Allergic/Immunologic: Positive for environmental allergies.   Psychiatric/Behavioral:  Positive for dysphoric mood and sleep disturbance.    All other systems reviewed and are negative.        Objective:    Physical Exam  Constitutional:       Appearance: Normal appearance.   HENT:      Head: Normocephalic and atraumatic.      Mouth/Throat:      Comments: Class 3 airway  Pulmonary:      Effort: Pulmonary effort is normal. No respiratory distress.   Neurological:      Mental Status: He is alert and oriented to person, place, and time.   Psychiatric:         Mood and Affect: Mood normal.         Behavior: Behavior normal.          Thought Content: Thought content normal.         Judgment: Judgment normal.             The patient continues to use and benefit from CPAP therapy.    ASSESSMENT/PLAN    Diagnoses and all orders for this visit:    1. THA (obstructive sleep apnea) (Primary)        Counseled patient regarding multimodal approach with healthy nutrition, healthy sleep, regular physical activity, social activities, counseling, and medications. Encouraged to practice lateral sleep position. Avoid alcohol and sedatives close to bedtime.    Patient continues to do well and will let me know if he is eligible for a new machine after talking to DME.  He will then make an appropriate follow-up appointment.  Patient is at home in [place] and gave consent for video visit.    I have reviewed the results of my evaluation and impression and discussed my recommendations in detail with the patient.      Signed by  Pallavi Prince, SOURAV    December 29, 2023      CC: Javad Negron MD         No ref. provider found

## 2024-01-09 ENCOUNTER — OFFICE VISIT (OUTPATIENT)
Dept: INTERNAL MEDICINE | Facility: CLINIC | Age: 74
End: 2024-01-09
Payer: MEDICARE

## 2024-01-09 ENCOUNTER — LAB (OUTPATIENT)
Dept: INTERNAL MEDICINE | Facility: CLINIC | Age: 74
End: 2024-01-09
Payer: MEDICARE

## 2024-01-09 VITALS
OXYGEN SATURATION: 95 % | HEART RATE: 64 BPM | WEIGHT: 233 LBS | BODY MASS INDEX: 32.62 KG/M2 | HEIGHT: 71 IN | DIASTOLIC BLOOD PRESSURE: 60 MMHG | SYSTOLIC BLOOD PRESSURE: 120 MMHG

## 2024-01-09 DIAGNOSIS — Z00.00 ENCOUNTER FOR MEDICARE ANNUAL WELLNESS EXAM: ICD-10-CM

## 2024-01-09 DIAGNOSIS — R73.09 ABNORMAL GLUCOSE: ICD-10-CM

## 2024-01-09 DIAGNOSIS — I10 PRIMARY HYPERTENSION: Primary | ICD-10-CM

## 2024-01-09 DIAGNOSIS — K21.00 GASTROESOPHAGEAL REFLUX DISEASE WITH ESOPHAGITIS WITHOUT HEMORRHAGE: ICD-10-CM

## 2024-01-09 DIAGNOSIS — E80.6 HYPERBILIRUBINEMIA: ICD-10-CM

## 2024-01-09 DIAGNOSIS — R60.0 BILATERAL LEG EDEMA: ICD-10-CM

## 2024-01-09 DIAGNOSIS — D69.6 THROMBOCYTOPENIA, UNSPECIFIED: ICD-10-CM

## 2024-01-09 DIAGNOSIS — I51.89 DIASTOLIC DYSFUNCTION: ICD-10-CM

## 2024-01-09 DIAGNOSIS — Z12.5 SCREENING FOR PROSTATE CANCER: ICD-10-CM

## 2024-01-09 LAB
ALBUMIN SERPL-MCNC: 4.2 G/DL (ref 3.5–5.2)
ALBUMIN/GLOB SERPL: 1.7 G/DL
ALP SERPL-CCNC: 60 U/L (ref 39–117)
ALT SERPL W P-5'-P-CCNC: 23 U/L (ref 1–41)
ANION GAP SERPL CALCULATED.3IONS-SCNC: 11 MMOL/L (ref 5–15)
AST SERPL-CCNC: 17 U/L (ref 1–40)
BILIRUB BLD-MCNC: NEGATIVE MG/DL
BILIRUB SERPL-MCNC: 0.8 MG/DL (ref 0–1.2)
BUN SERPL-MCNC: 13 MG/DL (ref 8–23)
BUN/CREAT SERPL: 12.7 (ref 7–25)
CALCIUM SPEC-SCNC: 9.3 MG/DL (ref 8.6–10.5)
CHLORIDE SERPL-SCNC: 105 MMOL/L (ref 98–107)
CHOLEST SERPL-MCNC: 163 MG/DL (ref 0–200)
CLARITY, POC: CLEAR
CO2 SERPL-SCNC: 25 MMOL/L (ref 22–29)
COLOR UR: YELLOW
CREAT SERPL-MCNC: 1.02 MG/DL (ref 0.76–1.27)
DEPRECATED RDW RBC AUTO: 43.2 FL (ref 37–54)
EGFRCR SERPLBLD CKD-EPI 2021: 78.6 ML/MIN/1.73
ERYTHROCYTE [DISTWIDTH] IN BLOOD BY AUTOMATED COUNT: 13.9 % (ref 12.3–15.4)
EXPIRATION DATE: NORMAL
GLOBULIN UR ELPH-MCNC: 2.5 GM/DL
GLUCOSE SERPL-MCNC: 120 MG/DL (ref 65–99)
GLUCOSE UR STRIP-MCNC: NEGATIVE MG/DL
HBA1C MFR BLD: 5.6 % (ref 4.8–5.6)
HCT VFR BLD AUTO: 45.5 % (ref 37.5–51)
HDLC SERPL-MCNC: 37 MG/DL (ref 40–60)
HGB BLD-MCNC: 16 G/DL (ref 13–17.7)
KETONES UR QL: NEGATIVE
LDLC SERPL CALC-MCNC: 98 MG/DL (ref 0–100)
LDLC/HDLC SERPL: 2.55 {RATIO}
LEUKOCYTE EST, POC: NEGATIVE
Lab: NORMAL
MCH RBC QN AUTO: 30.4 PG (ref 26.6–33)
MCHC RBC AUTO-ENTMCNC: 35.2 G/DL (ref 31.5–35.7)
MCV RBC AUTO: 86.5 FL (ref 79–97)
NITRITE UR-MCNC: NEGATIVE MG/ML
PH UR: 6 [PH] (ref 5–8)
PLATELET # BLD AUTO: 158 10*3/MM3 (ref 140–450)
PMV BLD AUTO: 10 FL (ref 6–12)
POTASSIUM SERPL-SCNC: 4 MMOL/L (ref 3.5–5.2)
PROT SERPL-MCNC: 6.7 G/DL (ref 6–8.5)
PROT UR STRIP-MCNC: NEGATIVE MG/DL
PSA SERPL-MCNC: 4.53 NG/ML (ref 0–4)
RBC # BLD AUTO: 5.26 10*6/MM3 (ref 4.14–5.8)
RBC # UR STRIP: NEGATIVE /UL
SODIUM SERPL-SCNC: 141 MMOL/L (ref 136–145)
SP GR UR: 1.02 (ref 1–1.03)
TRIGL SERPL-MCNC: 159 MG/DL (ref 0–150)
TSH SERPL DL<=0.05 MIU/L-ACNC: 1.07 UIU/ML (ref 0.27–4.2)
UROBILINOGEN UR QL: NORMAL
VIT B12 BLD-MCNC: 1019 PG/ML (ref 211–946)
VLDLC SERPL-MCNC: 28 MG/DL (ref 5–40)
WBC NRBC COR # BLD AUTO: 9.15 10*3/MM3 (ref 3.4–10.8)

## 2024-01-09 PROCEDURE — 85027 COMPLETE CBC AUTOMATED: CPT | Performed by: INTERNAL MEDICINE

## 2024-01-09 PROCEDURE — 83036 HEMOGLOBIN GLYCOSYLATED A1C: CPT | Performed by: INTERNAL MEDICINE

## 2024-01-09 PROCEDURE — 82607 VITAMIN B-12: CPT | Performed by: INTERNAL MEDICINE

## 2024-01-09 PROCEDURE — G0103 PSA SCREENING: HCPCS | Performed by: INTERNAL MEDICINE

## 2024-01-09 PROCEDURE — 80053 COMPREHEN METABOLIC PANEL: CPT | Performed by: INTERNAL MEDICINE

## 2024-01-09 PROCEDURE — 80061 LIPID PANEL: CPT | Performed by: INTERNAL MEDICINE

## 2024-01-09 PROCEDURE — 36415 COLL VENOUS BLD VENIPUNCTURE: CPT | Performed by: INTERNAL MEDICINE

## 2024-01-09 PROCEDURE — 84443 ASSAY THYROID STIM HORMONE: CPT | Performed by: INTERNAL MEDICINE

## 2024-01-09 RX ORDER — FUROSEMIDE 20 MG/1
20 TABLET ORAL DAILY PRN
Qty: 30 TABLET | Refills: 2 | Status: SHIPPED | OUTPATIENT
Start: 2024-01-09

## 2024-01-09 RX ORDER — AMLODIPINE BESYLATE 2.5 MG/1
2.5 TABLET ORAL NIGHTLY
Qty: 90 TABLET | Refills: 3 | Status: SHIPPED | OUTPATIENT
Start: 2024-01-09

## 2024-01-09 NOTE — PROGRESS NOTES
The ABCs of the Annual Wellness Visit  Subsequent Medicare Wellness Visit    Chief Complaint   Patient presents with    Annual Exam      Subjective    History of Present Illness:  Jimmie Salcedo is a 73 y.o. male who presents for a Subsequent Medicare Wellness Visit.    The following portions of the patient's history were reviewed and   updated as appropriate: current medications, past family history, past medical history, past social history, past surgical history, and problem list.     Compared to one year ago, the patient feels his physical   health is the same.    Compared to one year ago, the patient feels his mental   health is the same.    Recent Hospitalizations:  He was not admitted to the hospital during the last year.       Current Medical Providers:  Patient Care Team:  Javad Negron MD as PCP - General (Internal Medicine)  Sundeep Berger MD as Consulting Physician (Infectious Diseases)    Outpatient Medications Prior to Visit   Medication Sig Dispense Refill    Acetaminophen 500 MG capsule Take 1 capsule by mouth 2 (Two) Times a Day.      buPROPion XL (WELLBUTRIN XL) 150 MG 24 hr tablet       calcipotriene (DOVONOX) 0.005 % ointment As Needed.      Cholecalciferol (VITAMIN D3) 125 MCG (5000 UT) capsule capsule Take 1 capsule by mouth Daily.      docusate sodium (Colace) 100 MG capsule Take 1 capsule by mouth 2 (Two) Times a Day. 60 capsule 0    doxazosin (CARDURA) 4 MG tablet TAKE 1 TABLET BY MOUTH EVERY EVENING 90 tablet 3    fenofibrate (TRICOR) 145 MG tablet TAKE ONE TABLET BY MOUTH EVERY DAY 90 tablet 3    MULTIPLE VITAMINS PO Take 1 tablet by mouth Daily.      mupirocin (BACTROBAN) 2 % ointment Apply  topically to the appropriate area as directed Every 12 (Twelve) Hours.      Omega-3 Fatty Acids (FISH OIL) 1000 MG capsule capsule Take 1 capsule by mouth Daily.      phenol (CHLORASEPTIC) 1.4 % liquid liquid Apply 1 spray to the mouth or throat Every 2 (Two) Hours As Needed (pain). 118 mL 0     pravastatin (PRAVACHOL) 40 MG tablet TAKE ONE TABLET BY MOUTH EVERY DAY 30 tablet 3    Sacubitril-Valsartan (ENTRESTO PO) Take  by mouth.      sildenafil (VIAGRA) 100 MG tablet Take 1 tablet by mouth Daily As Needed for erectile dysfunction. 6 tablet 5    triamcinolone (KENALOG) 0.1 % cream Apply  topically to the appropriate area as directed Every 12 (Twelve) Hours.      urea (CARMOL) 20 % cream Apply  topically to the appropriate area as directed As Needed for Dry Skin.      vitamin B-6 (PYRIDOXINE) 50 MG tablet Take 1 tablet by mouth Daily.      amLODIPine (NORVASC) 5 MG tablet TAKE ONE TABLET BY MOUTH EVERY DAY 30 tablet 3    benzonatate (Tessalon Perles) 100 MG capsule Take 1 capsule by mouth 3 (Three) Times a Day As Needed for Cough. (Patient not taking: Reported on 1/9/2024) 30 capsule 1    guaifenesin-dextromethorphan (MUCINEX DM)  MG tablet sustained-release 12 hour tablet Take 2 tablets by mouth Every 12 (Twelve) Hours. (Patient not taking: Reported on 1/9/2024) 20 tablet 0    methylPREDNISolone (MEDROL) 4 MG dose pack Take as directed on package instructions - 6 day taper. (Patient not taking: Reported on 1/9/2024) 21 tablet 0     No facility-administered medications prior to visit.       No opioid medication identified on active medication list. I have reviewed chart for other potential  high risk medication/s and harmful drug interactions in the elderly.        Aspirin is not on active medication list.  Aspirin use is indicated based on review of current medical condition/s. Pros and cons of this therapy have been discussed with this patient. Benefits of this medication outweigh potential harm.  Patient has been instructed to start taking this medication..    Fall Risk Assessment was completed, and patient is at low risk for falls.      Patient Active Problem List   Diagnosis    Acid reflux    Arthritis    Hypertension    Hyperlipidemia    Severe obstructive sleep apnea    Psoriasis    Diastolic  dysfunction    Peptic ulceration    Bilateral edema of lower extremity    Abnormal liver function test    Hyperbilirubinemia    Psychophysiological insomnia    Gilbert's disease    Thrombocytopenia, unspecified    Situational depression    Tubular adenoma    Seasonal allergic rhinitis due to pollen    Mild cognitive impairment    Memory loss    Primary osteoarthritis of right knee    Status post total right knee replacement    Leukocytosis, likely reactive    Benign prostatic hyperplasia with urinary frequency    Spondylosis of lumbar region without myelopathy or radiculopathy    Degeneration of lumbar or lumbosacral intervertebral disc    Lumbar interspinous bursitis    Myofascial pain    Osteoarthritis of multiple joints    Baastrup's syndrome    Gastroesophageal reflux disease with esophagitis without hemorrhage    History of COVID-19    Primary osteoarthritis of left knee     Advance Care Planning   Advance Directive is on file.  ACP discussion was held with the patient during this visit. Patient has an advance directive in EMR which is still valid.     Review of Systems   Constitutional:  Positive for fatigue (better). Negative for chills, fever and unexpected weight change.   HENT:  Negative for ear pain, hearing loss, rhinorrhea, sinus pressure, sore throat and trouble swallowing.    Eyes:  Negative for discharge and itching.   Respiratory:  Positive for shortness of breath. Negative for cough and chest tightness.    Cardiovascular:  Positive for leg swelling (L>R, better with compression hose). Negative for chest pain.   Gastrointestinal:  Negative for abdominal pain, blood in stool, diarrhea and vomiting.        2013 colonoscopy normal  9/18 colonoscopy with TA times 1, repeat in 9/23   Endocrine: Negative for polydipsia and polyuria.   Genitourinary:  Negative for difficulty urinating, dysuria, enuresis, frequency, hematuria and urgency.        10/21 psa  ED  Followed by Dr Fu   Musculoskeletal:   "Positive for arthralgias and back pain. Negative for gait problem, joint swelling and neck pain.   Skin:  Negative for rash and wound.        Dry skin   Allergic/Immunologic: Negative for immunocompromised state.   Neurological:  Negative for dizziness, syncope, weakness, light-headedness, numbness and headaches.   Hematological:  Does not bruise/bleed easily.   Psychiatric/Behavioral:  Positive for behavioral problems (improved) and decreased concentration (improved). Negative for dysphoric mood and sleep disturbance. The patient is nervous/anxious (improved).          Objective       Vitals:    01/09/24 1005 01/09/24 1047   BP: 128/70 120/60   BP Location: Left arm    Patient Position: Sitting    Pulse: 64    SpO2: 95%    Weight: 106 kg (233 lb)    Height: 179.1 cm (70.51\")    PainSc:   3      BMI Readings from Last 1 Encounters:   01/09/24 32.95 kg/m²   BMI is above normal parameters. Recommendations include: exercise counseling and nutrition counseling    Does the patient have evidence of cognitive impairment? No    Physical Exam  Constitutional:       Appearance: Normal appearance. He is well-developed. He is obese.   HENT:      Head: Normocephalic and atraumatic.      Right Ear: External ear normal.      Left Ear: External ear normal.      Nose: Nose normal.      Mouth/Throat:      Mouth: Mucous membranes are moist.      Pharynx: Oropharynx is clear.   Eyes:      Extraocular Movements: Extraocular movements intact.      Conjunctiva/sclera: Conjunctivae normal.      Pupils: Pupils are equal, round, and reactive to light.   Cardiovascular:      Rate and Rhythm: Normal rate and regular rhythm.      Heart sounds: Normal heart sounds.   Pulmonary:      Effort: Pulmonary effort is normal.      Breath sounds: Normal breath sounds.   Abdominal:      General: Bowel sounds are normal.      Palpations: Abdomen is soft.   Musculoskeletal:         General: Normal range of motion.      Cervical back: Normal range of motion " and neck supple.      Right lower leg: Edema present.      Left lower leg: Edema present.   Lymphadenopathy:      Cervical: No cervical adenopathy.   Skin:     General: Skin is warm and dry.   Neurological:      General: No focal deficit present.      Mental Status: He is alert and oriented to person, place, and time.   Psychiatric:         Mood and Affect: Mood normal.         Behavior: Behavior normal.         Thought Content: Thought content normal.       Lab Results   Component Value Date    TRIG 159 (H) 2024    HDL 37 (L) 2024    LDL 98 2024    VLDL 28 2024    HGBA1C 5.60 2024            HEALTH RISK ASSESSMENT    Smoking Status:  Social History     Tobacco Use   Smoking Status Never   Smokeless Tobacco Never     Alcohol Consumption:  Social History     Substance and Sexual Activity   Alcohol Use Yes    Alcohol/week: 4.0 standard drinks of alcohol    Types: 4 Cans of beer per week     Fall Risk Screen:    PRANEETH Fall Risk Assessment was completed, and patient is at MODERATE risk for falls. Assessment completed on:2024    Depression Screenin/9/2024    10:00 AM   PHQ-2/PHQ-9 Depression Screening   Little Interest or Pleasure in Doing Things 0-->not at all   Feeling Down, Depressed or Hopeless 0-->not at all   PHQ-9: Brief Depression Severity Measure Score 0       Health Habits and Functional and Cognitive Screenin/9/2024    10:00 AM   Functional & Cognitive Status   Do you have difficulty preparing food and eating? No   Do you have difficulty bathing yourself, getting dressed or grooming yourself? No   Do you have difficulty using the toilet? No   Do you have difficulty moving around from place to place? No   Do you have trouble with steps or getting out of a bed or a chair? Yes   Current Diet Well Balanced Diet   Dental Exam Up to date   Eye Exam Up to date   Exercise (times per week) 2 times per week   Current Exercises Include Walking   Do you need help using  the phone?  No   Are you deaf or do you have serious difficulty hearing?  No   Do you need help to go to places out of walking distance? No   Do you need help shopping? No   Do you need help preparing meals?  No   Do you need help with housework?  No   Do you need help with laundry? No   Do you need help taking your medications? No   Do you need help managing money? No   Do you ever drive or ride in a car without wearing a seat belt? No   Have you felt unusual stress, anger or loneliness in the last month? No   Who do you live with? Spouse   If you need help, do you have trouble finding someone available to you? No   Have you been bothered in the last four weeks by sexual problems? No   Do you have difficulty concentrating, remembering or making decisions? Yes       Age-appropriate Screening Schedule:  Refer to the list below for future screening recommendations based on patient's age, sex and/or medical conditions. Orders for these recommended tests are listed in the plan section. The patient has been provided with a written plan.    Health Maintenance   Topic Date Due    ZOSTER VACCINE (2 of 3) 12/17/2014    COVID-19 Vaccine (6 - 2023-24 season) 09/01/2023    ANNUAL WELLNESS VISIT  01/09/2025    LIPID PANEL  01/09/2025    BMI FOLLOWUP  01/09/2025    COLORECTAL CANCER SCREENING  09/25/2028    TDAP/TD VACCINES (4 - Td or Tdap) 09/09/2032    HEPATITIS C SCREENING  Completed    INFLUENZA VACCINE  Completed    Pneumococcal Vaccine 65+  Completed              Assessment & Plan     CMS Preventative Services Quick Reference  Risk Factors Identified During Encounter  Immunizations Discussed/Encouraged: Shingrix, COVID19, and RSV (Respiratory Syncytial Virus)  Inactivity/Sedentary: Patient was advised to exercise at least 150 minutes a week per CDC recommendations.  Polypharmacy: Medication List reviewed and Medications are appropriate for patient  The above risks/problems have been discussed with the patient.  Follow up  actions/plans if indicated are seen below in the Assessment/Plan Section.  Pertinent information has been shared with the patient in the After Visit Summary.    Diagnoses and all orders for this visit:    1. Primary hypertension (Primary)  -     amLODIPine (NORVASC) 2.5 MG tablet; Take 1 tablet by mouth Every Night.  Dispense: 90 tablet; Refill: 3    2. Diastolic dysfunction    3. Thrombocytopenia, unspecified    4. Hyperbilirubinemia    5. Gastroesophageal reflux disease with esophagitis without hemorrhage    6. Encounter for Medicare annual wellness exam  -     CBC (No Diff)  -     Comprehensive Metabolic Panel  -     Lipid Panel  -     Hemoglobin A1c  -     TSH  -     PSA Screen  -     Vitamin B12  -     POC Urinalysis Dipstick, Automated    7. Abnormal glucose  -     Hemoglobin A1c    8. Screening for prostate cancer  -     PSA Screen    9. Bilateral leg edema  -     furosemide (Lasix) 20 MG tablet; Take 1 tablet by mouth Daily As Needed (edema).  Dispense: 30 tablet; Refill: 2        Follow Up:   No follow-ups on file.     An After Visit Summary and PPPS were given to the patient.             HTN-controlled on multi drug tx, advised goal of 150/80, drop amlodipine to 2.5 mg , should help with edema  hyperlipidemia-cont pravachol/fibrate, labs today noted, advised exercise and wt loss to decrease TG and increase HDL  BPH-stable on cardura, psa down, s/p Green Light  GERD-stable on omeprazole  djd-advised to limit nsaids, stable  Hyperbilirubinemia-recheck  Thrombocytopenia-recheck  Situational depression-stable with combo remeron/wellbutrin  Constipation-citrucel/miralax combo, stable  Abnormal glucose-labs today at goal but increased, counseled on diet  Edema-see above, lasix prn      1/9 labs noted and dw patient

## 2024-03-02 ENCOUNTER — APPOINTMENT (OUTPATIENT)
Dept: CT IMAGING | Facility: HOSPITAL | Age: 74
End: 2024-03-02
Payer: MEDICARE

## 2024-03-02 ENCOUNTER — HOSPITAL ENCOUNTER (EMERGENCY)
Facility: HOSPITAL | Age: 74
Discharge: HOME | End: 2024-03-02
Attending: STUDENT IN AN ORGANIZED HEALTH CARE EDUCATION/TRAINING PROGRAM
Payer: MEDICARE

## 2024-03-02 VITALS
DIASTOLIC BLOOD PRESSURE: 84 MMHG | RESPIRATION RATE: 14 BRPM | WEIGHT: 224 LBS | OXYGEN SATURATION: 95 % | BODY MASS INDEX: 32.07 KG/M2 | HEART RATE: 61 BPM | TEMPERATURE: 97.6 F | SYSTOLIC BLOOD PRESSURE: 151 MMHG | HEIGHT: 70 IN

## 2024-03-02 DIAGNOSIS — S22.32XA CLOSED FRACTURE OF ONE RIB OF LEFT SIDE, INITIAL ENCOUNTER: Primary | ICD-10-CM
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
## 2024-03-02 PROCEDURE — 71250 CT THORAX DX C-: CPT

## 2024-03-02 PROCEDURE — 99284 EMERGENCY DEPT VISIT MOD MDM: CPT

## 2024-03-02 NOTE — DISCHARGE INSTRUCTIONS
Rest.  Use a pillow to splint the ribs when you have to cough.  Using incentive spirometer as directed.  You can use a cough medicine such as Delsym or Mucinex as directed to help ease your cough.  Tylenol or Motrin as directed for pain.  Follow-up with your PCP or return to the emergency department if acutely worse.

## 2024-03-02 NOTE — ED PROVIDER NOTES
"Subjective   History of Present Illness  Mr. Salcedo is a 73 year old male with hx of HTN, HLD, who presents to the ED with left lower anterior rib pain after slipping and falling in his garage a week ago.  He states he landed with his arm against his ribs.  He has had continued left rib pain since the fall but is now having some pain with cough or deep inspiration.  No abdominal pain.  Mild shortness of breath.  Denies other injury secondary to the fall.  Specifically, did not hit his head.  No neck or back pain.  He is not on any blood thinners.        Review of Systems   Constitutional:  Negative for fever.   HENT:  Negative for sore throat.    Respiratory:  Positive for cough. Negative for shortness of breath.    Cardiovascular:  Positive for chest pain (left anterior lower ribs). Negative for palpitations.   Gastrointestinal:  Negative for abdominal pain, nausea and vomiting.   Genitourinary:  Negative for dysuria and hematuria.   Musculoskeletal:  Negative for back pain and neck pain.   Skin:  Negative for wound.   Neurological:  Negative for headaches.   Hematological: Negative.    Psychiatric/Behavioral: Negative.         Past Medical History:   Diagnosis Date    Abnormal liver function test     Anxiety     Arthritis     Arthritis of back     Arthritis of neck     Benign prostatic hyperplasia     Bilateral edema of lower extremity     Bursitis of hip     Cataract     bilat- still present - mild     Cellulitis of leg, right     resolved    Chalazion     Cracking skin     bilat feet mostly     Degeneration of lumbar or lumbosacral intervertebral disc 11/20/2020    Disease     \"brakes/breaks\" disease as child-  effected blood and urine     Fracture, finger     Fracture, foot     GERD (gastroesophageal reflux disease)     Hip arthrosis 2022    Pain walking    History of kidney stones     x2 had to have broken up     Hoarseness     from vocal cord bending- seeing ent - possible surgery soon     Hyperlipidemia     " Hypertension     Kidney infection     h/o     Knee swelling 2015    Ongoing    Neck muscle spasm     Onychomycosis of toenail     Peptic ulceration     Pulmonary embolism     Renal calculi     Situational depression 08/22/2018    Sleep apnea with use of continuous positive airway pressure (CPAP)     compliant with machine     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     Vocal cord strain     vocal cord bent- seeing ent but causes pt to be hoarse     Wears glasses        Allergies   Allergen Reactions    Penicillins Rash and Unknown - High Severity     Generalized  Pt states rash occurred as a child and has not used since  Generalized  Pt states rash occurred as a child and has not used since         Past Surgical History:   Procedure Laterality Date    BACK SURGERY      CARDIAC CATHETERIZATION      COLONOSCOPY      CYSTOSCOPY W/ LASER LITHOTRIPSY      x2    ENDOSCOPY      with dilation     FINGER SURGERY      left 5th finger    HAND SURGERY  2018    Little Finger - Left Hand    JOINT REPLACEMENT      KNEE SURGERY Right 1986    arthroscopy    LASER OF PROSTATE W/ GREEN LIGHT PVP  02/2022    Dr Fu    OTHER SURGICAL HISTORY      Lithotripsy    PROSTATE SURGERY      TONSILLECTOMY      TOTAL KNEE ARTHROPLASTY Right 07/22/2020    Procedure: TOTAL KNEE ARTHROPLASTY RIGHT;  Surgeon: Tereso Restrepo MD;  Location: [facility];  Service: Orthopedics;  Laterality: Right;    TRIGGER POINT INJECTION         Family History   Problem Relation Age of Onset    Arthritis Other     Hypertension Other     Hyperlipidemia Other     Heart attack Other     Hypertension Mother     Cancer Mother         Colon Cancer    Heart disease Father     Hypertension Father     Alcohol abuse Brother        Social History     Socioeconomic History    Marital status:    Tobacco Use    Smoking status: Never    Smokeless tobacco: Never   Substance and Sexual Activity    Alcohol use: Yes     Alcohol/week: 4.0 standard drinks of alcohol      Types: 4 Cans of beer per week    Drug use: No    Sexual activity: Yes     Partners: Female     Birth control/protection: None           Objective   Physical Exam  Constitutional:       General: He is not in acute distress.     Appearance: Normal appearance.   HENT:      Head: Normocephalic and atraumatic.      Right Ear: External ear normal.      Left Ear: External ear normal.      Nose: Nose normal.      Mouth/Throat:      Mouth: Mucous membranes are moist.   Eyes:      Conjunctiva/sclera: Conjunctivae normal.      Pupils: Pupils are equal, round, and reactive to light.   Cardiovascular:      Rate and Rhythm: Normal rate and regular rhythm.      Pulses: Normal pulses.      Heart sounds: Normal heart sounds. No murmur heard.  Pulmonary:      Effort: Pulmonary effort is normal.      Breath sounds: Normal breath sounds.   Chest:      Chest wall: Tenderness (moderate tenderness on palpation over left anterolateral lower ribs.  No crepitus.) present.   Abdominal:      General: Bowel sounds are normal.      Tenderness: There is no abdominal tenderness. There is no guarding.   Musculoskeletal:         General: No tenderness or deformity.      Cervical back: Neck supple. No tenderness.      Right lower leg: No edema.      Left lower leg: No edema.   Skin:     General: Skin is warm.   Neurological:      Mental Status: He is alert and oriented to person, place, and time.   Psychiatric:         Mood and Affect: Mood normal.         Procedures           ED Course      In summary, 73-year-old male with history of hypertension and hyperlipidemia, presents to the emergency department with complaints of left anterior lateral lower rib pain after a slip and fall in his garage last week.  The patient landed on his arm against his ribs.  He denies any head injury.  No loss of consciousness.  No neck or back pain.  The patient notes that the left rib pain is worse with coughing.  He has no significant shortness of breath.  No  abdominal pain, nausea or vomiting.  No extremity pain.  He is not on any blood thinners.    MDM: Differential includes rib fracture, rib contusion, pneumonia, pleurisy, etc.    Patient sent for CT chest without contrast for further evaluation of left rib pain.                           CT chest:  1. No acute cardiopulmonary abnormality. No evidence of pneumothorax.  2. Simple single cortical buckle fracture involving the left lateral eighth rib. Correlate clinically for localized pain at this location.  3. Multiple bilateral chronic healed rib fracture deformities.  4. Dilation of the main pulmonary artery which can be seen with underlying pulmonary hypertension.    Discussed results with pt.  Will d/c home with incentive spirometer and have f/u as needed.                Medical Decision Making  Problems Addressed:  Closed fracture of one rib of left side, initial encounter: complicated acute illness or injury  Fall, initial encounter: complicated acute illness or injury    Amount and/or Complexity of Data Reviewed  Radiology: ordered.        Final diagnoses:   Closed fracture of one rib of left side, initial encounter   Fall, initial encounter       ED Disposition  ED Disposition       ED Disposition   Discharge    Condition   Stable    Comment   --               Javad Negron MD  [address]  [facility] [address]  [place] [address]  [phone]      As needed    [facility] EMERGENCY DEPARTMENT  [address] [facility] [address] [phone]    If symptoms worsen         Medication List      No changes were made to your prescriptions during this visit.            Curt Leonardo PA  03/02/24 0361

## 2024-03-05 ENCOUNTER — PATIENT OUTREACH (OUTPATIENT)
Dept: CASE MANAGEMENT | Facility: OTHER | Age: 74
End: 2024-03-05
Payer: MEDICARE

## 2024-03-05 NOTE — OUTREACH NOTE
Patient Outreach    AMBULATORY CASE MANAGEMENT NOTE    Name and Relationship of Patient/Support Person: Matias Jimmie RYAN - Self    Call placed to pt. Introduced self and role of ACM. Mr Salcedo states he is doing ok. Still having some pain off and on. Review of ED visit and AVS. He is using his incentive spirometer. Discussed need to follow up with Dr Negron. Denies quesitons or concerns but is grateful for the call.         Vanessa TREJO  Ambulatory Case Management    3/5/2024, 11:12 EST

## 2024-04-08 DIAGNOSIS — R60.0 BILATERAL LEG EDEMA: ICD-10-CM

## 2024-04-08 RX ORDER — FUROSEMIDE 20 MG/1
20 TABLET ORAL DAILY PRN
Qty: 30 TABLET | Refills: 2 | Status: SHIPPED | OUTPATIENT
Start: 2024-04-08

## 2024-05-02 ENCOUNTER — APPOINTMENT (OUTPATIENT)
Dept: GENERAL RADIOLOGY | Facility: HOSPITAL | Age: 74
End: 2024-05-02
Payer: MEDICARE

## 2024-05-02 ENCOUNTER — APPOINTMENT (OUTPATIENT)
Dept: ULTRASOUND IMAGING | Facility: HOSPITAL | Age: 74
End: 2024-05-02
Payer: MEDICARE

## 2024-05-02 ENCOUNTER — HOSPITAL ENCOUNTER (EMERGENCY)
Facility: HOSPITAL | Age: 74
Discharge: HOME | End: 2024-05-02
Attending: EMERGENCY MEDICINE
Payer: MEDICARE

## 2024-05-02 VITALS
BODY MASS INDEX: 31.5 KG/M2 | TEMPERATURE: 97.9 F | HEIGHT: 70 IN | HEART RATE: 72 BPM | DIASTOLIC BLOOD PRESSURE: 68 MMHG | RESPIRATION RATE: 16 BRPM | OXYGEN SATURATION: 96 % | SYSTOLIC BLOOD PRESSURE: 169 MMHG | WEIGHT: 220 LBS

## 2024-05-02 DIAGNOSIS — K80.20 GALLSTONES: ICD-10-CM

## 2024-05-02 DIAGNOSIS — R07.9 CHEST PAIN, UNSPECIFIED TYPE: Primary | ICD-10-CM

## 2024-05-02 LAB
ALBUMIN SERPL-MCNC: 4.1 G/DL (ref 3.5–5.2)
ALBUMIN/GLOB SERPL: 1.5 G/DL
ALP SERPL-CCNC: 61 U/L (ref 39–117)
ALT SERPL W P-5'-P-CCNC: 14 U/L (ref 1–41)
ANION GAP SERPL CALCULATED.3IONS-SCNC: 11 MMOL/L (ref 5–15)
AST SERPL-CCNC: 17 U/L (ref 1–40)
BASOPHILS # BLD AUTO: 0.05 10*3/MM3 (ref 0–0.2)
BASOPHILS NFR BLD AUTO: 0.7 % (ref 0–1.5)
BILIRUB SERPL-MCNC: 1 MG/DL (ref 0–1.2)
BUN SERPL-MCNC: 12 MG/DL (ref 8–23)
BUN/CREAT SERPL: 12 (ref 7–25)
CALCIUM SPEC-SCNC: 9.1 MG/DL (ref 8.6–10.5)
CHLORIDE SERPL-SCNC: 104 MMOL/L (ref 98–107)
CO2 SERPL-SCNC: 26 MMOL/L (ref 22–29)
CREAT SERPL-MCNC: 1 MG/DL (ref 0.76–1.27)
DEPRECATED RDW RBC AUTO: 48.2 FL (ref 37–54)
EGFRCR SERPLBLD CKD-EPI 2021: 80.5 ML/MIN/1.73
EOSINOPHIL # BLD AUTO: 0.35 10*3/MM3 (ref 0–0.4)
EOSINOPHIL NFR BLD AUTO: 4.7 % (ref 0.3–6.2)
ERYTHROCYTE [DISTWIDTH] IN BLOOD BY AUTOMATED COUNT: 15.1 % (ref 12.3–15.4)
GEN 5 2HR TROPONIN T REFLEX: 12 NG/L
GLOBULIN UR ELPH-MCNC: 2.7 GM/DL
GLUCOSE SERPL-MCNC: 132 MG/DL (ref 65–99)
HCT VFR BLD AUTO: 48.8 % (ref 37.5–51)
HGB BLD-MCNC: 16.9 G/DL (ref 13–17.7)
HOLD SPECIMEN: NORMAL
IMM GRANULOCYTES # BLD AUTO: 0.02 10*3/MM3 (ref 0–0.05)
IMM GRANULOCYTES NFR BLD AUTO: 0.3 % (ref 0–0.5)
LIPASE SERPL-CCNC: 34 U/L (ref 13–60)
LYMPHOCYTES # BLD AUTO: 1.04 10*3/MM3 (ref 0.7–3.1)
LYMPHOCYTES NFR BLD AUTO: 14 % (ref 19.6–45.3)
MCH RBC QN AUTO: 30.2 PG (ref 26.6–33)
MCHC RBC AUTO-ENTMCNC: 34.6 G/DL (ref 31.5–35.7)
MCV RBC AUTO: 87.1 FL (ref 79–97)
MONOCYTES # BLD AUTO: 0.47 10*3/MM3 (ref 0.1–0.9)
MONOCYTES NFR BLD AUTO: 6.3 % (ref 5–12)
NEUTROPHILS NFR BLD AUTO: 5.48 10*3/MM3 (ref 1.7–7)
NEUTROPHILS NFR BLD AUTO: 74 % (ref 42.7–76)
NRBC BLD AUTO-RTO: 0 /100 WBC (ref 0–0.2)
NT-PROBNP SERPL-MCNC: 96.8 PG/ML (ref 0–900)
PLATELET # BLD AUTO: 139 10*3/MM3 (ref 140–450)
PMV BLD AUTO: 9.9 FL (ref 6–12)
POTASSIUM SERPL-SCNC: 3.8 MMOL/L (ref 3.5–5.2)
PROT SERPL-MCNC: 6.8 G/DL (ref 6–8.5)
QT INTERVAL: 456 MS
QT INTERVAL: 478 MS
QTC INTERVAL: 447 MS
QTC INTERVAL: 516 MS
RBC # BLD AUTO: 5.6 10*6/MM3 (ref 4.14–5.8)
SODIUM SERPL-SCNC: 141 MMOL/L (ref 136–145)
TROPONIN T DELTA: -2 NG/L
TROPONIN T SERPL HS-MCNC: 14 NG/L
WBC NRBC COR # BLD AUTO: 7.41 10*3/MM3 (ref 3.4–10.8)
WHOLE BLOOD HOLD COAG: NORMAL
WHOLE BLOOD HOLD SPECIMEN: NORMAL

## 2024-05-02 PROCEDURE — 76705 ECHO EXAM OF ABDOMEN: CPT

## 2024-05-02 PROCEDURE — 84484 ASSAY OF TROPONIN QUANT: CPT | Performed by: EMERGENCY MEDICINE

## 2024-05-02 PROCEDURE — 83690 ASSAY OF LIPASE: CPT | Performed by: EMERGENCY MEDICINE

## 2024-05-02 PROCEDURE — 93005 ELECTROCARDIOGRAM TRACING: CPT

## 2024-05-02 PROCEDURE — 71045 X-RAY EXAM CHEST 1 VIEW: CPT

## 2024-05-02 PROCEDURE — 85025 COMPLETE CBC W/AUTO DIFF WBC: CPT | Performed by: EMERGENCY MEDICINE

## 2024-05-02 PROCEDURE — 36415 COLL VENOUS BLD VENIPUNCTURE: CPT

## 2024-05-02 PROCEDURE — 93005 ELECTROCARDIOGRAM TRACING: CPT | Performed by: EMERGENCY MEDICINE

## 2024-05-02 PROCEDURE — 80053 COMPREHEN METABOLIC PANEL: CPT | Performed by: EMERGENCY MEDICINE

## 2024-05-02 PROCEDURE — 83880 ASSAY OF NATRIURETIC PEPTIDE: CPT | Performed by: EMERGENCY MEDICINE

## 2024-05-02 PROCEDURE — 99284 EMERGENCY DEPT VISIT MOD MDM: CPT

## 2024-05-02 RX ORDER — SODIUM CHLORIDE 0.9 % (FLUSH) 0.9 %
10 SYRINGE (ML) INJECTION AS NEEDED
Status: DISCONTINUED | OUTPATIENT
Start: 2024-05-02 | End: 2024-05-02 | Stop reason: HOSPADM

## 2024-05-02 RX ORDER — ASPIRIN 81 MG/1
324 TABLET, CHEWABLE ORAL ONCE
Status: DISCONTINUED | OUTPATIENT
Start: 2024-05-02 | End: 2024-05-02 | Stop reason: HOSPADM

## 2024-05-02 RX ORDER — DICYCLOMINE HCL 20 MG
20 TABLET ORAL EVERY 6 HOURS PRN
Qty: 20 TABLET | Refills: 0 | Status: SHIPPED | OUTPATIENT
Start: 2024-05-02

## 2024-05-03 NOTE — ED PROVIDER NOTES
"Subjective   History of Present Illness    Pt presents with chest pain.  He has been having right lower chest/RUQ pain for over a week, intermittent at first but fairly constant the last few days.  Some mild COOPER as well.  A cough, his wife has had one too. No fever or leg pain/swelling.  No vomiting or diarrhea. Symptoms are not related to eating as he can tell.    PMH HTN.  He is on Entresto but says for BP and not for HF.  Still has gallbladder.    History provided by:  Patient      Review of Systems   Constitutional:  Negative for fever.   Respiratory:  Positive for cough and shortness of breath.    Cardiovascular:  Positive for chest pain. Negative for palpitations and leg swelling.   Gastrointestinal:  Positive for abdominal pain. Negative for diarrhea and vomiting.   All other systems reviewed and are negative.      Past Medical History:   Diagnosis Date    Abnormal liver function test     Anxiety     Arthritis     Arthritis of back     Arthritis of neck     Benign prostatic hyperplasia     Bilateral edema of lower extremity     Bursitis of hip     Cataract     bilat- still present - mild     Cellulitis of leg, right     resolved    Chalazion     Cracking skin     bilat feet mostly     Degeneration of lumbar or lumbosacral intervertebral disc 11/20/2020    Disease     \"brakes/breaks\" disease as child-  effected blood and urine     Fracture, finger     Fracture, foot     GERD (gastroesophageal reflux disease)     Hip arthrosis 2022    Pain walking    History of kidney stones     x2 had to have broken up     Hoarseness     from vocal cord bending- seeing ent - possible surgery soon     Hyperlipidemia     Hypertension     Kidney infection     h/o     Knee swelling 2015    Ongoing    Neck muscle spasm     Onychomycosis of toenail     Peptic ulceration     Pulmonary embolism     Renal calculi     Situational depression 08/22/2018    Sleep apnea with use of continuous positive airway pressure (CPAP)     compliant " with machine     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     Vocal cord strain     vocal cord bent- seeing ent but causes pt to be hoarse     Wears glasses        Allergies   Allergen Reactions    Penicillins Rash and Unknown - High Severity     Generalized  Pt states rash occurred as a child and has not used since  Generalized  Pt states rash occurred as a child and has not used since         Past Surgical History:   Procedure Laterality Date    BACK SURGERY      CARDIAC CATHETERIZATION      COLONOSCOPY      CYSTOSCOPY W/ LASER LITHOTRIPSY      x2    ENDOSCOPY      with dilation     FINGER SURGERY      left 5th finger    HAND SURGERY  2018    Little Finger - Left Hand    JOINT REPLACEMENT      KNEE SURGERY Right 1986    arthroscopy    LASER OF PROSTATE W/ GREEN LIGHT PVP  02/2022    Dr Fu    OTHER SURGICAL HISTORY      Lithotripsy    PROSTATE SURGERY      TONSILLECTOMY      TOTAL KNEE ARTHROPLASTY Right 07/22/2020    Procedure: TOTAL KNEE ARTHROPLASTY RIGHT;  Surgeon: Tereso Restrepo MD;  Location: [facility];  Service: Orthopedics;  Laterality: Right;    TRIGGER POINT INJECTION         Family History   Problem Relation Age of Onset    Arthritis Other     Hypertension Other     Hyperlipidemia Other     Heart attack Other     Hypertension Mother     Cancer Mother         Colon Cancer    Heart disease Father     Hypertension Father     Alcohol abuse Brother        Social History     Socioeconomic History    Marital status:    Tobacco Use    Smoking status: Never    Smokeless tobacco: Never   Substance and Sexual Activity    Alcohol use: Yes     Alcohol/week: 4.0 standard drinks of alcohol     Types: 4 Cans of beer per week    Drug use: No    Sexual activity: Yes     Partners: Female     Birth control/protection: None           Objective   Physical Exam  Vitals and nursing note reviewed.   Constitutional:       General: He is not in acute distress.     Appearance: Normal appearance. He is not  ill-appearing.   HENT:      Head: Normocephalic and atraumatic.      Mouth/Throat:      Mouth: Mucous membranes are moist.   Eyes:      General: No scleral icterus.        Right eye: No discharge.         Left eye: No discharge.      Conjunctiva/sclera: Conjunctivae normal.   Cardiovascular:      Rate and Rhythm: Normal rate and regular rhythm.      Heart sounds: No murmur heard.  Pulmonary:      Effort: Pulmonary effort is normal. No respiratory distress.      Breath sounds: Normal breath sounds. No wheezing.   Abdominal:      General: Bowel sounds are normal. There is no distension.      Palpations: Abdomen is soft. There is no hepatomegaly.      Tenderness: There is abdominal tenderness (mild RUQ). There is no guarding or rebound.   Musculoskeletal:         General: No swelling. Normal range of motion.      Cervical back: Normal range of motion and neck supple.      Right lower leg: No tenderness. No edema.      Left lower leg: No edema.   Skin:     General: Skin is warm and dry.      Findings: No rash.   Neurological:      General: No focal deficit present.      Mental Status: He is alert. Mental status is at baseline.   Psychiatric:         Mood and Affect: Mood normal.         Behavior: Behavior normal.         Thought Content: Thought content normal.         Procedures           ED Course    EKG sinus rhythm with RBBB.  CXR NAD.  Labs benign.  RUQ US shows gallstones but no acute inflammatory changes.    Workup including two cardiac sets is negative for acute ischemic pathology.  Patient feeling better on rechecks.  We discussed negative workup today.  Discussed that a negative ED workup excludes acute MI but does not exclude underlying coronary disease and more workup is necessary.  Discussed methods of obtaining that workup including referral to our hospital's chest pain follow up clinic to discuss further testing such as stress testing and the importance of obtaining this follow up in the immediate term.   Discussed warning symptoms to prompt return to ED.  Answered all of the patient's questions to the best of my ability.                HEART Score: 3                              Medical Decision Making  Problems Addressed:  Chest pain, unspecified type: complicated acute illness or injury  Gallstones: acute illness or injury    Amount and/or Complexity of Data Reviewed  Labs: ordered. Decision-making details documented in ED Course.  Radiology: ordered and independent interpretation performed. Decision-making details documented in ED Course.     Details: CXR NAD read by me  ECG/medicine tests: ordered and independent interpretation performed. Decision-making details documented in ED Course.     Details: EKG NSR RBBB read by me    Risk  Prescription drug management.        Final diagnoses:   Chest pain, unspecified type   Gallstones       ED Disposition  ED Disposition       ED Disposition   Discharge    Condition   Stable    Comment   --               Javad Negron MD  [address]  [facility] [address]  [phone]    In 1 week      [facility]  [address]  [facility] [address] [phone]  In 1 week           Medication List        New Prescriptions      dicyclomine 20 MG tablet  Commonly known as: BENTYL  Take 1 tablet by mouth Every 6 (Six) Hours As Needed (pain).               Where to Get Your Medications        These medications were sent to [facility] - [place], [place] - [address] - [phone]  - [phone] FX  [address], [facility] [address]      Phone: [phone]   dicyclomine 20 MG tablet            Miller Acevedo MD  05/02/24 1817

## 2024-05-15 ENCOUNTER — OFFICE VISIT (OUTPATIENT)
Dept: INTERNAL MEDICINE | Facility: CLINIC | Age: 74
End: 2024-05-15
Payer: MEDICARE

## 2024-05-15 VITALS
BODY MASS INDEX: 31.78 KG/M2 | HEART RATE: 64 BPM | SYSTOLIC BLOOD PRESSURE: 130 MMHG | DIASTOLIC BLOOD PRESSURE: 60 MMHG | OXYGEN SATURATION: 97 % | HEIGHT: 70 IN | WEIGHT: 222 LBS

## 2024-05-15 DIAGNOSIS — F43.21 SITUATIONAL DEPRESSION: ICD-10-CM

## 2024-05-15 DIAGNOSIS — R35.0 BENIGN PROSTATIC HYPERPLASIA WITH URINARY FREQUENCY: ICD-10-CM

## 2024-05-15 DIAGNOSIS — N40.1 BENIGN PROSTATIC HYPERPLASIA WITH URINARY FREQUENCY: ICD-10-CM

## 2024-05-15 DIAGNOSIS — I10 PRIMARY HYPERTENSION: Primary | ICD-10-CM

## 2024-05-15 DIAGNOSIS — K21.00 GASTROESOPHAGEAL REFLUX DISEASE WITH ESOPHAGITIS WITHOUT HEMORRHAGE: ICD-10-CM

## 2024-05-15 DIAGNOSIS — E78.2 MIXED HYPERLIPIDEMIA: ICD-10-CM

## 2024-05-15 PROCEDURE — 99214 OFFICE O/P EST MOD 30 MIN: CPT | Performed by: INTERNAL MEDICINE

## 2024-05-15 PROCEDURE — 3075F SYST BP GE 130 - 139MM HG: CPT | Performed by: INTERNAL MEDICINE

## 2024-05-15 PROCEDURE — 1125F AMNT PAIN NOTED PAIN PRSNT: CPT | Performed by: INTERNAL MEDICINE

## 2024-05-15 PROCEDURE — 1159F MED LIST DOCD IN RCRD: CPT | Performed by: INTERNAL MEDICINE

## 2024-05-15 PROCEDURE — 1160F RVW MEDS BY RX/DR IN RCRD: CPT | Performed by: INTERNAL MEDICINE

## 2024-05-15 PROCEDURE — 3078F DIAST BP <80 MM HG: CPT | Performed by: INTERNAL MEDICINE

## 2024-05-15 NOTE — PROGRESS NOTES
"Patient is a 73 y.o. male who is here for a follow up of hyperlipidemia and hypertension.  Chief Complaint   Patient presents with    Hyperlipidemia    Hypertension         HPI:    Here for mgmt of HTN and hyperlipidemia.  Energy level is good.  Working about 20 hours per week.  No abdominal pains.  No fever or chills. Sleeping ok.  Depression is under control.     History:     Patient Active Problem List   Diagnosis    Acid reflux    Arthritis    Hypertension    Hyperlipidemia    Severe obstructive sleep apnea    Psoriasis    Diastolic dysfunction    Peptic ulceration    Bilateral edema of lower extremity    Abnormal liver function test    Hyperbilirubinemia    Psychophysiological insomnia    Gilbert's disease    Thrombocytopenia, unspecified    Situational depression    Tubular adenoma    Seasonal allergic rhinitis due to pollen    Mild cognitive impairment    Memory loss    Primary osteoarthritis of right knee    Status post total right knee replacement    Leukocytosis, likely reactive    Benign prostatic hyperplasia with urinary frequency    Spondylosis of lumbar region without myelopathy or radiculopathy    Degeneration of lumbar or lumbosacral intervertebral disc    Lumbar interspinous bursitis    Myofascial pain    Osteoarthritis of multiple joints    Baastrup's syndrome    Gastroesophageal reflux disease with esophagitis without hemorrhage    History of COVID-19    Primary osteoarthritis of left knee       Past Medical History:   Diagnosis Date    Abnormal liver function test     Anxiety     Arthritis     Arthritis of back     Arthritis of neck     Benign prostatic hyperplasia     Bilateral edema of lower extremity     Bursitis of hip     Cataract     bilat- still present - mild     Cellulitis of leg, right     resolved    Chalazion     Cracking skin     bilat feet mostly     Degeneration of lumbar or lumbosacral intervertebral disc 11/20/2020    Disease     \"brakes/breaks\" disease as child-  effected blood " and urine     Fracture, finger     Fracture, foot     GERD (gastroesophageal reflux disease)     Hip arthrosis 2022    Pain walking    History of kidney stones     x2 had to have broken up     Hoarseness     from vocal cord bending- seeing ent - possible surgery soon     Hyperlipidemia     Hypertension     Kidney infection     h/o     Knee swelling 2015    Ongoing    Neck muscle spasm     Onychomycosis of toenail     Peptic ulceration     Pulmonary embolism     Renal calculi     Situational depression 08/22/2018    Sleep apnea with use of continuous positive airway pressure (CPAP)     compliant with machine     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     Vocal cord strain     vocal cord bent- seeing ent but causes pt to be hoarse     Wears glasses        Past Surgical History:   Procedure Laterality Date    BACK SURGERY      CARDIAC CATHETERIZATION      COLONOSCOPY      CYSTOSCOPY W/ LASER LITHOTRIPSY      x2    ENDOSCOPY      with dilation     FINGER SURGERY      left 5th finger    HAND SURGERY  2018    Little Finger - Left Hand    JOINT REPLACEMENT      KNEE SURGERY Right 1986    arthroscopy    LASER OF PROSTATE W/ GREEN LIGHT PVP  02/2022    Dr Fu    OTHER SURGICAL HISTORY      Lithotripsy    PROSTATE SURGERY      TONSILLECTOMY      TOTAL KNEE ARTHROPLASTY Right 07/22/2020    Procedure: TOTAL KNEE ARTHROPLASTY RIGHT;  Surgeon: Tereso Restrepo MD;  Location: [facility];  Service: Orthopedics;  Laterality: Right;    TRIGGER POINT INJECTION         Current Outpatient Medications on File Prior to Visit   Medication Sig    Acetaminophen 500 MG capsule Take 1 capsule by mouth 2 (Two) Times a Day.    amLODIPine (NORVASC) 2.5 MG tablet Take 1 tablet by mouth Every Night.    buPROPion XL (WELLBUTRIN XL) 150 MG 24 hr tablet     calcipotriene (DOVONOX) 0.005 % ointment As Needed.    Cholecalciferol (VITAMIN D3) 125 MCG (5000 UT) capsule capsule Take 1 capsule by mouth Daily.    dicyclomine (BENTYL) 20 MG  tablet Take 1 tablet by mouth Every 6 (Six) Hours As Needed (pain).    docusate sodium (Colace) 100 MG capsule Take 1 capsule by mouth 2 (Two) Times a Day.    doxazosin (CARDURA) 4 MG tablet TAKE 1 TABLET BY MOUTH EVERY EVENING    fenofibrate (TRICOR) 145 MG tablet TAKE ONE TABLET BY MOUTH EVERY DAY    furosemide (LASIX) 20 MG tablet TAKE 1 TABLET BY MOUTH DAILY AS NEEDED (EDEMA).    MULTIPLE VITAMINS PO Take 1 tablet by mouth Daily.    mupirocin (BACTROBAN) 2 % ointment Apply  topically to the appropriate area as directed Every 12 (Twelve) Hours.    Omega-3 Fatty Acids (FISH OIL) 1000 MG capsule capsule Take 1 capsule by mouth Daily.    phenol (CHLORASEPTIC) 1.4 % liquid liquid Apply 1 spray to the mouth or throat Every 2 (Two) Hours As Needed (pain).    pravastatin (PRAVACHOL) 40 MG tablet TAKE ONE TABLET BY MOUTH EVERY DAY    Sacubitril-Valsartan (ENTRESTO PO) Take  by mouth.    sildenafil (VIAGRA) 100 MG tablet Take 1 tablet by mouth Daily As Needed for erectile dysfunction.    triamcinolone (KENALOG) 0.1 % cream Apply  topically to the appropriate area as directed Every 12 (Twelve) Hours.    urea (CARMOL) 20 % cream Apply  topically to the appropriate area as directed As Needed for Dry Skin.    vitamin B-6 (PYRIDOXINE) 50 MG tablet Take 1 tablet by mouth Daily.     No current facility-administered medications on file prior to visit.       Family History   Problem Relation Age of Onset    Arthritis Other     Hypertension Other     Hyperlipidemia Other     Heart attack Other     Hypertension Mother     Cancer Mother         Colon Cancer    Heart disease Father     Hypertension Father     Other Father         Parkinson's    Alcohol abuse Brother        Social History     Socioeconomic History    Marital status:    Tobacco Use    Smoking status: Never    Smokeless tobacco: Never   Vaping Use    Vaping status: Never Used   Substance and Sexual Activity    Alcohol use: Yes     Alcohol/week: 4.0 standard drinks  "of alcohol     Types: 4 Cans of beer per week    Drug use: No    Sexual activity: Yes     Partners: Female     Birth control/protection: None         Review of Systems   Constitutional:  Positive for fatigue (better). Negative for chills, fever and unexpected weight change.   HENT:  Negative for ear pain, hearing loss, rhinorrhea, sinus pressure, sore throat and trouble swallowing.    Eyes:  Negative for discharge and itching.   Respiratory:  Positive for shortness of breath (more COOPER). Negative for cough and chest tightness.    Cardiovascular:  Positive for leg swelling (L>R, better with compression hose). Negative for chest pain.   Gastrointestinal:  Negative for abdominal pain, blood in stool, diarrhea and vomiting.        2013 colonoscopy normal  9/18 colonoscopy with TA times 1, repeat in 9/23   Endocrine: Negative for polydipsia and polyuria.   Genitourinary:  Negative for difficulty urinating, dysuria, enuresis, frequency, hematuria and urgency.        10/21 psa  ED  Followed by Dr Fu   Musculoskeletal:  Positive for arthralgias and back pain. Negative for gait problem, joint swelling and neck pain.   Skin:  Negative for rash and wound.        Dry skin   Allergic/Immunologic: Negative for immunocompromised state.   Neurological:  Negative for dizziness, syncope, weakness, light-headedness, numbness and headaches.   Hematological:  Does not bruise/bleed easily.   Psychiatric/Behavioral:  Positive for behavioral problems (improved) and decreased concentration (improved). Negative for dysphoric mood and sleep disturbance. The patient is nervous/anxious (improved).        /60   Pulse 64   Ht 177.8 cm (70\")   Wt 101 kg (222 lb)   SpO2 97%   BMI 31.85 kg/m²       Physical Exam  Constitutional:       Appearance: Normal appearance. He is well-developed. He is obese.   HENT:      Head: Normocephalic and atraumatic.      Right Ear: External ear normal.      Left Ear: External ear normal.      Nose: Nose " normal.      Mouth/Throat:      Mouth: Mucous membranes are moist.      Pharynx: Oropharynx is clear.   Eyes:      Extraocular Movements: Extraocular movements intact.      Conjunctiva/sclera: Conjunctivae normal.      Pupils: Pupils are equal, round, and reactive to light.   Cardiovascular:      Rate and Rhythm: Normal rate and regular rhythm.      Heart sounds: Normal heart sounds.   Pulmonary:      Effort: Pulmonary effort is normal.      Breath sounds: Normal breath sounds.   Abdominal:      General: Bowel sounds are normal.      Palpations: Abdomen is soft.   Musculoskeletal:         General: Normal range of motion.      Cervical back: Normal range of motion and neck supple.      Right lower leg: Edema present.      Left lower leg: Edema present.   Lymphadenopathy:      Cervical: No cervical adenopathy.   Skin:     General: Skin is warm and dry.   Neurological:      General: No focal deficit present.      Mental Status: He is alert and oriented to person, place, and time.   Psychiatric:         Mood and Affect: Mood normal.         Behavior: Behavior normal.         Thought Content: Thought content normal.         Procedure:      Discussion/Summary:    HTN-controlled on multi drug tx, advised goal of 150/80  hyperlipidemia-cont pravachol/fibrate, labs noted, advised exercise and wt loss to decrease TG and increase HDL  BPH-stable on cardura, psa down, s/p Green Light  GERD-stable on omeprazole  djd-advised to limit nsaids, stable  Hyperbilirubinemia-recheck noted  Thrombocytopenia-recheck noted  Situational depression-stable with combo remeron/wellbutrin  Constipation-citrucel/miralax combo, stable  Abnormal glucose-labs today at goal but increased, counseled on diet  Edema-see above, lasix prn      1/9 labs noted and dw patient    Current Outpatient Medications:     Acetaminophen 500 MG capsule, Take 1 capsule by mouth 2 (Two) Times a Day., Disp: , Rfl:     amLODIPine (NORVASC) 2.5 MG tablet, Take 1 tablet by  mouth Every Night., Disp: 90 tablet, Rfl: 3    buPROPion XL (WELLBUTRIN XL) 150 MG 24 hr tablet, , Disp: , Rfl:     calcipotriene (DOVONOX) 0.005 % ointment, As Needed., Disp: , Rfl:     Cholecalciferol (VITAMIN D3) 125 MCG (5000 UT) capsule capsule, Take 1 capsule by mouth Daily., Disp: , Rfl:     dicyclomine (BENTYL) 20 MG tablet, Take 1 tablet by mouth Every 6 (Six) Hours As Needed (pain)., Disp: 20 tablet, Rfl: 0    docusate sodium (Colace) 100 MG capsule, Take 1 capsule by mouth 2 (Two) Times a Day., Disp: 60 capsule, Rfl: 0    doxazosin (CARDURA) 4 MG tablet, TAKE 1 TABLET BY MOUTH EVERY EVENING, Disp: 90 tablet, Rfl: 3    fenofibrate (TRICOR) 145 MG tablet, TAKE ONE TABLET BY MOUTH EVERY DAY, Disp: 90 tablet, Rfl: 3    furosemide (LASIX) 20 MG tablet, TAKE 1 TABLET BY MOUTH DAILY AS NEEDED (EDEMA)., Disp: 30 tablet, Rfl: 2    MULTIPLE VITAMINS PO, Take 1 tablet by mouth Daily., Disp: , Rfl:     mupirocin (BACTROBAN) 2 % ointment, Apply  topically to the appropriate area as directed Every 12 (Twelve) Hours., Disp: , Rfl:     Omega-3 Fatty Acids (FISH OIL) 1000 MG capsule capsule, Take 1 capsule by mouth Daily., Disp: , Rfl:     phenol (CHLORASEPTIC) 1.4 % liquid liquid, Apply 1 spray to the mouth or throat Every 2 (Two) Hours As Needed (pain)., Disp: 118 mL, Rfl: 0    pravastatin (PRAVACHOL) 40 MG tablet, TAKE ONE TABLET BY MOUTH EVERY DAY, Disp: 30 tablet, Rfl: 3    Sacubitril-Valsartan (ENTRESTO PO), Take  by mouth., Disp: , Rfl:     sildenafil (VIAGRA) 100 MG tablet, Take 1 tablet by mouth Daily As Needed for erectile dysfunction., Disp: 6 tablet, Rfl: 5    triamcinolone (KENALOG) 0.1 % cream, Apply  topically to the appropriate area as directed Every 12 (Twelve) Hours., Disp: , Rfl:     urea (CARMOL) 20 % cream, Apply  topically to the appropriate area as directed As Needed for Dry Skin., Disp: , Rfl:     vitamin B-6 (PYRIDOXINE) 50 MG tablet, Take 1 tablet by mouth Daily., Disp: , Rfl:         Diagnoses  and all orders for this visit:    1. Primary hypertension (Primary)    2. Mixed hyperlipidemia    3. Benign prostatic hyperplasia with urinary frequency    4. Situational depression    5. Gastroesophageal reflux disease with esophagitis without hemorrhage

## 2024-07-08 DIAGNOSIS — R60.0 BILATERAL LEG EDEMA: ICD-10-CM

## 2024-07-08 RX ORDER — FENOFIBRATE 145 MG/1
TABLET, COATED ORAL
Qty: 90 TABLET | Refills: 3 | Status: SHIPPED | OUTPATIENT
Start: 2024-07-08

## 2024-07-08 RX ORDER — DOXAZOSIN MESYLATE 4 MG/1
TABLET ORAL
Qty: 90 TABLET | Refills: 3 | Status: SHIPPED | OUTPATIENT
Start: 2024-07-08

## 2024-07-08 RX ORDER — FUROSEMIDE 20 MG/1
20 TABLET ORAL DAILY PRN
Qty: 30 TABLET | Refills: 2 | Status: SHIPPED | OUTPATIENT
Start: 2024-07-08

## 2024-07-16 ENCOUNTER — PREP FOR SURGERY (OUTPATIENT)
Dept: OTHER | Facility: HOSPITAL | Age: 74
End: 2024-07-16
Payer: MEDICARE

## 2024-07-16 ENCOUNTER — OFFICE VISIT (OUTPATIENT)
Dept: ORTHOPEDIC SURGERY | Facility: CLINIC | Age: 74
End: 2024-07-16
Payer: MEDICARE

## 2024-07-16 VITALS
WEIGHT: 221.6 LBS | BODY MASS INDEX: 31.73 KG/M2 | HEIGHT: 70 IN | SYSTOLIC BLOOD PRESSURE: 126 MMHG | DIASTOLIC BLOOD PRESSURE: 60 MMHG

## 2024-07-16 DIAGNOSIS — Z96.651 STATUS POST TOTAL RIGHT KNEE REPLACEMENT: Primary | ICD-10-CM

## 2024-07-16 DIAGNOSIS — M17.12 PRIMARY OSTEOARTHRITIS OF LEFT KNEE: ICD-10-CM

## 2024-07-16 DIAGNOSIS — M17.12 PRIMARY OSTEOARTHRITIS OF LEFT KNEE: Primary | ICD-10-CM

## 2024-07-16 DIAGNOSIS — G89.29 CHRONIC RIGHT SHOULDER PAIN: ICD-10-CM

## 2024-07-16 DIAGNOSIS — S49.91XA INJURY OF RIGHT SHOULDER, INITIAL ENCOUNTER: ICD-10-CM

## 2024-07-16 DIAGNOSIS — M25.511 CHRONIC RIGHT SHOULDER PAIN: ICD-10-CM

## 2024-07-16 DIAGNOSIS — M19.011 PRIMARY OSTEOARTHRITIS OF RIGHT SHOULDER: ICD-10-CM

## 2024-07-16 PROBLEM — M17.9 OA (OSTEOARTHRITIS) OF KNEE: Status: ACTIVE | Noted: 2024-07-16

## 2024-07-16 PROCEDURE — 99214 OFFICE O/P EST MOD 30 MIN: CPT | Performed by: ORTHOPAEDIC SURGERY

## 2024-07-16 PROCEDURE — 3078F DIAST BP <80 MM HG: CPT | Performed by: ORTHOPAEDIC SURGERY

## 2024-07-16 PROCEDURE — 3074F SYST BP LT 130 MM HG: CPT | Performed by: ORTHOPAEDIC SURGERY

## 2024-07-16 RX ORDER — TRANEXAMIC ACID 10 MG/ML
1000 INJECTION, SOLUTION INTRAVENOUS ONCE
OUTPATIENT
Start: 2024-07-16 | End: 2024-07-16

## 2024-07-16 RX ORDER — SILDENAFIL CITRATE 20 MG/1
1 TABLET ORAL 3 TIMES DAILY
COMMUNITY

## 2024-07-16 RX ORDER — OXYCODONE HCL 10 MG/1
10 TABLET, FILM COATED, EXTENDED RELEASE ORAL ONCE
OUTPATIENT
Start: 2024-07-16 | End: 2024-07-16

## 2024-07-16 RX ORDER — MIRTAZAPINE 7.5 MG/1
7.5 TABLET, FILM COATED ORAL
COMMUNITY
Start: 2024-07-08

## 2024-07-16 RX ORDER — CHLORHEXIDINE GLUCONATE 40 MG/ML
236 SOLUTION TOPICAL DAILY
Qty: 236 ML | Refills: 0 | Status: SHIPPED | OUTPATIENT
Start: 2024-07-16

## 2024-07-16 RX ORDER — CLONIDINE HYDROCHLORIDE 0.1 MG/1
0.1 TABLET ORAL
COMMUNITY

## 2024-07-16 RX ORDER — ACETAMINOPHEN 500 MG
1000 TABLET ORAL ONCE
OUTPATIENT
Start: 2024-07-16 | End: 2024-07-16

## 2024-07-16 NOTE — PROGRESS NOTES
"    [facility] [place] [facility] Note        Subjective     CC: Follow-up (10 month follow-up: 4 years S/P Right TKA (7/22/20); Primary osteoarthritis of left knee)      ANTELMO Salcedo is a 73 y.o. male.  Patient is here today for multiple reasons.  Today for follow-up of his right TKA replaced in July 2020.  He is doing well from that.  He is also here for follow-up of his left knee arthritis.  Says it is severe enough that it is affecting his activities of daily living.  Has severe medial and posterior knee pain.  Some pain at rest.  Third issue that the patient is here for today is his right shoulder.  Has had difficulties for the last 3 months after falling 6 months ago.  He is right-hand dominant.  Things are worsening.    Overall, patient's symptoms are as above.    ROS:    Constiutional:Pt denies fever, chills, nausea, or vomiting.  MSK:as above        Objective      Past Medical History  Past Medical History:   Diagnosis Date    Abnormal liver function test     Anxiety     Arthritis     Arthritis of back     Arthritis of neck     Benign prostatic hyperplasia     Bilateral edema of lower extremity     Bursitis of hip     Cataract     bilat- still present - mild     Cellulitis of leg, right     resolved    Chalazion     Cracking skin     bilat feet mostly     Degeneration of lumbar or lumbosacral intervertebral disc 11/20/2020    Disease     \"brakes/breaks\" disease as child-  effected blood and urine     Fracture, finger     Fracture, foot     GERD (gastroesophageal reflux disease)     Hip arthrosis 2022    Pain walking    History of kidney stones     x2 had to have broken up     Hoarseness     from vocal cord bending- seeing ent - possible surgery soon     Hyperlipidemia     Hypertension     Kidney infection     h/o     Knee swelling 2015    Ongoing    Neck muscle spasm     Onychomycosis of toenail     Peptic ulceration     Periarthritis of shoulder     Pulmonary embolism     Renal calculi     " "Rotator cuff syndrome     Situational depression 08/22/2018    Sleep apnea with use of continuous positive airway pressure (CPAP)     compliant with machine     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     Vocal cord strain     vocal cord bent- seeing ent but causes pt to be hoarse     Wears glasses      Social History     Socioeconomic History    Marital status:    Tobacco Use    Smoking status: Never     Passive exposure: Past    Smokeless tobacco: Never   Vaping Use    Vaping status: Never Used   Substance and Sexual Activity    Alcohol use: Yes     Alcohol/week: 4.0 standard drinks of alcohol     Types: 4 Cans of beer per week    Drug use: No    Sexual activity: Yes     Partners: Female     Birth control/protection: None          Physical Exam  /60   Ht 177.8 cm (70\")   Wt 101 kg (221 lb 9.6 oz)   BMI 31.80 kg/m²     Body mass index is 31.8 kg/m².    Patient is well nourished and well developed.        Ortho Exam        Lower Extremity:     Inspection and Palpation:      Right knee:  Calf:  Soft and non tender  Effusion:  None  Pulses:  2+  Warmth:  None  Incision:  Healed     ROM:  Right:  Extension:0    Flexion:120      Deformities/Malalignments/Discrepancies:    Right:  none    Functional Testing:  Right:  Straight Leg Raise: 5/5  Patella Tracking:  Normal          Musculoskeletal:  Global Assessment:  Overall assessment of Lower Extremity Muscle Strength and Tone:  Left quadriceps--5/5   Left hamstrings--5/5       Left tibialis anterior--5/5  Left gastroc-soleus--5/5  Left EHL --5/5    Lower Extremity:    Inspection and Palpation:  Left knee:  Tenderness:  Over the medial joint line and moderate severity  Effusion:  1+  Crepitus:  Positive  Pulses:  2+  Ecchymosis:  None  Warmth:  None     ROM:  Left:  Extension: 5     Flexion:120    Instability:    Left:    Lachman Test:  Negative   Varus stress test negative  Valgus stress test negative    Deformities/Malalignments/Discrepancies:    Left: "  Genu Varum     Functional Testing:  Vincent's test:  Negative  Patella grind test:  Positive  Q-angle:  normal      Musculoskeletal   Upper Extremity   Right Shoulder       Strength and Tone:    Supraspinatus -4-5 with pain    External Rotators-5/5    Infraspinatus - 5/5    Subscapularis - 5/5 with pain    Deltoid - 5/5     Range of Motion      Right Shoulder:    Internal Rotation: ROM - L4    External Rotation: AROM - 70 degrees    Elevation through flexion: AROM - 140 degrees     AC joint:  non tender to palpation    AC joint:  negative crossover          Imaging/Labs/EMG Reviewed and Interpreted:  Imaging Results (Last 24 Hours)       Procedure Component Value Units Date/Time    XR Shoulder 2+ View Right [[id number]] Resulted: 07/16/24 1037     Updated: 07/16/24 1038    Narrative:      Right Shoulder X-Ray    Indication: Pain    Study:  Grashey AP, axillary lateral, and scapular Y views    Comparison: None    Findings:  No acute fractures are visualized  No bony lesions are visualized.  Normal soft tissue appearance  AC joint: Severe joint space narrowing  Glenohumeral joint: Severe hypertrophic joint space narrowing  Acromion type: 2      Impression:    No acute bony abnormalities noted  Severe right glenohumeral and AC joint arthritis    XR Knee 4+ View Left [[id number]] Resulted: 07/16/24 1009     Updated: 07/16/24 1010    Narrative:      Knee X-Ray    Indication: Pain    Study:  Upright AP, Skiers, Lateral, and Sunrise views of Left knee(s)    Comparison: Left knee 3/15/2022    Findings:    Patient appears to have severe hypertrophic degenerative changes in the   medial compartment.    There are moderate degenerative changes in the lateral compartment.    There are severe changes in the patellofemoral compartment.    Patient has overall varus alignment.    Kellgren-Garth rdGrdrrdarddrderd:rd rd3rd Impression:   Severe medial compartment and patellofemoral compartment degnerative   changes of the left knee        XR  Knee 3+ View With [place] Right [[id number]] Resulted: 07/16/24 1008     Updated: 07/16/24 1009    Narrative:      Knee X-ray    Indication: status-post TKA    Study:  AP, Lateral, and Sunrise views of Right knee    Comparison: Right knee 8/13/2020    Findings:  No signs of acute fracture are visualized  No signs of loosening are appreciated  Components are well aligned    Impression:  Status post Right cemented total knee arthroplasty. No signs   of loosening or fracture.                Assessment    Assessment:  1. Status post total right knee replacement    2. Primary osteoarthritis of left knee    3. Chronic right shoulder pain    4. Injury of right shoulder, initial encounter    5. Primary osteoarthritis of right shoulder        Plan:  Recommend over the counter anti-inflammatories for pain and/or swelling  Status post right TKA--patient doing well.  Follow-up in year with x-rays or sooner if necessary.  Continue indefinite antibiotic prophylaxis  Right glenohumeral arthritis status post right shoulder injury--patient has end-stage arthritis of the shoulder.  Condition of his rotator cuff is the question for me given his recent injury 6 months ago.  MRI be requested to evaluate the condition of the cuff.  Left knee arthritis--risk, benefits, potential hazards, typical recovery and rehab as well as reasonable alternatives to outpatient robot-assisted cemented left TKA were discussed with him.  Patient would like to do the surgery as an outpatient.  Will use aspirin for blood clot prevention.  [facility] outpatient physical therapy will be ordered and necessary.  I will see him back preoperatively but likely I will see him to follow-up on his shoulder MRI prior to that.  Will go to [facility] today for scheduling.      Tereso Restrepo MD  07/16/24  13:13 EDT      Dictated Utilizing Dragon Dictation.

## 2024-07-24 ENCOUNTER — HOSPITAL ENCOUNTER (OUTPATIENT)
Dept: MRI IMAGING | Facility: HOSPITAL | Age: 74
Discharge: HOME | End: 2024-07-24
Admitting: ORTHOPAEDIC SURGERY
Payer: MEDICARE

## 2024-07-24 DIAGNOSIS — S49.91XA INJURY OF RIGHT SHOULDER, INITIAL ENCOUNTER: ICD-10-CM

## 2024-07-24 DIAGNOSIS — G89.29 CHRONIC RIGHT SHOULDER PAIN: ICD-10-CM

## 2024-07-24 DIAGNOSIS — M25.511 CHRONIC RIGHT SHOULDER PAIN: ICD-10-CM

## 2024-07-24 PROCEDURE — 73221 MRI JOINT UPR EXTREM W/O DYE: CPT

## 2024-08-20 ENCOUNTER — OFFICE VISIT (OUTPATIENT)
Dept: ORTHOPEDIC SURGERY | Facility: CLINIC | Age: 74
End: 2024-08-20
Payer: MEDICARE

## 2024-08-20 VITALS
DIASTOLIC BLOOD PRESSURE: 74 MMHG | BODY MASS INDEX: 31.92 KG/M2 | WEIGHT: 223 LBS | SYSTOLIC BLOOD PRESSURE: 130 MMHG | HEIGHT: 70 IN

## 2024-08-20 DIAGNOSIS — M19.011 PRIMARY OSTEOARTHRITIS OF RIGHT SHOULDER: ICD-10-CM

## 2024-08-20 DIAGNOSIS — S46.011D TRAUMATIC COMPLETE TEAR OF RIGHT ROTATOR CUFF, SUBSEQUENT ENCOUNTER: ICD-10-CM

## 2024-08-20 DIAGNOSIS — M25.511 CHRONIC RIGHT SHOULDER PAIN: Primary | ICD-10-CM

## 2024-08-20 DIAGNOSIS — S49.91XD INJURY OF RIGHT SHOULDER, SUBSEQUENT ENCOUNTER: ICD-10-CM

## 2024-08-20 DIAGNOSIS — G89.29 CHRONIC RIGHT SHOULDER PAIN: Primary | ICD-10-CM

## 2024-08-20 PROCEDURE — 3075F SYST BP GE 130 - 139MM HG: CPT | Performed by: ORTHOPAEDIC SURGERY

## 2024-08-20 PROCEDURE — 99213 OFFICE O/P EST LOW 20 MIN: CPT | Performed by: ORTHOPAEDIC SURGERY

## 2024-08-20 PROCEDURE — 3078F DIAST BP <80 MM HG: CPT | Performed by: ORTHOPAEDIC SURGERY

## 2024-08-20 NOTE — PROGRESS NOTES
"    [facility] [place] [facility] [place]        Subjective     CC: Follow-up (4 week follow up -Chronic right shoulder pain -MRI preformed 7/24/24)      ANTELMO    Jimmie Salcedo is a 74 y.o. male.  Patient is here today for follow-up of the MRI of his right shoulder.  Continues to struggle with overhead activity.    Overall, patient's symptoms are as above    ROS:    Constiutional:Pt denies fever, chills, nausea, or vomiting.  MSK:as above        Objective      Past Medical History  Past Medical History:   Diagnosis Date    Abnormal liver function test     Anxiety     Arthritis     Arthritis of back     Arthritis of neck     Benign prostatic hyperplasia     Bilateral edema of lower extremity     Bursitis of hip     Cataract     bilat- still present - mild     Cellulitis of leg, right     resolved    Chalazion     Cracking skin     bilat feet mostly     Degeneration of lumbar or lumbosacral intervertebral disc 11/20/2020    Disease     \"brakes/breaks\" disease as child-  effected blood and urine     Fracture, finger     Fracture, foot     GERD (gastroesophageal reflux disease)     Hip arthrosis 2022    Pain walking    History of kidney stones     x2 had to have broken up     Hoarseness     from vocal cord bending- seeing ent - possible surgery soon     Hyperlipidemia     Hypertension     Kidney infection     h/o     Knee swelling 2015    Ongoing    Neck muscle spasm     Onychomycosis of toenail     Peptic ulceration     Periarthritis of shoulder     Pulmonary embolism     Renal calculi     Rotator cuff syndrome     Situational depression 08/22/2018    Sleep apnea with use of continuous positive airway pressure (CPAP)     compliant with machine     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     Vocal cord strain     vocal cord bent- seeing ent but causes pt to be hoarse     Wears glasses      Social History     Socioeconomic History    Marital status:    Tobacco Use    Smoking status: Never     Passive exposure: " "Past    Smokeless tobacco: Never   Vaping Use    Vaping status: Never Used   Substance and Sexual Activity    Alcohol use: Yes     Alcohol/week: 4.0 standard drinks of alcohol     Types: 4 Cans of beer per week    Drug use: No    Sexual activity: Yes     Partners: Female     Birth control/protection: None          Physical Exam  /74   Ht 177.8 cm (70\")   Wt 101 kg (223 lb)   BMI 32.00 kg/m²     Body mass index is 32 kg/m².    Patient is well nourished and well developed.        Ortho Exam  Musculoskeletal   Upper Extremity   Right Shoulder       Strength and Tone:    Supraspinatus -4 out of 5 with pain    External Rotators-5/5    Infraspinatus - 5/5    Subscapularis - 5/5    Deltoid - 5/5     Range of Motion      Right Shoulder:    Internal Rotation: ROM - L4    External Rotation: AROM - 70 degrees    Elevation through flexion: AROM - 140 degrees     AC joint:  non tender to palpation    AC joint:  negative crossover      Imaging/Labs/EMG Reviewed and Interpreted:  Imaging Results (Last 24 Hours)       ** No results found for the last 24 hours. **          MRI Shoulder Right Without Contrast  Narrative: MRI SHOULDER RIGHT WO CONTRAST    Date of Exam: 7/24/2024 1:53 PM EDT    Indication: Shoulder pain, rotator cuff disorder suspected, xray done  pain.     Comparison: Shoulder radiograph 7/16/2024    Technique:  Routine multiplanar/multisequence images of the right shoulder were obtained without contrast administration.      Findings:  Acromioclavicular joint: Moderate degenerative changes of the acromioclavicular joint. Moderate  subacromial/subdeltoid bursal fluid/edema. Type II acromion. Os acromiale is absent.    Rotator cuff: Tendinopathy with full-thickness near full width tear of the supraspinatus tendon. There are a few residual fibers both anteriorly and posteriorly. The torn portion is retracted approximately 3.5 cm with tendon stump seen at the greater   tuberosity. Tendinopathy with low-grade " interstitial partial tearing of the infraspinatus tendon. Tendinopathy with low to moderate grade articular-sided partial tearing of the subscapularis tendon. No tear or substantial tendinopathy of the teres minor   tendon. Mild subscapularis and moderate supraspinatus fatty atrophy.    Labrum: Degeneration and tearing of the labrum.    Biceps tendon: Nonvisualization of the intra-articular portion of the long head of biceps tendon which may reflect tear.    Joint: Moderate joint effusion with synovitis. A loose body seen within the subscapularis recess measuring up to 7 mm. Severe degenerative changes of the shoulder with full-thickness chondral loss and osteophytosis of the humeral head. There is   remodeling of the glenoid as well. Subchondral edema seen predominately along the glenoid. No evidence of capsulitis.    Bones:  No fracture or marrow edema. Extensive enthesopathic cystic changes seen at the greater and lesser tuberosities.    Soft tissues: No soft tissue masses or fluid collections seen.  Impression: Impression:  Severe degenerative changes of the glenohumeral joint.    Rotator cuff tendinopathy with full-thickness near full width tear of supraspinatus. There is partial tearing of subscapularis and infraspinatus as well. There is mild subscapularis and moderate supraspinatus fatty atrophy.    Moderate glenohumeral joint effusion with synovitis. A 7 mm loose body is seen within the subscapularis recess.    Nonvisualization of the intra-articular portion of the long head of biceps tendon concerning for rupture.    Moderate acromioclavicular degenerative changes.    Electronically Signed: Jovany Arroyo MD    7/25/2024 1:18 PM EDT    Workstation ID: [id number]    We reviewed and interpreted MRI of the patient's right shoulder.  Patient has large full-thickness tear of the supraspinatus.  Severe glenohumeral arthritis is noted.    Assessment    Assessment:  1. Chronic right shoulder pain    2. Injury of  right shoulder, subsequent encounter    3. Primary osteoarthritis of right shoulder    4. Traumatic complete tear of right rotator cuff, subsequent encounter        Plan:  Recommend over the counter anti-inflammatories for pain and/or swelling  Right shoulder arthritis with large rotator cuff tear--this may have long-term ramifications with regards to anatomic versus reverse TSA.  Patient will be referred to Dr. Gomez for consideration of arthroplasty.  Patient is concerned about timing.  He is scheduled to have his knee replaced by me on 12/4/2024.  I suspect after 6 weeks, he will be ready for arthroplasty of his shoulder provided everything is going as planned.  Left knee arthritis--patient scheduled for left TKA.  See him back for his preop examination.      Tereso Restrepo MD  08/20/24  16:15 EDT      Dictated Utilizing Dragon Dictation.

## 2024-09-12 ENCOUNTER — OFFICE VISIT (OUTPATIENT)
Dept: ORTHOPEDIC SURGERY | Facility: CLINIC | Age: 74
End: 2024-09-12
Payer: MEDICARE

## 2024-09-12 VITALS
DIASTOLIC BLOOD PRESSURE: 74 MMHG | HEIGHT: 70 IN | BODY MASS INDEX: 31.92 KG/M2 | WEIGHT: 223 LBS | SYSTOLIC BLOOD PRESSURE: 128 MMHG

## 2024-09-12 DIAGNOSIS — M75.121 NONTRAUMATIC COMPLETE TEAR OF RIGHT ROTATOR CUFF: ICD-10-CM

## 2024-09-12 DIAGNOSIS — M24.511 CONTRACTURE OF JOINT OF RIGHT SHOULDER REGION: ICD-10-CM

## 2024-09-12 DIAGNOSIS — M19.011 PRIMARY OSTEOARTHRITIS OF RIGHT SHOULDER: Primary | ICD-10-CM

## 2024-09-12 PROCEDURE — 3078F DIAST BP <80 MM HG: CPT | Performed by: ORTHOPAEDIC SURGERY

## 2024-09-12 PROCEDURE — 3074F SYST BP LT 130 MM HG: CPT | Performed by: ORTHOPAEDIC SURGERY

## 2024-09-12 PROCEDURE — 99214 OFFICE O/P EST MOD 30 MIN: CPT | Performed by: ORTHOPAEDIC SURGERY

## 2024-09-12 NOTE — PROGRESS NOTES
[facility] - Ramsey Gomez MD  [facility]    Office Visit       Patient Name: Jimmie Salcedo    Chief Complaint:   Chief Complaint   Patient presents with    Right Shoulder - Pain, Initial Evaluation       Referring Physician: Dr. Angel Restrepo-appreciate the referral    History of Present Illness:   Jimmie Salcedo is a 74 y.o. male who presents with right body part: shoulder Reason: pain.  Onset:Onset: mechanical fall. The issue has been ongoing for 5-6 month(s). Pain is a 5/10 on the pain scale. Pain is described as Pain Characterization: dull, aching, and stabbing. Associated symptoms include Symptoms: pain and stiffness. The pain is worse with sleeping, working, lying on affected side, and any movement of the joint; resting and ice improve the pain. Previous treatments have included: Activity modifications; I have reviewed the patient's history of present illness as noted/entered above.    I have reviewed the patient's past medical history, surgical history, social history, family history, medications, and allergies as noted in the electronic medical record and as noted/entered.  I have reviewed the patient's review of systems as noted/enter and updated as noted in the patient's HPI.    Right shoulder severe glenohumeral joint arthritis with associated full-thickness rotator cuff tearing.  [place]    Upcoming total knee replacement in December with Dr. Angel Restrepo, he had his right done is having his left done in December      Patient desires to proceed with reverse shoulder arthroplasty following his upcoming total knee in December.  He would like to target surgery in February roughly 2 months following his knee replacement which is very reasonable.    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 male  Body mass index is 32 kg/m².    Subjective   Subjective      Review of Systems   Constitutional: Negative.  Negative for chills, fatigue and fever.   HENT: Negative.  Negative for congestion and dental problem.    Eyes: Negative.  Negative for blurred vision.   Respiratory: Negative.  Negative for shortness of breath.    Cardiovascular: Negative.  Negative for leg swelling.   Gastrointestinal: Negative.  Negative for abdominal pain.   Endocrine: Negative.  Negative for polyuria.   Genitourinary: Negative.  Negative for difficulty urinating.   Musculoskeletal:  Positive for arthralgias.   Skin: Negative.    Allergic/Immunologic: Negative.    Neurological: Negative.    Hematological: Negative.  Negative for adenopathy.   Psychiatric/Behavioral: Negative.  Negative for behavioral problems.         Past Medical History:   Past Medical History:   Diagnosis Date    Abnormal liver function test     Anxiety     Arthritis     Arthritis of back     Arthritis of neck     Benign prostatic hyperplasia     Bilateral edema of lower extremity     Bursitis of hip     Cataract     bilat- still present - mild     Cellulitis of leg, right     resolved    Chalazion     Cracking skin     bilat feet mostly     Degeneration of lumbar or lumbosacral intervertebral disc 11/20/2020    Disease     \"brakes/breaks\" disease as child-  effected blood and urine     Fracture, finger     Fracture, foot     Frozen shoulder     GERD (gastroesophageal reflux disease)     Hip arthrosis 2022    Pain walking    History of kidney stones     x2 had to have broken up     Hoarseness     from vocal cord bending- seeing ent - possible surgery soon     Hyperlipidemia     Hypertension     Kidney infection     h/o     Knee swelling 2015    " Ongoing    Neck muscle spasm     Onychomycosis of toenail     Peptic ulceration     Periarthritis of shoulder     Pulmonary embolism     Renal calculi     Rotator cuff syndrome     Situational depression 08/22/2018    Sleep apnea with use of continuous positive airway pressure (CPAP)     compliant with machine     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     Vocal cord strain     vocal cord bent- seeing ent but causes pt to be hoarse     Wears glasses        Past Surgical History:   Past Surgical History:   Procedure Laterality Date    BACK SURGERY      CARDIAC CATHETERIZATION      COLONOSCOPY      CYSTOSCOPY W/ LASER LITHOTRIPSY      x2    ENDOSCOPY      with dilation     FINGER SURGERY      left 5th finger    HAND SURGERY  2018    Little Finger - Left Hand    JOINT REPLACEMENT      KNEE SURGERY Right 1986    arthroscopy    LASER OF PROSTATE W/ GREEN LIGHT PVP  02/2022    Dr Fu    OTHER SURGICAL HISTORY      Lithotripsy    PROSTATE SURGERY      TONSILLECTOMY      TOTAL KNEE ARTHROPLASTY Right 07/22/2020    Procedure: TOTAL KNEE ARTHROPLASTY RIGHT;  Surgeon: Tereso Restrepo MD;  Location: [facility];  Service: Orthopedics;  Laterality: Right;    TRIGGER POINT INJECTION         Family History:   Family History   Problem Relation Age of Onset    Arthritis Other     Hypertension Other     Hyperlipidemia Other     Heart attack Other     Hypertension Mother     Cancer Mother         Colon Cancer    Heart disease Father     Hypertension Father     Other Father         Parkinson's    Alcohol abuse Brother        Social History:   Social History     Socioeconomic History    Marital status:    Tobacco Use    Smoking status: Never     Passive exposure: Past    Smokeless tobacco: Never   Vaping Use    Vaping status: Never Used   Substance and Sexual Activity    Alcohol use: Yes     Alcohol/week: 4.0 standard drinks of alcohol     Types: 4 Cans of beer per week    Drug use: No    Sexual activity: Yes      Partners: Female     Birth control/protection: None       Medications:   Current Outpatient Medications:     Acetaminophen 500 MG capsule, Take 1 capsule by mouth 2 (Two) Times a Day., Disp: , Rfl:     amLODIPine (NORVASC) 2.5 MG tablet, Take 1 tablet by mouth Every Night., Disp: 90 tablet, Rfl: 3    buPROPion XL (WELLBUTRIN XL) 150 MG 24 hr tablet, , Disp: , Rfl:     calcipotriene (DOVONOX) 0.005 % ointment, As Needed., Disp: , Rfl:     Chlorhexidine Gluconate 4 % solution, Apply 7.8667 Applications topically to the appropriate area as directed Daily. Shower w/ solution for 5 days before surgery. [facility]., Disp: 236 mL, Rfl: 0    Cholecalciferol (VITAMIN D3) 125 MCG (5000 UT) capsule capsule, Take 1 capsule by mouth Daily., Disp: , Rfl:     cloNIDine (CATAPRES) 0.1 MG tablet, 1 tablet., Disp: , Rfl:     dicyclomine (BENTYL) 20 MG tablet, Take 1 tablet by mouth Every 6 (Six) Hours As Needed (pain)., Disp: 20 tablet, Rfl: 0    docusate sodium (Colace) 100 MG capsule, Take 1 capsule by mouth 2 (Two) Times a Day., Disp: 60 capsule, Rfl: 0    doxazosin (CARDURA) 4 MG tablet, TAKE 1 TABLET BY MOUTH EVERY EVENING, Disp: 90 tablet, Rfl: 3    fenofibrate (TRICOR) 145 MG tablet, TAKE ONE TABLET BY MOUTH EVERY DAY, Disp: 90 tablet, Rfl: 3    furosemide (LASIX) 20 MG tablet, TAKE 1 TABLET BY MOUTH ONCE DAILY IF NEEDED, Disp: 30 tablet, Rfl: 2    mirtazapine (REMERON) 7.5 MG tablet, 1 tablet., Disp: , Rfl:     MULTIPLE VITAMINS PO, Take 1 tablet by mouth Daily., Disp: , Rfl:     mupirocin (BACTROBAN) 2 % ointment, Apply  topically to the appropriate area as directed Every 12 (Twelve) Hours., Disp: , Rfl:     Omega-3 Fatty Acids (FISH OIL) 1000 MG capsule capsule, Take 1 capsule by mouth Daily., Disp: , Rfl:     phenol (CHLORASEPTIC) 1.4 % liquid liquid, Apply 1 spray to the mouth or throat Every 2 (Two) Hours As Needed (pain)., Disp: 118 mL, Rfl: 0    pravastatin (PRAVACHOL) 40 MG tablet, TAKE ONE TABLET BY  "MOUTH EVERY DAY, Disp: 30 tablet, Rfl: 3    Sacubitril-Valsartan (ENTRESTO PO), Take  by mouth., Disp: , Rfl:     sildenafil (Revatio) 20 MG tablet, Take 1 tablet by mouth 3 (Three) Times a Day., Disp: , Rfl:     sildenafil (VIAGRA) 100 MG tablet, Take 1 tablet by mouth Daily As Needed for erectile dysfunction., Disp: 6 tablet, Rfl: 5    triamcinolone (KENALOG) 0.1 % cream, Apply  topically to the appropriate area as directed Every 12 (Twelve) Hours., Disp: , Rfl:     urea (CARMOL) 20 % cream, Apply  topically to the appropriate area as directed As Needed for Dry Skin., Disp: , Rfl:     vitamin B-6 (PYRIDOXINE) 50 MG tablet, Take 1 tablet by mouth Daily., Disp: , Rfl:     Allergies:   Allergies   Allergen Reactions    Penicillins Rash and Unknown - High Severity     Generalized  Pt states rash occurred as a child and has not used since  Generalized  Pt states rash occurred as a child and has not used since         The following portions of the patient's history were reviewed and updated as appropriate: allergies, current medications, past family history, past medical history, past social history, past surgical history and problem list.        Objective    Objective      Vital Signs:   Vitals:    09/12/24 0854   BP: 128/74   Weight: 101 kg (223 lb)   Height: 177.8 cm (70\")       Ortho Exam:  RIGHT SHOULDER  General: no acute distress, comfortable  Vitals reviewed in chart    Musculoskeletal Exam    SIDE: Right shoulder  Shoulder Exam:    Tenderness: Glenohumeral joint pain, rotator cuff weakness    Range of motion measurements (degrees)  Forward flexion/Abduction/External rotation at side/ER at 90/IR at 90/IR position  Active: 80/80/20/buttock  Passive: Same    Painful arc of motion: yes  Glenohumeral joint contracture: yes  Glenohumeral joint crepitus: yes  Glenohumeral joint pain: yes  No evidence of septic joint  Impingement testing Neer's test - positive/painful  Impingement testing Hawkin's test - " positive/painful    Rotator Cuff Testing:  Tenderness to palpation at rotator cuff - no  Rotator cuff testing Vaughn's test -positive  Rotator cuff testing External rotation -positive  Rotator cuff weakness  Rotator cuff testing Lag signs - negative  Rotator cuff testing Belly press - negative  Pain with abduction great than 90 degrees - yes  Rotator cuff testing limiting by glenohumeral joint pain and stiffness    Scapular dyskinesis - present, abnormal scapular motion    Long head of the biceps testing:  Walker's test for biceps - positive  Bicipital groove tenderness to palpation - positive      Results Review:   Imaging Results (Last 24 Hours)       ** No results found for the last 24 hours. **          MRI Shoulder Right Without Contrast    Result Date: 7/25/2024  Impression: Severe degenerative changes of the glenohumeral joint. Rotator cuff tendinopathy with full-thickness near full width tear of supraspinatus. There is partial tearing of subscapularis and infraspinatus as well. There is mild subscapularis and moderate supraspinatus fatty atrophy. Moderate glenohumeral joint effusion with synovitis. A 7 mm loose body is seen within the subscapularis recess. Nonvisualization of the intra-articular portion of the long head of biceps tendon concerning for rupture. Moderate acromioclavicular degenerative changes. Electronically Signed: Jovany Arroyo MD  7/25/2024 1:18 PM EDT  Workstation ID: [id number]      I personally reviewed and personally interpreted the imaging above.  Severe OA and full thickness rotator cuff tearing    Procedures             Assessment / Plan      Assessment/Plan:   Problem List Items Addressed This Visit          Musculoskeletal and Injuries    Primary osteoarthritis of right shoulder - Primary    Relevant Orders    CT shoulder right wo contrast    Nontraumatic complete tear of right rotator cuff    Relevant Orders    CT shoulder right wo contrast    Contracture of joint of right  shoulder region    Relevant Orders    CT shoulder right wo contrast       RIGHT SHOULDER  Severe glenohumeral joint arthritis and full-thickness rotator cuff tearing, chronic    A CT scan (Blueprint Protocol) is critical for assessment of the patient's glenoid morphology and rotator cuff status in anticipation of surgical intervention (shoulder arthroplasty).  The CT scan is critical as a surgical planning tool.  The patient has failed conservative measures and a CT scan is the next critical step in surgical decision making.    The indication for the CT scan without arthrogram is for assessment of the patient's glenoid morphology and rotator cuff status in anticipation of surgical intervention (shoulder arthroplasty) in the setting of glenohumeral joint arthritis.  The CT scan is a critical surgical planning tool.    Patient desires to proceed with reverse shoulder arthroplasty following his upcoming total knee in December.  He would like to target surgery in February roughly 2 months following his knee replacement which is very reasonable.        Follow Up: RIGHT SHOULDER CT SCAN        Ramsey Gomez MD, FAAOS  Orthopedic Surgeon, Shoulder Surgery  [facility]  Orthopedics and Sports Medicine  [address], [address]  [place] [address]    & New Location:  [facility] Location  [address], [address]  [place] [address]    09/12/24  09:21 EDT

## 2024-09-25 ENCOUNTER — OFFICE VISIT (OUTPATIENT)
Dept: INTERNAL MEDICINE | Facility: CLINIC | Age: 74
End: 2024-09-25
Payer: MEDICARE

## 2024-09-25 ENCOUNTER — LAB (OUTPATIENT)
Dept: INTERNAL MEDICINE | Facility: CLINIC | Age: 74
End: 2024-09-25
Payer: MEDICARE

## 2024-09-25 VITALS
HEART RATE: 70 BPM | WEIGHT: 228 LBS | DIASTOLIC BLOOD PRESSURE: 70 MMHG | OXYGEN SATURATION: 97 % | HEIGHT: 70 IN | BODY MASS INDEX: 32.64 KG/M2 | SYSTOLIC BLOOD PRESSURE: 130 MMHG

## 2024-09-25 DIAGNOSIS — Z23 NEEDS FLU SHOT: Primary | ICD-10-CM

## 2024-09-25 DIAGNOSIS — D69.6 THROMBOCYTOPENIA, UNSPECIFIED: ICD-10-CM

## 2024-09-25 DIAGNOSIS — E78.2 MIXED HYPERLIPIDEMIA: ICD-10-CM

## 2024-09-25 DIAGNOSIS — K21.00 GASTROESOPHAGEAL REFLUX DISEASE WITH ESOPHAGITIS WITHOUT HEMORRHAGE: ICD-10-CM

## 2024-09-25 DIAGNOSIS — R35.0 BENIGN PROSTATIC HYPERPLASIA WITH URINARY FREQUENCY: ICD-10-CM

## 2024-09-25 DIAGNOSIS — R60.0 BILATERAL EDEMA OF LOWER EXTREMITY: ICD-10-CM

## 2024-09-25 DIAGNOSIS — I10 PRIMARY HYPERTENSION: ICD-10-CM

## 2024-09-25 DIAGNOSIS — E80.6 HYPERBILIRUBINEMIA: ICD-10-CM

## 2024-09-25 DIAGNOSIS — I51.89 DIASTOLIC DYSFUNCTION: ICD-10-CM

## 2024-09-25 DIAGNOSIS — N40.1 BENIGN PROSTATIC HYPERPLASIA WITH URINARY FREQUENCY: ICD-10-CM

## 2024-09-25 DIAGNOSIS — F43.21 SITUATIONAL DEPRESSION: ICD-10-CM

## 2024-09-25 LAB
ALBUMIN SERPL-MCNC: 4.1 G/DL (ref 3.5–5.2)
ALBUMIN/GLOB SERPL: 1.6 G/DL
ALP SERPL-CCNC: 62 U/L (ref 39–117)
ALT SERPL W P-5'-P-CCNC: 15 U/L (ref 1–41)
ANION GAP SERPL CALCULATED.3IONS-SCNC: 11 MMOL/L (ref 5–15)
AST SERPL-CCNC: 15 U/L (ref 1–40)
BILIRUB SERPL-MCNC: 0.9 MG/DL (ref 0–1.2)
BUN SERPL-MCNC: 19 MG/DL (ref 8–23)
BUN/CREAT SERPL: 24.7 (ref 7–25)
CALCIUM SPEC-SCNC: 9.2 MG/DL (ref 8.6–10.5)
CHLORIDE SERPL-SCNC: 105 MMOL/L (ref 98–107)
CHOLEST SERPL-MCNC: 154 MG/DL (ref 0–200)
CO2 SERPL-SCNC: 23 MMOL/L (ref 22–29)
CREAT SERPL-MCNC: 0.77 MG/DL (ref 0.76–1.27)
DEPRECATED RDW RBC AUTO: 42.4 FL (ref 37–54)
EGFRCR SERPLBLD CKD-EPI 2021: 95.1 ML/MIN/1.73
ERYTHROCYTE [DISTWIDTH] IN BLOOD BY AUTOMATED COUNT: 13.6 % (ref 12.3–15.4)
GLOBULIN UR ELPH-MCNC: 2.5 GM/DL
GLUCOSE SERPL-MCNC: 107 MG/DL (ref 65–99)
HCT VFR BLD AUTO: 45.7 % (ref 37.5–51)
HDLC SERPL-MCNC: 36 MG/DL (ref 40–60)
HGB BLD-MCNC: 16.1 G/DL (ref 13–17.7)
LDLC SERPL CALC-MCNC: 92 MG/DL (ref 0–100)
LDLC/HDLC SERPL: 2.46 {RATIO}
MCH RBC QN AUTO: 30.1 PG (ref 26.6–33)
MCHC RBC AUTO-ENTMCNC: 35.2 G/DL (ref 31.5–35.7)
MCV RBC AUTO: 85.6 FL (ref 79–97)
PLATELET # BLD AUTO: 135 10*3/MM3 (ref 140–450)
PMV BLD AUTO: 9.5 FL (ref 6–12)
POTASSIUM SERPL-SCNC: 3.9 MMOL/L (ref 3.5–5.2)
PROT SERPL-MCNC: 6.6 G/DL (ref 6–8.5)
RBC # BLD AUTO: 5.34 10*6/MM3 (ref 4.14–5.8)
SODIUM SERPL-SCNC: 139 MMOL/L (ref 136–145)
TRIGL SERPL-MCNC: 147 MG/DL (ref 0–150)
TSH SERPL DL<=0.05 MIU/L-ACNC: 0.94 UIU/ML (ref 0.27–4.2)
VLDLC SERPL-MCNC: 26 MG/DL (ref 5–40)
WBC NRBC COR # BLD AUTO: 6.17 10*3/MM3 (ref 3.4–10.8)

## 2024-09-25 PROCEDURE — 3075F SYST BP GE 130 - 139MM HG: CPT | Performed by: INTERNAL MEDICINE

## 2024-09-25 PROCEDURE — 85027 COMPLETE CBC AUTOMATED: CPT | Performed by: INTERNAL MEDICINE

## 2024-09-25 PROCEDURE — 1125F AMNT PAIN NOTED PAIN PRSNT: CPT | Performed by: INTERNAL MEDICINE

## 2024-09-25 PROCEDURE — 1160F RVW MEDS BY RX/DR IN RCRD: CPT | Performed by: INTERNAL MEDICINE

## 2024-09-25 PROCEDURE — 80061 LIPID PANEL: CPT | Performed by: INTERNAL MEDICINE

## 2024-09-25 PROCEDURE — 1159F MED LIST DOCD IN RCRD: CPT | Performed by: INTERNAL MEDICINE

## 2024-09-25 PROCEDURE — 36415 COLL VENOUS BLD VENIPUNCTURE: CPT | Performed by: INTERNAL MEDICINE

## 2024-09-25 PROCEDURE — G0008 ADMIN INFLUENZA VIRUS VAC: HCPCS | Performed by: INTERNAL MEDICINE

## 2024-09-25 PROCEDURE — 84443 ASSAY THYROID STIM HORMONE: CPT | Performed by: INTERNAL MEDICINE

## 2024-09-25 PROCEDURE — 3078F DIAST BP <80 MM HG: CPT | Performed by: INTERNAL MEDICINE

## 2024-09-25 PROCEDURE — 90662 IIV NO PRSV INCREASED AG IM: CPT | Performed by: INTERNAL MEDICINE

## 2024-09-25 PROCEDURE — 99214 OFFICE O/P EST MOD 30 MIN: CPT | Performed by: INTERNAL MEDICINE

## 2024-09-25 PROCEDURE — 80053 COMPREHEN METABOLIC PANEL: CPT | Performed by: INTERNAL MEDICINE

## 2024-09-28 PROBLEM — G44.89 OTHER HEADACHE SYNDROME: Status: ACTIVE | Noted: 2024-09-28

## 2024-09-29 ENCOUNTER — PATIENT ROUNDING (BHMG ONLY) (OUTPATIENT)
Dept: URGENT CARE | Facility: CLINIC | Age: 74
End: 2024-09-29
Payer: COMMERCIAL

## 2024-09-30 DIAGNOSIS — R60.0 BILATERAL LEG EDEMA: ICD-10-CM

## 2024-09-30 RX ORDER — FUROSEMIDE 20 MG
20 TABLET ORAL DAILY PRN
Qty: 30 TABLET | Refills: 2 | Status: SHIPPED | OUTPATIENT
Start: 2024-09-30

## 2024-10-01 ENCOUNTER — HOSPITAL ENCOUNTER (OUTPATIENT)
Facility: HOSPITAL | Age: 74
Discharge: HOME | End: 2024-10-01
Admitting: ORTHOPAEDIC SURGERY
Payer: MEDICARE

## 2024-10-01 DIAGNOSIS — M75.121 NONTRAUMATIC COMPLETE TEAR OF RIGHT ROTATOR CUFF: ICD-10-CM

## 2024-10-01 DIAGNOSIS — M24.511 CONTRACTURE OF JOINT OF RIGHT SHOULDER REGION: ICD-10-CM

## 2024-10-01 DIAGNOSIS — M19.011 PRIMARY OSTEOARTHRITIS OF RIGHT SHOULDER: ICD-10-CM

## 2024-10-01 PROCEDURE — 73200 CT UPPER EXTREMITY W/O DYE: CPT

## 2024-10-02 ENCOUNTER — TELEPHONE (OUTPATIENT)
Dept: ORTHOPEDIC SURGERY | Facility: CLINIC | Age: 74
End: 2024-10-02

## 2024-10-02 NOTE — TELEPHONE ENCOUNTER
----- Message from [facility] [employer] sent at 10/2/2024 12:04 PM EDT -----  Regarding: PT after knee surgery  Contact: [phone]  Dr. Mattson,  My left knee surgery with you is December 4th.  When I saw you last, you said that I should contact my preferred [facility] PT to get a schedule set up with them.  I contacted them, and they will need a Doctor's order before they can help me set up a schedule.  Please advised,     Thanks,     Brady

## 2024-10-10 ENCOUNTER — OFFICE VISIT (OUTPATIENT)
Dept: ORTHOPEDIC SURGERY | Facility: CLINIC | Age: 74
End: 2024-10-10
Payer: MEDICARE

## 2024-10-10 VITALS
BODY MASS INDEX: 31.5 KG/M2 | SYSTOLIC BLOOD PRESSURE: 132 MMHG | DIASTOLIC BLOOD PRESSURE: 80 MMHG | HEIGHT: 70 IN | WEIGHT: 220 LBS

## 2024-10-10 DIAGNOSIS — S49.91XD INJURY OF RIGHT SHOULDER, SUBSEQUENT ENCOUNTER: ICD-10-CM

## 2024-10-10 DIAGNOSIS — M19.011 PRIMARY OSTEOARTHRITIS OF RIGHT SHOULDER: Primary | ICD-10-CM

## 2024-10-10 DIAGNOSIS — M25.511 CHRONIC RIGHT SHOULDER PAIN: ICD-10-CM

## 2024-10-10 DIAGNOSIS — M75.121 NONTRAUMATIC COMPLETE TEAR OF RIGHT ROTATOR CUFF: ICD-10-CM

## 2024-10-10 DIAGNOSIS — G89.29 CHRONIC RIGHT SHOULDER PAIN: ICD-10-CM

## 2024-10-10 DIAGNOSIS — M24.511 CONTRACTURE OF JOINT OF RIGHT SHOULDER REGION: ICD-10-CM

## 2024-10-10 NOTE — PROGRESS NOTES
[facility] Orthopaedic Surgery Office Follow Up - Ramsey Gomez MD  [facility]    Office Follow Up Visit       Patient Name: Jimmie Salcedo    Chief Complaint:   Chief Complaint   Patient presents with    Follow-up     1 month (CT) follow-up: Primary osteoarthritis of right shoulder       Referring Physician: No ref. provider found    History of Present Illness:   It has been 1  month(s) since Jimmie Salcedo's last visit. Jimmie Salcedo returns to clinic today for F/U: follow-up of rightBody Part: shoulderReason: arthritis. The issue has been ongoing for 6 month(s). Jimmie Salcedo rates HIS/HER: hispain at 5/10 on the pain scale. Previous/current treatments: activity modifications. Current symptoms:Symptoms: pain and stiffness. The pain is worse with sleeping, working, lying on affected side, and any movement of the joint; resting and ice improves the pain. Overall, he/she: heis doing the same. I have reviewed the patient's history of present illness as noted/entered above.    I have reviewed the patient's past medical history, surgical history, social history, family history, medications, and allergies as noted in the electronic medical record and as noted/entered.  I have reviewed the patient's review of systems as noted/enter and updated as noted in the patient's HPI.      Right shoulder severe glenohumeral joint arthritis with associated full-thickness rotator cuff tearing.  [place]     Upcoming total knee replacement in December with Dr. Angel Restrepo, he had his right done is having his left done in December        Patient desires to proceed with reverse shoulder arthroplasty following his upcoming total knee in December.  He would like to target surgery in February roughly 2 months following his knee replacement which is very reasonable.     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 male  Body mass index is 32 kg/m².       10/10/2024:  RIGHT SHOULDER OA and chronic full-thickness tearing  Right shoulder CT scan for planning purposes performed showing rotator cuff pathology and severe glenohumeral joint arthritis.    Desires to proceed with RIGHT RSA, outpatient after knee recovery    Enjoys: yves    He notes he had a clean bill of health with his heart team  He does use a CPAP for sleep apnea but no O2 requirements.      Subjective   Subjective      Review of Systems   Constitutional: Negative.  Negative for chills, fatigue and fever.   HENT: Negative.  Negative for congestion and dental problem.    Eyes: Negative.  Negative for blurred vision.   Respiratory: Negative.  Negative for shortness of breath.    Cardiovascular: Negative.  Negative for leg swelling.   Gastrointestinal: Negative.  Negative for abdominal pain.   Endocrine: Negative.  Negative for polyuria.   Genitourinary: Negative.  Negative for difficulty urinating.   Musculoskeletal:  Positive for arthralgias.   Skin: Negative.    Allergic/Immunologic: Negative.    Neurological: Negative.    Hematological: Negative.  Negative for adenopathy.   Psychiatric/Behavioral: Negative.  Negative for behavioral problems.         Past Medical History:   Past Medical History:   Diagnosis Date    Abnormal liver function test     Anxiety     Arthritis     Arthritis of back     Arthritis of neck     Benign prostatic hyperplasia     Bilateral edema of lower extremity     Bursitis of hip     Cataract     bilat- still present - mild     Cellulitis of leg, right     resolved    Chalazion     Cracking skin     bilat feet mostly     Degeneration of lumbar  "or lumbosacral intervertebral disc 11/20/2020    Disease     \"brakes/breaks\" disease as child-  effected blood and urine     Fracture, finger     Fracture, foot     Frozen shoulder     GERD (gastroesophageal reflux disease)     Hip arthrosis 2022    Pain walking    History of kidney stones     x2 had to have broken up     Hoarseness     from vocal cord bending- seeing ent - possible surgery soon     Hyperlipidemia     Hypertension     Kidney infection     h/o     Knee swelling 2015    Ongoing    Neck muscle spasm     Onychomycosis of toenail     Peptic ulceration     Periarthritis of shoulder     Pulmonary embolism     Renal calculi     Rotator cuff syndrome     Situational depression 08/22/2018    Sleep apnea with use of continuous positive airway pressure (CPAP)     compliant with machine     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     Vocal cord strain     vocal cord bent- seeing ent but causes pt to be hoarse     Wears glasses        Past Surgical History:   Past Surgical History:   Procedure Laterality Date    BACK SURGERY      CARDIAC CATHETERIZATION      COLONOSCOPY      CYSTOSCOPY W/ LASER LITHOTRIPSY      x2    ENDOSCOPY      with dilation     FINGER SURGERY      left 5th finger    HAND SURGERY  2018    Little Finger - Left Hand    JOINT REPLACEMENT      KNEE SURGERY Right 1986    arthroscopy    LASER OF PROSTATE W/ GREEN LIGHT PVP  02/2022    Dr Fu    OTHER SURGICAL HISTORY      Lithotripsy    PROSTATE SURGERY      TONSILLECTOMY      TOTAL KNEE ARTHROPLASTY Right 07/22/2020    Procedure: TOTAL KNEE ARTHROPLASTY RIGHT;  Surgeon: Tereso Restrepo MD;  Location: [facility];  Service: Orthopedics;  Laterality: Right;    TRIGGER POINT INJECTION         Family History:   Family History   Problem Relation Age of Onset    Arthritis Other     Hypertension Other     Hyperlipidemia Other     Heart attack Other     Hypertension Mother     Cancer Mother         Colon Cancer    Heart disease Father     " Hypertension Father     Alcohol abuse Brother        Social History:   Social History     Socioeconomic History    Marital status:    Tobacco Use    Smoking status: Never     Passive exposure: Past    Smokeless tobacco: Never   Vaping Use    Vaping status: Never Used   Substance and Sexual Activity    Alcohol use: Yes     Alcohol/week: 4.0 standard drinks of alcohol     Types: 4 Cans of beer per week    Drug use: No    Sexual activity: Yes     Partners: Female     Birth control/protection: None       Medications:   Current Outpatient Medications:     Acetaminophen 500 MG capsule, Take 1 capsule by mouth 2 (Two) Times a Day., Disp: , Rfl:     albuterol sulfate  (90 Base) MCG/ACT inhaler, Inhale 2 puffs Every 4 (Four) Hours As Needed for Wheezing., Disp: 8 g, Rfl: 0    amLODIPine (NORVASC) 2.5 MG tablet, Take 1 tablet by mouth Every Night., Disp: 90 tablet, Rfl: 3    buPROPion XL (WELLBUTRIN XL) 150 MG 24 hr tablet, , Disp: , Rfl:     calcipotriene (DOVONOX) 0.005 % ointment, As Needed., Disp: , Rfl:     Chlorhexidine Gluconate 4 % solution, Apply 7.8667 Applications topically to the appropriate area as directed Daily. Shower w/ solution for 5 days before surgery. [facility] Disp: 236 mL, Rfl: 0    Cholecalciferol (VITAMIN D3) 125 MCG (5000 UT) capsule capsule, Take 1 capsule by mouth Daily., Disp: , Rfl:     cloNIDine (CATAPRES) 0.1 MG tablet, 1 tablet., Disp: , Rfl:     dicyclomine (BENTYL) 20 MG tablet, Take 1 tablet by mouth Every 6 (Six) Hours As Needed (pain)., Disp: 20 tablet, Rfl: 0    docusate sodium (Colace) 100 MG capsule, Take 1 capsule by mouth 2 (Two) Times a Day., Disp: 60 capsule, Rfl: 0    doxazosin (CARDURA) 4 MG tablet, TAKE 1 TABLET BY MOUTH EVERY EVENING, Disp: 90 tablet, Rfl: 3    fenofibrate (TRICOR) 145 MG tablet, TAKE ONE TABLET BY MOUTH EVERY DAY, Disp: 90 tablet, Rfl: 3    furosemide (LASIX) 20 MG tablet, TAKE 1 TABLET BY MOUTH ONCE DAILY IF NEEDED, Disp: 30  "tablet, Rfl: 2    mirtazapine (REMERON) 7.5 MG tablet, 1 tablet., Disp: , Rfl:     MULTIPLE VITAMINS PO, Take 1 tablet by mouth Daily., Disp: , Rfl:     Omega-3 Fatty Acids (FISH OIL) 1000 MG capsule capsule, Take 1 capsule by mouth Daily., Disp: , Rfl:     pravastatin (PRAVACHOL) 40 MG tablet, TAKE ONE TABLET BY MOUTH EVERY DAY, Disp: 30 tablet, Rfl: 3    promethazine-dextromethorphan (PROMETHAZINE-DM) 6.25-15 MG/5ML syrup, Take 2.5 mL by mouth 4 (Four) Times a Day As Needed for Cough., Disp: 118 mL, Rfl: 0    Sacubitril-Valsartan (ENTRESTO PO), Take  by mouth., Disp: , Rfl:     sildenafil (Revatio) 20 MG tablet, Take 1 tablet by mouth 3 (Three) Times a Day., Disp: , Rfl:     triamcinolone (KENALOG) 0.1 % cream, Apply  topically to the appropriate area as directed Every 12 (Twelve) Hours., Disp: , Rfl:     urea (CARMOL) 20 % cream, Apply  topically to the appropriate area as directed As Needed for Dry Skin., Disp: , Rfl:     vitamin B-6 (PYRIDOXINE) 50 MG tablet, Take 1 tablet by mouth Daily., Disp: , Rfl:     Allergies:   Allergies   Allergen Reactions    Penicillins Rash     Generalized; Pt states rash occurred as a child and has not used since           The following portions of the patient's history were reviewed and updated as appropriate: allergies, current medications, past family history, past medical history, past social history, past surgical history and problem list.        Objective    Objective      Vital Signs:   Vitals:    10/10/24 0856   BP: 132/80   Weight: 99.8 kg (220 lb)   Height: 177.8 cm (70\")       Ortho Exam:  Right shoulder pain persists  Glenohumeral joint pain and crepitus, weakness with the rotator cuff particularly Jobes testing and external rotation testing.  Glenohumeral joint contracture, crepitus glenohumeral joint pain.  Active and passive range of motion remain limited as noted prior 80/80/20/buttock    Results Review:  Imaging Results (Last 24 Hours)       ** No results found for " the last 24 hours. **            CT shoulder right wo contrast    Result Date: 10/2/2024  Impression: Moderate to severe osteoarthritic change of the glenohumeral joint. Electronically Signed: Carlin Fountain MD  10/2/2024 2:19 PM EDT  Workstation ID: [id number]    MRI Shoulder Right Without Contrast    Result Date: 7/25/2024  Impression: Severe degenerative changes of the glenohumeral joint. Rotator cuff tendinopathy with full-thickness near full width tear of supraspinatus. There is partial tearing of subscapularis and infraspinatus as well. There is mild subscapularis and moderate supraspinatus fatty atrophy. Moderate glenohumeral joint effusion with synovitis. A 7 mm loose body is seen within the subscapularis recess. Nonvisualization of the intra-articular portion of the long head of biceps tendon concerning for rupture. Moderate acromioclavicular degenerative changes. Electronically Signed: Jovany Arroyo MD  7/25/2024 1:18 PM EDT  Workstation ID: [id number]      I have personally reviewed and interpreted images above rotator cuff notable for rotator cuff tearing.  CT also shows rotator cuff tearing and glenohumeral joint arthritis.    Procedures            Assessment / Plan      Assessment/Plan:   Problem List Items Addressed This Visit          Musculoskeletal and Injuries    Primary osteoarthritis of right shoulder - Primary    Relevant Orders    External Facility Surgical/Procedural Request    Nontraumatic complete tear of right rotator cuff    Relevant Orders    External Facility Surgical/Procedural Request    Contracture of joint of right shoulder region    Relevant Orders    External Facility Surgical/Procedural Request     Other Visit Diagnoses       Chronic right shoulder pain        Relevant Orders    External Facility Surgical/Procedural Request    Injury of right shoulder, subsequent encounter        Relevant Orders    External Facility Surgical/Procedural Request            RIGHT SHOULDER  Risks  and benefits of continued nonoperative management versus surgical management were discussed. The patient desires to proceed with operative intervention.    Reverse total shoulder replacement was discussed.      Specific risks include pain, bleeding, infection, injury to surrounding nerve and blood vessels, fracture, instability, failure or lack of healing of repair, incomplete pain relief, hardware failure, potential need for additional procedures, stiffness after surgery, and potential inability to restore range of motion and strength. Medical and anesthetic complications were additionally discussed.     General anesthesia is required, sling compliance, and compliance with physical therapy and/or a surgeon guided exercise program will be very important to the recovery process.    We also discussed the risks and benefits of postoperative medications including potential opioid medications for pain control.       I think that it is important for my patients to know that I work as a paid consultant, teacher, and  for an orthopedic device company/shoulder implant company ([employer] now [employer]), so I counseled regarding this.      I counseled the patient that I do serve as a paid consultant, speaker, surgeon educator, and technology and implant design, among other roles.  I do not get paid to use any specific company's implants, and I would never do that.  My number one priority is to provide the best possible care I can for my patients.  I enjoy my opportunity to educate other surgeons, and the ability to advance shoulder surgery with improved technology and improved shoulder surgery techniques and products.    I was happy to disclose my work as a medical device consultant with the patient and offered to answer any related questions.      I counseled the patient using figures, models, and a shoulder textbook.  I was able to show the patient preoperative and postoperative images  with background teaching to help that patient make a more informed decision.  I have found images to be helpful to better explain the diagnosis and treatment options.      RIGHT SHOULDER  Diagnosis and CPT Codes  1. Shoulder joint arthritis and rotator cuff tear - Open reverse total shoulder arthroplasty CPT Code 51565  2. Shoulder joint contracture   3. Shoulder biceps tendonitis     Ultrasling III - a neutral rotation sling is recommended for this patient for perioperative care.  The patient was fitted for the sling and the sling was provided today.  The sling will be a critical part of the perioperative care for these diagnoses.    [facility], prefers same day      Follow Up: RIGHT RSA after upcoming total knee -- February RIGHT RSA      Ramsey Gomez MD, FAAOS  Orthopedic Surgeon, Shoulder Surgery  [facility]  Orthopedics and Sports Medicine  [address], [address]  [place] [address]    & New Location:  [facility] Location  [address], [address]  [place] [address]    10/10/24  09:17 EDT

## 2024-11-12 DIAGNOSIS — M17.12 PRIMARY OSTEOARTHRITIS OF LEFT KNEE: Primary | ICD-10-CM

## 2024-11-26 ENCOUNTER — OFFICE VISIT (OUTPATIENT)
Dept: ORTHOPEDIC SURGERY | Facility: CLINIC | Age: 74
End: 2024-11-26
Payer: MEDICARE

## 2024-11-26 ENCOUNTER — PRE-ADMISSION TESTING (OUTPATIENT)
Dept: PREADMISSION TESTING | Facility: HOSPITAL | Age: 74
End: 2024-11-26
Payer: MEDICARE

## 2024-11-26 VITALS — BODY MASS INDEX: 33.21 KG/M2 | HEIGHT: 70 IN | WEIGHT: 232 LBS

## 2024-11-26 VITALS — WEIGHT: 232.59 LBS | HEIGHT: 69 IN | BODY MASS INDEX: 34.45 KG/M2

## 2024-11-26 DIAGNOSIS — Z96.651 STATUS POST TOTAL RIGHT KNEE REPLACEMENT: ICD-10-CM

## 2024-11-26 DIAGNOSIS — M17.12 PRIMARY OSTEOARTHRITIS OF LEFT KNEE: Primary | ICD-10-CM

## 2024-11-26 DIAGNOSIS — S89.91XA INJURY OF RIGHT KNEE, INITIAL ENCOUNTER: ICD-10-CM

## 2024-11-26 DIAGNOSIS — M17.12 PRIMARY OSTEOARTHRITIS OF LEFT KNEE: ICD-10-CM

## 2024-11-26 LAB
ALBUMIN SERPL-MCNC: 4.1 G/DL (ref 3.5–5.2)
ALBUMIN/GLOB SERPL: 1.6 G/DL
ALP SERPL-CCNC: 66 U/L (ref 39–117)
ALT SERPL W P-5'-P-CCNC: 15 U/L (ref 1–41)
ANION GAP SERPL CALCULATED.3IONS-SCNC: 11 MMOL/L (ref 5–15)
AST SERPL-CCNC: 21 U/L (ref 1–40)
BASOPHILS # BLD AUTO: 0.05 10*3/MM3 (ref 0–0.2)
BASOPHILS NFR BLD AUTO: 0.8 % (ref 0–1.5)
BILIRUB SERPL-MCNC: 0.7 MG/DL (ref 0–1.2)
BUN SERPL-MCNC: 14 MG/DL (ref 8–23)
BUN/CREAT SERPL: 15.1 (ref 7–25)
CALCIUM SPEC-SCNC: 8.8 MG/DL (ref 8.6–10.5)
CHLORIDE SERPL-SCNC: 107 MMOL/L (ref 98–107)
CO2 SERPL-SCNC: 24 MMOL/L (ref 22–29)
CREAT SERPL-MCNC: 0.93 MG/DL (ref 0.76–1.27)
CRP SERPL-MCNC: 0.44 MG/DL (ref 0–0.5)
DEPRECATED RDW RBC AUTO: 48.1 FL (ref 37–54)
EGFRCR SERPLBLD CKD-EPI 2021: 87.2 ML/MIN/1.73
EOSINOPHIL # BLD AUTO: 0.22 10*3/MM3 (ref 0–0.4)
EOSINOPHIL NFR BLD AUTO: 3.3 % (ref 0.3–6.2)
ERYTHROCYTE [DISTWIDTH] IN BLOOD BY AUTOMATED COUNT: 15.2 % (ref 12.3–15.4)
ERYTHROCYTE [SEDIMENTATION RATE] IN BLOOD: 10 MM/HR (ref 0–20)
GLOBULIN UR ELPH-MCNC: 2.5 GM/DL
GLUCOSE SERPL-MCNC: 142 MG/DL (ref 65–99)
HBA1C MFR BLD: 5.5 % (ref 4.8–5.6)
HCT VFR BLD AUTO: 45.7 % (ref 37.5–51)
HGB BLD-MCNC: 16 G/DL (ref 13–17.7)
IMM GRANULOCYTES # BLD AUTO: 0.01 10*3/MM3 (ref 0–0.05)
IMM GRANULOCYTES NFR BLD AUTO: 0.2 % (ref 0–0.5)
LYMPHOCYTES # BLD AUTO: 1.07 10*3/MM3 (ref 0.7–3.1)
LYMPHOCYTES NFR BLD AUTO: 16.1 % (ref 19.6–45.3)
MCH RBC QN AUTO: 30.3 PG (ref 26.6–33)
MCHC RBC AUTO-ENTMCNC: 35 G/DL (ref 31.5–35.7)
MCV RBC AUTO: 86.6 FL (ref 79–97)
MONOCYTES # BLD AUTO: 0.4 10*3/MM3 (ref 0.1–0.9)
MONOCYTES NFR BLD AUTO: 6 % (ref 5–12)
NEUTROPHILS NFR BLD AUTO: 4.89 10*3/MM3 (ref 1.7–7)
NEUTROPHILS NFR BLD AUTO: 73.6 % (ref 42.7–76)
NRBC BLD AUTO-RTO: 0 /100 WBC (ref 0–0.2)
PLATELET # BLD AUTO: 142 10*3/MM3 (ref 140–450)
PMV BLD AUTO: 9.1 FL (ref 6–12)
POTASSIUM SERPL-SCNC: 4.1 MMOL/L (ref 3.5–5.2)
PROT SERPL-MCNC: 6.6 G/DL (ref 6–8.5)
RBC # BLD AUTO: 5.28 10*6/MM3 (ref 4.14–5.8)
SODIUM SERPL-SCNC: 142 MMOL/L (ref 136–145)
WBC NRBC COR # BLD AUTO: 6.64 10*3/MM3 (ref 3.4–10.8)

## 2024-11-26 PROCEDURE — 85652 RBC SED RATE AUTOMATED: CPT

## 2024-11-26 PROCEDURE — 93005 ELECTROCARDIOGRAM TRACING: CPT

## 2024-11-26 PROCEDURE — 86140 C-REACTIVE PROTEIN: CPT

## 2024-11-26 PROCEDURE — 85025 COMPLETE CBC W/AUTO DIFF WBC: CPT

## 2024-11-26 PROCEDURE — 36415 COLL VENOUS BLD VENIPUNCTURE: CPT

## 2024-11-26 PROCEDURE — 83036 HEMOGLOBIN GLYCOSYLATED A1C: CPT

## 2024-11-26 PROCEDURE — 80053 COMPREHEN METABOLIC PANEL: CPT

## 2024-11-26 NOTE — PROGRESS NOTES
"    [facility] Orthopedic Surgery Clinic Note        Subjective     CC: Follow-up (4 month recheck- Primary osteoarthritis of left knee)      HPI    Jimmie Salcedo is a 74 y.o. male.  Patient is here today for his preop appointment for left TKA scheduled for 12/4/2024.  He is also here because of an injury to his right knee.  Tripped over a ladder and scraped the anterior aspect of his right knee.  Wanted to make sure that everything looked okay with his implant.    Overall, patient's symptoms are as above    ROS:    Constiutional:Pt denies fever, chills, nausea, or vomiting.  MSK:as above        Objective      Past Medical History  Past Medical History:   Diagnosis Date    Abnormal liver function test     Anxiety     Arthritis     Arthritis of back     Arthritis of neck     Benign prostatic hyperplasia     Bilateral edema of lower extremity     Bursitis of hip     Cataract     bilat- still present - mild     Cellulitis of leg, right     resolved    Chalazion     Cracking skin     bilat feet mostly     Degeneration of lumbar or lumbosacral intervertebral disc 11/20/2020    Disease     \"brakes/breaks\" disease as child-  effected blood and urine     Fracture, finger     Fracture, foot     Frozen shoulder     GERD (gastroesophageal reflux disease)     Hip arthrosis 2022    Pain walking    History of kidney stones     x2 had to have broken up     Hoarseness     from vocal cord bending- seeing ent - possible surgery soon     Hyperlipidemia     Hypertension     Kidney infection     h/o     Knee swelling 2015    Ongoing    Neck muscle spasm     Onychomycosis of toenail     Peptic ulceration     Periarthritis of shoulder     Pulmonary embolism     Renal calculi     Rotator cuff syndrome     Situational depression 08/22/2018    Sleep apnea with use of continuous positive airway pressure (CPAP)     compliant with machine     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     Vocal cord strain     vocal cord bent- seeing ent but " "causes pt to be hoarse     Wears glasses      Social History     Socioeconomic History    Marital status:    Tobacco Use    Smoking status: Never     Passive exposure: Past    Smokeless tobacco: Never   Vaping Use    Vaping status: Never Used   Substance and Sexual Activity    Alcohol use: Yes     Alcohol/week: 4.0 standard drinks of alcohol     Types: 4 Cans of beer per week    Drug use: No    Sexual activity: Yes     Partners: Female     Birth control/protection: None          Physical Exam  Ht 177.8 cm (70\")   Wt 105 kg (232 lb)   BMI 33.29 kg/m²     Body mass index is 33.29 kg/m².    Patient is well nourished and well developed.        Ortho Exam      Musculoskeletal:  Global Assessment:  Overall assessment of Lower Extremity Muscle Strength and Tone:  Left quadriceps--5/5   Left hamstrings--5/5       Left tibialis anterior--5/5  Left gastroc-soleus--5/5  Left EHL --5/5    Lower Extremity:    Inspection and Palpation:  Left knee:  Tenderness:  Over the medial joint line and moderate severity  Effusion:  1+  Crepitus:  Positive  Pulses:  2+  Ecchymosis:  None  Warmth:  None     ROM:  Left:  Extension: 5     Flexion:120    Instability:    Left:    Lachman Test:  Negative   Varus stress test negative  Valgus stress test negative    Deformities/Malalignments/Discrepancies:    Left:  Genu Varum     Functional Testing:  Vincent's test:  Negative  Patella grind test:  Positive  Q-angle:  normal      Patient has a fairly sizable abrasion on the anterior aspect of his right knee.  Superficial.  No drainage noted.  No knee effusion.  He is able to do straight leg raise with 5 out of 5 strength.    Imaging/Labs/EMG Reviewed and Interpreted:  Imaging Results (Last 24 Hours)       Procedure Component Value Units Date/Time    XR Knee 3+ View With [place] Right [[id number]] Resulted: 11/26/24 0855     Updated: 11/26/24 0855    Narrative:      Knee X-ray    Indication: status-post TKA    Study:  AP, Lateral, and " Sunrise views of Right knee    Comparison: Right knee 7/16/2024    Findings:  No signs of acute fracture are visualized  No signs of loosening are appreciated  Components are well aligned    Impression:  Status post Right total knee arthroplasty. No signs of   loosening or fracture.                   Assessment    Assessment:  1. Primary osteoarthritis of left knee    2. Status post total right knee replacement    3. Injury of right knee, initial encounter        Plan:  Recommend over the counter anti-inflammatories for pain and/or swelling  Right knee injury status post right TKA--I believe his injury is superficial overall.  I do not believe he is injured anything significantly.  Observe things for now will be the recommendation.  Left knee arthritis--risk, benefits, potential hazards, typical recovery and rehab as well as reasonable alternatives to outpatient left TKA, robot-assisted, were discussed with him.  Again the surgical be done as an outpatient.  Will use extended oral antibiotics because of a history of infection.  Full dose aspirin daily for DVT prophylaxis.  [facility] outpatient physical therapy will be initiated the day after surgery.  We will cement his implant.      Tereso Restrepo MD  11/26/24  09:03 EST      Dictated Utilizing Dragon Dictation.

## 2024-11-26 NOTE — PAT
Prescription for Chlorhexidine shower called into patient's pharmacy or [facility] pharmacy by patient's surgeon.  Reinforced with patient to  the prescription from applicable pharmacy if they haven't already.  Verbal and written instructions given regarding proper use of Chlorhexidine body wash to patient and/or famlily during PAT visit. Patient/family also instructed to complete checklist and return it to Pre-op on the day of surgery.  Patient and/or family verbalized understanding.    Patient to apply Chlorhexadine wipes  to surgical area (as instructed) the night before procedure and the AM of procedure. Wipes provided.    Patient instructed to drink 20 ounces of Gatorade or Gatorlyte (if diabetic) and it needs to be completed 1 hour (for Main OR patients) or 2 hours (scheduled  section & [facility] patients) before given arrival time for procedure (NO RED Gatorade and NO Gatorade Zero).  Patient verbalized understanding.    Per Anesthesia Request, patient instructed not to take their ACE/ARB medications on the AM of surgery.    Cardiac clearance on chart from dr cano dated     Discussed with patient options for receiving total joint replacement education and assessed patient's ability and preference. Joint Replacement Guide given to patient during PAT visit since not received a copy within the last year. Encouraged patient/family to read guide thoroughly and notify PAT staff with any questions or concerns. Handout provided directing patient to links to watch online videos related to joint replacement surgery on the [facility] website. The handout gives detailed instructions for joining an online joint replacement class through Microsoft Teams or phone conference offered on the  and  of the month. Patient agreed to participate by watching videos online. Patient verbalized understanding of instructions. Encouraged to share information with family and/or . An overview of the  joint replacement education was provided during the visit including general perioperative instructions that are routine for all surgical patients (PAT PASS, wipes, directions to pre-op, etc.).    Abrasion noted on right arm and right knee/lower leg, open to air and Dr Restrepo saw today in office per patient report

## 2024-12-01 LAB
QT INTERVAL: 442 MS
QTC INTERVAL: 467 MS

## 2024-12-02 ENCOUNTER — TRANSITIONAL CARE MANAGEMENT TELEPHONE ENCOUNTER (OUTPATIENT)
Dept: ORTHOPEDIC SURGERY | Facility: CLINIC | Age: 74
End: 2024-12-02
Payer: MEDICARE

## 2024-12-02 NOTE — OUTREACH NOTE
CALLED TO INITIATE PRE-OP CARE PLAN. RECEIVED NO ANSWER. LEFT VOICEMAIL FOR THE PATIENT TO RETURN MY CALL.

## 2024-12-03 ENCOUNTER — TELEPHONE (OUTPATIENT)
Dept: ORTHOPEDIC SURGERY | Facility: CLINIC | Age: 74
End: 2024-12-03
Payer: MEDICARE

## 2024-12-03 ENCOUNTER — ANESTHESIA EVENT (OUTPATIENT)
Dept: PERIOP | Facility: HOSPITAL | Age: 74
End: 2024-12-03
Payer: MEDICARE

## 2024-12-03 RX ORDER — FAMOTIDINE 10 MG/ML
20 INJECTION, SOLUTION INTRAVENOUS ONCE
Status: CANCELLED | OUTPATIENT
Start: 2024-12-03 | End: 2024-12-03

## 2024-12-03 RX ORDER — SODIUM CHLORIDE 0.9 % (FLUSH) 0.9 %
10 SYRINGE (ML) INJECTION AS NEEDED
Status: CANCELLED | OUTPATIENT
Start: 2024-12-03

## 2024-12-03 RX ORDER — SODIUM CHLORIDE 0.9 % (FLUSH) 0.9 %
10 SYRINGE (ML) INJECTION EVERY 12 HOURS SCHEDULED
Status: CANCELLED | OUTPATIENT
Start: 2024-12-03

## 2024-12-03 NOTE — TELEPHONE ENCOUNTER
I called patient and gave arrival time for surgery  with their arrival time for surgery. Arrival time for their surgery is 5:30AM. Surgery location is Main registration first floor of [address] NPO after midnight, may have up to 20 oz of Gatorade any color but RED, up until 1 hour before arrival time for surgery.

## 2024-12-04 ENCOUNTER — APPOINTMENT (OUTPATIENT)
Dept: GENERAL RADIOLOGY | Facility: HOSPITAL | Age: 74
End: 2024-12-04
Payer: MEDICARE

## 2024-12-04 ENCOUNTER — HOSPITAL ENCOUNTER (OUTPATIENT)
Facility: HOSPITAL | Age: 74
Discharge: HOME | End: 2024-12-04
Attending: ORTHOPAEDIC SURGERY | Admitting: ORTHOPAEDIC SURGERY
Payer: MEDICARE

## 2024-12-04 ENCOUNTER — ANESTHESIA EVENT CONVERTED (OUTPATIENT)
Dept: ANESTHESIOLOGY | Facility: HOSPITAL | Age: 74
End: 2024-12-04
Payer: MEDICARE

## 2024-12-04 ENCOUNTER — ANESTHESIA (OUTPATIENT)
Dept: PERIOP | Facility: HOSPITAL | Age: 74
End: 2024-12-04
Payer: MEDICARE

## 2024-12-04 VITALS
SYSTOLIC BLOOD PRESSURE: 165 MMHG | DIASTOLIC BLOOD PRESSURE: 57 MMHG | RESPIRATION RATE: 20 BRPM | OXYGEN SATURATION: 97 % | TEMPERATURE: 98.6 F | HEART RATE: 63 BPM

## 2024-12-04 DIAGNOSIS — M17.12 PRIMARY OSTEOARTHRITIS OF LEFT KNEE: ICD-10-CM

## 2024-12-04 DIAGNOSIS — Z96.652 STATUS POST TOTAL LEFT KNEE REPLACEMENT: Primary | ICD-10-CM

## 2024-12-04 PROBLEM — Z96.659 S/P TOTAL KNEE ARTHROPLASTY: Status: ACTIVE | Noted: 2024-12-04

## 2024-12-04 LAB — GLUCOSE BLDC GLUCOMTR-MCNC: 99 MG/DL (ref 70–130)

## 2024-12-04 PROCEDURE — 82948 REAGENT STRIP/BLOOD GLUCOSE: CPT

## 2024-12-04 PROCEDURE — C1776 JOINT DEVICE (IMPLANTABLE): HCPCS | Performed by: ORTHOPAEDIC SURGERY

## 2024-12-04 PROCEDURE — 25010000002 CEFAZOLIN PER 500 MG: Performed by: ORTHOPAEDIC SURGERY

## 2024-12-04 PROCEDURE — C1713 ANCHOR/SCREW BN/BN,TIS/BN: HCPCS | Performed by: ORTHOPAEDIC SURGERY

## 2024-12-04 PROCEDURE — 25010000002 LIDOCAINE PF 1% 1 % SOLUTION: Performed by: NURSE ANESTHETIST, CERTIFIED REGISTERED

## 2024-12-04 PROCEDURE — 25010000002 ROPIVACAINE PER 1 MG: Performed by: ORTHOPAEDIC SURGERY

## 2024-12-04 PROCEDURE — 25010000002 LIDOCAIN 0.5%-EPINEPHRINE 1:200000 0.5 %-1:200000 SOLUTION 50 ML VIAL: Performed by: ORTHOPAEDIC SURGERY

## 2024-12-04 PROCEDURE — 25010000002 LIDOCAINE PF 1% 1 % SOLUTION: Performed by: ANESTHESIOLOGY

## 2024-12-04 PROCEDURE — 25010000002 MORPHINE PER 10 MG: Performed by: ORTHOPAEDIC SURGERY

## 2024-12-04 PROCEDURE — 25010000002 DEXAMETHASONE PER 1 MG: Performed by: NURSE ANESTHETIST, CERTIFIED REGISTERED

## 2024-12-04 PROCEDURE — 97110 THERAPEUTIC EXERCISES: CPT

## 2024-12-04 PROCEDURE — 25010000002 KETOROLAC TROMETHAMINE PER 15 MG: Performed by: ORTHOPAEDIC SURGERY

## 2024-12-04 PROCEDURE — 27447 TOTAL KNEE ARTHROPLASTY: CPT | Performed by: ORTHOPAEDIC SURGERY

## 2024-12-04 PROCEDURE — 25010000002 BUPIVACAINE 0.5 % SOLUTION: Performed by: ANESTHESIOLOGY

## 2024-12-04 PROCEDURE — 25010000002 BUPIVACAINE (PF) 0.25 % SOLUTION: Performed by: NURSE ANESTHETIST, CERTIFIED REGISTERED

## 2024-12-04 PROCEDURE — 97116 GAIT TRAINING THERAPY: CPT

## 2024-12-04 PROCEDURE — 25810000003 LACTATED RINGERS PER 1000 ML: Performed by: ANESTHESIOLOGY

## 2024-12-04 PROCEDURE — 27447 TOTAL KNEE ARTHROPLASTY: CPT | Performed by: PHYSICIAN ASSISTANT

## 2024-12-04 PROCEDURE — 25010000002 PROPOFOL 10 MG/ML EMULSION: Performed by: NURSE ANESTHETIST, CERTIFIED REGISTERED

## 2024-12-04 PROCEDURE — 25010000002 ROPIVACAINE HCL-NACL 0.2-0.9 % SOLUTION: Performed by: NURSE ANESTHETIST, CERTIFIED REGISTERED

## 2024-12-04 PROCEDURE — 97162 PT EVAL MOD COMPLEX 30 MIN: CPT

## 2024-12-04 PROCEDURE — 20985 CPTR-ASST DIR MS PX: CPT | Performed by: ORTHOPAEDIC SURGERY

## 2024-12-04 PROCEDURE — C1755 CATHETER, INTRASPINAL: HCPCS | Performed by: ORTHOPAEDIC SURGERY

## 2024-12-04 PROCEDURE — 73560 X-RAY EXAM OF KNEE 1 OR 2: CPT

## 2024-12-04 PROCEDURE — 25010000002 ONDANSETRON PER 1 MG: Performed by: NURSE ANESTHETIST, CERTIFIED REGISTERED

## 2024-12-04 PROCEDURE — 20985 CPTR-ASST DIR MS PX: CPT | Performed by: PHYSICIAN ASSISTANT

## 2024-12-04 DEVICE — IMPLANTABLE DEVICE
Type: IMPLANTABLE DEVICE | Site: KNEE | Status: FUNCTIONAL
Brand: BIOMET® BONE CEMENT R

## 2024-12-04 DEVICE — CAP TOTL KN CMT PRIMARY W/ROSA: Type: IMPLANTABLE DEVICE | Site: KNEE | Status: FUNCTIONAL

## 2024-12-04 DEVICE — IMPLANTABLE DEVICE
Type: IMPLANTABLE DEVICE | Site: KNEE | Status: FUNCTIONAL
Brand: PERSONA®

## 2024-12-04 DEVICE — IMPLANTABLE DEVICE: Type: IMPLANTABLE DEVICE | Site: KNEE | Status: FUNCTIONAL

## 2024-12-04 DEVICE — IMPLANTABLE DEVICE
Type: IMPLANTABLE DEVICE | Site: KNEE | Status: FUNCTIONAL
Brand: PERSONA® VIVACIT-E®

## 2024-12-04 DEVICE — VIOLET ANTIBACTERIAL POLYDIOXANONE, KNOTLESS TISSUE CONTROL DEVICE
Type: IMPLANTABLE DEVICE | Site: KNEE | Status: FUNCTIONAL
Brand: STRATAFIX

## 2024-12-04 RX ORDER — ASPIRIN 325 MG
325 TABLET ORAL DAILY
Status: CANCELLED | OUTPATIENT
Start: 2024-12-05

## 2024-12-04 RX ORDER — CEFAZOLIN SODIUM 2 G/100ML
2000 INJECTION, SOLUTION INTRAVENOUS EVERY 8 HOURS
Status: DISCONTINUED | OUTPATIENT
Start: 2024-12-04 | End: 2024-12-04

## 2024-12-04 RX ORDER — ASPIRIN 325 MG
325 TABLET, DELAYED RELEASE (ENTERIC COATED) ORAL DAILY
Qty: 42 TABLET | Refills: 0 | Status: SHIPPED | OUTPATIENT
Start: 2024-12-04

## 2024-12-04 RX ORDER — HYDROMORPHONE HYDROCHLORIDE 1 MG/ML
0.5 INJECTION, SOLUTION INTRAMUSCULAR; INTRAVENOUS; SUBCUTANEOUS
Status: DISCONTINUED | OUTPATIENT
Start: 2024-12-04 | End: 2024-12-04 | Stop reason: HOSPADM

## 2024-12-04 RX ORDER — OXYCODONE AND ACETAMINOPHEN 5; 325 MG/1; MG/1
1 TABLET ORAL ONCE AS NEEDED
Status: DISCONTINUED | OUTPATIENT
Start: 2024-12-04 | End: 2024-12-04 | Stop reason: HOSPADM

## 2024-12-04 RX ORDER — ONDANSETRON 4 MG/1
4 TABLET, ORALLY DISINTEGRATING ORAL ONCE AS NEEDED
Status: DISCONTINUED | OUTPATIENT
Start: 2024-12-04 | End: 2024-12-04 | Stop reason: HOSPADM

## 2024-12-04 RX ORDER — ONDANSETRON 4 MG/1
4 TABLET, FILM COATED ORAL EVERY 8 HOURS PRN
Qty: 15 TABLET | Refills: 1 | Status: SHIPPED | OUTPATIENT
Start: 2024-12-04

## 2024-12-04 RX ORDER — LABETALOL HYDROCHLORIDE 5 MG/ML
5 INJECTION, SOLUTION INTRAVENOUS
Status: DISCONTINUED | OUTPATIENT
Start: 2024-12-04 | End: 2024-12-04 | Stop reason: HOSPADM

## 2024-12-04 RX ORDER — HYDRALAZINE HYDROCHLORIDE 20 MG/ML
5 INJECTION INTRAMUSCULAR; INTRAVENOUS
Status: DISCONTINUED | OUTPATIENT
Start: 2024-12-04 | End: 2024-12-04 | Stop reason: HOSPADM

## 2024-12-04 RX ORDER — TRANEXAMIC ACID 10 MG/ML
1000 INJECTION, SOLUTION INTRAVENOUS ONCE
Status: COMPLETED | OUTPATIENT
Start: 2024-12-04 | End: 2024-12-04

## 2024-12-04 RX ORDER — FENTANYL CITRATE 50 UG/ML
50 INJECTION, SOLUTION INTRAMUSCULAR; INTRAVENOUS
Status: DISCONTINUED | OUTPATIENT
Start: 2024-12-04 | End: 2024-12-04 | Stop reason: HOSPADM

## 2024-12-04 RX ORDER — MAGNESIUM HYDROXIDE 1200 MG/15ML
LIQUID ORAL AS NEEDED
Status: DISCONTINUED | OUTPATIENT
Start: 2024-12-04 | End: 2024-12-04 | Stop reason: HOSPADM

## 2024-12-04 RX ORDER — ROPIVACAINE HYDROCHLORIDE 2 MG/ML
INJECTION, SOLUTION EPIDURAL; INFILTRATION; PERINEURAL CONTINUOUS
Status: DISCONTINUED | OUTPATIENT
Start: 2024-12-04 | End: 2024-12-04 | Stop reason: HOSPADM

## 2024-12-04 RX ORDER — DROPERIDOL 2.5 MG/ML
0.62 INJECTION, SOLUTION INTRAMUSCULAR; INTRAVENOUS ONCE AS NEEDED
Status: DISCONTINUED | OUTPATIENT
Start: 2024-12-04 | End: 2024-12-04 | Stop reason: HOSPADM

## 2024-12-04 RX ORDER — IBUPROFEN 400 MG/1
400 TABLET, FILM COATED ORAL EVERY 8 HOURS PRN
Qty: 90 TABLET | Refills: 1 | Status: SHIPPED | OUTPATIENT
Start: 2024-12-04

## 2024-12-04 RX ORDER — MELOXICAM 15 MG/1
15 TABLET ORAL DAILY
Status: CANCELLED | OUTPATIENT
Start: 2024-12-04

## 2024-12-04 RX ORDER — IBUPROFEN 600 MG/1
600 TABLET, FILM COATED ORAL EVERY 6 HOURS PRN
Status: DISCONTINUED | OUTPATIENT
Start: 2024-12-04 | End: 2024-12-04 | Stop reason: HOSPADM

## 2024-12-04 RX ORDER — DEXAMETHASONE SODIUM PHOSPHATE 4 MG/ML
INJECTION, SOLUTION INTRA-ARTICULAR; INTRALESIONAL; INTRAMUSCULAR; INTRAVENOUS; SOFT TISSUE AS NEEDED
Status: DISCONTINUED | OUTPATIENT
Start: 2024-12-04 | End: 2024-12-04 | Stop reason: SURG

## 2024-12-04 RX ORDER — BUPIVACAINE HYDROCHLORIDE 2.5 MG/ML
INJECTION, SOLUTION EPIDURAL; INFILTRATION; INTRACAUDAL
Status: COMPLETED | OUTPATIENT
Start: 2024-12-04 | End: 2024-12-04

## 2024-12-04 RX ORDER — FAMOTIDINE 20 MG/1
20 TABLET, FILM COATED ORAL ONCE
Status: COMPLETED | OUTPATIENT
Start: 2024-12-04 | End: 2024-12-04

## 2024-12-04 RX ORDER — LIDOCAINE HYDROCHLORIDE 10 MG/ML
INJECTION, SOLUTION EPIDURAL; INFILTRATION; INTRACAUDAL; PERINEURAL AS NEEDED
Status: DISCONTINUED | OUTPATIENT
Start: 2024-12-04 | End: 2024-12-04 | Stop reason: SURG

## 2024-12-04 RX ORDER — OXYCODONE HCL 10 MG/1
10 TABLET, FILM COATED, EXTENDED RELEASE ORAL ONCE
Status: COMPLETED | OUTPATIENT
Start: 2024-12-04 | End: 2024-12-04

## 2024-12-04 RX ORDER — IPRATROPIUM BROMIDE AND ALBUTEROL SULFATE 2.5; .5 MG/3ML; MG/3ML
3 SOLUTION RESPIRATORY (INHALATION) ONCE AS NEEDED
Status: DISCONTINUED | OUTPATIENT
Start: 2024-12-04 | End: 2024-12-04 | Stop reason: HOSPADM

## 2024-12-04 RX ORDER — HYDROCODONE BITARTRATE AND ACETAMINOPHEN 5; 325 MG/1; MG/1
1 TABLET ORAL ONCE AS NEEDED
Status: DISCONTINUED | OUTPATIENT
Start: 2024-12-04 | End: 2024-12-04 | Stop reason: HOSPADM

## 2024-12-04 RX ORDER — LIDOCAINE HYDROCHLORIDE 10 MG/ML
0.5 INJECTION, SOLUTION EPIDURAL; INFILTRATION; INTRACAUDAL; PERINEURAL ONCE AS NEEDED
Status: COMPLETED | OUTPATIENT
Start: 2024-12-04 | End: 2024-12-04

## 2024-12-04 RX ORDER — SODIUM CHLORIDE, SODIUM LACTATE, POTASSIUM CHLORIDE, CALCIUM CHLORIDE 600; 310; 30; 20 MG/100ML; MG/100ML; MG/100ML; MG/100ML
9 INJECTION, SOLUTION INTRAVENOUS CONTINUOUS
Status: DISCONTINUED | OUTPATIENT
Start: 2024-12-05 | End: 2024-12-04 | Stop reason: HOSPADM

## 2024-12-04 RX ORDER — BUPIVACAINE HYDROCHLORIDE 5 MG/ML
INJECTION, SOLUTION PERINEURAL
Status: COMPLETED | OUTPATIENT
Start: 2024-12-04 | End: 2024-12-04

## 2024-12-04 RX ORDER — MIDAZOLAM HYDROCHLORIDE 1 MG/ML
0.5 INJECTION, SOLUTION INTRAMUSCULAR; INTRAVENOUS
Status: DISCONTINUED | OUTPATIENT
Start: 2024-12-04 | End: 2024-12-04 | Stop reason: HOSPADM

## 2024-12-04 RX ORDER — SODIUM CHLORIDE 9 MG/ML
INJECTION, SOLUTION INTRAVENOUS
Status: DISCONTINUED
Start: 2024-12-04 | End: 2024-12-04 | Stop reason: HOSPADM

## 2024-12-04 RX ORDER — FAMOTIDINE 20 MG/1
40 TABLET, FILM COATED ORAL DAILY
Status: CANCELLED | OUTPATIENT
Start: 2024-12-04

## 2024-12-04 RX ORDER — DOXYCYCLINE 100 MG/1
100 TABLET ORAL 2 TIMES DAILY
Qty: 14 TABLET | Refills: 0 | Status: SHIPPED | OUTPATIENT
Start: 2024-12-04 | End: 2024-12-09

## 2024-12-04 RX ORDER — ONDANSETRON 2 MG/ML
INJECTION INTRAMUSCULAR; INTRAVENOUS AS NEEDED
Status: DISCONTINUED | OUTPATIENT
Start: 2024-12-04 | End: 2024-12-04 | Stop reason: SURG

## 2024-12-04 RX ORDER — PROPOFOL 10 MG/ML
VIAL (ML) INTRAVENOUS CONTINUOUS PRN
Status: DISCONTINUED | OUTPATIENT
Start: 2024-12-04 | End: 2024-12-04 | Stop reason: SURG

## 2024-12-04 RX ORDER — SENNOSIDES 8.6 MG
650 CAPSULE ORAL EVERY 8 HOURS
Qty: 90 TABLET | Refills: 0 | Status: SHIPPED | OUTPATIENT
Start: 2024-12-04

## 2024-12-04 RX ORDER — OXYCODONE HYDROCHLORIDE 5 MG/1
5 TABLET ORAL EVERY 6 HOURS PRN
Qty: 30 TABLET | Refills: 0 | Status: SHIPPED | OUTPATIENT
Start: 2024-12-04

## 2024-12-04 RX ORDER — ACETAMINOPHEN 500 MG
1000 TABLET ORAL ONCE
Status: COMPLETED | OUTPATIENT
Start: 2024-12-04 | End: 2024-12-04

## 2024-12-04 RX ADMIN — FAMOTIDINE 20 MG: 20 TABLET, FILM COATED ORAL at 06:59

## 2024-12-04 RX ADMIN — DEXAMETHASONE SODIUM PHOSPHATE 4 MG: 4 INJECTION INTRA-ARTICULAR; INTRALESIONAL; INTRAMUSCULAR; INTRAVENOUS; SOFT TISSUE at 07:45

## 2024-12-04 RX ADMIN — BUPIVACAINE HYDROCHLORIDE 1.8 ML: 5 INJECTION, SOLUTION PERINEURAL at 07:32

## 2024-12-04 RX ADMIN — LIDOCAINE HYDROCHLORIDE 50 MG: 10 INJECTION, SOLUTION EPIDURAL; INFILTRATION; INTRACAUDAL; PERINEURAL at 07:30

## 2024-12-04 RX ADMIN — BUPIVACAINE HYDROCHLORIDE 20 ML: 2.5 INJECTION, SOLUTION EPIDURAL; INFILTRATION; INTRACAUDAL; PERINEURAL at 10:18

## 2024-12-04 RX ADMIN — SODIUM CHLORIDE 2 G: 900 INJECTION INTRAVENOUS at 07:26

## 2024-12-04 RX ADMIN — SODIUM CHLORIDE, SODIUM LACTATE, POTASSIUM CHLORIDE, CALCIUM CHLORIDE 9 ML/HR: 20; 30; 600; 310 INJECTION, SOLUTION INTRAVENOUS at 06:46

## 2024-12-04 RX ADMIN — Medication 1000 MG: at 10:26

## 2024-12-04 RX ADMIN — SODIUM CHLORIDE, SODIUM LACTATE, POTASSIUM CHLORIDE, CALCIUM CHLORIDE: 20; 30; 600; 310 INJECTION, SOLUTION INTRAVENOUS at 10:05

## 2024-12-04 RX ADMIN — TRANEXAMIC ACID 1000 MG: 10 INJECTION, SOLUTION INTRAVENOUS at 09:26

## 2024-12-04 RX ADMIN — ONDANSETRON 4 MG: 2 INJECTION INTRAMUSCULAR; INTRAVENOUS at 09:44

## 2024-12-04 RX ADMIN — ACETAMINOPHEN 1000 MG: 500 TABLET ORAL at 06:59

## 2024-12-04 RX ADMIN — SODIUM CHLORIDE 2000 MG: 900 INJECTION INTRAVENOUS at 11:41

## 2024-12-04 RX ADMIN — TRANEXAMIC ACID 1000 MG: 10 INJECTION, SOLUTION INTRAVENOUS at 09:13

## 2024-12-04 RX ADMIN — LIDOCAINE HYDROCHLORIDE 0.5 ML: 10 INJECTION, SOLUTION EPIDURAL; INFILTRATION; INTRACAUDAL; PERINEURAL at 06:59

## 2024-12-04 RX ADMIN — PROPOFOL 100 MCG/KG/MIN: 10 INJECTION, EMULSION INTRAVENOUS at 07:30

## 2024-12-04 RX ADMIN — OXYCODONE HYDROCHLORIDE 10 MG: 10 TABLET, FILM COATED, EXTENDED RELEASE ORAL at 06:59

## 2024-12-04 NOTE — ANESTHESIA POSTPROCEDURE EVALUATION
Patient: Jimmie Salcedo    Procedure Summary       Date: 12/04/24 Room / Location:  [facility]    Anesthesia Start: 0726 Anesthesia Stop: 1018    Procedure: TOTAL KNEE ARTHROPLASTY WITH ZOIE ROBOT (Left: Knee) Diagnosis:       Primary osteoarthritis of left knee      (Primary osteoarthritis of left knee [M17.12])    Surgeons: Tereso Restrepo MD Provider: Opal Bond DO    Anesthesia Type: spinal, regional ASA Status: 2            Anesthesia Type: spinal, regional    Vitals  Vitals Value Taken Time   /64 12/04/24 1330   Temp 98.6 °F (37 °C) 12/04/24 1245   Pulse 60 12/04/24 1338   Resp 18 12/04/24 1330   SpO2 95 % 12/04/24 1338   Vitals shown include unfiled device data.        Post Anesthesia Care and Evaluation    Patient location during evaluation: PACU  Patient participation: complete - patient participated  Level of consciousness: awake and alert  Pain management: adequate    Airway patency: patent  Anesthetic complications: No anesthetic complications  PONV Status: none  Cardiovascular status: hemodynamically stable and acceptable  Respiratory status: nonlabored ventilation, acceptable and nasal cannula  Hydration status: acceptable  Post Neuraxial Block status: No signs or symptoms of PDPH

## 2024-12-04 NOTE — DISCHARGE INSTRUCTIONS
Discharge Instructions--Total Knee Replacement  Dr. Restrepo      Congratulations!  You have just completed knee replacement surgery.  The knee joint forms where the thigh bone, shin bone, and kneecap meet.  The knee joint is supported by muscles and ligaments.  It is lined with a cushioning called cartilage.  Over time, the cartilage wears away which can make the knee feel stiff and painful.  Dr. Restrepo replaced your painful joint with an artificial joint to relieve the pain and restore range of motion.  Here are some directions to follow once you are at home.        **Medication/Pain management at home**    Medication schedule  A.  Expect to have pain following surgery.  Our goal is to maintain a manageable pain level.  The pain medications prescribed will provide relief but do not take away all of the pain.  The first few days after surgery can be the most painful.  Just keep in mind that it will get better.  B.  Staying on top of your pain with the combination of medications prescribed to you on a regular schedule is the best way to keep the pain from getting too severe.  You can begin weaning off the pain medication (i.e. oxycodone, Tylenol, ibuprofen) as symptoms allow.  Many patients are afraid to take the pain medication but you must make sure that your pain is controlled well enough to fully participate with physical therapy.  Taking medication to control the pain 30 minutes before therapy is often a good strategy to get the most out of the exercises.  C.  The most effective way to take the medications is to take the Ibuprofen and Tylenol together every 8 hours.  If that does not do the trick, then supplement with the oxycodone or hydrocodone.  If you are taking the oxycodone or hydrocodone, please be sure to also take the Colace (stool softener) to reduce the risk of constipation.  One of the major side effects of opioid pain medications is constipation.  Please keep this in mind.  D.  Do not  wait until your pain level is at an 8 or 9 before taking medications.  At this point, you have already lost control of your pain and it will take a higher dosage to get back to a comfortable level.  Remember that the medications you are on take between 20 to 30 minutes to begin taking effect.    2.  Ice/compression/elevation  A.  Icing is helpful to reduce pain and swelling.  Icing 20 minutes at a time at least 3 times a day will be beneficial.  Do not place the ice directly on the skin itself but use a barrier such as a washcloth or a towel between the ice and your skin.  B.  Elevation works wonders for swelling.  Remember that the leg must be elevated at or above the heart for this to be effective.  C.  Remember not to place pillows directly under the knee but instead place them under your heel and ankle.  Placing pillows directly into your knee may lead to stiffness and difficulty regaining your extension.  D.  Sitting in a chair with the leg extended in front of you is not sufficient elevation and we will not make a significant difference in your swelling.  If your ankle is below your heart, your swelling may worsen.  E.  Compression is very helpful on the leg when you cannot elevate or ice.  Using an Ace wrap or compression sleeve are two good options.  Be sure to wrap the ace wrap starting from the foot and continuing up above the knee.      3.  Blood clot prevention  A.  Because you have had knee replacement surgery, you are at higher risk for developing blood clots.  The more active you are, the lower the risk of developing blood clots.  B.  We also reduce the risk of this rare complication from surgery by giving you medication such as aspirin, Eliquis, or Xarelto.  Please let Dr. Restrepo know if you have a personal history of blood clots or a first-degree relative has a history of blood clots that you did not tell him about preoperatively.  This may require a change in your blood clot prevention  medication that he has prescribed.  C.  The blood clot prevention medications must be taken as scheduled and started the day after surgery.  Please let the office know if you are having trouble tolerating the medication or affording the medication.    4.  Planning for pain medication refills   A.  Keep track of the number of pills you are taking on a daily basis and how many you have left.   B.  It takes on average 24-hours for our office to refill medications  C.  Medications will not be called in on the weekends or after hours.  Most of the time,  the doctors that are on call for emergencies are not familiar with your case and will not call in pain medication.  D.  Be especially aware if the weekend is coming up and plan accordingly.  If you feel like you may run out of medication over the weekend, please call earlier in the week.    5.  Resuming normal home medications  A.  Unless directed otherwise by your primary care physician, Dr. Restrepo, or the hospitalist, you may resume all normal home medications after discharge from the hospital.  If you have questions please call the office.  B.  If you are on medications prescribed by a specialist (cardiologist, rheumatologist, etc.) contact their office about any questions or changes to those medications following surgery.      **Incision Care**    Your Exofin Fusion dressing system is designed to stay in place until your first follow-up appointment in 3 weeks.  Underneath the Exofin Fusion dressing system (looks similar to drywall tape), there are absorbable sutures which will dissolve on their own over time.  On occasion, people have a reaction to the Exofin Fusion dressing system and develop a rash.  Please call the office if this occurs.  You may shower following surgery once your nerve block has been removed.  Please keep the incision clean and dry, however.  The best way to do this is with cling wrap or a plastic bag taped on both ends.  Sit on a shower chair  or stool when you shower to keep from falling.  While you may shower normally after surgery, baths unfortunately will disrupt the dressing and increase your risk of infection.  Similarly, do not submerge your operative leg in a bathtub, swimming pool, or hot tub until you have been cleared to do so by Dr. Restrepo.  It usually takes about 6 weeks of healing time until these activities are allowed.  If your Exofin Fusion dressing system starts to peel off before your appointment, simply trim the edges and leave as much of it intact as possible.  After the Exofin Fusion dressing system has been removed at your 3-week postop appointment, do not put any creams, lotions, antibiotic ointments, or scar creams on your incision.  Your incision needs to be completely healed (no scabs) until these products can be used safely.  Your operative knee will be warmer to the touch than your nonsurgical knee for at least a few months.  If you notice, however, that your knee is very hot to the touch, associated with any visible drainage or marked redness, or you are having a fever or a dramatic increase in your pain not related to activity, please call the office or the surgeon on call immediately.  If you went home the same day as your surgery, you will notice that you have a large bulky dressing consisting of an ACE wrap and some cotton soft roll.  On the first day after surgery, you should remove both the ACE wrap and cotton wrap which will help you feel less constricted with range of motion exercises.  Save the ACE wrap in case you need it to protect the Exofin Fusion dressing or the add some compression to your knee/leg if you are having some swelling.    There are two incisions from the surgery on your knee:  a long incision over the center of the knee and a shorter one in the middle of the tibia.  The long incision is covered with the Exofin Fusion dressing discussed above.  The shorter incision is covered with gauze and a see  through adhesive called a Tegaderm.  This dressing may be kept on until your follow up appointment or removed if it becomes restrictive.  Do your best to keep this shorter incision clean and dry as well.        **Activity**    You will have 1-2 physical therapy sessions while in the hospital.  Please make sure you have a physical therapy appointment scheduled within 3 to 5 days of returning home.  If you do not or have not heard anything with regards to therapy scheduling, please contact the office.  Typically physical therapy is scheduled during your preop visit or during your hospital visit.  Very important!  Get that knee moving!  Range of motion is the MOST important aspect of your rehab in the first 6 weeks after surgery.  Once you have reached sufficient range of motion, your physical therapist will advance your rehab and add strengthening exercises.  In most cases, once you have achieved your range of motion goals, the pain from surgery improves significantly.    Your range of motion goals are 0 degrees of extension and 90 degrees of flexion at the 3-week visit.  By 6 weeks, we want 0 degrees of extension and 120 degrees of flexion.  In rare cases, if you fail to meet your range of motion goals, we may have to take you back to the operating room to perform a procedure to restore your range of motion.  This is called a manipulation under anesthesia and not something we love doing.  This can be avoided by being vigilant about restoring your range of motion as soon as possible before scar tissue forms.  It is extremely rare to overdo it when it comes to exercise and your therapy exercises.  This occurs less than 1% of the time.    Daily movement is important to improve your comfort following surgery.  You should plan on some gentle walking and stretching 2-3 times a day in addition to your physical therapy exercises.  Recovery from a knee replacement takes time.  There will be good days where you feel hardly any  pain and bad days following the surgery where you feel your pain and swelling are greater.  Know that your body is healing from a major surgery and pay attention to cues when you need to rest and take a break.  Lean on your physical therapist to help you differentiate between good pain and bad pain.  Sit in chairs with arms.  The arms make it easier for you to stand up and sit down.  Do not sit for more than 30 to 45 minutes at a time.  Nap if you are tired but do not stay in bed all day.  Do not drive until your healthcare provider says it is okay.  Most people can start driving at about 6 weeks after surgery.  Do not drive while you are taking opioid pain medication.  Remove items that may cause you to fall such as throw rugs and electrical cords.  Use nonslip bath mats, grab bars, and elevated toilet seat, and a shower chair in your bathroom.  Until your balance, flexibility, and strength improved, use a cane or walker.  Typically your physical therapist will tell you when you can wean from these devices.  Tells your dentist and all your healthcare providers that you have an artificial joint.  You will be directed to take antibiotics before for any dental work, dental cleanings, and any major or minor surgery.  Dental work is not allowed until 6 weeks after surgery.          **Post surgical appointments**    Be sure to schedule your outpatient physical therapy appointments before your surgery takes place.  Once you are discharged from the hospital, expect to attend physical therapy 2-3 times a week for the first 6 weeks.  On days when you are not attending formal therapy, you are expected to do your home exercises given to you by your therapist.  If your insurance covers the [employer] device, it is important to use this as directed, especially on the days when you are not attending formal physical therapy.  Plan to take your pain medication about 30 minutes before your physical therapy sessions.  If you are taking  narcotic pain medicine prior to your therapy, please be sure to have a  to take you to and from your appointments.  Please make sure you have a post operative appointment to see Dr. Restrepo or his PA around 3 weeks after surgery.  This appointment is typically made by the surgery scheduler but sometimes things happen.  If you do not have an appointment, please call the office.          **Call 911**  Call 911 right away if you are experiencing chest pain or shortness of breath        **Who to contact**    Call your healthcare provider if any of the following occur:  Fever of 100 or higher  Pain or swelling in your calf  Shaking chills  Stiffness or inability to move the knee  Increased swelling in your leg  Increased redness, tenderness, or swelling in or around the knee incision  Drainage from the knee incision  Increased knee pain  Our office is open from 8 AM to 4:30 PM Monday through Thursday and 8 AM to 3 PM on Fridays.  The office number is [phone].  If you need to contact someone after hours, please call the [employer] and asked for the orthopedic surgeon on-call for Dr. Restrepo.  That phone number is [phone].                   InfuBLOCK - Patient Information    What is a pain pump?  The InfuBLOCK pump delivers post-operative, non-narcotic, numbing medication to the nerve near the surgical site for pain relief.     Where can I find information about my pain pump?           For more information about your pain pump, scan the QR code.  For additional patient resources, visit [email or web address].                                                                                               While your physician is your primary source for information about your treatment there may be times during your treatment that you need assistance with your infusion pump.     If you need assistance take the following steps:    The [employer] Nursing Hotline is Here  for You 24/7.  Please call 1-431.805.9868 for the following concerns or complications:    Answers to questions about your infusion pump                 Tubing disconnect  Assistance with pump alarms                                                      Dislodged catheter  Excessive leakage noted from pump                                         Inadequate pain control    2.   [facility] Anesthesia Acute Pain Service: [phone] is available 24/7 for any further needs or concerns about medication or pain control.     -------------------------------------------------------------------------    Nerve Catheter Removal Instructions  When your device is empty:    Remove your catheter by pulling the dressing off slowly (like you would remove a regular bandage). The catheter should pull right out of the skin.  Check that the BLUE tip is intact.                                                                                     If the catheter is stuck, reposition your   extremity and pull slowly until removed.  *If catheter is HURTING and WON'T come out, stop and call [phone] for further assistance.    Remove medication bag from the black carrying case.  Cut the tubing on right and left side of pump, and discard the medication bag and tubing into garbage.  Place the pump and black carrying case into the plastic bag and then place this into the return box.  Seal box with blue stickers and return to [employer] postal service. THIS IS PRE-PAID POSTAGE.        -------------------------------------------------------------------------    [facility] COLD THERAPY - PATIENT INSTRUCTION SHEET    Cold Compression Therapy for your comfort and rehabilitation  Your caregivers want you to be productive in your rehab and comfortable during your stay. In keeping with those goals, you will be receiving an [facility] Cold Therapy Wrap to help ease post-operative pain and swelling that might keep you from getting back on track! Your SMI Cold Therapy  Wrap is effective and simple-to-use, and you will be encouraged to apply it throughout your hospital stay and at home through the duration of your recovery.    When you are ready to go home  Be sure to take your SMI Cold Therapy Wrap and both sets of Gel Bags with you for continued comfort and use throughout your rehabilitation. If you don't already have them, ask your nurse or aide to retrieve your SMI Gel Bags from the patient freezer.    Home use precautions  Always follow your medical professional's application instructions upon discharge. Your SMI Cold Therapy Wrap and Gel Bags are designed to last for months following your surgery. Never heat the Gel Bags unless specified by your healthcare provider. Supervision is advised when using this product on children or geriatric patients. To avoid danger of suffocation, please keep the outer plastic packaging away from children & pets.    Cold Therapy Instructions  Place Gel Bags in a freezer set ¾ of the way to max temperature for at least (4) hours. For best results, lay the Gel Bags flat and [id number] in the freezer. Once frozen, slide Gel Bags into the gel pouch and secure your wrap to the affected area with the straps.  Gel wraps that have been stored in a freezer for an extended period of time may require a (10) minute period of softening up in a room temperature environment before application.  The gel pouch acts as a protective barrier. NEVER place frozen bags directly onto skin, as this may cause frostbite injury.  The SMI Cold Therapy Wrap is designed to be able to be worm while ambulating. The compression straps can be secured well enough so that the Wrap won't fall off while moving.  Wrap Application Videos can be viewed at [email or web address].  An additional protective barrier such as clothing, a washcloth, hand-towel or pillowcase may be used during prolonged treatment applications.  The Gel-Pouch and Wrap are both Latex-Free and the Gel Bag  ingredients are non toxic.    SMI Wrap care instructions  The [facility] Cold Therapy Wrap may be hand washed and hung to dry when needed.    [facility] re-order information  Additional [facility] body specific wraps and/or Gel Bags can be re-ordered from [email or web address][email or web address] or call 020-ICE-WRAP ([phone])
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
## 2024-12-04 NOTE — PLAN OF CARE
Goal Outcome Evaluation:  Plan of Care Reviewed With: patient        Progress: improving  Outcome Evaluation: Patient demonstrated safe mobility on jacobson with walker. He was also safe on stairs. I issued a cane for use on stairs. He has his own walker at home. He will be going home with his wife and recieve outpatient PT. Will sign off.    Anticipated Discharge Disposition (PT): home with assist, home with outpatient therapy services

## 2024-12-04 NOTE — ANESTHESIA PREPROCEDURE EVALUATION
Anesthesia Evaluation     Patient summary reviewed and Nursing notes reviewed   no history of anesthetic complications:   NPO Solid Status: > 8 hours  NPO Liquid Status: > 2 hours           Airway   Mallampati: II  TM distance: >3 FB  Neck ROM: full  Possible difficult intubation  Dental - normal exam         Pulmonary - normal exam   (+) ,sleep apnea  (-) not a smoker  Cardiovascular - normal exam  Exercise tolerance: good (4-7 METS)    ECG reviewed    (+) hypertension, dysrhythmias (hx RBBB), hyperlipidemia  (-) angina, CHF, cardiac stents    ROS comment: 9/22-lvef 60-80, nl rvsf/rvsp 35-50, mild ai, no AS, no effusion, trace pi, trace mr; no ms    5/16-FINAL IMPRESSIONS:  1.  Mild coronary artery disease.   2.  Noncardiac chest discomfort.   3.  Falsely positive stress test.      10/24- rukavina/cano - acceptable cv risk.    Neuro/Psych  (+) headaches, psychiatric history  GI/Hepatic/Renal/Endo    (+) obesity, GERD well controlled, renal disease- stones    Musculoskeletal     Abdominal    Substance History   (+) alcohol use  (-) drug use     OB/GYN          Other        ROS/Med Hx Other: [place] disease  +hx hoarseness hx  Hx traumatic PTX  2020-spinal well tolerated  Hgb 16,  k 4.1                Anesthesia Plan    ASA 2     spinal and regional     (Benefits, alternatives, and risks of neuraxial (failed block, damage to nearby structures  incl nerves/blood vessels), intravascular injection, hematoma req evacuation, headache,etc) discussed with patient.  Questions answered; pt desires to proceed.  Benefits and risks of nerve block/catheter discussed with patient ((including failed block, damage to nerve/nearby structures, intravascular injection)); questions answered, agreeable to proceed.      )  intravenous induction     Anesthetic plan, risks, benefits, and alternatives have been provided, discussed and informed consent has been obtained with: patient.    Use of blood products discussed with patient .     Plan discussed with CRNA.    CODE STATUS:
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
## 2024-12-04 NOTE — ANESTHESIA PROCEDURE NOTES
Peripheral Block    Pre-sedation assessment completed: 12/4/2024 8:21 AM    Patient reassessed immediately prior to procedure    Start time: 12/4/2024 10:15 AM  Stop time: 12/4/2024 10:18 AM  Reason for block: at surgeon's request and post-op pain management  Performed by  CRNA/CAA: Wilberto Mejias, CRNA  Assisted by: Opal Hernandez RN  Preanesthetic Checklist  Completed: patient identified, IV checked, site marked, risks and benefits discussed, surgical consent, monitors and equipment checked, pre-op evaluation and timeout performed  Prep:  Pt Position: supine  Sterile barriers:cap, gloves, mask, sterile barriers and washed/disinfected hands  Prep: ChloraPrep  Patient monitoring: blood pressure monitoring, continuous pulse oximetry and EKG  Procedure  Performed under: spinal  Guidance:ultrasound guided    ULTRASOUND INTERPRETATION.  Using ultrasound guidance a 20 G gauge needle was placed in close proximity to the nerve, at which point, under ultrasound guidance anesthetic was injected in the area of the nerve and spread of the anesthesia was seen on ultrasound in close proximity thereto.  There were no abnormalities seen on ultrasound; a digital image was taken; and the patient tolerated the procedure with no complications. Images:still images obtained, printed/placed on chart    Laterality:left  Block Type:adductor canal block  Injection Technique:catheter  Needle Type:Tuohy and echogenic  Needle Gauge:18 G  Resistance on Injection: none  Catheter Size:20 G (20g)  Cath Depth at skin: 10 cm    Medications Used: bupivacaine PF (MARCAINE) 0.25 % injection - Injection   20 mL - 12/4/2024 10:18:00 AM      Medications  Preservative Free Saline:10ml    Post Assessment  Injection Assessment: negative aspiration for heme, incremental injection and no paresthesia on injection  Patient Tolerance:comfortable throughout block  Complications:no  Additional Notes  CATHETER   A high-frequency linear transducer, with sterile  "cover, was placed on the anterior mid-thigh (between the anterior superior iliac spine and patella). The transducer was then moved medially to identify the Sartorius muscle (Olman), Vastus Medialis muscle (VMM), Superficial Femoral Artery (SFA) and Vein. The transducer was then moved cephalad or caudad to position the SFA in the middle of the lOman. The insertion site was prepped and draped in sterile fashion. Skin and cutaneous tissue was infiltrated with 2-5 ml of 1% Lidocaine. Using ultrasound-guidance, an 18-gauge Contiplex Ultra 360 Touhy needle was advanced in plane from lateral to medial. Preservative-free normal saline was utilized for hydro-dissection of tissue, advancement of Touhy, and to confirm needle placement below the fascial plane of the Olman where the Nerve to the VMM is located. Local anesthetic (LA) 5 ml deposited here. The Touhy needle continues its path lateral to the SFA at the level of the Saphenous Nerve. The remainder of the LA was deposited at the 10-11 o'clock position of the SFA. This injection created a space between the Olman and the SFA. Aspiration every 5 ml to prevent intravascular injection. Injection was completed with negative aspiration of blood and negative intravascular injection. Injection pressures were normal with minimal resistance. A 20-gauge Contiplex Echo catheter was placed through the needle and advance out the tip of the Touhy 3-5 cm anterior to the SFA. The Touhy needle was then removed, and final catheter position verified at the 12 o'clock position to the SFA. The catheter was secured in the usual fashion with skin glue, benzoin, steri-strips, CHG tegaderm and label noting \"Nerve Block Catheter\". Jerk tape applied at yellow connector and catheter connection.   Performed by: Hebert Kennedy CRNA            "

## 2024-12-04 NOTE — OP NOTE
Orthopaedics Operative Report    PREOPERATIVE DIAGNOSIS: Primary osteoarthritis left knee    POSTOPERATIVE DIAGNOSIS: Same    PROCEDURE PERFORMED: Left total knee arthroplasty using Za robot, CPT 65246, 38294    SURGEON: Tereso Restrepo MD    ANESTHESIA:  Spinal with Block    STAFF:  Circulator: Anh Zambrano RN; Cheryl Barbosa RN  Scrub Person: Savanah Viera  Vendor Representative: Santino Rojas  Nursing Assistant: Cat Blandon  Assistant: Anamika Fernandes PA-C    TOURNIQUET TIME: 81 minutes    ESTIMATED BLOOD LOSS: 50 mL    COMPLICATIONS: None apparent.    RELEASES: None required after approach, bone cuts made, and osteophytes removed    Surgical Approach: Knee Medial Parapatellar     TXA: IV    IMPLANTS:     Implant Name Type Inv. Item Serial No.  Lot No. LRB No. Used Action   DEV CONTRL TISS STRATAFIX SYMM PDS PLUS DONNY CT-1 45CM - [id number] Implant DEV CONTRL TISS STRATAFIX SYMM PDS PLUS DONNY CT-1 45CM  [employer]  DIV OF J AND J [id number] Left 1 Implanted   CMT BONE R 1X40 - [id number] Implant CMT BONE R 1X40  [employer] [id number] Left 2 Implanted   BASEPLT TIB/KN PERSONA KEEL CMT 0DEG SZG LT - [id number] Implant BASEPLT TIB/KN PERSONA KEEL CMT 0DEG SZG LT  [employer] [id number] Left 1 Implanted   COMP FEM/KN PERSONA CR CMT COCR STD SZ9 LT - [id number] Implant COMP FEM/KN PERSONA CR CMT COCR STD SZ9 LT  [employer] [id number] Left 1 Implanted   PAT KN PERSONA ALLPOLY CMT 8.5X32MM - [id number] Implant PAT KN PERSONA ALLPOLY CMT 8.5X32MM  [employer] [id number] Left 1 Implanted   ART/SRF KN PERSONA/VE PS MC GH 8TO11 10MM LT - [id number] Implant ART/SRF KN PERSONA/VE PS MC GH 8TO11 10MM LT  [employer] [id number] Left 1 Implanted       April Persona CR femur size 9 standard  size G 0 degree tibia  32 patella button   Size 10 Vitamin E Medial Congruent highly crosslinked polyethylene articular surface    PREOPERATIVE ANTIBIOTICS: Kefzol    REFERRING PHYSICIAN: Roman Negron  MD    INDICATIONS: Failure of nonoperative treatment including injections, bracing, and activity modification.    DESCRIPTION OF PROCEDURE: After informed consent was obtained, the patient was taken to the operating room. The patient was given a dose of IV antibiotics prior to incision.After the smooth induction of spinal anesthesia, the patient’s left lower extremity was prepped and draped in the usual fashion for this type of procedure. We performed a timeout to verify site and the procedure to be performed.  We began with exsanguination of the left lower extremity using an Esmarch bandage and inflation of tourniquet to 300 mmHg. We made our standard midline incision and medial parapatellar approach. We took 20% of the quadriceps tendon medially and a sleeve of tissue around the patella for repair as well a sliver of patellar tendon. Dissected extra-ostially but subperiosteally on the medial side taking off the superficial and deep MCL from the proximal tibia. These were protected throughout the procedure. Visible medial meniscus was excised. The retropatellar fat pad was excised. The ACL and visible lateral meniscus was excised as well as the synovium from the anterior surface of the distal femur.  Osteophytes were removed from the distal femur and proximal tibia at this point.       We then everted the patella and this was resurfaced, restoring patellar height. The patellar height was 25mm preoperatively and we cut the patella to 15 mm and sized the patella to a 32.  The lugholes were drilled and the component slightly medialized.     We then turned our attention to placement of our pins for the femoral and tibial trackers.  The femoral trackers were placed within the incision about 2 to 3 fingerbreadths from the articular cartilage on the medial aspect of the femur.  The tibial trackers were placed through percutaneous incisions on the tibia.  Once these were placed, we obtained our hip center and then we then  registered our data points on the femur and tibia.  Once these were registered, we took the knee through a range of motion and assessed our medial and lateral gaps at 0 and 90 degrees. The 90 degree gap was obtained using a PCL retractor.      At this point, we then created our surgical plan.   Extension gaps medially and laterally were 20 and 21.0 mm.  Flexion gaps medially and laterally were 20.5 and 23.5 mm.  Patient had a preoperative 5 degree varus deformity.  Preop range of motion was 5 to 125.  We were able to correct them to 1 degree of varus alignment.  We placed 1 degrees of varus alignment on the distal femoral cut and 0 degrees of varus or valgus on the proximal tibial cut.  Distal femoral cut was made at 12 mm. Proximal tibial cut was made at 10 mm.    External rotation was set at 4 degrees relative to the posterior condylar axis.  Stylus height was set at 0 mm.      At this point the robotic arm was brought in and the distal femoral cut was made in collaborative mode.  This was made and then validated. We then sized the femur to verify the size of the femoral component to be appropriate.   We then brought the robotic arm back into pin our external rotation.  Our 4-in-1 block was then placed and we assessed for possible notching as well as for the size of the posterior condylar cuts.  The 4-in-1 cut was made without difficulty and the excess bone removed.  We then brought the robotic arm back in to make our proximal tibia cut.  Retractors were placed and the bone cut was made in static mode which was validated both for amount of bone taken off and for slope.  We then used our blocks and checked our extension and flexion gaps which were felt to be acceptable.  We then took off posterior osteophytes off the posterior medial and posterior lateral femur.  We completed preparation of our tibia and femur and then trialed polyethylene spacers for the best stability and range of motion parameters.       The  bone was then thoroughly irrigated and holes were drilled on the tibia for improved cement perforation.  We then injected our periarticular cocktail into the posterior medial aspect of the knee which consisted of 20 mL of NS, 20 mL of 0.5% lidocaine with epinephrine, 20 mL of 0.5% bupivicaine, 30 mg of Toradol, and 8 mg of morphine. We cemented these implants in place and once the cement was allowed to cure fully, the excess cement was removed.  We then deflated the tourniquet prior to placement of our final polyethylene spacer. Any bleeding seen in the back of the knee was controlled and we obtained hemostasis. The final size 10 polyethylene spacer was then secured into place.  The final range of motion was -1.5 to 141 degrees. We then used a final irrigation consisting of Irricept, diluted Betadine, and 3L of pulsatile lavage throughout the knee.  Trackers and pins were then removed.  We then closed our medial parapatellar approach using 2-0 vicryl and 0 Stratafix.  We then closed our subcutaneous layer using running 2-0 Vicryl and interrupted 2-0 Vicryl. We closed our skin using a running 3-0 Monocryl. We placed an Exofin Fusion dressing system over the incision and took down the drapes. The patient was transferred back to their hospital bed and then taken to the recovery room in stable condition. All counts were correct postoperatively. I performed the case.      First assistant: Anamika Fernandes PA-C    The skilled assistance of the above noted first assistant was necessary during this complex surgical procedure.  The surgical assistant assisted with every aspect of the operation including, but not limited to, proper and safe positioning of the patient, obtaining adequate surgical exposure, manipulation of surgical instruments to make the proper bone cuts, cementing of the final implants, the continual process of hemostasis during the procedure itself in addition to surgical wound closure and removal of the  patient from the operating table and returning the patient back to the [facility].  The assistance of the surgical assistant allowed me to perform the most sensitive and technical potions of this operation using 2 hands, thus enhancing efficiency and patient safety.  This would not be possible without the help of a skilled assistant familiar with the procedure and capable of safely performing the aforementioned tasks.       POSTOPERATIVE PLAN:  1. Weight bearing as tolerated left lower extremity.  2. The patient will receive an indwelling low femoral nerve block in the recovery room.  3.  Patient will receive a dose of IV antibiotics prior to discharge home  4.  Full dose aspirin daily beginning tomorrow for DVT prophylaxis.  5.  The patient will begin physical therapy with [facility] outpatient physical therapy department in the next 24 to 48 hours  6.  Discharge home once the patient has met criteria  7.  Patient will be discharged home   8.  Follow up with me in the office in 3 weeks  9.  Patient will need a bedside commode secondary to the fact that he will be confined to one level of his house and there are no toilets on that level.    Tereso Restrepo M.D.*
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
## 2024-12-04 NOTE — ANESTHESIA PROCEDURE NOTES
Spinal Block    Pre-sedation assessment completed: 12/4/2024 7:30 AM    Patient reassessed immediately prior to procedure    Patient location during procedure: OR  Start Time: 12/4/2024 7:32 AM  Stop Time: 12/4/2024 7:36 AM  Indication:at surgeon's request  Performed By  CRNA/EDIE: Hebert Kennedy CRNA  Preanesthetic Checklist  Completed: patient identified, IV checked, site marked, risks and benefits discussed, surgical consent, monitors and equipment checked, pre-op evaluation and timeout performed  Spinal Block Prep:  Patient Position:sitting  Sterile Tech:cap, gloves, sterile barriers and mask  Prep:Chloraprep  Patient Monitoring:blood pressure monitoring, continuous pulse oximetry and EKG    Spinal Block Procedure  Approach:midline  Guidance:landmark technique and palpation technique  Location:L4-L5  Needle Type:Quincke (Introducer:   no)  Needle Gauge:25 G  Placement of Spinal needle event:cerebrospinal fluid aspirated  Paresthesia: no  Fluid Appearance:clear  Medications: bupivacaine (MARCAINE) 0.5 % injection - Injection   1.8 mL - 12/4/2024 7:32:00 AM   Post Assessment  Patient Tolerance:patient tolerated the procedure well with no apparent complications  Complications no  Additional Notes  Procedure:  Pt assisted to sitting position, with legs in position of comfort over side of bed.  Pt. instructed in optimal spine presentation, the spine was prepped/ Draped and the skin at insertion site was anesthetized with 1% Lidocaine 2 ml.  The spinal needle was then advanced until CSF flow was obtained and LA was injected:

## 2024-12-06 ENCOUNTER — TELEPHONE (OUTPATIENT)
Dept: ORTHOPEDIC SURGERY | Facility: CLINIC | Age: 74
End: 2024-12-06
Payer: MEDICARE

## 2024-12-06 DIAGNOSIS — Z96.652 STATUS POST TOTAL LEFT KNEE REPLACEMENT: Primary | ICD-10-CM

## 2024-12-06 NOTE — TELEPHONE ENCOUNTER
PT WIFE CALLED AND STATED THAT [facility] CALLED AND STATED THEY NEED MORE INFORMATION WITH DIFFERENT VERBAGE ON IT, MAY NEED TO CALL AND FIND OUT BUT WITH WHAT WE SENT THEM THEY ARE NOT GETTING APPROVAL FROM MEDICARE

## 2024-12-06 NOTE — TELEPHONE ENCOUNTER
I have called and spoken with the patients wife. Patients demographics, and both Rx's were faxed over to [facility].     Patients wife was also given the address and phone number for [employer].     [address], [address]   [phone].     All info was faxed to [phone] per the [employer] office.

## 2024-12-06 NOTE — TELEPHONE ENCOUNTER
Patients wife called stating that Jimmie Salcedo was supposed to receive a commode chair yesterday but never got one and she just wants to check on the status of that.    Please advise, thank you

## 2024-12-06 NOTE — TELEPHONE ENCOUNTER
I have called and talked to the patients wife. They are in need of a bedside commode, and a shower chair to assist the patient.   They live in [place], [place] and would need this delivered.     I am working with Alfreda to see what we can get done for the patient. Wife is aware I will call her back with more information.

## 2024-12-09 ENCOUNTER — TELEPHONE (OUTPATIENT)
Dept: ORTHOPEDIC SURGERY | Facility: CLINIC | Age: 74
End: 2024-12-09
Payer: MEDICARE

## 2024-12-09 DIAGNOSIS — Z96.652 STATUS POST TOTAL LEFT KNEE REPLACEMENT: Primary | ICD-10-CM

## 2024-12-09 RX ORDER — CEFADROXIL 500 MG/1
500 CAPSULE ORAL 2 TIMES DAILY
Qty: 14 CAPSULE | Refills: 0 | Status: SHIPPED | OUTPATIENT
Start: 2024-12-09 | End: 2024-12-13 | Stop reason: HOSPADM

## 2024-12-09 NOTE — TELEPHONE ENCOUNTER
I have called and talked with the patients wife. She is going to call and get the information on what the DME request needs to say. She was given my direct line to call back.

## 2024-12-09 NOTE — TELEPHONE ENCOUNTER
"GERALDINERADHA     Received a call from the patient and his spouse (Silvana), in regards to the patient and his post-op left knee concern (Sx 12/4/24 L TKR). Per Silvana, the patient started having a prickly-red rash on Saturday 12/7/24. Silvana placed black magic marker dots to monitor and see if it would spread. It was patchy all the way up and down his shin on all sides, and all around the knee. There was drainage, yellowish in color (bubble-like) that dripped real slow. Drainage was thick in consistency and dripped slow until yesterday afternoon (Sunday 12/8/24). Per Silvana, patient had chills, no fever, leg was extremely hot to touch, \"stingy\" all around the leg, lower ankle up to his lower thigh. As of today, the patient is unable to move his leg at all. The redness and prickly-like rash is spreading down below his ankle all the way around the side of the knee and there seems be a bunch of spots,\" that are bright red in color right below the surgical incision, which look \"bubbly.\" Silvana states the patient is in tremendously pain, is doubling up on the icing, Tylenol and laxatives, but using the OxyCODONE sparingly. Spouse is unable to get the patient out of the chair to [facility] or to the ER, and she is unsure what she should do. Patient cannot raise his leg off the floor. Per Silvana, she believes what the patient is experiencing, is what he had prior in the right knee (5 years ago - Streptococcal). Silvana is wondering if antibiotics could possibly be a solution. Requested to have Silvana send images of the patients leg, to which she is in the process of completing. She also stated she would text provider T on his cell, so that he is aware. Silvana is also aware provider out of office, but information is being sent to clinical supervisor AV for advisement. Attempting to contact clinical supervisor also, to possibly speak with patient and spouse Silvana. Sending as HIGH priority due to post-op and concerns. Please advise.     Thank " you kindly!

## 2024-12-09 NOTE — TELEPHONE ENCOUNTER
Dr Restrepo-     I called and spoke with his wife Silvana- she states that when he had his right knee done, he had the same issue and you called in an antibiotic from them. She is unable to get him out of the house secondary to him not being able to lift his leg off the floor. How would you like to proceed?    Nicole

## 2024-12-09 NOTE — TELEPHONE ENCOUNTER
Ling-     I spoke with patients wife today for another issue and she states that they are still waiting for additional information to be able to get insurance to cover DME supplies. Can you check on this?    Thanks!    Nicole

## 2024-12-10 ENCOUNTER — APPOINTMENT (OUTPATIENT)
Dept: GENERAL RADIOLOGY | Facility: HOSPITAL | Age: 74
DRG: 863 | End: 2024-12-10
Payer: MEDICARE

## 2024-12-10 ENCOUNTER — APPOINTMENT (OUTPATIENT)
Dept: CARDIOLOGY | Facility: HOSPITAL | Age: 74
DRG: 863 | End: 2024-12-10
Payer: MEDICARE

## 2024-12-10 ENCOUNTER — HOSPITAL ENCOUNTER (INPATIENT)
Facility: HOSPITAL | Age: 74
LOS: 1 days | Discharge: NURSING, REHAB OR LONG-TERM CARE | DRG: 863 | End: 2024-12-13
Attending: EMERGENCY MEDICINE | Admitting: INTERNAL MEDICINE
Payer: MEDICARE

## 2024-12-10 DIAGNOSIS — M25.462 SWELLING OF JOINT, KNEE, LEFT: ICD-10-CM

## 2024-12-10 DIAGNOSIS — T81.41XA INFECTION OF SUPERFICIAL INCISIONAL SURGICAL SITE AFTER PROCEDURE, INITIAL ENCOUNTER: Primary | ICD-10-CM

## 2024-12-10 DIAGNOSIS — L03.116 CELLULITIS OF LEFT LOWER EXTREMITY: ICD-10-CM

## 2024-12-10 PROBLEM — L03.119 LOWER EXTREMITY CELLULITIS: Status: ACTIVE | Noted: 2024-12-10

## 2024-12-10 PROBLEM — T81.49XA POSTOPERATIVE WOUND CELLULITIS: Status: ACTIVE | Noted: 2024-12-10

## 2024-12-10 LAB
ALBUMIN SERPL-MCNC: 3.4 G/DL (ref 3.5–5.2)
ALBUMIN/GLOB SERPL: 1.2 G/DL
ALP SERPL-CCNC: 53 U/L (ref 39–117)
ALT SERPL W P-5'-P-CCNC: 10 U/L (ref 1–41)
ANION GAP SERPL CALCULATED.3IONS-SCNC: 11 MMOL/L (ref 5–15)
AST SERPL-CCNC: 14 U/L (ref 1–40)
BASOPHILS # BLD AUTO: 0.04 10*3/MM3 (ref 0–0.2)
BASOPHILS NFR BLD AUTO: 0.6 % (ref 0–1.5)
BH CV LOWER VASCULAR LEFT COMMON FEMORAL COMPRESS: NORMAL
BH CV LOWER VASCULAR LEFT COMMON FEMORAL PHASIC: NORMAL
BH CV LOWER VASCULAR LEFT COMMON FEMORAL SPONT: NORMAL
BH CV LOWER VASCULAR LEFT DISTAL FEMORAL COMPRESS: NORMAL
BH CV LOWER VASCULAR LEFT DISTAL FEMORAL PHASIC: NORMAL
BH CV LOWER VASCULAR LEFT DISTAL FEMORAL SPONT: NORMAL
BH CV LOWER VASCULAR LEFT GASTRONEMIUS COMPRESS: NORMAL
BH CV LOWER VASCULAR LEFT GREATER SAPH AK COMPRESS: NORMAL
BH CV LOWER VASCULAR LEFT GREATER SAPH BK COMPRESS: NORMAL
BH CV LOWER VASCULAR LEFT LESSER SAPH COMPRESS: NORMAL
BH CV LOWER VASCULAR LEFT MID FEMORAL COMPRESS: NORMAL
BH CV LOWER VASCULAR LEFT MID FEMORAL PHASIC: NORMAL
BH CV LOWER VASCULAR LEFT MID FEMORAL SPONT: NORMAL
BH CV LOWER VASCULAR LEFT PERONEAL AUGMENT: NORMAL
BH CV LOWER VASCULAR LEFT POPLITEAL COMPRESS: NORMAL
BH CV LOWER VASCULAR LEFT POPLITEAL PHASIC: NORMAL
BH CV LOWER VASCULAR LEFT POPLITEAL SPONT: NORMAL
BH CV LOWER VASCULAR LEFT POSTERIOR TIBIAL AUGMENT: NORMAL
BH CV LOWER VASCULAR LEFT POSTERIOR TIBIAL COMPRESS: NORMAL
BH CV LOWER VASCULAR LEFT PROFUNDA FEMORAL PHASIC: NORMAL
BH CV LOWER VASCULAR LEFT PROFUNDA FEMORAL SPONT: NORMAL
BH CV LOWER VASCULAR LEFT PROXIMAL FEMORAL COMPRESS: NORMAL
BH CV LOWER VASCULAR LEFT PROXIMAL FEMORAL PHASIC: NORMAL
BH CV LOWER VASCULAR LEFT PROXIMAL FEMORAL SPONT: NORMAL
BH CV LOWER VASCULAR LEFT SAPHENOFEMORAL JUNCTION COMPRESS: NORMAL
BH CV LOWER VASCULAR LEFT SAPHENOFEMORAL JUNCTION PHASIC: NORMAL
BH CV LOWER VASCULAR LEFT SAPHENOFEMORAL JUNCTION SPONT: NORMAL
BH CV LOWER VASCULAR RIGHT COMMON FEMORAL COMPRESS: NORMAL
BH CV LOWER VASCULAR RIGHT COMMON FEMORAL PHASIC: NORMAL
BH CV LOWER VASCULAR RIGHT COMMON FEMORAL SPONT: NORMAL
BH CV LOWER VASCULAR RIGHT DISTAL FEMORAL COMPRESS: NORMAL
BH CV LOWER VASCULAR RIGHT DISTAL FEMORAL PHASIC: NORMAL
BH CV LOWER VASCULAR RIGHT DISTAL FEMORAL SPONT: NORMAL
BH CV LOWER VASCULAR RIGHT GASTRONEMIUS COMPRESS: NORMAL
BH CV LOWER VASCULAR RIGHT GREATER SAPH AK COMPRESS: NORMAL
BH CV LOWER VASCULAR RIGHT GREATER SAPH BK COMPRESS: NORMAL
BH CV LOWER VASCULAR RIGHT LESSER SAPH COMPRESS: NORMAL
BH CV LOWER VASCULAR RIGHT MID FEMORAL COMPRESS: NORMAL
BH CV LOWER VASCULAR RIGHT MID FEMORAL PHASIC: NORMAL
BH CV LOWER VASCULAR RIGHT MID FEMORAL SPONT: NORMAL
BH CV LOWER VASCULAR RIGHT PERONEAL AUGMENT: NORMAL
BH CV LOWER VASCULAR RIGHT PERONEAL COMPRESS: NORMAL
BH CV LOWER VASCULAR RIGHT POPLITEAL COMPRESS: NORMAL
BH CV LOWER VASCULAR RIGHT POPLITEAL PHASIC: NORMAL
BH CV LOWER VASCULAR RIGHT POPLITEAL SPONT: NORMAL
BH CV LOWER VASCULAR RIGHT POSTERIOR TIBIAL AUGMENT: NORMAL
BH CV LOWER VASCULAR RIGHT POSTERIOR TIBIAL COMPRESS: NORMAL
BH CV LOWER VASCULAR RIGHT PROFUNDA FEMORAL PHASIC: NORMAL
BH CV LOWER VASCULAR RIGHT PROFUNDA FEMORAL SPONT: NORMAL
BH CV LOWER VASCULAR RIGHT PROXIMAL FEMORAL COMPRESS: NORMAL
BH CV LOWER VASCULAR RIGHT PROXIMAL FEMORAL PHASIC: NORMAL
BH CV LOWER VASCULAR RIGHT PROXIMAL FEMORAL SPONT: NORMAL
BH CV LOWER VASCULAR RIGHT SAPHENOFEMORAL JUNCTION COMPRESS: NORMAL
BH CV LOWER VASCULAR RIGHT SAPHENOFEMORAL JUNCTION PHASIC: NORMAL
BH CV LOWER VASCULAR RIGHT SAPHENOFEMORAL JUNCTION SPONT: NORMAL
BH CV VAS PRELIMINARY FINDINGS SCRIPTING: 1
BILIRUB SERPL-MCNC: 1 MG/DL (ref 0–1.2)
BUN SERPL-MCNC: 16 MG/DL (ref 8–23)
BUN/CREAT SERPL: 20.5 (ref 7–25)
CALCIUM SPEC-SCNC: 9 MG/DL (ref 8.6–10.5)
CHLORIDE SERPL-SCNC: 102 MMOL/L (ref 98–107)
CO2 SERPL-SCNC: 25 MMOL/L (ref 22–29)
CREAT SERPL-MCNC: 0.78 MG/DL (ref 0.76–1.27)
CRP SERPL-MCNC: 5.45 MG/DL (ref 0–0.5)
D-LACTATE SERPL-SCNC: 1.6 MMOL/L (ref 0.5–2)
DEPRECATED RDW RBC AUTO: 47.1 FL (ref 37–54)
EGFRCR SERPLBLD CKD-EPI 2021: 94.8 ML/MIN/1.73
EOSINOPHIL # BLD AUTO: 0.25 10*3/MM3 (ref 0–0.4)
EOSINOPHIL NFR BLD AUTO: 3.5 % (ref 0.3–6.2)
ERYTHROCYTE [DISTWIDTH] IN BLOOD BY AUTOMATED COUNT: 14.9 % (ref 12.3–15.4)
ERYTHROCYTE [SEDIMENTATION RATE] IN BLOOD: 43 MM/HR (ref 0–20)
GLOBULIN UR ELPH-MCNC: 2.8 GM/DL
GLUCOSE SERPL-MCNC: 174 MG/DL (ref 65–99)
HCT VFR BLD AUTO: 38.7 % (ref 37.5–51)
HGB BLD-MCNC: 13.2 G/DL (ref 13–17.7)
IMM GRANULOCYTES # BLD AUTO: 0.03 10*3/MM3 (ref 0–0.05)
IMM GRANULOCYTES NFR BLD AUTO: 0.4 % (ref 0–0.5)
LYMPHOCYTES # BLD AUTO: 1 10*3/MM3 (ref 0.7–3.1)
LYMPHOCYTES NFR BLD AUTO: 14.1 % (ref 19.6–45.3)
MCH RBC QN AUTO: 29.5 PG (ref 26.6–33)
MCHC RBC AUTO-ENTMCNC: 34.1 G/DL (ref 31.5–35.7)
MCV RBC AUTO: 86.4 FL (ref 79–97)
MONOCYTES # BLD AUTO: 0.45 10*3/MM3 (ref 0.1–0.9)
MONOCYTES NFR BLD AUTO: 6.4 % (ref 5–12)
NEUTROPHILS NFR BLD AUTO: 5.31 10*3/MM3 (ref 1.7–7)
NEUTROPHILS NFR BLD AUTO: 75 % (ref 42.7–76)
NRBC BLD AUTO-RTO: 0 /100 WBC (ref 0–0.2)
PLATELET # BLD AUTO: 143 10*3/MM3 (ref 140–450)
PMV BLD AUTO: 9.3 FL (ref 6–12)
POTASSIUM SERPL-SCNC: 3.7 MMOL/L (ref 3.5–5.2)
PROT SERPL-MCNC: 6.2 G/DL (ref 6–8.5)
QT INTERVAL: 458 MS
QTC INTERVAL: 487 MS
RBC # BLD AUTO: 4.48 10*6/MM3 (ref 4.14–5.8)
SODIUM SERPL-SCNC: 138 MMOL/L (ref 136–145)
WBC NRBC COR # BLD AUTO: 7.08 10*3/MM3 (ref 3.4–10.8)

## 2024-12-10 PROCEDURE — 93970 EXTREMITY STUDY: CPT

## 2024-12-10 PROCEDURE — 80053 COMPREHEN METABOLIC PANEL: CPT

## 2024-12-10 PROCEDURE — 25010000002 MORPHINE PER 10 MG: Performed by: EMERGENCY MEDICINE

## 2024-12-10 PROCEDURE — 86140 C-REACTIVE PROTEIN: CPT

## 2024-12-10 PROCEDURE — 99024 POSTOP FOLLOW-UP VISIT: CPT | Performed by: ORTHOPAEDIC SURGERY

## 2024-12-10 PROCEDURE — 93005 ELECTROCARDIOGRAM TRACING: CPT

## 2024-12-10 PROCEDURE — 87040 BLOOD CULTURE FOR BACTERIA: CPT

## 2024-12-10 PROCEDURE — 85652 RBC SED RATE AUTOMATED: CPT

## 2024-12-10 PROCEDURE — G0378 HOSPITAL OBSERVATION PER HR: HCPCS

## 2024-12-10 PROCEDURE — 83605 ASSAY OF LACTIC ACID: CPT

## 2024-12-10 PROCEDURE — 73560 X-RAY EXAM OF KNEE 1 OR 2: CPT

## 2024-12-10 PROCEDURE — 85025 COMPLETE CBC W/AUTO DIFF WBC: CPT

## 2024-12-10 PROCEDURE — 36415 COLL VENOUS BLD VENIPUNCTURE: CPT

## 2024-12-10 PROCEDURE — 99285 EMERGENCY DEPT VISIT HI MDM: CPT

## 2024-12-10 PROCEDURE — 25010000002 CEFTRIAXONE PER 250 MG

## 2024-12-10 PROCEDURE — 25010000002 DAPTOMYCIN PER 1 MG: Performed by: INTERNAL MEDICINE

## 2024-12-10 PROCEDURE — 93970 EXTREMITY STUDY: CPT | Performed by: INTERNAL MEDICINE

## 2024-12-10 RX ORDER — ACETAMINOPHEN 160 MG/5ML
650 SOLUTION ORAL EVERY 4 HOURS PRN
Status: DISCONTINUED | OUTPATIENT
Start: 2024-12-10 | End: 2024-12-13 | Stop reason: HOSPADM

## 2024-12-10 RX ORDER — DOXYCYCLINE 100 MG/1
100 CAPSULE ORAL 2 TIMES DAILY
COMMUNITY
Start: 2024-12-04 | End: 2024-12-13 | Stop reason: HOSPADM

## 2024-12-10 RX ORDER — MIRTAZAPINE 15 MG/1
7.5 TABLET, FILM COATED ORAL NIGHTLY
Status: DISCONTINUED | OUTPATIENT
Start: 2024-12-10 | End: 2024-12-13 | Stop reason: HOSPADM

## 2024-12-10 RX ORDER — ACETAMINOPHEN 650 MG/1
650 SUPPOSITORY RECTAL EVERY 4 HOURS PRN
Status: DISCONTINUED | OUTPATIENT
Start: 2024-12-10 | End: 2024-12-13 | Stop reason: HOSPADM

## 2024-12-10 RX ORDER — ACETAMINOPHEN 325 MG/1
650 TABLET ORAL EVERY 4 HOURS PRN
Status: DISCONTINUED | OUTPATIENT
Start: 2024-12-10 | End: 2024-12-13 | Stop reason: HOSPADM

## 2024-12-10 RX ORDER — SODIUM CHLORIDE 0.9 % (FLUSH) 0.9 %
10 SYRINGE (ML) INJECTION AS NEEDED
Status: DISCONTINUED | OUTPATIENT
Start: 2024-12-10 | End: 2024-12-13 | Stop reason: HOSPADM

## 2024-12-10 RX ORDER — ALBUTEROL SULFATE 90 UG/1
2 INHALANT RESPIRATORY (INHALATION) EVERY 4 HOURS PRN
Status: DISCONTINUED | OUTPATIENT
Start: 2024-12-10 | End: 2024-12-13 | Stop reason: HOSPADM

## 2024-12-10 RX ORDER — HYDROCODONE BITARTRATE AND ACETAMINOPHEN 10; 325 MG/1; MG/1
1 TABLET ORAL
Status: DISCONTINUED | OUTPATIENT
Start: 2024-12-10 | End: 2024-12-13 | Stop reason: HOSPADM

## 2024-12-10 RX ORDER — PANTOPRAZOLE SODIUM 40 MG/1
40 TABLET, DELAYED RELEASE ORAL
Status: DISCONTINUED | OUTPATIENT
Start: 2024-12-11 | End: 2024-12-13 | Stop reason: HOSPADM

## 2024-12-10 RX ORDER — OXYCODONE HYDROCHLORIDE 5 MG/1
5 TABLET ORAL EVERY 6 HOURS PRN
Status: DISCONTINUED | OUTPATIENT
Start: 2024-12-10 | End: 2024-12-10

## 2024-12-10 RX ORDER — MORPHINE SULFATE 4 MG/ML
4 INJECTION, SOLUTION INTRAMUSCULAR; INTRAVENOUS ONCE
Status: COMPLETED | OUTPATIENT
Start: 2024-12-10 | End: 2024-12-10

## 2024-12-10 RX ORDER — ASPIRIN 325 MG
325 TABLET, DELAYED RELEASE (ENTERIC COATED) ORAL DAILY
Status: DISCONTINUED | OUTPATIENT
Start: 2024-12-10 | End: 2024-12-13 | Stop reason: HOSPADM

## 2024-12-10 RX ORDER — BUPROPION HYDROCHLORIDE 150 MG/1
150 TABLET ORAL EVERY MORNING
Status: DISCONTINUED | OUTPATIENT
Start: 2024-12-10 | End: 2024-12-13 | Stop reason: HOSPADM

## 2024-12-10 RX ORDER — DOCUSATE SODIUM 100 MG/1
100 CAPSULE, LIQUID FILLED ORAL 2 TIMES DAILY PRN
Status: DISCONTINUED | OUTPATIENT
Start: 2024-12-10 | End: 2024-12-13 | Stop reason: HOSPADM

## 2024-12-10 RX ORDER — SODIUM CHLORIDE 0.9 % (FLUSH) 0.9 %
10 SYRINGE (ML) INJECTION EVERY 12 HOURS SCHEDULED
Status: DISCONTINUED | OUTPATIENT
Start: 2024-12-10 | End: 2024-12-13 | Stop reason: HOSPADM

## 2024-12-10 RX ORDER — SODIUM CHLORIDE 9 MG/ML
40 INJECTION, SOLUTION INTRAVENOUS AS NEEDED
Status: DISCONTINUED | OUTPATIENT
Start: 2024-12-10 | End: 2024-12-13 | Stop reason: HOSPADM

## 2024-12-10 RX ORDER — DICYCLOMINE HCL 20 MG
20 TABLET ORAL EVERY 6 HOURS PRN
Status: DISCONTINUED | OUTPATIENT
Start: 2024-12-10 | End: 2024-12-13 | Stop reason: HOSPADM

## 2024-12-10 RX ORDER — AMLODIPINE BESYLATE 2.5 MG/1
2.5 TABLET ORAL NIGHTLY
Status: DISCONTINUED | OUTPATIENT
Start: 2024-12-10 | End: 2024-12-13 | Stop reason: HOSPADM

## 2024-12-10 RX ORDER — PRAVASTATIN SODIUM 40 MG
40 TABLET ORAL NIGHTLY
Status: DISCONTINUED | OUTPATIENT
Start: 2024-12-10 | End: 2024-12-13 | Stop reason: HOSPADM

## 2024-12-10 RX ADMIN — PRAVASTATIN SODIUM 40 MG: 40 TABLET ORAL at 20:18

## 2024-12-10 RX ADMIN — MORPHINE SULFATE 4 MG: 4 INJECTION, SOLUTION INTRAMUSCULAR; INTRAVENOUS at 11:12

## 2024-12-10 RX ADMIN — HYDROCODONE BITARTRATE AND ACETAMINOPHEN 1 TABLET: 10; 325 TABLET ORAL at 20:19

## 2024-12-10 RX ADMIN — SODIUM CHLORIDE 2000 MG: 900 INJECTION INTRAVENOUS at 08:31

## 2024-12-10 RX ADMIN — MIRTAZAPINE 7.5 MG: 15 TABLET, FILM COATED ORAL at 20:18

## 2024-12-10 RX ADMIN — MUPIROCIN 1 APPLICATION: 20 OINTMENT TOPICAL at 17:12

## 2024-12-10 RX ADMIN — Medication 10 ML: at 20:19

## 2024-12-10 RX ADMIN — AMLODIPINE BESYLATE 2.5 MG: 2.5 TABLET ORAL at 20:17

## 2024-12-10 RX ADMIN — DOCUSATE SODIUM 100 MG: 100 CAPSULE, LIQUID FILLED ORAL at 20:18

## 2024-12-10 RX ADMIN — Medication 10 ML: at 13:49

## 2024-12-10 RX ADMIN — MORPHINE SULFATE 4 MG: 4 INJECTION, SOLUTION INTRAMUSCULAR; INTRAVENOUS at 09:41

## 2024-12-10 RX ADMIN — DAPTOMYCIN 500 MG: 500 INJECTION, POWDER, LYOPHILIZED, FOR SOLUTION INTRAVENOUS at 17:13
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
## 2024-12-10 NOTE — CASE MANAGEMENT/SOCIAL WORK
Discharge Planning Assessment  [facility]     Patient Name: Jimmie Salcedo  MRN: [id number]  Today's Date: 12/10/2024    Admit Date: 12/10/2024    Plan: IDP   Discharge Needs Assessment       Row Name 12/10/24 1014       Living Environment    People in Home spouse    Current Living Arrangements home    Primary Care Provided by self    Provides Primary Care For no one    Family Caregiver if Needed spouse    Quality of Family Relationships helpful;involved       Transition Planning    Transportation Anticipated family or friend will provide       Discharge Needs Assessment    Readmission Within the Last 30 Days unable to assess    Equipment Currently Used at Home cpap;cane, straight;walker, standard    Concerns to be Addressed discharge planning                   Discharge Plan       Row Name 12/10/24 1014       Plan    Plan IDP    Plan Comments MSW met with Pt’s spouse at bedside to complete IDP. Pt out of room. Pt lives with wife in [facility]. pt’s wife confirmed demographics and reports PCP is Dr. Negron. Pt has Medicare and Misc insurance. Pt independent at baseline; Pt has walker, cane, and CPAP. Pt has no HH services and no home O2. Pt’s preferred pharmacy is [facility]. Pt’s wife awaiting PT/OT recommendations. CM will continue to follow.                  Continued Care and Services - Admitted Since 12/10/2024    No active coordination exists for this encounter.          Demographic Summary       Row Name 12/10/24 1013       General Information    Arrived From home    Referral Source admission list;emergency department    Reason for Consult discharge planning    Preferred Language English                   Functional Status       Row Name 12/10/24 1014       Functional Status    Usual Activity Tolerance good    Current Activity Tolerance moderate       Functional Status, IADL    Medications independent    Meal Preparation independent    Housekeeping independent    Laundry independent    Shopping  independent                               DOLLY Painting

## 2024-12-10 NOTE — PROGRESS NOTES
"      Orthopaedic Surgery Progress Note    CC: Left leg pain      Subjective     Interval History:   Patient status post elective left TKA on 2024.  Patient started developing worsening erythema and pain associated with his incision a few days after his block was removed.  Pain did worsen directly after the block was removed.  Denies fever chills nausea vomiting.  Denies chest pain or shortness of breath.  Had a similar type issue after his right knee replacement requiring extended oral antibiotics and ID consultation.  His wife was very concerned about the overall appearance of his leg and called our office several times and we asked her to bring the patient in.  He was immobile enough that this could not be done in this morning he was scheduled to see us in the office but she instead chose to bring him to the emergency room because of her concern.  Patient is examined today with his wife and daughter at the bedside.      ROS: Denies fever, chills, nausea or vomiting    Objective     Vital Signs:  Temp (24hrs), Av.8 °F (36.6 °C), Min:97.5 °F (36.4 °C), Max:98 °F (36.7 °C)    /75 (BP Location: Left arm, Patient Position: Lying)   Pulse 69   Temp 98 °F (36.7 °C) (Oral)   Resp 18   Ht 177.8 cm (70\")   Wt 103 kg (226 lb 1.6 oz)   SpO2 97%   BMI 32.44 kg/m²       Physical Exam:  Left lower extremity: Patient is unable to do a straight leg raise.  He has some erythema associated with the distal aspect of his incision with some mild blistering.  His calf is tender but not overly firm.  EHL FHL gastrocsoleus and tibialis anterior are intact.  Range of motion 0 to about 45 degrees.    We have reviewed laboratory studies as well as an x-ray of the patient's left knee.  No evidence of fracture or obvious tendinous rupture is noted.  Elevated CRP and sed rate are noted.  No elevation in the white count.    Assessment and Plan:  Postop day #6 status post elective outpatient left TKA    -- Patient appears " to have cellulitis of the left lower extremity.  Agree with IV antibiotics.  Agree with ID consultation.  -- Physical therapy will be ordered.  Patient needs to continue working on his range of motion of the left knee as we treat his cellulitis.  --We will schedule hydrocodone to try and keep his pain under control to allow him to adequately participate with physical therapy.  -- Will recheck in the morning.  -- Appreciate Dr. GARCIA and Dr. Berger's evaluations and input.    Tereso Restrepo MD  12/10/24  17:38 EST

## 2024-12-10 NOTE — PLAN OF CARE
Problem: Adult Inpatient Plan of Care  Goal: Plan of Care Review  Outcome: Progressing  Goal: Patient-Specific Goal (Individualized)  Outcome: Progressing  Goal: Absence of Hospital-Acquired Illness or Injury  Outcome: Progressing  Intervention: Identify and Manage Fall Risk  Recent Flowsheet Documentation  Taken 12/10/2024 1600 by Winsome Barajas RN  Safety Promotion/Fall Prevention:   activity supervised   safety round/check completed  Taken 12/10/2024 1400 by Winsome Barajas RN  Safety Promotion/Fall Prevention:   activity supervised   safety round/check completed  Taken 12/10/2024 1340 by Winsome Barajas RN  Safety Promotion/Fall Prevention:   activity supervised   safety round/check completed  Intervention: Prevent Skin Injury  Recent Flowsheet Documentation  Taken 12/10/2024 1600 by Winsome Barajas RN  Body Position: position changed independently  Skin Protection: incontinence pads utilized  Taken 12/10/2024 1400 by Winsome Barajas RN  Body Position: position changed independently  Skin Protection:   incontinence pads utilized   transparent dressing maintained  Taken 12/10/2024 1340 by Winsome Barajas RN  Body Position: position changed independently  Skin Protection:   incontinence pads utilized   transparent dressing maintained  Goal: Optimal Comfort and Wellbeing  Outcome: Progressing  Intervention: Monitor Pain and Promote Comfort  Recent Flowsheet Documentation  Taken 12/10/2024 1400 by Winsome Barajas RN  Pain Management Interventions: pain management plan reviewed with patient/caregiver  Goal: Readiness for Transition of Care  Outcome: Progressing  Intervention: Mutually Develop Transition Plan  Recent Flowsheet Documentation  Taken 12/10/2024 1340 by Winsome Barajas RN  Transportation Anticipated: family or friend will provide  Patient/Family Anticipated Services at Transition: none  Patient/Family Anticipates Transition to: home with family  Taken 12/10/2024 1338 by Winsome Barajas RN  Equipment Currently  Used at Home:   cpap   bp cuff   walker, corky     Problem: Skin Injury Risk Increased  Goal: Skin Health and Integrity  Outcome: Progressing  Intervention: Optimize Skin Protection  Recent Flowsheet Documentation  Taken 12/10/2024 1600 by Winsome Barajas RN  Activity Management: activity encouraged  Pressure Reduction Techniques: frequent weight shift encouraged  Pressure Reduction Devices: pressure-redistributing mattress utilized  Skin Protection: incontinence pads utilized  Taken 12/10/2024 1400 by Winsome Barajas RN  Activity Management: activity encouraged  Pressure Reduction Techniques: frequent weight shift encouraged  Pressure Reduction Devices: pressure-redistributing mattress utilized  Skin Protection:   incontinence pads utilized   transparent dressing maintained  Taken 12/10/2024 1340 by Winsome Barajas RN  Activity Management: activity encouraged  Pressure Reduction Techniques: frequent weight shift encouraged  Pressure Reduction Devices: pressure-redistributing mattress utilized  Skin Protection:   incontinence pads utilized   transparent dressing maintained     Problem: Fall Injury Risk  Goal: Absence of Fall and Fall-Related Injury  Outcome: Progressing  Intervention: Promote Injury-Free Environment  Recent Flowsheet Documentation  Taken 12/10/2024 1600 by Winsome Barajas RN  Safety Promotion/Fall Prevention:   activity supervised   safety round/check completed  Taken 12/10/2024 1400 by Winsome Barajas RN  Safety Promotion/Fall Prevention:   activity supervised   safety round/check completed  Taken 12/10/2024 1340 by Winsome Barajas RN  Safety Promotion/Fall Prevention:   activity supervised   safety round/check completed   Goal Outcome Evaluation:      Patient came up around 1300. Vitals stable. NSR with a right bundle branch block. Was admitted for infected knee, area warm and bruised. Minimal drainage. Pulses +2. On RA. No complaints of pain. Will monitor until 1900.
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
## 2024-12-11 LAB — CK SERPL-CCNC: 28 U/L (ref 20–200)

## 2024-12-11 PROCEDURE — 99024 POSTOP FOLLOW-UP VISIT: CPT | Performed by: ORTHOPAEDIC SURGERY

## 2024-12-11 PROCEDURE — 97166 OT EVAL MOD COMPLEX 45 MIN: CPT

## 2024-12-11 PROCEDURE — 97116 GAIT TRAINING THERAPY: CPT

## 2024-12-11 PROCEDURE — G0378 HOSPITAL OBSERVATION PER HR: HCPCS

## 2024-12-11 PROCEDURE — 97162 PT EVAL MOD COMPLEX 30 MIN: CPT

## 2024-12-11 PROCEDURE — 25010000002 DAPTOMYCIN PER 1 MG: Performed by: INTERNAL MEDICINE

## 2024-12-11 PROCEDURE — 97110 THERAPEUTIC EXERCISES: CPT

## 2024-12-11 PROCEDURE — 97530 THERAPEUTIC ACTIVITIES: CPT

## 2024-12-11 PROCEDURE — 82550 ASSAY OF CK (CPK): CPT | Performed by: INTERNAL MEDICINE

## 2024-12-11 RX ORDER — BISACODYL 10 MG
10 SUPPOSITORY, RECTAL RECTAL DAILY PRN
Status: DISCONTINUED | OUTPATIENT
Start: 2024-12-11 | End: 2024-12-13 | Stop reason: HOSPADM

## 2024-12-11 RX ADMIN — MUPIROCIN 1 APPLICATION: 20 OINTMENT TOPICAL at 09:20

## 2024-12-11 RX ADMIN — AMLODIPINE BESYLATE 2.5 MG: 2.5 TABLET ORAL at 20:47

## 2024-12-11 RX ADMIN — HYDROCODONE BITARTRATE AND ACETAMINOPHEN 1 TABLET: 10; 325 TABLET ORAL at 17:25

## 2024-12-11 RX ADMIN — PRAVASTATIN SODIUM 40 MG: 40 TABLET ORAL at 20:48

## 2024-12-11 RX ADMIN — HYDROCODONE BITARTRATE AND ACETAMINOPHEN 1 TABLET: 10; 325 TABLET ORAL at 05:55

## 2024-12-11 RX ADMIN — HYDROCODONE BITARTRATE AND ACETAMINOPHEN 1 TABLET: 10; 325 TABLET ORAL at 20:47

## 2024-12-11 RX ADMIN — PANTOPRAZOLE SODIUM 40 MG: 40 TABLET, DELAYED RELEASE ORAL at 05:55

## 2024-12-11 RX ADMIN — Medication 10 ML: at 20:48

## 2024-12-11 RX ADMIN — MUPIROCIN 1 APPLICATION: 20 OINTMENT TOPICAL at 20:48

## 2024-12-11 RX ADMIN — MIRTAZAPINE 7.5 MG: 15 TABLET, FILM COATED ORAL at 20:47

## 2024-12-11 RX ADMIN — ACETAMINOPHEN 650 MG: 325 TABLET ORAL at 00:56

## 2024-12-11 RX ADMIN — BUPROPION HYDROCHLORIDE 150 MG: 150 TABLET, EXTENDED RELEASE ORAL at 05:55

## 2024-12-11 RX ADMIN — HYDROCODONE BITARTRATE AND ACETAMINOPHEN 1 TABLET: 10; 325 TABLET ORAL at 13:47

## 2024-12-11 RX ADMIN — ASPIRIN 325 MG: 325 TABLET, COATED ORAL at 09:20

## 2024-12-11 RX ADMIN — DAPTOMYCIN 500 MG: 500 INJECTION, POWDER, LYOPHILIZED, FOR SOLUTION INTRAVENOUS at 17:25

## 2024-12-11 RX ADMIN — HYDROCODONE BITARTRATE AND ACETAMINOPHEN 1 TABLET: 10; 325 TABLET ORAL at 09:20
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
## 2024-12-11 NOTE — PLAN OF CARE
"Goal Outcome Evaluation:  Plan of Care Reviewed With: patient        Progress: no change  Outcome Evaluation: PT initial evaluation complete. Pt presents below functional baseline demonstrating generalized weakness, impaired balance, and significant LLE pain resulting in limited mobility and inability to bear weight through LLE. Pt ambulated 4 ft w/ minAx2 and FWW however demonstrated a \"hop to\" gait pattern and was limited by pain and fatigue. IPPT services warranted while hospitalized to maximize funtional independence. D/c rec is IRF for best outcome when medically ready for discharge.    Anticipated Discharge Disposition (PT): inpatient rehabilitation facility                        "
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
## 2024-12-11 NOTE — PROGRESS NOTES
"[facility] Progress Note    Date of Admission: 12/10/2024      Antibiotics: dapto      CC:   Chief Complaint   Patient presents with    Leg Swelling       S: No f/c/s. No n/v/d. Hemodynamically stable. Still with left knee post op swelling and bruising and pain to LLE     O:  /69 (BP Location: Right arm, Patient Position: Lying)   Pulse 73   Temp 98.3 °F (36.8 °C) (Oral)   Resp 18   Ht 177.8 cm (70\")   Wt 103 kg (226 lb 1.6 oz)   SpO2 95%   BMI 32.44 kg/m²   Temp (24hrs), Av.1 °F (36.7 °C), Min:97.7 °F (36.5 °C), Max:98.5 °F (36.9 °C)      PE:      GENERAL: Awake and alert, in no acute distress.   HEENT: Normocephalic, atraumatic.  PERRL. EOMI. No conjunctival injection. No icterus. Oropharynx clear without evidence of thrush or exudate. No evidence of periodontal disease.    NECK: Supple without nuchal rigidity  LYMPH: No cervical, axillary or inguinal lymphadenopathy. No neck masses  HEART: RRR; No murmur, rubs, gallops.   LUNGS: Clear to auscultation bilaterally without wheezing, rales, rhonchi. Normal respiratory effort.  ABDOMEN: Soft, nontender, nondistended. Positive bowel sounds. No rebound or guarding.   EXT:  No cyanosis, clubbing and LLE edema  : Normal appearing genitalia without Paiz catheter.  MSK: decreased ROM left knee   SKIN: Warm and dry without cutaneous eruptions.  Left knee and lower extremity with some swelling and erythema and brusing with some rash around dressing  NEURO: Oriented to PPT. No focal deficits.   PSYCHIATRIC: Normal insight and judgement. Cooperative with PE    Laboratory Data    Results from last 7 days   Lab Units 12/10/24  0759   WBC 10*3/mm3 7.08   HEMOGLOBIN g/dL 13.2   HEMATOCRIT % 38.7   PLATELETS 10*3/mm3 143     Results from last 7 days   Lab Units 12/10/24  0831   SODIUM mmol/L 138   POTASSIUM mmol/L 3.7   CHLORIDE mmol/L 102   CO2 mmol/L 25.0   BUN mg/dL 16   CREATININE mg/dL 0.78   GLUCOSE mg/dL 174*   CALCIUM mg/dL 9.0     Results from last 7 days "   Lab Units 12/10/24  0831   ALK PHOS U/L 53   BILIRUBIN mg/dL 1.0   ALT (SGPT) U/L 10   AST (SGOT) U/L 14     Results from last 7 days   Lab Units 12/10/24  0759   SED RATE mm/hr 43*     Results from last 7 days   Lab Units 12/10/24  0831   CRP mg/dL 5.45*       Estimated Creatinine Clearance: 102.9 mL/min (by C-G formula based on SCr of 0.78 mg/dL).      Microbiology:  neg    Radiology:  Imaging Results (Last 24 Hours)       ** No results found for the last 24 hours. **               PROBLEM LIST:   Left TKA now with LLE cellulitis with pain and swelling  H/o recurrent RLE cellulitis  Psoriasis  Left knee pain     ASSESSMENT:  Pt is a 73 yo with recent L TKA and prior h/o of RLE cellulitis and now readmitted with LLE sweling and erythema and bruising and rash around incisional wound.  Patient to continue with acute cellulitis concern with blistering rash around incision and brusing down leg.  US with no DVT and will cover with iv abx and monitor.    Pt with bruising leg today with swelling and pain but no fevers and wbc remains normal feel some contact dermatitis and swelling bruising post op and severe cellulitis less likely but cover with abx with recent L TKA and prior h/o recurrent LE cellulitis.     PLAN:  Daptomycin iv for now  D/c ceftriaxone  Fu on crp in am  To rehab soon and plan iv to po abx once improved    D/w Dr. Srinivasan SERNA spent >35 min on case today with more than %50 time in counseling/coordination of care ongoing iv abx and d/w pt and Dr. Srinivasan Berger MD  12/11/2024

## 2024-12-11 NOTE — PROGRESS NOTES
"IM progress note      Jimmie Salcedo  [id number]  1950     LOS: 0 days     Attending: Bean Moreira MD    Primary Care Provider: Javad Negron MD      Chief Complaint/Reason for visit:  LLE pain    Subjective   Doing ok. Reports 5/10 pain at rest and 10/10 pain with ambulation. Denies f/c/n/v/sob/cp.    Objective     Vital Signs  Visit Vitals  /72 (BP Location: Left arm, Patient Position: Lying)   Pulse 73   Temp 98.1 °F (36.7 °C) (Oral)   Resp 18   Ht 177.8 cm (70\")   Wt 103 kg (226 lb 1.6 oz)   SpO2 97%   BMI 32.44 kg/m²     Temp (24hrs), Av °F (36.7 °C), Min:97.7 °F (36.5 °C), Max:98.5 °F (36.9 °C)      Nutrition: PO    Respiratory: RA    Physical Therapy: PT initial evaluation complete. Pt presents below functional baseline demonstrating generalized weakness, impaired balance, and significant LLE pain resulting in limited mobility and inability to bear weight through LLE. Pt ambulated 4 ft w/ minAx2 and FWW however demonstrated a \"hop to\" gait pattern and was limited by pain and fatigue. IPPT services warranted while hospitalized to maximize funtional independence. D/c rec is IRF for best outcome when medically ready for discharge.     Physical Exam:     General Appearance:    Alert, cooperative, in no acute distress   Head:    Normocephalic, without obvious abnormality, atraumatic    Lungs:     Normal effort, symmetric chest rise, no crepitus, clear to      auscultation bilaterally             Heart:    Regular rhythm and normal rate, normal S1 and S2   Abdomen:     Normal bowel sounds, no masses, no organomegaly, soft        non-tender, non-distended, no guarding, no rebound                tenderness   Extremities:   Swelling and erythema left lower extremity including the area around the incision but also extending down the leg to above the ankle. Calf is somewhat tender to palpation.    Pulses:   Pulses palpable and equal bilaterally   Skin:   No bleeding, bruising or rash "   Neurologic:   Cranial nerves 2 - 12 grossly intact. Flexion and dorsiflexion intact bilateral feet.       Results Review:     I reviewed the patient's new clinical results.   Results from last 7 days   Lab Units 12/10/24  0759   WBC 10*3/mm3 7.08   HEMOGLOBIN g/dL 13.2   HEMATOCRIT % 38.7   PLATELETS 10*3/mm3 143     Results from last 7 days   Lab Units 12/10/24  0831   SODIUM mmol/L 138   POTASSIUM mmol/L 3.7   CHLORIDE mmol/L 102   CO2 mmol/L 25.0   BUN mg/dL 16   CREATININE mg/dL 0.78   CALCIUM mg/dL 9.0   BILIRUBIN mg/dL 1.0   ALK PHOS U/L 53   ALT (SGPT) U/L 10   AST (SGOT) U/L 14   GLUCOSE mg/dL 174*     Duplex 12/10/24:    Interpretation Summary         Normal bilateral lower extremity venous duplex scan.      I reviewed the patient's new imaging including images and reports.    All medications reviewed.   amLODIPine, 2.5 mg, Oral, Nightly  aspirin, 325 mg, Oral, Daily  buPROPion XL, 150 mg, Oral, QAM  DAPTOmycin, 6 mg/kg (Adjusted), Intravenous, Q24H  HYDROcodone-acetaminophen, 1 tablet, Oral, Q4H While Awake  mirtazapine, 7.5 mg, Oral, Nightly  mupirocin, 1 Application, Each Nare, BID  pantoprazole, 40 mg, Oral, Q AM  pravastatin, 40 mg, Oral, Nightly  sodium chloride, 10 mL, Intravenous, Q12H        Assessment & Plan     Postoperative wound cellulitis    Hypertension    Hyperlipidemia    Severe obstructive sleep apnea    S/P left total knee arthroplasty, 12/4/24    Lower extremity cellulitis      Plan  1. PT/OT- WBAT LLE  2. Pain control-prns   3. IS-encouraged  4. DVT proph- mechs/ASA  5. Bowel regimen  6. Monitor post-op labs  7. DC planning for rehab per PT recs    Dr. Ab ESPINAL- abx management    HTN, Hyperlipidemia  - Continue home Norvasc and statin  - Hold lasix, cardura, Clonidine, entresto for now  - Monitor BP   - Holding parameters for BP meds  - Labetalol PRN for SBP>170     THA  - CPAP at night      SOURAV Ann  12/11/24  10:32 EST

## 2024-12-11 NOTE — PLAN OF CARE
Goal Outcome Evaluation:  Plan of Care Reviewed With: patient        Progress: no change  Outcome Evaluation: OT eval complete. Pt is below fxl baseline with pain limiting mobility, decreased activity tolerance, and ROM in LLE. Pt was able to perform STS transfer with CGA and FWW, however endorsed increased pain with WB attempts in LLE. Pt would benefit from ongoing skilled OT services to progress to PLOF. Recommend d/c to IRF.    Anticipated Discharge Disposition (OT): inpatient rehabilitation facility

## 2024-12-11 NOTE — PROGRESS NOTES
"      Orthopaedic Surgery Progress Note    CC: Left leg pain      Subjective     Interval History:   Patient sisters at the bedside this afternoon.  He tells me he got up with PT.  He got about 65 degrees of flexion with physical therapy tells me.  Still unable to fully bear weight on his left lower extremity secondary to pain.      ROS: Denies fever, chills, nausea or vomiting    Objective     Vital Signs:  Temp (24hrs), Av.1 °F (36.7 °C), Min:97.7 °F (36.5 °C), Max:98.5 °F (36.9 °C)    /69 (BP Location: Right arm, Patient Position: Lying)   Pulse 73   Temp 98.3 °F (36.8 °C) (Oral)   Resp 18   Ht 177.8 cm (70\")   Wt 103 kg (226 lb 1.6 oz)   SpO2 95%   BMI 32.44 kg/m²       Physical Exam:  Patient has more ecchymosis on the pretibial region today.  Still fullness in his calf.  No induration noted.  Range of motion seems to be less painful for him today.    Assessment and Plan:  Postop day #7 status post robot-assisted cemented left TKA on 2024.    --Continue with IV antibiotics per infectious disease service.  Have called Dr. Berger to discuss this afternoon.  Plan is for coverage once patient is discharged to rehab with daptomycin.  -- Continue physical therapy for range of motion and progressive weightbearing.  -- Patient seems to be doing better with scheduled Norco.  -- Continue medical management per Dr. Cunningham.    Tereso Restrepo MD  24  15:06 EST        "

## 2024-12-11 NOTE — PLAN OF CARE
Goal Outcome Evaluation:  Plan of Care Reviewed With: patient        Progress: improving  Outcome Evaluation: Pt continues to participate in therapy session w/ good effort. Pt increased his ambulation distance to 15ft w/ minAx2 and a close chair follow. Pt ambulated w/ a step to gait pattern, toe touch weight bearing due to pain, and heavy reliance on FWW. No LOB or buckling noted however gait distance limited by pain and fatigue. HEP performed and reviewed. Continue to progress poc as able.    Anticipated Discharge Disposition (PT): inpatient rehabilitation facility

## 2024-12-12 LAB — CRP SERPL-MCNC: 15.94 MG/DL (ref 0–0.5)

## 2024-12-12 PROCEDURE — 97116 GAIT TRAINING THERAPY: CPT

## 2024-12-12 PROCEDURE — 97535 SELF CARE MNGMENT TRAINING: CPT

## 2024-12-12 PROCEDURE — 97110 THERAPEUTIC EXERCISES: CPT

## 2024-12-12 PROCEDURE — 25010000002 DAPTOMYCIN PER 1 MG: Performed by: INTERNAL MEDICINE

## 2024-12-12 PROCEDURE — 86140 C-REACTIVE PROTEIN: CPT | Performed by: INTERNAL MEDICINE

## 2024-12-12 PROCEDURE — 99024 POSTOP FOLLOW-UP VISIT: CPT | Performed by: ORTHOPAEDIC SURGERY

## 2024-12-12 RX ADMIN — HYDROCODONE BITARTRATE AND ACETAMINOPHEN 1 TABLET: 10; 325 TABLET ORAL at 08:51

## 2024-12-12 RX ADMIN — HYDROCODONE BITARTRATE AND ACETAMINOPHEN 1 TABLET: 10; 325 TABLET ORAL at 21:32

## 2024-12-12 RX ADMIN — HYDROCODONE BITARTRATE AND ACETAMINOPHEN 1 TABLET: 10; 325 TABLET ORAL at 18:01

## 2024-12-12 RX ADMIN — HYDROCODONE BITARTRATE AND ACETAMINOPHEN 1 TABLET: 10; 325 TABLET ORAL at 05:46

## 2024-12-12 RX ADMIN — BUPROPION HYDROCHLORIDE 150 MG: 150 TABLET, EXTENDED RELEASE ORAL at 05:46

## 2024-12-12 RX ADMIN — HYDROCODONE BITARTRATE AND ACETAMINOPHEN 1 TABLET: 10; 325 TABLET ORAL at 13:38

## 2024-12-12 RX ADMIN — ASPIRIN 325 MG: 325 TABLET, COATED ORAL at 08:51

## 2024-12-12 RX ADMIN — BISACODYL 10 MG: 10 SUPPOSITORY RECTAL at 12:18

## 2024-12-12 RX ADMIN — DAPTOMYCIN 500 MG: 500 INJECTION, POWDER, LYOPHILIZED, FOR SOLUTION INTRAVENOUS at 13:39

## 2024-12-12 RX ADMIN — MIRTAZAPINE 7.5 MG: 15 TABLET, FILM COATED ORAL at 21:30

## 2024-12-12 RX ADMIN — DOCUSATE SODIUM 100 MG: 100 CAPSULE, LIQUID FILLED ORAL at 06:03

## 2024-12-12 RX ADMIN — Medication 10 ML: at 21:32

## 2024-12-12 RX ADMIN — PANTOPRAZOLE SODIUM 40 MG: 40 TABLET, DELAYED RELEASE ORAL at 05:46

## 2024-12-12 RX ADMIN — AMLODIPINE BESYLATE 2.5 MG: 2.5 TABLET ORAL at 21:28

## 2024-12-12 RX ADMIN — ACETAMINOPHEN 650 MG: 325 TABLET ORAL at 12:18

## 2024-12-12 RX ADMIN — PRAVASTATIN SODIUM 40 MG: 40 TABLET ORAL at 21:31

## 2024-12-12 RX ADMIN — Medication 10 ML: at 08:52

## 2024-12-12 RX ADMIN — MUPIROCIN 1 APPLICATION: 20 OINTMENT TOPICAL at 21:32

## 2024-12-12 NOTE — PLAN OF CARE
Goal Outcome Evaluation:  Plan of Care Reviewed With: patient        Progress: no change  Outcome Evaluation: Patient gives good effort with therapy interventions. He was able to ambulate 8' minAx2 with FWW, still having much difficulty with weight acceptance onto LLE. Performed HEP with Jam. IPPT remains indicated to address current deficits. Continue to recommend D/C to IPR.    Anticipated Discharge Disposition (PT): inpatient rehabilitation facility

## 2024-12-12 NOTE — PLAN OF CARE
Goal Outcome Evaluation:  Plan of Care Reviewed With: patient, spouse        Progress: improving  Outcome Evaluation: Patient demonstrated improved weight acceptance onto LLE this afternoon, although still limited, and ambulated increased distance 16' Jam with FWW. Good effort with HEP. IPPT remains indicated to address current deficits while admitted. Continue to recommend D/C to IPR.    Anticipated Discharge Disposition (PT): inpatient rehabilitation facility
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
## 2024-12-12 NOTE — PROGRESS NOTES
"      Orthopaedic Surgery Progress Note    CC: Left leg pain      Subjective     Interval History:   Patient tells me he feels like a lot of the pain and swelling in the back of his knee has subsided.  Is very complementary of PT services here      ROS: Denies fever, chills, nausea or vomiting    Objective     Vital Signs:  Temp (24hrs), Av.3 °F (36.8 °C), Min:98 °F (36.7 °C), Max:98.6 °F (37 °C)    /67 (BP Location: Right arm, Patient Position: Lying)   Pulse 67   Temp 98.4 °F (36.9 °C) (Oral)   Resp 18   Ht 177.8 cm (70\")   Wt 103 kg (226 lb 1.6 oz)   SpO2 94%   BMI 32.44 kg/m²       Physical Exam:  Patient continues to have some ecchymosis on the pretibial region.  Swelling seems to be improving.  Surgical dressing remains in place.  No streaking noted.      Assessment and Plan:  Postop day #8 status post robot-assisted cemented left TKA on 2024.  Patient readmitted for cellulitis of the left leg.    --PT recommending inpatient rehab.  -- DC planning  -- Continue IV antibiotics per Dr. Berger and ID service  --Continue PT.  Patient needs to work on full weightbearing as well as range of motion exercises.  His goal at 3 weeks when he returns to the office is 0 to 90 degrees of motion.    Tereso Restrepo MD  24  08:00 EST        "

## 2024-12-12 NOTE — PLAN OF CARE
Goal Outcome Evaluation:  Plan of Care Reviewed With: patient        Progress: improving     Pain better controlled today with scheduled pain medication, swelling has decreased from beginning of shift to the end, AOx4, RA, voiding spontaneously, abx given, BM this shift        Problem: Adult Inpatient Plan of Care  Goal: Absence of Hospital-Acquired Illness or Injury  Intervention: Identify and Manage Fall Risk  Description: Perform standard risk assessment on admission using a validated tool or comprehensive approach appropriate to the patient; reassess fall risk frequently, with change in status or transfer to another level of care.  Communicate risk to interprofessional healthcare team; ensure fall risk visible cue.  Determine need for increased observation, equipment and environmental modification, as well as use of supportive, nonskid footwear.  Adjust safety measures to individual needs and identified risk factors.  Reinforce the importance of active participation with fall risk prevention, safety, and physical activity with the patient and family.  Perform regular intentional rounding to assess need for position change, pain assessment and personal needs, including assistance with toileting.  Recent Flowsheet Documentation  Taken 12/12/2024 1842 by Soledad Mckeon RN  Safety Promotion/Fall Prevention:   safety round/check completed   toileting scheduled   room organization consistent   nonskid shoes/slippers when out of bed   mobility aid in reach   lighting adjusted   elopement precautions   gait belt   fall prevention program maintained   clutter free environment maintained   assistive device/personal items within reach   activity supervised  Taken 12/12/2024 1600 by Soledad Mckeon RN  Safety Promotion/Fall Prevention:   safety round/check completed   room organization consistent   nonskid shoes/slippers when out of bed   mobility aid in reach   lighting adjusted   fall prevention program maintained    clutter free environment maintained   assistive device/personal items within reach  Taken 12/12/2024 1411 by Soledad Mckeon, RN  Safety Promotion/Fall Prevention:   safety round/check completed   room organization consistent   nonskid shoes/slippers when out of bed   mobility aid in reach   fall prevention program maintained   clutter free environment maintained   assistive device/personal items within reach  Taken 12/12/2024 1218 by Soledad Mckeon, RN  Safety Promotion/Fall Prevention:   safety round/check completed   toileting scheduled   room organization consistent   nonskid shoes/slippers when out of bed   mobility aid in reach   lighting adjusted   gait belt   fall prevention program maintained   elopement precautions   clutter free environment maintained   activity supervised   assistive device/personal items within reach  Taken 12/12/2024 1000 by Soledad Mckeon, RN  Safety Promotion/Fall Prevention:   safety round/check completed   room organization consistent   nonskid shoes/slippers when out of bed   lighting adjusted   fall prevention program maintained   clutter free environment maintained   assistive device/personal items within reach  Taken 12/12/2024 0851 by Soledad Mckeon, RN  Safety Promotion/Fall Prevention:   safety round/check completed   toileting scheduled   room organization consistent   nonskid shoes/slippers when out of bed   mobility aid in reach   lighting adjusted   gait belt   fall prevention program maintained   elopement precautions   clutter free environment maintained   assistive device/personal items within reach   activity supervised

## 2024-12-12 NOTE — PLAN OF CARE
Problem: Adult Inpatient Plan of Care  Goal: Plan of Care Review  Recent Flowsheet Documentation  Taken 12/12/2024 1526 by Clara Jacobo OT  Progress: no change  Outcome Evaluation: Pt participates in therapy with good effort. Remains below baseline limited by acute LLE pain, strength and ROM deficits. Pt completes BUE/LE ther ex to support transfer training and self-care with good effort. Appropriate AE issued and teaching initiated for ADL retraining. Pt limited by pain. Follow up teaching recommended. OT will continue to follow IP. Recommend IRF when pt is medically ready.

## 2024-12-12 NOTE — PROGRESS NOTES
"IM progress note      Jimmie Salcedo  [id number]  1950     LOS: 0 days     Attending: Bean Moreira MD    Primary Care Provider: Javad Negron MD      Chief Complaint/Reason for visit:  LLE pain    Subjective   States that he feels much better.  Still has left lower extremity pain.  Participated with physical therapy and ambulated 8 feet with min assist of 2.  No nausea, vomiting, or shortness of breath.  No fevers or chills.    Objective     Visit Vitals  /67 (BP Location: Right arm, Patient Position: Lying)   Pulse 67   Temp 98.4 °F (36.9 °C) (Oral)   Resp 18   Ht 177.8 cm (70\")   Wt 103 kg (226 lb 1.6 oz)   SpO2 94%   BMI 32.44 kg/m²     Temp (24hrs), Av.3 °F (36.8 °C), Min:98 °F (36.7 °C), Max:98.6 °F (37 °C)      Nutrition: PO    Respiratory: RA    Physical Therapy: PT initial evaluation complete. Pt presents below functional baseline demonstrating generalized weakness, impaired balance, and significant LLE pain resulting in limited mobility and inability to bear weight through LLE. Pt ambulated 4 ft w/ minAx2 and FWW however demonstrated a \"hop to\" gait pattern and was limited by pain and fatigue. IPPT services warranted while hospitalized to maximize funtional independence. D/c rec is IRF for best outcome when medically ready for discharge.     Physical Exam:     General Appearance:    Alert, cooperative, in no acute distress   Head:    Normocephalic, without obvious abnormality, atraumatic    Lungs:     Normal effort, symmetric chest rise, no crepitus, clear to      auscultation bilaterally             Heart:    Regular rhythm and normal rate, normal S1 and S2   Abdomen:     Normal bowel sounds, no masses, no organomegaly, soft        non-tender, non-distended, no guarding, no rebound                tenderness   Extremities:   Swelling and erythema/ecchymoses,  Left lower extremity including the area around the incision but also extending down the leg to above the ankle. Calf " is somewhat tender to palpation.    Pulses:   Pulses palpable and equal bilaterally   Skin:   No bleeding, bruising or rash   Neurologic:   Cranial nerves 2 - 12 grossly intact. Flexion and dorsiflexion intact bilateral feet.       Results Review:     I reviewed the patient's new clinical results.   Results from last 7 days   Lab Units 12/10/24  0759   WBC 10*3/mm3 7.08   HEMOGLOBIN g/dL 13.2   HEMATOCRIT % 38.7   PLATELETS 10*3/mm3 143     Results from last 7 days   Lab Units 12/10/24  0831   SODIUM mmol/L 138   POTASSIUM mmol/L 3.7   CHLORIDE mmol/L 102   CO2 mmol/L 25.0   BUN mg/dL 16   CREATININE mg/dL 0.78   CALCIUM mg/dL 9.0   BILIRUBIN mg/dL 1.0   ALK PHOS U/L 53   ALT (SGPT) U/L 10   AST (SGOT) U/L 14   GLUCOSE mg/dL 174*     Duplex 12/10/24:    Interpretation Summary         Normal bilateral lower extremity venous duplex scan.      I reviewed the patient's new imaging including images and reports.    All medications reviewed.   amLODIPine, 2.5 mg, Oral, Nightly  aspirin, 325 mg, Oral, Daily  buPROPion XL, 150 mg, Oral, QAM  DAPTOmycin, 6 mg/kg (Adjusted), Intravenous, Q24H  HYDROcodone-acetaminophen, 1 tablet, Oral, Q4H While Awake  mirtazapine, 7.5 mg, Oral, Nightly  mupirocin, 1 Application, Each Nare, BID  pantoprazole, 40 mg, Oral, Q AM  pravastatin, 40 mg, Oral, Nightly  sodium chloride, 10 mL, Intravenous, Q12H        Assessment & Plan     Postoperative wound cellulitis    Hypertension    Hyperlipidemia    Severe obstructive sleep apnea    S/P left total knee arthroplasty, 12/4/24    Lower extremity cellulitis      Plan  1. PT/OT- WBAT LLE  2. Pain control-prns   3. IS-encouraged  4. DVT proph- mechs/ASA  5. Bowel regimen  6. Monitor post-op labs  7. DC planning for rehab per PT [facility] referral made.  Discussed with case management.    [facility], Dr. Berger- abx management    HTN, Hyperlipidemia  - Continue home Norvasc and statin  - Hold lasix, cardura, Clonidine, entresto for now  -  Monitor BP   - Holding parameters for BP meds  - Labetalol PRN for SBP>170     THA  - CPAP at night      Bean Moreira MD  12/12/24  10:55 EST
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
## 2024-12-12 NOTE — CASE MANAGEMENT/SOCIAL WORK
Continued Stay Note  [facility]     Patient Name: Jimmie Salcedo  MRN: [id number]  Today's Date: 12/12/2024    Admit Date: 12/10/2024    Plan: [facility]   Discharge Plan       Row Name 12/12/24 1143       Plan    Plan [facility]    Plan Comments I spoke with patient earlier today about discharge planning and a referral to [facility] with [facility] has been made. I also brought up SNF, but he prefers [facility].    Final Discharge Disposition Code 62 - inpatient rehab facility                   Discharge Codes    No documentation.                 Expected Discharge Date and Time       Expected Discharge Date Expected Discharge Time    Dec 12, 2024               Gianna Mcdermott RN
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
## 2024-12-13 VITALS
OXYGEN SATURATION: 95 % | HEIGHT: 70 IN | RESPIRATION RATE: 18 BRPM | DIASTOLIC BLOOD PRESSURE: 74 MMHG | SYSTOLIC BLOOD PRESSURE: 147 MMHG | BODY MASS INDEX: 32.37 KG/M2 | WEIGHT: 226.1 LBS | TEMPERATURE: 98.3 F | HEART RATE: 64 BPM

## 2024-12-13 LAB
ANION GAP SERPL CALCULATED.3IONS-SCNC: 12 MMOL/L (ref 5–15)
BASOPHILS # BLD AUTO: 0.05 10*3/MM3 (ref 0–0.2)
BASOPHILS NFR BLD AUTO: 0.6 % (ref 0–1.5)
BUN SERPL-MCNC: 21 MG/DL (ref 8–23)
BUN/CREAT SERPL: 26.3 (ref 7–25)
CALCIUM SPEC-SCNC: 9 MG/DL (ref 8.6–10.5)
CHLORIDE SERPL-SCNC: 99 MMOL/L (ref 98–107)
CO2 SERPL-SCNC: 24 MMOL/L (ref 22–29)
CREAT SERPL-MCNC: 0.8 MG/DL (ref 0.76–1.27)
CRP SERPL-MCNC: 12.84 MG/DL (ref 0–0.5)
DEPRECATED RDW RBC AUTO: 47.1 FL (ref 37–54)
EGFRCR SERPLBLD CKD-EPI 2021: 94 ML/MIN/1.73
EOSINOPHIL # BLD AUTO: 0.46 10*3/MM3 (ref 0–0.4)
EOSINOPHIL NFR BLD AUTO: 5.6 % (ref 0.3–6.2)
ERYTHROCYTE [DISTWIDTH] IN BLOOD BY AUTOMATED COUNT: 14.7 % (ref 12.3–15.4)
GLUCOSE SERPL-MCNC: 110 MG/DL (ref 65–99)
HCT VFR BLD AUTO: 37 % (ref 37.5–51)
HGB BLD-MCNC: 12.4 G/DL (ref 13–17.7)
IMM GRANULOCYTES # BLD AUTO: 0.02 10*3/MM3 (ref 0–0.05)
IMM GRANULOCYTES NFR BLD AUTO: 0.2 % (ref 0–0.5)
LYMPHOCYTES # BLD AUTO: 1.05 10*3/MM3 (ref 0.7–3.1)
LYMPHOCYTES NFR BLD AUTO: 12.7 % (ref 19.6–45.3)
MCH RBC QN AUTO: 29.5 PG (ref 26.6–33)
MCHC RBC AUTO-ENTMCNC: 33.5 G/DL (ref 31.5–35.7)
MCV RBC AUTO: 87.9 FL (ref 79–97)
MONOCYTES # BLD AUTO: 0.57 10*3/MM3 (ref 0.1–0.9)
MONOCYTES NFR BLD AUTO: 6.9 % (ref 5–12)
NEUTROPHILS NFR BLD AUTO: 6.11 10*3/MM3 (ref 1.7–7)
NEUTROPHILS NFR BLD AUTO: 74 % (ref 42.7–76)
NRBC BLD AUTO-RTO: 0 /100 WBC (ref 0–0.2)
PLATELET # BLD AUTO: 168 10*3/MM3 (ref 140–450)
PMV BLD AUTO: 9.5 FL (ref 6–12)
POTASSIUM SERPL-SCNC: 4.1 MMOL/L (ref 3.5–5.2)
RBC # BLD AUTO: 4.21 10*6/MM3 (ref 4.14–5.8)
SODIUM SERPL-SCNC: 135 MMOL/L (ref 136–145)
WBC NRBC COR # BLD AUTO: 8.26 10*3/MM3 (ref 3.4–10.8)

## 2024-12-13 PROCEDURE — 97116 GAIT TRAINING THERAPY: CPT

## 2024-12-13 PROCEDURE — 97110 THERAPEUTIC EXERCISES: CPT

## 2024-12-13 PROCEDURE — 99024 POSTOP FOLLOW-UP VISIT: CPT | Performed by: ORTHOPAEDIC SURGERY

## 2024-12-13 PROCEDURE — 97535 SELF CARE MNGMENT TRAINING: CPT

## 2024-12-13 PROCEDURE — 80048 BASIC METABOLIC PNL TOTAL CA: CPT | Performed by: INTERNAL MEDICINE

## 2024-12-13 PROCEDURE — 86140 C-REACTIVE PROTEIN: CPT | Performed by: INTERNAL MEDICINE

## 2024-12-13 PROCEDURE — 85025 COMPLETE CBC W/AUTO DIFF WBC: CPT | Performed by: INTERNAL MEDICINE

## 2024-12-13 RX ADMIN — MUPIROCIN 1 APPLICATION: 20 OINTMENT TOPICAL at 08:57

## 2024-12-13 RX ADMIN — BUPROPION HYDROCHLORIDE 150 MG: 150 TABLET, EXTENDED RELEASE ORAL at 06:42

## 2024-12-13 RX ADMIN — HYDROCODONE BITARTRATE AND ACETAMINOPHEN 1 TABLET: 10; 325 TABLET ORAL at 14:37

## 2024-12-13 RX ADMIN — HYDROCODONE BITARTRATE AND ACETAMINOPHEN 1 TABLET: 10; 325 TABLET ORAL at 06:42

## 2024-12-13 RX ADMIN — Medication 10 ML: at 08:56

## 2024-12-13 RX ADMIN — HYDROCODONE BITARTRATE AND ACETAMINOPHEN 1 TABLET: 10; 325 TABLET ORAL at 10:23

## 2024-12-13 RX ADMIN — ACETAMINOPHEN 650 MG: 325 TABLET ORAL at 03:54

## 2024-12-13 RX ADMIN — PANTOPRAZOLE SODIUM 40 MG: 40 TABLET, DELAYED RELEASE ORAL at 06:42

## 2024-12-13 RX ADMIN — ASPIRIN 325 MG: 325 TABLET, COATED ORAL at 08:56
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
## 2024-12-13 NOTE — DISCHARGE SUMMARY
Patient Name: Jimmie Salcedo  MRN: [id number]  : 1950  DOS: 2024    Attending: Bean Moreira MD    Primary Care Provider: Javad Negron MD    Date of Admission:.12/10/2024  7:15 AM    Date of Discharge:  2024    Discharge Diagnosis:   Postoperative wound cellulitis    Hypertension    Hyperlipidemia    Severe obstructive sleep apnea    S/P left total knee arthroplasty, 24    Lower extremity cellulitis    Consults: Sundeep Berger MD [place] [facility]  Tereso Mattson MD, orthopedic surgery    Hospital Course    At admit:  Patient is a very pleasant 74-year-old male who is known to our practice from recent presentation to [facility] last Wednesday when he had left total knee arthroplasty.  Surgery was done by Dr. Mattson under spinal anesthesia, was tolerated well.     Patient was discharged home later in the day having met discharge criteria.     At time of discharge he was given aspirin for DVT prophylaxis, multimodal pain medication approach.      He did well following his discharge on Wednesday continued to do well on  and then on Saturday started having increased pain.  Over the weekend he was not able to use his inhaler on machine.  His pain has worsened and he has had increased redness and swelling in his leg including the.  He had subjective chills but no documented fever.  No nausea or vomiting.  No shortness of breath.     Workup in the emergency department included a CBC with normal white count.  Inflammatory markers were mildly elevated which is not unusual for this interval following surgery.     Today he was supposed to see Dr. Mattson but could not get out of bed due to pain and swelling and was brought to the ED.     Noting patient's prior history of strep infections in his right leg and bacteremia according to him and his wife, he was placed on doxycycline postop and was given cefadroxil more recently.      After admit:    Patient was provided pain medications as needed for pain control.    Adjustments were made to pain medications to optimize pain management. Risks and benefits of opiate medications discussed with patient. ABDIEL report was reviewed.    He was started on IV antibiotics managed by Dr. Berger.  He is currently on daptomycin with a plan to continue this for a few days until significant clinical improvement occurs prior to transition to p.o. antibiotics.    He has been afebrile throughout his stay here.  His pain and swelling appears to be improving and he has participated with physical therapy    Redness and ecchymosis of the left lower extremity is improving slowly.     During his stay he used an IS for atelectasis prophylaxis and was resumed on aspirin along with mechanicals for DVT prophylaxis.    Home medications were resumed as appropriate, and labs were monitored and remained fairly stable.     With the progress he has made, Mr. Salcedo is ready for DC to [facility] today.        Discussed with patient regarding plan and he shows understanding and agreement.              Pertinent Test Results:    I reviewed the patient's new clinical results.   Results from last 7 days   Lab Units 12/13/24  0450 12/10/24  0759   WBC 10*3/mm3 8.26 7.08   HEMOGLOBIN g/dL 12.4* 13.2   HEMATOCRIT % 37.0* 38.7   PLATELETS 10*3/mm3 168 143     Results from last 7 days   Lab Units 12/13/24  0450 12/10/24  0831   SODIUM mmol/L 135* 138   POTASSIUM mmol/L 4.1 3.7   CHLORIDE mmol/L 99 102   CO2 mmol/L 24.0 25.0   BUN mg/dL 21 16   CREATININE mg/dL 0.80 0.78   CALCIUM mg/dL 9.0 9.0   BILIRUBIN mg/dL  --  1.0   ALK PHOS U/L  --  53   ALT (SGPT) U/L  --  10   AST (SGOT) U/L  --  14   GLUCOSE mg/dL 110* 174*     I reviewed the patient's new imaging including images and reports.   Latest Reference Range & Units 12/12/24 05:29 12/13/24 04:50   C-Reactive Protein 0.00 - 0.50 mg/dL 15.94 (H) 12.84 (H)   (H): Data is  "abnormally high    Venous duplex left lower extremity: Negative for DVT    Physical therapy    Date of Service: 24  Creation Time: 24       Goal Outcome Evaluation:  Plan of Care Reviewed With: patient  Progress: improving  Outcome Evaluation: Pt increased ambulation distance to 40' with min Ax1 and RW. He continues to have decreased WBing through LLE d/t pain. Education provided on importance of increased L knee flexion for improvements in functional mobility. HEP completed. Pt presents with deficits related to pain, strength, and balance. Further IPPT is warrented. PT will progress as able per POC.     Anticipated Discharge Disposition (PT): inpatient rehabilitation facility           Discharge Assessment:       Visit Vitals  /70 (BP Location: Right arm, Patient Position: Lying)   Pulse 64   Temp 98.3 °F (36.8 °C) (Oral)   Resp 18   Ht 177.8 cm (70\")   Wt 103 kg (226 lb 1.6 oz)   SpO2 95%   BMI 32.44 kg/m²     Temp (24hrs), Av.1 °F (36.7 °C), Min:97.7 °F (36.5 °C), Max:98.3 °F (36.8 °C)      General Appearance:    Alert, cooperative, in no acute distress   Lungs:     Clear to auscultation,respirations regular, even and                   unlabored    Heart:    Regular rhythm and normal rate, normal S1 and S2   Abdomen:     Normal bowel sounds, no masses, no organomegaly, soft        non-tender, non-distended, no guarding, no rebound                 tenderness   Extremities:   CDI dressing surgical knee .  Swelling and erythema and ecchymosis of the left leg, slowly improving.   Pulses:   Pulses palpable and equal bilaterally   Skin:   No bleeding, bruising or rash   Neurologic:   Cranial nerves 2 - 12 grossly intact, sensation intact, Flexion and dorsiflexion intact bilateral feet.         Discharge Disposition: [facility]         Discharge Medications        New Medications        Instructions Start Date   DAPTOmycin 500 mg in sodium chloride 0.9 % 50 mL   6 mg/kg (500 " mg), Intravenous, Every 24 Hours             Changes to Medications        Instructions Start Date   doxazosin 4 MG tablet  Commonly known as: CARDURA  What changed: when to take this   TAKE 1 TABLET BY MOUTH EVERY EVENING      pravastatin 40 MG tablet  Commonly known as: PRAVACHOL  What changed: when to take this   TAKE ONE TABLET BY MOUTH EVERY DAY             Continue These Medications        Instructions Start Date   acetaminophen 650 MG 8 hr tablet  Commonly known as: TYLENOL   650 mg, Oral, Every 8 Hours      albuterol sulfate  (90 Base) MCG/ACT inhaler  Commonly known as: PROVENTIL HFA;VENTOLIN HFA;PROAIR HFA   2 puffs, Inhalation, Every 4 Hours PRN      amLODIPine 2.5 MG tablet  Commonly known as: NORVASC   2.5 mg, Oral, Nightly      aspirin 325 MG EC tablet   325 mg, Oral, Daily, Begin day after surgery      buPROPion  MG 24 hr tablet  Commonly known as: WELLBUTRIN XL   150 mg, Every Morning      cloNIDine 0.1 MG tablet  Commonly known as: CATAPRES   0.1 mg, 2 Times Daily PRN      docusate sodium 100 MG capsule  Commonly known as: Colace   100 mg, Oral, 2 Times Daily      fenofibrate 145 MG tablet  Commonly known as: TRICOR   TAKE ONE TABLET BY MOUTH EVERY DAY      fish oil 1000 MG capsule capsule   1,000 mg, Daily      furosemide 20 MG tablet  Commonly known as: LASIX   20 mg, Oral, Daily PRN      mirtazapine 15 MG tablet  Commonly known as: REMERON   7.5 mg, Nightly      multivitamin tablet tablet  Commonly known as: THERAGRAN   1 capsule, Daily      oxyCODONE 5 MG immediate release tablet  Commonly known as: ROXICODONE   5 mg, Oral, Every 6 Hours PRN      sacubitril-valsartan  MG tablet  Commonly known as: ENTRESTO   1 tablet, 2 Times Daily      vitamin D3 125 MCG (5000 UT) capsule capsule   5,000 Units, Daily             Stop These Medications      cefadroxil 500 MG capsule  Commonly known as: DURICEF     doxycycline 100 MG capsule  Commonly known as: MONODOX              Discharge  Diet: Regular    Activity at Discharge:   Weightbearing as tolerated left lower extremity    Future Appointments   Date Time Provider Department Center   1/2/2025  9:00 AM Tereso Restrepo MD [facility]   1/14/2025  8:30 AM Javad Negron MD [facility]   2/4/2025  8:15 AM Pallavi Prince APRN [facility]   2/13/2025  9:20 AM Brunilda Lyle PA-C [facility]       Patient will be followed by [facility] at [facility], and will follow up with orthopedic surgery and with Dr. Berger with ID following discharge    Dragon disclaimer:  Part of this encounter note is an electronic transcription/translation of spoken language to printed text. The electronic translation of spoken language may permit erroneous, or at times, nonsensical words or phrases to be inadvertently transcribed; Although I have reviewed the note for such errors, some may still exist.       Bean Moreira MD  12/13/24  12:32 EST

## 2024-12-13 NOTE — PLAN OF CARE
Goal Outcome Evaluation:      A&Ox4 and VSS on RA. Redness and swelling noted to LLE and exofin dressing intact and dry on left knee. Scheduled pain medication administered. Patient discharged to [facility] and report called to Lio

## 2024-12-13 NOTE — PLAN OF CARE
Problem: Adult Inpatient Plan of Care  Goal: Plan of Care Review  Recent Flowsheet Documentation  Taken 12/13/2024 1517 by Clara Jacobo, AZALEA  Progress: improving  Outcome Evaluation: Pt participates in therapy with good effort. Transitions in/out of bed with leg  and Min Ax1. Teaching reviewed for ADL retraining. Spouse present for teaching. Mod A to don pants. Min A to don shirt. OT will d/c at this time. As pt remains below his baseline, plan is d/c to IRF today.

## 2024-12-13 NOTE — CASE MANAGEMENT/SOCIAL WORK
Case Management Discharge Note      Final Note: To [facility] today, acute rehab      Nursing to call report to [phone].   IMM Given today at 0920.     [facility] is unable to provide transport.   [employer] wheelchair will pick him up in room at 3:30 today    Selected Continued Care - Admitted Since 12/10/2024       Destination Coordination complete.      Service Provider Services Address Phone Fax Patient Preferred    [facility] [address] [facility] [id number] [phone] --              Durable Medical Equipment    No services have been selected for the patient.                Dialysis/Infusion    No services have been selected for the patient.                Home Medical Care    No services have been selected for the patient.                Therapy    No services have been selected for the patient.                Community Resources    No services have been selected for the patient.                Community & DME    No services have been selected for the patient.                         Final Discharge Disposition Code: 62 - inpatient rehab facility

## 2024-12-13 NOTE — DISCHARGE PLACEMENT REQUEST
"Jimmie Fairbanks \"Brady\" (74 y.o. Male)       Date of Birth   1950    Social Security Number       [place]   [address] [facility] [address]    Home Phone   [phone]    MRN   [id number]       Muslim   Catholic    Marital Status                               Admission Date   12/10/24    Admission Type   Emergency    Admitting Provider   Bean Moreira MD    Attending Provider   Bean Moreira MD    Department, Room/Bed   [facility] 3G, S355/1       Discharge Date       Discharge Disposition   Rehab Facility or Unit (DC - External)    Discharge Destination                                 Attending Provider: Bean Moriera MD    Allergies: Penicillins    Isolation: None   Infection: None   Code Status: CPR    Ht: 177.8 cm (70\")   Wt: 103 kg (226 lb 1.6 oz)    Admission Cmt: None   Principal Problem: Postoperative wound cellulitis [T81.49XA]                   Active Insurance as of 12/10/2024       Primary Coverage       Payor Plan Insurance Group Employer/Plan Group    MEDICARE MEDICARE A & B        Payor Plan Address Payor Plan Phone Number Payor Plan Fax Number Effective Dates    [address] [phone]  8/1/2017 - None Entered    [facility] [id number]         Subscriber Name Subscriber Birth Date Member ID       JIMMIE FAIRBANKS 1950 [id number]               Secondary Coverage       Payor Plan Insurance Group Employer/Plan Group    [employer] SUP   [employer] SUP                     Coverage Address Coverage Phone Number Coverage Fax Number Effective Dates    [address] [address] [phone]  9/27/2024 - None Entered    [facility] [id number]         Subscriber Name Subscriber Birth Date Member ID       JIMMIE FAIRBANKS 1950 [id number]                     Emergency Contacts        (Rel.) Home Phone Work Phone Mobile Phone    MatiasSilvana (Spouse) [phone] -- [phone]              Insurance Information                  " MEDICARE/MEDICARE A & B Phone: [phone]    Subscriber: Jimmie Salcedo Subscriber#: [id number]    Group#: -- Precert#: --    Authorization#: -- Effective Date: --        [facility] SU[facility]  /[facility] SU[employer]              Phone: [phone]    Subscriber: Jimmie Salcedo Subscriber#: [id number]    Group#: -- Precert#: --    Authorization#: -- Effective Date: --               Discharge Summary        Bean Moreira MD at 24 1227          Patient Name: Jimmie Salcedo  MRN: [id number]  : 1950  DOS: 2024    Attending: Bean Moreira MD    Primary Care Provider: Javad Negron MD    Date of Admission:.12/10/2024  7:15 AM    Date of Discharge:  2024    Discharge Diagnosis:   Postoperative wound cellulitis    Hypertension    Hyperlipidemia    Severe obstructive sleep apnea    S/P left total knee arthroplasty, 24    Lower extremity cellulitis    Consults: Sundeep Berger MD [place] [facility]  Tereso Mattson MD, orthopedic surgery    Hospital Course    At admit:  Patient is a very pleasant 74-year-old male who is known to our practice from recent presentation to [facility] last Wednesday when he had left total knee arthroplasty.  Surgery was done by Dr. Mattson under spinal anesthesia, was tolerated well.     Patient was discharged home later in the day having met discharge criteria.     At time of discharge he was given aspirin for DVT prophylaxis, multimodal pain medication approach.      He did well following his discharge on Wednesday continued to do well on  and then on Saturday started having increased pain.  Over the weekend he was not able to use his inhaler on machine.  His pain has worsened and he has had increased redness and swelling in his leg including the.  He had subjective chills but no documented fever.  No nausea or vomiting.  No shortness of breath.     Workup in the emergency department included a CBC  Statement Selected with normal white count.  Inflammatory markers were mildly elevated which is not unusual for this interval following surgery.     Today he was supposed to see Dr. Mattson but could not get out of bed due to pain and swelling and was brought to the ED.     Noting patient's prior history of strep infections in his right leg and bacteremia according to him and his wife, he was placed on doxycycline postop and was given cefadroxil more recently.     After admit:    Patient was provided pain medications as needed for pain control.    Adjustments were made to pain medications to optimize pain management. Risks and benefits of opiate medications discussed with patient. ABDIEL report was reviewed.    He was started on IV antibiotics managed by Dr. Berger.  He is currently on daptomycin with a plan to continue this for a few days until significant clinical improvement occurs prior to transition to p.o. antibiotics.    He has been afebrile throughout his stay here.  His pain and swelling appears to be improving and he has participated with physical therapy    Redness and ecchymosis of the left lower extremity is improving slowly.     During his stay he used an IS for atelectasis prophylaxis and was resumed on aspirin along with mechanicals for DVT prophylaxis.    Home medications were resumed as appropriate, and labs were monitored and remained fairly stable.     With the progress he has made, Mr. Salcedo is ready for DC to [facility] today.        Discussed with patient regarding plan and he shows understanding and agreement.              Pertinent Test Results:    I reviewed the patient's new clinical results.   Results from last 7 days   Lab Units 12/13/24  0450 12/10/24  0759   WBC 10*3/mm3 8.26 7.08   HEMOGLOBIN g/dL 12.4* 13.2   HEMATOCRIT % 37.0* 38.7   PLATELETS 10*3/mm3 168 143     Results from last 7 days   Lab Units 12/13/24  0450 12/10/24  0831   SODIUM mmol/L 135* 138   POTASSIUM mmol/L 4.1 3.7  "  CHLORIDE mmol/L 99 102   CO2 mmol/L 24.0 25.0   BUN mg/dL 21 16   CREATININE mg/dL 0.80 0.78   CALCIUM mg/dL 9.0 9.0   BILIRUBIN mg/dL  --  1.0   ALK PHOS U/L  --  53   ALT (SGPT) U/L  --  10   AST (SGOT) U/L  --  14   GLUCOSE mg/dL 110* 174*     I reviewed the patient's new imaging including images and reports.   Latest Reference Range & Units 24 05:29 24 04:50   C-Reactive Protein 0.00 - 0.50 mg/dL 15.94 (H) 12.84 (H)   (H): Data is abnormally high    Venous duplex left lower extremity: Negative for DVT    Physical therapy    Date of Service: 24  Creation Time: 24       Goal Outcome Evaluation:  Plan of Care Reviewed With: patient  Progress: improving  Outcome Evaluation: Pt increased ambulation distance to 40' with min Ax1 and RW. He continues to have decreased WBing through LLE d/t pain. Education provided on importance of increased L knee flexion for improvements in functional mobility. HEP completed. Pt presents with deficits related to pain, strength, and balance. Further IPPT is warrented. PT will progress as able per POC.     Anticipated Discharge Disposition (PT): inpatient rehabilitation facility           Discharge Assessment:       Visit Vitals  /70 (BP Location: Right arm, Patient Position: Lying)   Pulse 64   Temp 98.3 °F (36.8 °C) (Oral)   Resp 18   Ht 177.8 cm (70\")   Wt 103 kg (226 lb 1.6 oz)   SpO2 95%   BMI 32.44 kg/m²     Temp (24hrs), Av.1 °F (36.7 °C), Min:97.7 °F (36.5 °C), Max:98.3 °F (36.8 °C)      General Appearance:    Alert, cooperative, in no acute distress   Lungs:     Clear to auscultation,respirations regular, even and                   unlabored    Heart:    Regular rhythm and normal rate, normal S1 and S2   Abdomen:     Normal bowel sounds, no masses, no organomegaly, soft        non-tender, non-distended, no guarding, no rebound                 tenderness   Extremities:   CDI dressing surgical knee .  Swelling and erythema and " ecchymosis of the left leg, slowly improving.   Pulses:   Pulses palpable and equal bilaterally   Skin:   No bleeding, bruising or rash   Neurologic:   Cranial nerves 2 - 12 grossly intact, sensation intact, Flexion and dorsiflexion intact bilateral feet.         Discharge Disposition: [facility]         Discharge Medications        New Medications        Instructions Start Date   DAPTOmycin 500 mg in sodium chloride 0.9 % 50 mL   6 mg/kg (500 mg), Intravenous, Every 24 Hours             Changes to Medications        Instructions Start Date   doxazosin 4 MG tablet  Commonly known as: CARDURA  What changed: when to take this   TAKE 1 TABLET BY MOUTH EVERY EVENING      pravastatin 40 MG tablet  Commonly known as: PRAVACHOL  What changed: when to take this   TAKE ONE TABLET BY MOUTH EVERY DAY             Continue These Medications        Instructions Start Date   acetaminophen 650 MG 8 hr tablet  Commonly known as: TYLENOL   650 mg, Oral, Every 8 Hours      albuterol sulfate  (90 Base) MCG/ACT inhaler  Commonly known as: PROVENTIL HFA;VENTOLIN HFA;PROAIR HFA   2 puffs, Inhalation, Every 4 Hours PRN      amLODIPine 2.5 MG tablet  Commonly known as: NORVASC   2.5 mg, Oral, Nightly      aspirin 325 MG EC tablet   325 mg, Oral, Daily, Begin day after surgery      buPROPion  MG 24 hr tablet  Commonly known as: WELLBUTRIN XL   150 mg, Every Morning      cloNIDine 0.1 MG tablet  Commonly known as: CATAPRES   0.1 mg, 2 Times Daily PRN      docusate sodium 100 MG capsule  Commonly known as: Colace   100 mg, Oral, 2 Times Daily      fenofibrate 145 MG tablet  Commonly known as: TRICOR   TAKE ONE TABLET BY MOUTH EVERY DAY      fish oil 1000 MG capsule capsule   1,000 mg, Daily      furosemide 20 MG tablet  Commonly known as: LASIX   20 mg, Oral, Daily PRN      mirtazapine 15 MG tablet  Commonly known as: REMERON   7.5 mg, Nightly      multivitamin tablet tablet  Commonly known as: THERAGRAN   1 capsule,  Daily      oxyCODONE 5 MG immediate release tablet  Commonly known as: ROXICODONE   5 mg, Oral, Every 6 Hours PRN      sacubitril-valsartan  MG tablet  Commonly known as: ENTRESTO   1 tablet, 2 Times Daily      vitamin D3 125 MCG (5000 UT) capsule capsule   5,000 Units, Daily             Stop These Medications      cefadroxil 500 MG capsule  Commonly known as: DURICEF     doxycycline 100 MG capsule  Commonly known as: MONODOX              Discharge Diet: Regular    Activity at Discharge:   Weightbearing as tolerated left lower extremity    Future Appointments   Date Time Provider Department Center   1/2/2025  9:00 AM Tereso Restrepo MD [facility]   1/14/2025  8:30 AM Javad Negron MD [facility]   2/4/2025  8:15 AM Pallavi Prince APRN [facility]   2/13/2025  9:20 AM Brunilda Lyle PA-C [facility]       Patient will be followed by [place] [facility] at [facility], and will follow up with orthopedic surgery and with Dr. Berger with ID following discharge    Dragon disclaimer:  Part of this encounter note is an electronic transcription/translation of spoken language to printed text. The electronic translation of spoken language may permit erroneous, or at times, nonsensical words or phrases to be inadvertently transcribed; Although I have reviewed the note for such errors, some may still exist.       Bean Moreira MD  12/13/24  12:32 EST              Electronically signed by Bean Moreira MD at 12/13/24 1264

## 2024-12-13 NOTE — PROGRESS NOTES
"      Orthopaedic Surgery Progress Note    CC: Left leg pain      Subjective     Interval History:   Patient resting this morning.  Awake and alert.  He tells me that his leg is feeling better overall.  Was able to tolerate increased weightbearing on the left lower extremity yesterday with physical therapy.      ROS: Denies fever, chills, nausea or vomiting    Objective     Vital Signs:  Temp (24hrs), Av.9 °F (36.6 °C), Min:97.7 °F (36.5 °C), Max:98.3 °F (36.8 °C)    /74   Pulse 67   Temp 97.9 °F (36.6 °C) (Oral)   Resp 18   Ht 177.8 cm (70\")   Wt 103 kg (226 lb 1.6 oz)   SpO2 94%   BMI 32.44 kg/m²       Physical Exam:  Patient continues shows some left lower extremity swelling.  There is ecchymosis on the pretibial region.  The incision continues to look relatively benign and within normal limits.  Not atypical to see some blistering at portions of the tape.    CRP elevated yesterday compared to initial value    Assessment and Plan:  Postop day #9 status post cemented left robot-assisted TKA on 2024.  Patient readmitted for cellulitis of the left leg    --PT continues to recommend inpatient rehab.  I agree with that assessment and plan  -- Continue IV antibiotics per ID service  -- Clinically patient continues to improve.  Needs to focus on restoring flexion as much as pain will allow.  -- Could take up to 3 to 6 weeks for CRP to normalize.    Teerso Restrepo MD  24  07:24 EST        "

## 2024-12-13 NOTE — PROGRESS NOTES
"[facility] Progress Note    Date of Admission: 12/10/2024      Antibiotics: dapto      CC:   Chief Complaint   Patient presents with    Leg Swelling       S: No f/c/s. No n/v/d. Hemodynamically stable. Still with left knee post op swelling and bruising and decreased pain LLE.    O:  /74   Pulse 67   Temp 97.9 °F (36.6 °C) (Oral)   Resp 18   Ht 177.8 cm (70\")   Wt 103 kg (226 lb 1.6 oz)   SpO2 94%   BMI 32.44 kg/m²   Temp (24hrs), Av °F (36.7 °C), Min:97.7 °F (36.5 °C), Max:98.3 °F (36.8 °C)      PE:      GENERAL: Awake and alert, in no acute distress.   HEENT: Normocephalic, atraumatic.  PERRL. EOMI. No conjunctival injection. No icterus. Oropharynx clear without evidence of thrush or exudate. No evidence of periodontal disease.    NECK: Supple without nuchal rigidity  LYMPH: No cervical, axillary or inguinal lymphadenopathy. No neck masses  HEART: RRR; No murmur, rubs, gallops.   LUNGS: Clear to auscultation bilaterally without wheezing, rales, rhonchi. Normal respiratory effort.  ABDOMEN: Soft, nontender, nondistended. Positive bowel sounds. No rebound or guarding.   EXT:  No cyanosis, clubbing and LLE edema  : Normal appearing genitalia without Paiz catheter.  MSK: decreased ROM left knee   SKIN: Warm and dry without cutaneous eruptions.  Left knee and lower extremity with some swelling and erythema and brusing with some rash around dressing  NEURO: Oriented to PPT. No focal deficits.   PSYCHIATRIC: Normal insight and judgement. Cooperative with PE    Laboratory Data    Results from last 7 days   Lab Units 24  0450 12/10/24  0759   WBC 10*3/mm3 8.26 7.08   HEMOGLOBIN g/dL 12.4* 13.2   HEMATOCRIT % 37.0* 38.7   PLATELETS 10*3/mm3 168 143     Results from last 7 days   Lab Units 24  0450   SODIUM mmol/L 135*   POTASSIUM mmol/L 4.1   CHLORIDE mmol/L 99   CO2 mmol/L 24.0   BUN mg/dL 21   CREATININE mg/dL 0.80   GLUCOSE mg/dL 110*   CALCIUM mg/dL 9.0     Results from last 7 days   Lab Units " 12/10/24  0831   ALK PHOS U/L 53   BILIRUBIN mg/dL 1.0   ALT (SGPT) U/L 10   AST (SGOT) U/L 14     Results from last 7 days   Lab Units 12/10/24  0759   SED RATE mm/hr 43*     Results from last 7 days   Lab Units 12/13/24  0450   CRP mg/dL 12.84*       Estimated Creatinine Clearance: 100.3 mL/min (by C-G formula based on SCr of 0.8 mg/dL).      Microbiology:  neg    Radiology:  Imaging Results (Last 24 Hours)       ** No results found for the last 24 hours. **               PROBLEM LIST:   Left TKA now with LLE cellulitis with pain and swelling  H/o recurrent RLE cellulitis  Psoriasis  Left knee pain     ASSESSMENT:  Pt is a 75 yo with recent L TKA and prior h/o of RLE cellulitis and now readmitted with LLE sweling and erythema and bruising and rash around incisional wound.  Patient to continue with acute cellulitis concern with blistering rash around incision and brusing down leg.  US with no DVT and will cover with iv abx and monitor.    Pt with bruising leg today with swelling and pain but no fevers and wbc remains normal feel some contact dermatitis and swelling bruising post op and severe cellulitis less likely but cover with abx with recent L TKA and prior h/o recurrent LE cellulitis. Feels better wbc normal crp up today.     PLAN:  Daptomycin iv for now recommend around 5 more days at [facility] then po doxy x 10 days upon d/c then f/u with [facility].  Fu on crp in am  To rehab soon     I spent >35 min on case today with more than %50 time in counseling/coordination of care ongoing iv abx and d/w pt and d/w Dr. GARCIA will have [facility] check on Monday on pt at [facility].    Sundeep Berger MD  12/13/2024

## 2024-12-13 NOTE — CASE MANAGEMENT/SOCIAL WORK
Continued Stay Note  [facility]     Patient Name: Jimmie Salcedo  MRN: [id number]  Today's Date: 12/13/2024    Admit Date: 12/10/2024    Plan: [facility]   Discharge Plan       Row Name 12/13/24 0821       Plan    Plan [facility]    Plan Comments He has been accepted by [facility] for acute rehab.    Final Discharge Disposition Code 62 - inpatient rehab facility                   Discharge Codes    No documentation.                 Expected Discharge Date and Time       Expected Discharge Date Expected Discharge Time    Dec 12, 2024               Gianna Mcdermott RN

## 2024-12-13 NOTE — PLAN OF CARE
Goal Outcome Evaluation:  Plan of Care Reviewed With: patient        Progress: no change     The patient is pleasant, alert and oriented x4. VSS on room air and home CPAP utilized when sleeping. Voiding spontaneously via urinal. The patient rested well throughout the night. Pain managed well with ice packs and scheduled Norco. Swelling and pain has decreased for the patient in the LLE. Current plan is to discharge the patient to [facility].

## 2024-12-13 NOTE — PROGRESS NOTES
"[facility] Progress Note    Date of Admission: 12/10/2024      Antibiotics: dapto      CC:   Chief Complaint   Patient presents with    Leg Swelling       S: No f/c/s. No n/v/d. Hemodynamically stable. Still with left knee post op swelling and bruising and decreased pain LLE.    O:  /71 (BP Location: Right arm, Patient Position: Lying)   Pulse 75   Temp 98.1 °F (36.7 °C) (Oral)   Resp 16   Ht 177.8 cm (70\")   Wt 103 kg (226 lb 1.6 oz)   SpO2 97%   BMI 32.44 kg/m²   Temp (24hrs), Av.2 °F (36.8 °C), Min:97.7 °F (36.5 °C), Max:98.6 °F (37 °C)      PE:      GENERAL: Awake and alert, in no acute distress.   HEENT: Normocephalic, atraumatic.  PERRL. EOMI. No conjunctival injection. No icterus. Oropharynx clear without evidence of thrush or exudate. No evidence of periodontal disease.    NECK: Supple without nuchal rigidity  LYMPH: No cervical, axillary or inguinal lymphadenopathy. No neck masses  HEART: RRR; No murmur, rubs, gallops.   LUNGS: Clear to auscultation bilaterally without wheezing, rales, rhonchi. Normal respiratory effort.  ABDOMEN: Soft, nontender, nondistended. Positive bowel sounds. No rebound or guarding.   EXT:  No cyanosis, clubbing and LLE edema  : Normal appearing genitalia without Paiz catheter.  MSK: decreased ROM left knee   SKIN: Warm and dry without cutaneous eruptions.  Left knee and lower extremity with some swelling and erythema and brusing with some rash around dressing  NEURO: Oriented to PPT. No focal deficits.   PSYCHIATRIC: Normal insight and judgement. Cooperative with PE    Laboratory Data    Results from last 7 days   Lab Units 12/10/24  0759   WBC 10*3/mm3 7.08   HEMOGLOBIN g/dL 13.2   HEMATOCRIT % 38.7   PLATELETS 10*3/mm3 143     Results from last 7 days   Lab Units 12/10/24  0831   SODIUM mmol/L 138   POTASSIUM mmol/L 3.7   CHLORIDE mmol/L 102   CO2 mmol/L 25.0   BUN mg/dL 16   CREATININE mg/dL 0.78   GLUCOSE mg/dL 174*   CALCIUM mg/dL 9.0     Results from last 7 days "   Lab Units 12/10/24  0831   ALK PHOS U/L 53   BILIRUBIN mg/dL 1.0   ALT (SGPT) U/L 10   AST (SGOT) U/L 14     Results from last 7 days   Lab Units 12/10/24  0759   SED RATE mm/hr 43*     Results from last 7 days   Lab Units 12/12/24  0529   CRP mg/dL 15.94*       Estimated Creatinine Clearance: 102.9 mL/min (by C-G formula based on SCr of 0.78 mg/dL).      Microbiology:  neg    Radiology:  Imaging Results (Last 24 Hours)       ** No results found for the last 24 hours. **               PROBLEM LIST:   Left TKA now with LLE cellulitis with pain and swelling  H/o recurrent RLE cellulitis  Psoriasis  Left knee pain     ASSESSMENT:  Pt is a 73 yo with recent L TKA and prior h/o of RLE cellulitis and now readmitted with LLE sweling and erythema and bruising and rash around incisional wound.  Patient to continue with acute cellulitis concern with blistering rash around incision and brusing down leg.  US with no DVT and will cover with iv abx and monitor.    Pt with bruising leg today with swelling and pain but no fevers and wbc remains normal feel some contact dermatitis and swelling bruising post op and severe cellulitis less likely but cover with abx with recent L TKA and prior h/o recurrent LE cellulitis. Feels better wbc normal crp up today.     PLAN:  Daptomycin iv for now  Fu on crp in am  To rehab soon and plan iv to po abx once improved    I spent >35 min on case today with more than %50 time in counseling/coordination of care ongoing iv abx and d/w pt and Dr. Srinivasan Berger MD  12/12/2024

## 2024-12-13 NOTE — PLAN OF CARE
Goal Outcome Evaluation:  Plan of Care Reviewed With: patient        Progress: improving  Outcome Evaluation: Pt increased ambulation distance to 40' with min Ax1 and RW. He continues to have decreased WBing through LLE d/t pain. Education provided on importance of increased L knee flexion for improvements in functional mobility. HEP completed. Pt presents with deficits related to pain, strength, and balance. Further IPPT is warrented. PT will progress as able per POC.    Anticipated Discharge Disposition (PT): inpatient rehabilitation facility
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
## 2024-12-15 LAB
BACTERIA SPEC AEROBE CULT: NORMAL
BACTERIA SPEC AEROBE CULT: NORMAL

## 2025-01-02 ENCOUNTER — OFFICE VISIT (OUTPATIENT)
Dept: ORTHOPEDIC SURGERY | Facility: CLINIC | Age: 75
End: 2025-01-02
Payer: MEDICARE

## 2025-01-02 VITALS — TEMPERATURE: 96.9 F

## 2025-01-02 DIAGNOSIS — Z96.652 STATUS POST TOTAL LEFT KNEE REPLACEMENT: Primary | ICD-10-CM

## 2025-01-02 NOTE — PROGRESS NOTES
[facility] Note        Subjective     Post-op (4 weeks s/p TOTAL KNEE ARTHROPLASTY WITH ZOIE ROBOT LEFT DOS 12/4/24)       HPI    Jimmie Salcedo is a 74 y.o. male.  Patient is here today for follow-up now almost 4 weeks out from robot-assisted cemented left TKA on 12/4/2024.  He was readmitted shortly after surgery for cellulitis of the left leg.  He is at home now getting home health physical therapy.  He feels like the leg is swelling and that is limiting range of motion.          Objective      Physical Exam  Temp 96.9 °F (36.1 °C)     There is no height or weight on file to calculate BMI.        Ortho Exam  There is some edema throughout the left lower extremity.  Calf is firm but nontender.  Toes are pink and warm.  Range of motion is 5-90.  There is no induration and minimal erythema.    Imaging Reviewed and Interpreted:  Imaging Results (Last 24 Hours)       Procedure Component Value Units Date/Time    XR Knee 3+ View With [place] Left [[id number]] Resulted: 01/02/25 0849     Updated: 01/02/25 0849    Narrative:      Knee X-ray    Indication: status-post TKA    Study:  AP, Lateral, and Sunrise views of Left knee    Comparison: Left knee 12/10/2024    Findings:  No signs of acute fracture are visualized  No signs of loosening are appreciated  Components are well aligned    Impression:  Status post Left cemented total knee arthroplasty. No signs   of loosening or fracture.                Assessment    Assessment:  1. Status post total left knee replacement        Plan:  Status post left TKA with postop cellulitis of the left leg--continue antibiotics per Dr. Berger.  We will transition him from home health physical therapy to outpatient physical therapy starting next week.  I would like for them to see him 3 times a week.  I will see him back in 3 to 4 weeks to reassess motion and swelling.  Will put some Steri-Strips on his knee for now.  Will also get him to Tia within our office  to reschedule his shoulder surgery which he would like to push back until he has fully recovered from his knee.      Tereso Restrepo MD  01/02/25  08:57 EST      Dictated Utilizing Dragon Dictation.

## 2025-01-03 ENCOUNTER — PATIENT OUTREACH (OUTPATIENT)
Dept: CASE MANAGEMENT | Facility: OTHER | Age: 75
End: 2025-01-03
Payer: MEDICARE

## 2025-01-03 DIAGNOSIS — R60.0 BILATERAL LEG EDEMA: ICD-10-CM

## 2025-01-03 RX ORDER — FUROSEMIDE 20 MG/1
20 TABLET ORAL DAILY PRN
Qty: 30 TABLET | Refills: 5 | Status: SHIPPED | OUTPATIENT
Start: 2025-01-03

## 2025-01-03 NOTE — OUTREACH NOTE
"AMBULATORY CASE MANAGEMENT NOTE    Names and Relationships of Patient/Support Persons: Contact: Jimmie Salcedo \"Brady\"; Relationship: Self -     Patient Outreach    Spoke with patient as a rehab follow up call. Patient reports he still has some knee swelling, relieved by medications, rest and ice. He is now home and going to OP PT. Patient denied questions/needs. CCM closed.     Claudia ARREDONDO  Ambulatory Case Management    1/3/2025, 09:02 EST  "

## 2025-01-03 NOTE — TELEPHONE ENCOUNTER
Not prescribed previously by Ortho    Last seen yesterday - TOTAL KNEE ARTHROPLASTY WITH ZOIE ROBOT LEFT DOS 12/4/24 - Dr Kaye LYLES (R) ROT

## 2025-01-13 ENCOUNTER — TELEPHONE (OUTPATIENT)
Dept: ORTHOPEDIC SURGERY | Facility: CLINIC | Age: 75
End: 2025-01-13
Payer: MEDICARE

## 2025-01-13 NOTE — TELEPHONE ENCOUNTER
"  Caller: Jimmie Salcedo \"Brady\"     Relationship: PATIENT    Best call back number: [phone]    What is your medical concern? LT KNEE PAIN, INCREASED SWELLING, LOST FLEXIBILITY  RATES PAIN 7/10    How long has this issue been going on? 1.12.25    Is your provider already aware of this issue? NO  "

## 2025-01-13 NOTE — TELEPHONE ENCOUNTER
Spoke to pt to let him know Anamika's message; He was agreeable to coming in at 10:30 with CRD for a re-evaluation.     Fito DORMAN CMA ([facility]), ROT

## 2025-01-13 NOTE — TELEPHONE ENCOUNTER
Anamika-please see message below for this Dr. Restrepo pt that is 5.5 weeks s/p left TKA (12/4/24) and advise any further action. Thanks!    Spoke to pt regarding previous message; He saw PT last Thursday and was doing okay as far as pain and swelling goes; Did have some pain on Friday afterwards but was bearable; Pain got worse Saturday and Sunday; Went to PT today and they just iced the knee because his pain and swelling were too severe to proceed with any exercises; Last week he was able to bend his knee to 95 degrees and at today's visit, he was only measuring at 73 degrees; He reports he has been icing and elevating; He does report taking Oxycodone 5mg before PT today but it didn't help much. He denies any fever, chills, night sweats; Does see ID tomorrow at 2:15pm for the cellulitis of the left leg; He does report the whole lower leg has been swollen but denies any specific calf pain. Unable to take NSAIDs due to being told it would interact with other meds (takes Entresto for BP) but has been taking Tylenol.     I told him to continue the icing and elevating and take oxycodone for breakthrough pain and Tylenol 1,000mg TID. I told him I would send a message to one of Dr. Restrepo's PAs to see what else they recommend. Advised we may need to bring him into clinic tomorrow when Dr. Restrepo is also in clinic to be re-evaluated and if so, we will work around his 2:15pm ID appt. He understood.    Fito DORMAN CMA ([facility]), ROT

## 2025-01-14 ENCOUNTER — TELEPHONE (OUTPATIENT)
Dept: ORTHOPEDIC SURGERY | Facility: CLINIC | Age: 75
End: 2025-01-14

## 2025-01-14 ENCOUNTER — TRANSCRIBE ORDERS (OUTPATIENT)
Dept: LAB | Facility: HOSPITAL | Age: 75
End: 2025-01-14
Payer: MEDICARE

## 2025-01-14 ENCOUNTER — LAB (OUTPATIENT)
Dept: LAB | Facility: HOSPITAL | Age: 75
End: 2025-01-14
Payer: MEDICARE

## 2025-01-14 ENCOUNTER — HOSPITAL ENCOUNTER (OUTPATIENT)
Dept: CARDIOLOGY | Facility: HOSPITAL | Age: 75
Discharge: HOME | End: 2025-01-14
Payer: MEDICARE

## 2025-01-14 ENCOUNTER — LAB (OUTPATIENT)
Dept: INTERNAL MEDICINE | Facility: CLINIC | Age: 75
End: 2025-01-14
Payer: MEDICARE

## 2025-01-14 ENCOUNTER — OFFICE VISIT (OUTPATIENT)
Dept: INTERNAL MEDICINE | Facility: CLINIC | Age: 75
End: 2025-01-14
Payer: MEDICARE

## 2025-01-14 ENCOUNTER — OFFICE VISIT (OUTPATIENT)
Dept: ORTHOPEDIC SURGERY | Facility: CLINIC | Age: 75
End: 2025-01-14
Payer: MEDICARE

## 2025-01-14 VITALS
OXYGEN SATURATION: 96 % | DIASTOLIC BLOOD PRESSURE: 70 MMHG | SYSTOLIC BLOOD PRESSURE: 126 MMHG | HEART RATE: 67 BPM | WEIGHT: 222 LBS | BODY MASS INDEX: 31.78 KG/M2 | HEIGHT: 70 IN

## 2025-01-14 VITALS — WEIGHT: 222.66 LBS | BODY MASS INDEX: 31.88 KG/M2 | HEIGHT: 70 IN

## 2025-01-14 DIAGNOSIS — Z96.652 STATUS POST TOTAL LEFT KNEE REPLACEMENT: ICD-10-CM

## 2025-01-14 DIAGNOSIS — D69.6 THROMBOCYTOPENIA, UNSPECIFIED: ICD-10-CM

## 2025-01-14 DIAGNOSIS — L40.9 PSORIASIS: ICD-10-CM

## 2025-01-14 DIAGNOSIS — I10 ESSENTIAL HYPERTENSION, MALIGNANT: ICD-10-CM

## 2025-01-14 DIAGNOSIS — T84.54XD INFECTION OF TOTAL LEFT KNEE REPLACEMENT, SUBSEQUENT ENCOUNTER: Primary | ICD-10-CM

## 2025-01-14 DIAGNOSIS — M79.89 PAIN AND SWELLING OF LEFT LOWER LEG: Primary | ICD-10-CM

## 2025-01-14 DIAGNOSIS — L03.116 CELLULITIS OF LEFT FOOT: ICD-10-CM

## 2025-01-14 DIAGNOSIS — E78.2 MIXED HYPERLIPIDEMIA: ICD-10-CM

## 2025-01-14 DIAGNOSIS — T81.49XA POSTOPERATIVE WOUND CELLULITIS: ICD-10-CM

## 2025-01-14 DIAGNOSIS — M79.89 PAIN AND SWELLING OF LEFT LOWER LEG: ICD-10-CM

## 2025-01-14 DIAGNOSIS — R73.09 ABNORMAL GLUCOSE: ICD-10-CM

## 2025-01-14 DIAGNOSIS — N40.1 BENIGN PROSTATIC HYPERPLASIA WITH URINARY FREQUENCY: ICD-10-CM

## 2025-01-14 DIAGNOSIS — L08.89 PITTED KERATOLYSIS: ICD-10-CM

## 2025-01-14 DIAGNOSIS — D36.9 TUBULAR ADENOMA: ICD-10-CM

## 2025-01-14 DIAGNOSIS — M79.662 PAIN AND SWELLING OF LEFT LOWER LEG: Primary | ICD-10-CM

## 2025-01-14 DIAGNOSIS — I10 PRIMARY HYPERTENSION: Primary | ICD-10-CM

## 2025-01-14 DIAGNOSIS — E80.4 GILBERT'S DISEASE: ICD-10-CM

## 2025-01-14 DIAGNOSIS — T84.54XD INFECTION OF TOTAL LEFT KNEE REPLACEMENT, SUBSEQUENT ENCOUNTER: ICD-10-CM

## 2025-01-14 DIAGNOSIS — M79.662 PAIN AND SWELLING OF LEFT LOWER LEG: ICD-10-CM

## 2025-01-14 DIAGNOSIS — Z00.00 ENCOUNTER FOR MEDICARE ANNUAL WELLNESS EXAM: ICD-10-CM

## 2025-01-14 DIAGNOSIS — R35.0 BENIGN PROSTATIC HYPERPLASIA WITH URINARY FREQUENCY: ICD-10-CM

## 2025-01-14 DIAGNOSIS — K21.00 GASTROESOPHAGEAL REFLUX DISEASE WITH ESOPHAGITIS WITHOUT HEMORRHAGE: ICD-10-CM

## 2025-01-14 DIAGNOSIS — I51.89 DIASTOLIC DYSFUNCTION: ICD-10-CM

## 2025-01-14 LAB
ALBUMIN SERPL-MCNC: 4.2 G/DL (ref 3.5–5.2)
ALBUMIN SERPL-MCNC: 4.4 G/DL (ref 3.5–5.2)
ALBUMIN/GLOB SERPL: 1.3 G/DL
ALBUMIN/GLOB SERPL: 1.6 G/DL
ALP SERPL-CCNC: 74 U/L (ref 39–117)
ALP SERPL-CCNC: 76 U/L (ref 39–117)
ALT SERPL W P-5'-P-CCNC: 12 U/L (ref 1–41)
ALT SERPL W P-5'-P-CCNC: 12 U/L (ref 1–41)
ANION GAP SERPL CALCULATED.3IONS-SCNC: 12 MMOL/L (ref 5–15)
ANION GAP SERPL CALCULATED.3IONS-SCNC: 15.2 MMOL/L (ref 5–15)
AST SERPL-CCNC: 14 U/L (ref 1–40)
AST SERPL-CCNC: 17 U/L (ref 1–40)
BASOPHILS # BLD AUTO: 0.07 10*3/MM3 (ref 0–0.2)
BASOPHILS NFR BLD AUTO: 0.9 % (ref 0–1.5)
BH CV LOWER VASCULAR LEFT COMMON FEMORAL AUGMENT: NORMAL
BH CV LOWER VASCULAR LEFT COMMON FEMORAL COMPRESS: NORMAL
BH CV LOWER VASCULAR LEFT COMMON FEMORAL PHASIC: NORMAL
BH CV LOWER VASCULAR LEFT COMMON FEMORAL SPONT: NORMAL
BH CV LOWER VASCULAR LEFT DISTAL FEMORAL AUGMENT: NORMAL
BH CV LOWER VASCULAR LEFT DISTAL FEMORAL COMPRESS: NORMAL
BH CV LOWER VASCULAR LEFT DISTAL FEMORAL PHASIC: NORMAL
BH CV LOWER VASCULAR LEFT DISTAL FEMORAL SPONT: NORMAL
BH CV LOWER VASCULAR LEFT GASTRONEMIUS COMPRESS: NORMAL
BH CV LOWER VASCULAR LEFT GREATER SAPH AK COMPRESS: NORMAL
BH CV LOWER VASCULAR LEFT GREATER SAPH BK COMPRESS: NORMAL
BH CV LOWER VASCULAR LEFT LESSER SAPH COMPRESS: NORMAL
BH CV LOWER VASCULAR LEFT MID FEMORAL AUGMENT: NORMAL
BH CV LOWER VASCULAR LEFT MID FEMORAL COMPRESS: NORMAL
BH CV LOWER VASCULAR LEFT MID FEMORAL PHASIC: NORMAL
BH CV LOWER VASCULAR LEFT MID FEMORAL SPONT: NORMAL
BH CV LOWER VASCULAR LEFT PERONEAL COMPRESS: NORMAL
BH CV LOWER VASCULAR LEFT POPLITEAL AUGMENT: NORMAL
BH CV LOWER VASCULAR LEFT POPLITEAL COMPRESS: NORMAL
BH CV LOWER VASCULAR LEFT POPLITEAL PHASIC: NORMAL
BH CV LOWER VASCULAR LEFT POPLITEAL SPONT: NORMAL
BH CV LOWER VASCULAR LEFT POSTERIOR TIBIAL COMPRESS: NORMAL
BH CV LOWER VASCULAR LEFT PROFUNDA FEMORAL PHASIC: NORMAL
BH CV LOWER VASCULAR LEFT PROFUNDA FEMORAL SPONT: NORMAL
BH CV LOWER VASCULAR LEFT PROXIMAL FEMORAL AUGMENT: NORMAL
BH CV LOWER VASCULAR LEFT PROXIMAL FEMORAL COMPRESS: NORMAL
BH CV LOWER VASCULAR LEFT PROXIMAL FEMORAL PHASIC: NORMAL
BH CV LOWER VASCULAR LEFT PROXIMAL FEMORAL SPONT: NORMAL
BH CV LOWER VASCULAR LEFT SAPHENOFEMORAL JUNCTION AUGMENT: NORMAL
BH CV LOWER VASCULAR LEFT SAPHENOFEMORAL JUNCTION COMPRESS: NORMAL
BH CV LOWER VASCULAR LEFT SAPHENOFEMORAL JUNCTION PHASIC: NORMAL
BH CV LOWER VASCULAR LEFT SAPHENOFEMORAL JUNCTION SPONT: NORMAL
BH CV LOWER VASCULAR RIGHT COMMON FEMORAL AUGMENT: NORMAL
BH CV LOWER VASCULAR RIGHT COMMON FEMORAL PHASIC: NORMAL
BH CV LOWER VASCULAR RIGHT COMMON FEMORAL SPONT: NORMAL
BILIRUB SERPL-MCNC: 0.7 MG/DL (ref 0–1.2)
BILIRUB SERPL-MCNC: 0.9 MG/DL (ref 0–1.2)
BUN SERPL-MCNC: 19 MG/DL (ref 8–23)
BUN SERPL-MCNC: 19 MG/DL (ref 8–23)
BUN/CREAT SERPL: 18.4 (ref 7–25)
BUN/CREAT SERPL: 19.6 (ref 7–25)
CALCIUM SPEC-SCNC: 9.7 MG/DL (ref 8.6–10.5)
CALCIUM SPEC-SCNC: 9.9 MG/DL (ref 8.6–10.5)
CHLORIDE SERPL-SCNC: 101 MMOL/L (ref 98–107)
CHLORIDE SERPL-SCNC: 103 MMOL/L (ref 98–107)
CHOLEST SERPL-MCNC: 157 MG/DL (ref 0–200)
CO2 SERPL-SCNC: 23.8 MMOL/L (ref 22–29)
CO2 SERPL-SCNC: 26 MMOL/L (ref 22–29)
CREAT SERPL-MCNC: 0.97 MG/DL (ref 0.76–1.27)
CREAT SERPL-MCNC: 1.03 MG/DL (ref 0.76–1.27)
CRP SERPL-MCNC: 0.9 MG/DL (ref 0–0.5)
DEPRECATED RDW RBC AUTO: 43.3 FL (ref 37–54)
DEPRECATED RDW RBC AUTO: 48.7 FL (ref 37–54)
EGFRCR SERPLBLD CKD-EPI 2021: 77.2 ML/MIN/1.73
EGFRCR SERPLBLD CKD-EPI 2021: 82.9 ML/MIN/1.73
EOSINOPHIL # BLD AUTO: 0.53 10*3/MM3 (ref 0–0.4)
EOSINOPHIL NFR BLD AUTO: 6.8 % (ref 0.3–6.2)
ERYTHROCYTE [DISTWIDTH] IN BLOOD BY AUTOMATED COUNT: 14.2 % (ref 12.3–15.4)
ERYTHROCYTE [DISTWIDTH] IN BLOOD BY AUTOMATED COUNT: 15.6 % (ref 12.3–15.4)
ERYTHROCYTE [SEDIMENTATION RATE] IN BLOOD: 10 MM/HR (ref 0–20)
GLOBULIN UR ELPH-MCNC: 2.7 GM/DL
GLOBULIN UR ELPH-MCNC: 3.3 GM/DL
GLUCOSE SERPL-MCNC: 118 MG/DL (ref 65–99)
GLUCOSE SERPL-MCNC: 121 MG/DL (ref 65–99)
HBA1C MFR BLD: 5 % (ref 4.8–5.6)
HCT VFR BLD AUTO: 46.3 % (ref 37.5–51)
HCT VFR BLD AUTO: 46.6 % (ref 37.5–51)
HDLC SERPL-MCNC: 42 MG/DL (ref 40–60)
HGB BLD-MCNC: 15.4 G/DL (ref 13–17.7)
HGB BLD-MCNC: 15.5 G/DL (ref 13–17.7)
IMM GRANULOCYTES # BLD AUTO: 0.02 10*3/MM3 (ref 0–0.05)
IMM GRANULOCYTES NFR BLD AUTO: 0.3 % (ref 0–0.5)
LDLC SERPL CALC-MCNC: 88 MG/DL (ref 0–100)
LDLC/HDLC SERPL: 2.01 {RATIO}
LYMPHOCYTES # BLD AUTO: 1.68 10*3/MM3 (ref 0.7–3.1)
LYMPHOCYTES NFR BLD AUTO: 21.7 % (ref 19.6–45.3)
MCH RBC QN AUTO: 28.3 PG (ref 26.6–33)
MCH RBC QN AUTO: 28.4 PG (ref 26.6–33)
MCHC RBC AUTO-ENTMCNC: 33 G/DL (ref 31.5–35.7)
MCHC RBC AUTO-ENTMCNC: 33.5 G/DL (ref 31.5–35.7)
MCV RBC AUTO: 84.5 FL (ref 79–97)
MCV RBC AUTO: 86 FL (ref 79–97)
MONOCYTES # BLD AUTO: 0.58 10*3/MM3 (ref 0.1–0.9)
MONOCYTES NFR BLD AUTO: 7.5 % (ref 5–12)
NEUTROPHILS NFR BLD AUTO: 4.87 10*3/MM3 (ref 1.7–7)
NEUTROPHILS NFR BLD AUTO: 62.8 % (ref 42.7–76)
NRBC BLD AUTO-RTO: 0 /100 WBC (ref 0–0.2)
PLATELET # BLD AUTO: 135 10*3/MM3 (ref 140–450)
PLATELET # BLD AUTO: 137 10*3/MM3 (ref 140–450)
PMV BLD AUTO: 9.5 FL (ref 6–12)
PMV BLD AUTO: 9.9 FL (ref 6–12)
POTASSIUM SERPL-SCNC: 3.9 MMOL/L (ref 3.5–5.2)
POTASSIUM SERPL-SCNC: 4.5 MMOL/L (ref 3.5–5.2)
PROT SERPL-MCNC: 7.1 G/DL (ref 6–8.5)
PROT SERPL-MCNC: 7.5 G/DL (ref 6–8.5)
RBC # BLD AUTO: 5.42 10*6/MM3 (ref 4.14–5.8)
RBC # BLD AUTO: 5.48 10*6/MM3 (ref 4.14–5.8)
SODIUM SERPL-SCNC: 140 MMOL/L (ref 136–145)
SODIUM SERPL-SCNC: 141 MMOL/L (ref 136–145)
TRIGL SERPL-MCNC: 153 MG/DL (ref 0–150)
TSH SERPL DL<=0.05 MIU/L-ACNC: 2.11 UIU/ML (ref 0.27–4.2)
VLDLC SERPL-MCNC: 27 MG/DL (ref 5–40)
WBC NRBC COR # BLD AUTO: 7.75 10*3/MM3 (ref 3.4–10.8)
WBC NRBC COR # BLD AUTO: 9.07 10*3/MM3 (ref 3.4–10.8)

## 2025-01-14 PROCEDURE — 1125F AMNT PAIN NOTED PAIN PRSNT: CPT | Performed by: INTERNAL MEDICINE

## 2025-01-14 PROCEDURE — 36415 COLL VENOUS BLD VENIPUNCTURE: CPT | Performed by: INTERNAL MEDICINE

## 2025-01-14 PROCEDURE — 85652 RBC SED RATE AUTOMATED: CPT

## 2025-01-14 PROCEDURE — 1159F MED LIST DOCD IN RCRD: CPT | Performed by: INTERNAL MEDICINE

## 2025-01-14 PROCEDURE — 1170F FXNL STATUS ASSESSED: CPT | Performed by: INTERNAL MEDICINE

## 2025-01-14 PROCEDURE — 80053 COMPREHEN METABOLIC PANEL: CPT

## 2025-01-14 PROCEDURE — G0439 PPPS, SUBSEQ VISIT: HCPCS | Performed by: INTERNAL MEDICINE

## 2025-01-14 PROCEDURE — 93971 EXTREMITY STUDY: CPT

## 2025-01-14 PROCEDURE — 85025 COMPLETE CBC W/AUTO DIFF WBC: CPT

## 2025-01-14 PROCEDURE — 84443 ASSAY THYROID STIM HORMONE: CPT | Performed by: INTERNAL MEDICINE

## 2025-01-14 PROCEDURE — 36415 COLL VENOUS BLD VENIPUNCTURE: CPT

## 2025-01-14 PROCEDURE — 3074F SYST BP LT 130 MM HG: CPT | Performed by: INTERNAL MEDICINE

## 2025-01-14 PROCEDURE — 3078F DIAST BP <80 MM HG: CPT | Performed by: INTERNAL MEDICINE

## 2025-01-14 PROCEDURE — 85027 COMPLETE CBC AUTOMATED: CPT | Performed by: INTERNAL MEDICINE

## 2025-01-14 PROCEDURE — 80061 LIPID PANEL: CPT | Performed by: INTERNAL MEDICINE

## 2025-01-14 PROCEDURE — 80053 COMPREHEN METABOLIC PANEL: CPT | Performed by: INTERNAL MEDICINE

## 2025-01-14 PROCEDURE — 1160F RVW MEDS BY RX/DR IN RCRD: CPT | Performed by: INTERNAL MEDICINE

## 2025-01-14 PROCEDURE — 86140 C-REACTIVE PROTEIN: CPT

## 2025-01-14 PROCEDURE — 83036 HEMOGLOBIN GLYCOSYLATED A1C: CPT | Performed by: INTERNAL MEDICINE

## 2025-01-14 RX ORDER — ONDANSETRON 4 MG/1
TABLET, FILM COATED ORAL
COMMUNITY
Start: 2025-01-03

## 2025-01-14 RX ORDER — BUPROPION HYDROCHLORIDE 150 MG/1
TABLET, EXTENDED RELEASE ORAL
COMMUNITY
Start: 2025-01-06

## 2025-01-14 NOTE — PROGRESS NOTES
[facility]        Subjective     Post-op Follow-up (1.5 week follow up - 6 weeks S/P Total Knee Arthroplasty With Za Robot Left (DOS: 12/4/24))       ANTELMO Salcedo is a 74 y.o. male.  Patient cannot the clinic yesterday noting increased pain, swelling and decreased range of motion to his left knee.  He is status post left TKA with Za robot by Dr. Restrepo on 12/4/2024.  He reports that last week he was doing good but then he started noticing the increased pain and swelling this weekend.  He also has lost some of his range of motion with regards to flexion when he had the increased pain and swelling.  He does have an appointment this afternoon with ID.  He is currently on doxycycline.  Additionally he takes aspirin daily, no other blood thinners.    Pain scale: 6/10. Associated symptoms pain, swelling, stiffness. Pain with walking, sleeping, rising from a seated position, movement of joint. Pain eased by ice, medication. Using cane to assist with ambulation. Attending OPPT.    Overall, patient reports his symptoms are worse since this weekend.    Patient denies any fever, chills, night sweats or other constitutional symptoms.  Patient denies any chest pain or shortness of breath but does feel fatigued and winded with certain activities.  Believes it secondary to his surgery and not having fully recovered.          Objective      Physical Exam  There were no vitals taken for this visit.    There is no height or weight on file to calculate BMI.        Ortho Exam  Integument:   Left knee: Incision is healing well without drainage or signs of infection.    Lower Extremities:   Left knee:    Tenderness:  Positive tenderness noted throughout the knee as well as into the calf.    Swelling: Mild erythema and positive swelling noted to the lower leg.  Compartments are firm but compressible.    Range of motion:  Extension: 7°       Flexion: 90° with overpressure.  Yesterday at PT  reports range of motion was to 73 degrees.  Instability:  No varus laxity, no valgus laxity, negative anterior drawer      Imaging Reviewed and Interpreted:  No new imaging today.      Assessment:  1. Pain and swelling of left lower leg    2. Status post total left knee replacement        Plan:  Pain and swelling left lower leg, status post left TKA with Za pagan.  Patient will be sent for duplex venous Doppler left lower extremity to rule out DVT.  If positive he will be directed to PCP or ED to begin treatment.  Continue with outpatient PT.  Continue with ice and elevation to help with inflammation and swelling.  Patient will keep his appointment today with ID.  For now continue with his daily aspirin.  Keep follow-up appointment with Dr. Restrepo on 1/28/2025.    Questions and concerns answered.      Anamika Fernandes PA-C  01/14/25  11:05 EST      Dictated Utilizing Dragon Dictation.

## 2025-01-14 NOTE — PROGRESS NOTES
The ABCs of the Annual Wellness Visit  Subsequent Medicare Wellness Visit    Chief Complaint   Patient presents with    Annual Exam      Subjective    History of Present Illness:  Jimmie Salcedo is a 74 y.o. male who presents for a Subsequent Medicare Wellness Visit.    The following portions of the patient's history were reviewed and   updated as appropriate: current medications, past family history, past medical history, past social history, past surgical history, and problem list.     Compared to one year ago, the patient feels his physical   health is the same.    Compared to one year ago, the patient feels his mental   health is better.    Recent Hospitalizations:  This patient has had a [facility] admission record on file within the last 365 days.    Current Medical Providers:  Patient Care Team:  Javad Negron MD as PCP - General (Internal Medicine)  Sundeep Berger MD as Consulting Physician (Infectious Diseases)  Manuel De La Torre MD as Consulting Physician (Cardiology)  Terseo Restrepo MD as Consulting Physician (Orthopedic Surgery)  Pallavi Prince APRN as Nurse Practitioner (Nurse Practitioner)    Outpatient Medications Prior to Visit   Medication Sig Dispense Refill    acetaminophen (TYLENOL) 650 MG 8 hr tablet Take 1 tablet by mouth Every 8 (Eight) Hours. (Patient taking differently: Take 500 mg by mouth Every 8 (Eight) Hours.) 90 tablet 0    albuterol sulfate  (90 Base) MCG/ACT inhaler Inhale 2 puffs Every 4 (Four) Hours As Needed for Wheezing. 8 g 0    amLODIPine (NORVASC) 2.5 MG tablet Take 1 tablet by mouth Every Night. 90 tablet 3    aspirin 325 MG EC tablet Take 1 tablet by mouth Daily. Begin day after surgery 42 tablet 0    buPROPion XL (WELLBUTRIN XL) 150 MG 24 hr tablet Take 1 tablet by mouth Every Morning.      Cholecalciferol (VITAMIN D3) 125 MCG (5000 UT) capsule capsule Take 1 capsule by mouth Daily.      cloNIDine (CATAPRES) 0.1 MG tablet Take 1 tablet  by mouth 2 (Two) Times a Day As Needed for High Blood Pressure (sbp >180).      docusate sodium (Colace) 100 MG capsule Take 1 capsule by mouth 2 (Two) Times a Day. (Patient taking differently: Take 1 capsule by mouth 2 (Two) Times a Day As Needed for Constipation.) 60 capsule 0    doxazosin (CARDURA) 4 MG tablet TAKE 1 TABLET BY MOUTH EVERY EVENING (Patient taking differently: Take 1 tablet by mouth Every Night.) 90 tablet 3    fenofibrate (TRICOR) 145 MG tablet TAKE ONE TABLET BY MOUTH EVERY DAY (Patient taking differently: Take 1 tablet by mouth Daily.) 90 tablet 3    furosemide (LASIX) 20 MG tablet TAKE ONE TABLET BY MOUTH EVERY DAY AS NEEDED 30 tablet 5    mirtazapine (REMERON) 15 MG tablet Take 0.5 tablets by mouth Every Night.      MULTIPLE VITAMINS PO Take 1 tablet by mouth Daily.      Omega-3 Fatty Acids (FISH OIL) 1000 MG capsule capsule Take 1 capsule by mouth Daily.      omeprazole (priLOSEC) 20 MG capsule TAKE 1 CAPSULE BY MOUTH DAILY. 21 capsule 5    oxyCODONE (ROXICODONE) 5 MG immediate release tablet Take 1 tablet by mouth Every 6 (Six) Hours As Needed for Severe Pain. 30 tablet 0    pravastatin (PRAVACHOL) 40 MG tablet TAKE ONE TABLET BY MOUTH EVERY DAY (Patient taking differently: Take 1 tablet by mouth Every Night.) 30 tablet 3    sacubitril-valsartan (ENTRESTO)  MG tablet Take 1 tablet by mouth 2 (Two) Times a Day.       No facility-administered medications prior to visit.       Opioid medication/s are on active medication list.  and I have evaluated his active treatment plan and pain score trends (see table).  Vitals:    01/14/25 0832   PainSc:   7     I have reviewed the chart for potential of high risk medication and harmful drug interactions in the elderly.          Aspirin is on active medication list. Aspirin use is indicated based on review of current medical condition/s. Pros and cons of this therapy have been discussed today. Benefits of this medication outweigh potential harm.   Patient has been encouraged to continue taking this medication.  .    Fall Risk Assessment was completed, and patient is at low risk for falls.      Patient Active Problem List   Diagnosis    Acid reflux    Arthritis    Hypertension    Hyperlipidemia    Severe obstructive sleep apnea    Psoriasis    Diastolic dysfunction    Peptic ulceration    Bilateral edema of lower extremity    Abnormal liver function test    Hyperbilirubinemia    Psychophysiological insomnia    Gilbert's disease    Thrombocytopenia, unspecified    Situational depression    Tubular adenoma    Seasonal allergic rhinitis due to pollen    Mild cognitive impairment    Memory loss    Primary osteoarthritis of right knee    Status post total right knee replacement    Leukocytosis, likely reactive    Benign prostatic hyperplasia with urinary frequency    Spondylosis of lumbar region without myelopathy or radiculopathy    Degeneration of lumbar or lumbosacral intervertebral disc    Lumbar interspinous bursitis    Myofascial pain    Osteoarthritis of multiple joints    Baastrup's syndrome    Gastroesophageal reflux disease with esophagitis without hemorrhage    History of COVID-19    Primary osteoarthritis of left knee    OA (osteoarthritis) of knee    Primary osteoarthritis of right shoulder    Nontraumatic complete tear of right rotator cuff    Contracture of joint of right shoulder region    Other headache syndrome    S/P left total knee arthroplasty, 12/4/24    Postoperative wound cellulitis    Lower extremity cellulitis     Advance Care Planning   Advance Directive is on file.  ACP discussion was held with the patient during this visit. Patient has an advance directive in EMR which is still valid.     Review of Systems   Constitutional:  Positive for fatigue (better). Negative for chills, fever and unexpected weight change.   HENT:  Negative for ear pain, hearing loss, rhinorrhea, sinus pressure, sore throat and trouble swallowing.    Eyes:   "Negative for discharge and itching.   Respiratory:  Positive for shortness of breath (more COOPER). Negative for cough and chest tightness.    Cardiovascular:  Positive for leg swelling (L>R, better with compression hose). Negative for chest pain.   Gastrointestinal:  Negative for abdominal pain, blood in stool, diarrhea and vomiting.        2013 colonoscopy normal  9/18 colonoscopy with TA times 1, repeat in 9/23   Endocrine: Negative for polydipsia and polyuria.   Genitourinary:  Negative for difficulty urinating, dysuria, enuresis, frequency, hematuria and urgency.        ED  Followed by Dr Fu   Musculoskeletal:  Positive for arthralgias and back pain. Negative for gait problem, joint swelling and neck pain.   Skin:  Negative for rash and wound.        Dry skin   Allergic/Immunologic: Negative for immunocompromised state.   Neurological:  Negative for dizziness, syncope, weakness, light-headedness, numbness and headaches.   Hematological:  Does not bruise/bleed easily.   Psychiatric/Behavioral:  Positive for behavioral problems (improved) and decreased concentration (improved). Negative for dysphoric mood and sleep disturbance. The patient is nervous/anxious (improved).          Objective       Vitals:    01/14/25 0832 01/14/25 0847   BP: 134/74 126/70   BP Location: Left arm    Patient Position: Sitting    Pulse: 67    SpO2: 96%    Weight: 101 kg (222 lb)    Height: 177.8 cm (70\")    PainSc:   7      BMI Readings from Last 1 Encounters:   01/14/25 31.95 kg/m²   BMI is within normal parameters. No follow-up required.  BMI Readings from Last 1 Encounters:   01/14/25 31.95 kg/m²   BMI is above normal parameters. Recommendations include: exercise counseling and nutrition counseling    Does the patient have evidence of cognitive impairment? No    Physical Exam  Constitutional:       Appearance: Normal appearance. He is well-developed. He is obese.   HENT:      Head: Normocephalic and atraumatic.      Right Ear: " External ear normal.      Left Ear: External ear normal.      Nose: Nose normal.      Mouth/Throat:      Mouth: Mucous membranes are moist.      Pharynx: Oropharynx is clear.   Eyes:      Extraocular Movements: Extraocular movements intact.      Conjunctiva/sclera: Conjunctivae normal.      Pupils: Pupils are equal, round, and reactive to light.   Cardiovascular:      Rate and Rhythm: Normal rate and regular rhythm.      Heart sounds: Normal heart sounds.   Pulmonary:      Effort: Pulmonary effort is normal.      Breath sounds: Normal breath sounds.   Abdominal:      General: Bowel sounds are normal.      Palpations: Abdomen is soft.   Musculoskeletal:      Cervical back: Normal range of motion and neck supple.      Left lower leg: Edema present.      Comments: Using cane   Lymphadenopathy:      Cervical: No cervical adenopathy.   Skin:     General: Skin is warm and dry.      Comments: Healing surgical scars   Neurological:      General: No focal deficit present.      Mental Status: He is alert and oriented to person, place, and time.   Psychiatric:         Mood and Affect: Mood normal.         Behavior: Behavior normal.         Thought Content: Thought content normal.       Lab Results   Component Value Date    TRIG 153 (H) 2025    HDL 42 2025    LDL 88 2025    VLDL 27 2025    HGBA1C 5.00 2025            HEALTH RISK ASSESSMENT    Smoking Status:  Social History     Tobacco Use   Smoking Status Never    Passive exposure: Past   Smokeless Tobacco Never     Alcohol Consumption:  Social History     Substance and Sexual Activity   Alcohol Use Yes    Alcohol/week: 4.0 standard drinks of alcohol    Types: 4 Cans of beer per week     Fall Risk Screen:    STEADI Fall Risk Assessment was completed, and patient is at MODERATE risk for falls. Assessment completed on:2025    Depression Screenin/14/2025     8:00 AM   PHQ-2/PHQ-9 Depression Screening   Little interest or pleasure in  doing things Not at all   Feeling down, depressed, or hopeless Not at all       Health Habits and Functional and Cognitive Screenin/14/2025     8:00 AM   Functional & Cognitive Status   Do you have difficulty preparing food and eating? No   Do you have difficulty bathing yourself, getting dressed or grooming yourself? No   Do you have difficulty using the toilet? No   Do you have difficulty moving around from place to place? No   Do you have trouble with steps or getting out of a bed or a chair? No   Current Diet Unhealthy Diet   Dental Exam Up to date   Eye Exam Up to date   Exercise (times per week) 2 times per week   Current Exercises Include Walking   Do you need help using the phone?  No   Are you deaf or do you have serious difficulty hearing?  No   Do you need help to go to places out of walking distance? No   Do you need help shopping? No   Do you need help preparing meals?  No   Do you need help with housework?  No   Do you need help with laundry? No   Do you need help taking your medications? No   Do you need help managing money? No   Do you ever drive or ride in a car without wearing a seat belt? No   Have you felt unusual stress, anger or loneliness in the last month? No   Who do you live with? Spouse   If you need help, do you have trouble finding someone available to you? No   Have you been bothered in the last four weeks by sexual problems? No   Do you have difficulty concentrating, remembering or making decisions? No       Age-appropriate Screening Schedule:  Refer to the list below for future screening recommendations based on patient's age, sex and/or medical conditions. Orders for these recommended tests are listed in the plan section. The patient has been provided with a written plan.    Health Maintenance   Topic Date Due    COVID-19 Vaccine ( season) 2024    ANNUAL WELLNESS VISIT  2026    LIPID PANEL  2026    BMI FOLLOWUP  2026    COLORECTAL CANCER  SCREENING  09/25/2028    TDAP/TD VACCINES (4 - Td or Tdap) 09/09/2032    HEPATITIS C SCREENING  Completed    INFLUENZA VACCINE  Completed    Pneumococcal Vaccine 65+  Completed    ZOSTER VACCINE  Completed              Assessment & Plan     CMS Preventative Services Quick Reference  Risk Factors Identified During Encounter  Inadequate Social Support, Isolation, Loneliness, Lack of Transportation, Financial Difficulties, or Caregiver Stress: controlled  Polypharmacy: Medication List reviewed and Medications are appropriate for patient  High Risk Sexual Behavior Identified:  not applicable    The above risks/problems have been discussed with the patient.  Follow up actions/plans if indicated are seen below in the Assessment/Plan Section.  Pertinent information has been shared with the patient in the After Visit Summary.    Diagnoses and all orders for this visit:    1. Primary hypertension (Primary)    2. Mixed hyperlipidemia    3. Diastolic dysfunction    4. Thrombocytopenia, unspecified    5. Gilbert's disease    6. Benign prostatic hyperplasia with urinary frequency    7. Tubular adenoma    8. Postoperative wound cellulitis    9. Gastroesophageal reflux disease with esophagitis without hemorrhage    10. Encounter for Medicare annual wellness exam  -     CBC (No Diff)  -     Comprehensive Metabolic Panel  -     Lipid Panel  -     Hemoglobin A1c  -     TSH    11. Abnormal glucose  -     Hemoglobin A1c        Follow Up:   Return in about 4 months (around 5/14/2025) for Recheck, with fasting labs.     An After Visit Summary and PPPS were given to the patient.             HTN/diastolic dysfunction-controlled on multi drug tx, advised goal of 150/80  hyperlipidemia-cont pravachol/fibrate, labs today, advised exercise and wt loss to decrease TG and increase HDL  BPH-stable on cardura, s/p Green Light  GERD-stable on omeprazole  djd-advised to limit nsaids, stable  Hyperbilirubinemia-recheck today  normal  Thrombocytopenia-recheck today stable  Situational depression-stable with combo remeron/wellbutrin  Constipation-citrucel/miralax combo, stable  Abnormal glucose-labs today on target, counseled on diet  Edema-see above, lasix prn      1/14 labs noted and dw patient

## 2025-01-15 DIAGNOSIS — Z96.652 STATUS POST TOTAL LEFT KNEE REPLACEMENT: ICD-10-CM

## 2025-01-15 DIAGNOSIS — Z96.652 STATUS POST TOTAL LEFT KNEE REPLACEMENT: Primary | ICD-10-CM

## 2025-01-15 RX ORDER — OXYCODONE HYDROCHLORIDE 5 MG/1
5 TABLET ORAL EVERY 6 HOURS PRN
Qty: 30 TABLET | Refills: 0 | Status: CANCELLED | OUTPATIENT
Start: 2025-01-15

## 2025-01-15 RX ORDER — OXYCODONE HYDROCHLORIDE 5 MG/1
5 TABLET ORAL EVERY 12 HOURS PRN
Qty: 30 TABLET | Refills: 0 | Status: SHIPPED | OUTPATIENT
Start: 2025-01-15

## 2025-01-15 NOTE — TELEPHONE ENCOUNTER
Contacted patient earlier this evening to let him know that his duplex venous Doppler of his left lower extremity was negative for DVT/SVT.    To continue with elevation, icing and working with PT regarding inflammation, swelling, pain control and range of motion.    Keep follow-up appoint with Dr. Restrepo on 1/28/2025.    He did have an ID appointment today as well.  Labs were drawn: CRP 0.9, ESR 10 (both of which improved from 1 month ago), WBC 7.75.  They have extended his doxycycline.

## 2025-01-28 ENCOUNTER — LAB (OUTPATIENT)
Dept: LAB | Facility: HOSPITAL | Age: 75
End: 2025-01-28
Payer: MEDICARE

## 2025-01-28 ENCOUNTER — OFFICE VISIT (OUTPATIENT)
Dept: ORTHOPEDIC SURGERY | Facility: CLINIC | Age: 75
End: 2025-01-28
Payer: MEDICARE

## 2025-01-28 ENCOUNTER — TRANSCRIBE ORDERS (OUTPATIENT)
Dept: LAB | Facility: HOSPITAL | Age: 75
End: 2025-01-28
Payer: MEDICARE

## 2025-01-28 DIAGNOSIS — L40.9 PSORIASIS: ICD-10-CM

## 2025-01-28 DIAGNOSIS — M79.89 PAIN AND SWELLING OF LEFT LOWER LEG: ICD-10-CM

## 2025-01-28 DIAGNOSIS — R50.9 FEVER AND CHILLS: ICD-10-CM

## 2025-01-28 DIAGNOSIS — R60.0 EDEMA LEG: ICD-10-CM

## 2025-01-28 DIAGNOSIS — L08.89 PITTED KERATOLYSIS: ICD-10-CM

## 2025-01-28 DIAGNOSIS — I10 ESSENTIAL HYPERTENSION, MALIGNANT: ICD-10-CM

## 2025-01-28 DIAGNOSIS — T84.54XD INFECTION OF TOTAL LEFT KNEE REPLACEMENT, SUBSEQUENT ENCOUNTER: ICD-10-CM

## 2025-01-28 DIAGNOSIS — H60.501 ACUTE NON-INFECTIVE OTITIS EXTERNA OF RIGHT EAR, UNSPECIFIED TYPE: ICD-10-CM

## 2025-01-28 DIAGNOSIS — Z96.652 STATUS POST TOTAL LEFT KNEE REPLACEMENT: Primary | ICD-10-CM

## 2025-01-28 DIAGNOSIS — D69.6 THROMBOCYTOPENIA, UNSPECIFIED: ICD-10-CM

## 2025-01-28 DIAGNOSIS — L03.116 CELLULITIS OF LEFT FOOT: ICD-10-CM

## 2025-01-28 DIAGNOSIS — M79.662 PAIN AND SWELLING OF LEFT LOWER LEG: ICD-10-CM

## 2025-01-28 DIAGNOSIS — L03.115 CELLULITIS OF RIGHT FOOT: ICD-10-CM

## 2025-01-28 DIAGNOSIS — T84.54XD INFECTION OF TOTAL LEFT KNEE REPLACEMENT, SUBSEQUENT ENCOUNTER: Primary | ICD-10-CM

## 2025-01-28 LAB
ALBUMIN SERPL-MCNC: 4.2 G/DL (ref 3.5–5.2)
ALBUMIN/GLOB SERPL: 1.6 G/DL
ALP SERPL-CCNC: 71 U/L (ref 39–117)
ALT SERPL W P-5'-P-CCNC: 15 U/L (ref 1–41)
ANION GAP SERPL CALCULATED.3IONS-SCNC: 12 MMOL/L (ref 5–15)
AST SERPL-CCNC: 18 U/L (ref 1–40)
BASOPHILS # BLD AUTO: 0.03 10*3/MM3 (ref 0–0.2)
BASOPHILS NFR BLD AUTO: 0.6 % (ref 0–1.5)
BILIRUB SERPL-MCNC: 0.5 MG/DL (ref 0–1.2)
BUN SERPL-MCNC: 21 MG/DL (ref 8–23)
BUN/CREAT SERPL: 27.3 (ref 7–25)
CALCIUM SPEC-SCNC: 9.6 MG/DL (ref 8.6–10.5)
CHLORIDE SERPL-SCNC: 102 MMOL/L (ref 98–107)
CO2 SERPL-SCNC: 27 MMOL/L (ref 22–29)
CREAT SERPL-MCNC: 0.77 MG/DL (ref 0.76–1.27)
CRP SERPL-MCNC: 0.6 MG/DL (ref 0–0.5)
DEPRECATED RDW RBC AUTO: 47.8 FL (ref 37–54)
EGFRCR SERPLBLD CKD-EPI 2021: 95.1 ML/MIN/1.73
EOSINOPHIL # BLD AUTO: 0.16 10*3/MM3 (ref 0–0.4)
EOSINOPHIL NFR BLD AUTO: 3.4 % (ref 0.3–6.2)
ERYTHROCYTE [DISTWIDTH] IN BLOOD BY AUTOMATED COUNT: 15.4 % (ref 12.3–15.4)
ERYTHROCYTE [SEDIMENTATION RATE] IN BLOOD: 11 MM/HR (ref 0–20)
GLOBULIN UR ELPH-MCNC: 2.6 GM/DL
GLUCOSE SERPL-MCNC: 122 MG/DL (ref 65–99)
HCT VFR BLD AUTO: 45.7 % (ref 37.5–51)
HGB BLD-MCNC: 15.5 G/DL (ref 13–17.7)
IMM GRANULOCYTES # BLD AUTO: 0.01 10*3/MM3 (ref 0–0.05)
IMM GRANULOCYTES NFR BLD AUTO: 0.2 % (ref 0–0.5)
LYMPHOCYTES # BLD AUTO: 1.06 10*3/MM3 (ref 0.7–3.1)
LYMPHOCYTES NFR BLD AUTO: 22.7 % (ref 19.6–45.3)
MCH RBC QN AUTO: 28.8 PG (ref 26.6–33)
MCHC RBC AUTO-ENTMCNC: 33.9 G/DL (ref 31.5–35.7)
MCV RBC AUTO: 84.8 FL (ref 79–97)
MONOCYTES # BLD AUTO: 0.4 10*3/MM3 (ref 0.1–0.9)
MONOCYTES NFR BLD AUTO: 8.6 % (ref 5–12)
NEUTROPHILS NFR BLD AUTO: 3 10*3/MM3 (ref 1.7–7)
NEUTROPHILS NFR BLD AUTO: 64.5 % (ref 42.7–76)
NRBC BLD AUTO-RTO: 0 /100 WBC (ref 0–0.2)
PLATELET # BLD AUTO: 140 10*3/MM3 (ref 140–450)
PMV BLD AUTO: 9.9 FL (ref 6–12)
POTASSIUM SERPL-SCNC: 4 MMOL/L (ref 3.5–5.2)
PROT SERPL-MCNC: 6.8 G/DL (ref 6–8.5)
RBC # BLD AUTO: 5.39 10*6/MM3 (ref 4.14–5.8)
SODIUM SERPL-SCNC: 141 MMOL/L (ref 136–145)
WBC NRBC COR # BLD AUTO: 4.66 10*3/MM3 (ref 3.4–10.8)

## 2025-01-28 PROCEDURE — 80053 COMPREHEN METABOLIC PANEL: CPT

## 2025-01-28 PROCEDURE — 36415 COLL VENOUS BLD VENIPUNCTURE: CPT

## 2025-01-28 PROCEDURE — 85652 RBC SED RATE AUTOMATED: CPT

## 2025-01-28 PROCEDURE — 85025 COMPLETE CBC W/AUTO DIFF WBC: CPT

## 2025-01-28 PROCEDURE — 86140 C-REACTIVE PROTEIN: CPT

## 2025-01-28 PROCEDURE — 99024 POSTOP FOLLOW-UP VISIT: CPT | Performed by: ORTHOPAEDIC SURGERY

## 2025-01-28 ASSESSMENT — KOOS JR
KOOS JR SCORE: 63.78
KOOS JR SCORE: 9

## 2025-01-28 NOTE — PROGRESS NOTES
[facility] [place] [facility]        Subjective     Follow-up (4 week follow up--- 1.5 month s/p TOTAL KNEE ARTHROPLASTY WITH ZOIE ROBOT LEFT DOS 12/4/24))       ANTELMO Salcedo is a 74 y.o. male.  Patient is here today now 6 weeks out from robot-assisted cemented left TKA on 12/4/2024.  Has struggled with postop cellulitis.  Seeing Dr. Berger at [facility].  Says he got to about 118 degrees of flexion the other day at therapy.  Doing physical therapy with [facility].  Says overall he is feeling better.  Has had some scabbing and blistering in his leg.          Objective      Physical Exam  There were no vitals taken for this visit.    There is no height or weight on file to calculate BMI.        Ortho Exam  Patient has mild erythema at the distal aspect of the leg.  The swelling is down overall.  Range of motion is 0 to about 110.    Imaging Reviewed and Interpreted:  Imaging Results (Last 24 Hours)       ** No results found for the last 24 hours. **              Assessment    Assessment:  1. Status post total left knee replacement    2. Pain and swelling of left lower leg        Plan:  Status post left TKA with postop cellulitis--patient likely needs to continue the oral antibiotics for little bit longer given the current scabbing he is having.  He says that in the contralateral knee, he had to see dermatology and they had to do some special wraps and ointments to heal what was going on there.  I want to check Brady back in about 4 weeks just to make sure he continues to be on the right path.  He has avoided an CHRISTI today because his motion is so much better.  I will let him go back to work part-time next week.      Tereso Restrepo MD  01/28/25  09:46 EST      Dictated Utilizing Dragon Dictation.

## 2025-01-31 DIAGNOSIS — I10 PRIMARY HYPERTENSION: ICD-10-CM

## 2025-02-03 RX ORDER — AMLODIPINE BESYLATE 2.5 MG/1
2.5 TABLET ORAL NIGHTLY
Qty: 90 TABLET | Refills: 3 | Status: SHIPPED | OUTPATIENT
Start: 2025-02-03

## 2025-02-11 ENCOUNTER — LAB (OUTPATIENT)
Dept: LAB | Facility: HOSPITAL | Age: 75
End: 2025-02-11
Payer: MEDICARE

## 2025-02-11 ENCOUNTER — TRANSCRIBE ORDERS (OUTPATIENT)
Dept: LAB | Facility: HOSPITAL | Age: 75
End: 2025-02-11
Payer: MEDICARE

## 2025-02-11 DIAGNOSIS — L40.9 PSORIASIS: ICD-10-CM

## 2025-02-11 DIAGNOSIS — T84.54XD INFECTION OF TOTAL LEFT KNEE REPLACEMENT, SUBSEQUENT ENCOUNTER: Primary | ICD-10-CM

## 2025-02-11 DIAGNOSIS — L03.116 CELLULITIS OF LEFT FOOT: ICD-10-CM

## 2025-02-11 DIAGNOSIS — L08.89 PITTED KERATOLYSIS: ICD-10-CM

## 2025-02-11 DIAGNOSIS — T84.54XD INFECTION OF TOTAL LEFT KNEE REPLACEMENT, SUBSEQUENT ENCOUNTER: ICD-10-CM

## 2025-02-11 LAB
ALBUMIN SERPL-MCNC: 4.3 G/DL (ref 3.5–5.2)
ALBUMIN/GLOB SERPL: 1.7 G/DL
ALP SERPL-CCNC: 75 U/L (ref 39–117)
ALT SERPL W P-5'-P-CCNC: 12 U/L (ref 1–41)
ANION GAP SERPL CALCULATED.3IONS-SCNC: 10 MMOL/L (ref 5–15)
AST SERPL-CCNC: 16 U/L (ref 1–40)
BASOPHILS # BLD AUTO: 0.04 10*3/MM3 (ref 0–0.2)
BASOPHILS NFR BLD AUTO: 0.6 % (ref 0–1.5)
BILIRUB SERPL-MCNC: 0.6 MG/DL (ref 0–1.2)
BUN SERPL-MCNC: 24 MG/DL (ref 8–23)
BUN/CREAT SERPL: 28.6 (ref 7–25)
CALCIUM SPEC-SCNC: 9.8 MG/DL (ref 8.6–10.5)
CHLORIDE SERPL-SCNC: 103 MMOL/L (ref 98–107)
CO2 SERPL-SCNC: 30 MMOL/L (ref 22–29)
CREAT SERPL-MCNC: 0.84 MG/DL (ref 0.76–1.27)
CRP SERPL-MCNC: 0.36 MG/DL (ref 0–0.5)
DEPRECATED RDW RBC AUTO: 47.5 FL (ref 37–54)
EGFRCR SERPLBLD CKD-EPI 2021: 92.7 ML/MIN/1.73
EOSINOPHIL # BLD AUTO: 0.25 10*3/MM3 (ref 0–0.4)
EOSINOPHIL NFR BLD AUTO: 3.6 % (ref 0.3–6.2)
ERYTHROCYTE [DISTWIDTH] IN BLOOD BY AUTOMATED COUNT: 15.1 % (ref 12.3–15.4)
ERYTHROCYTE [SEDIMENTATION RATE] IN BLOOD: 5 MM/HR (ref 0–20)
GLOBULIN UR ELPH-MCNC: 2.5 GM/DL
GLUCOSE SERPL-MCNC: 118 MG/DL (ref 65–99)
HCT VFR BLD AUTO: 46.8 % (ref 37.5–51)
HGB BLD-MCNC: 15.7 G/DL (ref 13–17.7)
IMM GRANULOCYTES # BLD AUTO: 0.01 10*3/MM3 (ref 0–0.05)
IMM GRANULOCYTES NFR BLD AUTO: 0.1 % (ref 0–0.5)
LYMPHOCYTES # BLD AUTO: 1.23 10*3/MM3 (ref 0.7–3.1)
LYMPHOCYTES NFR BLD AUTO: 17.8 % (ref 19.6–45.3)
MCH RBC QN AUTO: 28.7 PG (ref 26.6–33)
MCHC RBC AUTO-ENTMCNC: 33.5 G/DL (ref 31.5–35.7)
MCV RBC AUTO: 85.6 FL (ref 79–97)
MONOCYTES # BLD AUTO: 0.46 10*3/MM3 (ref 0.1–0.9)
MONOCYTES NFR BLD AUTO: 6.7 % (ref 5–12)
NEUTROPHILS NFR BLD AUTO: 4.91 10*3/MM3 (ref 1.7–7)
NEUTROPHILS NFR BLD AUTO: 71.2 % (ref 42.7–76)
NRBC BLD AUTO-RTO: 0 /100 WBC (ref 0–0.2)
PLATELET # BLD AUTO: 184 10*3/MM3 (ref 140–450)
PMV BLD AUTO: 9.5 FL (ref 6–12)
POTASSIUM SERPL-SCNC: 4.1 MMOL/L (ref 3.5–5.2)
PROT SERPL-MCNC: 6.8 G/DL (ref 6–8.5)
RBC # BLD AUTO: 5.47 10*6/MM3 (ref 4.14–5.8)
SODIUM SERPL-SCNC: 143 MMOL/L (ref 136–145)
WBC NRBC COR # BLD AUTO: 6.9 10*3/MM3 (ref 3.4–10.8)

## 2025-02-11 PROCEDURE — 86140 C-REACTIVE PROTEIN: CPT

## 2025-02-11 PROCEDURE — 36415 COLL VENOUS BLD VENIPUNCTURE: CPT

## 2025-02-11 PROCEDURE — 85025 COMPLETE CBC W/AUTO DIFF WBC: CPT

## 2025-02-11 PROCEDURE — 80053 COMPREHEN METABOLIC PANEL: CPT

## 2025-02-11 PROCEDURE — 85652 RBC SED RATE AUTOMATED: CPT

## 2025-02-20 ENCOUNTER — OFFICE VISIT (OUTPATIENT)
Dept: ORTHOPEDIC SURGERY | Facility: CLINIC | Age: 75
End: 2025-02-20
Payer: MEDICARE

## 2025-02-20 DIAGNOSIS — Z96.652 STATUS POST TOTAL LEFT KNEE REPLACEMENT: Primary | ICD-10-CM

## 2025-02-20 RX ORDER — DOXYCYCLINE 100 MG/1
CAPSULE ORAL
COMMUNITY
Start: 2025-01-15

## 2025-02-20 RX ORDER — MUPIROCIN 20 MG/G
OINTMENT TOPICAL
COMMUNITY
Start: 2025-01-28 | End: 2025-04-27

## 2025-02-20 NOTE — PROGRESS NOTES
[facility] [place] [facility]        Subjective     Post-op (3 week follow up - 11 weeks s/p TOTAL KNEE ARTHROPLASTY WITH ZOIE ROBOT LEFT DOS 12/4/24/)       HPI    Jimmie Salcedo is a 74 y.o. male.  Patient is here today now 11 weeks out from robot-assisted left TKA on 12/4/2024.  Patient is finishing up his antibiotics but unfortunately a week or 2 ago hit his left leg on something and has a large wound on the inferior aspect of his leg in the pretibial region.  Denies fever chills nausea vomiting.          Objective      Physical Exam  There were no vitals taken for this visit.    There is no height or weight on file to calculate BMI.        Ortho Exam  Patient's range of motion is 0-1 10.  Incision is healed and free of erythema or drainage  Mild scab noted at the inferior aspect of the incision  Patient has a large excoriated area on the pretibial region distal to his most distal tibial incision.  Is free of drainage.  There is surrounding erythema but no induration or streaking    Imaging Reviewed and Interpreted:  Imaging Results (Last 24 Hours)       ** No results found for the last 24 hours. **              Assessment    Assessment:  1. Status post total left knee replacement        Plan:  Status post left TKA--clinically from the knee replacement, patient's knee is doing quite well overall.  He has a new large wound on the left leg and I would suspect that Dr. Berger will keep him covered on antibiotics till this has resolved to protect the implant.  Follow-up with me in 6 weeks to make sure he remains on a positive trajectory.      Tereso Restrepo MD  02/20/25  17:08 EST      Dictated Utilizing Dragon Dictation.

## 2025-04-08 ENCOUNTER — OFFICE VISIT (OUTPATIENT)
Dept: SLEEP MEDICINE | Facility: CLINIC | Age: 75
End: 2025-04-08
Payer: MEDICARE

## 2025-04-08 ENCOUNTER — OFFICE VISIT (OUTPATIENT)
Dept: ORTHOPEDIC SURGERY | Facility: CLINIC | Age: 75
End: 2025-04-08
Payer: MEDICARE

## 2025-04-08 VITALS
OXYGEN SATURATION: 99 % | WEIGHT: 231 LBS | HEIGHT: 70 IN | DIASTOLIC BLOOD PRESSURE: 78 MMHG | HEART RATE: 67 BPM | TEMPERATURE: 97.8 F | SYSTOLIC BLOOD PRESSURE: 146 MMHG | BODY MASS INDEX: 33.07 KG/M2

## 2025-04-08 VITALS — BODY MASS INDEX: 31.95 KG/M2 | HEIGHT: 70 IN

## 2025-04-08 DIAGNOSIS — Z96.652 STATUS POST TOTAL LEFT KNEE REPLACEMENT: Primary | ICD-10-CM

## 2025-04-08 DIAGNOSIS — G47.33 SEVERE OBSTRUCTIVE SLEEP APNEA: Primary | ICD-10-CM

## 2025-04-08 DIAGNOSIS — S81.802S WOUND OF LEFT LOWER EXTREMITY, SEQUELA: ICD-10-CM

## 2025-04-08 DIAGNOSIS — F51.04 PSYCHOPHYSIOLOGICAL INSOMNIA: ICD-10-CM

## 2025-04-08 PROCEDURE — 99213 OFFICE O/P EST LOW 20 MIN: CPT | Performed by: NURSE PRACTITIONER

## 2025-04-08 PROCEDURE — 1159F MED LIST DOCD IN RCRD: CPT | Performed by: NURSE PRACTITIONER

## 2025-04-08 PROCEDURE — 1160F RVW MEDS BY RX/DR IN RCRD: CPT | Performed by: NURSE PRACTITIONER

## 2025-04-08 PROCEDURE — 3077F SYST BP >= 140 MM HG: CPT | Performed by: NURSE PRACTITIONER

## 2025-04-08 PROCEDURE — 3078F DIAST BP <80 MM HG: CPT | Performed by: NURSE PRACTITIONER

## 2025-04-08 RX ORDER — TRIAMCINOLONE ACETONIDE 1 MG/G
CREAM TOPICAL
COMMUNITY
Start: 2025-03-20

## 2025-04-08 NOTE — PROGRESS NOTES
Chief Complaint:   Chief Complaint   Patient presents with    Follow-up    Sleep Apnea       HPI:    Jimmie Salcedo is a 74 y.o. male here for follow-up of sleep apnea.  Patient was last seen 12/29/2023.  Patient states he gets very fragmented 5 to 7 hours of sleep nightly.  He is on one half of 15 mg Remeron.  I have asked that he please add an extended release melatonin and if this is not successful we can certainly try something else.  He goes to sleep within 30 minutes and gets up 2-3 times in the night.  He has an [place] score of 12/24.  He is doing well without concern or complaint and will continue therapy.        Current medications are:   Current Outpatient Medications:     acetaminophen (TYLENOL) 650 MG 8 hr tablet, Take 1 tablet by mouth Every 8 (Eight) Hours. (Patient taking differently: Take 500 mg by mouth Every 8 (Eight) Hours.), Disp: 90 tablet, Rfl: 0    albuterol sulfate  (90 Base) MCG/ACT inhaler, Inhale 2 puffs Every 4 (Four) Hours As Needed for Wheezing., Disp: 8 g, Rfl: 0    amLODIPine (NORVASC) 2.5 MG tablet, TAKE 1 TABLET BY MOUTH EVERY NIGHT., Disp: 90 tablet, Rfl: 3    aspirin 325 MG EC tablet, Take 1 tablet by mouth Daily. Begin day after surgery, Disp: 42 tablet, Rfl: 0    buPROPion SR (WELLBUTRIN SR) 150 MG 12 hr tablet, , Disp: , Rfl:     buPROPion XL (WELLBUTRIN XL) 150 MG 24 hr tablet, Take 1 tablet by mouth Every Morning., Disp: , Rfl:     Cholecalciferol (VITAMIN D3) 125 MCG (5000 UT) capsule capsule, Take 1 capsule by mouth Daily., Disp: , Rfl:     cloNIDine (CATAPRES) 0.1 MG tablet, Take 1 tablet by mouth 2 (Two) Times a Day As Needed for High Blood Pressure (sbp >180)., Disp: , Rfl:     docusate sodium (Colace) 100 MG capsule, Take 1 capsule by mouth 2 (Two) Times a Day. (Patient taking differently: Take 1 capsule by mouth 2 (Two) Times a Day As Needed for Constipation.), Disp: 60 capsule, Rfl: 0    doxazosin (CARDURA) 4 MG tablet, TAKE 1 TABLET BY MOUTH EVERY EVENING  (Patient taking differently: Take 1 tablet by mouth Every Night.), Disp: 90 tablet, Rfl: 3    doxycycline (MONODOX) 100 MG capsule, DOXYCYCLINE MONOHYDRATE 100 MG CAPS, Disp: , Rfl:     fenofibrate (TRICOR) 145 MG tablet, TAKE ONE TABLET BY MOUTH EVERY DAY (Patient taking differently: Take 1 tablet by mouth Daily.), Disp: 90 tablet, Rfl: 3    furosemide (LASIX) 20 MG tablet, TAKE ONE TABLET BY MOUTH EVERY DAY AS NEEDED, Disp: 30 tablet, Rfl: 5    mirtazapine (REMERON) 15 MG tablet, Take 0.5 tablets by mouth Every Night., Disp: , Rfl:     MULTIPLE VITAMINS PO, Take 1 tablet by mouth Daily., Disp: , Rfl:     mupirocin (BACTROBAN) 2 % ointment, MUPIROCIN 2 % OINT, Disp: , Rfl:     Omega-3 Fatty Acids (FISH OIL) 1000 MG capsule capsule, Take 1 capsule by mouth Daily., Disp: , Rfl:     omeprazole (priLOSEC) 20 MG capsule, TAKE 1 CAPSULE BY MOUTH DAILY., Disp: 21 capsule, Rfl: 5    ondansetron (ZOFRAN) 4 MG tablet, , Disp: , Rfl:     oxyCODONE (ROXICODONE) 5 MG immediate release tablet, Take 1 tablet by mouth Every 12 (Twelve) Hours As Needed for Severe Pain., Disp: 30 tablet, Rfl: 0    pravastatin (PRAVACHOL) 40 MG tablet, TAKE ONE TABLET BY MOUTH EVERY DAY (Patient taking differently: Take 1 tablet by mouth Every Night.), Disp: 30 tablet, Rfl: 3    sacubitril-valsartan (ENTRESTO)  MG tablet, Take 1 tablet by mouth 2 (Two) Times a Day., Disp: , Rfl:     Sodium Fluoride 1.1 % paste, Apply  to teeth Daily., Disp: , Rfl:     triamcinolone (KENALOG) 0.1 % cream, , Disp: , Rfl: .      The patient's relevant past medical, surgical, family and social history were reviewed and updated in Epic as appropriate.       Review of Systems   Eyes:  Positive for visual disturbance.   Respiratory:  Positive for apnea and shortness of breath.    Genitourinary:  Positive for frequency.   Musculoskeletal:  Positive for arthralgias.   Allergic/Immunologic: Positive for environmental allergies.   Psychiatric/Behavioral:  Positive for  "dysphoric mood and sleep disturbance.    All other systems reviewed and are negative.      /78   Pulse 67   Temp 97.8 °F (36.6 °C)   Ht 177.8 cm (70\")   Wt 105 kg (231 lb)   SpO2 99%   BMI 33.15 kg/m²     Objective:    Physical Exam  Constitutional:       Appearance: Normal appearance.   HENT:      Head: Normocephalic and atraumatic.      Mouth/Throat:      Comments: Class 3 airway  Cardiovascular:      Rate and Rhythm: Regular rhythm. Bradycardia present.   Pulmonary:      Effort: Pulmonary effort is normal.      Breath sounds: Normal breath sounds.   Skin:     General: Skin is warm and dry.   Neurological:      Mental Status: He is alert and oriented to person, place, and time.   Psychiatric:         Mood and Affect: Mood normal.         Behavior: Behavior normal.         Thought Content: Thought content normal.         Judgment: Judgment normal.       /78   Pulse 67   Temp 97.8 °F (36.6 °C)   Ht 177.8 cm (70\")   Wt 105 kg (231 lb)   SpO2 99%   BMI 33.15 kg/m²     CPAP Report  90/90 days of use  Greater than 4-hour use 94.4  90% pressure 9.4  AHI 3.3  Setting 8-18    The patient continues to use and benefit from CPAP therapy.    ASSESSMENT/PLAN    Diagnoses and all orders for this visit:    1. Severe obstructive sleep apnea (Primary)  -     PAP Therapy    2. Psychophysiological insomnia        Refill supplies x 1 year.  Patient will continue Remeron 15 mg 1/2 tablet and add extended release melatonin if this does not improve sleep quality he will certainly let me know otherwise I will see him back in 1 year.    Signed by  SOURAV Field    April 8, 2025      CC: Javad Negron MD         No ref. provider found      "
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
## 2025-04-09 ENCOUNTER — HOSPITAL ENCOUNTER (OUTPATIENT)
Dept: PHYSICAL THERAPY | Facility: HOSPITAL | Age: 75
Setting detail: THERAPIES SERIES
Discharge: HOME | End: 2025-04-09
Payer: MEDICARE

## 2025-04-09 DIAGNOSIS — R60.0 EDEMA OF LEFT LOWER LEG: Primary | ICD-10-CM

## 2025-04-09 DIAGNOSIS — S81.802D MULTIPLE OPEN WOUNDS OF LOWER LEG, LEFT, SUBSEQUENT ENCOUNTER: ICD-10-CM

## 2025-04-09 PROCEDURE — 97161 PT EVAL LOW COMPLEX 20 MIN: CPT

## 2025-04-09 PROCEDURE — 29581 APPL MULTLAYER CMPRN SYS LEG: CPT

## 2025-04-09 PROCEDURE — 97597 DBRDMT OPN WND 1ST 20 CM/<: CPT
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
## 2025-04-16 ENCOUNTER — HOSPITAL ENCOUNTER (OUTPATIENT)
Dept: PHYSICAL THERAPY | Facility: HOSPITAL | Age: 75
Setting detail: THERAPIES SERIES
Discharge: HOME | End: 2025-04-16
Payer: MEDICARE

## 2025-04-16 DIAGNOSIS — S81.802D MULTIPLE OPEN WOUNDS OF LOWER LEG, LEFT, SUBSEQUENT ENCOUNTER: ICD-10-CM

## 2025-04-16 DIAGNOSIS — R60.0 EDEMA OF LEFT LOWER LEG: Primary | ICD-10-CM

## 2025-04-16 PROCEDURE — 97597 DBRDMT OPN WND 1ST 20 CM/<: CPT

## 2025-04-16 PROCEDURE — 29581 APPL MULTLAYER CMPRN SYS LEG: CPT
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
## 2025-04-23 ENCOUNTER — APPOINTMENT (OUTPATIENT)
Dept: PHYSICAL THERAPY | Facility: HOSPITAL | Age: 75
End: 2025-04-23
Payer: MEDICARE

## 2025-04-23 ENCOUNTER — DOCUMENTATION (OUTPATIENT)
Dept: PHYSICAL THERAPY | Facility: HOSPITAL | Age: 75
End: 2025-04-23

## 2025-04-23 DIAGNOSIS — S81.802D MULTIPLE OPEN WOUNDS OF LOWER LEG, LEFT, SUBSEQUENT ENCOUNTER: ICD-10-CM

## 2025-04-23 DIAGNOSIS — R60.0 EDEMA OF LEFT LOWER LEG: Primary | ICD-10-CM

## 2025-05-08 ENCOUNTER — OFFICE VISIT (OUTPATIENT)
Dept: ORTHOPEDIC SURGERY | Facility: CLINIC | Age: 75
End: 2025-05-08
Payer: MEDICARE

## 2025-05-08 VITALS — WEIGHT: 235 LBS | HEIGHT: 70 IN | BODY MASS INDEX: 33.64 KG/M2

## 2025-05-08 DIAGNOSIS — M75.121 NONTRAUMATIC COMPLETE TEAR OF RIGHT ROTATOR CUFF: Primary | ICD-10-CM

## 2025-05-08 DIAGNOSIS — M24.511 CONTRACTURE OF JOINT OF RIGHT SHOULDER REGION: ICD-10-CM

## 2025-05-08 DIAGNOSIS — M19.011 PRIMARY OSTEOARTHRITIS OF RIGHT SHOULDER: ICD-10-CM

## 2025-05-08 PROCEDURE — 99213 OFFICE O/P EST LOW 20 MIN: CPT | Performed by: ORTHOPAEDIC SURGERY
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
## 2025-05-21 ENCOUNTER — PATIENT MESSAGE (OUTPATIENT)
Dept: ORTHOPEDIC SURGERY | Facility: CLINIC | Age: 75
End: 2025-05-21
Payer: MEDICARE
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
## 2025-06-26 ENCOUNTER — LAB (OUTPATIENT)
Dept: INTERNAL MEDICINE | Age: 75
End: 2025-06-26
Payer: MEDICARE

## 2025-06-26 ENCOUNTER — OFFICE VISIT (OUTPATIENT)
Dept: INTERNAL MEDICINE | Age: 75
End: 2025-06-26
Payer: MEDICARE

## 2025-06-26 VITALS
HEART RATE: 57 BPM | BODY MASS INDEX: 33.36 KG/M2 | DIASTOLIC BLOOD PRESSURE: 78 MMHG | HEIGHT: 70 IN | SYSTOLIC BLOOD PRESSURE: 130 MMHG | OXYGEN SATURATION: 99 % | WEIGHT: 233 LBS

## 2025-06-26 DIAGNOSIS — R60.0 BILATERAL EDEMA OF LOWER EXTREMITY: ICD-10-CM

## 2025-06-26 DIAGNOSIS — I10 PRIMARY HYPERTENSION: Primary | ICD-10-CM

## 2025-06-26 DIAGNOSIS — Z00.00 HEALTHCARE MAINTENANCE: Primary | ICD-10-CM

## 2025-06-26 DIAGNOSIS — D69.6 THROMBOCYTOPENIA, UNSPECIFIED: ICD-10-CM

## 2025-06-26 DIAGNOSIS — N40.1 BENIGN PROSTATIC HYPERPLASIA WITH URINARY FREQUENCY: ICD-10-CM

## 2025-06-26 DIAGNOSIS — E78.2 MIXED HYPERLIPIDEMIA: ICD-10-CM

## 2025-06-26 DIAGNOSIS — E80.4 GILBERT'S DISEASE: ICD-10-CM

## 2025-06-26 DIAGNOSIS — R73.09 ABNORMAL GLUCOSE: ICD-10-CM

## 2025-06-26 DIAGNOSIS — K21.00 GASTROESOPHAGEAL REFLUX DISEASE WITH ESOPHAGITIS WITHOUT HEMORRHAGE: ICD-10-CM

## 2025-06-26 DIAGNOSIS — R35.0 BENIGN PROSTATIC HYPERPLASIA WITH URINARY FREQUENCY: ICD-10-CM

## 2025-06-26 LAB
ALBUMIN SERPL-MCNC: 4.2 G/DL (ref 3.5–5.2)
ALBUMIN/GLOB SERPL: 1.4 G/DL
ALP SERPL-CCNC: 91 U/L (ref 39–117)
ALT SERPL W P-5'-P-CCNC: 17 U/L (ref 1–41)
ANION GAP SERPL CALCULATED.3IONS-SCNC: 14.2 MMOL/L (ref 5–15)
AST SERPL-CCNC: 21 U/L (ref 1–40)
BILIRUB SERPL-MCNC: 1.4 MG/DL (ref 0–1.2)
BUN SERPL-MCNC: 13 MG/DL (ref 8–23)
BUN/CREAT SERPL: 15.9 (ref 7–25)
CALCIUM SPEC-SCNC: 9.2 MG/DL (ref 8.6–10.5)
CHLORIDE SERPL-SCNC: 103 MMOL/L (ref 98–107)
CHOLEST SERPL-MCNC: 163 MG/DL (ref 0–200)
CO2 SERPL-SCNC: 20.8 MMOL/L (ref 22–29)
CREAT SERPL-MCNC: 0.82 MG/DL (ref 0.76–1.27)
DEPRECATED RDW RBC AUTO: 45.3 FL (ref 37–54)
EGFRCR SERPLBLD CKD-EPI 2021: 93.3 ML/MIN/1.73
ERYTHROCYTE [DISTWIDTH] IN BLOOD BY AUTOMATED COUNT: 14.7 % (ref 12.3–15.4)
GLOBULIN UR ELPH-MCNC: 3 GM/DL
GLUCOSE SERPL-MCNC: 111 MG/DL (ref 65–99)
HCT VFR BLD AUTO: 49.9 % (ref 37.5–51)
HDLC SERPL-MCNC: 36 MG/DL (ref 40–60)
HGB BLD-MCNC: 17.2 G/DL (ref 13–17.7)
LDLC SERPL CALC-MCNC: 99 MG/DL (ref 0–100)
LDLC/HDLC SERPL: 2.66 {RATIO}
MCH RBC QN AUTO: 29.4 PG (ref 26.6–33)
MCHC RBC AUTO-ENTMCNC: 34.5 G/DL (ref 31.5–35.7)
MCV RBC AUTO: 85.2 FL (ref 79–97)
PLATELET # BLD AUTO: 140 10*3/MM3 (ref 140–450)
PMV BLD AUTO: 10.1 FL (ref 6–12)
POTASSIUM SERPL-SCNC: 4.2 MMOL/L (ref 3.5–5.2)
PROT SERPL-MCNC: 7.2 G/DL (ref 6–8.5)
RBC # BLD AUTO: 5.86 10*6/MM3 (ref 4.14–5.8)
SODIUM SERPL-SCNC: 138 MMOL/L (ref 136–145)
TRIGL SERPL-MCNC: 156 MG/DL (ref 0–150)
TSH SERPL DL<=0.05 MIU/L-ACNC: 1.1 UIU/ML (ref 0.27–4.2)
VLDLC SERPL-MCNC: 28 MG/DL (ref 5–40)
WBC NRBC COR # BLD AUTO: 7.81 10*3/MM3 (ref 3.4–10.8)

## 2025-06-26 PROCEDURE — 83036 HEMOGLOBIN GLYCOSYLATED A1C: CPT | Performed by: INTERNAL MEDICINE

## 2025-06-26 PROCEDURE — 80061 LIPID PANEL: CPT | Performed by: INTERNAL MEDICINE

## 2025-06-26 PROCEDURE — 84443 ASSAY THYROID STIM HORMONE: CPT | Performed by: INTERNAL MEDICINE

## 2025-06-26 PROCEDURE — 85027 COMPLETE CBC AUTOMATED: CPT | Performed by: INTERNAL MEDICINE

## 2025-06-26 PROCEDURE — 36415 COLL VENOUS BLD VENIPUNCTURE: CPT | Performed by: INTERNAL MEDICINE

## 2025-06-26 PROCEDURE — 80053 COMPREHEN METABOLIC PANEL: CPT | Performed by: INTERNAL MEDICINE
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
## 2025-06-27 LAB — HBA1C MFR BLD: 5.3 % (ref 4.8–5.6)

## 2025-06-30 DIAGNOSIS — R60.0 BILATERAL LEG EDEMA: ICD-10-CM

## 2025-06-30 RX ORDER — DOXAZOSIN 4 MG/1
4 TABLET ORAL EVERY EVENING
Qty: 90 TABLET | Refills: 3 | Status: SHIPPED | OUTPATIENT
Start: 2025-06-30

## 2025-06-30 RX ORDER — FUROSEMIDE 20 MG/1
20 TABLET ORAL DAILY
Qty: 30 TABLET | Refills: 5 | Status: SHIPPED | OUTPATIENT
Start: 2025-06-30

## 2025-07-08 ENCOUNTER — OFFICE VISIT (OUTPATIENT)
Dept: ORTHOPEDIC SURGERY | Facility: CLINIC | Age: 75
End: 2025-07-08
Payer: MEDICARE

## 2025-07-08 VITALS
DIASTOLIC BLOOD PRESSURE: 80 MMHG | HEIGHT: 70 IN | BODY MASS INDEX: 33.46 KG/M2 | SYSTOLIC BLOOD PRESSURE: 132 MMHG | WEIGHT: 233.69 LBS

## 2025-07-08 DIAGNOSIS — S81.802S WOUND OF LEFT LOWER EXTREMITY, SEQUELA: ICD-10-CM

## 2025-07-08 DIAGNOSIS — Z96.652 STATUS POST TOTAL LEFT KNEE REPLACEMENT: Primary | ICD-10-CM
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
## 2025-07-09 ENCOUNTER — LAB (OUTPATIENT)
Dept: LAB | Facility: HOSPITAL | Age: 75
End: 2025-07-09
Payer: MEDICARE

## 2025-07-09 ENCOUNTER — TRANSCRIBE ORDERS (OUTPATIENT)
Dept: LAB | Facility: HOSPITAL | Age: 75
End: 2025-07-09
Payer: MEDICARE

## 2025-07-09 DIAGNOSIS — L03.116 CELLULITIS OF LEFT FOOT: ICD-10-CM

## 2025-07-09 DIAGNOSIS — I10 ESSENTIAL HYPERTENSION, MALIGNANT: ICD-10-CM

## 2025-07-09 DIAGNOSIS — L40.9 PSORIASIS: ICD-10-CM

## 2025-07-09 DIAGNOSIS — L08.89 PITTED KERATOLYSIS: ICD-10-CM

## 2025-07-09 DIAGNOSIS — T84.54XD INFECTION OF TOTAL LEFT KNEE REPLACEMENT, SUBSEQUENT ENCOUNTER: Primary | ICD-10-CM

## 2025-07-09 DIAGNOSIS — T84.54XD INFECTION OF TOTAL LEFT KNEE REPLACEMENT, SUBSEQUENT ENCOUNTER: ICD-10-CM

## 2025-07-09 LAB
ALBUMIN SERPL-MCNC: 4.1 G/DL (ref 3.5–5.2)
ALBUMIN/GLOB SERPL: 1.7 G/DL
ALP SERPL-CCNC: 92 U/L (ref 39–117)
ALT SERPL W P-5'-P-CCNC: 13 U/L (ref 1–41)
ANION GAP SERPL CALCULATED.3IONS-SCNC: 11 MMOL/L (ref 5–15)
AST SERPL-CCNC: 15 U/L (ref 1–40)
BASOPHILS # BLD AUTO: 0.05 10*3/MM3 (ref 0–0.2)
BASOPHILS NFR BLD AUTO: 0.7 % (ref 0–1.5)
BILIRUB SERPL-MCNC: 1.7 MG/DL (ref 0–1.2)
BUN SERPL-MCNC: 14.6 MG/DL (ref 8–23)
BUN/CREAT SERPL: 20 (ref 7–25)
CALCIUM SPEC-SCNC: 9 MG/DL (ref 8.6–10.5)
CHLORIDE SERPL-SCNC: 102 MMOL/L (ref 98–107)
CK SERPL-CCNC: 67 U/L (ref 20–200)
CO2 SERPL-SCNC: 25 MMOL/L (ref 22–29)
CREAT SERPL-MCNC: 0.73 MG/DL (ref 0.76–1.27)
CRP SERPL-MCNC: 1.25 MG/DL (ref 0–0.5)
DEPRECATED RDW RBC AUTO: 48.7 FL (ref 37–54)
EGFRCR SERPLBLD CKD-EPI 2021: 96.7 ML/MIN/1.73
EOSINOPHIL # BLD AUTO: 0.36 10*3/MM3 (ref 0–0.4)
EOSINOPHIL NFR BLD AUTO: 4.8 % (ref 0.3–6.2)
ERYTHROCYTE [DISTWIDTH] IN BLOOD BY AUTOMATED COUNT: 15.9 % (ref 12.3–15.4)
GLOBULIN UR ELPH-MCNC: 2.4 GM/DL
GLUCOSE SERPL-MCNC: 126 MG/DL (ref 65–99)
HCT VFR BLD AUTO: 48.5 % (ref 37.5–51)
HGB BLD-MCNC: 16.7 G/DL (ref 13–17.7)
IMM GRANULOCYTES # BLD AUTO: 0.01 10*3/MM3 (ref 0–0.05)
IMM GRANULOCYTES NFR BLD AUTO: 0.1 % (ref 0–0.5)
LYMPHOCYTES # BLD AUTO: 1.05 10*3/MM3 (ref 0.7–3.1)
LYMPHOCYTES NFR BLD AUTO: 14.1 % (ref 19.6–45.3)
MCH RBC QN AUTO: 29.2 PG (ref 26.6–33)
MCHC RBC AUTO-ENTMCNC: 34.4 G/DL (ref 31.5–35.7)
MCV RBC AUTO: 84.8 FL (ref 79–97)
MONOCYTES # BLD AUTO: 0.56 10*3/MM3 (ref 0.1–0.9)
MONOCYTES NFR BLD AUTO: 7.5 % (ref 5–12)
NEUTROPHILS NFR BLD AUTO: 5.42 10*3/MM3 (ref 1.7–7)
NEUTROPHILS NFR BLD AUTO: 72.8 % (ref 42.7–76)
NRBC BLD AUTO-RTO: 0 /100 WBC (ref 0–0.2)
PLATELET # BLD AUTO: 133 10*3/MM3 (ref 140–450)
PMV BLD AUTO: 9.3 FL (ref 6–12)
POTASSIUM SERPL-SCNC: 3.8 MMOL/L (ref 3.5–5.2)
PROT SERPL-MCNC: 6.5 G/DL (ref 6–8.5)
RBC # BLD AUTO: 5.72 10*6/MM3 (ref 4.14–5.8)
SODIUM SERPL-SCNC: 138 MMOL/L (ref 136–145)
WBC NRBC COR # BLD AUTO: 7.45 10*3/MM3 (ref 3.4–10.8)

## 2025-07-09 PROCEDURE — 82550 ASSAY OF CK (CPK): CPT

## 2025-07-09 PROCEDURE — 85025 COMPLETE CBC W/AUTO DIFF WBC: CPT

## 2025-07-09 PROCEDURE — 36415 COLL VENOUS BLD VENIPUNCTURE: CPT

## 2025-07-09 PROCEDURE — 80053 COMPREHEN METABOLIC PANEL: CPT

## 2025-07-09 PROCEDURE — 86140 C-REACTIVE PROTEIN: CPT

## 2025-08-07 ENCOUNTER — PREP FOR SURGERY (OUTPATIENT)
Dept: OTHER | Facility: HOSPITAL | Age: 75
End: 2025-08-07
Payer: MEDICARE

## 2025-08-07 ENCOUNTER — OFFICE VISIT (OUTPATIENT)
Dept: ORTHOPEDIC SURGERY | Facility: CLINIC | Age: 75
End: 2025-08-07
Payer: MEDICARE

## 2025-08-07 VITALS
BODY MASS INDEX: 32.9 KG/M2 | SYSTOLIC BLOOD PRESSURE: 98 MMHG | DIASTOLIC BLOOD PRESSURE: 62 MMHG | WEIGHT: 229.8 LBS | HEIGHT: 70 IN

## 2025-08-07 DIAGNOSIS — M19.011 PRIMARY OSTEOARTHRITIS OF RIGHT SHOULDER: ICD-10-CM

## 2025-08-07 DIAGNOSIS — M75.121 NONTRAUMATIC COMPLETE TEAR OF RIGHT ROTATOR CUFF: Primary | ICD-10-CM

## 2025-08-07 DIAGNOSIS — M19.011 PRIMARY LOCALIZED OSTEOARTHROSIS OF RIGHT SHOULDER REGION: ICD-10-CM

## 2025-08-07 DIAGNOSIS — R22.32 MASS OF SKIN OF LEFT SHOULDER: Primary | ICD-10-CM

## 2025-08-07 DIAGNOSIS — M75.121 NONTRAUMATIC COMPLETE TEAR OF RIGHT ROTATOR CUFF: ICD-10-CM

## 2025-08-07 RX ORDER — LIDOCAINE HYDROCHLORIDE 10 MG/ML
4 INJECTION, SOLUTION EPIDURAL; INFILTRATION; INTRACAUDAL; PERINEURAL
Status: COMPLETED | OUTPATIENT
Start: 2025-08-07 | End: 2025-08-07

## 2025-08-07 RX ORDER — VANCOMYCIN/0.9 % SOD CHLORIDE 1.5G/250ML
15 PLASTIC BAG, INJECTION (ML) INTRAVENOUS ONCE
OUTPATIENT
Start: 2025-08-07 | End: 2025-08-07

## 2025-08-07 RX ORDER — SCOPOLAMINE 1 MG/3D
1 PATCH, EXTENDED RELEASE TRANSDERMAL CONTINUOUS
OUTPATIENT
Start: 2025-08-07 | End: 2025-08-10

## 2025-08-07 RX ORDER — METHYLPREDNISOLONE ACETATE 40 MG/ML
40 INJECTION, SUSPENSION INTRA-ARTICULAR; INTRALESIONAL; INTRAMUSCULAR; SOFT TISSUE
Status: COMPLETED | OUTPATIENT
Start: 2025-08-07 | End: 2025-08-07

## 2025-08-07 RX ORDER — TRANEXAMIC ACID 10 MG/ML
1000 INJECTION, SOLUTION INTRAVENOUS ONCE
OUTPATIENT
Start: 2025-08-07 | End: 2025-08-07

## 2025-08-07 RX ADMIN — LIDOCAINE HYDROCHLORIDE 4 ML: 10 INJECTION, SOLUTION EPIDURAL; INFILTRATION; INTRACAUDAL; PERINEURAL at 08:53

## 2025-08-07 RX ADMIN — METHYLPREDNISOLONE ACETATE 40 MG: 40 INJECTION, SUSPENSION INTRA-ARTICULAR; INTRALESIONAL; INTRAMUSCULAR; SOFT TISSUE at 08:53

## 2025-08-19 ENCOUNTER — HOSPITAL ENCOUNTER (OUTPATIENT)
Facility: HOSPITAL | Age: 75
Discharge: HOME | End: 2025-08-19
Admitting: ORTHOPAEDIC SURGERY
Payer: MEDICARE

## 2025-08-19 DIAGNOSIS — R22.32 MASS OF SKIN OF LEFT SHOULDER: ICD-10-CM

## 2025-08-19 PROCEDURE — 73221 MRI JOINT UPR EXTREM W/O DYE: CPT

## 2025-08-22 DIAGNOSIS — R20.0 FINGER NUMBNESS: Primary | ICD-10-CM

## 2025-08-28 ENCOUNTER — PATIENT MESSAGE (OUTPATIENT)
Dept: ORTHOPEDIC SURGERY | Facility: CLINIC | Age: 75
End: 2025-08-28
Payer: MEDICARE

## 2025-08-28 DIAGNOSIS — R22.32 MASS OF SKIN OF LEFT SHOULDER: Primary | ICD-10-CM

## (undated) DEVICE — 450 ML BOTTLE OF 0.05% CHLORHEXIDINE GLUCONATE IN 99.95% STERILE WATER FOR IRRIGATION, USP AND APPLICATOR.: Brand: IRRISEPT ANTIMICROBIAL WOUND LAVAGE

## (undated) DEVICE — CVR HNDL LIGHT RIGID

## (undated) DEVICE — TB SXN FRAZIER 12F STRL

## (undated) DEVICE — INTENDED FOR TISSUE SEPARATION, AND OTHER PROCEDURES THAT REQUIRE A SHARP SURGICAL BLADE TO PUNCTURE OR CUT.: Brand: BARD-PARKER ® CARBON RIB-BACK BLADES

## (undated) DEVICE — Device

## (undated) DEVICE — STERILE PVP: Brand: MEDLINE INDUSTRIES, INC.

## (undated) DEVICE — CONTAINER,SPECIMEN,OR STERILE,4OZ: Brand: MEDLINE

## (undated) DEVICE — PAD,ARMBOARD,CONV,FOAM,2X8X20",12PR/CS: Brand: MEDLINE

## (undated) DEVICE — DELFI MATCHING LIMB PROTECTION SLEEVES (MLPS) HELP PROTECT THE PATIENT’S LIMB FROM POSSIBLE WRINKLING, PINCHING AND SHEARING OF SKIN AND SOFT TISSUES OF THE LIMB.EACH DELFI MATCHING LIMB PROTECTION SLEEVE IS INTENDED FOR USE WITH A SPECIFIC DELFI TOURNIQUET CUFF. THIS SLEEVE IS SPECIFICALLY FOR THIGH.: Brand: MATCHING LIMB PROTECTION SLEEVES - VARI-FIT

## (undated) DEVICE — SYR LUERLOK 30CC

## (undated) DEVICE — PULLOVER TOGA, 2X LARGE: Brand: FLYTE, SURGICOOL

## (undated) DEVICE — INTENDED FOR TISSUE SEPARATION, AND OTHER PROCEDURES THAT REQUIRE A SHARP SURGICAL BLADE TO PUNCTURE OR CUT.: Brand: BARD-PARKER ® STAINLESS STEEL BLADES

## (undated) DEVICE — ANTIBACTERIAL UNDYED BRAIDED (POLYGLACTIN 910), SYNTHETIC ABSORBABLE SUTURE: Brand: COATED VICRYL

## (undated) DEVICE — DRP ROBOTIC ROSA BX/20

## (undated) DEVICE — GLV SURG PREMIERPRO MIC LTX PF SZ8 BRN

## (undated) DEVICE — BLANKT WARM UPPR/BDY ARM/OUT 57X196CM

## (undated) DEVICE — BNDG ELAS W/CLIP 6IN 10YD LF STRL

## (undated) DEVICE — PUMP PAIN AUTOFUSER AUTO SELCT NOBOLUS 1TO14ML/HR 550ML DISP

## (undated) DEVICE — STRYKER PERFORMANCE SERIES SAGITTAL BLADE: Brand: STRYKER PERFORMANCE SERIES

## (undated) DEVICE — PK KN TOTL 10

## (undated) DEVICE — 3M™ STERI-DRAPE™ INCISE DRAPE 1050 (60CM X 45CM): Brand: STERI-DRAPE™

## (undated) DEVICE — PATIENT RETURN ELECTRODE, SINGLE-USE, CONTACT QUALITY MONITORING, ADULT, WITH 9FT CORD, FOR PATIENTS WEIGING OVER 33LBS. (15KG): Brand: MEGADYNE

## (undated) DEVICE — SPNG GZ WOVN 4X4IN 12PLY 10/BX STRL

## (undated) DEVICE — DRSNG PAD ABD 8X10IN STRL

## (undated) DEVICE — SUT MONOCRYL PLS ANTIB UND 3/0  PS1 27IN

## (undated) DEVICE — SYS SKIN CLS DERMABOND PRINEO W/22CM MESH TP

## (undated) DEVICE — TRAP FLD MINIVAC MEGADYNE 100ML

## (undated) DEVICE — PIN DRL NOHEAD TROC 3.2X75MM

## (undated) DEVICE — SYS CLS SKIN PREMIERPRO EXOFINFUSION 22CM

## (undated) DEVICE — NEEDLE, QUINCKE, 18GX3.5": Brand: MEDLINE

## (undated) DEVICE — UNDERCAST PADDING: Brand: DEROYAL

## (undated) DEVICE — TRY EPID SFTY 18G 3.5IN 1T7680

## (undated) DEVICE — PENCL SMOKE/EVAC MEGADYNE TELESCP 10FT

## (undated) DEVICE — NDL HYPO ECLPS SFTY 18G 1 1/2IN

## (undated) DEVICE — IMPLANTABLE DEVICE
Type: IMPLANTABLE DEVICE | Site: KNEE | Status: NON-FUNCTIONAL
Brand: PERSONA™
Removed: 2024-12-04

## (undated) DEVICE — 3 BONE CEMENT MIXER: Brand: MIXEVAC

## (undated) DEVICE — DRSNG SURESITE WNDW 4X4.5

## (undated) DEVICE — SYS IRR SURGIPHOR A/MIC RTU BO PVPI 450ML STRL